# Patient Record
Sex: FEMALE | Race: WHITE | Employment: OTHER | URBAN - METROPOLITAN AREA
[De-identification: names, ages, dates, MRNs, and addresses within clinical notes are randomized per-mention and may not be internally consistent; named-entity substitution may affect disease eponyms.]

---

## 2017-01-06 ENCOUNTER — TELEPHONE (OUTPATIENT)
Dept: INTERNAL MEDICINE CLINIC | Age: 62
End: 2017-01-06

## 2017-01-09 ENCOUNTER — OFFICE VISIT (OUTPATIENT)
Dept: CARDIOLOGY CLINIC | Age: 62
End: 2017-01-09

## 2017-01-09 VITALS
SYSTOLIC BLOOD PRESSURE: 120 MMHG | HEART RATE: 60 BPM | WEIGHT: 236.4 LBS | DIASTOLIC BLOOD PRESSURE: 58 MMHG | HEIGHT: 64 IN | BODY MASS INDEX: 40.36 KG/M2 | OXYGEN SATURATION: 98 %

## 2017-01-09 DIAGNOSIS — I10 ESSENTIAL HYPERTENSION: ICD-10-CM

## 2017-01-09 DIAGNOSIS — I82.5Z2 CHRONIC DEEP VEIN THROMBOSIS (DVT) OF DISTAL VEIN OF LEFT LOWER EXTREMITY (HCC): ICD-10-CM

## 2017-01-09 DIAGNOSIS — I42.9 CARDIOMYOPATHY (HCC): Primary | ICD-10-CM

## 2017-01-09 PROCEDURE — 93000 ELECTROCARDIOGRAM COMPLETE: CPT | Performed by: INTERNAL MEDICINE

## 2017-01-09 PROCEDURE — 99214 OFFICE O/P EST MOD 30 MIN: CPT | Performed by: INTERNAL MEDICINE

## 2017-01-17 RX ORDER — PANTOPRAZOLE SODIUM 40 MG/1
TABLET, DELAYED RELEASE ORAL
Qty: 30 TABLET | Refills: 5 | Status: SHIPPED | OUTPATIENT
Start: 2017-01-17 | End: 2017-08-10 | Stop reason: SDUPTHER

## 2017-01-27 RX ORDER — GABAPENTIN 300 MG/1
CAPSULE ORAL
Qty: 180 CAPSULE | Refills: 3 | Status: SHIPPED | OUTPATIENT
Start: 2017-01-27 | End: 2017-06-20 | Stop reason: SDUPTHER

## 2017-02-01 RX ORDER — INSULIN GLARGINE 300 U/ML
INJECTION, SOLUTION SUBCUTANEOUS
Qty: 18 ML | Refills: 3 | Status: SHIPPED | OUTPATIENT
Start: 2017-02-01 | End: 2017-04-25 | Stop reason: ALTCHOICE

## 2017-02-01 RX ORDER — GABAPENTIN 300 MG/1
CAPSULE ORAL
Qty: 180 CAPSULE | Refills: 3 | Status: SHIPPED | OUTPATIENT
Start: 2017-02-01 | End: 2017-04-25 | Stop reason: SDUPTHER

## 2017-02-06 RX ORDER — RIVAROXABAN 15 MG/1
TABLET, FILM COATED ORAL
Qty: 30 TABLET | Refills: 6 | Status: SHIPPED | OUTPATIENT
Start: 2017-02-06 | End: 2017-02-06 | Stop reason: ALTCHOICE

## 2017-02-24 RX ORDER — ATORVASTATIN CALCIUM 80 MG/1
TABLET, FILM COATED ORAL
Qty: 30 TABLET | Refills: 3 | Status: SHIPPED | OUTPATIENT
Start: 2017-02-24 | End: 2017-09-29 | Stop reason: SDUPTHER

## 2017-04-25 ENCOUNTER — OFFICE VISIT (OUTPATIENT)
Dept: CARDIOLOGY CLINIC | Age: 62
End: 2017-04-25

## 2017-04-25 VITALS
WEIGHT: 237 LBS | BODY MASS INDEX: 40.46 KG/M2 | HEIGHT: 64 IN | HEART RATE: 65 BPM | OXYGEN SATURATION: 96 % | DIASTOLIC BLOOD PRESSURE: 68 MMHG | SYSTOLIC BLOOD PRESSURE: 100 MMHG

## 2017-04-25 DIAGNOSIS — I82.502 CHRONIC DEEP VEIN THROMBOSIS (DVT) OF LEFT LOWER EXTREMITY, UNSPECIFIED VEIN (HCC): ICD-10-CM

## 2017-04-25 DIAGNOSIS — I42.8 NONISCHEMIC CARDIOMYOPATHY (HCC): Primary | ICD-10-CM

## 2017-04-25 DIAGNOSIS — I10 ESSENTIAL HYPERTENSION: ICD-10-CM

## 2017-04-25 PROCEDURE — 99214 OFFICE O/P EST MOD 30 MIN: CPT | Performed by: INTERNAL MEDICINE

## 2017-04-25 RX ORDER — TRIAMTERENE AND HYDROCHLOROTHIAZIDE 37.5; 25 MG/1; MG/1
1 CAPSULE ORAL EVERY MORNING
COMMUNITY
End: 2017-11-13 | Stop reason: SDUPTHER

## 2017-04-25 RX ORDER — CARVEDILOL 12.5 MG/1
12.5 TABLET ORAL 2 TIMES DAILY
Qty: 60 TABLET | Refills: 5 | Status: SHIPPED | OUTPATIENT
Start: 2017-04-25 | End: 2017-10-22 | Stop reason: SDUPTHER

## 2017-04-26 ENCOUNTER — TELEPHONE (OUTPATIENT)
Dept: CARDIOLOGY CLINIC | Age: 62
End: 2017-04-26

## 2017-05-15 RX ORDER — CITALOPRAM 40 MG/1
40 TABLET ORAL DAILY
Qty: 30 TABLET | Refills: 1 | Status: SHIPPED | OUTPATIENT
Start: 2017-05-15 | End: 2017-07-13 | Stop reason: SDUPTHER

## 2017-06-14 ENCOUNTER — OFFICE VISIT (OUTPATIENT)
Dept: INTERNAL MEDICINE CLINIC | Age: 62
End: 2017-06-14

## 2017-06-14 VITALS
BODY MASS INDEX: 42.17 KG/M2 | RESPIRATION RATE: 18 BRPM | OXYGEN SATURATION: 98 % | HEART RATE: 52 BPM | HEIGHT: 63 IN | DIASTOLIC BLOOD PRESSURE: 84 MMHG | WEIGHT: 238 LBS | SYSTOLIC BLOOD PRESSURE: 136 MMHG

## 2017-06-14 DIAGNOSIS — Z91.14 POOR COMPLIANCE WITH MEDICATION: ICD-10-CM

## 2017-06-14 DIAGNOSIS — M54.50 BACK PAIN, LUMBOSACRAL: ICD-10-CM

## 2017-06-14 DIAGNOSIS — Z12.31 VISIT FOR SCREENING MAMMOGRAM: ICD-10-CM

## 2017-06-14 DIAGNOSIS — G89.4 CHRONIC PAIN SYNDROME: ICD-10-CM

## 2017-06-14 DIAGNOSIS — E66.01 MORBID OBESITY DUE TO EXCESS CALORIES (HCC): ICD-10-CM

## 2017-06-14 DIAGNOSIS — Z12.11 COLON CANCER SCREENING: ICD-10-CM

## 2017-06-14 DIAGNOSIS — E78.2 MIXED HYPERLIPIDEMIA: ICD-10-CM

## 2017-06-14 DIAGNOSIS — I10 ESSENTIAL HYPERTENSION: Chronic | ICD-10-CM

## 2017-06-14 PROBLEM — Z91.148 POOR COMPLIANCE WITH MEDICATION: Status: ACTIVE | Noted: 2017-06-14

## 2017-06-14 LAB — HBA1C MFR BLD: 9.8 %

## 2017-06-14 PROCEDURE — 83036 HEMOGLOBIN GLYCOSYLATED A1C: CPT | Performed by: FAMILY MEDICINE

## 2017-06-14 PROCEDURE — 82274 ASSAY TEST FOR BLOOD FECAL: CPT | Performed by: FAMILY MEDICINE

## 2017-06-14 PROCEDURE — 99214 OFFICE O/P EST MOD 30 MIN: CPT | Performed by: FAMILY MEDICINE

## 2017-06-14 RX ORDER — LISINOPRIL 20 MG/1
20 TABLET ORAL DAILY
Qty: 90 TABLET | Refills: 1 | Status: SHIPPED | OUTPATIENT
Start: 2017-06-14 | End: 2017-12-14 | Stop reason: SDUPTHER

## 2017-06-14 ASSESSMENT — ENCOUNTER SYMPTOMS
ABDOMINAL PAIN: 0
DIARRHEA: 0
SHORTNESS OF BREATH: 0
BLOOD IN STOOL: 0
CONSTIPATION: 0

## 2017-06-20 RX ORDER — GABAPENTIN 300 MG/1
CAPSULE ORAL
Qty: 180 CAPSULE | Refills: 3 | Status: SHIPPED | OUTPATIENT
Start: 2017-06-20 | End: 2017-10-22 | Stop reason: SDUPTHER

## 2017-06-22 ENCOUNTER — TELEPHONE (OUTPATIENT)
Dept: INTERNAL MEDICINE CLINIC | Age: 62
End: 2017-06-22

## 2017-06-22 DIAGNOSIS — R19.5 POSITIVE FIT (FECAL IMMUNOCHEMICAL TEST): Primary | ICD-10-CM

## 2017-06-22 LAB
CONTROL: PRESENT
HEMOCCULT STL QL: POSITIVE

## 2017-06-26 ENCOUNTER — HOSPITAL ENCOUNTER (OUTPATIENT)
Dept: OTHER | Age: 62
Discharge: HOME | End: 2017-06-26
Attending: FAMILY MEDICINE | Admitting: FAMILY MEDICINE

## 2017-06-26 DIAGNOSIS — G89.4 CHRONIC PAIN SYNDROME: ICD-10-CM

## 2017-06-27 ENCOUNTER — TELEPHONE (OUTPATIENT)
Dept: INTERNAL MEDICINE CLINIC | Age: 62
End: 2017-06-27

## 2017-07-03 RX ORDER — NAPROXEN 375 MG/1
375 TABLET ORAL 2 TIMES DAILY WITH MEALS
Qty: 60 TABLET | Refills: 3 | Status: SHIPPED | OUTPATIENT
Start: 2017-07-03 | End: 2017-07-17

## 2017-07-05 ENCOUNTER — HOSPITAL ENCOUNTER (OUTPATIENT)
Dept: WOMENS IMAGING | Age: 62
Discharge: HOME | End: 2017-07-05
Attending: FAMILY MEDICINE | Admitting: FAMILY MEDICINE

## 2017-07-05 DIAGNOSIS — Z12.31 VISIT FOR SCREENING MAMMOGRAM: ICD-10-CM

## 2017-07-07 ENCOUNTER — TELEPHONE (OUTPATIENT)
Dept: INTERNAL MEDICINE CLINIC | Age: 62
End: 2017-07-07

## 2017-07-07 DIAGNOSIS — R92.8 ABNORMAL MAMMOGRAM OF LEFT BREAST: Primary | ICD-10-CM

## 2017-07-10 ENCOUNTER — HOSPITAL ENCOUNTER (OUTPATIENT)
Dept: ULTRASOUND IMAGING | Age: 62
Discharge: HOME | End: 2017-07-10
Attending: FAMILY MEDICINE | Admitting: FAMILY MEDICINE

## 2017-07-10 DIAGNOSIS — R92.8 ABNORMAL MAMMOGRAM OF LEFT BREAST: ICD-10-CM

## 2017-07-10 DIAGNOSIS — R92.8 OTHER ABNORMAL AND INCONCLUSIVE FINDINGS ON DIAGNOSTIC IMAGING OF BREAST: ICD-10-CM

## 2017-07-13 RX ORDER — CITALOPRAM 40 MG/1
TABLET ORAL
Qty: 30 TABLET | Refills: 2 | Status: SHIPPED | OUTPATIENT
Start: 2017-07-13 | End: 2017-10-15 | Stop reason: SDUPTHER

## 2017-07-17 RX ORDER — NAPROXEN 500 MG/1
500 TABLET ORAL 2 TIMES DAILY WITH MEALS
Qty: 60 TABLET | Refills: 0 | Status: SHIPPED | OUTPATIENT
Start: 2017-07-17 | End: 2017-08-16 | Stop reason: SDUPTHER

## 2017-07-27 RX ORDER — VALACYCLOVIR HYDROCHLORIDE 1 G/1
TABLET, FILM COATED ORAL
Qty: 4 TABLET | Refills: 1 | Status: SHIPPED | OUTPATIENT
Start: 2017-07-27 | End: 2018-03-27

## 2017-08-10 RX ORDER — PANTOPRAZOLE SODIUM 40 MG/1
TABLET, DELAYED RELEASE ORAL
Qty: 30 TABLET | Refills: 3 | Status: SHIPPED | OUTPATIENT
Start: 2017-08-10 | End: 2017-12-09 | Stop reason: SDUPTHER

## 2017-08-14 RX ORDER — INSULIN DEGLUDEC 200 U/ML
INJECTION, SOLUTION SUBCUTANEOUS
Refills: 1 | OUTPATIENT
Start: 2017-08-14

## 2017-08-17 RX ORDER — NAPROXEN 500 MG/1
500 TABLET ORAL 2 TIMES DAILY WITH MEALS
Qty: 60 TABLET | Refills: 0 | Status: SHIPPED | OUTPATIENT
Start: 2017-08-17 | End: 2017-09-13 | Stop reason: SDUPTHER

## 2017-09-13 RX ORDER — NAPROXEN 500 MG/1
500 TABLET ORAL 2 TIMES DAILY WITH MEALS
Qty: 60 TABLET | Refills: 0 | Status: SHIPPED | OUTPATIENT
Start: 2017-09-13 | End: 2017-10-05

## 2017-09-13 RX ORDER — NAPROXEN 500 MG/1
TABLET ORAL
Qty: 60 TABLET | Refills: 0 | OUTPATIENT
Start: 2017-09-13

## 2017-09-21 RX ORDER — RIVAROXABAN 20 MG/1
TABLET, FILM COATED ORAL
Qty: 30 TABLET | Refills: 1 | Status: SHIPPED | OUTPATIENT
Start: 2017-09-21 | End: 2017-11-19 | Stop reason: SDUPTHER

## 2017-09-28 ENCOUNTER — OFFICE VISIT (OUTPATIENT)
Dept: INTERNAL MEDICINE CLINIC | Age: 62
End: 2017-09-28

## 2017-09-28 VITALS — WEIGHT: 247 LBS | HEART RATE: 56 BPM | BODY MASS INDEX: 44.1 KG/M2 | OXYGEN SATURATION: 97 % | RESPIRATION RATE: 18 BRPM

## 2017-09-28 DIAGNOSIS — E78.2 MIXED HYPERLIPIDEMIA: ICD-10-CM

## 2017-09-28 DIAGNOSIS — N32.81 OAB (OVERACTIVE BLADDER): ICD-10-CM

## 2017-09-28 DIAGNOSIS — E66.01 MORBID OBESITY DUE TO EXCESS CALORIES (HCC): ICD-10-CM

## 2017-09-28 DIAGNOSIS — Z23 NEED FOR INFLUENZA VACCINATION: ICD-10-CM

## 2017-09-28 DIAGNOSIS — E11.42 DM TYPE 2 WITH DIABETIC PERIPHERAL NEUROPATHY (HCC): Primary | ICD-10-CM

## 2017-09-28 DIAGNOSIS — I10 ESSENTIAL HYPERTENSION: Chronic | ICD-10-CM

## 2017-09-28 LAB
CREATININE URINE POCT: 106.6
HBA1C MFR BLD: 7.8 %
MICROALBUMIN/CREAT 24H UR: 41.2 MG/G{CREAT}
MICROALBUMIN/CREAT UR-RTO: 38.6

## 2017-09-28 PROCEDURE — 90686 IIV4 VACC NO PRSV 0.5 ML IM: CPT | Performed by: FAMILY MEDICINE

## 2017-09-28 PROCEDURE — 99214 OFFICE O/P EST MOD 30 MIN: CPT | Performed by: FAMILY MEDICINE

## 2017-09-28 PROCEDURE — 90471 IMMUNIZATION ADMIN: CPT | Performed by: FAMILY MEDICINE

## 2017-09-28 PROCEDURE — 83036 HEMOGLOBIN GLYCOSYLATED A1C: CPT | Performed by: FAMILY MEDICINE

## 2017-09-28 PROCEDURE — 82044 UR ALBUMIN SEMIQUANTITATIVE: CPT | Performed by: FAMILY MEDICINE

## 2017-09-28 ASSESSMENT — ENCOUNTER SYMPTOMS
CONSTIPATION: 0
SHORTNESS OF BREATH: 0
BLOOD IN STOOL: 0
ABDOMINAL PAIN: 0
DIARRHEA: 0

## 2017-09-29 RX ORDER — TOLTERODINE 4 MG/1
4 CAPSULE, EXTENDED RELEASE ORAL DAILY
Qty: 30 CAPSULE | Refills: 3 | Status: SHIPPED | OUTPATIENT
Start: 2017-09-29 | End: 2018-03-27

## 2017-09-29 RX ORDER — ATORVASTATIN CALCIUM 80 MG/1
TABLET, FILM COATED ORAL
Qty: 90 TABLET | Refills: 1 | Status: SHIPPED | OUTPATIENT
Start: 2017-09-29 | End: 2018-05-24 | Stop reason: SDUPTHER

## 2017-10-02 ENCOUNTER — TELEPHONE (OUTPATIENT)
Dept: INTERNAL MEDICINE CLINIC | Age: 62
End: 2017-10-02

## 2017-10-02 RX ORDER — OXYBUTYNIN CHLORIDE 5 MG/1
5 TABLET ORAL 2 TIMES DAILY
Qty: 60 TABLET | Refills: 3 | Status: SHIPPED | OUTPATIENT
Start: 2017-10-02 | End: 2018-04-09

## 2017-10-03 ENCOUNTER — TELEPHONE (OUTPATIENT)
Dept: BARIATRICS/WEIGHT MGMT | Age: 62
End: 2017-10-03

## 2017-10-04 ENCOUNTER — NURSE ONLY (OUTPATIENT)
Dept: INTERNAL MEDICINE CLINIC | Age: 62
End: 2017-10-04

## 2017-10-04 DIAGNOSIS — Z11.59 NEED FOR HEPATITIS C SCREENING TEST: ICD-10-CM

## 2017-10-04 DIAGNOSIS — Z11.4 SCREENING FOR HIV (HUMAN IMMUNODEFICIENCY VIRUS): ICD-10-CM

## 2017-10-04 DIAGNOSIS — Z00.00 PREVENTATIVE HEALTH CARE: Primary | ICD-10-CM

## 2017-10-04 LAB
A/G RATIO: 1.2 (ref 1.1–2.2)
ALBUMIN SERPL-MCNC: 3.8 G/DL (ref 3.4–5)
ALP BLD-CCNC: 99 U/L (ref 40–129)
ALT SERPL-CCNC: 13 U/L (ref 10–40)
ANION GAP SERPL CALCULATED.3IONS-SCNC: 14 MMOL/L (ref 3–16)
AST SERPL-CCNC: 11 U/L (ref 15–37)
BASOPHILS ABSOLUTE: 0.1 K/UL (ref 0–0.2)
BASOPHILS RELATIVE PERCENT: 0.7 %
BILIRUB SERPL-MCNC: <0.2 MG/DL (ref 0–1)
BUN BLDV-MCNC: 35 MG/DL (ref 7–20)
CALCIUM SERPL-MCNC: 9.2 MG/DL (ref 8.3–10.6)
CHLORIDE BLD-SCNC: 100 MMOL/L (ref 99–110)
CHOLESTEROL, TOTAL: 191 MG/DL (ref 0–199)
CO2: 25 MMOL/L (ref 21–32)
CREAT SERPL-MCNC: 1.9 MG/DL (ref 0.6–1.2)
EOSINOPHILS ABSOLUTE: 0.3 K/UL (ref 0–0.6)
EOSINOPHILS RELATIVE PERCENT: 3.9 %
GFR AFRICAN AMERICAN: 32
GFR NON-AFRICAN AMERICAN: 27
GLOBULIN: 3.2 G/DL
GLUCOSE BLD-MCNC: 131 MG/DL (ref 70–99)
HCT VFR BLD CALC: 34.3 % (ref 36–48)
HDLC SERPL-MCNC: 46 MG/DL (ref 40–60)
HEMOGLOBIN: 11.1 G/DL (ref 12–16)
HEPATITIS C ANTIBODY INTERPRETATION: NORMAL
LDL CHOLESTEROL CALCULATED: 119 MG/DL
LYMPHOCYTES ABSOLUTE: 2 K/UL (ref 1–5.1)
LYMPHOCYTES RELATIVE PERCENT: 25.3 %
MCH RBC QN AUTO: 29.4 PG (ref 26–34)
MCHC RBC AUTO-ENTMCNC: 32.3 G/DL (ref 31–36)
MCV RBC AUTO: 90.9 FL (ref 80–100)
MONOCYTES ABSOLUTE: 0.6 K/UL (ref 0–1.3)
MONOCYTES RELATIVE PERCENT: 6.9 %
NEUTROPHILS ABSOLUTE: 5.1 K/UL (ref 1.7–7.7)
NEUTROPHILS RELATIVE PERCENT: 63.2 %
PDW BLD-RTO: 14.5 % (ref 12.4–15.4)
PLATELET # BLD: 260 K/UL (ref 135–450)
PMV BLD AUTO: 10 FL (ref 5–10.5)
POTASSIUM SERPL-SCNC: 5 MMOL/L (ref 3.5–5.1)
RBC # BLD: 3.77 M/UL (ref 4–5.2)
SODIUM BLD-SCNC: 139 MMOL/L (ref 136–145)
T4 FREE: 0.9 NG/DL (ref 0.9–1.8)
TOTAL PROTEIN: 7 G/DL (ref 6.4–8.2)
TRIGL SERPL-MCNC: 132 MG/DL (ref 0–150)
TSH SERPL DL<=0.05 MIU/L-ACNC: 2.24 UIU/ML (ref 0.27–4.2)
VLDLC SERPL CALC-MCNC: 26 MG/DL
WBC # BLD: 8 K/UL (ref 4–11)

## 2017-10-04 PROCEDURE — 36415 COLL VENOUS BLD VENIPUNCTURE: CPT | Performed by: FAMILY MEDICINE

## 2017-10-04 NOTE — PROGRESS NOTES
Patient is here for fasting labs. Drawn without complications. Advised patient to check for lab results, or call in the next day or two.

## 2017-10-05 ENCOUNTER — TELEPHONE (OUTPATIENT)
Dept: INTERNAL MEDICINE CLINIC | Age: 62
End: 2017-10-05

## 2017-10-05 DIAGNOSIS — E11.22 CKD STAGE 3 SECONDARY TO DIABETES (HCC): Primary | ICD-10-CM

## 2017-10-05 DIAGNOSIS — N18.30 CKD STAGE 3 SECONDARY TO DIABETES (HCC): Primary | ICD-10-CM

## 2017-10-05 LAB — HIV-1 AND HIV-2 ANTIBODIES: NORMAL

## 2017-10-05 RX ORDER — EZETIMIBE 10 MG/1
10 TABLET ORAL DAILY
Qty: 30 TABLET | Refills: 3 | Status: SHIPPED | OUTPATIENT
Start: 2017-10-05 | End: 2018-04-09 | Stop reason: ALTCHOICE

## 2017-10-05 NOTE — TELEPHONE ENCOUNTER
Reviewed with Dr Elli Pittman. V.O.'s per Dr Elli Pittman:    1) D/C Naprosyn. No NSAID's.   2) CKD stage 3-4 refer to Dr Alesha Acosta  3) LDL elevated. Pt is at optimum dose of Lipitor- 80 mg. Add Zetia 10 mg daily. Repeat lipids in 3 months. Called pt. Informed her of all of this. She is okay with the Zetia. Wants to wait on the referral (copay costs). Main question:  She takes the NSAIDs for back pain. What can she take for the pain now? See a spine specialist?    Informed pt that Dr Elli Pittman has left for the day. Will call back tomorrow.

## 2017-10-09 RX ORDER — DAPAGLIFLOZIN 10 MG/1
10 TABLET, FILM COATED ORAL EVERY MORNING
Qty: 30 TABLET | Refills: 3 | Status: SHIPPED | OUTPATIENT
Start: 2017-10-09 | End: 2018-02-15 | Stop reason: SDUPTHER

## 2017-10-16 RX ORDER — CITALOPRAM 40 MG/1
40 TABLET ORAL DAILY
Qty: 30 TABLET | Refills: 3 | Status: SHIPPED | OUTPATIENT
Start: 2017-10-16 | End: 2018-02-20 | Stop reason: SDUPTHER

## 2017-10-23 RX ORDER — GABAPENTIN 300 MG/1
CAPSULE ORAL
Qty: 180 CAPSULE | Refills: 3 | Status: SHIPPED | OUTPATIENT
Start: 2017-10-23 | End: 2018-03-27

## 2017-10-23 RX ORDER — CARVEDILOL 12.5 MG/1
TABLET ORAL
Qty: 60 TABLET | Refills: 6 | Status: SHIPPED | OUTPATIENT
Start: 2017-10-23 | End: 2018-04-25 | Stop reason: SDUPTHER

## 2017-11-13 RX ORDER — TRIAMTERENE AND HYDROCHLOROTHIAZIDE 37.5; 25 MG/1; MG/1
1 CAPSULE ORAL EVERY MORNING
Qty: 30 CAPSULE | Refills: 2 | Status: SHIPPED | OUTPATIENT
Start: 2017-11-13 | End: 2018-02-20 | Stop reason: SDUPTHER

## 2017-11-20 RX ORDER — RIVAROXABAN 20 MG/1
TABLET, FILM COATED ORAL
Qty: 30 TABLET | Refills: 1 | Status: SHIPPED | OUTPATIENT
Start: 2017-11-20 | End: 2018-04-09 | Stop reason: ALTCHOICE

## 2017-12-11 RX ORDER — PANTOPRAZOLE SODIUM 40 MG/1
TABLET, DELAYED RELEASE ORAL
Qty: 30 TABLET | Refills: 2 | Status: SHIPPED | OUTPATIENT
Start: 2017-12-11 | End: 2018-03-27 | Stop reason: SDUPTHER

## 2017-12-15 RX ORDER — LISINOPRIL 20 MG/1
20 TABLET ORAL DAILY
Qty: 90 TABLET | Refills: 0 | Status: SHIPPED | OUTPATIENT
Start: 2017-12-15 | End: 2018-02-28 | Stop reason: SDUPTHER

## 2018-02-12 DIAGNOSIS — E11.42 DIABETIC POLYNEUROPATHY ASSOCIATED WITH TYPE 2 DIABETES MELLITUS (HCC): Primary | ICD-10-CM

## 2018-02-12 RX ORDER — LANCETS 33 GAUGE
EACH MISCELLANEOUS
Qty: 100 EACH | Refills: 11 | Status: SHIPPED | OUTPATIENT
Start: 2018-02-12 | End: 2018-03-27

## 2018-02-20 RX ORDER — CITALOPRAM 40 MG/1
40 TABLET ORAL DAILY
Qty: 30 TABLET | Refills: 0 | Status: SHIPPED | OUTPATIENT
Start: 2018-02-20 | End: 2018-03-27 | Stop reason: SDUPTHER

## 2018-02-20 RX ORDER — TRIAMTERENE AND HYDROCHLOROTHIAZIDE 37.5; 25 MG/1; MG/1
1 CAPSULE ORAL EVERY MORNING
Qty: 30 CAPSULE | Refills: 0 | Status: SHIPPED | OUTPATIENT
Start: 2018-02-20 | End: 2018-03-27 | Stop reason: SDUPTHER

## 2018-02-28 RX ORDER — LISINOPRIL 20 MG/1
20 TABLET ORAL DAILY
Qty: 30 TABLET | Refills: 0 | Status: SHIPPED | OUTPATIENT
Start: 2018-02-28 | End: 2018-03-27 | Stop reason: SDUPTHER

## 2018-03-27 ENCOUNTER — OFFICE VISIT (OUTPATIENT)
Dept: INTERNAL MEDICINE CLINIC | Age: 63
End: 2018-03-27

## 2018-03-27 VITALS
DIASTOLIC BLOOD PRESSURE: 57 MMHG | BODY MASS INDEX: 42.88 KG/M2 | OXYGEN SATURATION: 97 % | WEIGHT: 242 LBS | HEART RATE: 52 BPM | SYSTOLIC BLOOD PRESSURE: 102 MMHG | HEIGHT: 63 IN

## 2018-03-27 DIAGNOSIS — E11.42 DM TYPE 2 WITH DIABETIC PERIPHERAL NEUROPATHY (HCC): ICD-10-CM

## 2018-03-27 DIAGNOSIS — Z91.14 POOR COMPLIANCE WITH MEDICATION: ICD-10-CM

## 2018-03-27 DIAGNOSIS — I10 ESSENTIAL HYPERTENSION: Chronic | ICD-10-CM

## 2018-03-27 DIAGNOSIS — L08.9 DIABETIC INFECTION OF LEFT FOOT (HCC): ICD-10-CM

## 2018-03-27 DIAGNOSIS — E78.2 MIXED HYPERLIPIDEMIA: ICD-10-CM

## 2018-03-27 DIAGNOSIS — E11.628 DIABETIC INFECTION OF LEFT FOOT (HCC): ICD-10-CM

## 2018-03-27 LAB — HBA1C MFR BLD: 8.2 %

## 2018-03-27 PROCEDURE — G8417 CALC BMI ABV UP PARAM F/U: HCPCS | Performed by: FAMILY MEDICINE

## 2018-03-27 PROCEDURE — 99214 OFFICE O/P EST MOD 30 MIN: CPT | Performed by: FAMILY MEDICINE

## 2018-03-27 PROCEDURE — G8427 DOCREV CUR MEDS BY ELIG CLIN: HCPCS | Performed by: FAMILY MEDICINE

## 2018-03-27 PROCEDURE — 83036 HEMOGLOBIN GLYCOSYLATED A1C: CPT | Performed by: FAMILY MEDICINE

## 2018-03-27 PROCEDURE — 3017F COLORECTAL CA SCREEN DOC REV: CPT | Performed by: FAMILY MEDICINE

## 2018-03-27 PROCEDURE — 1036F TOBACCO NON-USER: CPT | Performed by: FAMILY MEDICINE

## 2018-03-27 PROCEDURE — G8482 FLU IMMUNIZE ORDER/ADMIN: HCPCS | Performed by: FAMILY MEDICINE

## 2018-03-27 PROCEDURE — 3014F SCREEN MAMMO DOC REV: CPT | Performed by: FAMILY MEDICINE

## 2018-03-27 PROCEDURE — 3045F PR MOST RECENT HEMOGLOBIN A1C LEVEL 7.0-9.0%: CPT | Performed by: FAMILY MEDICINE

## 2018-03-27 RX ORDER — CITALOPRAM 40 MG/1
40 TABLET ORAL DAILY
Qty: 30 TABLET | Refills: 3 | Status: SHIPPED | OUTPATIENT
Start: 2018-03-27 | End: 2018-07-22 | Stop reason: SDUPTHER

## 2018-03-27 RX ORDER — CLOTRIMAZOLE AND BETAMETHASONE DIPROPIONATE 10; .64 MG/G; MG/G
CREAM TOPICAL
Qty: 1 TUBE | Refills: 1 | Status: SHIPPED | OUTPATIENT
Start: 2018-03-27 | End: 2018-06-11

## 2018-03-27 RX ORDER — FLUCONAZOLE 150 MG/1
150 TABLET ORAL ONCE
Qty: 2 TABLET | Refills: 0 | Status: SHIPPED | OUTPATIENT
Start: 2018-03-27 | End: 2018-03-27

## 2018-03-27 RX ORDER — TRIAMTERENE AND HYDROCHLOROTHIAZIDE 37.5; 25 MG/1; MG/1
1 CAPSULE ORAL EVERY MORNING
Qty: 30 CAPSULE | Refills: 3 | Status: SHIPPED | OUTPATIENT
Start: 2018-03-27 | End: 2018-04-25 | Stop reason: SDUPTHER

## 2018-03-27 RX ORDER — PANTOPRAZOLE SODIUM 40 MG/1
TABLET, DELAYED RELEASE ORAL
Qty: 30 TABLET | Refills: 3 | Status: SHIPPED | OUTPATIENT
Start: 2018-03-27 | End: 2018-07-15 | Stop reason: SDUPTHER

## 2018-03-27 RX ORDER — LISINOPRIL 20 MG/1
20 TABLET ORAL DAILY
Qty: 30 TABLET | Refills: 3 | Status: SHIPPED | OUTPATIENT
Start: 2018-03-27 | End: 2018-07-22 | Stop reason: SDUPTHER

## 2018-03-27 RX ORDER — SULFAMETHOXAZOLE AND TRIMETHOPRIM 800; 160 MG/1; MG/1
1 TABLET ORAL 2 TIMES DAILY
Qty: 20 TABLET | Refills: 0 | Status: SHIPPED | OUTPATIENT
Start: 2018-03-27 | End: 2018-04-06

## 2018-03-27 ASSESSMENT — ENCOUNTER SYMPTOMS
SHORTNESS OF BREATH: 0
BLOOD IN STOOL: 0
CONSTIPATION: 0
ABDOMINAL PAIN: 0
DIARRHEA: 0

## 2018-03-29 RX ORDER — LISINOPRIL 20 MG/1
20 TABLET ORAL DAILY
Qty: 30 TABLET | Refills: 0 | OUTPATIENT
Start: 2018-03-29

## 2018-04-09 ENCOUNTER — HOSPITAL ENCOUNTER (OUTPATIENT)
Dept: WOUND CARE | Age: 63
Discharge: HOME | End: 2018-04-09
Admitting: NURSE PRACTITIONER

## 2018-04-09 VITALS
HEART RATE: 56 BPM | WEIGHT: 244.05 LBS | TEMPERATURE: 98.4 F | RESPIRATION RATE: 20 BRPM | HEIGHT: 63 IN | BODY MASS INDEX: 43.24 KG/M2 | DIASTOLIC BLOOD PRESSURE: 74 MMHG | SYSTOLIC BLOOD PRESSURE: 143 MMHG

## 2018-04-09 DIAGNOSIS — E66.01 MORBID OBESITY DUE TO EXCESS CALORIES (HCC): ICD-10-CM

## 2018-04-09 DIAGNOSIS — E11.42 DM TYPE 2 WITH DIABETIC PERIPHERAL NEUROPATHY (HCC): Primary | ICD-10-CM

## 2018-04-09 DIAGNOSIS — E11.621 DIABETIC ULCER OF TOE OF LEFT FOOT ASSOCIATED WITH TYPE 2 DIABETES MELLITUS, WITH FAT LAYER EXPOSED (HCC): ICD-10-CM

## 2018-04-09 DIAGNOSIS — L97.522 DIABETIC ULCER OF TOE OF LEFT FOOT ASSOCIATED WITH TYPE 2 DIABETES MELLITUS, WITH FAT LAYER EXPOSED (HCC): ICD-10-CM

## 2018-04-09 PROCEDURE — 99204 OFFICE O/P NEW MOD 45 MIN: CPT | Performed by: NURSE PRACTITIONER

## 2018-04-09 PROCEDURE — 11042 DBRDMT SUBQ TIS 1ST 20SQCM/<: CPT | Performed by: NURSE PRACTITIONER

## 2018-04-09 RX ORDER — LIDOCAINE HYDROCHLORIDE 40 MG/ML
SOLUTION TOPICAL ONCE
Status: DISCONTINUED | OUTPATIENT
Start: 2018-04-09 | End: 2018-04-10 | Stop reason: HOSPADM

## 2018-04-09 RX ORDER — GABAPENTIN 100 MG/1
100 CAPSULE ORAL 3 TIMES DAILY
COMMUNITY
End: 2018-05-25 | Stop reason: SDUPTHER

## 2018-04-10 PROBLEM — E11.621 DIABETIC ULCER OF TOE OF LEFT FOOT ASSOCIATED WITH TYPE 2 DIABETES MELLITUS, WITH FAT LAYER EXPOSED (HCC): Status: ACTIVE | Noted: 2018-04-10

## 2018-04-10 PROBLEM — L97.522 DIABETIC ULCER OF TOE OF LEFT FOOT ASSOCIATED WITH TYPE 2 DIABETES MELLITUS, WITH FAT LAYER EXPOSED (HCC): Status: ACTIVE | Noted: 2018-04-10

## 2018-04-19 ENCOUNTER — HOSPITAL ENCOUNTER (OUTPATIENT)
Dept: WOUND CARE | Age: 63
Discharge: HOME | End: 2018-04-19
Attending: PODIATRIST | Admitting: PODIATRIST

## 2018-04-19 VITALS
RESPIRATION RATE: 20 BRPM | TEMPERATURE: 98.1 F | DIASTOLIC BLOOD PRESSURE: 61 MMHG | SYSTOLIC BLOOD PRESSURE: 101 MMHG | HEART RATE: 58 BPM

## 2018-04-20 RX ORDER — LIDOCAINE HYDROCHLORIDE 40 MG/ML
SOLUTION TOPICAL ONCE
Status: DISCONTINUED | OUTPATIENT
Start: 2018-04-20 | End: 2018-06-07

## 2018-04-25 RX ORDER — CARVEDILOL 12.5 MG/1
TABLET ORAL
Qty: 180 TABLET | Refills: 0 | Status: SHIPPED | OUTPATIENT
Start: 2018-04-25 | End: 2018-08-28 | Stop reason: SDUPTHER

## 2018-04-25 RX ORDER — TRIAMTERENE AND HYDROCHLOROTHIAZIDE 37.5; 25 MG/1; MG/1
1 CAPSULE ORAL EVERY MORNING
Qty: 90 CAPSULE | Refills: 1 | Status: SHIPPED | OUTPATIENT
Start: 2018-04-25 | End: 2018-11-18 | Stop reason: SDUPTHER

## 2018-04-25 RX ORDER — GABAPENTIN 300 MG/1
CAPSULE ORAL
Qty: 180 CAPSULE | Refills: 3 | Status: SHIPPED | OUTPATIENT
Start: 2018-04-25 | End: 2018-07-22 | Stop reason: SDUPTHER

## 2018-05-16 DIAGNOSIS — E11.42 DM TYPE 2 WITH DIABETIC PERIPHERAL NEUROPATHY (HCC): Primary | ICD-10-CM

## 2018-05-16 RX ORDER — PEN NEEDLE, DIABETIC 31 GX5/16"
NEEDLE, DISPOSABLE MISCELLANEOUS
Qty: 100 EACH | Refills: 3 | Status: SHIPPED | OUTPATIENT
Start: 2018-05-16 | End: 2018-09-18 | Stop reason: SDUPTHER

## 2018-05-24 ENCOUNTER — HOSPITAL ENCOUNTER (OUTPATIENT)
Dept: OTHER | Age: 63
Discharge: HOME | End: 2018-05-24
Attending: PODIATRIST | Admitting: PODIATRIST

## 2018-05-24 ENCOUNTER — HOSPITAL ENCOUNTER (OUTPATIENT)
Dept: WOUND CARE | Age: 63
Discharge: HOME | End: 2018-05-24
Attending: PODIATRIST | Admitting: PODIATRIST

## 2018-05-24 VITALS
TEMPERATURE: 97.8 F | WEIGHT: 244 LBS | SYSTOLIC BLOOD PRESSURE: 124 MMHG | BODY MASS INDEX: 43.23 KG/M2 | RESPIRATION RATE: 20 BRPM | HEIGHT: 63 IN | HEART RATE: 60 BPM | DIASTOLIC BLOOD PRESSURE: 67 MMHG

## 2018-05-24 DIAGNOSIS — E11.621 DIABETIC ULCER OF TOE OF LEFT FOOT ASSOCIATED WITH TYPE 2 DIABETES MELLITUS, WITH FAT LAYER EXPOSED (HCC): ICD-10-CM

## 2018-05-24 DIAGNOSIS — L97.522 DIABETIC ULCER OF TOE OF LEFT FOOT ASSOCIATED WITH TYPE 2 DIABETES MELLITUS, WITH FAT LAYER EXPOSED (HCC): ICD-10-CM

## 2018-05-24 DIAGNOSIS — M79.662 PAIN OF LEFT CALF: Primary | ICD-10-CM

## 2018-05-24 LAB
C-REACTIVE PROTEIN: 48.7 MG/L (ref 0–5.1)
HCT VFR BLD CALC: 32.8 % (ref 36–48)
HEMOGLOBIN: 11 G/DL (ref 12–16)
MCH RBC QN AUTO: 29.3 PG (ref 26–34)
MCHC RBC AUTO-ENTMCNC: 33.5 G/DL (ref 31–36)
MCV RBC AUTO: 87.5 FL (ref 80–100)
PDW BLD-RTO: 15.3 % (ref 12.4–15.4)
PLATELET # BLD: 304 K/UL (ref 135–450)
PMV BLD AUTO: 9.7 FL (ref 5–10.5)
RBC # BLD: 3.75 M/UL (ref 4–5.2)
SEDIMENTATION RATE, ERYTHROCYTE: 49 MM/HR (ref 0–30)
WBC # BLD: 10.1 K/UL (ref 4–11)

## 2018-05-24 RX ORDER — CIPROFLOXACIN 500 MG/1
500 TABLET, FILM COATED ORAL 2 TIMES DAILY
Qty: 28 TABLET | Refills: 0 | Status: SHIPPED | OUTPATIENT
Start: 2018-05-24 | End: 2018-06-07

## 2018-05-24 RX ORDER — CLINDAMYCIN HYDROCHLORIDE 300 MG/1
300 CAPSULE ORAL 3 TIMES DAILY
Qty: 42 CAPSULE | Refills: 0 | Status: SHIPPED | OUTPATIENT
Start: 2018-05-24 | End: 2018-05-31 | Stop reason: ALTCHOICE

## 2018-05-24 ASSESSMENT — PAIN DESCRIPTION - LOCATION: LOCATION: TOE (COMMENT WHICH ONE)

## 2018-05-24 ASSESSMENT — PAIN DESCRIPTION - ORIENTATION: ORIENTATION: LEFT

## 2018-05-24 ASSESSMENT — PAIN DESCRIPTION - DESCRIPTORS: DESCRIPTORS: ACHING

## 2018-05-24 ASSESSMENT — PAIN SCALES - GENERAL: PAINLEVEL_OUTOF10: 2

## 2018-05-24 ASSESSMENT — PAIN DESCRIPTION - ONSET: ONSET: ON-GOING

## 2018-05-24 ASSESSMENT — PAIN DESCRIPTION - FREQUENCY: FREQUENCY: INTERMITTENT

## 2018-05-24 ASSESSMENT — PAIN DESCRIPTION - PROGRESSION: CLINICAL_PROGRESSION: NOT CHANGED

## 2018-05-24 ASSESSMENT — PAIN DESCRIPTION - PAIN TYPE: TYPE: ACUTE PAIN

## 2018-05-25 RX ORDER — LIDOCAINE HYDROCHLORIDE 40 MG/ML
SOLUTION TOPICAL PRN
Status: DISCONTINUED | OUTPATIENT
Start: 2018-05-25 | End: 2018-11-19

## 2018-05-25 RX ORDER — ATORVASTATIN CALCIUM 80 MG/1
TABLET, FILM COATED ORAL
Qty: 90 TABLET | Refills: 0 | Status: SHIPPED | OUTPATIENT
Start: 2018-05-25 | End: 2018-05-31 | Stop reason: SDUPTHER

## 2018-05-26 LAB
GRAM STAIN RESULT: ABNORMAL
ORGANISM: ABNORMAL
WOUND/ABSCESS: ABNORMAL
WOUND/ABSCESS: ABNORMAL

## 2018-05-31 ENCOUNTER — HOSPITAL ENCOUNTER (OUTPATIENT)
Dept: WOUND CARE | Age: 63
Discharge: HOME | End: 2018-05-31
Attending: PODIATRIST | Admitting: PODIATRIST

## 2018-05-31 VITALS
TEMPERATURE: 97.4 F | DIASTOLIC BLOOD PRESSURE: 59 MMHG | SYSTOLIC BLOOD PRESSURE: 96 MMHG | HEART RATE: 66 BPM | RESPIRATION RATE: 20 BRPM

## 2018-05-31 DIAGNOSIS — M86.172 OSTEOMYELITIS OF ANKLE OR FOOT, ACUTE, LEFT (HCC): ICD-10-CM

## 2018-05-31 DIAGNOSIS — L97.522 DIABETIC ULCER OF TOE OF LEFT FOOT ASSOCIATED WITH TYPE 2 DIABETES MELLITUS, WITH FAT LAYER EXPOSED (HCC): Primary | ICD-10-CM

## 2018-05-31 DIAGNOSIS — E11.621 DIABETIC ULCER OF TOE OF LEFT FOOT ASSOCIATED WITH TYPE 2 DIABETES MELLITUS, WITH FAT LAYER EXPOSED (HCC): Primary | ICD-10-CM

## 2018-05-31 RX ORDER — ATORVASTATIN CALCIUM 80 MG/1
80 TABLET, FILM COATED ORAL DAILY
Qty: 90 TABLET | Refills: 1 | Status: SHIPPED | OUTPATIENT
Start: 2018-05-31 | End: 2018-12-05 | Stop reason: SDUPTHER

## 2018-05-31 RX ORDER — LIDOCAINE HYDROCHLORIDE 40 MG/ML
SOLUTION TOPICAL PRN
Status: DISCONTINUED | OUTPATIENT
Start: 2018-05-31 | End: 2018-06-01 | Stop reason: HOSPADM

## 2018-05-31 RX ORDER — CEPHALEXIN 500 MG/1
500 CAPSULE ORAL 3 TIMES DAILY
Qty: 42 CAPSULE | Refills: 0 | Status: SHIPPED | OUTPATIENT
Start: 2018-05-31 | End: 2018-06-11

## 2018-05-31 ASSESSMENT — PAIN SCALES - GENERAL: PAINLEVEL_OUTOF10: 0

## 2018-06-04 PROBLEM — M86.172 OSTEOMYELITIS OF ANKLE OR FOOT, ACUTE, LEFT (HCC): Status: ACTIVE | Noted: 2018-06-04

## 2018-06-06 ENCOUNTER — HOSPITAL ENCOUNTER (OUTPATIENT)
Dept: MRI IMAGING | Age: 63
Discharge: HOME | End: 2018-06-06
Attending: PODIATRIST | Admitting: PODIATRIST

## 2018-06-06 DIAGNOSIS — E11.621 TYPE 2 DIABETES MELLITUS WITH FOOT ULCER (CODE) (HCC): ICD-10-CM

## 2018-06-06 DIAGNOSIS — L97.522 DIABETIC ULCER OF TOE OF LEFT FOOT ASSOCIATED WITH TYPE 2 DIABETES MELLITUS, WITH FAT LAYER EXPOSED (HCC): ICD-10-CM

## 2018-06-06 DIAGNOSIS — E11.621 DIABETIC ULCER OF TOE OF LEFT FOOT ASSOCIATED WITH TYPE 2 DIABETES MELLITUS, WITH FAT LAYER EXPOSED (HCC): ICD-10-CM

## 2018-06-06 LAB
ANION GAP SERPL CALCULATED.3IONS-SCNC: 15 MMOL/L (ref 3–16)
BUN BLDV-MCNC: 41 MG/DL (ref 7–20)
CALCIUM SERPL-MCNC: 9.4 MG/DL (ref 8.3–10.6)
CHLORIDE BLD-SCNC: 104 MMOL/L (ref 99–110)
CO2: 22 MMOL/L (ref 21–32)
CREAT SERPL-MCNC: 2 MG/DL (ref 0.6–1.2)
GFR AFRICAN AMERICAN: 30
GFR NON-AFRICAN AMERICAN: 25
GLUCOSE BLD-MCNC: 78 MG/DL (ref 70–99)
POTASSIUM SERPL-SCNC: 5.5 MMOL/L (ref 3.5–5.1)
SODIUM BLD-SCNC: 141 MMOL/L (ref 136–145)

## 2018-06-07 ENCOUNTER — HOSPITAL ENCOUNTER (OUTPATIENT)
Dept: WOUND CARE | Age: 63
Discharge: HOME | End: 2018-06-07
Attending: NURSE PRACTITIONER | Admitting: NURSE PRACTITIONER

## 2018-06-07 VITALS
TEMPERATURE: 97.8 F | SYSTOLIC BLOOD PRESSURE: 113 MMHG | HEART RATE: 59 BPM | RESPIRATION RATE: 20 BRPM | DIASTOLIC BLOOD PRESSURE: 68 MMHG

## 2018-06-07 DIAGNOSIS — M86.172 OSTEOMYELITIS OF ANKLE OR FOOT, ACUTE, LEFT (HCC): Primary | ICD-10-CM

## 2018-06-07 PROCEDURE — 99214 OFFICE O/P EST MOD 30 MIN: CPT | Performed by: NURSE PRACTITIONER

## 2018-06-07 RX ORDER — CEPHALEXIN 500 MG/1
500 CAPSULE ORAL 3 TIMES DAILY
Qty: 21 CAPSULE | Refills: 0 | Status: SHIPPED | OUTPATIENT
Start: 2018-06-07 | End: 2018-06-14

## 2018-06-07 RX ORDER — LIDOCAINE HYDROCHLORIDE 40 MG/ML
SOLUTION TOPICAL ONCE
Status: DISCONTINUED | OUTPATIENT
Start: 2018-06-07 | End: 2018-06-08 | Stop reason: HOSPADM

## 2018-06-11 ENCOUNTER — OFFICE VISIT (OUTPATIENT)
Dept: INTERNAL MEDICINE CLINIC | Age: 63
End: 2018-06-11

## 2018-06-11 VITALS
SYSTOLIC BLOOD PRESSURE: 120 MMHG | RESPIRATION RATE: 18 BRPM | DIASTOLIC BLOOD PRESSURE: 60 MMHG | HEART RATE: 54 BPM | OXYGEN SATURATION: 95 %

## 2018-06-11 DIAGNOSIS — M86.172 OSTEOMYELITIS OF ANKLE OR FOOT, ACUTE, LEFT (HCC): ICD-10-CM

## 2018-06-11 DIAGNOSIS — E11.42 DM TYPE 2 WITH DIABETIC PERIPHERAL NEUROPATHY (HCC): ICD-10-CM

## 2018-06-11 DIAGNOSIS — I10 ESSENTIAL HYPERTENSION: Chronic | ICD-10-CM

## 2018-06-11 DIAGNOSIS — G62.9 PERIPHERAL POLYNEUROPATHY: ICD-10-CM

## 2018-06-11 DIAGNOSIS — E66.01 MORBID OBESITY DUE TO EXCESS CALORIES (HCC): ICD-10-CM

## 2018-06-11 LAB — HBA1C MFR BLD: 7.4 %

## 2018-06-11 PROCEDURE — 99214 OFFICE O/P EST MOD 30 MIN: CPT | Performed by: FAMILY MEDICINE

## 2018-06-11 PROCEDURE — 3045F PR MOST RECENT HEMOGLOBIN A1C LEVEL 7.0-9.0%: CPT | Performed by: FAMILY MEDICINE

## 2018-06-11 PROCEDURE — 2022F DILAT RTA XM EVC RTNOPTHY: CPT | Performed by: FAMILY MEDICINE

## 2018-06-11 PROCEDURE — 1036F TOBACCO NON-USER: CPT | Performed by: FAMILY MEDICINE

## 2018-06-11 PROCEDURE — G8427 DOCREV CUR MEDS BY ELIG CLIN: HCPCS | Performed by: FAMILY MEDICINE

## 2018-06-11 PROCEDURE — 83036 HEMOGLOBIN GLYCOSYLATED A1C: CPT | Performed by: FAMILY MEDICINE

## 2018-06-11 PROCEDURE — G8417 CALC BMI ABV UP PARAM F/U: HCPCS | Performed by: FAMILY MEDICINE

## 2018-06-11 PROCEDURE — 3017F COLORECTAL CA SCREEN DOC REV: CPT | Performed by: FAMILY MEDICINE

## 2018-06-11 RX ORDER — SULFAMETHOXAZOLE AND TRIMETHOPRIM 800; 160 MG/1; MG/1
1 TABLET ORAL 2 TIMES DAILY
Qty: 20 TABLET | Refills: 0 | Status: SHIPPED | OUTPATIENT
Start: 2018-06-11 | End: 2018-06-21

## 2018-06-11 ASSESSMENT — ENCOUNTER SYMPTOMS
DIARRHEA: 0
BLOOD IN STOOL: 0
ABDOMINAL PAIN: 0
SHORTNESS OF BREATH: 0
CONSTIPATION: 0

## 2018-06-14 ENCOUNTER — HOSPITAL ENCOUNTER (OUTPATIENT)
Dept: WOUND CARE | Age: 63
Discharge: HOME | End: 2018-06-14
Attending: PODIATRIST | Admitting: PODIATRIST

## 2018-06-14 VITALS
SYSTOLIC BLOOD PRESSURE: 109 MMHG | TEMPERATURE: 97.8 F | RESPIRATION RATE: 18 BRPM | HEART RATE: 61 BPM | DIASTOLIC BLOOD PRESSURE: 70 MMHG

## 2018-06-14 RX ORDER — LIDOCAINE HYDROCHLORIDE 40 MG/ML
SOLUTION TOPICAL PRN
Status: DISCONTINUED | OUTPATIENT
Start: 2018-06-14 | End: 2018-06-15 | Stop reason: HOSPADM

## 2018-06-14 RX ORDER — CEPHALEXIN 500 MG/1
500 CAPSULE ORAL 3 TIMES DAILY
Qty: 42 CAPSULE | Refills: 0 | Status: SHIPPED | OUTPATIENT
Start: 2018-06-14 | End: 2018-06-28

## 2018-06-18 ENCOUNTER — HOSPITAL ENCOUNTER (OUTPATIENT)
Dept: VASCULAR LAB | Age: 63
Discharge: HOME | End: 2018-06-18
Attending: PODIATRIST | Admitting: PODIATRIST

## 2018-06-18 DIAGNOSIS — M79.662 PAIN OF LEFT LOWER LEG: ICD-10-CM

## 2018-06-21 ENCOUNTER — HOSPITAL ENCOUNTER (OUTPATIENT)
Dept: WOUND CARE | Age: 63
Discharge: HOME | End: 2018-06-21
Attending: PODIATRIST | Admitting: PODIATRIST

## 2018-06-21 VITALS
DIASTOLIC BLOOD PRESSURE: 63 MMHG | HEART RATE: 61 BPM | RESPIRATION RATE: 18 BRPM | TEMPERATURE: 97.5 F | SYSTOLIC BLOOD PRESSURE: 101 MMHG

## 2018-06-21 RX ORDER — LIDOCAINE HYDROCHLORIDE 40 MG/ML
SOLUTION TOPICAL PRN
Status: DISCONTINUED | OUTPATIENT
Start: 2018-06-21 | End: 2018-06-22 | Stop reason: HOSPADM

## 2018-06-28 ENCOUNTER — HOSPITAL ENCOUNTER (OUTPATIENT)
Dept: SURGERY | Age: 63
Discharge: HOME | End: 2018-06-28
Attending: FAMILY MEDICINE | Admitting: PODIATRIST

## 2018-06-28 VITALS
RESPIRATION RATE: 18 BRPM | DIASTOLIC BLOOD PRESSURE: 66 MMHG | TEMPERATURE: 97.4 F | HEIGHT: 63 IN | OXYGEN SATURATION: 96 % | SYSTOLIC BLOOD PRESSURE: 136 MMHG | WEIGHT: 244.6 LBS | BODY MASS INDEX: 43.34 KG/M2 | HEART RATE: 51 BPM

## 2018-06-28 LAB
GLUCOSE BLD-MCNC: 119 MG/DL (ref 70–99)
GLUCOSE BLD-MCNC: 90 MG/DL (ref 70–99)
PERFORMED ON: ABNORMAL
PERFORMED ON: NORMAL

## 2018-06-28 RX ORDER — CEPHALEXIN 500 MG/1
500 CAPSULE ORAL 3 TIMES DAILY
Qty: 30 CAPSULE | Refills: 0 | Status: SHIPPED | OUTPATIENT
Start: 2018-06-28 | End: 2018-07-08

## 2018-06-28 ASSESSMENT — PAIN SCALES - GENERAL: PAINLEVEL_OUTOF10: 0

## 2018-06-28 ASSESSMENT — PAIN - FUNCTIONAL ASSESSMENT: PAIN_FUNCTIONAL_ASSESSMENT: 0-10

## 2018-06-28 ASSESSMENT — PAIN DESCRIPTION - PAIN TYPE: TYPE: SURGICAL PAIN

## 2018-07-05 ENCOUNTER — HOSPITAL ENCOUNTER (OUTPATIENT)
Dept: WOUND CARE | Age: 63
Discharge: HOME | End: 2018-07-05
Attending: PODIATRIST | Admitting: PODIATRIST

## 2018-07-05 VITALS
DIASTOLIC BLOOD PRESSURE: 63 MMHG | SYSTOLIC BLOOD PRESSURE: 97 MMHG | HEART RATE: 59 BPM | RESPIRATION RATE: 18 BRPM | TEMPERATURE: 97.9 F

## 2018-07-05 DIAGNOSIS — Z48.89 ENCOUNTER FOR POST SURGICAL WOUND CHECK: ICD-10-CM

## 2018-07-05 PROCEDURE — 99212 OFFICE O/P EST SF 10 MIN: CPT | Performed by: NURSE PRACTITIONER

## 2018-07-05 RX ORDER — ACETAMINOPHEN 325 MG/1
650 TABLET ORAL EVERY 6 HOURS PRN
COMMUNITY
End: 2018-11-07

## 2018-07-06 PROBLEM — Z48.89 ENCOUNTER FOR POST SURGICAL WOUND CHECK: Status: ACTIVE | Noted: 2018-07-06

## 2018-07-06 NOTE — PROGRESS NOTES
40 MG tablet Take 1 tablet by mouth daily 30 tablet 3    pantoprazole (PROTONIX) 40 MG tablet TAKE 1 TABLET BY MOUTH DAILY 30 tablet 3    dapagliflozin (FARXIGA) 10 MG tablet Take 1 tablet by mouth every morning To the patient: Please call to make an appointment. . 90 tablet 1    aspirin 81 MG chewable tablet Take 1 tablet by mouth daily. 30 tablet     cephALEXin (KEFLEX) 500 MG capsule Take 1 capsule by mouth 3 times daily for 10 days 30 capsule 0    B-D UF III MINI PEN NEEDLES 31G X 5 MM MISC USE TWICE A DAY WITH INSULIN INJECTIONS 100 each 3    sennosides-docusate sodium (SENOKOT-S) 8.6-50 MG tablet Take 2 tablets by mouth 2 times daily as needed for Constipation. Current Facility-Administered Medications on File Prior to Encounter   Medication Dose Route Frequency Provider Last Rate Last Dose    lidocaine (XYLOCAINE) 4 % external solution   Topical PRN Lala Contreras DPM           REVIEW OF SYSTEMS    Pertinent items are noted in HPI. Objective:      BP 97/63   Pulse 59   Temp 97.9 °F (36.6 °C) (Oral)   Resp 18   LMP 06/15/1999     Wt Readings from Last 3 Encounters:   06/28/18 244 lb 9.6 oz (111 kg)   05/24/18 244 lb (110.7 kg)   04/09/18 244 lb 0.8 oz (110.7 kg)       PHYSICAL EXAM    General Appearance: alert and oriented to person, place and time  Skin: warm and dry, no rash or erythema  Head: normocephalic and atraumatic  Eyes: pupils equal, round, and reactive to light  Pulmonary/Chest:  normal air movement, no respiratory distress  Cardiovascular: normal rate, regular rhythm and intact distal pulses  Extremities: no cyanosis and no clubbing  Wound:  Left third toe suture line intact without drainage, redness and wound edges well approximated with sutures intact.        Assessment:      Patient Active Problem List   Diagnosis Code    Meniere's disease H81.09    Peripheral neuropathy ([facility] G62.9    Depression F32.9    Nicotine abuse Z72.0    Mixed hyperlipidemia E78.2    Essential hypertension I10    Obesity (BMI 30-39. 9) E66.9    ASNHL (asymmetrical sensorineural hearing loss) H90.5    Poor compliance with medication Z91.14    Morbid obesity due to excess calories (HCC) E66.01    DM type 2 with diabetic peripheral neuropathy (HCC) E11.42    Diabetic ulcer of toe of left foot associated with type 2 diabetes mellitus, with fat layer exposed ([address] E11.621, L97.522    Osteomyelitis of ankle or foot, acute, left ([address] M86.172    Encounter for post surgical wound check Z48.89          Plan:   1. Plan to have sutures removed next week on Thursday visit. Pt education per provider related to post-op course, questions answered. Treatment Note please see attached Discharge Instructions    Written patient dismissal instructions given to patient and signed by patient or POA. Discharge Instructions       Wound Clinic Physician Orders and Discharge [facility] [address] [place] [address] Drive   [address] [place] [address]  Telephone: [phone]      FAX [phone]     NAME: Todd Guy  DATE OF BIRTH:  1955  MEDICAL RECORD NUMBER:  [id number]  DATE:  7/5/2018     Wound Cleansing:   Do not scrub or use excessive force. Cleanse wound prior to applying a clean dressing with:  [x] Normal Saline            [] Keep Wound Dry in Shower    [] Wound Cleanser   [] Cleanse wound with Mild Soap & Water  [] May Shower at Discharge   [] Other:        Topical Treatments:  Do not apply lotions, creams, or ointments to wound bed unless directed. [] Apply moisturizing lotion to skin surrounding the wound prior to dressing change.  [] Apply antifungal ointment to skin surrounding the wound prior to dressing change.  [] Apply thin film of moisture barrier ointment to skin immediately around wound.   [] Other:       Dressings:                  Wound Location: Left third toe        [x] Apply Primary Dressing: compression.  Do not get leg(s) with wrap wet.  If wraps become too tight call the center or completely remove the wrap.                 [] Elevate leg(s) above the level of the heart when sitting.                    [] Avoid prolonged standing in one place.     Off-Loading:   [] Off-loading when        [] walking           [] in bed [] sitting  [] Total non-weight bearing  [] Right Leg  [] Left Leg          [x] Assistive Device         [] Walker            [] Cane   [] Wheelchair      [] Crutches              [x] Surgical shoe    [] Podus Boot(s)   [] Foam Boot(s)  [] Roll About              [] Cast Boot       [] CROW Boot  [] Other:     Contact Cast:  Apply:  [] Total Contact Cast Applied in Clinic  []RightLeg          []Left Leg              [] Do not get cast wet.  Contact center or go to emergency room if there is a foul odor or becomes uncomfortable due to feeling tight or swelling.  Do not use objects inside of cast to scratch.          Dietary:  [x] Diet as tolerated:        [] Calorie Diabetic Diet: [] No Added Salt:  [] Increase Protein:        [] Other:   Activity:  [x] Activity as tolerated:  [] Patient has no activity restrictions     [] Strict Bedrest:       [] Remain off Work:     [] May return to full duty work:    [] Return to work with [address]     Return Appointment:  [] Wound and dressing supply provider:   [] ECF or Home Healthcare:  [] Nurse visit:     [] Physician or NP scheduled for Nurse Visit:   [x] Return Appointment: With Dr Smith Shelbi Week(s)  [X] Remain on oral antibiotics    Nurse Case Manger:  Paige  Electronically signed by Vanita Laird RN on 7/5/2018 at 10:33 AM  [address] [place] [address] Information: Should you experience any significant changes in your wound(s) or have questions about your wound care, please contact the [address] [place] [address] [phone] [facility] - THURSDAY 8:30 am - 4:30 pm and Friday 8:30 am - 1:00 pm.  If you need help with your wound

## 2018-07-12 ENCOUNTER — HOSPITAL ENCOUNTER (OUTPATIENT)
Dept: WOUND CARE | Age: 63
Discharge: HOME | End: 2018-07-12
Attending: PODIATRIST | Admitting: PODIATRIST

## 2018-07-12 VITALS
RESPIRATION RATE: 18 BRPM | HEART RATE: 56 BPM | SYSTOLIC BLOOD PRESSURE: 96 MMHG | TEMPERATURE: 97.8 F | DIASTOLIC BLOOD PRESSURE: 60 MMHG

## 2018-07-12 DIAGNOSIS — L97.522 DIABETIC ULCER OF TOE OF LEFT FOOT ASSOCIATED WITH TYPE 2 DIABETES MELLITUS, WITH FAT LAYER EXPOSED (HCC): ICD-10-CM

## 2018-07-12 DIAGNOSIS — M86.172 OSTEOMYELITIS OF ANKLE OR FOOT, ACUTE, LEFT (HCC): ICD-10-CM

## 2018-07-12 DIAGNOSIS — E11.621 DIABETIC ULCER OF TOE OF LEFT FOOT ASSOCIATED WITH TYPE 2 DIABETES MELLITUS, WITH FAT LAYER EXPOSED (HCC): ICD-10-CM

## 2018-07-12 NOTE — PROGRESS NOTES
[facility]   Progress Note and Procedure Note      Flower Bird  MEDICAL RECORD NUMBER:  [id number]  AGE: 57 y.o. GENDER: female  : 1955  EPISODE DATE:  2018    Subjective:     Chief Complaint   Patient presents with    Wound Check     f/u left 3rd toe         HISTORY of PRESENT ILLNESS HPI  Flower Bird is a 57 y.o. female who presents today for evaluation and follow-up from surgery on 18 for amputation of left 3rd toe related to osteomyelitis. Pt has history of Type 2 diabetes with poor compliance, last A1C on 3/27 was 8.2%. Pt seeing Dr. Vicenta Bennett for cardiac issues; cardiomyopathy, HTN, sinus bradycardia. History of LLL DVT, blood thinners discontinued. Does not check blood sugars consistently. States has back issues with pain. Patient relates that she did not keep her follow up appointment because she thought that the wound was healed. Patient relates that she has completed taking her abx.   Wound/Ulcer Pain Timing/Severity: none, neuropathic  Quality of pain: N/A  Severity:  0 / 10   Modifying Factors: None  Associated Signs/Symptoms:none     Ulcer Identification:  Ulcer Type: diabetic and traumatic  Contributing Factors: diabetes, poor glucose control, shear force and obesity, osteomyelitis.     Wound: N/A  PAST MEDICAL HISTORY        Diagnosis Date    Abnormal echocardiogram     25% on 3/11/14 and 50% on 3/19/14    Back pain     Cardiomyopathy ([address]     EF was 50% on 3/19/14    Depression     Depressive disorder 2014    Diabetes mellitus ([address]     Diabetic infection of right foot ([address] 4/15/2015    DVT (deep venous thrombosis) (HCC)     HTN (hypertension)     Ischemic cardiomyopathy 4/15/2015    Meniere's disease     Mixed hyperlipidemia 3/7/2016    NSTEMI (non-ST elevated myocardial infarction) ([address] 3/13/2014    Pneumonia     Spinal abscess ([address] 3/2014    epidural abscess    Spinal epidural abscess 3/13/2014    Type II diabetes left foot associated with type 2 diabetes mellitus, with fat layer exposed ([facility] E11.621, L97.522    Osteomyelitis of ankle or foot, acute, left ([facility] [address] M86.172    Encounter for post surgical wound check Z48.89          Plan:   Half of the sutures were removed. Patient presented today without a dressing ambulating in flipflops. Encouraged her to keep her incision covered and dry until all of the sutures are removed. Treatment Note please see attached Discharge Instructions    Written patient dismissal instructions given to patient and signed by patient or POA. RTC 1 week    Discharge Instructions             Wound Clinic Physician Orders and Discharge [facility]  [address] [facility] [address], [facility]  Telephone: [phone]      FAX [phone]     NAME: Bharti Marvin  DATE OF BIRTH:  1955  MEDICAL RECORD NUMBER:  [id number]  DATE:  7/12/2018          Dressings:                  Wound Location: Left third toe        [x] Keep toe dry     Pressure Relief:  [] When sitting, shift position or do seat lifts every 15 minutes. [] Wheelchair cushion   [] Specialty Bed/Mattress  [] Turn every 2 hours when in bed.  Avoid position directing pressure on wound site.  Limit side lying to 30 degree tilt.  Limit HOB elevation to 30 degrees.     Dietary:  [x] Diet as tolerated:        [] Calorie Diabetic Diet: [] No Added Salt:  [] Increase Protein:        [] Other:   Activity:  [x] Activity as tolerated:  [] Patient has no activity restrictions     [] Strict Bedrest:       [] Remain off Work:     [] May return to full duty work:    [] Return to work with [address]     Return Appointment:  [] Wound and dressing supply provider:   [] ECF or Home Healthcare:  [] Nurse visit:     [] Physician or NP scheduled for Nurse Visit:   [x] Return Appointment: With Dr Ivan Cast Week(s)    Nurse Case Manger: Karl Richter  Electronically signed by Lokesh Rodrigues RN on 7/12/2018 at 9:43 AM  [address] [place] [address] Information: Should you experience any significant changes in your wound(s) or have questions about your wound care, please contact the [address] [place] [address] [phone] [facility] - THURSDAY 8:30 am - 4:30 pm and Friday 8:30 am - 1:00 pm.  If you need help with your wound outside these hours and cannot wait until we are again available, contact your PCP or go to the hospital emergency room.      Nurse Signature:_______________________     Date: ___________ Time:  ____________        Discharge Nurse Signature        PLEASE NOTE: IF YOU ARE UNABLE TO OBTAIN WOUND SUPPLIES, CONTINUE TO USE THE SUPPLIES YOU HAVE AVAILABLE UNTIL YOU ARE ABLE TO [facility].  IT IS MOST IMPORTANT TO KEEP THE WOUND COVERED AT ALL TIMES.     Physician Signature:_______________________     Date: ___________ Time:  ____________                    [x] Dr Sadi Ulrich            The information contained in the After Visit Summary has been reviewed with me, the patient and/or responsible adult, by my health care provider(s).  I had the opportunity to ask questions regarding this information.  I have elected to receive;        Patient Signature:_______________________     Date: ___________ Time:  ____________     [] Patient unable to sign Discharge Instructions given to ECF/Transportation/POA     [x]  After Visit Summary  []  Comprehensive Discharge Instruction        Electronically signed by Sadi Ulrich DPM on 7/12/2018 at 2:50 PM

## 2018-07-16 RX ORDER — PANTOPRAZOLE SODIUM 40 MG/1
40 TABLET, DELAYED RELEASE ORAL DAILY
Qty: 30 TABLET | Refills: 3 | Status: SHIPPED | OUTPATIENT
Start: 2018-07-16 | End: 2018-10-19 | Stop reason: SDUPTHER

## 2018-07-19 ENCOUNTER — HOSPITAL ENCOUNTER (OUTPATIENT)
Dept: WOUND CARE | Age: 63
Discharge: HOME | End: 2018-07-19
Attending: PODIATRIST | Admitting: PODIATRIST

## 2018-07-19 VITALS
RESPIRATION RATE: 18 BRPM | DIASTOLIC BLOOD PRESSURE: 67 MMHG | HEART RATE: 97 BPM | TEMPERATURE: 98 F | SYSTOLIC BLOOD PRESSURE: 120 MMHG

## 2018-07-19 DIAGNOSIS — E11.621 DIABETIC ULCER OF TOE OF LEFT FOOT ASSOCIATED WITH TYPE 2 DIABETES MELLITUS, WITH FAT LAYER EXPOSED (HCC): ICD-10-CM

## 2018-07-19 DIAGNOSIS — L97.522 DIABETIC ULCER OF TOE OF LEFT FOOT ASSOCIATED WITH TYPE 2 DIABETES MELLITUS, WITH FAT LAYER EXPOSED (HCC): ICD-10-CM

## 2018-07-19 DIAGNOSIS — M86.172 OSTEOMYELITIS OF ANKLE OR FOOT, ACUTE, LEFT (HCC): ICD-10-CM

## 2018-07-19 ASSESSMENT — PAIN SCALES - GENERAL: PAINLEVEL_OUTOF10: 0

## 2018-07-19 NOTE — PROGRESS NOTES
[facility] [address]   Progress Note and Procedure Note      Ayana Forbes  MEDICAL RECORD NUMBER:  [id number]  AGE: 57 y.o. GENDER: female  : 1955  EPISODE DATE:  2018    Subjective:     Chief Complaint   Patient presents with    Wound Check     Follow-up visit for a wound to the third left toe. HISTORY of PRESENT ILLNESS HPI  Ayana Forbes is a 57 y.o. female who presents today for evaluation and follow-up from surgery on 18 for amputation of left 3rd toe related to osteomyelitis. Pt has history of Type 2 diabetes with poor compliance, last A1C on 3/27 was 8.2%. Pt seeing Dr. Rosa Elena Graves for cardiac issues; cardiomyopathy, HTN, sinus bradycardia. History of LLL DVT, blood thinners discontinued. Does not check blood sugars consistently. States has back issues with pain. Patient relates that she did not keep her follow up appointment because she thought that the wound was healed. Patient relates that she has completed taking her abx.   Wound/Ulcer Pain Timing/Severity: none, neuropathic  Quality of pain: N/A  Severity:  0 / 10   Modifying Factors: None  Associated Signs/Symptoms:none     Ulcer Identification:  Ulcer Type: diabetic and traumatic  Contributing Factors: diabetes, poor glucose control, shear force and obesity, osteomyelitis.     Wound: N/A  PAST MEDICAL HISTORY        Diagnosis Date    Abnormal echocardiogram     25% on 3/11/14 and 50% on 3/19/14    Back pain     Cardiomyopathy ([address]     EF was 50% on 3/19/14    Depression     Depressive disorder 2014    Diabetes mellitus ([address]     Diabetic infection of right foot ([address] 4/15/2015    DVT (deep venous thrombosis) ([address]     HTN (hypertension)     Ischemic cardiomyopathy 4/15/2015    Meniere's disease     Mixed hyperlipidemia 3/7/2016    NSTEMI (non-ST elevated myocardial infarction) ([address] 3/13/2014    Pneumonia     Spinal abscess ([address] 3/2014    epidural abscess    Spinal epidural abscess 3/13/2014    Type II diabetes mellitus, uncontrolled ([facility] 08/13/2014       PAST SURGICAL HISTORY    Past Surgical History:   Procedure Laterality Date    BACK SURGERY  3/2014    DEBRIDEMENT  3/12/14    extensive debridement of bone/muscle and paraspinal empyema    HYSTERECTOMY  6/15/1999    complete-think right ovary in.        FAMILY HISTORY    Family History   Problem Relation Age of Onset    High Blood Pressure Mother     COPD Father     Rheum Arthritis Neg Hx     Osteoarthritis Neg Hx     Asthma Neg Hx     Breast Cancer Neg Hx     Cancer Neg Hx     Diabetes Neg Hx     Heart Failure Neg Hx     High Cholesterol Neg Hx     Hypertension Neg Hx     Migraines Neg Hx     Ovarian Cancer Neg Hx     Rashes/Skin Problems Neg Hx     Seizures Neg Hx     Stroke Neg Hx     Thyroid Disease Neg Hx        SOCIAL HISTORY    Social History   Substance Use Topics    Smoking status: Former Smoker     Packs/day: 0.50     Years: 10.00     Quit date: 11/10/2013    Smokeless tobacco: Never Used    Alcohol use 0.0 oz/week      Comment: occasionally       ALLERGIES    Allergies   Allergen Reactions    Doxycycline Other (See Comments)     Yeast infection    Metformin And Related Diarrhea       MEDICATIONS    Current Outpatient Prescriptions on File Prior to Encounter   Medication Sig Dispense Refill    pantoprazole (PROTONIX) 40 MG tablet Take 1 tablet by mouth daily 30 tablet 3    atorvastatin (LIPITOR) 80 MG tablet Take 1 tablet by mouth daily 90 tablet 1    Insulin Degludec (TRESIBA FLEXTOUCH) 200 UNIT/ML SOPN Inject 94 Units into the skin 2 times daily 56 mL 3    gabapentin (NEURONTIN) 300 MG capsule TAKE 2 CAPSULES BY MOUTH 3 TIMES DAILY 180 capsule 3    carvedilol (COREG) 12.5 MG tablet TAKE 1 TABLET TWICE DAILY 180 tablet 0    lisinopril (PRINIVIL;ZESTRIL) 20 MG tablet Take 1 tablet by mouth daily 30 tablet 3    dapagliflozin (FARXIGA) 10 MG tablet Take 1 tablet by mouth every morning To the patient: Please call to make an appointment. . 90 tablet 1    aspirin 81 MG chewable tablet Take 1 tablet by mouth daily. 30 tablet     acetaminophen (TYLENOL) 325 MG tablet Take 650 mg by mouth every 6 hours as needed for Pain      B-D UF III MINI PEN NEEDLES 31G X 5 MM MISC USE TWICE A DAY WITH INSULIN INJECTIONS 100 each 3    triamterene-hydrochlorothiazide (DYAZIDE) 37.5-25 MG per capsule Take 1 capsule by mouth every morning 90 capsule 1    citalopram (CELEXA) 40 MG tablet Take 1 tablet by mouth daily 30 tablet 3    sennosides-docusate sodium (SENOKOT-S) 8.6-50 MG tablet Take 2 tablets by mouth 2 times daily as needed for Constipation. Current Facility-Administered Medications on File Prior to Encounter   Medication Dose Route Frequency Provider Last Rate Last Dose    lidocaine (XYLOCAINE) 4 % external solution   Topical PRN Suly Bryant DPM           REVIEW OF SYSTEMS    Pertinent items are noted in HPI. Objective:      /67   Pulse 97   Temp 98 °F (36.7 °C) (Oral)   Resp 18   LMP 06/15/1999     Wt Readings from Last 3 Encounters:   06/28/18 244 lb 9.6 oz (111 kg)   05/24/18 244 lb (110.7 kg)   04/09/18 244 lb 0.8 oz (110.7 kg)       PHYSICAL EXAM    General Appearance: alert and oriented to person, place and time  Wound:  Left third toe suture line intact without drainage, redness and wound edges well approximated with sutures intact. Erythema and edema noted preoperatively have nearly resolved. Assessment:      Patient Active Problem List   Diagnosis Code    Meniere's disease H81.09    Peripheral neuropathy ([facility] G62.9    Depression F32.9    Nicotine abuse Z72.0    Mixed hyperlipidemia E78.2    Essential hypertension I10    Obesity (BMI 30-39. 9) E66.9    ASNHL (asymmetrical sensorineural hearing loss) H90.5    Poor compliance with medication Z91.14    Morbid obesity due to excess calories (HCC) E66.01    DM type 2 with diabetic peripheral neuropathy [facility]) E11.42    Diabetic ulcer of toe of left foot associated with type 2 diabetes mellitus, with fat layer exposed ([facility] [address] E11.621, L97.522    Osteomyelitis of ankle or foot, acute, left ([facility] M86.172    Encounter for post surgical wound check Z48.89          Plan:   Remaining sutures were removed. OK to resume normal activities and shoe gear as tolerated. Recommend follow up with podiatry for routine DM foot care to prevent further ulcerations, infections and/or amputations. Treatment Note please see attached Discharge Instructions    Written patient dismissal instructions given to patient and signed by patient or POA. RTC prn    Discharge Instructions         [address]  Physician Orders   [facility] ORTHOPEDIC AND SPINE [facility]  [address] [place] [address]  Telephone: [phone]    NAME:  Deisy Herrera OF BIRTH:  1955  MEDICAL RECORD NUMBER:  [id number]  DATE:  7/19/2018    Congratulations! You have completed your treatment. 1. Return to your Primary Care Physician for all your health issues. 2. Resume your ordinary activities as tolerated. 3. Take your medications as prescribed by your primary care physician. 4. Check your skin daily for cracks, bruises, sores, or dryness. Use a moisturizer as needed. 5. Clean and dry your skin, using mild soap and warm water (not hot). 6. Avoid alcohol and caffeine and do not smoke. 7. Maintain a nutritious diet. [] Avoid pressure on your wound site. Keep your legs elevated above the level of the heart whenever possible.   [] Continue to use wraps/stockings/compression as prescribed. [] Replace compression stockings every 4 to 6 months as needed to ensure proper fit. [] Wear well-fitting shoes and leg garments. **MAKE APPOINTMENT WITH DR LOMAX AT HER OFFICE IN 6-8 WEEKS**     THANK YOU FOR ALLOWING US TO SERVE YOU. PLEASE CALL IF YOU DEVELOP ANOTHER WOUND.

## 2018-07-23 DIAGNOSIS — E11.42 DM TYPE 2 WITH DIABETIC PERIPHERAL NEUROPATHY (HCC): Primary | ICD-10-CM

## 2018-07-23 RX ORDER — CITALOPRAM 40 MG/1
40 TABLET ORAL DAILY
Qty: 30 TABLET | Refills: 3 | Status: SHIPPED | OUTPATIENT
Start: 2018-07-23 | End: 2018-10-19 | Stop reason: SDUPTHER

## 2018-07-23 RX ORDER — GABAPENTIN 300 MG/1
600 CAPSULE ORAL 3 TIMES DAILY
Qty: 180 CAPSULE | Refills: 3 | Status: SHIPPED | OUTPATIENT
Start: 2018-07-23 | End: 2018-10-19 | Stop reason: SDUPTHER

## 2018-07-23 RX ORDER — GABAPENTIN 300 MG/1
600 CAPSULE ORAL 3 TIMES DAILY
Qty: 180 CAPSULE | Refills: 3 | Status: SHIPPED | OUTPATIENT
Start: 2018-07-23 | End: 2018-07-23 | Stop reason: SDUPTHER

## 2018-07-23 RX ORDER — LISINOPRIL 20 MG/1
20 TABLET ORAL DAILY
Qty: 30 TABLET | Refills: 3 | Status: SHIPPED | OUTPATIENT
Start: 2018-07-23 | End: 2018-10-19 | Stop reason: SDUPTHER

## 2018-08-28 RX ORDER — CARVEDILOL 12.5 MG/1
TABLET ORAL
Qty: 180 TABLET | Refills: 0 | Status: SHIPPED | OUTPATIENT
Start: 2018-08-28 | End: 2018-11-26 | Stop reason: SDUPTHER

## 2018-08-31 ENCOUNTER — OFFICE VISIT (OUTPATIENT)
Dept: INTERNAL MEDICINE CLINIC | Age: 63
End: 2018-08-31

## 2018-08-31 VITALS
RESPIRATION RATE: 14 BRPM | WEIGHT: 247 LBS | HEART RATE: 62 BPM | SYSTOLIC BLOOD PRESSURE: 124 MMHG | HEIGHT: 63 IN | OXYGEN SATURATION: 97 % | BODY MASS INDEX: 43.77 KG/M2 | DIASTOLIC BLOOD PRESSURE: 76 MMHG

## 2018-08-31 DIAGNOSIS — E11.42 DM TYPE 2 WITH DIABETIC PERIPHERAL NEUROPATHY (HCC): Primary | ICD-10-CM

## 2018-08-31 DIAGNOSIS — E66.01 MORBID OBESITY DUE TO EXCESS CALORIES (HCC): ICD-10-CM

## 2018-08-31 DIAGNOSIS — E78.2 MIXED HYPERLIPIDEMIA: ICD-10-CM

## 2018-08-31 DIAGNOSIS — I10 ESSENTIAL HYPERTENSION: Chronic | ICD-10-CM

## 2018-08-31 PROBLEM — L97.522 DIABETIC ULCER OF TOE OF LEFT FOOT ASSOCIATED WITH TYPE 2 DIABETES MELLITUS, WITH FAT LAYER EXPOSED (HCC): Status: RESOLVED | Noted: 2018-04-10 | Resolved: 2018-08-31

## 2018-08-31 PROBLEM — E11.621 DIABETIC ULCER OF TOE OF LEFT FOOT ASSOCIATED WITH TYPE 2 DIABETES MELLITUS, WITH FAT LAYER EXPOSED (HCC): Status: RESOLVED | Noted: 2018-04-10 | Resolved: 2018-08-31

## 2018-08-31 LAB — HBA1C MFR BLD: 8 %

## 2018-08-31 PROCEDURE — G8427 DOCREV CUR MEDS BY ELIG CLIN: HCPCS | Performed by: FAMILY MEDICINE

## 2018-08-31 PROCEDURE — 3045F PR MOST RECENT HEMOGLOBIN A1C LEVEL 7.0-9.0%: CPT | Performed by: FAMILY MEDICINE

## 2018-08-31 PROCEDURE — 1036F TOBACCO NON-USER: CPT | Performed by: FAMILY MEDICINE

## 2018-08-31 PROCEDURE — 3017F COLORECTAL CA SCREEN DOC REV: CPT | Performed by: FAMILY MEDICINE

## 2018-08-31 PROCEDURE — 83036 HEMOGLOBIN GLYCOSYLATED A1C: CPT | Performed by: FAMILY MEDICINE

## 2018-08-31 PROCEDURE — 2022F DILAT RTA XM EVC RTNOPTHY: CPT | Performed by: FAMILY MEDICINE

## 2018-08-31 PROCEDURE — G8417 CALC BMI ABV UP PARAM F/U: HCPCS | Performed by: FAMILY MEDICINE

## 2018-08-31 PROCEDURE — 99214 OFFICE O/P EST MOD 30 MIN: CPT | Performed by: FAMILY MEDICINE

## 2018-08-31 ASSESSMENT — ENCOUNTER SYMPTOMS
CONSTIPATION: 0
ABDOMINAL PAIN: 0
SHORTNESS OF BREATH: 0
DIARRHEA: 0
BLOOD IN STOOL: 0

## 2018-08-31 ASSESSMENT — PATIENT HEALTH QUESTIONNAIRE - PHQ9
2. FEELING DOWN, DEPRESSED OR HOPELESS: 0
SUM OF ALL RESPONSES TO PHQ QUESTIONS 1-9: 0
SUM OF ALL RESPONSES TO PHQ9 QUESTIONS 1 & 2: 0
SUM OF ALL RESPONSES TO PHQ QUESTIONS 1-9: 0
1. LITTLE INTEREST OR PLEASURE IN DOING THINGS: 0

## 2018-08-31 NOTE — PROGRESS NOTES
Neurological: Negative for dizziness and syncope. Psychiatric/Behavioral: Negative for behavioral problems. Prior to Visit Medications    Medication Sig Taking? Authorizing Provider   Insulin Degludec (TRESIBA FLEXTOUCH) 200 UNIT/ML SOPN Inject 96 Units into the skin 2 times daily Yes Amina Jin MD   carvedilol (COREG) 12.5 MG tablet TAKE 1 TABLET TWICE DAILY Yes Amina Jin MD   lisinopril (PRINIVIL;ZESTRIL) 20 MG tablet Take 1 tablet by mouth daily Yes Amina Jin MD   gabapentin (NEURONTIN) 300 MG capsule Take 2 capsules by mouth 3 times daily for 210 days. Trish Kramer MD   pantoprazole (PROTONIX) 40 MG tablet Take 1 tablet by mouth daily Yes Amina Jin MD   acetaminophen (TYLENOL) 325 MG tablet Take 650 mg by mouth every 6 hours as needed for Pain Yes Historical Provider, MD   atorvastatin (LIPITOR) 80 MG tablet Take 1 tablet by mouth daily Yes Amina Jin MD   B-D UF III MINI PEN NEEDLES 31G X 5 MM MISC USE TWICE A DAY WITH INSULIN INJECTIONS Yes Amina Jin MD   dapagliflozin (FARXIGA) 10 MG tablet Take 1 tablet by mouth every morning To the patient: Please call to make an appointment. [phone]. Yes Amina Jin MD   aspirin 81 MG chewable tablet Take 1 tablet by mouth daily. Yes Caridad Lloyd MD   sennosides-docusate sodium (SENOKOT-S) 8.6-50 MG tablet Take 2 tablets by mouth 2 times daily as needed for Constipation.  Yes Caridad Lloyd MD   citalopram (CELEXA) 40 MG tablet Take 1 tablet by mouth daily  Amina Jin MD   triamterene-hydrochlorothiazide (Ellis Carrier) 37.5-25 MG per capsule Take 1 capsule by mouth every morning  Amina Jin MD        Social History   Substance Use Topics    Smoking status: Former Smoker     Packs/day: 0.50     Years: 10.00     Quit date: 11/10/2013    Smokeless tobacco: Never Used    Alcohol use 0.0 oz/week      Comment: occasionally        Vitals:    08/31/18 1337   BP: 124/76   Site: Left Arm

## 2018-09-18 DIAGNOSIS — E11.42 DM TYPE 2 WITH DIABETIC PERIPHERAL NEUROPATHY (HCC): ICD-10-CM

## 2018-09-19 RX ORDER — PEN NEEDLE, DIABETIC 31 GX5/16"
NEEDLE, DISPOSABLE MISCELLANEOUS
Qty: 100 EACH | Refills: 3 | Status: SHIPPED | OUTPATIENT
Start: 2018-09-19 | End: 2019-08-26 | Stop reason: SDUPTHER

## 2018-10-05 RX ORDER — IBUPROFEN 800 MG/1
800 TABLET ORAL EVERY 6 HOURS PRN
COMMUNITY
End: 2018-10-12

## 2018-10-05 RX ORDER — IBUPROFEN 800 MG/1
800 TABLET ORAL EVERY 8 HOURS PRN
Qty: 30 TABLET | Refills: 1 | Status: SHIPPED | OUTPATIENT
Start: 2018-10-05 | End: 2018-10-12

## 2018-10-12 ENCOUNTER — OFFICE VISIT (OUTPATIENT)
Dept: INTERNAL MEDICINE CLINIC | Age: 63
End: 2018-10-12
Payer: MEDICARE

## 2018-10-12 VITALS
OXYGEN SATURATION: 94 % | HEART RATE: 58 BPM | RESPIRATION RATE: 18 BRPM | SYSTOLIC BLOOD PRESSURE: 126 MMHG | DIASTOLIC BLOOD PRESSURE: 70 MMHG

## 2018-10-12 DIAGNOSIS — I10 ESSENTIAL HYPERTENSION: Chronic | ICD-10-CM

## 2018-10-12 DIAGNOSIS — Z23 NEED FOR INFLUENZA VACCINATION: ICD-10-CM

## 2018-10-12 DIAGNOSIS — E66.01 MORBID OBESITY DUE TO EXCESS CALORIES (HCC): ICD-10-CM

## 2018-10-12 DIAGNOSIS — R53.82 CHRONIC FATIGUE: ICD-10-CM

## 2018-10-12 DIAGNOSIS — R06.83 SNORING: ICD-10-CM

## 2018-10-12 DIAGNOSIS — E11.42 DM TYPE 2 WITH DIABETIC PERIPHERAL NEUROPATHY (HCC): ICD-10-CM

## 2018-10-12 DIAGNOSIS — M25.562 ACUTE PAIN OF LEFT KNEE: ICD-10-CM

## 2018-10-12 PROCEDURE — 2022F DILAT RTA XM EVC RTNOPTHY: CPT | Performed by: FAMILY MEDICINE

## 2018-10-12 PROCEDURE — G8427 DOCREV CUR MEDS BY ELIG CLIN: HCPCS | Performed by: FAMILY MEDICINE

## 2018-10-12 PROCEDURE — G0008 ADMIN INFLUENZA VIRUS VAC: HCPCS | Performed by: FAMILY MEDICINE

## 2018-10-12 PROCEDURE — G8417 CALC BMI ABV UP PARAM F/U: HCPCS | Performed by: FAMILY MEDICINE

## 2018-10-12 PROCEDURE — 99214 OFFICE O/P EST MOD 30 MIN: CPT | Performed by: FAMILY MEDICINE

## 2018-10-12 PROCEDURE — 3017F COLORECTAL CA SCREEN DOC REV: CPT | Performed by: FAMILY MEDICINE

## 2018-10-12 PROCEDURE — 90682 RIV4 VACC RECOMBINANT DNA IM: CPT | Performed by: FAMILY MEDICINE

## 2018-10-12 PROCEDURE — 1036F TOBACCO NON-USER: CPT | Performed by: FAMILY MEDICINE

## 2018-10-12 PROCEDURE — G8482 FLU IMMUNIZE ORDER/ADMIN: HCPCS | Performed by: FAMILY MEDICINE

## 2018-10-12 PROCEDURE — 3045F PR MOST RECENT HEMOGLOBIN A1C LEVEL 7.0-9.0%: CPT | Performed by: FAMILY MEDICINE

## 2018-10-12 RX ORDER — PREDNISONE 20 MG/1
20 TABLET ORAL DAILY
Qty: 10 TABLET | Refills: 0 | Status: SHIPPED | OUTPATIENT
Start: 2018-10-12 | End: 2018-10-22

## 2018-10-12 RX ORDER — ACETAMINOPHEN 500 MG
500 TABLET ORAL EVERY 6 HOURS PRN
Qty: 120 TABLET | Status: ON HOLD | COMMUNITY
Start: 2018-10-12 | End: 2020-09-08

## 2018-10-12 ASSESSMENT — ENCOUNTER SYMPTOMS
CONSTIPATION: 0
BLOOD IN STOOL: 0
ABDOMINAL PAIN: 0
SHORTNESS OF BREATH: 0
DIARRHEA: 0

## 2018-10-12 NOTE — PROGRESS NOTES
Vaccine Information Sheet, \"Influenza - Inactivated\"  given to Kathryn Mccann, or parent/legal guardian of  Kathryn Mccann and verbalized understanding. Patient responses:    Have you ever had a reaction to a flu vaccine? No  Are you able to eat eggs without adverse effects? Yes  Do you have any current illness? No  Have you ever had Guillian Swarth[place] Syndrome? No    Flu vaccine given per order. Please see immunization tab.

## 2018-10-12 NOTE — PATIENT INSTRUCTIONS
season. But even when the vaccine doesn't exactly match these viruses, it may still provide some protection. Flu vaccine cannot prevent:  · Flu that is caused by a virus not covered by the vaccine. · Illnesses that look like flu but are not. Some people should not get this vaccine  Tell the person who is giving you the vaccine:  · If you have any severe (life-threatening) allergies. If you ever had a life-threatening allergic reaction after a dose of flu vaccine, or have a severe allergy to any part of this vaccine, you may be advised not to get vaccinated. Most, but not all, types of flu vaccine contain a small amount of egg protein. · If you ever had Guillain-Barré syndrome (also called GBS) Some people with a history of GBS should not get this vaccine. This should be discussed with your doctor. · If you are not feeling well. It is usually okay to get flu vaccine when you have a mild illness, but you might be asked to come back when you feel better. Risks of a vaccine reaction  With any medicine, including vaccines, there is a chance of reactions. These are usually mild and go away on their own, but serious reactions are also possible. Most people who get a flu shot do not have any problems with it. Minor problems following a flu shot include:  · Soreness, redness, or swelling where the shot was given  · Hoarseness  · Sore, red or itchy eyes  · Cough  · Fever  · Aches  · Headache  · Itching  · Fatigue  If these problems occur, they usually begin soon after the shot and last 1 or 2 days. More serious problems following a flu shot can include the following:  · There may be a small increased risk of Guillain-Barré Syndrome (GBS) after inactivated flu vaccine. This risk has been estimated at 1 or 2 additional cases per million people vaccinated. This is much lower than the risk of severe complications from flu, which can be prevented by flu vaccine.   · The [employer] children who get the flu shot along with

## 2018-10-12 NOTE — PROGRESS NOTES
10/12/2018     Jeovany Lee (:  1955) is a 57 y.o. female, here for evaluation of the following medical concerns:    HPI  Patient is a 57years old white female with multiple medical problems. She presented to the office because of left knee pain for the past 2 weeks. Patient is stated that couple of weeks ago she tried to get out of the car and probably twisted the left knee and since then she is sore on her left knee  with certain movement especially on ambulation She has no bruise no swelling of the knee Somebody told her that she might have ruptured Baker's cyst. And she needs to be checked  Patient also have morbid obesity and had been trying to lose weight she is enrolled to weight management program but she still reluctant to have surgical weight loss treatment. She was wondering if there is some Magic diet pill that she could try. She has diabetes but does not check sugar regularly but denies hypoglycemic episode. She has a cortisone shot on her back several months ago which should take my affected it her blood sugar and she appears to have HbA1c checked today. She has chronic fatigue and was told that she snore at night. She's never been tested for sleep apnea  She has hypertension controlled with current medication and has no side effect from the drugs    Review of Systems   Constitutional: Negative for activity change and appetite change. Eyes: Negative for visual disturbance. Respiratory: Negative for shortness of breath. Cardiovascular: Negative for chest pain and leg swelling. Gastrointestinal: Negative for abdominal pain, blood in stool, constipation and diarrhea. Genitourinary: Negative for difficulty urinating, frequency, hematuria, menstrual problem and urgency. Neurological: Negative for dizziness and syncope. Psychiatric/Behavioral: Negative for behavioral problems. Prior to Visit Medications    Medication Sig Taking?  Authorizing Provider   predniSONE (DELTASONE) 20 MG tablet Take 1 tablet by mouth daily for 10 days Yes Dayton Avina MD   acetaminophen (TYLENOL) 500 MG tablet Take 1 tablet by mouth every 6 hours as needed for Pain Yes Dayton Avina MD   dapagliflozin (FARXIGA) 10 MG tablet Take 1 tablet by mouth every morning To the patient: Please call to make an appointment. [phone]. Yes Dayton Avina MD   B-D UF III MINI PEN NEEDLES 31G X 5 MM MISC USE TWICE A DAY WITH INSULIN INJECTIONS  Dayton Avina MD   Insulin Degludec (TRESIBA FLEXTOUCH) 200 UNIT/ML SOPN Inject 96 Units into the skin 2 times daily  Dayton Avina MD   carvedilol (COREG) 12.5 MG tablet TAKE 1 TABLET TWICE DAILY  Dayton Avina MD   citalopram (CELEXA) 40 MG tablet Take 1 tablet by mouth daily  Dayton Avina MD   lisinopril (PRINIVIL;ZESTRIL) 20 MG tablet Take 1 tablet by mouth daily  Dayton Avina MD   gabapentin (NEURONTIN) 300 MG capsule Take 2 capsules by mouth 3 times daily for 210 days. Carey De La Torre MD   pantoprazole (PROTONIX) 40 MG tablet Take 1 tablet by mouth daily  Dayton Avina MD   acetaminophen (TYLENOL) 325 MG tablet Take 650 mg by mouth every 6 hours as needed for Pain  Historical Provider, MD   atorvastatin (LIPITOR) 80 MG tablet Take 1 tablet by mouth daily  Dayton Avina MD   triamterene-hydrochlorothiazide (DYAZIDE) 37.5-25 MG per capsule Take 1 capsule by mouth every morning  Dayton Avina MD   aspirin 81 MG chewable tablet Take 1 tablet by mouth daily. Daisy Knight MD   sennosides-docusate sodium (SENOKOT-S) 8.6-50 MG tablet Take 2 tablets by mouth 2 times daily as needed for Constipation.   Daisy Knight MD        Social History   Substance Use Topics    Smoking status: Former Smoker     Packs/day: 0.50     Years: 10.00     Quit date: 11/10/2013    Smokeless tobacco: Never Used    Alcohol use 0.0 oz/week      Comment: occasionally        Vitals:    10/12/18 1438   BP: 126/70   Pulse: 58   Resp: 18   SpO2: 94%     Estimated body mass index is 43.75 kg/m² as calculated from the following:    Height as of 8/31/18: 5' 3\" (1.6 m). Weight as of 8/31/18: 247 lb (112 kg). Physical Exam   Constitutional: She is oriented to person, place, and time. She appears well-developed and well-nourished. No distress. HENT:   Head: Normocephalic. Eyes: Conjunctivae are normal.   Neck: Neck supple. No thyromegaly present. Cardiovascular: Normal rate, regular rhythm and normal heart sounds. No murmur heard. Pulmonary/Chest: Breath sounds normal. No respiratory distress. She has no wheezes. She has no rales. Abdominal: Soft. She exhibits no distension. Musculoskeletal: Normal range of motion. She exhibits no edema. Neurological: She is alert and oriented to person, place, and time. Skin: Skin is warm. No rash noted. Psychiatric: She has a normal mood and affect. Her behavior is normal.       ASSESSMENT/PLAN:  1. Acute pain of left knee  Prescribe prednisone 20 mg daily 10 days plus Tylenol as needed. She cannot take NSAIDs because of chronic kidney disease. Will order x-ray of the left knee. If left knee is not better in 2-4 weeks will order MRI and consider referral to orthopedic  - XR KNEE LEFT (3 VIEWS); Future    2. DM type 2 with diabetic peripheral neuropathy ([address]  She is on assist and case of diabetes and takes high dose of basal insulin Tressiba 96 units twice a day plus Farxiga at 10 mg daily. She is concern that when she turn 70 and on Medicare she might easily go into the donut hole because of the cost of the insulin    3. Morbid obesity due to excess calories ([address]  Encouraged to lose weight with diet and exercise. Follow-up with weight management solution. She might be a good candidate for bariatric surgery if her insurance will cover the procedure    4. Chronic fatigue  With the her massive weight and other symptoms is sleep apnea is highly possible. Will refer to sleep clinic    5.  Snoring  With

## 2018-10-15 ENCOUNTER — TELEPHONE (OUTPATIENT)
Dept: BARIATRICS/WEIGHT MGMT | Age: 63
End: 2018-10-15

## 2018-10-19 DIAGNOSIS — E11.42 DM TYPE 2 WITH DIABETIC PERIPHERAL NEUROPATHY (HCC): ICD-10-CM

## 2018-10-19 RX ORDER — LISINOPRIL 20 MG/1
20 TABLET ORAL DAILY
Qty: 30 TABLET | Refills: 3 | Status: SHIPPED | OUTPATIENT
Start: 2018-10-19 | End: 2019-01-20 | Stop reason: SDUPTHER

## 2018-10-19 RX ORDER — CITALOPRAM 40 MG/1
40 TABLET ORAL DAILY
Qty: 30 TABLET | Refills: 3 | Status: SHIPPED | OUTPATIENT
Start: 2018-10-19 | End: 2019-01-20 | Stop reason: SDUPTHER

## 2018-10-19 RX ORDER — PANTOPRAZOLE SODIUM 40 MG/1
40 TABLET, DELAYED RELEASE ORAL DAILY
Qty: 30 TABLET | Refills: 3 | Status: SHIPPED | OUTPATIENT
Start: 2018-10-19 | End: 2019-01-20 | Stop reason: SDUPTHER

## 2018-10-19 RX ORDER — GABAPENTIN 300 MG/1
CAPSULE ORAL
Qty: 180 CAPSULE | Refills: 3 | Status: SHIPPED | OUTPATIENT
Start: 2018-10-19 | End: 2019-01-20 | Stop reason: SDUPTHER

## 2018-10-26 ENCOUNTER — TELEPHONE (OUTPATIENT)
Dept: INTERNAL MEDICINE CLINIC | Age: 63
End: 2018-10-26

## 2018-11-07 ENCOUNTER — OFFICE VISIT (OUTPATIENT)
Dept: BARIATRICS/WEIGHT MGMT | Age: 63
End: 2018-11-07
Payer: MEDICARE

## 2018-11-07 VITALS
HEART RATE: 59 BPM | HEIGHT: 63 IN | SYSTOLIC BLOOD PRESSURE: 126 MMHG | WEIGHT: 259.8 LBS | BODY MASS INDEX: 46.03 KG/M2 | DIASTOLIC BLOOD PRESSURE: 62 MMHG

## 2018-11-07 DIAGNOSIS — E11.42 DM TYPE 2 WITH DIABETIC PERIPHERAL NEUROPATHY (HCC): ICD-10-CM

## 2018-11-07 DIAGNOSIS — I10 ESSENTIAL HYPERTENSION: ICD-10-CM

## 2018-11-07 DIAGNOSIS — E78.2 MIXED HYPERLIPIDEMIA: ICD-10-CM

## 2018-11-07 DIAGNOSIS — E66.01 MORBID OBESITY WITH BMI OF 45.0-49.9, ADULT (HCC): Primary | ICD-10-CM

## 2018-11-07 PROCEDURE — 4040F PNEUMOC VAC/ADMIN/RCVD: CPT | Performed by: SURGERY

## 2018-11-07 PROCEDURE — G8417 CALC BMI ABV UP PARAM F/U: HCPCS | Performed by: SURGERY

## 2018-11-07 PROCEDURE — G8482 FLU IMMUNIZE ORDER/ADMIN: HCPCS | Performed by: SURGERY

## 2018-11-07 PROCEDURE — 3045F PR MOST RECENT HEMOGLOBIN A1C LEVEL 7.0-9.0%: CPT | Performed by: SURGERY

## 2018-11-07 PROCEDURE — 1090F PRES/ABSN URINE INCON ASSESS: CPT | Performed by: SURGERY

## 2018-11-07 PROCEDURE — G8427 DOCREV CUR MEDS BY ELIG CLIN: HCPCS | Performed by: SURGERY

## 2018-11-07 PROCEDURE — 3017F COLORECTAL CA SCREEN DOC REV: CPT | Performed by: SURGERY

## 2018-11-07 PROCEDURE — 99204 OFFICE O/P NEW MOD 45 MIN: CPT | Performed by: SURGERY

## 2018-11-07 PROCEDURE — 1036F TOBACCO NON-USER: CPT | Performed by: SURGERY

## 2018-11-07 PROCEDURE — 2022F DILAT RTA XM EVC RTNOPTHY: CPT | Performed by: SURGERY

## 2018-11-07 PROCEDURE — 1123F ACP DISCUSS/DSCN MKR DOCD: CPT | Performed by: SURGERY

## 2018-11-07 PROCEDURE — G8400 PT W/DXA NO RESULTS DOC: HCPCS | Performed by: SURGERY

## 2018-11-07 PROCEDURE — 1101F PT FALLS ASSESS-DOCD LE1/YR: CPT | Performed by: SURGERY

## 2018-11-07 NOTE — PROGRESS NOTES
take an unusually large amount of food for the patient to feel comfortably full. Most days the patient eats until she is full. Patient describes level of activity as sedentary - previous back surgery; just started physical therapy    Surgery  Patient's greatest concern about having surgery is: being permanent. Patient describes the motivation for weight loss surgery to be: diabetes, HTN, high cholesterol, previous MI?, DVT. Patient does feel confident in her ability to make these changes. The patient's expectations of post-surgical eating habits are realistics. Patient states she does understand the consequences of not complying with post-op food guidelines. Patient states she understands the long term changes in food intake that will be necessary for all occasions after surgery for the rest of her life. she does understand the long term food changes. Patient is deemed nutritionally appropriate to proceed. Goals  Weight: 150?? Health Improvement: improve diabetes, HTN, high cholesterol, knee and back pain    Assessment  Nutritional Needs: RMR=(9.99 x 118) + (6.25 x 160) - (4.92 x 63 y.o.) -161  = 1698 kcal x 1.4 (sedentary activity factor)= 2377 kcal - 1000 (for 2 lb weight loss/week)= 1377 kcal.    Plan  Surgical procedure desired: gastric sleeve    Plan/Recommendations: Surgical Procedure: gastric sleeve  General weight loss/lifestyle modification strategies discussed (elicit support from others; identify saboteurs; non-food rewards, etc). Goals:   -Eat 4-5 times daily  -Avoid high fat and high sugar foods  -Include protein with all meals and snacks  -Avoid carbonation and caffeine  -Avoid calorie containing beverages  -Increase physical activity as tolerated    PES Statement:  Overweight/Obesity related to lack of exercise, sedentary lifestyle, unhealthy eating habits, and unsuccessful diet attempts as evidenced by BMI. Body mass index is 46.02 kg/m². .    Will follow up as
for Pain, Disp: 120 tablet, Rfl:     B-D UF III MINI PEN NEEDLES 31G X 5 MM MISC, USE TWICE A DAY WITH INSULIN INJECTIONS, Disp: 100 each, Rfl: 3    Insulin Degludec (TRESIBA FLEXTOUCH) 200 UNIT/ML SOPN, Inject 96 Units into the skin 2 times daily, Disp: 56 mL, Rfl: 3    carvedilol (COREG) 12.5 MG tablet, TAKE 1 TABLET TWICE DAILY, Disp: 180 tablet, Rfl: 0    atorvastatin (LIPITOR) 80 MG tablet, Take 1 tablet by mouth daily, Disp: 90 tablet, Rfl: 1    aspirin 81 MG chewable tablet, Take 1 tablet by mouth daily. , Disp: 30 tablet, Rfl:     triamterene-hydrochlorothiazide (DYAZIDE) 37.5-25 MG per capsule, Take 1 capsule by mouth every morning, Disp: 90 capsule, Rfl: 1      Review of Systems - History obtained from the patient  General ROS: negative  Psychological ROS: negative  Ophthalmic ROS: negative  Neurological ROS: negative  ENT ROS: negative  Allergy and Immunology ROS: negative  Hematological and Lymphatic ROS: negative  Endocrine ROS: negative  Breast ROS: negative  Respiratory ROS: negative  Cardiovascular ROS: negative  Gastrointestinal ROS:negative  Genito-Urinary ROS: negative  Musculoskeletal ROS: negative   Skin ROS: negative    Physical Exam   Constitutional: Patient is oriented to person, place, and time. Vital signs are normal. Patient  appears well-developed and well-nourished. Patient  is active and cooperative. Non-toxic appearance. No distress. HENT:   Head: Normocephalic and atraumatic. Head is without laceration. Right Ear: External ear normal. No lacerations. No drainage, swelling or tenderness. Left Ear: External ear normal. No lacerations. No drainage, swelling or tenderness. Nose: Nose normal. No nose lacerations or nasal deformity. Mouth/Throat: Uvula is midline, oropharynx is clear and moist and mucous membranes are normal. No oropharyngeal exudate. Eyes: Conjunctivae, EOM and lids are normal. Pupils are equal, round, and reactive to light.  Right eye exhibits no

## 2018-11-09 ENCOUNTER — HOSPITAL ENCOUNTER (OUTPATIENT)
Dept: WOUND CARE | Age: 63
Discharge: HOME | End: 2018-11-09
Payer: MEDICARE

## 2018-11-09 VITALS
DIASTOLIC BLOOD PRESSURE: 61 MMHG | BODY MASS INDEX: 46.32 KG/M2 | TEMPERATURE: 97.9 F | SYSTOLIC BLOOD PRESSURE: 160 MMHG | WEIGHT: 261.47 LBS | RESPIRATION RATE: 18 BRPM | HEART RATE: 64 BPM

## 2018-11-09 DIAGNOSIS — L97.522 DIABETIC ULCER OF TOE OF LEFT FOOT ASSOCIATED WITH DIABETES MELLITUS DUE TO UNDERLYING CONDITION, WITH FAT LAYER EXPOSED (HCC): ICD-10-CM

## 2018-11-09 DIAGNOSIS — E11.621 DIABETIC ULCER OF TOE OF LEFT FOOT ASSOCIATED WITH TYPE 2 DIABETES MELLITUS, WITH FAT LAYER EXPOSED (HCC): ICD-10-CM

## 2018-11-09 DIAGNOSIS — L97.522 DIABETIC ULCER OF TOE OF LEFT FOOT ASSOCIATED WITH TYPE 2 DIABETES MELLITUS, WITH FAT LAYER EXPOSED (HCC): ICD-10-CM

## 2018-11-09 DIAGNOSIS — E11.42 DM TYPE 2 WITH DIABETIC PERIPHERAL NEUROPATHY (HCC): ICD-10-CM

## 2018-11-09 DIAGNOSIS — E08.621 DIABETIC ULCER OF TOE OF LEFT FOOT ASSOCIATED WITH DIABETES MELLITUS DUE TO UNDERLYING CONDITION, WITH FAT LAYER EXPOSED (HCC): ICD-10-CM

## 2018-11-09 DIAGNOSIS — E66.01 MORBID OBESITY DUE TO EXCESS CALORIES (HCC): Primary | ICD-10-CM

## 2018-11-09 PROCEDURE — 97597 DBRDMT OPN WND 1ST 20 CM/<: CPT

## 2018-11-09 PROCEDURE — 97597 DBRDMT OPN WND 1ST 20 CM/<: CPT | Performed by: NURSE PRACTITIONER

## 2018-11-09 PROCEDURE — 99213 OFFICE O/P EST LOW 20 MIN: CPT

## 2018-11-09 RX ORDER — ACETAMINOPHEN 160 MG
2000 TABLET,DISINTEGRATING ORAL EVERY EVENING
COMMUNITY

## 2018-11-09 NOTE — PLAN OF CARE
Problem: Wound:  Goal: Will show signs of wound healing; wound closure and no evidence of infection  Will show signs of wound healing; wound closure and no evidence of infection  Outcome: Ongoing      Problem: Weight control:  Goal: Ability to maintain an optimal weight for height and age will be supported  Ability to maintain an optimal weight for height and age will be supported  Outcome: Ongoing      Problem: Falls - Risk of:  Goal: Will remain free from falls  Will remain free from falls  Outcome: Ongoing      Problem: Blood Glucose:  Goal: Ability to maintain appropriate glucose levels will improve  Ability to maintain appropriate glucose levels will improve  Outcome: Ongoing

## 2018-11-09 NOTE — PROGRESS NOTES
[address]   Progress Note and Procedure Note      Palmer Padron  MEDICAL RECORD NUMBER:  [id number]  AGE: 70 y.o. GENDER: female  : 1955  EPISODE DATE:  2018    Subjective:     Chief Complaint   Patient presents with    Wound Check     initial visit for wound on left great toe. Has been applying polysporin and dry dressing to toe wound         HISTORY of PRESENT ILLNESS HPI     Palmer Padron is a 70 y.o. female who presents today for wound/ulcer evaluation. Pt noted a crack in the callus on left medial great toe and irritated second left toe from callus and \"cut nail too close\". Denies constitutional issues. She is undergoing testing and consultation for upcoming gastric sleeve surgery tentatively scheduled for 2019. Last A1C was 8 on 18, is scheduled for another A1C this month. Pt recently twisted her knee getting out of the car and believes she has been walking differently since then and lead to callus formation. Was treated with short course of prednisone and then had a cortisone injection.   History of Wound Context: callus formation with fissure  Wound/Ulcer Pain Timing/Severity: none (pt is neuropathic)  Quality of pain: N/A  Severity:  0 / 10   Modifying Factors: None  Associated Signs/Symptoms: none    Ulcer Identification:  Ulcer Type: diabetic, pressure and shear  Contributing Factors: diabetes, poor glucose control, chronic pressure, shear force and obesity    Wound: N/A        PAST MEDICAL HISTORY        Diagnosis Date    Abnormal echocardiogram     25% on 3/11/14 and 50% on 3/19/14    Back pain     Cardiomyopathy ([address]     EF was 50% on 3/19/14    Depression     Depressive disorder 2014    Diabetes mellitus ([address]     Diabetic infection of right foot ([address] 4/15/2015    Diabetic ulcer of toe of left foot associated with type 2 diabetes mellitus, with fat layer exposed ([address] 4/10/2018    Pt slipped in hot tub latter part of February pulses  Extremities: no cyanosis, no clubbing and no edema, dry skin on feet  Wounds: Large callus medial left great toe with a horizontal fissure. Callus above 2nd toenail with slightly loose nail, but adhering well without drainage,slight anterior redness below cuticle. Assessment:      Patient Active Problem List   Diagnosis Code    Type II diabetes mellitus, uncontrolled ([facility] E11.65    Meniere's disease H81.09    Peripheral neuropathy G62.9    Depression F32.9    Mixed hyperlipidemia E78.2    Essential hypertension I10    Obesity (BMI 30-39. 9) E66.9    ASNHL (asymmetrical sensorineural hearing loss) H90.5    Poor compliance with medication Z91.14    Morbid obesity due to excess calories (HCC) E66.01    DM type 2 with diabetic peripheral neuropathy (HCC) E11.42    Osteomyelitis of ankle or foot, acute, left ([facility] M86.172    Encounter for post surgical wound check Z48.89    Diabetic ulcer of toe of left foot associated with diabetes mellitus due to underlying condition, with fat layer exposed ([facility] J05.989, F29.634        Procedure Note  Indications:  Based on my examination of this patient's wound(s)/ulcer(s) today, debridement is required to promote healing and evaluate the wound base.     Performed by: MADISON Ryan CNP    Consent obtained:  Yes    Time out taken:  Yes    Pain Control: Anesthetic  Anesthetic: 5% Lidocaine Ointment Topical (0.5cm)       Debridement: NON-EXCISIONAL DEBRIDMENT    Using #15 blade scalpel and forceps the wound(s)/ulcer(s) was/were sharply debrided down through and including the removal of epidermis and dermis       Devitalized Tissue Debrided:  Fibrin, biofilm and callus    Pre Debridement Measurements:  Are located in the Wound/Ulcer Documentation Flow Sheet    Wound/Ulcer #: 8    Post Debridement Measurements:  Wound/Ulcer Descriptions are Pre Debridement except measurements:    Wound 11/09/18 Toe (Comment  which one) Left;Plantar Wound #8 noted

## 2018-11-15 ENCOUNTER — HOSPITAL ENCOUNTER (OUTPATIENT)
Dept: WOUND CARE | Age: 63
Discharge: HOME | End: 2018-11-15

## 2018-11-19 ENCOUNTER — HOSPITAL ENCOUNTER (OUTPATIENT)
Dept: WOUND CARE | Age: 63
Discharge: HOME | End: 2018-11-19
Payer: MEDICARE

## 2018-11-19 VITALS
RESPIRATION RATE: 18 BRPM | HEART RATE: 66 BPM | SYSTOLIC BLOOD PRESSURE: 145 MMHG | TEMPERATURE: 98.3 F | DIASTOLIC BLOOD PRESSURE: 73 MMHG

## 2018-11-19 DIAGNOSIS — L97.522 DIABETIC ULCER OF TOE OF LEFT FOOT ASSOCIATED WITH DIABETES MELLITUS DUE TO UNDERLYING CONDITION, WITH FAT LAYER EXPOSED (HCC): Primary | ICD-10-CM

## 2018-11-19 DIAGNOSIS — E08.621 DIABETIC ULCER OF TOE OF LEFT FOOT ASSOCIATED WITH DIABETES MELLITUS DUE TO UNDERLYING CONDITION, WITH FAT LAYER EXPOSED (HCC): Primary | ICD-10-CM

## 2018-11-19 PROCEDURE — 97597 DBRDMT OPN WND 1ST 20 CM/<: CPT | Performed by: NURSE PRACTITIONER

## 2018-11-19 PROCEDURE — 11042 DBRDMT SUBQ TIS 1ST 20SQCM/<: CPT | Performed by: NURSE PRACTITIONER

## 2018-11-19 RX ORDER — TRIAMTERENE AND HYDROCHLOROTHIAZIDE 37.5; 25 MG/1; MG/1
1 CAPSULE ORAL EVERY MORNING
Qty: 90 CAPSULE | Refills: 1 | Status: SHIPPED | OUTPATIENT
Start: 2018-11-19 | End: 2018-12-13 | Stop reason: ALTCHOICE

## 2018-11-19 RX ORDER — LIDOCAINE 50 MG/G
OINTMENT TOPICAL PRN
Status: DISCONTINUED | OUTPATIENT
Start: 2018-11-19 | End: 2018-11-20 | Stop reason: HOSPADM

## 2018-11-20 NOTE — PROGRESS NOTES
topical treatment    DVT (deep venous thrombosis) (HCC)     HTN (hypertension)     Ischemic cardiomyopathy 4/15/2015    Meniere's disease     Mixed hyperlipidemia 3/7/2016    Nicotine abuse     NSTEMI (non-ST elevated myocardial infarction) ([facility] 3/13/2014    Osteomyelitis of ankle or foot, acute, left ([facility] 6/4/2018    Pneumonia     Spinal abscess ([facility] 3/2014    epidural abscess    Spinal epidural abscess 3/13/2014    Type II diabetes mellitus, uncontrolled ([facility] 08/13/2014       PAST SURGICAL HISTORY    Past Surgical History:   Procedure Laterality Date    BACK SURGERY  3/2014    DEBRIDEMENT  3/12/14    extensive debridement of bone/muscle and paraspinal empyema    HYSTERECTOMY  6/15/1999    complete-think right ovary in.        FAMILY HISTORY    Family History   Problem Relation Age of Onset    High Blood Pressure Mother     Cancer Mother     Rheum Arthritis Mother     COPD Father     Cancer Father     Osteoarthritis Neg Hx     Asthma Neg Hx     Breast Cancer Neg Hx     Diabetes Neg Hx     Heart Failure Neg Hx     High Cholesterol Neg Hx     Hypertension Neg Hx     Migraines Neg Hx     Ovarian Cancer Neg Hx     Rashes/Skin Problems Neg Hx     Seizures Neg Hx     Stroke Neg Hx     Thyroid Disease Neg Hx        SOCIAL HISTORY    Social History   Substance Use Topics    Smoking status: Former Smoker     Packs/day: 0.50     Years: 10.00     Quit date: 11/10/2013    Smokeless tobacco: Never Used    Alcohol use 0.0 oz/week      Comment: occasionally       ALLERGIES    Allergies   Allergen Reactions    Doxycycline Other (See Comments)     Yeast infection    Metformin And Related Diarrhea       MEDICATIONS    Current Outpatient Prescriptions on File Prior to Encounter   Medication Sig Dispense Refill    Cholecalciferol (VITAMIN D3) 2000 units CAPS Take by mouth daily      dapagliflozin (FARXIGA) 10 MG tablet Take 1 tablet by mouth every morning 90 tablet 1    gabapentin [facility] emergency room.      Nurse Signature:_______________________     Date: ___________ Time:  ____________        Discharge Nurse Signature        PLEASE NOTE: IF YOU ARE UNABLE TO OBTAIN WOUND SUPPLIES, CONTINUE TO USE THE SUPPLIES YOU HAVE AVAILABLE UNTIL YOU ARE ABLE TO REACH US. IT IS MOST IMPORTANT TO KEEP THE WOUND COVERED AT ALL TIMES. Physician Signature:_______________________     Date: ___________ Time:  ____________                    [] Dr Ilsa Pedraza    [] Dr Neetu Magdaleno   [x] Leonel Nogueira CNP                 [] Dr Melva Jaime  [] Dr Marli Bui   [] Dr Manny Goel                  [] Dr Fatemeh Guerrero   [] Teodora Davenport NP            The information contained in the After Visit Summary has been reviewed with me, the patient and/or responsible adult, by my health care provider(s). I had the opportunity to ask questions regarding this information.   I have elected to receive;        Patient Signature:_______________________     Date: ___________ Time:  ____________     [] Patient unable to sign Discharge Instructions given to ECF/Transportation/POA        [x]  After Visit Summary          Electronically signed by MADISON Duran CNP on 11/20/2018 at 12:23 PM

## 2018-11-21 ENCOUNTER — TELEPHONE (OUTPATIENT)
Dept: BARIATRICS/WEIGHT MGMT | Age: 63
End: 2018-11-21

## 2018-11-26 RX ORDER — CARVEDILOL 12.5 MG/1
12.5 TABLET ORAL 2 TIMES DAILY
Qty: 180 TABLET | Refills: 1 | Status: SHIPPED | OUTPATIENT
Start: 2018-11-26 | End: 2019-03-18 | Stop reason: SDUPTHER

## 2018-11-27 ENCOUNTER — OFFICE VISIT (OUTPATIENT)
Dept: SLEEP MEDICINE | Age: 63
End: 2018-11-27
Payer: MEDICARE

## 2018-11-27 VITALS
BODY MASS INDEX: 45.89 KG/M2 | SYSTOLIC BLOOD PRESSURE: 98 MMHG | HEIGHT: 63 IN | RESPIRATION RATE: 18 BRPM | TEMPERATURE: 97.7 F | HEART RATE: 64 BPM | WEIGHT: 259 LBS | DIASTOLIC BLOOD PRESSURE: 50 MMHG | OXYGEN SATURATION: 96 %

## 2018-11-27 DIAGNOSIS — E66.01 MORBID OBESITY WITH BMI OF 45.0-49.9, ADULT (HCC): ICD-10-CM

## 2018-11-27 DIAGNOSIS — I10 ESSENTIAL HYPERTENSION: Chronic | ICD-10-CM

## 2018-11-27 DIAGNOSIS — E66.01 CLASS 3 SEVERE OBESITY DUE TO EXCESS CALORIES WITH SERIOUS COMORBIDITY AND BODY MASS INDEX (BMI) OF 45.0 TO 49.9 IN ADULT (HCC): ICD-10-CM

## 2018-11-27 DIAGNOSIS — G47.33 OSA (OBSTRUCTIVE SLEEP APNEA): Primary | ICD-10-CM

## 2018-11-27 LAB
A/G RATIO: 1.5 (ref 1.1–2.2)
ALBUMIN SERPL-MCNC: 4.3 G/DL (ref 3.4–5)
ALP BLD-CCNC: 94 U/L (ref 40–129)
ALT SERPL-CCNC: 20 U/L (ref 10–40)
ANION GAP SERPL CALCULATED.3IONS-SCNC: 13 MMOL/L (ref 3–16)
AST SERPL-CCNC: 13 U/L (ref 15–37)
BASOPHILS ABSOLUTE: 0.1 K/UL (ref 0–0.2)
BASOPHILS RELATIVE PERCENT: 0.6 %
BILIRUB SERPL-MCNC: 0.3 MG/DL (ref 0–1)
BUN BLDV-MCNC: 41 MG/DL (ref 7–20)
CALCIUM SERPL-MCNC: 9.7 MG/DL (ref 8.3–10.6)
CHLORIDE BLD-SCNC: 104 MMOL/L (ref 99–110)
CHOLESTEROL, TOTAL: 218 MG/DL (ref 0–199)
CO2: 26 MMOL/L (ref 21–32)
CREAT SERPL-MCNC: 1.7 MG/DL (ref 0.6–1.2)
EOSINOPHILS ABSOLUTE: 0.1 K/UL (ref 0–0.6)
EOSINOPHILS RELATIVE PERCENT: 1.4 %
FOLATE: 5.6 NG/ML (ref 4.78–24.2)
GFR AFRICAN AMERICAN: 36
GFR NON-AFRICAN AMERICAN: 30
GLOBULIN: 2.8 G/DL
GLUCOSE BLD-MCNC: 57 MG/DL (ref 70–99)
HCT VFR BLD CALC: 35.9 % (ref 36–48)
HDLC SERPL-MCNC: 64 MG/DL (ref 40–60)
HEMOGLOBIN: 11.7 G/DL (ref 12–16)
IRON SATURATION: 22 % (ref 15–50)
IRON: 73 UG/DL (ref 37–145)
LDL CHOLESTEROL CALCULATED: 139 MG/DL
LYMPHOCYTES ABSOLUTE: 1.8 K/UL (ref 1–5.1)
LYMPHOCYTES RELATIVE PERCENT: 20.1 %
MCH RBC QN AUTO: 29.8 PG (ref 26–34)
MCHC RBC AUTO-ENTMCNC: 32.7 G/DL (ref 31–36)
MCV RBC AUTO: 91.2 FL (ref 80–100)
MONOCYTES ABSOLUTE: 0.8 K/UL (ref 0–1.3)
MONOCYTES RELATIVE PERCENT: 9.1 %
NEUTROPHILS ABSOLUTE: 6 K/UL (ref 1.7–7.7)
NEUTROPHILS RELATIVE PERCENT: 68.8 %
PDW BLD-RTO: 15 % (ref 12.4–15.4)
PLATELET # BLD: 280 K/UL (ref 135–450)
PMV BLD AUTO: 10 FL (ref 5–10.5)
POTASSIUM SERPL-SCNC: 5.3 MMOL/L (ref 3.5–5.1)
RBC # BLD: 3.93 M/UL (ref 4–5.2)
SODIUM BLD-SCNC: 143 MMOL/L (ref 136–145)
TOTAL IRON BINDING CAPACITY: 327 UG/DL (ref 260–445)
TOTAL PROTEIN: 7.1 G/DL (ref 6.4–8.2)
TRIGL SERPL-MCNC: 77 MG/DL (ref 0–150)
TSH REFLEX: 3.07 UIU/ML (ref 0.27–4.2)
VITAMIN B-12: 355 PG/ML (ref 211–911)
VITAMIN D 25-HYDROXY: 39.7 NG/ML
VLDLC SERPL CALC-MCNC: 15 MG/DL
WBC # BLD: 8.8 K/UL (ref 4–11)

## 2018-11-27 PROCEDURE — 1123F ACP DISCUSS/DSCN MKR DOCD: CPT | Performed by: PSYCHIATRY & NEUROLOGY

## 2018-11-27 PROCEDURE — G8427 DOCREV CUR MEDS BY ELIG CLIN: HCPCS | Performed by: PSYCHIATRY & NEUROLOGY

## 2018-11-27 PROCEDURE — G8482 FLU IMMUNIZE ORDER/ADMIN: HCPCS | Performed by: PSYCHIATRY & NEUROLOGY

## 2018-11-27 PROCEDURE — G8417 CALC BMI ABV UP PARAM F/U: HCPCS | Performed by: PSYCHIATRY & NEUROLOGY

## 2018-11-27 PROCEDURE — 1101F PT FALLS ASSESS-DOCD LE1/YR: CPT | Performed by: PSYCHIATRY & NEUROLOGY

## 2018-11-27 PROCEDURE — 99204 OFFICE O/P NEW MOD 45 MIN: CPT | Performed by: PSYCHIATRY & NEUROLOGY

## 2018-11-27 PROCEDURE — 4040F PNEUMOC VAC/ADMIN/RCVD: CPT | Performed by: PSYCHIATRY & NEUROLOGY

## 2018-11-27 PROCEDURE — 3017F COLORECTAL CA SCREEN DOC REV: CPT | Performed by: PSYCHIATRY & NEUROLOGY

## 2018-11-27 PROCEDURE — 1036F TOBACCO NON-USER: CPT | Performed by: PSYCHIATRY & NEUROLOGY

## 2018-11-27 PROCEDURE — G8400 PT W/DXA NO RESULTS DOC: HCPCS | Performed by: PSYCHIATRY & NEUROLOGY

## 2018-11-27 PROCEDURE — 1090F PRES/ABSN URINE INCON ASSESS: CPT | Performed by: PSYCHIATRY & NEUROLOGY

## 2018-11-27 ASSESSMENT — SLEEP AND FATIGUE QUESTIONNAIRES
HOW LIKELY ARE YOU TO NOD OFF OR FALL ASLEEP WHILE SITTING INACTIVE IN A PUBLIC PLACE: 0
ESS TOTAL SCORE: 3
HOW LIKELY ARE YOU TO NOD OFF OR FALL ASLEEP WHILE SITTING AND TALKING TO SOMEONE: 0
HOW LIKELY ARE YOU TO NOD OFF OR FALL ASLEEP WHILE LYING DOWN TO REST IN THE AFTERNOON WHEN CIRCUMSTANCES PERMIT: 2
NECK CIRCUMFERENCE (INCHES): 17.75
HOW LIKELY ARE YOU TO NOD OFF OR FALL ASLEEP IN A CAR, WHILE STOPPED FOR A FEW MINUTES IN TRAFFIC: 0
HOW LIKELY ARE YOU TO NOD OFF OR FALL ASLEEP WHILE SITTING AND READING: 0
HOW LIKELY ARE YOU TO NOD OFF OR FALL ASLEEP WHILE WATCHING TV: 0
HOW LIKELY ARE YOU TO NOD OFF OR FALL ASLEEP WHEN YOU ARE A PASSENGER IN A CAR FOR AN HOUR WITHOUT A BREAK: 1
HOW LIKELY ARE YOU TO NOD OFF OR FALL ASLEEP WHILE SITTING QUIETLY AFTER LUNCH WITHOUT ALCOHOL: 0

## 2018-11-27 ASSESSMENT — ENCOUNTER SYMPTOMS
EYES NEGATIVE: 1
CHOKING: 1
ALLERGIC/IMMUNOLOGIC NEGATIVE: 1
GASTROINTESTINAL NEGATIVE: 1

## 2018-11-27 NOTE — PROGRESS NOTES
MD DEYSI Bernabe Board Certified in Sleep Medicine  Certified [facility] Sleep Medicine  Board Certified in Neurology [address]  [address] [facility] [address]  [address] [facility] ([address] [facility]  [address], [address]           [address]  [facility] [place] [address]  [phone]    Subjective:     Patient ID: Aminata Vicente is a 70 y.o. female. Chief Complaint   Patient presents with   Sonia Weldon Overall, sx, snoring,        HPI:        Aminata Vicente is a 70 y.o. female referred by Dr Sangeeta Dodson for a sleep evaluation. She complains of snoring, snorting, tossing and turning, excessive daytime sleepiness, feels sleepy during the day but she denies choking, periods of not breathing, knees buckling with laughing, completely or partially paralyzed while falling asleep or waking up, noisy environment, uncomfortable room temperature, uncomfortable bedding. Symptoms began several years ago, gradually worsening since that time. The patient's family confirmed the snoring without stopped breathing at night  SLEEP SCHEDULE: Goes to bed around 1 AM in theweekdays and 1 AM in the weekends. It usually takes the patient 10 minutes to fall asleep. The patient gets up 3 per night to go to the bathroom. The Patient finally gets up at 9 AM during the weekdays and 9 AM in the weekends. The patient has restless sleep with frequent arousals in addition to the Patient has significant daytime sleepiness. The Patient scored Total score: 3 on [place] Sleepiness Scale ( more than 10 is indicative of daytime sleepiness)and 24 in fatigue scale ( more than 36 is indicativeof daytime fatigue). The patient takes no naps. Her BP is up and down. Previous evaluation and treatment has included- none.     He has been obese for many years and tried, has gained 100 pounds daily 8/31/18 12/29/18 Yes Mickey Vicente MD   atorvastatin (LIPITOR) 80 MG tablet Take 1 tablet by mouth daily 5/31/18  Yes Mickey Vicente MD   aspirin 81 MG chewable tablet Take 1 tablet by mouth daily. 3/20/14  Yes Winifred Huber MD   acetaminophen (TYLENOL) 500 MG tablet Take 1 tablet by mouth every 6 hours as needed for Pain 10/12/18 11/11/18  Mickey Vicente MD       Allergies as of 11/27/2018 - Review Complete 11/27/2018   Allergen Reaction Noted    Doxycycline Other (See Comments) 04/09/2018    Metformin and related Diarrhea 10/26/2016       Patient Active Problem List   Diagnosis    Type II diabetes mellitus, uncontrolled ([address]    Meniere's disease    Peripheral neuropathy    Depression    Mixed hyperlipidemia    Essential hypertension    Obesity (BMI 30-39. 9)    ASNHL (asymmetrical sensorineural hearing loss)    Poor compliance with medication    Morbid obesity due to excess calories ([address]    DM type 2 with diabetic peripheral neuropathy (HCC)    Osteomyelitis of ankle or foot, acute, left ([address]    Encounter for post surgical wound check    Diabetic ulcer of toe of left foot associated with diabetes mellitus due to underlying condition, with fat layer exposed ([address]       Past Medical History:   Diagnosis Date    Abnormal echocardiogram     25% on 3/11/14 and 50% on 3/19/14    Back pain     Cardiomyopathy ([address]     EF was 50% on 3/19/14    Depression     Depressive disorder 8/13/2014    Diabetes mellitus ([address]     Diabetic infection of right foot ([address] 4/15/2015    Diabetic ulcer of toe of left foot associated with type 2 diabetes mellitus, with fat layer exposed ([address] 4/10/2018    Pt slipped in hot tub latter part of February and has not healed since despite topical treatment    DVT (deep venous thrombosis) ([address]     HTN (hypertension)     Ischemic cardiomyopathy 4/15/2015    Meniere's disease     Mixed hyperlipidemia 3/7/2016    Nicotine abuse     NSTEMI (non-ST

## 2018-11-27 NOTE — PATIENT INSTRUCTIONS
Patient Education        Sleep Apnea: Care Instructions  Your Care Instructions    Sleep apnea means that you frequently stop breathing for 10 seconds or longer during sleep. It can be mild to severe, based on the number of times an hour that you stop breathing or have slowed breathing. Blocked or narrowed airways in your nose, mouth, or throat can cause sleep apnea. Your airway can become blocked when your throat muscles and tongue relax during sleep. You can treat sleep apnea at home by making lifestyle changes. You also can use a CPAP breathing machine that keeps tissues in the throat from blocking your airway. Or your doctor may suggest that you use a breathing device while you sleep. It helps keep your airway open. This could be a device that you put in your mouth. In some cases, surgery may be needed to remove enlarged tissues in the throat. Follow-up care is a key part of your treatment and safety. Be sure to make and go to all appointments, and call your doctor if you are having problems. It's also a good idea to know your test results and keep a list of the medicines you take. How can you care for yourself at home? · Lose weight, if needed. It may reduce the number of times you stop breathing or have slowed breathing. · Sleep on your side. It may stop mild apnea. If you tend to roll onto your back, sew a pocket in the back of your pajama top. Put a tennis ball into the pocket, and stitch the pocket shut. This will help keep you from sleeping on your back. · Avoid alcohol and medicines such as sleeping pills and sedatives before bed. · Do not smoke. Smoking can make sleep apnea worse. If you need help quitting, talk to your doctor about stop-smoking programs and medicines. These can increase your chances of quitting for good. · Prop up the head of your bed 4 to 6 inches by putting bricks under the legs of the bed.   · Treat breathing problems, such as a stuffy nose, caused by a cold or

## 2018-11-28 LAB
ESTIMATED AVERAGE GLUCOSE: 165.7 MG/DL
HBA1C MFR BLD: 7.4 %

## 2018-11-29 ENCOUNTER — HOSPITAL ENCOUNTER (OUTPATIENT)
Dept: WOUND CARE | Age: 63
Discharge: HOME | End: 2018-11-29
Payer: MEDICARE

## 2018-11-29 VITALS
RESPIRATION RATE: 18 BRPM | SYSTOLIC BLOOD PRESSURE: 135 MMHG | HEART RATE: 62 BPM | DIASTOLIC BLOOD PRESSURE: 83 MMHG | TEMPERATURE: 97.9 F

## 2018-11-29 DIAGNOSIS — L97.522 DIABETIC ULCER OF TOE OF LEFT FOOT ASSOCIATED WITH DIABETES MELLITUS DUE TO UNDERLYING CONDITION, WITH FAT LAYER EXPOSED (HCC): ICD-10-CM

## 2018-11-29 DIAGNOSIS — E08.621 DIABETIC ULCER OF TOE OF LEFT FOOT ASSOCIATED WITH DIABETES MELLITUS DUE TO UNDERLYING CONDITION, WITH FAT LAYER EXPOSED (HCC): ICD-10-CM

## 2018-11-29 PROCEDURE — 97597 DBRDMT OPN WND 1ST 20 CM/<: CPT

## 2018-11-29 RX ORDER — LIDOCAINE 50 MG/G
OINTMENT TOPICAL PRN
Status: DISCONTINUED | OUTPATIENT
Start: 2018-11-29 | End: 2018-11-30 | Stop reason: HOSPADM

## 2018-11-29 NOTE — PROGRESS NOTES
1 capsule by mouth every morning 90 capsule 1    Cholecalciferol (VITAMIN D3) 2000 units CAPS Take by mouth daily      gabapentin (NEURONTIN) 300 MG capsule TAKE 2 CAPSULES BY MOUTH 3 TIMES DAILY 180 capsule 3    pantoprazole (PROTONIX) 40 MG tablet Take 1 tablet by mouth daily 30 tablet 3    lisinopril (PRINIVIL;ZESTRIL) 20 MG tablet Take 1 tablet by mouth daily 30 tablet 3    citalopram (CELEXA) 40 MG tablet Take 1 tablet by mouth daily 30 tablet 3    Insulin Degludec (TRESIBA FLEXTOUCH) 200 UNIT/ML SOPN Inject 96 Units into the skin 2 times daily 56 mL 3    atorvastatin (LIPITOR) 80 MG tablet Take 1 tablet by mouth daily 90 tablet 1    aspirin 81 MG chewable tablet Take 1 tablet by mouth daily. 30 tablet     dapagliflozin (FARXIGA) 10 MG tablet Take 1 tablet by mouth every morning 90 tablet 1    acetaminophen (TYLENOL) 500 MG tablet Take 1 tablet by mouth every 6 hours as needed for Pain 120 tablet     B-D UF III MINI PEN NEEDLES 31G X 5 MM MISC USE TWICE A DAY WITH INSULIN INJECTIONS 100 each 3     No current facility-administered medications on file prior to encounter. REVIEW OF SYSTEMS    Pertinent items are noted in HPI. Review of Systems: A 12 point review of symptoms is unremarkable with the exception of the chief complaint. Patient specifically denies nausea, fever, vomiting, chills, shortness of breath, chest pain, abdominal pain, constipation or difficulty urinating. Objective:      /83   Pulse 62   Temp 97.9 °F (36.6 °C) (Oral)   Resp 18   LMP 06/15/1999     Wt Readings from Last 3 Encounters:   11/27/18 259 lb (117.5 kg)   11/09/18 261 lb 7.5 oz (118.6 kg)   11/07/18 259 lb 12.8 oz (117.8 kg)       PHYSICAL EXAM    General Appearance: alert and oriented to person, place and time, well-developed and well-nourished, in no acute distress  Skin: warm and dry, no rash or erythema. Wound noted on the plantar aspect of the left great toe.   Wound has fibrotic and nonviable Assessment:  [] Physician or NP scheduled for Wound Assessment:   [x] Return Appointment: With DR. Heather Lopez  in 1 Week(s)  [] Ordered tests:      Nurse Case Manger: [facility] [address] Information: Should you experience any significant changes in your wound(s) or have questions about your wound care, please contact the [address] [place] [address] [phone] [facility] - THURSDAY 8:30 am - 4:30 pm and Friday 8:30 am - 1:00 pm.  If you need help with your wound outside these hours and cannot wait until we are again available, contact your PCP or go to the hospital emergency room.      Nurse Signature:_______________________     Date: ___________ Time:  ____________        Discharge Nurse Signature        PLEASE NOTE: IF YOU ARE UNABLE TO OBTAIN WOUND SUPPLIES, CONTINUE TO USE THE SUPPLIES YOU HAVE AVAILABLE UNTIL YOU ARE ABLE TO [facility].  IT IS MOST IMPORTANT TO KEEP THE WOUND COVERED AT ALL TIMES.     Physician Signature:_______________________     Date: ___________ Time:  ____________                    [x] Dr Mica Pierre    [] Dr Veronica Brown   [] Melvi Hickman CNP                 [] Dr José Dimas  [] Dr Ashtyn Hernandez   [] Dr Froylan Fernandez                  [] Dr Korey Hutchison   [] Chilo Botello NP            The information contained in the After Visit Summary has been reviewed with me, the patient and/or responsible adult, by my health care provider(s).  I had the opportunity to ask questions regarding this information.  I have elected to receive;          Electronically signed by Mica Pierre DPM on 11/29/2018 at 2:10 PM

## 2018-11-30 ENCOUNTER — OFFICE VISIT (OUTPATIENT)
Dept: INTERNAL MEDICINE CLINIC | Age: 63
End: 2018-11-30
Payer: MEDICARE

## 2018-11-30 VITALS
HEART RATE: 62 BPM | BODY MASS INDEX: 45.7 KG/M2 | OXYGEN SATURATION: 96 % | WEIGHT: 258 LBS | RESPIRATION RATE: 18 BRPM | SYSTOLIC BLOOD PRESSURE: 138 MMHG | DIASTOLIC BLOOD PRESSURE: 64 MMHG

## 2018-11-30 DIAGNOSIS — I10 ESSENTIAL HYPERTENSION: Chronic | ICD-10-CM

## 2018-11-30 DIAGNOSIS — E78.2 MIXED HYPERLIPIDEMIA: ICD-10-CM

## 2018-11-30 DIAGNOSIS — E66.01 MORBID OBESITY DUE TO EXCESS CALORIES (HCC): ICD-10-CM

## 2018-11-30 DIAGNOSIS — E08.621 DIABETIC ULCER OF TOE OF LEFT FOOT ASSOCIATED WITH DIABETES MELLITUS DUE TO UNDERLYING CONDITION, WITH FAT LAYER EXPOSED (HCC): ICD-10-CM

## 2018-11-30 DIAGNOSIS — L97.522 DIABETIC ULCER OF TOE OF LEFT FOOT ASSOCIATED WITH DIABETES MELLITUS DUE TO UNDERLYING CONDITION, WITH FAT LAYER EXPOSED (HCC): ICD-10-CM

## 2018-11-30 PROCEDURE — 99214 OFFICE O/P EST MOD 30 MIN: CPT | Performed by: FAMILY MEDICINE

## 2018-11-30 PROCEDURE — G8482 FLU IMMUNIZE ORDER/ADMIN: HCPCS | Performed by: FAMILY MEDICINE

## 2018-11-30 PROCEDURE — 1101F PT FALLS ASSESS-DOCD LE1/YR: CPT | Performed by: FAMILY MEDICINE

## 2018-11-30 PROCEDURE — G8400 PT W/DXA NO RESULTS DOC: HCPCS | Performed by: FAMILY MEDICINE

## 2018-11-30 PROCEDURE — 3017F COLORECTAL CA SCREEN DOC REV: CPT | Performed by: FAMILY MEDICINE

## 2018-11-30 PROCEDURE — 1036F TOBACCO NON-USER: CPT | Performed by: FAMILY MEDICINE

## 2018-11-30 PROCEDURE — 1090F PRES/ABSN URINE INCON ASSESS: CPT | Performed by: FAMILY MEDICINE

## 2018-11-30 PROCEDURE — 1123F ACP DISCUSS/DSCN MKR DOCD: CPT | Performed by: FAMILY MEDICINE

## 2018-11-30 PROCEDURE — G8427 DOCREV CUR MEDS BY ELIG CLIN: HCPCS | Performed by: FAMILY MEDICINE

## 2018-11-30 PROCEDURE — G8417 CALC BMI ABV UP PARAM F/U: HCPCS | Performed by: FAMILY MEDICINE

## 2018-11-30 PROCEDURE — 4040F PNEUMOC VAC/ADMIN/RCVD: CPT | Performed by: FAMILY MEDICINE

## 2018-11-30 ASSESSMENT — ENCOUNTER SYMPTOMS
ABDOMINAL PAIN: 0
BLOOD IN STOOL: 0
CONSTIPATION: 0
SHORTNESS OF BREATH: 0
DIARRHEA: 0

## 2018-12-05 ENCOUNTER — OFFICE VISIT (OUTPATIENT)
Dept: BARIATRICS/WEIGHT MGMT | Age: 63
End: 2018-12-05
Payer: MEDICARE

## 2018-12-05 VITALS
DIASTOLIC BLOOD PRESSURE: 60 MMHG | HEART RATE: 60 BPM | HEIGHT: 63 IN | SYSTOLIC BLOOD PRESSURE: 108 MMHG | BODY MASS INDEX: 46 KG/M2 | WEIGHT: 259.6 LBS

## 2018-12-05 DIAGNOSIS — E66.01 MORBID OBESITY WITH BMI OF 45.0-49.9, ADULT (HCC): Primary | ICD-10-CM

## 2018-12-05 DIAGNOSIS — I10 ESSENTIAL HYPERTENSION: ICD-10-CM

## 2018-12-05 DIAGNOSIS — N18.9 CHRONIC KIDNEY DISEASE, UNSPECIFIED CKD STAGE: ICD-10-CM

## 2018-12-05 DIAGNOSIS — E11.42 DM TYPE 2 WITH DIABETIC PERIPHERAL NEUROPATHY (HCC): ICD-10-CM

## 2018-12-05 PROCEDURE — G8417 CALC BMI ABV UP PARAM F/U: HCPCS | Performed by: SURGERY

## 2018-12-05 PROCEDURE — 4040F PNEUMOC VAC/ADMIN/RCVD: CPT | Performed by: SURGERY

## 2018-12-05 PROCEDURE — G8427 DOCREV CUR MEDS BY ELIG CLIN: HCPCS | Performed by: SURGERY

## 2018-12-05 PROCEDURE — G8400 PT W/DXA NO RESULTS DOC: HCPCS | Performed by: SURGERY

## 2018-12-05 PROCEDURE — 1090F PRES/ABSN URINE INCON ASSESS: CPT | Performed by: SURGERY

## 2018-12-05 PROCEDURE — 3017F COLORECTAL CA SCREEN DOC REV: CPT | Performed by: SURGERY

## 2018-12-05 PROCEDURE — 2022F DILAT RTA XM EVC RTNOPTHY: CPT | Performed by: SURGERY

## 2018-12-05 PROCEDURE — 3045F PR MOST RECENT HEMOGLOBIN A1C LEVEL 7.0-9.0%: CPT | Performed by: SURGERY

## 2018-12-05 PROCEDURE — 1036F TOBACCO NON-USER: CPT | Performed by: SURGERY

## 2018-12-05 PROCEDURE — 1101F PT FALLS ASSESS-DOCD LE1/YR: CPT | Performed by: SURGERY

## 2018-12-05 PROCEDURE — 99213 OFFICE O/P EST LOW 20 MIN: CPT | Performed by: SURGERY

## 2018-12-05 PROCEDURE — G8482 FLU IMMUNIZE ORDER/ADMIN: HCPCS | Performed by: SURGERY

## 2018-12-05 PROCEDURE — 1123F ACP DISCUSS/DSCN MKR DOCD: CPT | Performed by: SURGERY

## 2018-12-05 RX ORDER — ATORVASTATIN CALCIUM 80 MG/1
80 TABLET, FILM COATED ORAL DAILY
Qty: 90 TABLET | Refills: 1 | Status: SHIPPED | OUTPATIENT
Start: 2018-12-05 | End: 2019-09-05 | Stop reason: SDUPTHER

## 2018-12-05 NOTE — PROGRESS NOTES
Heart Failure Neg Hx     High Cholesterol Neg Hx     Hypertension Neg Hx     Migraines Neg Hx     Ovarian Cancer Neg Hx     Rashes/Skin Problems Neg Hx     Seizures Neg Hx     Stroke Neg Hx     Thyroid Disease Neg Hx      Social History   Substance Use Topics    Smoking status: Former Smoker     Packs/day: 0.50     Years: 10.00     Quit date: 11/10/2013    Smokeless tobacco: Never Used    Alcohol use 0.0 oz/week      Comment: occasionally     I counseled the patient on the importance of not smoking and risks of ETOH. Allergies   Allergen Reactions    Doxycycline Other (See Comments)     Yeast infection    Metformin And Related Diarrhea     Vitals:    12/05/18 1314   BP: 108/60   Pulse: 60   Weight: 259 lb 9.6 oz (117.8 kg)   Height: 5' 3\" (1.6 m)       Body mass index is 45.99 kg/m².       Current Outpatient Prescriptions:     dapagliflozin (FARXIGA) 10 MG tablet, Take 1 tablet by mouth every morning, Disp: 30 tablet, Rfl: 3    carvedilol (COREG) 12.5 MG tablet, Take 1 tablet by mouth 2 times daily, Disp: 180 tablet, Rfl: 1    triamterene-hydrochlorothiazide (DYAZIDE) 37.5-25 MG per capsule, Take 1 capsule by mouth every morning, Disp: 90 capsule, Rfl: 1    Cholecalciferol (VITAMIN D3) 2000 units CAPS, Take by mouth daily, Disp: , Rfl:     gabapentin (NEURONTIN) 300 MG capsule, TAKE 2 CAPSULES BY MOUTH 3 TIMES DAILY, Disp: 180 capsule, Rfl: 3    pantoprazole (PROTONIX) 40 MG tablet, Take 1 tablet by mouth daily, Disp: 30 tablet, Rfl: 3    lisinopril (PRINIVIL;ZESTRIL) 20 MG tablet, Take 1 tablet by mouth daily, Disp: 30 tablet, Rfl: 3    citalopram (CELEXA) 40 MG tablet, Take 1 tablet by mouth daily, Disp: 30 tablet, Rfl: 3    B-D UF III MINI PEN NEEDLES 31G X 5 MM MISC, USE TWICE A DAY WITH INSULIN INJECTIONS, Disp: 100 each, Rfl: 3    Insulin Degludec (TRESIBA FLEXTOUCH) 200 UNIT/ML SOPN, Inject 96 Units into the skin 2 times daily, Disp: 56 mL, Rfl: 3    aspirin 81 MG chewable tablet, sizes, food choices and liquid calories. Patient is trying to exercise regularly as much as possible. We discussed how her weight affects her overall health including: Mounika Marie was seen today for obesity. Diagnoses and all orders for this visit:    Morbid obesity with BMI of 45.0-49.9, adult ([facility]    Chronic kidney disease, unspecified CKD stage  -     AFL ([facility])- Conrad Thompson MD, Nephrology, [facility]    DM type 2 with diabetic peripheral neuropathy ([facility]    Essential hypertension       and importance of weight loss to alleviate those co morbid conditions. I encouraged the patient to continue exercise and keeping healthy eating habits. Discussed pre-op labs and work up till now. Also counseled the patient extensively on Surgery. I spent 15 minutes face to face with patient with more than 50% of the time counseling and/or coordinating care for weight loss surgery. RTC in 4 weeks  Obtain rest of pre-op work up / clearances  Diet and Exercise   EGD     Patient advised that its their responsibility to follow up for studies and/or labs ordered today.      Laureano Minor

## 2018-12-06 ENCOUNTER — CLINICAL DOCUMENTATION (OUTPATIENT)
Dept: NEPHROLOGY | Age: 63
End: 2018-12-06

## 2018-12-06 ENCOUNTER — HOSPITAL ENCOUNTER (OUTPATIENT)
Dept: WOUND CARE | Age: 63
Discharge: HOME | End: 2018-12-06
Payer: MEDICARE

## 2018-12-06 VITALS
TEMPERATURE: 97.7 F | SYSTOLIC BLOOD PRESSURE: 111 MMHG | DIASTOLIC BLOOD PRESSURE: 64 MMHG | RESPIRATION RATE: 18 BRPM | HEART RATE: 62 BPM

## 2018-12-06 DIAGNOSIS — L97.522 DIABETIC ULCER OF TOE OF LEFT FOOT ASSOCIATED WITH DIABETES MELLITUS DUE TO UNDERLYING CONDITION, WITH FAT LAYER EXPOSED (HCC): Primary | ICD-10-CM

## 2018-12-06 DIAGNOSIS — E08.621 DIABETIC ULCER OF TOE OF LEFT FOOT ASSOCIATED WITH DIABETES MELLITUS DUE TO UNDERLYING CONDITION, WITH FAT LAYER EXPOSED (HCC): Primary | ICD-10-CM

## 2018-12-06 PROCEDURE — 97597 DBRDMT OPN WND 1ST 20 CM/<: CPT

## 2018-12-06 RX ORDER — LIDOCAINE 50 MG/G
OINTMENT TOPICAL PRN
Status: DISCONTINUED | OUTPATIENT
Start: 2018-12-06 | End: 2018-12-07 | Stop reason: HOSPADM

## 2018-12-07 DIAGNOSIS — I10 ESSENTIAL HYPERTENSION: Chronic | ICD-10-CM

## 2018-12-07 DIAGNOSIS — E66.01 MORBID OBESITY WITH BMI OF 45.0-49.9, ADULT (HCC): Primary | ICD-10-CM

## 2018-12-07 DIAGNOSIS — E11.42 DM TYPE 2 WITH DIABETIC PERIPHERAL NEUROPATHY (HCC): ICD-10-CM

## 2018-12-07 NOTE — PROGRESS NOTES
(COREG) 12.5 MG tablet Take 1 tablet by mouth 2 times daily 180 tablet 1    triamterene-hydrochlorothiazide (DYAZIDE) 37.5-25 MG per capsule Take 1 capsule by mouth every morning 90 capsule 1    Cholecalciferol (VITAMIN D3) 2000 units CAPS Take by mouth daily      gabapentin (NEURONTIN) 300 MG capsule TAKE 2 CAPSULES BY MOUTH 3 TIMES DAILY 180 capsule 3    pantoprazole (PROTONIX) 40 MG tablet Take 1 tablet by mouth daily 30 tablet 3    lisinopril (PRINIVIL;ZESTRIL) 20 MG tablet Take 1 tablet by mouth daily 30 tablet 3    citalopram (CELEXA) 40 MG tablet Take 1 tablet by mouth daily 30 tablet 3    Insulin Degludec (TRESIBA FLEXTOUCH) 200 UNIT/ML SOPN Inject 96 Units into the skin 2 times daily 56 mL 3    aspirin 81 MG chewable tablet Take 1 tablet by mouth daily. 30 tablet     acetaminophen (TYLENOL) 500 MG tablet Take 1 tablet by mouth every 6 hours as needed for Pain 120 tablet     B-D UF III MINI PEN NEEDLES 31G X 5 MM MISC USE TWICE A DAY WITH INSULIN INJECTIONS 100 each 3     No current facility-administered medications on file prior to encounter. REVIEW OF SYSTEMS    Pertinent items are noted in HPI. Review of Systems: A 12 point review of symptoms is unremarkable with the exception of the chief complaint. Patient specifically denies nausea, fever, vomiting, chills, shortness of breath, chest pain, abdominal pain, constipation or difficulty urinating. Objective:      /64   Pulse 62   Temp 97.7 °F (36.5 °C) (Oral)   Resp 18   LMP 06/15/1999     Wt Readings from Last 3 Encounters:   12/05/18 259 lb 9.6 oz (117.8 kg)   11/30/18 258 lb (117 kg)   11/27/18 259 lb (117.5 kg)       PHYSICAL EXAM    General Appearance: alert and oriented to person, place and time, well-developed and well-nourished, in no acute distress  Skin: warm and dry, no rash or erythema. Wound noted on the plantar aspect of the left great toe.   Wound has fibrotic and nonviable tissue that extends down through

## 2018-12-11 DIAGNOSIS — N18.30 CKD (CHRONIC KIDNEY DISEASE), STAGE III (HCC): ICD-10-CM

## 2018-12-11 LAB
ALBUMIN SERPL-MCNC: 4.5 G/DL (ref 3.4–5)
ANION GAP SERPL CALCULATED.3IONS-SCNC: 17 MMOL/L (ref 3–16)
BUN BLDV-MCNC: 48 MG/DL (ref 7–20)
CALCIUM SERPL-MCNC: 9.8 MG/DL (ref 8.3–10.6)
CHLORIDE BLD-SCNC: 100 MMOL/L (ref 99–110)
CO2: 24 MMOL/L (ref 21–32)
CREAT SERPL-MCNC: 2.2 MG/DL (ref 0.6–1.2)
GFR AFRICAN AMERICAN: 27
GFR NON-AFRICAN AMERICAN: 22
GLUCOSE BLD-MCNC: 40 MG/DL (ref 70–99)
PHOSPHORUS: 4.4 MG/DL (ref 2.5–4.9)
POTASSIUM SERPL-SCNC: 5.1 MMOL/L (ref 3.5–5.1)
SODIUM BLD-SCNC: 141 MMOL/L (ref 136–145)

## 2018-12-13 ENCOUNTER — HOSPITAL ENCOUNTER (OUTPATIENT)
Dept: WOUND CARE | Age: 63
Discharge: HOME | End: 2018-12-13
Payer: MEDICARE

## 2018-12-13 VITALS
TEMPERATURE: 97.9 F | RESPIRATION RATE: 20 BRPM | DIASTOLIC BLOOD PRESSURE: 71 MMHG | SYSTOLIC BLOOD PRESSURE: 109 MMHG | HEART RATE: 62 BPM

## 2018-12-13 PROCEDURE — 97597 DBRDMT OPN WND 1ST 20 CM/<: CPT

## 2018-12-14 LAB
KAPPA, FREE LIGHT CHAINS, SERUM: 55.45 MG/L (ref 3.3–19.4)
KAPPA/LAMBDA RATIO: 2.81 (ref 0.26–1.65)
KAPPA/LAMBDA TEST COMMENT: ABNORMAL
LAMBDA, FREE LIGHT CHAINS, SERUM: 19.71 MG/L (ref 5.71–26.3)

## 2018-12-14 NOTE — PROGRESS NOTES
Meniere's disease     Mixed hyperlipidemia 3/7/2016    Nicotine abuse     NSTEMI (non-ST elevated myocardial infarction) ([facility] 3/13/2014    Osteomyelitis of ankle or foot, acute, left ([facility] 6/4/2018    Pneumonia     Spinal abscess ([facility] 3/2014    epidural abscess    Spinal epidural abscess 3/13/2014    Type II diabetes mellitus, uncontrolled ([facility] 08/13/2014       PAST SURGICAL HISTORY    Past Surgical History:   Procedure Laterality Date    BACK SURGERY  3/2014    DEBRIDEMENT  3/12/14    extensive debridement of bone/muscle and paraspinal empyema    HYSTERECTOMY  6/15/1999    complete-think right ovary in.        FAMILY HISTORY    Family History   Problem Relation Age of Onset    High Blood Pressure Mother     Cancer Mother     Rheum Arthritis Mother     COPD Father     Cancer Father     Osteoarthritis Neg Hx     Asthma Neg Hx     Breast Cancer Neg Hx     Diabetes Neg Hx     Heart Failure Neg Hx     High Cholesterol Neg Hx     Hypertension Neg Hx     Migraines Neg Hx     Ovarian Cancer Neg Hx     Rashes/Skin Problems Neg Hx     Seizures Neg Hx     Stroke Neg Hx     Thyroid Disease Neg Hx        SOCIAL HISTORY    Social History   Substance Use Topics    Smoking status: Former Smoker     Packs/day: 0.50     Years: 10.00     Quit date: 11/10/2013    Smokeless tobacco: Never Used    Alcohol use 0.0 oz/week      Comment: occasionally       ALLERGIES    Allergies   Allergen Reactions    Doxycycline Other (See Comments)     Yeast infection    Metformin And Related Diarrhea       MEDICATIONS    Current Outpatient Prescriptions on File Prior to Encounter   Medication Sig Dispense Refill    atorvastatin (LIPITOR) 80 MG tablet Take 1 tablet by mouth daily 90 tablet 1    dapagliflozin (FARXIGA) 10 MG tablet Take 1 tablet by mouth every morning 30 tablet 3    carvedilol (COREG) 12.5 MG tablet Take 1 tablet by mouth 2 times daily 180 tablet 1    Cholecalciferol (VITAMIN D3) 2000 units CAPS Assessment:  [] Physician or NP scheduled for Wound Assessment:   [x] Return Appointment: With DR. Heather Lopez  in 1 Week(s)  [] Ordered tests:      Nurse Case Manger: [facility] [address] [employer] [address] Information: Should you experience any significant changes in your wound(s) or have questions about your wound care, please contact the [address] [place] [address] [phone] [facility] - THURSDAY 8:30 am - 4:30 pm and Friday 8:30 am - 1:00 pm.  If you need help with your wound outside these hours and cannot wait until we are again available, contact your PCP or go to the hospital emergency room.      Nurse Signature:_______________________     Date: ___________ Time:  ____________        Discharge Nurse Signature        PLEASE NOTE: IF YOU ARE UNABLE TO OBTAIN WOUND SUPPLIES, CONTINUE TO USE THE SUPPLIES YOU HAVE AVAILABLE UNTIL YOU ARE ABLE TO [facility].  IT IS MOST IMPORTANT TO KEEP THE WOUND COVERED AT ALL TIMES.     Physician Signature:_______________________     Date: ___________ Time:  ____________                    [x] Dr Ilsa Pedraza    [] Dr Neetu Magdaleno   [] Melvi Hickman CNP                 [] Dr Melva Jaime  [] Dr Marli Bui   [] Dr Manny Goel                  [] Dr Fatemeh Guerrero   [] Chilo Angela NP            The information contained in the After Visit Summary has been reviewed with me, the patient and/or responsible adult, by my health care provider(s).  I had the opportunity to ask questions regarding this information.  I have elected to receive;                Electronically signed by Ilsa Pedraza DPM on 12/14/2018 at 12:58 PM

## 2018-12-20 ENCOUNTER — HOSPITAL ENCOUNTER (OUTPATIENT)
Dept: WOUND CARE | Age: 63
Discharge: HOME | End: 2018-12-20

## 2019-01-02 ENCOUNTER — OFFICE VISIT (OUTPATIENT)
Dept: BARIATRICS/WEIGHT MGMT | Age: 64
End: 2019-01-02
Payer: MEDICARE

## 2019-01-02 VITALS
SYSTOLIC BLOOD PRESSURE: 105 MMHG | BODY MASS INDEX: 46.42 KG/M2 | WEIGHT: 262 LBS | HEIGHT: 63 IN | DIASTOLIC BLOOD PRESSURE: 60 MMHG | HEART RATE: 68 BPM

## 2019-01-02 DIAGNOSIS — E66.01 MORBID OBESITY WITH BMI OF 45.0-49.9, ADULT (HCC): Primary | ICD-10-CM

## 2019-01-02 DIAGNOSIS — N18.9 CHRONIC KIDNEY DISEASE, UNSPECIFIED CKD STAGE: ICD-10-CM

## 2019-01-02 DIAGNOSIS — E11.42 DM TYPE 2 WITH DIABETIC PERIPHERAL NEUROPATHY (HCC): ICD-10-CM

## 2019-01-02 DIAGNOSIS — E78.2 MIXED HYPERLIPIDEMIA: ICD-10-CM

## 2019-01-02 DIAGNOSIS — I10 ESSENTIAL HYPERTENSION: ICD-10-CM

## 2019-01-02 PROCEDURE — 1123F ACP DISCUSS/DSCN MKR DOCD: CPT | Performed by: SURGERY

## 2019-01-02 PROCEDURE — 1090F PRES/ABSN URINE INCON ASSESS: CPT | Performed by: SURGERY

## 2019-01-02 PROCEDURE — 1101F PT FALLS ASSESS-DOCD LE1/YR: CPT | Performed by: SURGERY

## 2019-01-02 PROCEDURE — 1036F TOBACCO NON-USER: CPT | Performed by: SURGERY

## 2019-01-02 PROCEDURE — 4040F PNEUMOC VAC/ADMIN/RCVD: CPT | Performed by: SURGERY

## 2019-01-02 PROCEDURE — 2022F DILAT RTA XM EVC RTNOPTHY: CPT | Performed by: SURGERY

## 2019-01-02 PROCEDURE — G8400 PT W/DXA NO RESULTS DOC: HCPCS | Performed by: SURGERY

## 2019-01-02 PROCEDURE — G8417 CALC BMI ABV UP PARAM F/U: HCPCS | Performed by: SURGERY

## 2019-01-02 PROCEDURE — 99213 OFFICE O/P EST LOW 20 MIN: CPT | Performed by: SURGERY

## 2019-01-02 PROCEDURE — G8427 DOCREV CUR MEDS BY ELIG CLIN: HCPCS | Performed by: SURGERY

## 2019-01-02 PROCEDURE — 3046F HEMOGLOBIN A1C LEVEL >9.0%: CPT | Performed by: SURGERY

## 2019-01-02 PROCEDURE — 3017F COLORECTAL CA SCREEN DOC REV: CPT | Performed by: SURGERY

## 2019-01-02 PROCEDURE — G8482 FLU IMMUNIZE ORDER/ADMIN: HCPCS | Performed by: SURGERY

## 2019-01-03 ENCOUNTER — HOSPITAL ENCOUNTER (OUTPATIENT)
Dept: WOUND CARE | Age: 64
Discharge: HOME | End: 2019-01-03
Payer: MEDICARE

## 2019-01-03 ENCOUNTER — HOSPITAL ENCOUNTER (OUTPATIENT)
Dept: SLEEP CENTER | Age: 64
Discharge: HOME | End: 2019-01-03
Payer: MEDICARE

## 2019-01-03 VITALS
DIASTOLIC BLOOD PRESSURE: 67 MMHG | TEMPERATURE: 97.4 F | SYSTOLIC BLOOD PRESSURE: 140 MMHG | HEIGHT: 63 IN | BODY MASS INDEX: 46.33 KG/M2 | HEART RATE: 67 BPM | WEIGHT: 261.47 LBS | RESPIRATION RATE: 19 BRPM

## 2019-01-03 DIAGNOSIS — L97.522 DIABETIC ULCER OF TOE OF LEFT FOOT ASSOCIATED WITH DIABETES MELLITUS DUE TO UNDERLYING CONDITION, WITH FAT LAYER EXPOSED (HCC): ICD-10-CM

## 2019-01-03 DIAGNOSIS — I10 ESSENTIAL HYPERTENSION: Chronic | ICD-10-CM

## 2019-01-03 DIAGNOSIS — E66.01 CLASS 3 SEVERE OBESITY DUE TO EXCESS CALORIES WITH SERIOUS COMORBIDITY AND BODY MASS INDEX (BMI) OF 45.0 TO 49.9 IN ADULT (HCC): ICD-10-CM

## 2019-01-03 DIAGNOSIS — E08.621 DIABETIC ULCER OF TOE OF LEFT FOOT ASSOCIATED WITH DIABETES MELLITUS DUE TO UNDERLYING CONDITION, WITH FAT LAYER EXPOSED (HCC): ICD-10-CM

## 2019-01-03 DIAGNOSIS — G47.33 OSA (OBSTRUCTIVE SLEEP APNEA): ICD-10-CM

## 2019-01-03 PROCEDURE — 95810 POLYSOM 6/> YRS 4/> PARAM: CPT

## 2019-01-03 PROCEDURE — 99212 OFFICE O/P EST SF 10 MIN: CPT

## 2019-01-04 PROCEDURE — 95810 POLYSOM 6/> YRS 4/> PARAM: CPT | Performed by: PSYCHIATRY & NEUROLOGY

## 2019-01-07 PROBLEM — E08.621 DIABETIC ULCER OF TOE OF LEFT FOOT ASSOCIATED WITH DIABETES MELLITUS DUE TO UNDERLYING CONDITION, WITH FAT LAYER EXPOSED (HCC): Chronic | Status: ACTIVE | Noted: 2018-11-09

## 2019-01-07 PROBLEM — L97.522 DIABETIC ULCER OF TOE OF LEFT FOOT ASSOCIATED WITH DIABETES MELLITUS DUE TO UNDERLYING CONDITION, WITH FAT LAYER EXPOSED (HCC): Chronic | Status: ACTIVE | Noted: 2018-11-09

## 2019-01-07 PROBLEM — E11.42 DM TYPE 2 WITH DIABETIC PERIPHERAL NEUROPATHY (HCC): Chronic | Status: ACTIVE | Noted: 2017-09-28

## 2019-01-08 ENCOUNTER — TELEPHONE (OUTPATIENT)
Dept: SLEEP MEDICINE | Age: 64
End: 2019-01-08

## 2019-01-12 PROBLEM — N18.30 CKD (CHRONIC KIDNEY DISEASE), STAGE III (HCC): Chronic | Status: ACTIVE | Noted: 2018-12-11

## 2019-01-14 ENCOUNTER — HOSPITAL ENCOUNTER (OUTPATIENT)
Dept: SLEEP CENTER | Age: 64
Discharge: HOME | End: 2019-01-14
Payer: MEDICARE

## 2019-01-14 DIAGNOSIS — G47.33 OSA (OBSTRUCTIVE SLEEP APNEA): ICD-10-CM

## 2019-01-14 PROCEDURE — 95811 POLYSOM 6/>YRS CPAP 4/> PARM: CPT

## 2019-01-15 PROCEDURE — 95811 POLYSOM 6/>YRS CPAP 4/> PARM: CPT | Performed by: PSYCHIATRY & NEUROLOGY

## 2019-01-16 ENCOUNTER — TELEPHONE (OUTPATIENT)
Dept: PULMONOLOGY | Age: 64
End: 2019-01-16

## 2019-01-17 DIAGNOSIS — N18.30 CKD (CHRONIC KIDNEY DISEASE), STAGE III (HCC): ICD-10-CM

## 2019-01-17 LAB
ALBUMIN SERPL-MCNC: 3.8 G/DL (ref 3.4–5)
ANION GAP SERPL CALCULATED.3IONS-SCNC: 15 MMOL/L (ref 3–16)
BACTERIA: ABNORMAL /HPF
BUN BLDV-MCNC: 29 MG/DL (ref 7–20)
CALCIUM SERPL-MCNC: 9 MG/DL (ref 8.3–10.6)
CHLORIDE BLD-SCNC: 100 MMOL/L (ref 99–110)
CO2: 25 MMOL/L (ref 21–32)
CREAT SERPL-MCNC: 1.6 MG/DL (ref 0.6–1.2)
CREATININE URINE: 250.6 MG/DL (ref 28–259)
D DIMER: 486 NG/ML DDU (ref 0–229)
EPITHELIAL CELLS, UA: 5 /HPF (ref 0–5)
GFR AFRICAN AMERICAN: 39
GFR NON-AFRICAN AMERICAN: 32
GLUCOSE BLD-MCNC: 167 MG/DL (ref 70–99)
HYALINE CASTS: 3 /LPF (ref 0–8)
MICROALBUMIN UR-MCNC: 99.4 MG/DL
MICROALBUMIN/CREAT UR-RTO: 396.6 MG/G (ref 0–30)
PHOSPHORUS: 3.3 MG/DL (ref 2.5–4.9)
POTASSIUM SERPL-SCNC: 4.7 MMOL/L (ref 3.5–5.1)
RBC UA: 4 /HPF (ref 0–4)
SODIUM BLD-SCNC: 140 MMOL/L (ref 136–145)
WBC UA: 4 /HPF (ref 0–5)

## 2019-01-18 ENCOUNTER — HOSPITAL ENCOUNTER (OUTPATIENT)
Dept: VASCULAR LAB | Age: 64
Discharge: HOME | End: 2019-01-18
Payer: MEDICARE

## 2019-01-18 DIAGNOSIS — N18.30 CKD (CHRONIC KIDNEY DISEASE), STAGE III (HCC): ICD-10-CM

## 2019-01-18 DIAGNOSIS — R60.0 LOCALIZED EDEMA: ICD-10-CM

## 2019-01-18 PROCEDURE — 93971 EXTREMITY STUDY: CPT

## 2019-01-20 DIAGNOSIS — E11.42 DM TYPE 2 WITH DIABETIC PERIPHERAL NEUROPATHY (HCC): ICD-10-CM

## 2019-01-21 RX ORDER — GABAPENTIN 300 MG/1
600 CAPSULE ORAL 3 TIMES DAILY
Qty: 180 CAPSULE | Refills: 2 | Status: SHIPPED | OUTPATIENT
Start: 2019-01-21 | End: 2019-08-25

## 2019-01-21 RX ORDER — LISINOPRIL 20 MG/1
20 TABLET ORAL DAILY
Qty: 30 TABLET | Refills: 2 | Status: SHIPPED | OUTPATIENT
Start: 2019-01-21 | End: 2019-03-18 | Stop reason: SDUPTHER

## 2019-01-21 RX ORDER — PANTOPRAZOLE SODIUM 40 MG/1
40 TABLET, DELAYED RELEASE ORAL DAILY
Qty: 30 TABLET | Refills: 2 | Status: ON HOLD | OUTPATIENT
Start: 2019-01-21 | End: 2019-04-03 | Stop reason: HOSPADM

## 2019-01-21 RX ORDER — CITALOPRAM 40 MG/1
40 TABLET ORAL DAILY
Qty: 30 TABLET | Refills: 2 | Status: SHIPPED | OUTPATIENT
Start: 2019-01-21 | End: 2019-04-16 | Stop reason: SDUPTHER

## 2019-01-22 ENCOUNTER — OFFICE VISIT (OUTPATIENT)
Dept: CARDIOLOGY CLINIC | Age: 64
End: 2019-01-22
Payer: MEDICARE

## 2019-01-22 VITALS
DIASTOLIC BLOOD PRESSURE: 82 MMHG | WEIGHT: 263 LBS | SYSTOLIC BLOOD PRESSURE: 126 MMHG | OXYGEN SATURATION: 96 % | BODY MASS INDEX: 46.6 KG/M2 | HEIGHT: 63 IN | HEART RATE: 61 BPM

## 2019-01-22 DIAGNOSIS — Z01.810 PREOP CARDIOVASCULAR EXAM: Primary | ICD-10-CM

## 2019-01-22 DIAGNOSIS — R06.02 SOB (SHORTNESS OF BREATH): ICD-10-CM

## 2019-01-22 DIAGNOSIS — I10 ESSENTIAL HYPERTENSION: ICD-10-CM

## 2019-01-22 DIAGNOSIS — I51.81 STRESS-INDUCED CARDIOMYOPATHY: ICD-10-CM

## 2019-01-22 PROCEDURE — 1123F ACP DISCUSS/DSCN MKR DOCD: CPT | Performed by: INTERNAL MEDICINE

## 2019-01-22 PROCEDURE — 3017F COLORECTAL CA SCREEN DOC REV: CPT | Performed by: INTERNAL MEDICINE

## 2019-01-22 PROCEDURE — 1036F TOBACCO NON-USER: CPT | Performed by: INTERNAL MEDICINE

## 2019-01-22 PROCEDURE — G8482 FLU IMMUNIZE ORDER/ADMIN: HCPCS | Performed by: INTERNAL MEDICINE

## 2019-01-22 PROCEDURE — G8417 CALC BMI ABV UP PARAM F/U: HCPCS | Performed by: INTERNAL MEDICINE

## 2019-01-22 PROCEDURE — G8400 PT W/DXA NO RESULTS DOC: HCPCS | Performed by: INTERNAL MEDICINE

## 2019-01-22 PROCEDURE — 4040F PNEUMOC VAC/ADMIN/RCVD: CPT | Performed by: INTERNAL MEDICINE

## 2019-01-22 PROCEDURE — 99215 OFFICE O/P EST HI 40 MIN: CPT | Performed by: INTERNAL MEDICINE

## 2019-01-22 PROCEDURE — G8427 DOCREV CUR MEDS BY ELIG CLIN: HCPCS | Performed by: INTERNAL MEDICINE

## 2019-01-22 PROCEDURE — 1101F PT FALLS ASSESS-DOCD LE1/YR: CPT | Performed by: INTERNAL MEDICINE

## 2019-01-22 PROCEDURE — 93000 ELECTROCARDIOGRAM COMPLETE: CPT | Performed by: INTERNAL MEDICINE

## 2019-01-22 PROCEDURE — 1090F PRES/ABSN URINE INCON ASSESS: CPT | Performed by: INTERNAL MEDICINE

## 2019-02-06 ENCOUNTER — OFFICE VISIT (OUTPATIENT)
Dept: BARIATRICS/WEIGHT MGMT | Age: 64
End: 2019-02-06
Payer: MEDICARE

## 2019-02-06 VITALS
HEIGHT: 63 IN | HEART RATE: 66 BPM | WEIGHT: 260 LBS | DIASTOLIC BLOOD PRESSURE: 61 MMHG | SYSTOLIC BLOOD PRESSURE: 124 MMHG | BODY MASS INDEX: 46.07 KG/M2

## 2019-02-06 DIAGNOSIS — E66.01 MORBID OBESITY WITH BMI OF 45.0-49.9, ADULT (HCC): Primary | ICD-10-CM

## 2019-02-06 DIAGNOSIS — I10 ESSENTIAL HYPERTENSION: ICD-10-CM

## 2019-02-06 DIAGNOSIS — E11.42 DM TYPE 2 WITH DIABETIC PERIPHERAL NEUROPATHY (HCC): ICD-10-CM

## 2019-02-06 DIAGNOSIS — N18.9 CHRONIC KIDNEY DISEASE, UNSPECIFIED CKD STAGE: ICD-10-CM

## 2019-02-06 PROCEDURE — G8417 CALC BMI ABV UP PARAM F/U: HCPCS | Performed by: SURGERY

## 2019-02-06 PROCEDURE — G8427 DOCREV CUR MEDS BY ELIG CLIN: HCPCS | Performed by: SURGERY

## 2019-02-06 PROCEDURE — 1101F PT FALLS ASSESS-DOCD LE1/YR: CPT | Performed by: SURGERY

## 2019-02-06 PROCEDURE — 1123F ACP DISCUSS/DSCN MKR DOCD: CPT | Performed by: SURGERY

## 2019-02-06 PROCEDURE — 1090F PRES/ABSN URINE INCON ASSESS: CPT | Performed by: SURGERY

## 2019-02-06 PROCEDURE — 1036F TOBACCO NON-USER: CPT | Performed by: SURGERY

## 2019-02-06 PROCEDURE — G8482 FLU IMMUNIZE ORDER/ADMIN: HCPCS | Performed by: SURGERY

## 2019-02-06 PROCEDURE — 2022F DILAT RTA XM EVC RTNOPTHY: CPT | Performed by: SURGERY

## 2019-02-06 PROCEDURE — 4040F PNEUMOC VAC/ADMIN/RCVD: CPT | Performed by: SURGERY

## 2019-02-06 PROCEDURE — 3046F HEMOGLOBIN A1C LEVEL >9.0%: CPT | Performed by: SURGERY

## 2019-02-06 PROCEDURE — 99213 OFFICE O/P EST LOW 20 MIN: CPT | Performed by: SURGERY

## 2019-02-06 PROCEDURE — G8400 PT W/DXA NO RESULTS DOC: HCPCS | Performed by: SURGERY

## 2019-02-06 PROCEDURE — 3017F COLORECTAL CA SCREEN DOC REV: CPT | Performed by: SURGERY

## 2019-02-08 ENCOUNTER — ANESTHESIA EVENT (OUTPATIENT)
Dept: ENDOSCOPY | Age: 64
End: 2019-02-08
Payer: MEDICARE

## 2019-02-08 ENCOUNTER — ANESTHESIA (OUTPATIENT)
Dept: ENDOSCOPY | Age: 64
End: 2019-02-08
Payer: MEDICARE

## 2019-02-08 ENCOUNTER — HOSPITAL ENCOUNTER (OUTPATIENT)
Age: 64
Setting detail: OUTPATIENT SURGERY
Discharge: HOME | End: 2019-02-08
Attending: SURGERY | Admitting: SURGERY
Payer: MEDICARE

## 2019-02-08 VITALS
HEART RATE: 30 BPM | DIASTOLIC BLOOD PRESSURE: 70 MMHG | WEIGHT: 250 LBS | SYSTOLIC BLOOD PRESSURE: 157 MMHG | OXYGEN SATURATION: 96 % | TEMPERATURE: 97.6 F | HEIGHT: 63 IN | BODY MASS INDEX: 44.3 KG/M2 | RESPIRATION RATE: 18 BRPM

## 2019-02-08 VITALS — DIASTOLIC BLOOD PRESSURE: 65 MMHG | SYSTOLIC BLOOD PRESSURE: 125 MMHG | TEMPERATURE: 96.8 F | OXYGEN SATURATION: 97 %

## 2019-02-08 LAB
GLUCOSE BLD-MCNC: 72 MG/DL (ref 70–99)
PERFORMED ON: NORMAL

## 2019-02-08 PROCEDURE — 6360000002 HC RX W HCPCS: Performed by: NURSE ANESTHETIST, CERTIFIED REGISTERED

## 2019-02-08 PROCEDURE — 88305 TISSUE EXAM BY PATHOLOGIST: CPT

## 2019-02-08 PROCEDURE — 43251 EGD REMOVE LESION SNARE: CPT | Performed by: SURGERY

## 2019-02-08 PROCEDURE — 2580000003 HC RX 258: Performed by: NURSE ANESTHETIST, CERTIFIED REGISTERED

## 2019-02-08 PROCEDURE — 3700000001 HC ADD 15 MINUTES (ANESTHESIA): Performed by: SURGERY

## 2019-02-08 PROCEDURE — 2709999900 HC NON-CHARGEABLE SUPPLY: Performed by: SURGERY

## 2019-02-08 PROCEDURE — 3700000000 HC ANESTHESIA ATTENDED CARE: Performed by: SURGERY

## 2019-02-08 PROCEDURE — 3609013500 HC EGD REMOVAL TUMOR POLYP/OTHER LESION SNARE TECH: Performed by: SURGERY

## 2019-02-08 PROCEDURE — 7100000011 HC PHASE II RECOVERY - ADDTL 15 MIN: Performed by: SURGERY

## 2019-02-08 PROCEDURE — 43239 EGD BIOPSY SINGLE/MULTIPLE: CPT | Performed by: SURGERY

## 2019-02-08 PROCEDURE — 2500000003 HC RX 250 WO HCPCS: Performed by: NURSE ANESTHETIST, CERTIFIED REGISTERED

## 2019-02-08 PROCEDURE — 3609012400 HC EGD TRANSORAL BIOPSY SINGLE/MULTIPLE: Performed by: SURGERY

## 2019-02-08 PROCEDURE — 7100000010 HC PHASE II RECOVERY - FIRST 15 MIN: Performed by: SURGERY

## 2019-02-08 PROCEDURE — 3609013200 HC EGD W/ ABLATION: Performed by: SURGERY

## 2019-02-08 RX ORDER — SODIUM CHLORIDE, SODIUM LACTATE, POTASSIUM CHLORIDE, CALCIUM CHLORIDE 600; 310; 30; 20 MG/100ML; MG/100ML; MG/100ML; MG/100ML
INJECTION, SOLUTION INTRAVENOUS CONTINUOUS PRN
Status: DISCONTINUED | OUTPATIENT
Start: 2019-02-08 | End: 2019-02-08 | Stop reason: SDUPTHER

## 2019-02-08 RX ORDER — PROPOFOL 10 MG/ML
INJECTION, EMULSION INTRAVENOUS PRN
Status: DISCONTINUED | OUTPATIENT
Start: 2019-02-08 | End: 2019-02-08 | Stop reason: SDUPTHER

## 2019-02-08 RX ORDER — LIDOCAINE HYDROCHLORIDE 20 MG/ML
INJECTION, SOLUTION EPIDURAL; INFILTRATION; INTRACAUDAL; PERINEURAL PRN
Status: DISCONTINUED | OUTPATIENT
Start: 2019-02-08 | End: 2019-02-08 | Stop reason: SDUPTHER

## 2019-02-08 RX ADMIN — PROPOFOL 50 MG: 10 INJECTION, EMULSION INTRAVENOUS at 09:25

## 2019-02-08 RX ADMIN — LIDOCAINE HYDROCHLORIDE 60 MG: 20 INJECTION, SOLUTION EPIDURAL; INFILTRATION; INTRACAUDAL; PERINEURAL at 09:19

## 2019-02-08 RX ADMIN — PROPOFOL 50 MG: 10 INJECTION, EMULSION INTRAVENOUS at 09:28

## 2019-02-08 RX ADMIN — PROPOFOL 50 MG: 10 INJECTION, EMULSION INTRAVENOUS at 09:22

## 2019-02-08 RX ADMIN — PROPOFOL 100 MG: 10 INJECTION, EMULSION INTRAVENOUS at 09:19

## 2019-02-08 RX ADMIN — SODIUM CHLORIDE, SODIUM LACTATE, POTASSIUM CHLORIDE, AND CALCIUM CHLORIDE: 600; 310; 30; 20 INJECTION, SOLUTION INTRAVENOUS at 09:12

## 2019-02-08 ASSESSMENT — LIFESTYLE VARIABLES: SMOKING_STATUS: 0

## 2019-02-08 ASSESSMENT — PULMONARY FUNCTION TESTS
PIF_VALUE: 0
PIF_VALUE: 1
PIF_VALUE: 0
PIF_VALUE: 1

## 2019-02-08 ASSESSMENT — PAIN - FUNCTIONAL ASSESSMENT: PAIN_FUNCTIONAL_ASSESSMENT: 0-10

## 2019-02-12 ENCOUNTER — HOSPITAL ENCOUNTER (OUTPATIENT)
Dept: MRI IMAGING | Age: 64
Discharge: HOME | End: 2019-02-12
Payer: MEDICARE

## 2019-02-12 DIAGNOSIS — M25.562 LEFT KNEE PAIN, UNSPECIFIED CHRONICITY: ICD-10-CM

## 2019-02-12 PROCEDURE — 73721 MRI JNT OF LWR EXTRE W/O DYE: CPT

## 2019-02-14 ENCOUNTER — HOSPITAL ENCOUNTER (OUTPATIENT)
Dept: NON INVASIVE DIAGNOSTICS | Age: 64
Discharge: HOME | End: 2019-02-14
Payer: MEDICARE

## 2019-02-14 DIAGNOSIS — R06.02 SOB (SHORTNESS OF BREATH): ICD-10-CM

## 2019-02-14 DIAGNOSIS — E11.42 DM TYPE 2 WITH DIABETIC PERIPHERAL NEUROPATHY (HCC): Primary | Chronic | ICD-10-CM

## 2019-02-14 DIAGNOSIS — I51.81 STRESS-INDUCED CARDIOMYOPATHY: ICD-10-CM

## 2019-02-14 LAB
LV EF: 53 %
LVEF MODALITY: NORMAL

## 2019-02-14 PROCEDURE — 93306 TTE W/DOPPLER COMPLETE: CPT

## 2019-02-19 ENCOUNTER — TELEPHONE (OUTPATIENT)
Dept: CARDIOLOGY CLINIC | Age: 64
End: 2019-02-19

## 2019-03-06 ENCOUNTER — OFFICE VISIT (OUTPATIENT)
Dept: BARIATRICS/WEIGHT MGMT | Age: 64
End: 2019-03-06
Payer: MEDICARE

## 2019-03-06 VITALS
WEIGHT: 261.6 LBS | BODY MASS INDEX: 46.35 KG/M2 | DIASTOLIC BLOOD PRESSURE: 60 MMHG | HEART RATE: 72 BPM | HEIGHT: 63 IN | SYSTOLIC BLOOD PRESSURE: 136 MMHG

## 2019-03-06 DIAGNOSIS — E78.2 MIXED HYPERLIPIDEMIA: ICD-10-CM

## 2019-03-06 DIAGNOSIS — N18.9 CHRONIC KIDNEY DISEASE, UNSPECIFIED CKD STAGE: ICD-10-CM

## 2019-03-06 DIAGNOSIS — E11.42 DM TYPE 2 WITH DIABETIC PERIPHERAL NEUROPATHY (HCC): ICD-10-CM

## 2019-03-06 DIAGNOSIS — I10 ESSENTIAL HYPERTENSION: ICD-10-CM

## 2019-03-06 DIAGNOSIS — E66.01 MORBID OBESITY WITH BMI OF 45.0-49.9, ADULT (HCC): Primary | ICD-10-CM

## 2019-03-06 PROCEDURE — 3046F HEMOGLOBIN A1C LEVEL >9.0%: CPT | Performed by: SURGERY

## 2019-03-06 PROCEDURE — 1101F PT FALLS ASSESS-DOCD LE1/YR: CPT | Performed by: SURGERY

## 2019-03-06 PROCEDURE — G8427 DOCREV CUR MEDS BY ELIG CLIN: HCPCS | Performed by: SURGERY

## 2019-03-06 PROCEDURE — 1036F TOBACCO NON-USER: CPT | Performed by: SURGERY

## 2019-03-06 PROCEDURE — 2022F DILAT RTA XM EVC RTNOPTHY: CPT | Performed by: SURGERY

## 2019-03-06 PROCEDURE — G8417 CALC BMI ABV UP PARAM F/U: HCPCS | Performed by: SURGERY

## 2019-03-06 PROCEDURE — G8400 PT W/DXA NO RESULTS DOC: HCPCS | Performed by: SURGERY

## 2019-03-06 PROCEDURE — 1123F ACP DISCUSS/DSCN MKR DOCD: CPT | Performed by: SURGERY

## 2019-03-06 PROCEDURE — 1090F PRES/ABSN URINE INCON ASSESS: CPT | Performed by: SURGERY

## 2019-03-06 PROCEDURE — 4040F PNEUMOC VAC/ADMIN/RCVD: CPT | Performed by: SURGERY

## 2019-03-06 PROCEDURE — G8482 FLU IMMUNIZE ORDER/ADMIN: HCPCS | Performed by: SURGERY

## 2019-03-06 PROCEDURE — 99214 OFFICE O/P EST MOD 30 MIN: CPT | Performed by: SURGERY

## 2019-03-06 PROCEDURE — 3017F COLORECTAL CA SCREEN DOC REV: CPT | Performed by: SURGERY

## 2019-03-10 PROBLEM — K21.9 CHRONIC GERD: Status: ACTIVE | Noted: 2019-03-10

## 2019-03-10 ASSESSMENT — ENCOUNTER SYMPTOMS
EYES NEGATIVE: 1
GASTROINTESTINAL NEGATIVE: 1
RESPIRATORY NEGATIVE: 1
ALLERGIC/IMMUNOLOGIC NEGATIVE: 1

## 2019-03-12 ENCOUNTER — OFFICE VISIT (OUTPATIENT)
Dept: SLEEP MEDICINE | Age: 64
End: 2019-03-12
Payer: MEDICARE

## 2019-03-12 VITALS
DIASTOLIC BLOOD PRESSURE: 73 MMHG | TEMPERATURE: 97.3 F | WEIGHT: 258 LBS | OXYGEN SATURATION: 94 % | HEIGHT: 63 IN | HEART RATE: 62 BPM | BODY MASS INDEX: 45.71 KG/M2 | SYSTOLIC BLOOD PRESSURE: 152 MMHG | RESPIRATION RATE: 14 BRPM

## 2019-03-12 DIAGNOSIS — E66.01 OBESITY, CLASS III, BMI 40-49.9 (MORBID OBESITY) (HCC): ICD-10-CM

## 2019-03-12 DIAGNOSIS — Z99.89 DEPENDENCE ON OTHER ENABLING MACHINES AND DEVICES: ICD-10-CM

## 2019-03-12 DIAGNOSIS — G47.33 OSA ON CPAP: Primary | ICD-10-CM

## 2019-03-12 DIAGNOSIS — Z99.89 OSA ON CPAP: Primary | ICD-10-CM

## 2019-03-12 PROCEDURE — 99213 OFFICE O/P EST LOW 20 MIN: CPT | Performed by: PSYCHIATRY & NEUROLOGY

## 2019-03-12 PROCEDURE — G8482 FLU IMMUNIZE ORDER/ADMIN: HCPCS | Performed by: PSYCHIATRY & NEUROLOGY

## 2019-03-12 PROCEDURE — 1090F PRES/ABSN URINE INCON ASSESS: CPT | Performed by: PSYCHIATRY & NEUROLOGY

## 2019-03-12 PROCEDURE — 4040F PNEUMOC VAC/ADMIN/RCVD: CPT | Performed by: PSYCHIATRY & NEUROLOGY

## 2019-03-12 PROCEDURE — G8400 PT W/DXA NO RESULTS DOC: HCPCS | Performed by: PSYCHIATRY & NEUROLOGY

## 2019-03-12 PROCEDURE — G8427 DOCREV CUR MEDS BY ELIG CLIN: HCPCS | Performed by: PSYCHIATRY & NEUROLOGY

## 2019-03-12 PROCEDURE — 1101F PT FALLS ASSESS-DOCD LE1/YR: CPT | Performed by: PSYCHIATRY & NEUROLOGY

## 2019-03-12 PROCEDURE — 3017F COLORECTAL CA SCREEN DOC REV: CPT | Performed by: PSYCHIATRY & NEUROLOGY

## 2019-03-12 PROCEDURE — 1123F ACP DISCUSS/DSCN MKR DOCD: CPT | Performed by: PSYCHIATRY & NEUROLOGY

## 2019-03-12 PROCEDURE — G8417 CALC BMI ABV UP PARAM F/U: HCPCS | Performed by: PSYCHIATRY & NEUROLOGY

## 2019-03-12 PROCEDURE — 1036F TOBACCO NON-USER: CPT | Performed by: PSYCHIATRY & NEUROLOGY

## 2019-03-12 ASSESSMENT — ENCOUNTER SYMPTOMS
EYES NEGATIVE: 1
GASTROINTESTINAL NEGATIVE: 1
CHOKING: 0
APNEA: 0

## 2019-03-14 ENCOUNTER — OFFICE VISIT (OUTPATIENT)
Dept: BARIATRICS/WEIGHT MGMT | Age: 64
End: 2019-03-14
Payer: MEDICARE

## 2019-03-14 VITALS
SYSTOLIC BLOOD PRESSURE: 132 MMHG | DIASTOLIC BLOOD PRESSURE: 70 MMHG | BODY MASS INDEX: 46 KG/M2 | HEART RATE: 78 BPM | WEIGHT: 259.6 LBS | HEIGHT: 63 IN

## 2019-03-14 DIAGNOSIS — E78.2 MIXED HYPERLIPIDEMIA: ICD-10-CM

## 2019-03-14 DIAGNOSIS — K21.9 CHRONIC GERD: ICD-10-CM

## 2019-03-14 DIAGNOSIS — E11.42 DM TYPE 2 WITH DIABETIC PERIPHERAL NEUROPATHY (HCC): Chronic | ICD-10-CM

## 2019-03-14 DIAGNOSIS — I10 ESSENTIAL HYPERTENSION: Primary | Chronic | ICD-10-CM

## 2019-03-14 DIAGNOSIS — E66.01 OBESITY, CLASS III, BMI 40-49.9 (MORBID OBESITY) (HCC): ICD-10-CM

## 2019-03-14 DIAGNOSIS — G47.33 OSA (OBSTRUCTIVE SLEEP APNEA): ICD-10-CM

## 2019-03-14 PROCEDURE — G8417 CALC BMI ABV UP PARAM F/U: HCPCS | Performed by: NURSE PRACTITIONER

## 2019-03-14 PROCEDURE — 4040F PNEUMOC VAC/ADMIN/RCVD: CPT | Performed by: NURSE PRACTITIONER

## 2019-03-14 PROCEDURE — 1101F PT FALLS ASSESS-DOCD LE1/YR: CPT | Performed by: NURSE PRACTITIONER

## 2019-03-14 PROCEDURE — 3046F HEMOGLOBIN A1C LEVEL >9.0%: CPT | Performed by: NURSE PRACTITIONER

## 2019-03-14 PROCEDURE — 1036F TOBACCO NON-USER: CPT | Performed by: NURSE PRACTITIONER

## 2019-03-14 PROCEDURE — 99213 OFFICE O/P EST LOW 20 MIN: CPT | Performed by: NURSE PRACTITIONER

## 2019-03-14 PROCEDURE — 2022F DILAT RTA XM EVC RTNOPTHY: CPT | Performed by: NURSE PRACTITIONER

## 2019-03-14 PROCEDURE — 1090F PRES/ABSN URINE INCON ASSESS: CPT | Performed by: NURSE PRACTITIONER

## 2019-03-14 PROCEDURE — G8427 DOCREV CUR MEDS BY ELIG CLIN: HCPCS | Performed by: NURSE PRACTITIONER

## 2019-03-14 PROCEDURE — G8482 FLU IMMUNIZE ORDER/ADMIN: HCPCS | Performed by: NURSE PRACTITIONER

## 2019-03-14 PROCEDURE — G8400 PT W/DXA NO RESULTS DOC: HCPCS | Performed by: NURSE PRACTITIONER

## 2019-03-14 PROCEDURE — 1123F ACP DISCUSS/DSCN MKR DOCD: CPT | Performed by: NURSE PRACTITIONER

## 2019-03-14 PROCEDURE — 3017F COLORECTAL CA SCREEN DOC REV: CPT | Performed by: NURSE PRACTITIONER

## 2019-03-14 ASSESSMENT — ENCOUNTER SYMPTOMS
ABDOMINAL PAIN: 0
COUGH: 0
DIARRHEA: 0
SHORTNESS OF BREATH: 0
CONSTIPATION: 0
BACK PAIN: 1
NAUSEA: 0

## 2019-03-18 ENCOUNTER — OFFICE VISIT (OUTPATIENT)
Dept: INTERNAL MEDICINE CLINIC | Age: 64
End: 2019-03-18
Payer: MEDICARE

## 2019-03-18 ENCOUNTER — TELEPHONE (OUTPATIENT)
Dept: BARIATRICS/WEIGHT MGMT | Age: 64
End: 2019-03-18

## 2019-03-18 VITALS
BODY MASS INDEX: 46.07 KG/M2 | DIASTOLIC BLOOD PRESSURE: 64 MMHG | HEIGHT: 63 IN | HEART RATE: 62 BPM | OXYGEN SATURATION: 98 % | SYSTOLIC BLOOD PRESSURE: 150 MMHG | WEIGHT: 260 LBS | RESPIRATION RATE: 18 BRPM

## 2019-03-18 DIAGNOSIS — E66.01 OBESITY, CLASS III, BMI 40-49.9 (MORBID OBESITY) (HCC): ICD-10-CM

## 2019-03-18 DIAGNOSIS — E08.621 DIABETIC ULCER OF TOE OF LEFT FOOT ASSOCIATED WITH DIABETES MELLITUS DUE TO UNDERLYING CONDITION, WITH FAT LAYER EXPOSED (HCC): ICD-10-CM

## 2019-03-18 DIAGNOSIS — G47.33 OSA (OBSTRUCTIVE SLEEP APNEA): ICD-10-CM

## 2019-03-18 DIAGNOSIS — Z01.818 PREOP EXAMINATION: Primary | ICD-10-CM

## 2019-03-18 DIAGNOSIS — L97.522 DIABETIC ULCER OF TOE OF LEFT FOOT ASSOCIATED WITH DIABETES MELLITUS DUE TO UNDERLYING CONDITION, WITH FAT LAYER EXPOSED (HCC): ICD-10-CM

## 2019-03-18 DIAGNOSIS — Z23 NEED FOR PNEUMOCOCCAL VACCINATION: ICD-10-CM

## 2019-03-18 DIAGNOSIS — E11.42 DM TYPE 2 WITH DIABETIC PERIPHERAL NEUROPATHY (HCC): Chronic | ICD-10-CM

## 2019-03-18 LAB
ANION GAP SERPL CALCULATED.3IONS-SCNC: 15 MMOL/L (ref 3–16)
BASOPHILS ABSOLUTE: 0 K/UL (ref 0–0.2)
BASOPHILS RELATIVE PERCENT: 0.5 %
BUN BLDV-MCNC: 30 MG/DL (ref 7–20)
CALCIUM SERPL-MCNC: 9.1 MG/DL (ref 8.3–10.6)
CHLORIDE BLD-SCNC: 96 MMOL/L (ref 99–110)
CO2: 27 MMOL/L (ref 21–32)
CREAT SERPL-MCNC: 1.7 MG/DL (ref 0.6–1.2)
EOSINOPHILS ABSOLUTE: 0.2 K/UL (ref 0–0.6)
EOSINOPHILS RELATIVE PERCENT: 2.5 %
GFR AFRICAN AMERICAN: 36
GFR NON-AFRICAN AMERICAN: 30
GLUCOSE BLD-MCNC: 246 MG/DL (ref 70–99)
HBA1C MFR BLD: 7.5 %
HCT VFR BLD CALC: 35.7 % (ref 36–48)
HEMOGLOBIN: 11.6 G/DL (ref 12–16)
LYMPHOCYTES ABSOLUTE: 1.5 K/UL (ref 1–5.1)
LYMPHOCYTES RELATIVE PERCENT: 17.8 %
MCH RBC QN AUTO: 29.5 PG (ref 26–34)
MCHC RBC AUTO-ENTMCNC: 32.6 G/DL (ref 31–36)
MCV RBC AUTO: 90.3 FL (ref 80–100)
MONOCYTES ABSOLUTE: 0.7 K/UL (ref 0–1.3)
MONOCYTES RELATIVE PERCENT: 8.1 %
NEUTROPHILS ABSOLUTE: 6 K/UL (ref 1.7–7.7)
NEUTROPHILS RELATIVE PERCENT: 71.1 %
PDW BLD-RTO: 15.5 % (ref 12.4–15.4)
PLATELET # BLD: 260 K/UL (ref 135–450)
PMV BLD AUTO: 10.2 FL (ref 5–10.5)
POTASSIUM SERPL-SCNC: 4.5 MMOL/L (ref 3.5–5.1)
RBC # BLD: 3.95 M/UL (ref 4–5.2)
SODIUM BLD-SCNC: 138 MMOL/L (ref 136–145)
WBC # BLD: 8.4 K/UL (ref 4–11)

## 2019-03-18 PROCEDURE — 4040F PNEUMOC VAC/ADMIN/RCVD: CPT | Performed by: FAMILY MEDICINE

## 2019-03-18 PROCEDURE — G8482 FLU IMMUNIZE ORDER/ADMIN: HCPCS | Performed by: FAMILY MEDICINE

## 2019-03-18 PROCEDURE — 90670 PCV13 VACCINE IM: CPT | Performed by: FAMILY MEDICINE

## 2019-03-18 PROCEDURE — 3046F HEMOGLOBIN A1C LEVEL >9.0%: CPT | Performed by: FAMILY MEDICINE

## 2019-03-18 PROCEDURE — G8417 CALC BMI ABV UP PARAM F/U: HCPCS | Performed by: FAMILY MEDICINE

## 2019-03-18 PROCEDURE — 2022F DILAT RTA XM EVC RTNOPTHY: CPT | Performed by: FAMILY MEDICINE

## 2019-03-18 PROCEDURE — G8427 DOCREV CUR MEDS BY ELIG CLIN: HCPCS | Performed by: FAMILY MEDICINE

## 2019-03-18 PROCEDURE — G8400 PT W/DXA NO RESULTS DOC: HCPCS | Performed by: FAMILY MEDICINE

## 2019-03-18 PROCEDURE — 36415 COLL VENOUS BLD VENIPUNCTURE: CPT | Performed by: FAMILY MEDICINE

## 2019-03-18 PROCEDURE — 99214 OFFICE O/P EST MOD 30 MIN: CPT | Performed by: FAMILY MEDICINE

## 2019-03-18 PROCEDURE — 1101F PT FALLS ASSESS-DOCD LE1/YR: CPT | Performed by: FAMILY MEDICINE

## 2019-03-18 PROCEDURE — 1090F PRES/ABSN URINE INCON ASSESS: CPT | Performed by: FAMILY MEDICINE

## 2019-03-18 PROCEDURE — 1123F ACP DISCUSS/DSCN MKR DOCD: CPT | Performed by: FAMILY MEDICINE

## 2019-03-18 PROCEDURE — 3017F COLORECTAL CA SCREEN DOC REV: CPT | Performed by: FAMILY MEDICINE

## 2019-03-18 PROCEDURE — G0009 ADMIN PNEUMOCOCCAL VACCINE: HCPCS | Performed by: FAMILY MEDICINE

## 2019-03-18 PROCEDURE — 1036F TOBACCO NON-USER: CPT | Performed by: FAMILY MEDICINE

## 2019-03-18 PROCEDURE — 83036 HEMOGLOBIN GLYCOSYLATED A1C: CPT | Performed by: FAMILY MEDICINE

## 2019-03-18 RX ORDER — CARVEDILOL 25 MG/1
25 TABLET ORAL 2 TIMES DAILY
Qty: 180 TABLET | Refills: 1 | Status: SHIPPED | OUTPATIENT
Start: 2019-03-18 | End: 2019-09-14 | Stop reason: SDUPTHER

## 2019-03-18 RX ORDER — LISINOPRIL 20 MG/1
20 TABLET ORAL DAILY
Qty: 30 TABLET | Refills: 2 | Status: SHIPPED | OUTPATIENT
Start: 2019-03-18 | End: 2019-07-20 | Stop reason: SDUPTHER

## 2019-04-01 ENCOUNTER — ANESTHESIA EVENT (OUTPATIENT)
Dept: OPERATING ROOM | Age: 64
DRG: 619 | End: 2019-04-01
Payer: MEDICARE

## 2019-04-01 ENCOUNTER — TELEPHONE (OUTPATIENT)
Dept: BARIATRICS/WEIGHT MGMT | Age: 64
End: 2019-04-01

## 2019-04-01 NOTE — TELEPHONE ENCOUNTER
LM for patient to call office.    Surgery time tomorrow is 7:15 with an arrival time of 5:30   [facility]   Dr Maggi Stoddard

## 2019-04-01 NOTE — PROGRESS NOTES
[address]                          Date of Procedure   4/2/19    Time of Procedure  0715  PRIOR TO PROCEDURE DATE:  1. Please follow any guidelines/instructions prior to your procedure as advised by your surgeon. 2. Arrange for someone to drive you home and be with you for the first 24 hours after discharge for your safety after your procedure for which you received sedation. Ensure it is someone we can share information with regarding your discharge. 3. You must contact your surgeon for instructions IF:   You are taking any blood thinners, aspirin, anti-inflammatory or vitamin E.   There is a change in your physical condition such as a cold, fever, rash, cuts, sores or any other infection, especially near your surgical site. 4. Do not drink alcohol the day before or day of your procedure. 5. A Pre-op History and Physical for surgery MUST be completed by your Physician or Urgent Care within 30 days of your procedure date. Please bring a copy with you on the day of your procedure and along with any other testing performed. THE DAY OF YOUR PROCEDURE:  1. Follow instructions for ARRIVAL TIME as DIRECTED BY YOUR SURGEON. If your surgeon does not give you a specific arrival time, please arrive at 0530    2. Enter the MAIN entrance from [employer] and follow the signs to the free [employer] or [id number] parking (offered free of charge 6am-5pm). 3. Enter the Main Entrance of the hospital (do not enter from the lower level of the parking garage). Upon entrance, check in with the  at the main desk on your left. If no one is available at the desk, proceed into the [facility] [place] Waiting Room and go through the door directly into the [facility] [place] [facility]. There is a Check-in desk ACROSS from Room 5 (marked with a sign hanging from the ceiling). The phone number for the surgery center is [phone].     4. Please call [phone] option #2 option #2 if you have not been preregistered yet. On the day of your procedure bring your insurance card and photo ID. You will be registered at your bedside once brought back to your room. 5. DO NOT EAT ANYTHING eight hours prior to surgery. May have 8 ounces of water 4 hours prior to surgery. 6. MEDICATIONS    Take the following medications with a SMALL sip of water: gabapentin, coreg celexa   Use your usual dose of inhalers the morning of surgery. BRING your rescue inhaler with you to hospital.    Anesthesia does NOT want you to take insulin the morning of surgery. They will control your blood sugar while you are at the hospital. Please contact your ordering physician for instructions regarding your insulin the night before your procedure. If you have an insulin pump, please keep it set on basal rate. 7. Do not swallow water when brushing teeth. No gum, candy, mints or ice chips. Refrain from smoking or at least decrease the amount. 8. Dress in loose, comfortable clothing appropriate for redressing after your procedure. Do not wear jewelry (including body piercings), make-up (especially NO eye make-up), fingernail polish (NO toenail polish if foot/leg surgery), lotion, powders or metal hairclips. 9. Dentures, glasses, or contacts will need to be removed before your procedure. Bring cases for your glasses, contacts, dentures, or hearing aids to protect them while you are in surgery. 10. If you use a CPAP, please bring it with you on the day of your procedure. 11. We recommend that valuable personal  belongings such as cash, cell phones, e-tablets or jewelry, be left at home during your stay. The hospital will not be responsible for valuables that are not secured in the hospital safe. However, if your insurance requires a co-pay, you may want to bring a method of payment, i.e. Check or credit card, if you wish to pay your co-pay the day of surgery.       12. If you are to stay overnight, you may bring a bag with personal items. Please have any large items you may need brought in by your family after your arrival to your hospital room. 15. If you have a Living Will or Durable Power of , please bring a copy on the day of your procedure. 15. With your permission, one family member may accompany you while you are being prepared for surgery. Once you are ready, additional family members may join you. HOW WE KEEP YOU SAFE and WORK TO PREVENT SURGICAL SITE INFECTIONS:  1. Health care workers should always check your ID bracelet to verify your name and birth date. You will be asked many times to state your name, date of birth, and allergies. 2. Health care workers should always clean their hands with soap or alcohol gel before providing care to you. It is okay to ask anyone if they cleaned their hands before they touch you. 3. You will be actively involved in verifying the type of procedure you are having and ensuring the correct surgical site. This will be confirmed multiple times prior to your procedure. Do NOT tim your surgery site UNLESS instructed to by your surgeon. 4. Do not shave or wax for 72 hours prior to procedure near your operative site. Shaving with a razor can irritate your skin and make it easier to develop an infection. On the day of your procedure, any hair that needs to be removed near the surgical site will be clipped by a healthcare worker using a special clippers designed to avoid skin irritation. 5. When you are in the operating room, your surgical site will be cleansed with a special soap, and in most cases, you will be given an antibiotic before the surgery begins. What to expect AFTER YOUR PROCEDURE:  1. Immediately following your procedure, your will be taken to the PACU for the first phase of your recovery.   Your nurse will help you recover from any potential side effects of anesthesia, such as extreme drowsiness, changes in your vital signs or

## 2019-04-02 ENCOUNTER — ANESTHESIA (OUTPATIENT)
Dept: OPERATING ROOM | Age: 64
DRG: 619 | End: 2019-04-02
Payer: MEDICARE

## 2019-04-02 ENCOUNTER — HOSPITAL ENCOUNTER (INPATIENT)
Age: 64
LOS: 1 days | Discharge: HOME | DRG: 619 | End: 2019-04-03
Attending: SURGERY | Admitting: SURGERY
Payer: MEDICARE

## 2019-04-02 VITALS — DIASTOLIC BLOOD PRESSURE: 59 MMHG | OXYGEN SATURATION: 98 % | SYSTOLIC BLOOD PRESSURE: 111 MMHG | TEMPERATURE: 97.3 F

## 2019-04-02 DIAGNOSIS — G89.18 POST-OP PAIN: Primary | ICD-10-CM

## 2019-04-02 PROBLEM — E66.01 MORBID OBESITY WITH BMI OF 45.0-49.9, ADULT (HCC): Status: ACTIVE | Noted: 2019-04-02

## 2019-04-02 LAB
A/G RATIO: 1.2 (ref 1.1–2.2)
ABO/RH: NORMAL
ALBUMIN SERPL-MCNC: 4.2 G/DL (ref 3.4–5)
ALP BLD-CCNC: 88 U/L (ref 40–129)
ALT SERPL-CCNC: 17 U/L (ref 10–40)
ANION GAP SERPL CALCULATED.3IONS-SCNC: 14 MMOL/L (ref 3–16)
ANTIBODY SCREEN: NORMAL
AST SERPL-CCNC: 17 U/L (ref 15–37)
BILIRUB SERPL-MCNC: 0.4 MG/DL (ref 0–1)
BUN BLDV-MCNC: 51 MG/DL (ref 7–20)
CALCIUM SERPL-MCNC: 10 MG/DL (ref 8.3–10.6)
CHLORIDE BLD-SCNC: 99 MMOL/L (ref 99–110)
CHLORIDE URINE RANDOM: 27 MMOL/L
CO2: 27 MMOL/L (ref 21–32)
CREAT SERPL-MCNC: 2.1 MG/DL (ref 0.6–1.2)
CREATININE URINE: 165.6 MG/DL (ref 28–259)
GFR AFRICAN AMERICAN: 29
GFR NON-AFRICAN AMERICAN: 24
GLOBULIN: 3.5 G/DL
GLUCOSE BLD-MCNC: 76 MG/DL (ref 70–99)
GLUCOSE BLD-MCNC: 76 MG/DL (ref 70–99)
GLUCOSE BLD-MCNC: 86 MG/DL (ref 70–99)
GLUCOSE BLD-MCNC: 91 MG/DL (ref 70–99)
GLUCOSE BLD-MCNC: 97 MG/DL (ref 70–99)
PERFORMED ON: NORMAL
POTASSIUM SERPL-SCNC: 3.9 MMOL/L (ref 3.5–5.1)
PROTEIN PROTEIN: 56.4 MG/DL
SODIUM BLD-SCNC: 140 MMOL/L (ref 136–145)
SODIUM URINE: 28 MMOL/L
TOTAL PROTEIN: 7.7 G/DL (ref 6.4–8.2)

## 2019-04-02 PROCEDURE — 86900 BLOOD TYPING SEROLOGIC ABO: CPT

## 2019-04-02 PROCEDURE — 88307 TISSUE EXAM BY PATHOLOGIST: CPT

## 2019-04-02 PROCEDURE — 6370000000 HC RX 637 (ALT 250 FOR IP)

## 2019-04-02 PROCEDURE — 6360000002 HC RX W HCPCS: Performed by: SURGERY

## 2019-04-02 PROCEDURE — P9045 ALBUMIN (HUMAN), 5%, 250 ML: HCPCS | Performed by: NURSE ANESTHETIST, CERTIFIED REGISTERED

## 2019-04-02 PROCEDURE — 94770 HC ETCO2 MONITOR DAILY: CPT

## 2019-04-02 PROCEDURE — 7100000001 HC PACU RECOVERY - ADDTL 15 MIN: Performed by: SURGERY

## 2019-04-02 PROCEDURE — 86901 BLOOD TYPING SEROLOGIC RH(D): CPT

## 2019-04-02 PROCEDURE — 2700000000 HC OXYGEN THERAPY PER DAY

## 2019-04-02 PROCEDURE — 3600000009 HC SURGERY ROBOT BASE: Performed by: SURGERY

## 2019-04-02 PROCEDURE — 2720000010 HC SURG SUPPLY STERILE: Performed by: SURGERY

## 2019-04-02 PROCEDURE — 94761 N-INVAS EAR/PLS OXIMETRY MLT: CPT

## 2019-04-02 PROCEDURE — 82436 ASSAY OF URINE CHLORIDE: CPT

## 2019-04-02 PROCEDURE — 2580000003 HC RX 258: Performed by: ANESTHESIOLOGY

## 2019-04-02 PROCEDURE — 84156 ASSAY OF PROTEIN URINE: CPT

## 2019-04-02 PROCEDURE — 3700000001 HC ADD 15 MINUTES (ANESTHESIA): Performed by: SURGERY

## 2019-04-02 PROCEDURE — 2580000003 HC RX 258: Performed by: SURGERY

## 2019-04-02 PROCEDURE — 0WQF4ZZ REPAIR ABDOMINAL WALL, PERCUTANEOUS ENDOSCOPIC APPROACH: ICD-10-PCS | Performed by: SURGERY

## 2019-04-02 PROCEDURE — 82570 ASSAY OF URINE CREATININE: CPT

## 2019-04-02 PROCEDURE — 2709999900 HC NON-CHARGEABLE SUPPLY: Performed by: SURGERY

## 2019-04-02 PROCEDURE — 6370000000 HC RX 637 (ALT 250 FOR IP): Performed by: SURGERY

## 2019-04-02 PROCEDURE — C9113 INJ PANTOPRAZOLE SODIUM, VIA: HCPCS | Performed by: SURGERY

## 2019-04-02 PROCEDURE — 7100000000 HC PACU RECOVERY - FIRST 15 MIN: Performed by: SURGERY

## 2019-04-02 PROCEDURE — 94664 DEMO&/EVAL PT USE INHALER: CPT

## 2019-04-02 PROCEDURE — 6360000002 HC RX W HCPCS: Performed by: INTERNAL MEDICINE

## 2019-04-02 PROCEDURE — 1200000000 HC SEMI PRIVATE

## 2019-04-02 PROCEDURE — 0DB64Z3 EXCISION OF STOMACH, PERCUTANEOUS ENDOSCOPIC APPROACH, VERTICAL: ICD-10-PCS | Performed by: SURGERY

## 2019-04-02 PROCEDURE — 84300 ASSAY OF URINE SODIUM: CPT

## 2019-04-02 PROCEDURE — 6360000002 HC RX W HCPCS: Performed by: NURSE ANESTHETIST, CERTIFIED REGISTERED

## 2019-04-02 PROCEDURE — 3700000000 HC ANESTHESIA ATTENDED CARE: Performed by: SURGERY

## 2019-04-02 PROCEDURE — 2500000003 HC RX 250 WO HCPCS: Performed by: SURGERY

## 2019-04-02 PROCEDURE — S2900 ROBOTIC SURGICAL SYSTEM: HCPCS | Performed by: SURGERY

## 2019-04-02 PROCEDURE — 49654 PR LAP, INCISIONAL HERNIA REPAIR,REDUCIBLE: CPT | Performed by: SURGERY

## 2019-04-02 PROCEDURE — 80053 COMPREHEN METABOLIC PANEL: CPT

## 2019-04-02 PROCEDURE — 86850 RBC ANTIBODY SCREEN: CPT

## 2019-04-02 PROCEDURE — 3600000019 HC SURGERY ROBOT ADDTL 15MIN: Performed by: SURGERY

## 2019-04-02 PROCEDURE — 2500000003 HC RX 250 WO HCPCS: Performed by: NURSE ANESTHETIST, CERTIFIED REGISTERED

## 2019-04-02 PROCEDURE — 8E0W4CZ ROBOTIC ASSISTED PROCEDURE OF TRUNK REGION, PERCUTANEOUS ENDOSCOPIC APPROACH: ICD-10-PCS | Performed by: SURGERY

## 2019-04-02 PROCEDURE — 43775 LAP SLEEVE GASTRECTOMY: CPT | Performed by: SURGERY

## 2019-04-02 RX ORDER — ONDANSETRON 2 MG/ML
4 INJECTION INTRAMUSCULAR; INTRAVENOUS EVERY 6 HOURS PRN
Status: DISCONTINUED | OUTPATIENT
Start: 2019-04-02 | End: 2019-04-03 | Stop reason: HOSPADM

## 2019-04-02 RX ORDER — HYDRALAZINE HYDROCHLORIDE 20 MG/ML
5 INJECTION INTRAMUSCULAR; INTRAVENOUS EVERY 10 MIN PRN
Status: DISCONTINUED | OUTPATIENT
Start: 2019-04-02 | End: 2019-04-02 | Stop reason: HOSPADM

## 2019-04-02 RX ORDER — LABETALOL HYDROCHLORIDE 5 MG/ML
5 INJECTION, SOLUTION INTRAVENOUS EVERY 10 MIN PRN
Status: DISCONTINUED | OUTPATIENT
Start: 2019-04-02 | End: 2019-04-02 | Stop reason: HOSPADM

## 2019-04-02 RX ORDER — FENTANYL CITRATE 50 UG/ML
INJECTION, SOLUTION INTRAMUSCULAR; INTRAVENOUS PRN
Status: DISCONTINUED | OUTPATIENT
Start: 2019-04-02 | End: 2019-04-02 | Stop reason: SDUPTHER

## 2019-04-02 RX ORDER — MEPERIDINE HYDROCHLORIDE 25 MG/ML
12.5 INJECTION INTRAMUSCULAR; INTRAVENOUS; SUBCUTANEOUS EVERY 5 MIN PRN
Status: DISCONTINUED | OUTPATIENT
Start: 2019-04-02 | End: 2019-04-02 | Stop reason: HOSPADM

## 2019-04-02 RX ORDER — PANTOPRAZOLE SODIUM 40 MG/10ML
40 INJECTION, POWDER, LYOPHILIZED, FOR SOLUTION INTRAVENOUS DAILY
Status: DISCONTINUED | OUTPATIENT
Start: 2019-04-02 | End: 2019-04-03 | Stop reason: HOSPADM

## 2019-04-02 RX ORDER — GLYCOPYRROLATE 1 MG/5 ML
SYRINGE (ML) INTRAVENOUS PRN
Status: DISCONTINUED | OUTPATIENT
Start: 2019-04-02 | End: 2019-04-02 | Stop reason: SDUPTHER

## 2019-04-02 RX ORDER — NALOXONE HYDROCHLORIDE 0.4 MG/ML
0.4 INJECTION, SOLUTION INTRAMUSCULAR; INTRAVENOUS; SUBCUTANEOUS PRN
Status: DISCONTINUED | OUTPATIENT
Start: 2019-04-02 | End: 2019-04-03

## 2019-04-02 RX ORDER — MIDAZOLAM HYDROCHLORIDE 1 MG/ML
INJECTION INTRAMUSCULAR; INTRAVENOUS PRN
Status: DISCONTINUED | OUTPATIENT
Start: 2019-04-02 | End: 2019-04-02 | Stop reason: SDUPTHER

## 2019-04-02 RX ORDER — ONDANSETRON 2 MG/ML
4 INJECTION INTRAMUSCULAR; INTRAVENOUS
Status: DISCONTINUED | OUTPATIENT
Start: 2019-04-02 | End: 2019-04-02 | Stop reason: HOSPADM

## 2019-04-02 RX ORDER — MAGNESIUM HYDROXIDE 1200 MG/15ML
LIQUID ORAL CONTINUOUS PRN
Status: COMPLETED | OUTPATIENT
Start: 2019-04-02 | End: 2019-04-02

## 2019-04-02 RX ORDER — ROCURONIUM BROMIDE 10 MG/ML
INJECTION, SOLUTION INTRAVENOUS PRN
Status: DISCONTINUED | OUTPATIENT
Start: 2019-04-02 | End: 2019-04-02 | Stop reason: SDUPTHER

## 2019-04-02 RX ORDER — EPHEDRINE SULFATE 50 MG/ML
INJECTION, SOLUTION INTRAVENOUS PRN
Status: DISCONTINUED | OUTPATIENT
Start: 2019-04-02 | End: 2019-04-02 | Stop reason: SDUPTHER

## 2019-04-02 RX ORDER — SODIUM CHLORIDE 0.9 % (FLUSH) 0.9 %
10 SYRINGE (ML) INJECTION PRN
Status: DISCONTINUED | OUTPATIENT
Start: 2019-04-02 | End: 2019-04-03 | Stop reason: HOSPADM

## 2019-04-02 RX ORDER — SODIUM CHLORIDE, SODIUM LACTATE, POTASSIUM CHLORIDE, CALCIUM CHLORIDE 600; 310; 30; 20 MG/100ML; MG/100ML; MG/100ML; MG/100ML
INJECTION, SOLUTION INTRAVENOUS CONTINUOUS PRN
Status: COMPLETED | OUTPATIENT
Start: 2019-04-02 | End: 2019-04-02

## 2019-04-02 RX ORDER — SUCCINYLCHOLINE CHLORIDE 20 MG/ML
INJECTION INTRAMUSCULAR; INTRAVENOUS PRN
Status: DISCONTINUED | OUTPATIENT
Start: 2019-04-02 | End: 2019-04-02 | Stop reason: SDUPTHER

## 2019-04-02 RX ORDER — SODIUM CHLORIDE 0.9 % (FLUSH) 0.9 %
10 SYRINGE (ML) INJECTION EVERY 12 HOURS SCHEDULED
Status: DISCONTINUED | OUTPATIENT
Start: 2019-04-02 | End: 2019-04-03 | Stop reason: HOSPADM

## 2019-04-02 RX ORDER — METOCLOPRAMIDE HYDROCHLORIDE 5 MG/ML
10 INJECTION INTRAMUSCULAR; INTRAVENOUS EVERY 6 HOURS PRN
Status: DISCONTINUED | OUTPATIENT
Start: 2019-04-02 | End: 2019-04-03 | Stop reason: HOSPADM

## 2019-04-02 RX ORDER — ALBUMIN, HUMAN INJ 5% 5 %
SOLUTION INTRAVENOUS PRN
Status: DISCONTINUED | OUTPATIENT
Start: 2019-04-02 | End: 2019-04-02 | Stop reason: SDUPTHER

## 2019-04-02 RX ORDER — SODIUM CHLORIDE 9 MG/ML
INJECTION, SOLUTION INTRAVENOUS CONTINUOUS
Status: DISCONTINUED | OUTPATIENT
Start: 2019-04-02 | End: 2019-04-03

## 2019-04-02 RX ORDER — 0.9 % SODIUM CHLORIDE 0.9 %
500 INTRAVENOUS SOLUTION INTRAVENOUS ONCE
Status: COMPLETED | OUTPATIENT
Start: 2019-04-02 | End: 2019-04-02

## 2019-04-02 RX ORDER — FENTANYL CITRATE 50 UG/ML
50 INJECTION, SOLUTION INTRAMUSCULAR; INTRAVENOUS EVERY 5 MIN PRN
Status: DISCONTINUED | OUTPATIENT
Start: 2019-04-02 | End: 2019-04-02 | Stop reason: HOSPADM

## 2019-04-02 RX ORDER — CEFAZOLIN SODIUM 2 G/50ML
2 SOLUTION INTRAVENOUS ONCE
Status: COMPLETED | OUTPATIENT
Start: 2019-04-02 | End: 2019-04-02

## 2019-04-02 RX ORDER — APREPITANT 40 MG/1
80 CAPSULE ORAL ONCE
Status: COMPLETED | OUTPATIENT
Start: 2019-04-02 | End: 2019-04-02

## 2019-04-02 RX ORDER — BUPIVACAINE HYDROCHLORIDE 5 MG/ML
INJECTION, SOLUTION EPIDURAL; INTRACAUDAL PRN
Status: DISCONTINUED | OUTPATIENT
Start: 2019-04-02 | End: 2019-04-02 | Stop reason: HOSPADM

## 2019-04-02 RX ORDER — PROMETHAZINE HYDROCHLORIDE 25 MG/ML
6.25 INJECTION, SOLUTION INTRAMUSCULAR; INTRAVENOUS
Status: DISCONTINUED | OUTPATIENT
Start: 2019-04-02 | End: 2019-04-02 | Stop reason: HOSPADM

## 2019-04-02 RX ORDER — SCOLOPAMINE TRANSDERMAL SYSTEM 1 MG/1
1 PATCH, EXTENDED RELEASE TRANSDERMAL ONCE
Status: DISCONTINUED | OUTPATIENT
Start: 2019-04-02 | End: 2019-04-02

## 2019-04-02 RX ORDER — 0.9 % SODIUM CHLORIDE 0.9 %
10 VIAL (ML) INJECTION DAILY
Status: DISCONTINUED | OUTPATIENT
Start: 2019-04-02 | End: 2019-04-03 | Stop reason: HOSPADM

## 2019-04-02 RX ORDER — FUROSEMIDE 10 MG/ML
40 INJECTION INTRAMUSCULAR; INTRAVENOUS ONCE
Status: COMPLETED | OUTPATIENT
Start: 2019-04-02 | End: 2019-04-02

## 2019-04-02 RX ORDER — METHYLENE BLUE 10 MG/ML
INJECTION INTRAVENOUS PRN
Status: DISCONTINUED | OUTPATIENT
Start: 2019-04-02 | End: 2019-04-02 | Stop reason: HOSPADM

## 2019-04-02 RX ORDER — ONDANSETRON 2 MG/ML
INJECTION INTRAMUSCULAR; INTRAVENOUS PRN
Status: DISCONTINUED | OUTPATIENT
Start: 2019-04-02 | End: 2019-04-02 | Stop reason: SDUPTHER

## 2019-04-02 RX ORDER — PROPOFOL 10 MG/ML
INJECTION, EMULSION INTRAVENOUS PRN
Status: DISCONTINUED | OUTPATIENT
Start: 2019-04-02 | End: 2019-04-02 | Stop reason: SDUPTHER

## 2019-04-02 RX ORDER — SODIUM CHLORIDE, SODIUM LACTATE, POTASSIUM CHLORIDE, CALCIUM CHLORIDE 600; 310; 30; 20 MG/100ML; MG/100ML; MG/100ML; MG/100ML
INJECTION, SOLUTION INTRAVENOUS CONTINUOUS
Status: DISCONTINUED | OUTPATIENT
Start: 2019-04-02 | End: 2019-04-03

## 2019-04-02 RX ORDER — HEPARIN SODIUM 5000 [USP'U]/ML
5000 INJECTION, SOLUTION INTRAVENOUS; SUBCUTANEOUS EVERY 8 HOURS SCHEDULED
Status: DISCONTINUED | OUTPATIENT
Start: 2019-04-02 | End: 2019-04-03 | Stop reason: HOSPADM

## 2019-04-02 RX ORDER — FENTANYL CITRATE 50 UG/ML
25 INJECTION, SOLUTION INTRAMUSCULAR; INTRAVENOUS EVERY 5 MIN PRN
Status: DISCONTINUED | OUTPATIENT
Start: 2019-04-02 | End: 2019-04-02 | Stop reason: HOSPADM

## 2019-04-02 RX ADMIN — FENTANYL CITRATE 50 MCG: 50 INJECTION INTRAMUSCULAR; INTRAVENOUS at 08:43

## 2019-04-02 RX ADMIN — PHENYLEPHRINE HYDROCHLORIDE 160 MCG: 10 INJECTION, SOLUTION INTRAMUSCULAR; INTRAVENOUS; SUBCUTANEOUS at 08:53

## 2019-04-02 RX ADMIN — Medication 0.2 MG: at 07:43

## 2019-04-02 RX ADMIN — HEPARIN SODIUM 5000 UNITS: 5000 INJECTION INTRAVENOUS; SUBCUTANEOUS at 15:15

## 2019-04-02 RX ADMIN — EPHEDRINE SULFATE 10 MG: 50 INJECTION, SOLUTION INTRAMUSCULAR; INTRAVENOUS; SUBCUTANEOUS at 07:50

## 2019-04-02 RX ADMIN — PHENYLEPHRINE HYDROCHLORIDE 160 MCG: 10 INJECTION, SOLUTION INTRAMUSCULAR; INTRAVENOUS; SUBCUTANEOUS at 08:22

## 2019-04-02 RX ADMIN — PHENYLEPHRINE HYDROCHLORIDE 80 MCG: 10 INJECTION, SOLUTION INTRAMUSCULAR; INTRAVENOUS; SUBCUTANEOUS at 08:10

## 2019-04-02 RX ADMIN — SUGAMMADEX 200 MG: 100 INJECTION, SOLUTION INTRAVENOUS at 09:46

## 2019-04-02 RX ADMIN — HEPARIN SODIUM 5000 UNITS: 5000 INJECTION INTRAVENOUS; SUBCUTANEOUS at 23:16

## 2019-04-02 RX ADMIN — ALBUMIN (HUMAN) 250 ML: 12.5 SOLUTION INTRAVENOUS at 08:13

## 2019-04-02 RX ADMIN — FUROSEMIDE 40 MG: 10 INJECTION, SOLUTION INTRAMUSCULAR; INTRAVENOUS at 16:32

## 2019-04-02 RX ADMIN — FENTANYL CITRATE 50 MCG: 50 INJECTION INTRAMUSCULAR; INTRAVENOUS at 09:43

## 2019-04-02 RX ADMIN — ROCURONIUM BROMIDE 50 MG: 10 INJECTION, SOLUTION INTRAVENOUS at 07:41

## 2019-04-02 RX ADMIN — ENOXAPARIN SODIUM 30 MG: 30 INJECTION SUBCUTANEOUS at 06:13

## 2019-04-02 RX ADMIN — PROPOFOL 200 MG: 10 INJECTION, EMULSION INTRAVENOUS at 07:25

## 2019-04-02 RX ADMIN — CEFAZOLIN SODIUM 2 G: 2 SOLUTION INTRAVENOUS at 07:40

## 2019-04-02 RX ADMIN — MIDAZOLAM HYDROCHLORIDE 2 MG: 2 INJECTION, SOLUTION INTRAMUSCULAR; INTRAVENOUS at 07:15

## 2019-04-02 RX ADMIN — Medication 10 ML: at 12:10

## 2019-04-02 RX ADMIN — PHENYLEPHRINE HYDROCHLORIDE 160 MCG: 10 INJECTION, SOLUTION INTRAMUSCULAR; INTRAVENOUS; SUBCUTANEOUS at 08:25

## 2019-04-02 RX ADMIN — APREPITANT 80 MG: 40 CAPSULE ORAL at 06:11

## 2019-04-02 RX ADMIN — FENTANYL CITRATE 100 MCG: 50 INJECTION INTRAMUSCULAR; INTRAVENOUS at 07:25

## 2019-04-02 RX ADMIN — LIDOCAINE HYDROCHLORIDE 100 MG: 20 INJECTION, SOLUTION INTRAVENOUS at 07:25

## 2019-04-02 RX ADMIN — PANTOPRAZOLE SODIUM 40 MG: 40 INJECTION, POWDER, FOR SOLUTION INTRAVENOUS at 12:10

## 2019-04-02 RX ADMIN — SODIUM CHLORIDE 500 ML: 9 INJECTION, SOLUTION INTRAVENOUS at 14:43

## 2019-04-02 RX ADMIN — SODIUM CHLORIDE: 9 INJECTION, SOLUTION INTRAVENOUS at 10:41

## 2019-04-02 RX ADMIN — SUCCINYLCHOLINE CHLORIDE 140 MG: 20 INJECTION, SOLUTION INTRAMUSCULAR; INTRAVENOUS; PARENTERAL at 07:25

## 2019-04-02 RX ADMIN — PHENYLEPHRINE HYDROCHLORIDE 80 MCG: 10 INJECTION, SOLUTION INTRAMUSCULAR; INTRAVENOUS; SUBCUTANEOUS at 09:05

## 2019-04-02 RX ADMIN — SODIUM CHLORIDE: 9 INJECTION, SOLUTION INTRAVENOUS at 07:15

## 2019-04-02 RX ADMIN — EPHEDRINE SULFATE 20 MG: 50 INJECTION, SOLUTION INTRAMUSCULAR; INTRAVENOUS; SUBCUTANEOUS at 08:06

## 2019-04-02 RX ADMIN — EPHEDRINE SULFATE 10 MG: 50 INJECTION, SOLUTION INTRAMUSCULAR; INTRAVENOUS; SUBCUTANEOUS at 07:54

## 2019-04-02 RX ADMIN — SODIUM CHLORIDE, POTASSIUM CHLORIDE, SODIUM LACTATE AND CALCIUM CHLORIDE: 600; 310; 30; 20 INJECTION, SOLUTION INTRAVENOUS at 06:30

## 2019-04-02 RX ADMIN — Medication: at 10:39

## 2019-04-02 RX ADMIN — ONDANSETRON 4 MG: 2 INJECTION INTRAMUSCULAR; INTRAVENOUS at 09:09

## 2019-04-02 RX ADMIN — SODIUM CHLORIDE: 9 INJECTION, SOLUTION INTRAVENOUS at 16:32

## 2019-04-02 ASSESSMENT — PULMONARY FUNCTION TESTS
PIF_VALUE: 34
PIF_VALUE: 32
PIF_VALUE: 32
PIF_VALUE: 2
PIF_VALUE: 27
PIF_VALUE: 27
PIF_VALUE: 32
PIF_VALUE: 31
PIF_VALUE: 1
PIF_VALUE: 32
PIF_VALUE: 8
PIF_VALUE: 32
PIF_VALUE: 32
PIF_VALUE: 27
PIF_VALUE: 32
PIF_VALUE: 32
PIF_VALUE: 22
PIF_VALUE: 32
PIF_VALUE: 6
PIF_VALUE: 32
PIF_VALUE: 34
PIF_VALUE: 32
PIF_VALUE: 27
PIF_VALUE: 32
PIF_VALUE: 32
PIF_VALUE: 27
PIF_VALUE: 0
PIF_VALUE: 33
PIF_VALUE: 33
PIF_VALUE: 0
PIF_VALUE: 1
PIF_VALUE: 35
PIF_VALUE: 22
PIF_VALUE: 31
PIF_VALUE: 32
PIF_VALUE: 0
PIF_VALUE: 32
PIF_VALUE: 10
PIF_VALUE: 32
PIF_VALUE: 27
PIF_VALUE: 2
PIF_VALUE: 40
PIF_VALUE: 0
PIF_VALUE: 33
PIF_VALUE: 32
PIF_VALUE: 33
PIF_VALUE: 32
PIF_VALUE: 30
PIF_VALUE: 34
PIF_VALUE: 27
PIF_VALUE: 2
PIF_VALUE: 32
PIF_VALUE: 32
PIF_VALUE: 36
PIF_VALUE: 32
PIF_VALUE: 31
PIF_VALUE: 4
PIF_VALUE: 32
PIF_VALUE: 34
PIF_VALUE: 32
PIF_VALUE: 0
PIF_VALUE: 32
PIF_VALUE: 1
PIF_VALUE: 19
PIF_VALUE: 32
PIF_VALUE: 27
PIF_VALUE: 32
PIF_VALUE: 1
PIF_VALUE: 32
PIF_VALUE: 33
PIF_VALUE: 32
PIF_VALUE: 7
PIF_VALUE: 1
PIF_VALUE: 32
PIF_VALUE: 1
PIF_VALUE: 32
PIF_VALUE: 32
PIF_VALUE: 31
PIF_VALUE: 32
PIF_VALUE: 1
PIF_VALUE: 32
PIF_VALUE: 31
PIF_VALUE: 27
PIF_VALUE: 32
PIF_VALUE: 35
PIF_VALUE: 32
PIF_VALUE: 0
PIF_VALUE: 27
PIF_VALUE: 32
PIF_VALUE: 27
PIF_VALUE: 32
PIF_VALUE: 32
PIF_VALUE: 31
PIF_VALUE: 0
PIF_VALUE: 32
PIF_VALUE: 25
PIF_VALUE: 3
PIF_VALUE: 22
PIF_VALUE: 1
PIF_VALUE: 32
PIF_VALUE: 4
PIF_VALUE: 31
PIF_VALUE: 32
PIF_VALUE: 0
PIF_VALUE: 6
PIF_VALUE: 34
PIF_VALUE: 36
PIF_VALUE: 32
PIF_VALUE: 27
PIF_VALUE: 31
PIF_VALUE: 22
PIF_VALUE: 32
PIF_VALUE: 32
PIF_VALUE: 40
PIF_VALUE: 27
PIF_VALUE: 27
PIF_VALUE: 35
PIF_VALUE: 32
PIF_VALUE: 33
PIF_VALUE: 32
PIF_VALUE: 1
PIF_VALUE: 22
PIF_VALUE: 1
PIF_VALUE: 34
PIF_VALUE: 32
PIF_VALUE: 0
PIF_VALUE: 32
PIF_VALUE: 27
PIF_VALUE: 37
PIF_VALUE: 32
PIF_VALUE: 32
PIF_VALUE: 25
PIF_VALUE: 32
PIF_VALUE: 32
PIF_VALUE: 27
PIF_VALUE: 32

## 2019-04-02 ASSESSMENT — PAIN SCALES - GENERAL
PAINLEVEL_OUTOF10: 3
PAINLEVEL_OUTOF10: 2
PAINLEVEL_OUTOF10: 3
PAINLEVEL_OUTOF10: 0
PAINLEVEL_OUTOF10: 3

## 2019-04-02 ASSESSMENT — PAIN - FUNCTIONAL ASSESSMENT: PAIN_FUNCTIONAL_ASSESSMENT: 0-10

## 2019-04-02 NOTE — PROGRESS NOTES
Pt admitted to PACU 9 from OR post ROBOTIC ASSISTED LAPAROSCOPIC SLEEVE GASTRECTOMY, LAPAROSCOPIC REDUCIBLE [address] per Dr. Ursula German. PACU monitoring devices in place. Report received at bedside from CRNA, reports patient required BP support and had low urine output. BP resolved with medication. Dr. Ursula German to consult nephrology for low urine output as patient has a history of CKD. Pt denies pain.

## 2019-04-02 NOTE — PROGRESS NOTES
Dr. Rk Patterson and anesthesia at bedside. Notified of R shoulder pain. No swelling or redness noted. Likely due to BP cuff and positioning in OR. BP cuff switched to LUE.

## 2019-04-02 NOTE — PROGRESS NOTES
PACU Transfer Note    Vitals:    04/02/19 1115   BP: (!) 152/56   Pulse: 60   Resp: 16   Temp: 98 °F (36.7 °C)   SpO2: 100%   Meets haven criteria for transfer to inpatient unit.      In: 2691 [I.V.:2441]  Out: 60 [Urine:35]    Pain assessment:  present - adequately treated  Pain Level: 3(R shoulder )    Report given to Receiving unit RN.    4/2/2019 11:21 AM

## 2019-04-02 NOTE — ANESTHESIA POSTPROCEDURE EVALUATION
Department of Anesthesiology  Postprocedure Note    Patient: Devonte Restrepo  MRN: [id number]  YOB: 1955  Date of evaluation: 4/2/2019  Time:  2:17 PM     Procedure Summary     Date:  04/02/19 Room / Location:  [address] [place] [address] OR    Anesthesia Start:  0715 Anesthesia Stop:  1234    Procedure:  ROBOTIC ASSISTED LAPAROSCOPIC SLEEVE GASTRECTOMY, LAPAROSCOPIC REDUCIBLE INCISIONAL HERNIA REPAIR (N/A ) Diagnosis:  (MORBID OBESITY)    Surgeon: Charleen Weiner DO Responsible Provider:  Juan Francisco Fuller MD    Anesthesia Type:  general ASA Status:  3          Anesthesia Type: general    Steven Phase I: Steven Score: 8    Steven Phase II:      Last vitals: Reviewed and per EMR flowsheets.        Anesthesia Post Evaluation    Patient location during evaluation: bedside  Patient participation: complete - patient participated  Level of consciousness: awake and alert  Pain score: 3  Airway patency: patent  Nausea & Vomiting: no nausea and no vomiting  Complications: no  Cardiovascular status: blood pressure returned to baseline  Respiratory status: acceptable  Hydration status: stable

## 2019-04-02 NOTE — CONSULTS
Nephrology Consult Note  Patient's Name: Leonard Diaz  10:08 AM  4/3/2019    Reason for Consult: NEHA   Requesting Physician:  Gely Piper MD      Chief Complaint:  Gastric sleeve     History of Present Ilness:     Leonard Diaz is a 70 y.o. female came for gastric sleeve today   Cr was worse from normal   UO is low   Bp was low   S/p IVF   On morphine pump   Pt has CHF was on lasix at home   On IVF       Past Medical History:   Diagnosis Date    Abnormal echocardiogram     25% on 3/11/14 and 50% on 3/19/14    Back pain     Cardiomyopathy ([address]     EF was 50% on 3/19/14    Chipped tooth     lower left    Dental crown present     veneers    Depression     Depressive disorder 8/13/2014    Diabetes mellitus ([address]     Diabetic infection of right foot ([address] 4/15/2015    Diabetic ulcer of toe of left foot associated with type 2 diabetes mellitus, with fat layer exposed ([address] 4/10/2018    Pt slipped in hot tub latter part of February and has not healed since despite topical treatment    DVT (deep venous thrombosis) ([address]     HTN (hypertension)     Hx of blood clots 2016    left leg    Ischemic cardiomyopathy 4/15/2015    Kidney disease     Meniere's disease     Mixed hyperlipidemia 3/7/2016    Nicotine abuse     NSTEMI (non-ST elevated myocardial infarction) ([address] 3/13/2014    Osteomyelitis of ankle or foot, acute, left ([address] 6/4/2018    Pneumonia     Spinal abscess ([address] 3/2014    epidural abscess    Spinal epidural abscess 3/13/2014    Type II diabetes mellitus, uncontrolled ([address] 08/13/2014       Past Surgical History:   Procedure Laterality Date    BACK SURGERY  3/2014    DEBRIDEMENT  3/12/14    extensive debridement of bone/muscle and paraspinal empyema    HYSTERECTOMY  6/15/1999    complete-think right ovary in.    NASAL SEPTUM SURGERY      SLEEVE GASTRECTOMY  04/02/2019    ROBOTIC ASSISTED LAPAROSCOPIC SLEEVE GASTRECTOMY, LAPAROSCOPIC REDUCIBLE INCISIONAL HERNIA REPAIR     SLEEVE GASTRECTOMY N/A 4/2/2019    ROBOTIC ASSISTED LAPAROSCOPIC SLEEVE GASTRECTOMY, LAPAROSCOPIC REDUCIBLE INCISIONAL HERNIA REPAIR performed by Lorie Greene DO at [address] N/A 2/8/2019    EGD BIOPSY performed by Lorie Greene DO at [facility] N/A 2/8/2019    EGD POLYP ABLATION/OTHER performed by Lorie Greene DO at [facility] ENDOSCOPY       Family History   Problem Relation Age of Onset    High Blood Pressure Mother     Cancer Mother     Rheum Arthritis Mother     COPD Father     Cancer Father     Osteoarthritis Neg Hx     Asthma Neg Hx     Breast Cancer Neg Hx     Diabetes Neg Hx     Heart Failure Neg Hx     High Cholesterol Neg Hx     Hypertension Neg Hx     Migraines Neg Hx     Ovarian Cancer Neg Hx     Rashes/Skin Problems Neg Hx     Seizures Neg Hx     Stroke Neg Hx     Thyroid Disease Neg Hx         reports that she quit smoking about 5 years ago. She has a 5.00 pack-year smoking history. She has never used smokeless tobacco. She reports that she drank alcohol. She reports that she does not use drugs.     Allergies:  Doxycycline    Current Medications:      naloxone (NARCAN) injection 0.4 mg PRN   HYDROmorphone (DILAUDID; STANDARD DOSE) PCA 1 mg/mL Continuous   sodium chloride flush 0.9 % injection 10 mL 2 times per day   sodium chloride flush 0.9 % injection 10 mL PRN   0.9 % sodium chloride infusion Continuous   ondansetron (ZOFRAN) injection 4 mg Q6H PRN   prochlorperazine (COMPAZINE) injection 10 mg Q6H PRN   metoclopramide (REGLAN) injection 10 mg Q6H PRN   hyoscyamine (LEVSIN/SL) sublingual tablet 125 mcg Q6H PRN   pantoprazole (PROTONIX) injection 40 mg Daily   And    sodium chloride (PF) 0.9 % injection 10 mL Daily   heparin (porcine) injection 5,000 Units 3 times per day       Review of Systems:   14 point ROS obtained but were negative except mentioned in HPI      Physical exam:     Vitals:  BP (!) 149/66   Pulse 65   Temp 99.2 °F (37.3 °C) (Oral)   Resp 20   Ht 5' 2.5\" (1.588 m)   Wt 249 lb 8 oz (113.2 kg)   LMP 06/15/1999   SpO2 97%   BMI 44.91 kg/m²   Constitutional:  OAA X3 NAD  Skin: no rash, turgor wnl  Heent:  eomi, mmm  Neck: no bruits or jvd noted  Cardiovascular:  S1, S2 without m/r/g  Respiratory: CTA B without w/r/r  Abdomen:  +bs, soft, nt, nd  Ext: no lower extremity edema  Psychiatric: mood and affect appropriate  Musculoskeletal:  Rom, muscular strength intact    Data:   Labs:  CBC:   Recent Labs     04/03/19  0635   WBC 10.3   HGB 10.6*        BMP:  Recent Labs     04/02/19  0635 04/03/19  0635    139   K 3.9 4.4   CL 99 103   CO2 27 25   BUN 51* 45*   CREATININE 2.1* 1.9*   GLUCOSE 76 87     Ca/Mg/Phos: Recent Labs     04/02/19  0635 04/03/19  0635   CALCIUM 10.0 8.4     Hepatic: Recent Labs     04/02/19  0635   AST 17   ALT 17   BILITOT 0.4   ALKPHOS 88     Troponin: No results for input(s): TROPONINI in the last 72 hours. BNP: No results for input(s): BNP in the last 72 hours. Lipids: No results for input(s): CHOL, TRIG, HDL, LDLCALC, LABVLDL in the last 72 hours. ABGs: No results for input(s): PHART, PO2ART, KFI7XSX in the last 72 hours. INR: No results for input(s): INR in the last 72 hours. UA:No results for input(s): Cristian Perks, GLUCOSEU, BILIRUBINUR, Devora Dary, BLOODU, PHUR, PROTEINU, UROBILINOGEN, NITRU, LEUKOCYTESUR, LABMICR, URINETYPE in the last 72 hours. Urine Microscopic: No results for input(s): LABCAST, BACTERIA, COMU, HYALCAST, WBCUA, RBCUA, EPIU in the last 72 hours. Urine Culture: No results for input(s): LABURIN in the last 72 hours. Urine Chemistry: Recent Labs     04/02/19  1430   CLUR 27   LABCREA 165.6   PROTEINUR 56.40*   NAUR 28             IMAGING:  FL UGI W KUB    (Results Pending)       Assessment/Plan   1. NEHA     2. HTN    3. Anemia    4.  S/p Gastric sleeve     Plan     NEHA sec to Hypoperfusion and ATN   - IVF to cont at 150 cc per hour - Ur studies  - Keep MAP > 75   - Monitor UO   - Uo is low - will give lasix once BP is better  - Monitor lyrupa             Thank you for allowing us to participate in care of [address] [place] [address] free to contact me   [facility] [address]  Office : [phone]  Fax :[phone]

## 2019-04-02 NOTE — PROGRESS NOTES
Per Dr Nilam Branham omit scopolamine patch because of meniere's and aware of today's lab values per CRNA calling him.

## 2019-04-02 NOTE — ANESTHESIA PRE PROCEDURE
Department of Anesthesiology  Preprocedure Note       Name:  Ld Rachel   Age:  70 y.o.  :  1955                                          MRN:  [id number]         Date:  2019      Surgeon: Archana Lei): Christal Santana DO    Procedure: ROBOTIC ASSISTED LAPAROSCOPIC SLEEVE GASTRECTOMY (N/A )    Medications prior to admission:   Prior to Admission medications    Medication Sig Start Date End Date Taking? Authorizing Provider   lisinopril (PRINIVIL;ZESTRIL) 20 MG tablet Take 1 tablet by mouth daily 3/18/19  Yes Helio Jo MD   carvedilol (COREG) 25 MG tablet Take 1 tablet by mouth 2 times daily 3/18/19  Yes Helio Jo MD   Insulin Degludec (TRESIBA FLEXTOUCH) 200 UNIT/ML SOPN Inject 96 units into the skin twice daily. Patient taking differently: 44 Units 2 times daily Inject 96 units into the skin twice daily. 19  Yes Helio Jo MD   ONE TOUCH LANCETS MISC 1 each by Does not apply route 3 times daily 19  Yes Helio Jo MD   blood glucose test strips (ONE TOUCH ULTRA TEST) strip 1 each by Does not apply route 3 times daily As needed. 19  Yes Helio Jo MD   citalopram (CELEXA) 40 MG tablet Take 1 tablet by mouth daily 1/21/19 3/6/20 Yes Helio Jo MD   pantoprazole (PROTONIX) 40 MG tablet Take 1 tablet by mouth daily  Patient taking differently: Take 40 mg by mouth as needed  19  Yes Helio Jo MD   gabapentin (NEURONTIN) 300 MG capsule Take 2 capsules by mouth 3 times daily for 120 days. . 19 Yes Helio Jo MD   furosemide (LASIX) 20 MG tablet Take 1 tablet by mouth daily 19  Yes Sami Mustafa MD   atorvastatin (LIPITOR) 80 MG tablet Take 1 tablet by mouth daily 18  Yes Helio Jo MD   Cholecalciferol (VITAMIN D3) 2000 units CAPS Take by mouth daily   Yes Historical Provider, MD   acetaminophen (TYLENOL) 500 MG tablet Take 1 tablet by mouth every 6 hours as needed for Pain 10/12/18 3/6/20 Yes Deangelo Sabillon MD [place]   B-D UF III MINI PEN NEEDLES 31G X 5 MM MISC USE TWICE A DAY WITH INSULIN INJECTIONS 9/19/18  Yes Tani Adorno MD   aspirin 81 MG chewable tablet Take 1 tablet by mouth daily. 3/20/14  Yes Aimee Martins MD       Current medications:    Current Facility-Administered Medications   Medication Dose Route Frequency Provider Last Rate Last Dose    0.9 % sodium chloride infusion   Intravenous Continuous Linard Arm, DO        ceFAZolin (ANCEF) 2 g in dextrose 3 % 50 mL IVPB (duplex)  2 g Intravenous Once Linard Arm, DO        scopolamine (TRANSDERM-SCOP) transdermal patch 1 patch  1 patch Transdermal Once Linard Arm, DO        lactated ringers infusion   Intravenous Continuous Natasha Heredia MD           Allergies:     Allergies   Allergen Reactions    Doxycycline Other (See Comments)     Yeast infection       Problem List:    Patient Active Problem List   Diagnosis Code    Type II diabetes mellitus, uncontrolled ([facility] E11.65    Meniere's disease H81.09    Peripheral neuropathy G62.9    Depression F32.9    Mixed hyperlipidemia E78.2    Essential hypertension I10    Obesity, Class III, BMI 40-49.9 (morbid obesity) ([facility]) E66.01    ASNHL (asymmetrical sensorineural hearing loss) H90.5    Poor compliance with medication Z91.14    DM type 2 with diabetic peripheral neuropathy ([facility]) E11.42    Osteomyelitis of ankle or foot, acute, left ([facility] M86.172    Encounter for post surgical wound check Z48.89    Diabetic ulcer of toe of left foot associated with diabetes mellitus due to underlying condition, with fat layer exposed ([facility] C35.993, A03.353    CKD (chronic kidney disease), stage III ([facility]) N18.3    ANGLE (obstructive sleep apnea) G47.33    Chronic GERD K21.9    Morbid obesity with BMI of 45.0-49.9, adult ([facility]) E66.01, Z68.42       Past Medical History:        Diagnosis Date    Abnormal echocardiogram     25% on 3/11/14 and 50% on 3/19/14    Back pain     Cardiomyopathy ([facility]     EF was 50% on 3/19/14    Chipped tooth     lower left    Dental crown present     veneers    Depression     Depressive disorder 2014    Diabetes mellitus ([address]     Diabetic infection of right foot ([address] 4/15/2015    Diabetic ulcer of toe of left foot associated with type 2 diabetes mellitus, with fat layer exposed ([address] 4/10/2018    Pt slipped in hot tub latter part of February and has not healed since despite topical treatment    DVT (deep venous thrombosis) ([address]     HTN (hypertension)     Hx of blood clots     left leg    Ischemic cardiomyopathy 4/15/2015    Kidney disease     Meniere's disease     Mixed hyperlipidemia 3/7/2016    Nicotine abuse     NSTEMI (non-ST elevated myocardial infarction) ([address] 3/13/2014    Osteomyelitis of ankle or foot, acute, left ([address] 2018    Pneumonia     Spinal abscess ([address] 3/2014    epidural abscess    Spinal epidural abscess 3/13/2014    Type II diabetes mellitus, uncontrolled ([address] 2014       Past Surgical History:        Procedure Laterality Date    BACK SURGERY  3/2014    DEBRIDEMENT  3/12/14    extensive debridement of bone/muscle and paraspinal empyema    HYSTERECTOMY  6/15/1999    complete-think right ovary in.    NASAL SEPTUM SURGERY      UPPER GASTROINTESTINAL ENDOSCOPY N/A 2019    EGD BIOPSY performed by Tessie Cooley DO at [address] [facility] N/A 2019    EGD POLYP ABLATION/OTHER performed by Tessie Cooley DO at [address] [facility] History:    Social History     Tobacco Use    Smoking status: Former Smoker     Packs/day: 0.50     Years: 10.00     Pack years: 5.00     Last attempt to quit: 11/10/2013     Years since quittin.3    Smokeless tobacco: Never Used   Substance Use Topics    Alcohol use: Not Currently     Alcohol/week: 0.0 oz     Comment: occasionally                                Counseling given: Not Answered      Vital Signs (Current):   Vitals:    19 0805 04/02/19 0550   BP:  (!) 105/57   Pulse:  52   Resp:  16   Temp:  97.8 °F (36.6 °C)   TempSrc:  Oral   SpO2:  95%   Weight: 249 lb (112.9 kg) 249 lb 8 oz (113.2 kg)   Height: 5' 2.5\" (1.588 m) 5' 2.5\" (1.588 m)                                              BP Readings from Last 3 Encounters:   04/02/19 (!) 105/57   03/18/19 (!) 150/64   03/14/19 132/70       NPO Status:                                                   Date of last liquid consumption: 04/01/19                        Date of last solid food consumption: 03/31/19    BMI:   Wt Readings from Last 3 Encounters:   04/02/19 249 lb 8 oz (113.2 kg)   03/18/19 260 lb (117.9 kg)   03/14/19 259 lb 9.6 oz (117.8 kg)     Body mass index is 44.91 kg/m². CBC:   Lab Results   Component Value Date    WBC 8.4 03/18/2019    RBC 3.95 03/18/2019    HGB 11.6 03/18/2019    HCT 35.7 03/18/2019    MCV 90.3 03/18/2019    RDW 15.5 03/18/2019     03/18/2019       CMP:   Lab Results   Component Value Date     03/18/2019    K 4.5 03/18/2019    CL 96 03/18/2019    CO2 27 03/18/2019    BUN 30 03/18/2019    CREATININE 1.7 03/18/2019    GFRAA 36 03/18/2019    AGRATIO 1.5 11/27/2018    LABGLOM 30 03/18/2019    GLUCOSE 246 03/18/2019    PROT 7.1 11/27/2018    CALCIUM 9.1 03/18/2019    BILITOT 0.3 11/27/2018    ALKPHOS 94 11/27/2018    AST 13 11/27/2018    ALT 20 11/27/2018       POC Tests: No results for input(s): POCGLU, POCNA, POCK, POCCL, POCBUN, POCHEMO, POCHCT in the last 72 hours.     Coags: No results found for: PROTIME, INR, APTT    HCG (If Applicable): No results found for: PREGTESTUR, PREGSERUM, HCG, HCGQUANT     ABGs:   Lab Results   Component Value Date    PHART 7.431 03/13/2014    PO2ART 86.8 03/13/2014    MQR9JQT 31.0 03/13/2014    IXR6LJI 20.2 03/13/2014    BEART -2.8 03/13/2014    E5DDRHLH 96.6 03/13/2014        Type & Screen (If Applicable):  No results found for: [facility]    Anesthesia Evaluation  Patient summary reviewed and Nursing notes reviewed  Airway: Mallampati: II  TM distance: >3 FB   Neck ROM: full  Mouth opening: > = 3 FB Dental: normal exam         Pulmonary:Negative Pulmonary ROS and normal exam  breath sounds clear to auscultation                             Cardiovascular:Negative CV ROS            Rhythm: regular  Rate: normal                    Neuro/Psych:   Negative Neuro/Psych ROS              GI/Hepatic/Renal: Neg GI/Hepatic/Renal ROS            Endo/Other: Negative Endo/Other ROS                    Abdominal:           Vascular: negative vascular ROS. Anesthesia Plan      general     ASA 3       Induction: intravenous. MIPS: Postoperative opioids intended and Prophylactic antiemetics administered. Anesthetic plan and risks discussed with patient.         Attending anesthesiologist reviewed and agrees with aSnia Mueller MD   4/2/2019

## 2019-04-02 NOTE — H&P
performed by Shirley Dandy, DO at [address] [facility] N/A 2019    EGD POLYP ABLATION/OTHER performed by Shirley Dandy, DO at [facility] ENDOSCOPY     Past Social History:  Social History     Socioeconomic History    Marital status:      Spouse name: None    Number of children: None    Years of education: None    Highest education level: None   Occupational History    None   Social Needs    Financial resource strain: None    Food insecurity:     Worry: None     Inability: None    Transportation needs:     Medical: None     Non-medical: None   Tobacco Use    Smoking status: Former Smoker     Packs/day: 0.50     Years: 10.00     Pack years: 5.00     Last attempt to quit: 11/10/2013     Years since quittin.3    Smokeless tobacco: Never Used   Substance and Sexual Activity    Alcohol use: Not Currently     Alcohol/week: 0.0 oz     Comment: occasionally    Drug use: No    Sexual activity: Not Currently   Lifestyle    Physical activity:     Days per week: None     Minutes per session: None    Stress: None   Relationships    Social connections:     Talks on phone: None     Gets together: None     Attends Worship service: None     Active member of club or organization: None     Attends meetings of clubs or organizations: None     Relationship status: None    Intimate partner violence:     Fear of current or ex partner: None     Emotionally abused: None     Physically abused: None     Forced sexual activity: None   Other Topics Concern    None   Social History Narrative    None         Medications Prior to Admission:      Prior to Admission medications    Medication Sig Start Date End Date Taking?  Authorizing Provider   lisinopril (PRINIVIL;ZESTRIL) 20 MG tablet Take 1 tablet by mouth daily 3/18/19  Yes Dorothea Hines MD   carvedilol (COREG) 25 MG tablet Take 1 tablet by mouth 2 times daily 3/18/19  Yes Dorothea Hines MD   Insulin Degludec (TRESIBA FLEXTOUCH) 200 UNIT/ML SOPN Inject 96 units into the skin twice daily. Patient taking differently: 44 Units 2 times daily Inject 96 units into the skin twice daily. 2/22/19  Yes Dorothea Hines MD   ONE TOUCH LANCETS MISC 1 each by Does not apply route 3 times daily 2/14/19  Yes Dorothea Hines MD   blood glucose test strips (ONE TOUCH ULTRA TEST) strip 1 each by Does not apply route 3 times daily As needed. 2/14/19  Yes Dorothea Hines MD   citalopram (CELEXA) 40 MG tablet Take 1 tablet by mouth daily 1/21/19 3/6/20 Yes Dorothea Hines MD   pantoprazole (PROTONIX) 40 MG tablet Take 1 tablet by mouth daily  Patient taking differently: Take 40 mg by mouth as needed  1/21/19  Yes Dorothea Hines MD   gabapentin (NEURONTIN) 300 MG capsule Take 2 capsules by mouth 3 times daily for 120 days. . 1/21/19 5/21/19 Yes Dorothea Hines MD   furosemide (LASIX) 20 MG tablet Take 1 tablet by mouth daily 1/17/19  Yes Yesenia Valenzuela MD   atorvastatin (LIPITOR) 80 MG tablet Take 1 tablet by mouth daily 12/5/18  Yes Dorothea Hines MD   Cholecalciferol (VITAMIN D3) 2000 units CAPS Take by mouth daily   Yes Historical Provider, MD   acetaminophen (TYLENOL) 500 MG tablet Take 1 tablet by mouth every 6 hours as needed for Pain 10/12/18 3/6/20 Yes Dorothea Hines MD   B-D UF III MINI PEN NEEDLES 31G X 5 MM MISC USE TWICE A DAY WITH INSULIN INJECTIONS 9/19/18  Yes Dorothea Hines MD   aspirin 81 MG chewable tablet Take 1 tablet by mouth daily.  3/20/14  Yes Ericka Ochoa MD         Allergies:  Doxycycline    PHYSICAL EXAM:      BP (!) 105/57 Comment: 106/63 left arm  Pulse 52   Temp 97.8 °F (36.6 °C) (Oral)   Resp 16   Ht 5' 2.5\" (1.588 m)   Wt 249 lb 8 oz (113.2 kg)   LMP 06/15/1999   SpO2 95%   BMI 44.91 kg/m²      Heart:  regular rate and rhythm, no murmur    Lungs:  No increased work of breathing, good air exchange, clear to auscultation bilaterally, no crackles or wheezing    Abdomen:  soft, non-distended, non-tender, no rebound tenderness or guarding, normal active bowel sounds and no masses palpated    ASSESSMENT AND PLAN:    1. Patient seen and focused exam done today- no new changes since last physical exam on 3-    2. Access to ancillary services are available per request of the provider.     Veronica Nelson     4/2/2019

## 2019-04-03 ENCOUNTER — APPOINTMENT (OUTPATIENT)
Dept: GENERAL RADIOLOGY | Age: 64
DRG: 619 | End: 2019-04-03
Attending: SURGERY
Payer: MEDICARE

## 2019-04-03 VITALS
OXYGEN SATURATION: 97 % | HEART RATE: 65 BPM | RESPIRATION RATE: 16 BRPM | SYSTOLIC BLOOD PRESSURE: 165 MMHG | HEIGHT: 63 IN | DIASTOLIC BLOOD PRESSURE: 70 MMHG | BODY MASS INDEX: 44.21 KG/M2 | WEIGHT: 249.5 LBS | TEMPERATURE: 98.4 F

## 2019-04-03 LAB
ANION GAP SERPL CALCULATED.3IONS-SCNC: 11 MMOL/L (ref 3–16)
BUN BLDV-MCNC: 45 MG/DL (ref 7–20)
CALCIUM SERPL-MCNC: 8.4 MG/DL (ref 8.3–10.6)
CHLORIDE BLD-SCNC: 103 MMOL/L (ref 99–110)
CO2: 25 MMOL/L (ref 21–32)
CREAT SERPL-MCNC: 1.9 MG/DL (ref 0.6–1.2)
GFR AFRICAN AMERICAN: 32
GFR NON-AFRICAN AMERICAN: 26
GLUCOSE BLD-MCNC: 108 MG/DL (ref 70–99)
GLUCOSE BLD-MCNC: 82 MG/DL (ref 70–99)
GLUCOSE BLD-MCNC: 87 MG/DL (ref 70–99)
GLUCOSE BLD-MCNC: 96 MG/DL (ref 70–99)
HCT VFR BLD CALC: 33 % (ref 36–48)
HEMOGLOBIN: 10.6 G/DL (ref 12–16)
MCH RBC QN AUTO: 29.1 PG (ref 26–34)
MCHC RBC AUTO-ENTMCNC: 32.2 G/DL (ref 31–36)
MCV RBC AUTO: 90.4 FL (ref 80–100)
PDW BLD-RTO: 15.3 % (ref 12.4–15.4)
PERFORMED ON: ABNORMAL
PERFORMED ON: NORMAL
PERFORMED ON: NORMAL
PLATELET # BLD: 215 K/UL (ref 135–450)
PMV BLD AUTO: 10.2 FL (ref 5–10.5)
POTASSIUM SERPL-SCNC: 4.4 MMOL/L (ref 3.5–5.1)
RBC # BLD: 3.65 M/UL (ref 4–5.2)
SODIUM BLD-SCNC: 139 MMOL/L (ref 136–145)
WBC # BLD: 10.3 K/UL (ref 4–11)

## 2019-04-03 PROCEDURE — 85027 COMPLETE CBC AUTOMATED: CPT

## 2019-04-03 PROCEDURE — 6370000000 HC RX 637 (ALT 250 FOR IP): Performed by: NURSE PRACTITIONER

## 2019-04-03 PROCEDURE — 94761 N-INVAS EAR/PLS OXIMETRY MLT: CPT

## 2019-04-03 PROCEDURE — 99024 POSTOP FOLLOW-UP VISIT: CPT | Performed by: SURGERY

## 2019-04-03 PROCEDURE — 74241 FL UGI W KUB: CPT

## 2019-04-03 PROCEDURE — 6360000002 HC RX W HCPCS: Performed by: INTERNAL MEDICINE

## 2019-04-03 PROCEDURE — 80048 BASIC METABOLIC PNL TOTAL CA: CPT

## 2019-04-03 PROCEDURE — 2580000003 HC RX 258: Performed by: SURGERY

## 2019-04-03 PROCEDURE — 6360000002 HC RX W HCPCS: Performed by: SURGERY

## 2019-04-03 PROCEDURE — 94664 DEMO&/EVAL PT USE INHALER: CPT

## 2019-04-03 PROCEDURE — C9113 INJ PANTOPRAZOLE SODIUM, VIA: HCPCS | Performed by: SURGERY

## 2019-04-03 PROCEDURE — 36415 COLL VENOUS BLD VENIPUNCTURE: CPT

## 2019-04-03 PROCEDURE — 2700000000 HC OXYGEN THERAPY PER DAY

## 2019-04-03 RX ORDER — OXYCODONE HYDROCHLORIDE AND ACETAMINOPHEN 5; 325 MG/1; MG/1
1 TABLET ORAL EVERY 6 HOURS PRN
Qty: 28 TABLET | Refills: 0 | Status: SHIPPED | OUTPATIENT
Start: 2019-04-03 | End: 2019-04-10

## 2019-04-03 RX ORDER — OXYCODONE HCL 5 MG/5 ML
10 SOLUTION, ORAL ORAL EVERY 4 HOURS PRN
Status: DISCONTINUED | OUTPATIENT
Start: 2019-04-03 | End: 2019-04-03 | Stop reason: HOSPADM

## 2019-04-03 RX ORDER — OXYCODONE HCL 5 MG/5 ML
5 SOLUTION, ORAL ORAL EVERY 4 HOURS PRN
Status: DISCONTINUED | OUTPATIENT
Start: 2019-04-03 | End: 2019-04-03 | Stop reason: HOSPADM

## 2019-04-03 RX ORDER — CARVEDILOL 25 MG/1
25 TABLET ORAL 2 TIMES DAILY
Status: DISCONTINUED | OUTPATIENT
Start: 2019-04-03 | End: 2019-04-03 | Stop reason: HOSPADM

## 2019-04-03 RX ORDER — FUROSEMIDE 10 MG/ML
40 INJECTION INTRAMUSCULAR; INTRAVENOUS ONCE
Status: COMPLETED | OUTPATIENT
Start: 2019-04-03 | End: 2019-04-03

## 2019-04-03 RX ORDER — OMEPRAZOLE 20 MG/1
20 CAPSULE, DELAYED RELEASE ORAL DAILY
Qty: 30 CAPSULE | Refills: 3 | Status: SHIPPED | OUTPATIENT
Start: 2019-04-03 | End: 2019-04-30

## 2019-04-03 RX ORDER — ONDANSETRON 4 MG/1
4 TABLET, ORALLY DISINTEGRATING ORAL EVERY 8 HOURS PRN
Qty: 20 TABLET | Refills: 0 | Status: SHIPPED | OUTPATIENT
Start: 2019-04-03 | End: 2019-04-30 | Stop reason: SDUPTHER

## 2019-04-03 RX ADMIN — PANTOPRAZOLE SODIUM 40 MG: 40 INJECTION, POWDER, FOR SOLUTION INTRAVENOUS at 08:27

## 2019-04-03 RX ADMIN — Medication 10 ML: at 08:31

## 2019-04-03 RX ADMIN — HEPARIN SODIUM 5000 UNITS: 5000 INJECTION INTRAVENOUS; SUBCUTANEOUS at 14:05

## 2019-04-03 RX ADMIN — FUROSEMIDE 40 MG: 10 INJECTION, SOLUTION INTRAMUSCULAR; INTRAVENOUS at 11:10

## 2019-04-03 RX ADMIN — Medication 10 ML: at 08:28

## 2019-04-03 RX ADMIN — HEPARIN SODIUM 5000 UNITS: 5000 INJECTION INTRAVENOUS; SUBCUTANEOUS at 08:27

## 2019-04-03 RX ADMIN — CARVEDILOL 25 MG: 25 TABLET, FILM COATED ORAL at 11:46

## 2019-04-03 RX ADMIN — OXYCODONE HYDROCHLORIDE 5 MG: 5 SOLUTION ORAL at 13:29

## 2019-04-03 ASSESSMENT — PAIN SCALES - GENERAL
PAINLEVEL_OUTOF10: 0
PAINLEVEL_OUTOF10: 3
PAINLEVEL_OUTOF10: 2
PAINLEVEL_OUTOF10: 4

## 2019-04-03 NOTE — PLAN OF CARE
Problem:  Bowel Function - Altered:  Goal: Bowel elimination is within specified parameters  Description  Bowel elimination is within specified parameters  Outcome: Met This Shift

## 2019-04-03 NOTE — CARE COORDINATION
Case Management Discharge Assessment    4/3/2019  [facility])  Clinical Case Management Department    Patient: Devonte Restrepo  MRN: [id number] / : 1955  ACCT: [de-identified]          Admission Documentation  Attending Provider: Charleen Weiner DO  Admit date/time: 2019  5:21 AM  Status: Inpatient [101]  Diagnosis: Morbid obesity with BMI of 45.0-49.9, adult ([address]     Readmission within last 30 days:  no     Living Situation  Discharge Planning  Living Arrangements: Alone  Support Systems: Children, Family Members  Potential Assistance Needed: N/A  Type of Home Care Services: None  Patient expects to be discharged to[de-identified] home  Expected Discharge Date: 19    Service Assessment:       Values / Beliefs  Do you have any ethnic, cultural, sacramental, or spiritual Gnosticist needs you would like us to be aware of while you are in the hospital?: No    Advance Directives (For Healthcare)  Pre-existing DNR Comfort Care/DNR Arrest/DNI Order: No  Healthcare Directive: No, patient does not have an advance directive for healthcare treatment  Information on Healthcare Directives Requested: No  Patient Requests Assistance: No  Advance Directives: Pt. not interested at this time                        Pharmacy: [facility]   Potential Assistance Purchasing Medications:  No  Does patient want to participate in local refill/meds to beds program?: Yes    Notification completed in HENS/PAS? No     IMM  No       Discharge disposition: Home     LOC Home with no needs    Mode of Transport private car with family      Jordan Esteves and her family were provided with choice of provider; she and her family are in agreement with the discharge plan.       Care Transitions patient: No    Rajan Bustillo RN  [facility]  Case Management Department  Ph: [phone] Fax: [phone]
RN  [facility]  Case Management Department  Ph: [phone] Fax: [phone]

## 2019-04-03 NOTE — PROGRESS NOTES
Surgery Daily Progress Note  Leonard Diaz  CC: Morbid obesity      Subjective : No acute events overnight. Patient states pain is well controlled with PCA. Ambulated yesterday evening but not during the night. Patient had low urine output during the day but has picked up some overnight. Objective    Infusions:   sodium chloride 50 mL/hr at 04/02/19 0715    lactated ringers 100 mL/hr at 04/02/19 0630    HYDROmorphone      sodium chloride 100 mL/hr at 04/02/19 1837        I/O:I/O last 3 completed shifts: In: 3952.8 [I.V.:3702.8; IV Piggyback:250]  Out: 695 [Urine:670; Blood:25]           Wt Readings from Last 1 Encounters:   04/02/19 249 lb 8 oz (113.2 kg)                 LABS:  No results for input(s): WBC, HGB, HCT, MCV, PLT in the last 72 hours. Recent Labs     04/02/19  0635      K 3.9   CL 99   CO2 27   BUN 51*   CREATININE 2.1*        Recent Labs     04/02/19  0635   AST 17   ALT 17   BILITOT 0.4   ALKPHOS 88      No results for input(s): LIPASE, AMYLASE in the last 72 hours. Recent Labs     04/02/19  0635   PROT 7.7      No results for input(s): CKTOTAL, CKMB, CKMBINDEX, TROPONINI in the last 72 hours. Exam:/73   Pulse 89   Temp 98.2 °F (36.8 °C) (Oral)   Resp 16   Ht 5' 2.5\" (1.588 m)   Wt 249 lb 8 oz (113.2 kg)   LMP 06/15/1999   SpO2 96%   BMI 44.91 kg/m²   General appearance: alert, appears stated age and cooperative  Lungs: clear to auscultation bilaterally  Heart: regular rate and rhythm, S1, S2   Abdomen: soft, appropriately-tender; bowel sounds present. Trocar sites c/d/i. Abdominal binder in place.  No active signs of bleeding      ASSESSMENT/PLAN: Pt. is a 70 y.o. female s/p robotic assisted laparoscopic sleeve gastrectomy, laparoscopic reducible incisional hernia repair    Awaiting UGI this am  If UGI negative will progress diet to BCLD  Anticipate patient being able to be discharged home later today      Vita Young 4/3/2019 6:16

## 2019-04-03 NOTE — PROGRESS NOTES
PT switched to continuous pulse oximeter because she is primarily breathing out of her mouth making her Etc02 alarms inaccurately reading.   Pt on 4 liter NC, sp02 96%

## 2019-04-03 NOTE — PROGRESS NOTES
Rom, muscular strength intact    Data:   Labs:  CBC:   Recent Labs     04/03/19  0635   WBC 10.3   HGB 10.6*        BMP:    Recent Labs     04/02/19  0635 04/03/19  0635    139   K 3.9 4.4   CL 99 103   CO2 27 25   BUN 51* 45*   CREATININE 2.1* 1.9*   GLUCOSE 76 87     Ca/Mg/Phos:   Recent Labs     04/02/19  0635 04/03/19  0635   CALCIUM 10.0 8.4     Hepatic:   Recent Labs     04/02/19  0635   AST 17   ALT 17   BILITOT 0.4   ALKPHOS 88     Troponin: No results for input(s): TROPONINI in the last 72 hours. BNP: No results for input(s): BNP in the last 72 hours. Lipids: No results for input(s): CHOL, TRIG, HDL, LDLCALC, LABVLDL in the last 72 hours. ABGs: No results for input(s): PHART, PO2ART, ULK5IKB in the last 72 hours. INR: No results for input(s): INR in the last 72 hours. UA:No results for input(s): Deirdre Drafts, GLUCOSEU, BILIRUBINUR, Deb Bottcher, BLOODU, PHUR, PROTEINU, UROBILINOGEN, NITRU, LEUKOCYTESUR, LABMICR, URINETYPE in the last 72 hours. Urine Microscopic: No results for input(s): LABCAST, BACTERIA, COMU, HYALCAST, WBCUA, RBCUA, EPIU in the last 72 hours. Urine Culture: No results for input(s): LABURIN in the last 72 hours. Urine Chemistry:   Recent Labs     04/02/19  1430   CLUR 27   LABCREA 165.6   PROTEINUR 56.40*   NAUR 28             IMAGING:  FL UGI W KUB    (Results Pending)       Assessment/Plan   1. NEHA     2. HTN    3. Anemia    4.  S/p Gastric sleeve     Plan     NEHA sec to Hypoperfusion and ATN   - d/c IVF   - Cr better   - lasix x 1 dose   - Keep MAP > 75   - Monitor UO   - Monitor lytes             Thank you for allowing us to participate in care of [address] [place] [address] free to contact me   [facility] [address]  Office : [phone]  Fax :[phone]

## 2019-04-03 NOTE — PROGRESS NOTES
RN educated and reviewed pt's discharge instructions with pt and son. Both IV's removed, pt tolerated well. Pt to be wheeled down in wheelchair.

## 2019-04-03 NOTE — CONSULTS
Clinical Pharmacy Progress Note    Admit date: 4/2/2019     Asked to review home medications to see what can be crushed or changed to liquid formulation. · Acetaminophen - some tablet formulations can be crushed. Liquid & chewable formulations also available. · Aspirin - chewable tablets can be crushed. · Atorvastatin - tablets can be crushed. · Carvedilol - tablets can be crushed. · Cholecalciferol - capsule formulations cannot be opened. Tablet formulations can be crushed. · Citalopram - tablets can be crushed. · Furosemide - tablets can be crushed. · Gabapentin - capsules can be opened. · Lisinopril - tablets can be crushed. · Pantoprazole - tablets cannot be crushed. Packets are available for reconstitution into oral solution (or granules from packet can be sprinkled on applesauce).         Please call with questions--  Thanks--  Ivonne Ferguson, PharmD, [address] [facility], [address] [facility] or [phone] (wireless)  4/3/2019 10:47 AM

## 2019-04-03 NOTE — PROGRESS NOTES
Pt alert and oriented. VSS. Pain is being controlled PCA pump. Elmore intact, draining minimal output. Pt in bed with call light within reach, bed is in the lowest position, bed alarm on.  Will continue to monitor

## 2019-04-04 ENCOUNTER — TELEPHONE (OUTPATIENT)
Dept: INTERNAL MEDICINE CLINIC | Age: 64
End: 2019-04-04

## 2019-04-04 NOTE — DISCHARGE SUMMARY
Physician Discharge Summary     Patient ID:  Roland Benjamin  [id number]  70 y.o.  1955    Admit date: 4/2/2019    Discharge date and time: 4/3/2019  5:41 PM     Admitting Physician: Yu Ingram DO     Discharge Physician: Yu Ingram DO    Admission Diagnoses: Morbid obesity with BMI of 45.0-49.9, adult ([facility] [address] [E66.01, Z68.42]    Discharge Diagnoses: Morbid obesity    Admission Condition: fair    Discharged Condition: stable    Indication for Admission: surgery, IV hydration, IV pain control, IV nausea control    Hospital Course: 70 /o female admitted through [facility] for a planned robotic assisted laparoscopic sleeve gastrectomy, laparoscopic reducible incisional hernia repair. Her surgery was uneventful and she was taken to PACU to begin her recovery She remained hemodynamically stable and was transferred to her med/surg room for continued recovery. The patient is NPO, PCA for pain control along with a valverde catheter    POD # 1 - UGI negative for any abnormality. PCA discontinued, valverde removed and patient was placed on a BCLD. Will continue to monitor closely but anticipate patient will be able to be discharged home today. Instructed patient on diet restrictions, importance of staying mobile and keeping her follow up appointment    Consults: none        Outstanding Order Results     No orders found from 3/4/2019 to 4/3/2019. Treatments: IV hydration, antibiotics: Ancef and analgesia: Dilaudid    Discharge Exam:    Exam:/73   Pulse 89   Temp 98.2 °F (36.8 °C) (Oral)   Resp 16   Ht 5' 2.5\" (1.588 m)   Wt 249 lb 8 oz (113.2 kg)   LMP 06/15/1999   SpO2 96%   BMI 44.91 kg/m²   General appearance: alert, appears stated age and cooperative  Lungs: clear to auscultation bilaterally  Heart: regular rate and rhythm, S1, S2   Abdomen: soft, appropriately-tender; bowel sounds present. Trocar sites c/d/i. Abdominal binder in place.  No active signs of bleeding        Disposition: home    Patient Instructions:   [unfilled]  Activity: no lifting, Driving, or Strenuous exercise for until seen at  Your follow up appointment  Diet: clear liquids - bariatric  Wound Care: none needed    Follow-up with Dr. Maia Matos in 2 weeks.     Signed:  Naheed Cordova  4/4/2019  9:39 AM

## 2019-04-04 NOTE — TELEPHONE ENCOUNTER
[facility] Transitions Initial Follow Up Call    Outreach made within 2 business days of discharge: Yes    Patient: Genesis Parkinson Patient : 1955   MRN: [id number]  Reason for Admission: There are no discharge diagnoses documented for the most recent discharge. Discharge Date: 4/3/19       Spoke with: Rommel Romo    Discharge department/facility: [facility]    TCM Interactive Patient Contact:  Was patient able to fill all prescriptions: Yes  Was patient instructed to bring all medications to the follow-up visit: Yes  Is patient taking all medications as directed in the discharge summary?  Yes  Does patient understand their discharge instructions: Yes  Does patient have questions or concerns that need addressed prior to 7-14 day follow up office visit: no    Scheduled appointment with PCP within 7-14 days    Follow Up  Future Appointments   Date Time Provider Gael Turcios   2019 11:45 AM Cornel Franco DO [facility]   2019 11:30 [facility]   2019  1:00 PM Sudhakar Albrecht MD [facility]   2019  1:30 PM Willow De Santiago  [place], [place]

## 2019-04-09 ENCOUNTER — TELEPHONE (OUTPATIENT)
Dept: BARIATRICS/WEIGHT MGMT | Age: 64
End: 2019-04-09

## 2019-04-18 ENCOUNTER — OFFICE VISIT (OUTPATIENT)
Dept: BARIATRICS/WEIGHT MGMT | Age: 64
End: 2019-04-18

## 2019-04-18 VITALS
BODY MASS INDEX: 41.5 KG/M2 | WEIGHT: 234.2 LBS | SYSTOLIC BLOOD PRESSURE: 118 MMHG | HEART RATE: 60 BPM | HEIGHT: 63 IN | DIASTOLIC BLOOD PRESSURE: 60 MMHG

## 2019-04-18 DIAGNOSIS — Z98.84 S/P LAPAROSCOPIC SLEEVE GASTRECTOMY: Primary | ICD-10-CM

## 2019-04-18 PROCEDURE — 99024 POSTOP FOLLOW-UP VISIT: CPT | Performed by: SURGERY

## 2019-04-18 RX ORDER — CITALOPRAM 40 MG/1
40 TABLET ORAL DAILY
Qty: 30 TABLET | Refills: 2 | Status: SHIPPED | OUTPATIENT
Start: 2019-04-18 | End: 2019-07-24 | Stop reason: SDUPTHER

## 2019-04-18 RX ORDER — PANTOPRAZOLE SODIUM 40 MG/1
40 TABLET, DELAYED RELEASE ORAL DAILY
Qty: 30 TABLET | Refills: 2 | Status: SHIPPED | OUTPATIENT
Start: 2019-04-18 | End: 2020-04-15 | Stop reason: SDUPTHER

## 2019-04-18 NOTE — PROGRESS NOTES
Dietary Assessment Note    Vitals:   Vitals:    19 1137   BP: 118/60   Pulse: 60   Weight: 234 lb 3.2 oz (106.2 kg)   Height: 5' 3\" (1.6 m)   Patient lost 25.4 lbs over past ~ 2 weeks. Total Weight Loss: 25.6 lb    Labs reviewed: no labs since surgery    Protein intake: <60 grams/day (hasn't drank protein shakes for the past 4 days)    Fluid intake: 48-64 oz/day - water / or w/ SF flavoring    Multivitamin/mineral intake: fusion - how many/day: 1/day    Calcium intake: none    Other: none    Exercise: up moving around but no strenuous exercise    Nutrition Assessment: 2 week post-op visit.      Amount able to eat per sittinoz     Following  rule: trying to but occasionally has had a sip / using baby spoons    *Not consistently pureeing food, eating \"soft\" foods    B- SF pudding OR SF jello OR scrambled eggs OR yogurt  L- same as above  D- same as above  S- same as above    Food Intolerances/issues: adversity to pureed based foods    Client Concerns: a little constipation / low energy    Goals:   - Take 4 fusion per day  - Aim for 60-80g protein   - Try mixing protein powder in foods OR crystal light (suggested unflavored)  - Move to phase 3 on 2019    Plan: f/u at 6 weeks OR as needed    [employer]

## 2019-04-18 NOTE — PATIENT INSTRUCTIONS
Patient received dietary handouts and education.     Goals:   - Take 4 fusion per day  - Aim for 60-80g protein   - Try mixing protein powder in foods OR crystal light (suggested unflavored)  - Move to phase 3 on April 23, 2019

## 2019-04-26 NOTE — PROGRESS NOTES
Patient feels great  No N/V/F/C  Tolerating pureed  Educated on phase 3    Incisions C/D/I    2 weeks S/P sleeve gastrectomy    F/U in 4 weeks    [employer]

## 2019-04-30 ENCOUNTER — OFFICE VISIT (OUTPATIENT)
Dept: INTERNAL MEDICINE CLINIC | Age: 64
End: 2019-04-30
Payer: MEDICARE

## 2019-04-30 VITALS
SYSTOLIC BLOOD PRESSURE: 130 MMHG | DIASTOLIC BLOOD PRESSURE: 60 MMHG | WEIGHT: 233.2 LBS | RESPIRATION RATE: 18 BRPM | BODY MASS INDEX: 41.31 KG/M2

## 2019-04-30 DIAGNOSIS — Z98.84 S/P LAPAROSCOPIC SLEEVE GASTRECTOMY: ICD-10-CM

## 2019-04-30 DIAGNOSIS — N18.30 CKD (CHRONIC KIDNEY DISEASE) STAGE 3, GFR 30-59 ML/MIN (HCC): Chronic | ICD-10-CM

## 2019-04-30 DIAGNOSIS — E66.01 MORBID OBESITY WITH BMI OF 45.0-49.9, ADULT (HCC): ICD-10-CM

## 2019-04-30 DIAGNOSIS — E11.42 DM TYPE 2 WITH DIABETIC PERIPHERAL NEUROPATHY (HCC): Primary | Chronic | ICD-10-CM

## 2019-04-30 DIAGNOSIS — I10 ESSENTIAL HYPERTENSION: Chronic | ICD-10-CM

## 2019-04-30 DIAGNOSIS — E78.2 MIXED HYPERLIPIDEMIA: ICD-10-CM

## 2019-04-30 PROBLEM — Z48.89 ENCOUNTER FOR POST SURGICAL WOUND CHECK: Status: RESOLVED | Noted: 2018-07-06 | Resolved: 2019-04-30

## 2019-04-30 PROBLEM — M86.172 OSTEOMYELITIS OF ANKLE OR FOOT, ACUTE, LEFT (HCC): Status: RESOLVED | Noted: 2018-06-04 | Resolved: 2019-04-30

## 2019-04-30 PROCEDURE — 1111F DSCHRG MED/CURRENT MED MERGE: CPT | Performed by: FAMILY MEDICINE

## 2019-04-30 PROCEDURE — 1090F PRES/ABSN URINE INCON ASSESS: CPT | Performed by: FAMILY MEDICINE

## 2019-04-30 PROCEDURE — 1036F TOBACCO NON-USER: CPT | Performed by: FAMILY MEDICINE

## 2019-04-30 PROCEDURE — 4040F PNEUMOC VAC/ADMIN/RCVD: CPT | Performed by: FAMILY MEDICINE

## 2019-04-30 PROCEDURE — 2022F DILAT RTA XM EVC RTNOPTHY: CPT | Performed by: FAMILY MEDICINE

## 2019-04-30 PROCEDURE — 3017F COLORECTAL CA SCREEN DOC REV: CPT | Performed by: FAMILY MEDICINE

## 2019-04-30 PROCEDURE — 99214 OFFICE O/P EST MOD 30 MIN: CPT | Performed by: FAMILY MEDICINE

## 2019-04-30 PROCEDURE — 3045F PR MOST RECENT HEMOGLOBIN A1C LEVEL 7.0-9.0%: CPT | Performed by: FAMILY MEDICINE

## 2019-04-30 PROCEDURE — 1123F ACP DISCUSS/DSCN MKR DOCD: CPT | Performed by: FAMILY MEDICINE

## 2019-04-30 PROCEDURE — G8427 DOCREV CUR MEDS BY ELIG CLIN: HCPCS | Performed by: FAMILY MEDICINE

## 2019-04-30 PROCEDURE — G8400 PT W/DXA NO RESULTS DOC: HCPCS | Performed by: FAMILY MEDICINE

## 2019-04-30 PROCEDURE — G8417 CALC BMI ABV UP PARAM F/U: HCPCS | Performed by: FAMILY MEDICINE

## 2019-04-30 RX ORDER — ONDANSETRON 4 MG/1
4 TABLET, ORALLY DISINTEGRATING ORAL EVERY 8 HOURS PRN
Qty: 20 TABLET | Refills: 0 | Status: SHIPPED | OUTPATIENT
Start: 2019-04-30 | End: 2019-08-18 | Stop reason: SDUPTHER

## 2019-04-30 ASSESSMENT — ENCOUNTER SYMPTOMS
BLOOD IN STOOL: 0
DIARRHEA: 0
CONSTIPATION: 0
SHORTNESS OF BREATH: 0
ABDOMINAL PAIN: 0

## 2019-04-30 NOTE — PROGRESS NOTES
Post-Discharge Transitional Care Management Services or Hospital Follow Up      Margaret Love   YOB: 1955    Date of Office Visit:  4/30/2019  Date of Hospital Admission: 4/2/19  Date of Hospital Discharge: 4/3/19  Readmission Risk Score(high >=14%.  Medium >=10%):Readmission Risk Score: 14      Care management risk score Rising risk (score 2-5) and Complex Care (Scores >=6): 3     Non face to face  following discharge, date last encounter closed (first attempt may have been earlier): *No documented post hospital discharge outreach found in the last 14 days *No documented post hospital discharge outreach found in the last 14 days    Call initiated 2 business days of discharge: *No response recorded in the last 14 days     Patient Active Problem List   Diagnosis    Meniere's disease    Peripheral neuropathy    Depression    Mixed hyperlipidemia    Essential hypertension    Obesity, Class III, BMI 40-49.9 (morbid obesity) ([address]    ASNHL (asymmetrical sensorineural hearing loss)    Poor compliance with medication    DM type 2 with diabetic peripheral neuropathy ([address]    Diabetic ulcer of toe of left foot associated with diabetes mellitus due to underlying condition, with fat layer exposed ([address]    CKD (chronic kidney disease) stage 3, GFR 30-59 ml/min ([facility])    ANGLE (obstructive sleep apnea)    Chronic GERD    Morbid obesity with BMI of 45.0-49.9, adult ([address]    Incisional hernia, without obstruction or gangrene    S/P laparoscopic sleeve gastrectomy       Allergies   Allergen Reactions    Doxycycline Other (See Comments)     Yeast infection       Medications listed as ordered at the time of discharge from hospital   Luis AMBROCIO \"Kati\"   Home Medication Instructions PERRY:    Printed on:04/30/19 5426   Medication Information                      acetaminophen (TYLENOL) 500 MG tablet  Take 1 tablet by mouth every 6 hours as needed for Pain             aspirin 81 MG chewable tablet  Take 1 tablet by mouth daily. atorvastatin (LIPITOR) 80 MG tablet  Take 1 tablet by mouth daily             B-D UF III MINI PEN NEEDLES 31G X 5 MM MISC  USE TWICE A DAY WITH INSULIN INJECTIONS             blood glucose test strips (ONE TOUCH ULTRA TEST) strip  1 each by Does not apply route 3 times daily As needed. carvedilol (COREG) 25 MG tablet  Take 1 tablet by mouth 2 times daily             Cholecalciferol (VITAMIN D3) 2000 units CAPS  Take by mouth daily             citalopram (CELEXA) 40 MG tablet  TAKE 1 TABLET BY MOUTH DAILY             furosemide (LASIX) 20 MG tablet  Take 1 tablet by mouth daily             gabapentin (NEURONTIN) 300 MG capsule  Take 2 capsules by mouth 3 times daily for 120 days. .             hyoscyamine (LEVSIN/SL) 125 MCG sublingual tablet  Place 1 tablet under the tongue every 6 hours as needed for Cramping             Insulin Degludec (TRESIBA FLEXTOUCH) 200 UNIT/ML SOPN  Inject 96 units into the skin twice daily.              lisinopril (PRINIVIL;ZESTRIL) 20 MG tablet  Take 1 tablet by mouth daily             Multiple Vitamins-Minerals (BARIATRIC FUSION PO)  Take 4 tablets by mouth daily             omeprazole (PRILOSEC) 20 MG delayed release capsule  Take 1 capsule by mouth daily Open capsule and sprinkle into applesauce to take             ondansetron (ZOFRAN ODT) 4 MG disintegrating tablet  Take 1 tablet by mouth every 8 hours as needed for Nausea or Vomiting             ONE TOUCH LANCETS MISC  1 each by Does not apply route 3 times daily             pantoprazole (PROTONIX) 40 MG tablet  TAKE 1 TABLET BY MOUTH DAILY                   Medications marked \"taking\" at this time  Outpatient Medications Marked as Taking for the 4/30/19 encounter (Office Visit) with Valentina Gutierrez MD   Medication Sig Dispense Refill    citalopram (CELEXA) 40 MG tablet TAKE 1 TABLET BY MOUTH DAILY 30 tablet 2    pantoprazole (PROTONIX) 40 MG tablet TAKE 1 TABLET BY Assessment/Plan:  1. DM type 2 with diabetic peripheral neuropathy (HCC)  HbA1c last March 18, 2019 was 7.5%. Patient already reduced the dose of  basal and insulin to half. She was on Tressiba 96 units twice a day . She cut it down to 44 units twice a day patient was told to adjust the dose to 50 units twice a day. Continue low-carb diet. 2. Morbid obesity with BMI of 45.0-49.9, adult ([facility] [address]   status post laparoscopic sleeve gastrectomy 4/2/19. Patient short-term goal is weight < 220 pounds, medium-term goal is weight <180 pounds  and long-term goal is weight <160 pounds    3. S/P laparoscopic sleeve gastrectomy  Her GI function is back to normal. But she still take Protonix 40 mg daily and Zofran as needed    4. Essential hypertension  Controlled. Continue lisinopril 20 mg daily and Coreg 25 mg twice a day    5. Mixed hyperlipidemia  Controlled. Continue Lipitor 80 mg daily    6. CKD (chronic kidney disease) stage 3, GFR 30-59 ml/min ([facility])  Stable renal profile. Avoid NSAIDs and other nephrotoxic drugs. Medical Decision Making: moderate complexity    RTC in 2 mos.  She would like her to be less 220 lbs next visit

## 2019-05-23 ENCOUNTER — HOSPITAL ENCOUNTER (OUTPATIENT)
Dept: WOUND CARE | Age: 64
Discharge: HOME | End: 2019-05-23
Payer: MEDICARE

## 2019-05-23 VITALS
WEIGHT: 233.25 LBS | BODY MASS INDEX: 41.32 KG/M2 | SYSTOLIC BLOOD PRESSURE: 147 MMHG | DIASTOLIC BLOOD PRESSURE: 78 MMHG | TEMPERATURE: 97.7 F | HEART RATE: 62 BPM | RESPIRATION RATE: 18 BRPM

## 2019-05-23 PROCEDURE — 99213 OFFICE O/P EST LOW 20 MIN: CPT

## 2019-05-23 PROCEDURE — 97597 DBRDMT OPN WND 1ST 20 CM/<: CPT

## 2019-05-23 RX ORDER — CLINDAMYCIN HYDROCHLORIDE 300 MG/1
300 CAPSULE ORAL 3 TIMES DAILY
Qty: 30 CAPSULE | Refills: 0 | Status: SHIPPED | OUTPATIENT
Start: 2019-05-23 | End: 2019-06-02

## 2019-05-23 ASSESSMENT — PAIN SCALES - GENERAL
PAINLEVEL_OUTOF10: 0
PAINLEVEL_OUTOF10: 0

## 2019-05-24 NOTE — PROGRESS NOTES
Jo [facility] [address]   Progress Note and Procedure Note      Eloise Nazario  MEDICAL RECORD NUMBER:  [id number]  AGE: 70 y.o. GENDER: female  : 1955  EPISODE DATE:  2019    Subjective:     Chief Complaint   Patient presents with    Wound Check     INITIAL VISIT FOR SWELLING TO 2ND TOE. APPLYING PSO TO WOUND. HISTORY of PRESENT ILLNESS HPI  Eloise Nazario is a 70 y.o. female who presents today for wound/ulcer evaluation. Pt noted a crack in the callus on left medial great toe and irritated second left toe from callus and \"cut nail too close\" on the left 5th toe. She relates that she cut her 5th toe last night when cutting her nails and had trouble getting it to stop bleeding. Denies constitutional issues. Patient relates that she had a gastric sleeve in march and has lost 30 lbs since her surgery. She relates that she has noticed that since her surgery she os overall feeling much better.    History of Wound Context: callus formation with fissure  Wound/Ulcer Pain Timing/Severity: none (pt is neuropathic)  Quality of pain: N/A  Severity:  0 / 10   Modifying Factors: None  Associated Signs/Symptoms: none     Ulcer Identification:  Ulcer Type: diabetic, pressure and shear  Contributing Factors: diabetes, poor glucose control, chronic pressure, shear force and obesity     Wound: N/A                                       PAST MEDICAL HISTORY        Diagnosis Date    Abnormal echocardiogram     25% on 3/11/14 and 50% on 3/19/14    Back pain     Cardiomyopathy ([address]     EF was 50% on 3/19/14    Chipped tooth     lower left    Dental crown present     veneers    Depression     Depressive disorder 2014    Diabetes mellitus ([address]     Diabetic infection of right foot ([address] 4/15/2015    Diabetic ulcer of toe of left foot associated with type 2 diabetes mellitus, with fat layer exposed ([address] 4/10/2018    Pt slipped in hot tub latter part of February and has not healed since despite topical treatment    DVT (deep venous thrombosis) (HCC)     HTN (hypertension)     Hx of blood clots 2016    left leg    Ischemic cardiomyopathy 4/15/2015    Kidney disease     Meniere's disease     Mixed hyperlipidemia 3/7/2016    Nicotine abuse     NSTEMI (non-ST elevated myocardial infarction) ([facility] 3/13/2014    Osteomyelitis of ankle or foot, acute, left ([address] 6/4/2018    Pneumonia     Spinal abscess ([facility] 3/2014    epidural abscess    Spinal epidural abscess 3/13/2014    Type II diabetes mellitus, uncontrolled ([facility] 08/13/2014       PAST SURGICAL HISTORY    Past Surgical History:   Procedure Laterality Date    BACK SURGERY  3/2014    DEBRIDEMENT  3/12/14    extensive debridement of bone/muscle and paraspinal empyema    HYSTERECTOMY  6/15/1999    complete-think right ovary in.    NASAL SEPTUM SURGERY      SLEEVE GASTRECTOMY  04/02/2019    ROBOTIC ASSISTED LAPAROSCOPIC SLEEVE GASTRECTOMY, LAPAROSCOPIC REDUCIBLE INCISIONAL HERNIA REPAIR     SLEEVE GASTRECTOMY N/A 4/2/2019    ROBOTIC ASSISTED LAPAROSCOPIC SLEEVE GASTRECTOMY, LAPAROSCOPIC REDUCIBLE INCISIONAL HERNIA REPAIR performed by Khurram Yanez DO at [facility] N/A 2/8/2019    EGD BIOPSY performed by Khurram Yanez DO at [facility] N/A 2/8/2019    EGD POLYP ABLATION/OTHER performed by Khurram Yanez DO at [facility] ENDOSCOPY       FAMILY HISTORY    Family History   Problem Relation Age of Onset    High Blood Pressure Mother     Cancer Mother     Rheum Arthritis Mother     COPD Father     Cancer Father     Osteoarthritis Neg Hx     Asthma Neg Hx     Breast Cancer Neg Hx     Diabetes Neg Hx     Heart Failure Neg Hx     High Cholesterol Neg Hx     Hypertension Neg Hx     Migraines Neg Hx     Ovarian Cancer Neg Hx     Rashes/Skin Problems Neg Hx     Seizures Neg Hx     Stroke Neg Hx     Thyroid Disease Neg Hx        SOCIAL HISTORY    Social History Tobacco Use    Smoking status: Former Smoker     Packs/day: 0.50     Years: 10.00     Pack years: 5.00     Last attempt to quit: 11/10/2013     Years since quittin.5    Smokeless tobacco: Never Used   Substance Use Topics    Alcohol use: Not Currently     Alcohol/week: 0.0 oz     Comment: occasionally    Drug use: No       ALLERGIES    Allergies   Allergen Reactions    Doxycycline Other (See Comments)     Yeast infection       MEDICATIONS    Current Outpatient Medications on File Prior to Encounter   Medication Sig Dispense Refill    ondansetron (ZOFRAN ODT) 4 MG disintegrating tablet Take 1 tablet by mouth every 8 hours as needed for Nausea or Vomiting 20 tablet 0    Insulin Degludec (TRESIBA FLEXTOUCH) 200 UNIT/ML SOPN Inject 50 units into the skin twice daily. (Patient taking differently: 52 Units 2 times daily Inject 50 units into the skin twice daily. ) 172 mL 1    citalopram (CELEXA) 40 MG tablet TAKE 1 TABLET BY MOUTH DAILY 30 tablet 2    pantoprazole (PROTONIX) 40 MG tablet TAKE 1 TABLET BY MOUTH DAILY 30 tablet 2    Multiple Vitamins-Minerals (BARIATRIC FUSION PO) Take 4 tablets by mouth daily      lisinopril (PRINIVIL;ZESTRIL) 20 MG tablet Take 1 tablet by mouth daily 30 tablet 2    carvedilol (COREG) 25 MG tablet Take 1 tablet by mouth 2 times daily 180 tablet 1    gabapentin (NEURONTIN) 300 MG capsule Take 2 capsules by mouth 3 times daily for 120 days. . 180 capsule 2    furosemide (LASIX) 20 MG tablet Take 1 tablet by mouth daily 60 tablet 3    atorvastatin (LIPITOR) 80 MG tablet Take 1 tablet by mouth daily 90 tablet 1    Cholecalciferol (VITAMIN D3) 2000 units CAPS Take by mouth daily      acetaminophen (TYLENOL) 500 MG tablet Take 1 tablet by mouth every 6 hours as needed for Pain 120 tablet     aspirin 81 MG chewable tablet Take 1 tablet by mouth daily.  30 tablet     ONE TOUCH LANCETS MISC 1 each by Does not apply route 3 times daily 100 each 11    blood glucose test strips (ONE TOUCH ULTRA TEST) strip 1 each by Does not apply route 3 times daily As needed. 100 strip 11    B-D UF III MINI PEN NEEDLES 31G X 5 MM MISC USE TWICE A DAY WITH INSULIN INJECTIONS 100 each 3     No current facility-administered medications on file prior to encounter. REVIEW OF SYSTEMS    Pertinent items are noted in HPI. Review of Systems: A 12 point review of symptoms is unremarkable with the exception of the chief complaint. Patient specifically denies nausea, fever, vomiting, chills, shortness of breath, chest pain, abdominal pain, constipation or difficulty urinating. Objective:      BP (!) 147/78   Pulse 62   Temp 97.7 °F (36.5 °C) (Oral)   Resp 18   Wt 233 lb 4 oz (105.8 kg)   LMP 06/15/1999   BMI 41.32 kg/m²     Wt Readings from Last 3 Encounters:   05/23/19 233 lb 4 oz (105.8 kg)   04/30/19 234 lb (106.1 kg)   04/30/19 233 lb 3.2 oz (105.8 kg)       PHYSICAL EXAM    General Appearance: alert and oriented to person, place and time, well-developed and well-nourished, in no acute distress  Skin: warm and dry, no rash or erythema. Wound noted on the plantar aspect of the left great toe with a large hyperkeratotic rim. Wound has fibrotic and nonviable tissue that extends down through and includes the subcutaneous tissue. After debridement the wound has a granular base. There is no surrounding erythema, edema, warmth or malodor noted. The wound does not probe or track to bone. There is a wound noted on the dorsal medial aspect of the PIPJ of the left 2nd toe. Wound has fibrotic and nonviable tissue that extends down through and includes the subcutaneous tissue. After debridement the wound has a fibrous base. There is surrounding erythema, edema and warmth but no purulent drainage or malodor noted. The wound does not probe or track to bone. There is an 2cm ring of cellulitis surrounding the left 2nd toe wound. The distal tip of the left 5th toe has been clipped off.   There Width (cm) 2.4 cm 5/23/2019 11:44 AM   Post-Procedure Depth (cm) 0.3 cm 5/23/2019 11:44 AM   Post-Procedure Surface Area (cm^2) 4.08 cm^2 5/23/2019 11:44 AM   Post-Procedure Volume (cm^3) 1.22 cm^3 5/23/2019 11:44 AM   Wound Assessment Slough;Pink;Yellow 5/23/2019 10:48 AM   Drainage Amount ELIZABETH 5/23/2019 10:48 AM   Odor None 5/23/2019 10:48 AM   Margins Undefined edges 5/23/2019 10:48 AM   Nicole-wound Assessment Calloused;Dry 5/23/2019 10:48 AM   Non-staged Wound Description Full thickness 5/23/2019 10:48 AM   [place]%Wound Bed 50 5/23/2019 10:48 AM   Yellow%Wound Bed 50 5/23/2019 10:48 AM   Number of days: 0       Wound 05/23/19 Toe (Comment  which one) Left;Dorsal # 10 left 2nd toe dorsal onset 4 weeks ago, Diabetic, Love 1 (Active)   Wound Image   5/23/2019 10:48 AM   Wound Diabetic Love 1 5/23/2019 10:48 AM   Dressing Changed Changed/New 5/23/2019 11:44 AM   Wound Length (cm) 0.6 cm 5/23/2019 10:48 AM   Wound Width (cm) 0.6 cm 5/23/2019 10:48 AM   Wound Depth (cm) 0.1 cm 5/23/2019 10:48 AM   Wound Surface Area (cm^2) 0.36 cm^2 5/23/2019 10:48 AM   Wound Volume (cm^3) 0.04 cm^3 5/23/2019 10:48 AM   Post-Procedure Length (cm) 0.8 cm 5/23/2019 11:44 AM   Post-Procedure Width (cm) 0.8 cm 5/23/2019 11:44 AM   Post-Procedure Depth (cm) 0.1 cm 5/23/2019 11:44 AM   Post-Procedure Surface Area (cm^2) 0.64 cm^2 5/23/2019 11:44 AM   Post-Procedure Volume (cm^3) 0.06 cm^3 5/23/2019 11:44 AM   Wound Assessment Pink;Yellow 5/23/2019 10:48 AM   Drainage Amount ELIZABETH 5/23/2019 10:48 AM   Odor None 5/23/2019 10:48 AM   Margins Attached edges 5/23/2019 10:48 AM   Nicole-wound Assessment Dry 5/23/2019 10:48 AM   Non-staged Wound Description Full thickness 5/23/2019 10:48 AM   [place]%Wound Bed 10 5/23/2019 10:48 AM   Yellow%Wound Bed 90 5/23/2019 10:48 AM   Number of days: 0       Wound 05/23/19 Toe (Comment  which one) Left # 11 left 5th toe tip onset 4 weeks ago, Diabetic Love 1 (Active)   Wound Image   5/23/2019 10:48 AM   Wound Diabetic Love 1 5/23/2019 10:48 AM   Dressing Changed Changed/New 5/23/2019 11:44 AM   Wound Length (cm) 1.5 cm 5/23/2019 10:48 AM   Wound Width (cm) 0.5 cm 5/23/2019 10:48 AM   Wound Depth (cm) 0.1 cm 5/23/2019 10:48 AM   Wound Surface Area (cm^2) 0.75 cm^2 5/23/2019 10:48 AM   Wound Volume (cm^3) 0.08 cm^3 5/23/2019 10:48 AM   Post-Procedure Length (cm) 1.7 cm 5/23/2019 11:44 AM   Post-Procedure Width (cm) 0.7 cm 5/23/2019 11:44 AM   Post-Procedure Depth (cm) 0.1 cm 5/23/2019 11:44 AM   Post-Procedure Surface Area (cm^2) 1.19 cm^2 5/23/2019 11:44 AM   Post-Procedure Volume (cm^3) 0.12 cm^3 5/23/2019 11:44 AM   Wound Assessment Black; Red 5/23/2019 10:48 AM   Drainage Amount ELIZABETH 5/23/2019 10:48 AM   Odor None 5/23/2019 10:48 AM   Margins Undefined edges 5/23/2019 10:48 AM   Nicole-wound Assessment Dry 5/23/2019 10:48 AM   Non-staged Wound Description Full thickness 5/23/2019 10:48 AM   Red%Wound Bed 50 5/23/2019 10:48 AM   Black%Wound Bed 50 5/23/2019 10:48 AM   Number of days: 0     Percent of Wound(s)/Ulcer(s) Debrided: 100%    Total Surface Area Debrided:  4.72 sq cm       Diabetic/Pressure/Non Pressure Ulcers only:  Ulcer: N/A      Estimated Blood Loss:  Minimal    Hemostasis Achieved:  by pressure    Response to treatment:  Well tolerated by patient. Plan:   E/M x 30 minutes of a new problem. rx clindamycin for 2nd toe cellulitis. A culture was taken and will adjust abx based on culture results. Offload the wound with surgical shoe as there was hyperkeratotic tissue surrounding the wound. Patient admits that she does not always wear it. Pt education per provider related to plan of care, pt is agreeable to plan and questions answered. Treatment Note please see attached Discharge Instructions    Written patient dismissal instructions given to patient and signed by patient or POA.       RTC 1 week       Discharge Instructions       [address] and Hyperbaric Oxygen with restrictions: If you are still having pain after you go home:  [x] Elevate the affected limb. [x] Use over-the-counter medications you would normally use for pain as permitted by your family doctor. [x] For persistent pain not relieved by the above interventions, please call your family doctor. Return Appointment:  [] Wound and dressing supply provider:   [] ECF or Home Healthcare:  [] Wound Assessment: [] Physician or NP scheduled for Wound Assessment:   [x] Return Appointment: With DR LOMAX  in  1 Week(s)  [] Ordered tests:     Nurse Case Manger:  PILAR   Electronically signed by Wendy Rizo RN on 5/23/2019 at 10:56 AM     [address] [place] [address] Information: Should you experience any significant changes in your wound(s) or have questions about your wound care, please contact the [address] [place] [address] at [address] 8:00 am - 4:30 pm and Friday 8:00 am - 12:30 pm.  If you need help with your wound outside these hours and cannot wait until we are again available, contact your PCP or go to the hospital emergency room. PLEASE NOTE: IF YOU ARE UNABLE TO OBTAIN WOUND SUPPLIES, CONTINUE TO USE THE SUPPLIES YOU HAVE AVAILABLE UNTIL YOU ARE ABLE TO REACH US. IT IS MOST IMPORTANT TO KEEP THE WOUND COVERED AT ALL TIMES.      Physician Signature:_______________________    Date: ___________ Time:  ____________        Dr Bryan Birmingham                        Electronically signed by Bryan Birmingham DPM on 5/24/2019 at 8:42 AM

## 2019-05-25 LAB
ANAEROBIC CULTURE: ABNORMAL
GRAM STAIN RESULT: ABNORMAL
ORGANISM: ABNORMAL
WOUND/ABSCESS: ABNORMAL

## 2019-05-30 ENCOUNTER — HOSPITAL ENCOUNTER (OUTPATIENT)
Dept: WOUND CARE | Age: 64
Discharge: HOME | End: 2019-05-30
Payer: MEDICARE

## 2019-06-01 RX ORDER — CARVEDILOL 12.5 MG/1
TABLET ORAL
Qty: 180 TABLET | Refills: 1 | OUTPATIENT
Start: 2019-06-01

## 2019-06-06 ENCOUNTER — HOSPITAL ENCOUNTER (OUTPATIENT)
Dept: WOUND CARE | Age: 64
Discharge: HOME | End: 2019-06-06
Payer: MEDICARE

## 2019-06-06 VITALS
DIASTOLIC BLOOD PRESSURE: 64 MMHG | BODY MASS INDEX: 41.47 KG/M2 | SYSTOLIC BLOOD PRESSURE: 145 MMHG | HEART RATE: 59 BPM | RESPIRATION RATE: 16 BRPM | TEMPERATURE: 97.2 F | WEIGHT: 234.13 LBS

## 2019-06-06 PROCEDURE — 97597 DBRDMT OPN WND 1ST 20 CM/<: CPT

## 2019-06-06 RX ORDER — LIDOCAINE 50 MG/G
OINTMENT TOPICAL PRN
Status: DISCONTINUED | OUTPATIENT
Start: 2019-06-06 | End: 2019-06-07 | Stop reason: HOSPADM

## 2019-06-06 ASSESSMENT — PAIN SCALES - GENERAL
PAINLEVEL_OUTOF10: 0
PAINLEVEL_OUTOF10: 0

## 2019-06-10 NOTE — PROGRESS NOTES
Jo Tony [address]   Progress Note and Procedure Note      Leonard Diaz  MEDICAL RECORD NUMBER:  [id number]  AGE: 70 y.o. GENDER: female  : 1955  EPISODE DATE:  2019    Subjective:     No chief complaint on file. HISTORY of PRESENT ILLNESS HPI  Leonard Diaz is a 70 y.o. female who presents today for wound/ulcer evaluation. Pt noted a crack in the callus on left medial great toe and irritated second left toe from callus and \"cut nail too close\" on the left 5th toe. She relates that she cut her 5th toe last night when cutting her nails and had trouble getting it to stop bleeding. Denies constitutional issues. Patient relates that she had a gastric sleeve in march and has lost more than 30 lbs since her surgery. She relates that she has noticed that since her surgery she is overall feeling much better. Patient relates that she has been taking her abx as recommended.    History of Wound Context: callus formation with fissure  Wound/Ulcer Pain Timing/Severity: none (pt is neuropathic)  Quality of pain: N/A  Severity:  0 / 10   Modifying Factors: None  Associated Signs/Symptoms: none     Ulcer Identification:  Ulcer Type: diabetic, pressure and shear  Contributing Factors: diabetes, poor glucose control, chronic pressure, shear force and obesity     Wound: N/A                                       PAST MEDICAL HISTORY        Diagnosis Date    Abnormal echocardiogram     25% on 3/11/14 and 50% on 3/19/14    Back pain     Cardiomyopathy ([address]     EF was 50% on 3/19/14    Chipped tooth     lower left    Dental crown present     veneers    Depression     Depressive disorder 2014    Diabetes mellitus ([address]     Diabetic infection of right foot ([address] 4/15/2015    Diabetic ulcer of toe of left foot associated with type 2 diabetes mellitus, with fat layer exposed ([address] 4/10/2018    Pt slipped in hot tub latter part of February and has not healed since despite topical treatment    DVT (deep venous thrombosis) (HCC)     HTN (hypertension)     Hx of blood clots 2016    left leg    Ischemic cardiomyopathy 4/15/2015    Kidney disease     Meniere's disease     Mixed hyperlipidemia 3/7/2016    Nicotine abuse     NSTEMI (non-ST elevated myocardial infarction) ([facility] 3/13/2014    Osteomyelitis of ankle or foot, acute, left ([address] 6/4/2018    Pneumonia     Spinal abscess ([address] 3/2014    epidural abscess    Spinal epidural abscess 3/13/2014    Type II diabetes mellitus, uncontrolled ([facility] 08/13/2014       PAST SURGICAL HISTORY    Past Surgical History:   Procedure Laterality Date    BACK SURGERY  3/2014    DEBRIDEMENT  3/12/14    extensive debridement of bone/muscle and paraspinal empyema    HYSTERECTOMY  6/15/1999    complete-think right ovary in.    NASAL SEPTUM SURGERY      SLEEVE GASTRECTOMY  04/02/2019    ROBOTIC ASSISTED LAPAROSCOPIC SLEEVE GASTRECTOMY, LAPAROSCOPIC REDUCIBLE INCISIONAL HERNIA REPAIR     SLEEVE GASTRECTOMY N/A 4/2/2019    ROBOTIC ASSISTED LAPAROSCOPIC SLEEVE GASTRECTOMY, LAPAROSCOPIC REDUCIBLE INCISIONAL HERNIA REPAIR performed by Elina Del Toro DO at [place] [address] N/A 2/8/2019    EGD BIOPSY performed by Elina Del Toro DO at [facility] N/A 2/8/2019    EGD POLYP ABLATION/OTHER performed by Elina Del Toro DO at [facility] ENDOSCOPY       FAMILY HISTORY    Family History   Problem Relation Age of Onset    High Blood Pressure Mother     Cancer Mother     Rheum Arthritis Mother     COPD Father     Cancer Father     Osteoarthritis Neg Hx     Asthma Neg Hx     Breast Cancer Neg Hx     Diabetes Neg Hx     Heart Failure Neg Hx     High Cholesterol Neg Hx     Hypertension Neg Hx     Migraines Neg Hx     Ovarian Cancer Neg Hx     Rashes/Skin Problems Neg Hx     Seizures Neg Hx     Stroke Neg Hx     Thyroid Disease Neg Hx        SOCIAL HISTORY    Social History     Tobacco Use    Hair growth is normal in appearance. No edema noted. No calf pain with palpation noted. Epicritic sensation is grossly absent bilaterally. Negative clonus and babinski reflex is down going. Patient has had a left partial 3rd toe amputation. Patient has rigid hammertoe contractures noted on the bilateral feet. Hallux limitus noted bilaterally    Assessment:        Problem List Items Addressed This Visit     None           Procedure Note  Indications:  Based on my examination of this patient's wound(s)/ulcer(s) today, debridement is required to promote healing and evaluate the wound base. Performed by: Jay Taylor DPM    Consent obtained:  Yes    Time out taken:  Yes    Pain Control: Anesthetic  Anesthetic: 5% Lidocaine Ointment Topical(0.5 cm)       Debridement:Nonexcisional     Using # 10 blade scalpel the wound(s)/ulcer(s) was/were sharply debrided down through and including the removal of epidermis and dermis. Devitalized Tissue Debrided:  fibrin, biofilm and callus    Pre Debridement Measurements:  Are located in the Wound/Ulcer Documentation Flow Sheet    Wound/Ulcer #: 9 and 10    Post Debridement Measurements:  Wound/Ulcer Descriptions are Pre Debridement except measurements:    Wound 05/23/19 Toe (Comment  which one) Left;Plantar # 9 left great toe plantar onset 4 weeks ago, Diabetic Wagner1 (Active)   Wound Image   5/23/2019 10:48 AM   Wound Diabetic Love 1 6/6/2019 10:43 AM   Dressing Status Clean;Dry; Intact 6/6/2019 10:43 AM   Dressing Changed Changed/New 6/6/2019 11:14 AM   Wound Length (cm) 0.9 cm 6/6/2019 10:43 AM   Wound Width (cm) 1.8 cm 6/6/2019 10:43 AM   Wound Depth (cm) 0.3 cm 6/6/2019 10:43 AM   Wound Surface Area (cm^2) 1.62 cm^2 6/6/2019 10:43 AM   Change in Wound Size % (l*w) 50.91 6/6/2019 10:43 AM   Wound Volume (cm^3) 0.49 cm^3 6/6/2019 10:43 AM   Wound Healing % 51 6/6/2019 10:43 AM   Post-Procedure Length (cm) 1.1 cm 6/6/2019 11:14 AM   Post-Procedure Width (cm) 2 cm 05/23/19 Toe (Comment  which one) Left # 11 left 5th toe tip onset 4 weeks ago, Diabetic Love 1 (Active)   Wound Image   5/23/2019 10:48 AM   Wound Diabetic Love 1 6/6/2019 10:43 AM   Dressing Status Clean;Dry; Intact 6/6/2019 10:43 AM   Dressing Changed Changed/New 6/6/2019 11:14 AM   Wound Length (cm) 0.5 cm 6/6/2019 10:43 AM   Wound Width (cm) 0.5 cm 6/6/2019 10:43 AM   Wound Depth (cm) 0.1 cm 6/6/2019 10:43 AM   Wound Surface Area (cm^2) 0.25 cm^2 6/6/2019 10:43 AM   Change in Wound Size % (l*w) 66.67 6/6/2019 10:43 AM   Wound Volume (cm^3) 0.02 cm^3 6/6/2019 10:43 AM   Wound Healing % 75 6/6/2019 10:43 AM   Post-Procedure Length (cm) 1.9 cm 6/6/2019 11:14 AM   Post-Procedure Width (cm) 0.9 cm 6/6/2019 11:14 AM   Post-Procedure Depth (cm) 0.1 cm 6/6/2019 11:14 AM   Post-Procedure Surface Area (cm^2) 1.71 cm^2 6/6/2019 11:14 AM   Post-Procedure Volume (cm^3) 0.17 cm^3 6/6/2019 11:14 AM   Wound Assessment Dry;Brown 6/6/2019 10:43 AM   Drainage Amount None 6/6/2019 10:43 AM   Odor None 6/6/2019 10:43 AM   Margins Undefined edges 6/6/2019 10:43 AM   Nicole-wound Assessment Clean;Dry 6/6/2019 10:43 AM   Non-staged Wound Description Full thickness 6/6/2019 10:43 AM   Red%Wound Bed 50 5/23/2019 10:48 AM   Black%Wound Bed 50 5/23/2019 10:48 AM   Other%Wound Bed 100 6/6/2019 10:43 AM   Number of days: 17     Percent of Wound(s)/Ulcer(s) Debrided: 100%    Total Surface Area Debrided:  2.36 sq cm       Diabetic/Pressure/Non Pressure Ulcers only:  Ulcer: N/A      Estimated Blood Loss:  Minimal    Hemostasis Achieved:  by pressure    Response to treatment:  Well tolerated by patient. Plan:   E/M x 30 minutes     Continue clindamycin for 2nd toe cellulitis until gone. Offload the wound with surgical shoe as there was hyperkeratotic tissue surrounding the wound. Patient admits that she does not always wear it.       Pt education per provider related to plan of care, pt is agreeable to plan and questions          Off-Loading:   [x] Off-loading when        [x] walking           [] in bed [] sitting  [] Total non-weight bearing  [] Right Leg  [] Left Leg          [x] Assistive Device         [] Walker            [] Cane   [] Wheelchair      [] Crutches              [x] Surgical shoe  TO LEFT FOOT                                    Dietary:  [] Diet as tolerated:        [x] Calorie Diabetic Diet: [] No Added Salt:  [x] Increase Protein:        [] Other:   Activity:  [x] Activity as tolerated:  [] Patient has no activity restrictions     [] Strict Bedrest:            [] Remain off Work:     [] May return to full duty work:                                       [] Return to work with restrictions:                 If you are still having pain after you go home:  [x] Elevate the affected limb. [x] Use over-the-counter medications you would normally use for pain as permitted by your family doctor. [x] For persistent pain not relieved by the above interventions, please call your family doctor.   Tamica Daniels   Return Appointment:  [] Wound and dressing supply provider:   [] ECF or Home Healthcare:  [] Wound Assessment:  [] Physician or NP scheduled for Wound Assessment:   [x] Return Appointment: With DR LOMAX  in  1 Week(s)  [] Ordered tests:      Nurse Case Manger:  PILAR  Electronically signed by José Luis Bolanos RN on 6/6/2019 at 11:20 AM  [address] [place] [address] Information: Should you experience any significant changes in your wound(s) or have questions about your wound care, please contact the [address] [place] [address] at [address] 8:00 am - 4:30 pm and Friday 8:00 am - 12:30 pm.  If you need help with your wound outside these hours and cannot wait until we are again available, contact your PCP or go to the hospital emergency room.      PLEASE NOTE: IF YOU ARE UNABLE TO OBTAIN WOUND SUPPLIES, CONTINUE TO USE THE SUPPLIES YOU HAVE AVAILABLE UNTIL YOU ARE ABLE TO [facility].  IT IS MOST IMPORTANT TO KEEP THE WOUND COVERED AT ALL TIMES.      Physician Signature:_______________________     Date: ___________ Time:  ____________                                Dr Freda Sarmiento               Electronically signed by Freda Sarmiento DPM on 6/10/2019 at 8:04 AM

## 2019-06-13 ENCOUNTER — HOSPITAL ENCOUNTER (OUTPATIENT)
Dept: WOUND CARE | Age: 64
Discharge: HOME | End: 2019-06-13
Payer: MEDICARE

## 2019-06-20 ENCOUNTER — OFFICE VISIT (OUTPATIENT)
Dept: BARIATRICS/WEIGHT MGMT | Age: 64
End: 2019-06-20

## 2019-06-20 VITALS
SYSTOLIC BLOOD PRESSURE: 136 MMHG | WEIGHT: 228.4 LBS | DIASTOLIC BLOOD PRESSURE: 70 MMHG | HEIGHT: 63 IN | HEART RATE: 60 BPM | BODY MASS INDEX: 40.47 KG/M2

## 2019-06-20 DIAGNOSIS — Z98.84 S/P LAPAROSCOPIC SLEEVE GASTRECTOMY: Primary | ICD-10-CM

## 2019-06-20 PROCEDURE — 99024 POSTOP FOLLOW-UP VISIT: CPT | Performed by: SURGERY

## 2019-06-20 NOTE — PROGRESS NOTES
Dietary Assessment Note    Vitals:   Vitals:    19 0854   BP: 136/70   Pulse: 60   Weight: 228 lb 6.4 oz (103.6 kg)   Height: 5' 3\" (1.6 m)    Patient lost 5.8 lbs over past 2 months. Total Weight Loss: 31.4 lbs     Labs reviewed: No new nutrition related labs     Protein intake: 60-80 grams/day, pt mentally tracking     Fluid intake: 45-64 oz/day     Multivitamin/mineral intake: fusion 2-3 x day     Calcium intake:  none     Other:  Vit D     Exercise: No. Hx of spine sx, but states this is improving with wt loss. Interested in starting at [facility]. Nutrition Assessment: 11 week s/p sleeve post-op visit. Does not have a consistent sleep/wake schedule. Pt has not been seen since 2 week post-op. Breakfast: 2 softboiled eggs on sandwich thin     Snack: nothing     Lunch: PB with celery/apple OR sliced turkey with piece of cheese     Snack: string cheese OR parmesan crisps OR egg salad     Dinner: hamburger tj sometimes with cheese and onions with garlic Dominican mustard OR grilled chicken with brussels sprouts/broccoli/cauliflower/green beans/potatoes    Snack: crackers sometimes with cheese OR pretzel with Dominican onion dip     Fluids: Water, flavored water, sravan delight (states she is using for low BS's, but also has glucose tablets)     Amount able to eat per sittin/2-3/4 cup/sitting     Following 30/30/30 rule: Eats faster than 30 minutes, takes sips during meals     Food Intolerances/issues: none     Client Concerns:  Constipation- started taking colace, takes several times per week. Slow weight loss     Goals:   Measurements taken and provided to pt today   Be consistent with meeting fluid recommendations of 48-64 oz/day  Avoid sravan delight - use glucose tablets   Stay on track with diet and track intakes with goal of 1200 kcals and 60-80 g/pro/day.    Follow 30-30-30 Rule   Get started with exercise - discussed utilizing [facility]   Focus on fruits and veggies in diet as main CHO source to help with constipation   Switch to alternative MVI and start Calcium supplement- handout provided and reviewed  Phase 4 handout provided and reviewed     Plan: F/U at 6 months     Gilford Dole

## 2019-06-20 NOTE — PROGRESS NOTES
[address]  Physicians   General & Laparoscopic Surgery  [facility]       HPI:     Anabella Blake is a very pleasant 70 y.o. obese female , Body mass index is 40.46 kg/m². Marcela Kaz And multiple medical problems who is presenting for bariatric follow up care.    Anabella Blake is s/p laparoscopic sleeve gastrectomy        Past Medical History:   Diagnosis Date    Abnormal echocardiogram     25% on 3/11/14 and 50% on 3/19/14    Back pain     Cardiomyopathy ([address]     EF was 50% on 3/19/14    Chipped tooth     lower left    Dental crown present     veneers    Depression     Depressive disorder 8/13/2014    Diabetes mellitus ([address]     Diabetic infection of right foot ([address] 4/15/2015    Diabetic ulcer of toe of left foot associated with type 2 diabetes mellitus, with fat layer exposed ([address] 4/10/2018    Pt slipped in hot tub latter part of February and has not healed since despite topical treatment    DVT (deep venous thrombosis) ([address]     HTN (hypertension)     Hx of blood clots 2016    left leg    Ischemic cardiomyopathy 4/15/2015    Kidney disease     Meniere's disease     Mixed hyperlipidemia 3/7/2016    Nicotine abuse     NSTEMI (non-ST elevated myocardial infarction) ([address] 3/13/2014    Osteomyelitis of ankle or foot, acute, left ([address] 6/4/2018    Pneumonia     Spinal abscess ([address] 3/2014    epidural abscess    Spinal epidural abscess 3/13/2014    Type II diabetes mellitus, uncontrolled ([address] 08/13/2014     Past Surgical History:   Procedure Laterality Date    BACK SURGERY  3/2014    DEBRIDEMENT  3/12/14    extensive debridement of bone/muscle and paraspinal empyema    HYSTERECTOMY  6/15/1999    complete-think right ovary in.    NASAL SEPTUM SURGERY      SLEEVE GASTRECTOMY  04/02/2019    ROBOTIC ASSISTED LAPAROSCOPIC SLEEVE GASTRECTOMY, LAPAROSCOPIC REDUCIBLE INCISIONAL HERNIA REPAIR     SLEEVE GASTRECTOMY N/A 4/2/2019    ROBOTIC ASSISTED LAPAROSCOPIC SLEEVE GASTRECTOMY, LAPAROSCOPIC REDUCIBLE INCISIONAL HERNIA REPAIR performed by Geovany Mukherjee DO at [address] N/A 2019    EGD BIOPSY [facility] ENDOS[facility] N/A 2019    EGD POLYP ABLATION/OTHER performed by Geovany Mukherjee DO at [facility] ENDOSCOPY     Family History   Problem Relation Age of Onset    High Blood Pressure Mother     Cancer Mother     Rheum Arthritis Mother     COPD Father     Cancer Father     Osteoarthritis Neg Hx     Asthma Neg Hx     Breast Cancer Neg Hx     Diabetes Neg Hx     Heart Failure Neg Hx     High Cholesterol Neg Hx     Hypertension Neg Hx     Migraines Neg Hx     Ovarian Cancer Neg Hx     Rashes/Skin Problems Neg Hx     Seizures Neg Hx     Stroke Neg Hx     Thyroid Disease Neg Hx      Social History     Tobacco Use    Smoking status: Former Smoker     Packs/day: 0.50     Years: 10.00     Pack years: 5.00     Last attempt to quit: 11/10/2013     Years since quittin.6    Smokeless tobacco: Never Used   Substance Use Topics    Alcohol use: Not Currently     Alcohol/week: 0.0 oz     Comment: occasionally     I counseled the patient on the importance of not smoking and risks of ETOH. Allergies   Allergen Reactions    Doxycycline Other (See Comments)     Yeast infection     Vitals:    19 0854   BP: 136/70   Pulse: 60   Weight: 228 lb 6.4 oz (103.6 kg)   Height: 5' 3\" (1.6 m)       Body mass index is 40.46 kg/m². Current Outpatient Medications:     ondansetron (ZOFRAN ODT) 4 MG disintegrating tablet, Take 1 tablet by mouth every 8 hours as needed for Nausea or Vomiting, Disp: 20 tablet, Rfl: 0    Insulin Degludec (TRESIBA FLEXTOUCH) 200 UNIT/ML SOPN, Inject 50 units into the skin twice daily.  (Patient taking differently: 52 Units 2 times daily Inject 50 units into the skin twice daily.), Disp: 172 mL, Rfl: 1    pantoprazole (PROTONIX) 40 MG tablet, TAKE 1 TABLET BY MOUTH DAILY, Disp: 30 tablet, Rfl: 2    Multiple Vitamins-Minerals (BARIATRIC FUSION PO), Take 4 tablets by mouth daily, Disp: , Rfl:     lisinopril (PRINIVIL;ZESTRIL) 20 MG tablet, Take 1 tablet by mouth daily, Disp: 30 tablet, Rfl: 2    carvedilol (COREG) 25 MG tablet, Take 1 tablet by mouth 2 times daily, Disp: 180 tablet, Rfl: 1    ONE TOUCH LANCETS MISC, 1 each by Does not apply route 3 times daily, Disp: 100 each, Rfl: 11    blood glucose test strips (ONE TOUCH ULTRA TEST) strip, 1 each by Does not apply route 3 times daily As needed. , Disp: 100 strip, Rfl: 11    furosemide (LASIX) 20 MG tablet, Take 1 tablet by mouth daily, Disp: 60 tablet, Rfl: 3    atorvastatin (LIPITOR) 80 MG tablet, Take 1 tablet by mouth daily, Disp: 90 tablet, Rfl: 1    Cholecalciferol (VITAMIN D3) 2000 units CAPS, Take by mouth daily, Disp: , Rfl:     acetaminophen (TYLENOL) 500 MG tablet, Take 1 tablet by mouth every 6 hours as needed for Pain, Disp: 120 tablet, Rfl:     B-D UF III MINI PEN NEEDLES 31G X 5 MM MISC, USE TWICE A DAY WITH INSULIN INJECTIONS, Disp: 100 each, Rfl: 3    aspirin 81 MG chewable tablet, Take 1 tablet by mouth daily. , Disp: 30 tablet, Rfl:     citalopram (CELEXA) 40 MG tablet, TAKE 1 TABLET BY MOUTH DAILY, Disp: 30 tablet, Rfl: 2    gabapentin (NEURONTIN) 300 MG capsule, Take 2 capsules by mouth 3 times daily for 120 days. ., Disp: 180 capsule, Rfl: 2      Review of Systems - History obtained from the patient  General ROS: negative  Psychological ROS: negative  Respiratory ROS: negative  Cardiovascular ROS: negative  Gastrointestinal ROS:negative  Genito-Urinary ROS: negative  Musculoskeletal ROS: negative   Skin ROS: negative    Physical Exam   Vitals Reviewed   Constitutional: Patient is oriented to person, place, and time. Patient appears well-developed and well-nourished. Patient is active and cooperative. Non-toxic appearance. No distress. Cardiovascular: Normal rate and no JVD.    Abdominal: Normal

## 2019-07-18 DIAGNOSIS — N18.30 CKD (CHRONIC KIDNEY DISEASE), STAGE III (HCC): ICD-10-CM

## 2019-07-18 LAB
ALBUMIN SERPL-MCNC: 4.3 G/DL (ref 3.4–5)
ANION GAP SERPL CALCULATED.3IONS-SCNC: 15 MMOL/L (ref 3–16)
BUN BLDV-MCNC: 24 MG/DL (ref 7–20)
CALCIUM SERPL-MCNC: 9.8 MG/DL (ref 8.3–10.6)
CHLORIDE BLD-SCNC: 98 MMOL/L (ref 99–110)
CO2: 25 MMOL/L (ref 21–32)
CREAT SERPL-MCNC: 1.4 MG/DL (ref 0.6–1.2)
GFR AFRICAN AMERICAN: 46
GFR NON-AFRICAN AMERICAN: 38
GLUCOSE BLD-MCNC: 137 MG/DL (ref 70–99)
PHOSPHORUS: 4.2 MG/DL (ref 2.5–4.9)
POTASSIUM SERPL-SCNC: 4.3 MMOL/L (ref 3.5–5.1)
SODIUM BLD-SCNC: 138 MMOL/L (ref 136–145)

## 2019-07-22 DIAGNOSIS — E11.42 DM TYPE 2 WITH DIABETIC PERIPHERAL NEUROPATHY (HCC): ICD-10-CM

## 2019-07-22 RX ORDER — LISINOPRIL 20 MG/1
20 TABLET ORAL DAILY
Qty: 90 TABLET | Refills: 1 | Status: SHIPPED | OUTPATIENT
Start: 2019-07-22 | End: 2020-01-13

## 2019-07-23 RX ORDER — GABAPENTIN 300 MG/1
600 CAPSULE ORAL 3 TIMES DAILY
Qty: 540 CAPSULE | Refills: 1 | Status: SHIPPED | OUTPATIENT
Start: 2019-07-23 | End: 2020-01-10

## 2019-07-24 RX ORDER — CITALOPRAM 40 MG/1
40 TABLET ORAL DAILY
Qty: 90 TABLET | Refills: 1 | Status: SHIPPED | OUTPATIENT
Start: 2019-07-24 | End: 2020-02-08

## 2019-08-19 RX ORDER — ONDANSETRON 4 MG/1
TABLET, ORALLY DISINTEGRATING ORAL
Qty: 20 TABLET | Refills: 0 | Status: SHIPPED | OUTPATIENT
Start: 2019-08-19 | End: 2019-09-27

## 2019-08-23 ENCOUNTER — OFFICE VISIT (OUTPATIENT)
Dept: INTERNAL MEDICINE CLINIC | Age: 64
End: 2019-08-23
Payer: MEDICARE

## 2019-08-23 VITALS
HEART RATE: 58 BPM | TEMPERATURE: 97.8 F | DIASTOLIC BLOOD PRESSURE: 86 MMHG | SYSTOLIC BLOOD PRESSURE: 166 MMHG | OXYGEN SATURATION: 98 % | RESPIRATION RATE: 20 BRPM

## 2019-08-23 DIAGNOSIS — Z12.39 BREAST CANCER SCREENING: ICD-10-CM

## 2019-08-23 DIAGNOSIS — G47.33 OSA (OBSTRUCTIVE SLEEP APNEA): ICD-10-CM

## 2019-08-23 DIAGNOSIS — N18.30 CKD (CHRONIC KIDNEY DISEASE) STAGE 3, GFR 30-59 ML/MIN (HCC): Chronic | ICD-10-CM

## 2019-08-23 DIAGNOSIS — K21.9 CHRONIC GERD: ICD-10-CM

## 2019-08-23 DIAGNOSIS — I10 ESSENTIAL HYPERTENSION: Chronic | ICD-10-CM

## 2019-08-23 DIAGNOSIS — E66.01 OBESITY, CLASS III, BMI 40-49.9 (MORBID OBESITY) (HCC): ICD-10-CM

## 2019-08-23 DIAGNOSIS — J20.9 ACUTE BRONCHITIS DUE TO INFECTION: ICD-10-CM

## 2019-08-23 DIAGNOSIS — E11.42 DM TYPE 2 WITH DIABETIC PERIPHERAL NEUROPATHY (HCC): Chronic | ICD-10-CM

## 2019-08-23 LAB — HBA1C MFR BLD: 6.5 %

## 2019-08-23 PROCEDURE — 99214 OFFICE O/P EST MOD 30 MIN: CPT | Performed by: FAMILY MEDICINE

## 2019-08-23 PROCEDURE — 4040F PNEUMOC VAC/ADMIN/RCVD: CPT | Performed by: FAMILY MEDICINE

## 2019-08-23 PROCEDURE — 1123F ACP DISCUSS/DSCN MKR DOCD: CPT | Performed by: FAMILY MEDICINE

## 2019-08-23 PROCEDURE — 3017F COLORECTAL CA SCREEN DOC REV: CPT | Performed by: FAMILY MEDICINE

## 2019-08-23 PROCEDURE — 1090F PRES/ABSN URINE INCON ASSESS: CPT | Performed by: FAMILY MEDICINE

## 2019-08-23 PROCEDURE — G8427 DOCREV CUR MEDS BY ELIG CLIN: HCPCS | Performed by: FAMILY MEDICINE

## 2019-08-23 PROCEDURE — 3044F HG A1C LEVEL LT 7.0%: CPT | Performed by: FAMILY MEDICINE

## 2019-08-23 PROCEDURE — 2022F DILAT RTA XM EVC RTNOPTHY: CPT | Performed by: FAMILY MEDICINE

## 2019-08-23 PROCEDURE — 1036F TOBACCO NON-USER: CPT | Performed by: FAMILY MEDICINE

## 2019-08-23 PROCEDURE — 83036 HEMOGLOBIN GLYCOSYLATED A1C: CPT | Performed by: FAMILY MEDICINE

## 2019-08-23 PROCEDURE — G8417 CALC BMI ABV UP PARAM F/U: HCPCS | Performed by: FAMILY MEDICINE

## 2019-08-23 PROCEDURE — G8400 PT W/DXA NO RESULTS DOC: HCPCS | Performed by: FAMILY MEDICINE

## 2019-08-23 RX ORDER — AZITHROMYCIN 250 MG/1
TABLET, FILM COATED ORAL
Qty: 1 PACKET | Refills: 0 | Status: SHIPPED | OUTPATIENT
Start: 2019-08-23 | End: 2019-09-02

## 2019-08-23 ASSESSMENT — ENCOUNTER SYMPTOMS
BLOOD IN STOOL: 0
ABDOMINAL PAIN: 0
CONSTIPATION: 0
DIARRHEA: 0
SHORTNESS OF BREATH: 0

## 2019-08-26 DIAGNOSIS — E11.42 DM TYPE 2 WITH DIABETIC PERIPHERAL NEUROPATHY (HCC): ICD-10-CM

## 2019-08-26 RX ORDER — PEN NEEDLE, DIABETIC 31 GX5/16"
NEEDLE, DISPOSABLE MISCELLANEOUS
Qty: 200 EACH | Refills: 1 | Status: SHIPPED | OUTPATIENT
Start: 2019-08-26 | End: 2020-02-05

## 2019-09-06 RX ORDER — ATORVASTATIN CALCIUM 80 MG/1
80 TABLET, FILM COATED ORAL DAILY
Qty: 90 TABLET | Refills: 1 | Status: SHIPPED | OUTPATIENT
Start: 2019-09-06 | End: 2020-03-06

## 2019-09-16 RX ORDER — CARVEDILOL 25 MG/1
25 TABLET ORAL 2 TIMES DAILY WITH MEALS
Qty: 180 TABLET | Refills: 1 | Status: SHIPPED | OUTPATIENT
Start: 2019-09-16 | End: 2020-02-06 | Stop reason: SDUPTHER

## 2019-09-27 ENCOUNTER — OFFICE VISIT (OUTPATIENT)
Dept: INTERNAL MEDICINE CLINIC | Age: 64
End: 2019-09-27
Payer: MEDICARE

## 2019-09-27 VITALS
TEMPERATURE: 98 F | HEART RATE: 70 BPM | OXYGEN SATURATION: 92 % | SYSTOLIC BLOOD PRESSURE: 96 MMHG | RESPIRATION RATE: 18 BRPM | WEIGHT: 210 LBS | DIASTOLIC BLOOD PRESSURE: 48 MMHG | BODY MASS INDEX: 37.2 KG/M2

## 2019-09-27 DIAGNOSIS — Z98.84 S/P LAPAROSCOPIC SLEEVE GASTRECTOMY: ICD-10-CM

## 2019-09-27 DIAGNOSIS — E66.01 MORBID OBESITY WITH BMI OF 45.0-49.9, ADULT (HCC): ICD-10-CM

## 2019-09-27 DIAGNOSIS — E11.42 DM TYPE 2 WITH DIABETIC PERIPHERAL NEUROPATHY (HCC): Chronic | ICD-10-CM

## 2019-09-27 DIAGNOSIS — I10 ESSENTIAL HYPERTENSION: Chronic | ICD-10-CM

## 2019-09-27 DIAGNOSIS — F32.9 REACTIVE DEPRESSION: ICD-10-CM

## 2019-09-27 DIAGNOSIS — I95.1 ORTHOSTATIC HYPOTENSION: Primary | ICD-10-CM

## 2019-09-27 DIAGNOSIS — N18.30 CKD (CHRONIC KIDNEY DISEASE) STAGE 3, GFR 30-59 ML/MIN (HCC): Chronic | ICD-10-CM

## 2019-09-27 PROCEDURE — 3017F COLORECTAL CA SCREEN DOC REV: CPT | Performed by: FAMILY MEDICINE

## 2019-09-27 PROCEDURE — 2022F DILAT RTA XM EVC RTNOPTHY: CPT | Performed by: FAMILY MEDICINE

## 2019-09-27 PROCEDURE — G8400 PT W/DXA NO RESULTS DOC: HCPCS | Performed by: FAMILY MEDICINE

## 2019-09-27 PROCEDURE — 4040F PNEUMOC VAC/ADMIN/RCVD: CPT | Performed by: FAMILY MEDICINE

## 2019-09-27 PROCEDURE — 1123F ACP DISCUSS/DSCN MKR DOCD: CPT | Performed by: FAMILY MEDICINE

## 2019-09-27 PROCEDURE — 1090F PRES/ABSN URINE INCON ASSESS: CPT | Performed by: FAMILY MEDICINE

## 2019-09-27 PROCEDURE — G8417 CALC BMI ABV UP PARAM F/U: HCPCS | Performed by: FAMILY MEDICINE

## 2019-09-27 PROCEDURE — G8427 DOCREV CUR MEDS BY ELIG CLIN: HCPCS | Performed by: FAMILY MEDICINE

## 2019-09-27 PROCEDURE — 1036F TOBACCO NON-USER: CPT | Performed by: FAMILY MEDICINE

## 2019-09-27 PROCEDURE — 3044F HG A1C LEVEL LT 7.0%: CPT | Performed by: FAMILY MEDICINE

## 2019-09-27 PROCEDURE — 99214 OFFICE O/P EST MOD 30 MIN: CPT | Performed by: FAMILY MEDICINE

## 2019-09-27 ASSESSMENT — ENCOUNTER SYMPTOMS
ABDOMINAL PAIN: 0
BLOOD IN STOOL: 0
SHORTNESS OF BREATH: 0
DIARRHEA: 0
CONSTIPATION: 0

## 2019-09-27 NOTE — PROGRESS NOTES
2019     Pacheco Hill (:  1955) is a 70 y.o. female, here for evaluation of the following medical concerns:    HPI   Patient was sick 2 to 3 days ago. He woke up one morning went to the bathroom and almost passed out. Nausea and vomiting and some kind of diarrhea blood pressure has been running low. She did check it at [facility] and it was 77 systolic. She has been drinking a lot of fluids lately but she is still taking her usual blood pressure medication including Lasix. Blood pressure at the office today is 96/48. She has no nausea vomiting or diarrhea but still weak and did not go for work. She is also on new product Victoza. Diabetes Mellitus Type 2: Current symptoms/problems include neuropathy. Medication compliance:  compliant most of the time  Diabetic diet compliance:  compliant all of the time,  Weight trend: decreasing  Current exercise: no regular exercise  Barriers: none    Home blood sugar records: patient does not test  Any episodes of hypoglycemia? no  Eye exam current (within one year): unknown   reports that she quit smoking about 5 years ago. She has a 5.00 pack-year smoking history. She has never used smokeless tobacco.   Daily Aspirin? Yes    Lab Results   Component Value Date    LABA1C 6.5 2019    LABA1C 7.5 2019    LABA1C 7.4 2018     Lab Results   Component Value Date    LABMICR 99.40 (H) 2019    CREATININE 1.4 (H) 2019     Lab Results   Component Value Date    ALT 17 2019    AST 17 2019     Lab Results   Component Value Date    CHOL 218 (H) 2018    TRIG 77 2018    HDL 64 (H) 2018    LDLCALC 139 (H) 2018        Hypertension:  Home blood pressure monitoring: No.  She is adherent to a low sodium diet. Patient denies chest pain and shortness of breath. Antihypertensive medication side effects: no medication side effects noted. Use of agents associated with hypertension: none.

## 2019-09-30 ENCOUNTER — HOSPITAL ENCOUNTER (EMERGENCY)
Age: 64
Discharge: HOME | End: 2019-09-30
Attending: EMERGENCY MEDICINE
Payer: MEDICARE

## 2019-09-30 VITALS
BODY MASS INDEX: 37.42 KG/M2 | RESPIRATION RATE: 15 BRPM | OXYGEN SATURATION: 99 % | HEIGHT: 63 IN | SYSTOLIC BLOOD PRESSURE: 179 MMHG | WEIGHT: 211.2 LBS | TEMPERATURE: 98.8 F | HEART RATE: 75 BPM | DIASTOLIC BLOOD PRESSURE: 65 MMHG

## 2019-09-30 DIAGNOSIS — R31.9 URINARY TRACT INFECTION WITH HEMATURIA, SITE UNSPECIFIED: ICD-10-CM

## 2019-09-30 DIAGNOSIS — I10 ESSENTIAL HYPERTENSION: ICD-10-CM

## 2019-09-30 DIAGNOSIS — N39.0 URINARY TRACT INFECTION WITH HEMATURIA, SITE UNSPECIFIED: ICD-10-CM

## 2019-09-30 DIAGNOSIS — D64.9 ANEMIA, UNSPECIFIED TYPE: ICD-10-CM

## 2019-09-30 DIAGNOSIS — R55 NEAR SYNCOPE: Primary | ICD-10-CM

## 2019-09-30 LAB
A/G RATIO: 1 (ref 1.1–2.2)
ALBUMIN SERPL-MCNC: 3.9 G/DL (ref 3.4–5)
ALP BLD-CCNC: 90 U/L (ref 40–129)
ALT SERPL-CCNC: 10 U/L (ref 10–40)
ANION GAP SERPL CALCULATED.3IONS-SCNC: 16 MMOL/L (ref 3–16)
AST SERPL-CCNC: 16 U/L (ref 15–37)
BACTERIA: ABNORMAL /HPF
BASOPHILS ABSOLUTE: 0.1 K/UL (ref 0–0.2)
BASOPHILS RELATIVE PERCENT: 0.9 %
BILIRUB SERPL-MCNC: 0.3 MG/DL (ref 0–1)
BILIRUBIN URINE: NEGATIVE
BLOOD, URINE: ABNORMAL
BUN BLDV-MCNC: 25 MG/DL (ref 7–20)
CALCIUM SERPL-MCNC: 9 MG/DL (ref 8.3–10.6)
CASTS: ABNORMAL /LPF
CHLORIDE BLD-SCNC: 101 MMOL/L (ref 99–110)
CLARITY: ABNORMAL
CO2: 24 MMOL/L (ref 21–32)
COLOR: YELLOW
CREAT SERPL-MCNC: 1.5 MG/DL (ref 0.6–1.2)
CRYSTALS, UA: ABNORMAL /HPF
EKG ATRIAL RATE: 71 BPM
EKG DIAGNOSIS: NORMAL
EKG P AXIS: 43 DEGREES
EKG P-R INTERVAL: 184 MS
EKG Q-T INTERVAL: 422 MS
EKG QRS DURATION: 70 MS
EKG QTC CALCULATION (BAZETT): 458 MS
EKG R AXIS: -15 DEGREES
EKG T AXIS: 27 DEGREES
EKG VENTRICULAR RATE: 71 BPM
EOSINOPHILS ABSOLUTE: 0.1 K/UL (ref 0–0.6)
EOSINOPHILS RELATIVE PERCENT: 1.2 %
EPITHELIAL CELLS, UA: >36 /HPF (ref 0–5)
GFR AFRICAN AMERICAN: 42
GFR NON-AFRICAN AMERICAN: 35
GLOBULIN: 4 G/DL
GLUCOSE BLD-MCNC: 118 MG/DL (ref 70–99)
GLUCOSE URINE: NEGATIVE MG/DL
HCT VFR BLD CALC: 32.7 % (ref 36–48)
HEMOGLOBIN: 10.9 G/DL (ref 12–16)
KETONES, URINE: NEGATIVE MG/DL
LEUKOCYTE ESTERASE, URINE: NEGATIVE
LYMPHOCYTES ABSOLUTE: 1.1 K/UL (ref 1–5.1)
LYMPHOCYTES RELATIVE PERCENT: 11.8 %
MCH RBC QN AUTO: 29.3 PG (ref 26–34)
MCHC RBC AUTO-ENTMCNC: 33.3 G/DL (ref 31–36)
MCV RBC AUTO: 88.2 FL (ref 80–100)
MICROSCOPIC EXAMINATION: YES
MONOCYTES ABSOLUTE: 0.9 K/UL (ref 0–1.3)
MONOCYTES RELATIVE PERCENT: 9.4 %
NEUTROPHILS ABSOLUTE: 7.4 K/UL (ref 1.7–7.7)
NEUTROPHILS RELATIVE PERCENT: 76.7 %
NITRITE, URINE: NEGATIVE
PDW BLD-RTO: 14.6 % (ref 12.4–15.4)
PH UA: 5.5 (ref 5–8)
PLATELET # BLD: 328 K/UL (ref 135–450)
PMV BLD AUTO: 9.2 FL (ref 5–10.5)
POTASSIUM SERPL-SCNC: 3.9 MMOL/L (ref 3.5–5.1)
PROTEIN UA: 100 MG/DL
RBC # BLD: 3.71 M/UL (ref 4–5.2)
RBC UA: ABNORMAL /HPF (ref 0–2)
SODIUM BLD-SCNC: 141 MMOL/L (ref 136–145)
SPECIFIC GRAVITY UA: >=1.03 (ref 1–1.03)
TOTAL PROTEIN: 7.9 G/DL (ref 6.4–8.2)
URINE REFLEX TO CULTURE: YES
URINE TYPE: ABNORMAL
UROBILINOGEN, URINE: 0.2 E.U./DL
WBC # BLD: 9.6 K/UL (ref 4–11)
WBC UA: 8 /HPF (ref 0–5)

## 2019-09-30 PROCEDURE — 93005 ELECTROCARDIOGRAM TRACING: CPT | Performed by: EMERGENCY MEDICINE

## 2019-09-30 PROCEDURE — 2580000003 HC RX 258: Performed by: EMERGENCY MEDICINE

## 2019-09-30 PROCEDURE — 85025 COMPLETE CBC W/AUTO DIFF WBC: CPT

## 2019-09-30 PROCEDURE — 80053 COMPREHEN METABOLIC PANEL: CPT

## 2019-09-30 PROCEDURE — 81001 URINALYSIS AUTO W/SCOPE: CPT

## 2019-09-30 PROCEDURE — 96360 HYDRATION IV INFUSION INIT: CPT

## 2019-09-30 PROCEDURE — 99284 EMERGENCY DEPT VISIT MOD MDM: CPT

## 2019-09-30 PROCEDURE — 93010 ELECTROCARDIOGRAM REPORT: CPT | Performed by: INTERNAL MEDICINE

## 2019-09-30 PROCEDURE — 87086 URINE CULTURE/COLONY COUNT: CPT

## 2019-09-30 RX ORDER — CEFUROXIME AXETIL 250 MG/1
250 TABLET ORAL 2 TIMES DAILY
Qty: 20 TABLET | Refills: 0 | Status: SHIPPED | OUTPATIENT
Start: 2019-09-30 | End: 2019-10-10

## 2019-09-30 RX ORDER — 0.9 % SODIUM CHLORIDE 0.9 %
1000 INTRAVENOUS SOLUTION INTRAVENOUS ONCE
Status: COMPLETED | OUTPATIENT
Start: 2019-09-30 | End: 2019-09-30

## 2019-09-30 RX ADMIN — SODIUM CHLORIDE 1000 ML: 9 INJECTION, SOLUTION INTRAVENOUS at 15:46

## 2019-10-01 LAB — URINE CULTURE, ROUTINE: NORMAL

## 2019-11-07 ENCOUNTER — HOSPITAL ENCOUNTER (OUTPATIENT)
Dept: WOUND CARE | Age: 64
Discharge: HOME | End: 2019-11-07
Payer: MEDICARE

## 2019-11-07 VITALS
TEMPERATURE: 97.1 F | SYSTOLIC BLOOD PRESSURE: 105 MMHG | RESPIRATION RATE: 16 BRPM | HEART RATE: 69 BPM | DIASTOLIC BLOOD PRESSURE: 56 MMHG

## 2019-11-07 PROCEDURE — 99213 OFFICE O/P EST LOW 20 MIN: CPT

## 2019-11-07 PROCEDURE — 97597 DBRDMT OPN WND 1ST 20 CM/<: CPT

## 2019-11-07 RX ORDER — LIDOCAINE 50 MG/G
OINTMENT TOPICAL PRN
Status: DISCONTINUED | OUTPATIENT
Start: 2019-11-07 | End: 2019-11-08 | Stop reason: HOSPADM

## 2019-11-07 ASSESSMENT — PAIN SCALES - GENERAL
PAINLEVEL_OUTOF10: 0
PAINLEVEL_OUTOF10: 0

## 2019-11-14 ENCOUNTER — HOSPITAL ENCOUNTER (OUTPATIENT)
Dept: WOUND CARE | Age: 64
Discharge: HOME | End: 2019-11-14
Payer: MEDICARE

## 2019-11-14 VITALS
SYSTOLIC BLOOD PRESSURE: 129 MMHG | RESPIRATION RATE: 16 BRPM | TEMPERATURE: 96.6 F | DIASTOLIC BLOOD PRESSURE: 68 MMHG | HEART RATE: 69 BPM

## 2019-11-14 DIAGNOSIS — L97.512 ULCER OF TOE OF RIGHT FOOT, WITH FAT LAYER EXPOSED (HCC): ICD-10-CM

## 2019-11-14 PROCEDURE — 97597 DBRDMT OPN WND 1ST 20 CM/<: CPT

## 2019-11-14 RX ORDER — LIDOCAINE 50 MG/G
OINTMENT TOPICAL PRN
Status: DISCONTINUED | OUTPATIENT
Start: 2019-11-14 | End: 2019-11-15 | Stop reason: HOSPADM

## 2019-11-14 ASSESSMENT — PAIN SCALES - GENERAL
PAINLEVEL_OUTOF10: 0
PAINLEVEL_OUTOF10: 0

## 2019-11-21 ENCOUNTER — HOSPITAL ENCOUNTER (OUTPATIENT)
Dept: WOUND CARE | Age: 64
Discharge: HOME | End: 2019-11-21
Payer: MEDICARE

## 2019-11-21 VITALS
SYSTOLIC BLOOD PRESSURE: 182 MMHG | HEART RATE: 66 BPM | TEMPERATURE: 97.7 F | DIASTOLIC BLOOD PRESSURE: 94 MMHG | RESPIRATION RATE: 16 BRPM

## 2019-11-21 DIAGNOSIS — L97.522 DIABETIC ULCER OF TOE OF LEFT FOOT ASSOCIATED WITH DIABETES MELLITUS DUE TO UNDERLYING CONDITION, WITH FAT LAYER EXPOSED (HCC): ICD-10-CM

## 2019-11-21 DIAGNOSIS — E08.621 DIABETIC ULCER OF TOE OF LEFT FOOT ASSOCIATED WITH DIABETES MELLITUS DUE TO UNDERLYING CONDITION, WITH FAT LAYER EXPOSED (HCC): ICD-10-CM

## 2019-11-21 PROCEDURE — 97597 DBRDMT OPN WND 1ST 20 CM/<: CPT

## 2019-11-21 RX ORDER — LIDOCAINE HYDROCHLORIDE 40 MG/ML
SOLUTION TOPICAL PRN
Status: DISCONTINUED | OUTPATIENT
Start: 2019-11-21 | End: 2019-11-22 | Stop reason: HOSPADM

## 2019-11-21 ASSESSMENT — PAIN SCALES - GENERAL: PAINLEVEL_OUTOF10: 0

## 2019-11-30 ENCOUNTER — HOSPITAL ENCOUNTER (OUTPATIENT)
Age: 64
Discharge: HOME | End: 2019-11-30
Payer: MEDICARE

## 2019-11-30 DIAGNOSIS — N18.30 CKD (CHRONIC KIDNEY DISEASE), STAGE III (HCC): ICD-10-CM

## 2019-11-30 LAB
ALBUMIN SERPL-MCNC: 3.6 G/DL (ref 3.4–5)
ANION GAP SERPL CALCULATED.3IONS-SCNC: 13 MMOL/L (ref 3–16)
BUN BLDV-MCNC: 28 MG/DL (ref 7–20)
CALCIUM SERPL-MCNC: 9 MG/DL (ref 8.3–10.6)
CHLORIDE BLD-SCNC: 104 MMOL/L (ref 99–110)
CO2: 25 MMOL/L (ref 21–32)
CREAT SERPL-MCNC: 1.6 MG/DL (ref 0.6–1.2)
GFR AFRICAN AMERICAN: 39
GFR NON-AFRICAN AMERICAN: 32
GLUCOSE BLD-MCNC: 61 MG/DL (ref 70–99)
PHOSPHORUS: 3.6 MG/DL (ref 2.5–4.9)
POTASSIUM SERPL-SCNC: 4 MMOL/L (ref 3.5–5.1)
SODIUM BLD-SCNC: 142 MMOL/L (ref 136–145)

## 2019-11-30 PROCEDURE — 36415 COLL VENOUS BLD VENIPUNCTURE: CPT

## 2019-11-30 PROCEDURE — 80069 RENAL FUNCTION PANEL: CPT

## 2019-12-05 ENCOUNTER — HOSPITAL ENCOUNTER (OUTPATIENT)
Dept: WOUND CARE | Age: 64
Discharge: HOME | End: 2019-12-05
Payer: MEDICARE

## 2019-12-12 ENCOUNTER — HOSPITAL ENCOUNTER (OUTPATIENT)
Dept: WOUND CARE | Age: 64
Discharge: HOME | End: 2019-12-12
Payer: MEDICARE

## 2019-12-12 VITALS
HEART RATE: 69 BPM | SYSTOLIC BLOOD PRESSURE: 111 MMHG | TEMPERATURE: 97.2 F | RESPIRATION RATE: 16 BRPM | DIASTOLIC BLOOD PRESSURE: 62 MMHG

## 2019-12-12 DIAGNOSIS — E08.621 DIABETIC ULCER OF TOE OF LEFT FOOT ASSOCIATED WITH DIABETES MELLITUS DUE TO UNDERLYING CONDITION, WITH FAT LAYER EXPOSED (HCC): Primary | ICD-10-CM

## 2019-12-12 DIAGNOSIS — L97.522 DIABETIC ULCER OF TOE OF LEFT FOOT ASSOCIATED WITH DIABETES MELLITUS DUE TO UNDERLYING CONDITION, WITH FAT LAYER EXPOSED (HCC): Primary | ICD-10-CM

## 2019-12-12 PROCEDURE — 97597 DBRDMT OPN WND 1ST 20 CM/<: CPT

## 2019-12-12 PROCEDURE — 6370000000 HC RX 637 (ALT 250 FOR IP): Performed by: PODIATRIST

## 2019-12-12 RX ORDER — GINSENG 100 MG
CAPSULE ORAL ONCE
Status: CANCELLED | OUTPATIENT
Start: 2019-12-12

## 2019-12-12 RX ORDER — BACITRACIN, NEOMYCIN, POLYMYXIN B 400; 3.5; 5 [USP'U]/G; MG/G; [USP'U]/G
OINTMENT TOPICAL ONCE
Status: CANCELLED | OUTPATIENT
Start: 2019-12-12

## 2019-12-12 RX ORDER — GENTAMICIN SULFATE 1 MG/G
OINTMENT TOPICAL ONCE
Status: CANCELLED | OUTPATIENT
Start: 2019-12-12

## 2019-12-12 RX ORDER — LIDOCAINE 50 MG/G
0.5 OINTMENT TOPICAL ONCE
Status: CANCELLED | OUTPATIENT
Start: 2019-12-12

## 2019-12-12 RX ORDER — BETAMETHASONE DIPROPIONATE 0.05 %
OINTMENT (GRAM) TOPICAL ONCE
Status: CANCELLED | OUTPATIENT
Start: 2019-12-12

## 2019-12-12 RX ORDER — FUROSEMIDE 20 MG/1
20 TABLET ORAL DAILY
COMMUNITY
Start: 2019-09-23 | End: 2020-02-06 | Stop reason: SDUPTHER

## 2019-12-12 RX ORDER — BACITRACIN ZINC AND POLYMYXIN B SULFATE 500; 1000 [USP'U]/G; [USP'U]/G
OINTMENT TOPICAL ONCE
Status: CANCELLED | OUTPATIENT
Start: 2019-12-12

## 2019-12-12 RX ORDER — LIDOCAINE HYDROCHLORIDE 40 MG/ML
5 SOLUTION TOPICAL ONCE
Status: CANCELLED | OUTPATIENT
Start: 2019-12-12

## 2019-12-12 RX ORDER — CLOBETASOL PROPIONATE 0.5 MG/G
OINTMENT TOPICAL ONCE
Status: CANCELLED | OUTPATIENT
Start: 2019-12-12

## 2019-12-12 RX ORDER — LIDOCAINE 40 MG/G
CREAM TOPICAL ONCE
Status: CANCELLED | OUTPATIENT
Start: 2019-12-12

## 2019-12-12 RX ORDER — LIDOCAINE HYDROCHLORIDE 40 MG/ML
5 SOLUTION TOPICAL ONCE
Status: COMPLETED | OUTPATIENT
Start: 2019-12-12 | End: 2019-12-12

## 2019-12-12 RX ADMIN — LIDOCAINE HYDROCHLORIDE 2.5 ML: 40 SOLUTION TOPICAL at 11:29

## 2019-12-12 ASSESSMENT — PAIN SCALES - GENERAL
PAINLEVEL_OUTOF10: 0
PAINLEVEL_OUTOF10: 0

## 2019-12-17 RX ORDER — ONDANSETRON 4 MG/1
TABLET, ORALLY DISINTEGRATING ORAL
Qty: 20 TABLET | Refills: 1 | Status: SHIPPED | OUTPATIENT
Start: 2019-12-17 | End: 2020-02-25

## 2019-12-19 ENCOUNTER — HOSPITAL ENCOUNTER (OUTPATIENT)
Dept: WOUND CARE | Age: 64
Discharge: HOME | End: 2019-12-19
Payer: MEDICARE

## 2019-12-19 VITALS
DIASTOLIC BLOOD PRESSURE: 68 MMHG | RESPIRATION RATE: 16 BRPM | SYSTOLIC BLOOD PRESSURE: 111 MMHG | HEART RATE: 72 BPM | TEMPERATURE: 97.9 F

## 2019-12-19 DIAGNOSIS — L97.522 DIABETIC ULCER OF TOE OF LEFT FOOT ASSOCIATED WITH DIABETES MELLITUS DUE TO UNDERLYING CONDITION, WITH FAT LAYER EXPOSED (HCC): Primary | ICD-10-CM

## 2019-12-19 DIAGNOSIS — E08.621 DIABETIC ULCER OF TOE OF LEFT FOOT ASSOCIATED WITH DIABETES MELLITUS DUE TO UNDERLYING CONDITION, WITH FAT LAYER EXPOSED (HCC): Primary | ICD-10-CM

## 2019-12-19 PROCEDURE — 6370000000 HC RX 637 (ALT 250 FOR IP): Performed by: PODIATRIST

## 2019-12-19 PROCEDURE — 97597 DBRDMT OPN WND 1ST 20 CM/<: CPT

## 2019-12-19 RX ORDER — LIDOCAINE 40 MG/G
CREAM TOPICAL ONCE
Status: CANCELLED | OUTPATIENT
Start: 2019-12-19

## 2019-12-19 RX ORDER — GENTAMICIN SULFATE 1 MG/G
OINTMENT TOPICAL ONCE
Status: CANCELLED | OUTPATIENT
Start: 2019-12-19

## 2019-12-19 RX ORDER — GINSENG 100 MG
CAPSULE ORAL ONCE
Status: CANCELLED | OUTPATIENT
Start: 2019-12-19

## 2019-12-19 RX ORDER — BETAMETHASONE DIPROPIONATE 0.05 %
OINTMENT (GRAM) TOPICAL ONCE
Status: CANCELLED | OUTPATIENT
Start: 2019-12-19

## 2019-12-19 RX ORDER — LIDOCAINE HYDROCHLORIDE 40 MG/ML
5 SOLUTION TOPICAL ONCE
Status: COMPLETED | OUTPATIENT
Start: 2019-12-19 | End: 2019-12-19

## 2019-12-19 RX ORDER — BACITRACIN ZINC AND POLYMYXIN B SULFATE 500; 1000 [USP'U]/G; [USP'U]/G
OINTMENT TOPICAL ONCE
Status: CANCELLED | OUTPATIENT
Start: 2019-12-19

## 2019-12-19 RX ORDER — BACITRACIN, NEOMYCIN, POLYMYXIN B 400; 3.5; 5 [USP'U]/G; MG/G; [USP'U]/G
OINTMENT TOPICAL ONCE
Status: CANCELLED | OUTPATIENT
Start: 2019-12-19

## 2019-12-19 RX ORDER — LIDOCAINE HYDROCHLORIDE 40 MG/ML
5 SOLUTION TOPICAL ONCE
Status: CANCELLED | OUTPATIENT
Start: 2019-12-19

## 2019-12-19 RX ORDER — CLOBETASOL PROPIONATE 0.5 MG/G
OINTMENT TOPICAL ONCE
Status: CANCELLED | OUTPATIENT
Start: 2019-12-19

## 2019-12-19 RX ORDER — LIDOCAINE 50 MG/G
0.5 OINTMENT TOPICAL ONCE
Status: CANCELLED | OUTPATIENT
Start: 2019-12-19

## 2019-12-19 RX ADMIN — LIDOCAINE HYDROCHLORIDE 2.5 ML: 40 SOLUTION TOPICAL at 10:58

## 2019-12-19 ASSESSMENT — PAIN SCALES - GENERAL
PAINLEVEL_OUTOF10: 0
PAINLEVEL_OUTOF10: 0

## 2020-01-02 ENCOUNTER — HOSPITAL ENCOUNTER (OUTPATIENT)
Dept: WOUND CARE | Age: 65
Discharge: HOME | End: 2020-01-02
Payer: MEDICARE

## 2020-01-02 VITALS
SYSTOLIC BLOOD PRESSURE: 158 MMHG | RESPIRATION RATE: 16 BRPM | TEMPERATURE: 97.4 F | HEART RATE: 62 BPM | DIASTOLIC BLOOD PRESSURE: 80 MMHG

## 2020-01-02 PROCEDURE — 6370000000 HC RX 637 (ALT 250 FOR IP): Performed by: PODIATRIST

## 2020-01-02 PROCEDURE — 97597 DBRDMT OPN WND 1ST 20 CM/<: CPT

## 2020-01-02 RX ORDER — LIDOCAINE HYDROCHLORIDE 40 MG/ML
5 SOLUTION TOPICAL ONCE
Status: CANCELLED | OUTPATIENT
Start: 2020-01-02

## 2020-01-02 RX ORDER — BACITRACIN ZINC AND POLYMYXIN B SULFATE 500; 1000 [USP'U]/G; [USP'U]/G
OINTMENT TOPICAL ONCE
Status: CANCELLED | OUTPATIENT
Start: 2020-01-02

## 2020-01-02 RX ORDER — LIDOCAINE 40 MG/G
CREAM TOPICAL ONCE
Status: CANCELLED | OUTPATIENT
Start: 2020-01-02

## 2020-01-02 RX ORDER — LIDOCAINE 50 MG/G
0.5 OINTMENT TOPICAL ONCE
Status: CANCELLED | OUTPATIENT
Start: 2020-01-02

## 2020-01-02 RX ORDER — LIDOCAINE HYDROCHLORIDE 40 MG/ML
5 SOLUTION TOPICAL ONCE
Status: COMPLETED | OUTPATIENT
Start: 2020-01-02 | End: 2020-01-02

## 2020-01-02 RX ORDER — BETAMETHASONE DIPROPIONATE 0.05 %
OINTMENT (GRAM) TOPICAL ONCE
Status: CANCELLED | OUTPATIENT
Start: 2020-01-02

## 2020-01-02 RX ORDER — BACITRACIN, NEOMYCIN, POLYMYXIN B 400; 3.5; 5 [USP'U]/G; MG/G; [USP'U]/G
OINTMENT TOPICAL ONCE
Status: CANCELLED | OUTPATIENT
Start: 2020-01-02

## 2020-01-02 RX ORDER — GENTAMICIN SULFATE 1 MG/G
OINTMENT TOPICAL ONCE
Status: CANCELLED | OUTPATIENT
Start: 2020-01-02

## 2020-01-02 RX ORDER — GINSENG 100 MG
CAPSULE ORAL ONCE
Status: CANCELLED | OUTPATIENT
Start: 2020-01-02

## 2020-01-02 RX ORDER — CLOBETASOL PROPIONATE 0.5 MG/G
OINTMENT TOPICAL ONCE
Status: CANCELLED | OUTPATIENT
Start: 2020-01-02

## 2020-01-02 RX ADMIN — LIDOCAINE HYDROCHLORIDE 2.5 ML: 40 SOLUTION TOPICAL at 11:10

## 2020-01-02 ASSESSMENT — PAIN SCALES - GENERAL
PAINLEVEL_OUTOF10: 0
PAINLEVEL_OUTOF10: 0

## 2020-01-02 NOTE — PROGRESS NOTES
cardiomyopathy 4/15/2015    Kidney disease     Meniere's disease     Mixed hyperlipidemia 3/7/2016    Nicotine abuse     NSTEMI (non-ST elevated myocardial infarction) [facility] 3/13/2014    Osteomyelitis of ankle or foot, acute, left ([facility] 6/4/2018    Pneumonia     Spinal abscess ([facility] 3/2014    epidural abscess    Spinal epidural abscess 3/13/2014    Type II diabetes mellitus, uncontrolled ([facility] 08/13/2014       PAST SURGICAL HISTORY    Past Surgical History:   Procedure Laterality Date    BACK SURGERY  3/2014    DEBRIDEMENT  3/12/14    extensive debridement of bone/muscle and paraspinal empyema    HYSTERECTOMY  6/15/1999    complete-think right ovary in.    NASAL SEPTUM SURGERY      SLEEVE GASTRECTOMY  04/02/2019    ROBOTIC ASSISTED LAPAROSCOPIC SLEEVE GASTRECTOMY, LAPAROSCOPIC REDUCIBLE INCISIONAL HERNIA REPAIR     SLEEVE GASTRECTOMY N/A 4/2/2019    ROBOTIC ASSISTED LAPAROSCOPIC SLEEVE GASTRECTOMY, LAPAROSCOPIC REDUCIBLE INCISIONAL HERNIA REPAIR performed by Nabeel Michel DO at [address] [facility] N/A 2/8/2019    EGD BIOPSY performed by Nabeel Michel DO at [address] [facility] N/A 2/8/2019    EGD POLYP ABLATION/OTHER performed by [facility]abeel Michel DO at [facility] ENDOSCOPY       FAMILY HISTORY    Family History   Problem Relation Age of Onset    High Blood Pressure Mother     Cancer Mother     Rheum Arthritis Mother     COPD Father     Cancer Father     Osteoarthritis Neg Hx     Asthma Neg Hx     Breast Cancer Neg Hx     Diabetes Neg Hx     Heart Failure Neg Hx     High Cholesterol Neg Hx     Hypertension Neg Hx     Migraines Neg Hx     Ovarian Cancer Neg Hx     Rashes/Skin Problems Neg Hx     Seizures Neg Hx     Stroke Neg Hx     Thyroid Disease Neg Hx        SOCIAL HISTORY    Social History     Tobacco Use    Smoking status: Former Smoker     Packs/day: 0.50     Years: 10.00     Pack years: 5.00     Last attempt to quit: 11/10/2013 TOUCH LANCETS MISC 1 each by Does not apply route 3 times daily 100 each 11    blood glucose test strips (ONE TOUCH ULTRA TEST) strip 1 each by Does not apply route 3 times daily As needed. 100 strip 11     No current facility-administered medications on file prior to encounter. REVIEW OF SYSTEMS    Pertinent items are noted in HPI. Review of Systems: A 12 point review of symptoms is unremarkable with the exception of the chief complaint. Patient specifically denies nausea, fever, vomiting, chills, shortness of breath, chest pain, abdominal pain, constipation or difficulty urinating. Objective:      BP (!) 158/80   Pulse 62   Temp 97.4 °F (36.3 °C) (Oral)   Resp 16   LMP 06/15/1999     Wt Readings from Last 3 Encounters:   12/03/19 214 lb 12.8 oz (97.4 kg)   09/30/19 211 lb 3.2 oz (95.8 kg)   09/27/19 210 lb (95.3 kg)       PHYSICAL EXAM    General Appearance: alert and oriented to person, place and time, well-developed and well-nourished, in no acute distress  Skin: warm and dry, no rash or erythema. Wound noted on the plantar aspect of the bilateral great toes. There are minimal hyperkeratotic rims surrounding the wounds. Wound has fibrotic and nonviable tissue that extends down through and includes the subcutaneous tissue. After debridement the wound has a granular base. There is no surrounding erythema, edema, warmth or malodor noted. The wound does not probe or track to bone. There is a superficial puncture/abriasion wound noted sub 2nd MPJ on the left foot. There was no retained FB noted and there was not erythema, warmth, edema or drainage noted. DP/PT pulses palpable bilaterally. CFT brisk to all digits. Digits are pink and warm to the touch. Hair growth is normal in appearance. No edema noted. No calf pain with palpation noted. Epicritic sensation is grossly absent bilaterally. Negative clonus and babinski reflex is down going.   Patient has had a left partial 3rd toe 1/2/2020 11:00 AM   Post-Procedure Length (cm) 0.9 cm 1/2/2020 11:29 AM   Post-Procedure Width (cm) 0.8 cm 1/2/2020 11:29 AM   Post-Procedure Depth (cm) 0.3 cm 1/2/2020 11:29 AM   Post-Procedure Surface Area (cm^2) 0.72 cm^2 1/2/2020 11:29 AM   Post-Procedure Volume (cm^3) 0.22 cm^3 1/2/2020 11:29 AM   Undermining Starts ___ O'Clock 6 1/2/2020 11:00 AM   Undermining Ends___ O'Clock 11 1/2/2020 11:00 AM   Undermining Maxium Distance (cm) 0.3 1/2/2020 11:00 AM   Wound Assessment Granulation tissue;Slough 1/2/2020 11:00 AM   Drainage Amount Scant 1/2/2020 11:00 AM   Drainage Description Serosanguinous;Green 1/2/2020 11:00 AM   Odor Mild 1/2/2020 11:00 AM   Margins Unattached edges 1/2/2020 11:00 AM   Nicole-wound Assessment Calloused;Dry 1/2/2020 11:00 AM   Non-staged Wound Description Full thickness 1/2/2020 11:00 AM   [place]%Wound Bed 90 1/2/2020 11:00 AM   Red%Wound Bed 5 1/2/2020 11:00 AM   Yellow%Wound Bed 5 1/2/2020 11:00 AM   Number of days: 56          Percent of Wound(s)/Ulcer(s) Debrided: 100%    Total Surface Area Debrided:  1.14 sq cm       Diabetic/Pressure/Non Pressure Ulcers only:  Ulcer: Diabetic ulcer, fat layer exposed      Estimated Blood Loss:  Minimal    Hemostasis Achieved:  by pressure    Response to treatment:  Well tolerated by patient. Plan:   E/M x 30 minutes with more than half of the time spent with face to face treatment and counseling. Offload the wound with surgical shoe as there was hyperkeratotic tissue surrounding the wound. Discussed with patient at length that if she is not adherent to offloading recommendations the wounds are not likely to heal and she is at risk for further amputation. Patient will be preauthorized for a bioengineered skin substitute to accelerate healing in this high rksk DM foot ulceration as patient has already had an amputation of a digit due to OM from a nonhealing wound.   Blood sugars are well controlled, wounds are offloaded, and there is no underlying infection and wounds have failed to heal in greater than 6 weeks. Pt education per provider related to plan of care, pt is agreeable to plan and questions answered. Treatment Note please see attached Discharge Instructions    Written patient dismissal instructions given to patient and signed by patient or POA. RTC 1 week       Discharge Instructions       [address] and Hyperbaric Oxygen Therapy   Physician Orders and Discharge [facility]  [address]   [address], [facility]  Telephone: [phone]      FAX [phone]     NAME: Tarik Madrigal  DATE OF BIRTH:  1955  MEDICAL RECORD NUMBER:  [id number]  DATE:  1/2/2020     Wound Cleansing:   Do not scrub or use excessive force.   Cleanse wound prior to applying a clean dressing with:  [x]????? Normal Saline        [x]????? Keep Wound Dry in Shower    []????? Wound Cleanser   []????? Cleanse wound with Mild Soap & Water  []????? May Shower at Discharge   []????? Other:        Topical Treatments:  Do not apply lotions, creams, or ointments to wound bed unless directed.   []????? Apply moisturizing lotion to skin surrounding the wound prior to dressing change.  []????? Apply antifungal ointment to skin surrounding the wound prior to dressing change.  []????? Apply thin film of moisture barrier ointment to skin immediately around wound.  []????? Other:       Dressings:                  Wound Location : RIGHT AND LEFT GREAT TOE WOUNDS       [x]????? Apply Primary Dressing:                                          [x]????? Collagen with Silver                         [x]????? Moisten with Saline                                       []????? Other:       [x]????? Cover and Secure with:                   [x]????? Gauze         [x]????? Jackelyn           []????? Kerlix              []????? Ace Wrap   []????? Cover Roll Tape     []????? ABD                                      []????? Other:               Avoid contact of tape with skin. [x]????? Change dressing:   []????? Daily           []????? Every Other Day    [x]????? Three times per week              []????? Once a week          []????? Do Not Change Dressing     []????? Other:        Edema Control:  Apply:  []????? Compression Stocking       []??? ? ? Right Leg     []??? ? ? Left Leg              []????? Tubigrip      []??? ? ? Right Leg Double Layer      []??? ? ? Left Leg Double Layer                                                  []??? ? ? Right Leg Single Layer       []??? ? ? Left Leg Single Layer              []????? SpandaGrip            []??? ? ? Right Leg     []??? ? ? Left Leg                                      []??? ? ?Low compression 5-10 mm/Hg                                             []??? ? ? Medium compression 10-20 mm/Hg                                      []??? ? ? High compression  20-30 mm/Hg              every morning immediately when getting up should be applied to affected leg(s) from mid foot to knee making sure to cover the heel.  Remove every night before going to bed.              []????? Elevate leg(s) above the level of the heart when sitting.                 []????? Avoid prolonged standing in one place.        Compression:  Apply:  []????? Multilayer Compression Wrap Applied in Clinic    []??? ? ? RightLeg      []??? ? ? Left Leg              []????? Multi-layer compression.  Do not get leg(s) with wrap wet.  If wraps become too tight call the center or completely remove the wrap.                      []????? Elevate leg(s) above the level of the heart when sitting.                 []????? Avoid prolonged standing in one place.     Off-Loading:   []????? Off-loading when    []????? walking       []????? in bed         []????? sitting  []????? Total non-weight bearing  []????? Right Leg  []????? Left Leg          [x]????? Assistive Device     []????? Walker        []????? Cane           []????? Wheelchair  []????? Crutches              [x]????? Surgical shoe TO BOTH FEET - WEAR WHENEVER YOUR FEET ARE ON THE FLOOR   []????? Podus Boot(s)   []????? Foam Boot(s)  []????? Roll About              []????? Cast Boot   []????? CROW Boot  []????? Other:     Contact Cast:  Apply:  []????? Total Contact Cast Applied in Clinic          []??? ? ? RightLeg      []??? ? ? Left Leg              []????? Do not get cast wet.  Contact center or go to emergency room if there is a foul odor or becomes uncomfortable due to feeling tight or swelling.  Do not use objects inside of cast to scratch.          Dietary:  []????? Diet as tolerated:    [x]????? Calorie Diabetic Diet:         []????? No Added Salt:  []????? Increase Protein:    []????? Other:     Activity:  [x]????? Activity as tolerated:  []????? Patient has no activity restrictions     []????? Strict Bedrest: []????? Remain off Work:     []????? May return to full duty work:                                    []????? Return to work with [address]     If you are still having pain after you go home:  [x]????? Elevate the affected limb.    [x]????? Use over-the-counter medications you would normally use for pain as permitted by your family doctor.   [x]????? For persistent pain not relieved by the above interventions, please call your family doctor.             Return Appointment:  []????? Wound and dressing supply provider:   []????? ECF or Home Healthcare:  []????? Wound Assessment:          []????? Physician or NP scheduled for Wound Assessment:   [x]????? Return Appointment: With DR LOMAX  in 1 Week(s)  []????? Ordered tests:      Nurse Inder Childs: Arne Simmonds     Electronically signed by Jorje Angulo RN on 1/2/2020 at 11:28 [address] Information: Should you experience any significant changes in your wound(s) or have questions about your wound care, please contact the [facility] [address] [facility] [place] [phone] [address] 8:00 am - 4:30 pm and Friday 8:00 am - 12:30 pm.  If you need help with your wound outside these hours and cannot wait until we are again available, contact your PCP or go to the hospital emergency room.      PLEASE NOTE: IF YOU ARE UNABLE TO [address], CONTINUE TO USE THE SUPPLIES YOU HAVE AVAILABLE UNTIL YOU ARE ABLE TO [facility].  IT IS MOST IMPORTANT TO KEEP THE WOUND COVERED AT ALL TIMES.     Physician Signature:_______________________     Date: ___________ Time:  ____________                                Dr Maggie Macdonald        Electronically signed by Odilia Vila DPM on 1/2/2020 at 3:38 PM

## 2020-01-09 ENCOUNTER — HOSPITAL ENCOUNTER (OUTPATIENT)
Dept: WOUND CARE | Age: 65
Discharge: HOME | End: 2020-01-09
Payer: MEDICARE

## 2020-01-09 VITALS
SYSTOLIC BLOOD PRESSURE: 137 MMHG | RESPIRATION RATE: 16 BRPM | DIASTOLIC BLOOD PRESSURE: 79 MMHG | HEART RATE: 65 BPM | TEMPERATURE: 97.6 F

## 2020-01-09 PROCEDURE — 11042 DBRDMT SUBQ TIS 1ST 20SQCM/<: CPT

## 2020-01-09 PROCEDURE — 6370000000 HC RX 637 (ALT 250 FOR IP): Performed by: PODIATRIST

## 2020-01-09 PROCEDURE — 11042 DBRDMT SUBQ TIS 1ST 20SQCM/<: CPT | Performed by: NURSE PRACTITIONER

## 2020-01-09 RX ORDER — LIDOCAINE 50 MG/G
0.5 OINTMENT TOPICAL ONCE
Status: CANCELLED | OUTPATIENT
Start: 2020-01-09

## 2020-01-09 RX ORDER — BACITRACIN ZINC AND POLYMYXIN B SULFATE 500; 1000 [USP'U]/G; [USP'U]/G
OINTMENT TOPICAL ONCE
Status: CANCELLED | OUTPATIENT
Start: 2020-01-09

## 2020-01-09 RX ORDER — BETAMETHASONE DIPROPIONATE 0.05 %
OINTMENT (GRAM) TOPICAL ONCE
Status: CANCELLED | OUTPATIENT
Start: 2020-01-09

## 2020-01-09 RX ORDER — BACITRACIN, NEOMYCIN, POLYMYXIN B 400; 3.5; 5 [USP'U]/G; MG/G; [USP'U]/G
OINTMENT TOPICAL ONCE
Status: CANCELLED | OUTPATIENT
Start: 2020-01-09

## 2020-01-09 RX ORDER — GINSENG 100 MG
CAPSULE ORAL ONCE
Status: CANCELLED | OUTPATIENT
Start: 2020-01-09

## 2020-01-09 RX ORDER — LIDOCAINE HYDROCHLORIDE 40 MG/ML
5 SOLUTION TOPICAL ONCE
Status: CANCELLED | OUTPATIENT
Start: 2020-01-09

## 2020-01-09 RX ORDER — GENTAMICIN SULFATE 1 MG/G
OINTMENT TOPICAL ONCE
Status: CANCELLED | OUTPATIENT
Start: 2020-01-09

## 2020-01-09 RX ORDER — LIDOCAINE HYDROCHLORIDE 40 MG/ML
5 SOLUTION TOPICAL ONCE
Status: COMPLETED | OUTPATIENT
Start: 2020-01-09 | End: 2020-01-09

## 2020-01-09 RX ORDER — LIDOCAINE 40 MG/G
CREAM TOPICAL ONCE
Status: CANCELLED | OUTPATIENT
Start: 2020-01-09

## 2020-01-09 RX ORDER — CLOBETASOL PROPIONATE 0.5 MG/G
OINTMENT TOPICAL ONCE
Status: CANCELLED | OUTPATIENT
Start: 2020-01-09

## 2020-01-09 RX ADMIN — LIDOCAINE HYDROCHLORIDE 2.5 ML: 40 SOLUTION TOPICAL at 11:12

## 2020-01-09 ASSESSMENT — PAIN SCALES - GENERAL
PAINLEVEL_OUTOF10: 0
PAINLEVEL_OUTOF10: 0

## 2020-01-10 PROBLEM — L97.522 ULCER OF TOE OF LEFT FOOT, WITH FAT LAYER EXPOSED (HCC): Status: ACTIVE | Noted: 2020-01-10

## 2020-01-10 RX ORDER — GABAPENTIN 300 MG/1
CAPSULE ORAL
Qty: 180 CAPSULE | Refills: 0 | Status: SHIPPED | OUTPATIENT
Start: 2020-01-10 | End: 2020-04-17

## 2020-01-10 NOTE — TELEPHONE ENCOUNTER
Patient requesting a medication refill. Medication: gabapentin (NEURONTIN) 300 MG capsule       [facility]  Andres Wick [phone] Christin Beal [phone]  Last office visit: 7-27-19  Next visit: 1-16-19

## 2020-01-10 NOTE — PROGRESS NOTES
acute, left ([facility] 2018    Pneumonia     Spinal abscess ([facility] 3/2014    epidural abscess    Spinal epidural abscess 3/13/2014    Type II diabetes mellitus, uncontrolled ([facility] 2014       PAST SURGICAL HISTORY    Past Surgical History:   Procedure Laterality Date    BACK SURGERY  3/2014    DEBRIDEMENT  3/12/14    extensive debridement of bone/muscle and paraspinal empyema    HYSTERECTOMY  6/15/1999    complete-think right ovary in.    NASAL SEPTUM SURGERY      SLEEVE GASTRECTOMY  2019    ROBOTIC ASSISTED LAPAROSCOPIC SLEEVE GASTRECTOMY, LAPAROSCOPIC REDUCIBLE INCISIONAL HERNIA REPAIR     SLEEVE GASTRECTOMY N/A 2019    ROBOTIC ASSISTED LAPAROSCOPIC SLEEVE GASTRECTOMY, LAPAROSCOPIC REDUCIBLE INCISIONAL HERNIA REPAIR performed by Atul Kern DO at [facility] N/A 2019    EGD BIOPSY performed by Atul Kern DO at [facility] N/A 2019    EGD POLYP ABLATION/OTHER performed by Atul Kern DO at [facility] ENDOSCOPY       FAMILY HISTORY    Family History   Problem Relation Age of Onset    High Blood Pressure Mother     Cancer Mother     Rheum Arthritis Mother     COPD Father     Cancer Father     Osteoarthritis Neg Hx     Asthma Neg Hx     Breast Cancer Neg Hx     Diabetes Neg Hx     Heart Failure Neg Hx     High Cholesterol Neg Hx     Hypertension Neg Hx     Migraines Neg Hx     Ovarian Cancer Neg Hx     Rashes/Skin Problems Neg Hx     Seizures Neg Hx     Stroke Neg Hx     Thyroid Disease Neg Hx        SOCIAL HISTORY    Social History     Tobacco Use    Smoking status: Former Smoker     Packs/day: 0.50     Years: 10.00     Pack years: 5.00     Last attempt to quit: 11/10/2013     Years since quittin.1    Smokeless tobacco: Never Used   Substance Use Topics    Alcohol use: Not Currently     Alcohol/week: 0.0 standard drinks     Comment: occasionally    Drug use: No       ALLERGIES    Allergies facility-administered medications on file prior to encounter. REVIEW OF SYSTEMS    Pertinent items are noted in HPI. Objective:      /79   Pulse 65   Temp 97.6 °F (36.4 °C) (Oral)   Resp 16   LMP 06/15/1999     Wt Readings from Last 3 Encounters:   12/03/19 214 lb 12.8 oz (97.4 kg)   09/30/19 211 lb 3.2 oz (95.8 kg)   09/27/19 210 lb (95.3 kg)       PHYSICAL EXAM    General Appearance: alert and oriented to person, place and time, well-developed and well-nourished, in no acute distress  Skin: warm and dry, no rash or erythema  Head: normocephalic and atraumatic  Eyes: pupils equal, round, and reactive to light  Pulmonary/Chest:  normal air movement, no respiratory distress  Cardiovascular: normal rate, regular rhythm and intact distal pulses  Extremities: no cyanosis and no clubbing  Wounds: Wounds noted on the plantar aspect of the bilateral great toes. There are minimal hyperkeratotic rims surrounding the wounds. Wound has fibrotic and nonviable tissue that extends down through and includes the subcutaneous tissue. After debridement the wound has a granular base. There is no surrounding erythema, edema, warmth or malodor noted. The wound does not probe or track to bone. Assessment:        Problem List Items Addressed This Visit     Diabetic ulcer of toe of left foot associated with diabetes mellitus due to underlying condition, with fat layer exposed ([address] - Primary (Chronic)    Ulcer of toe of right foot, with fat layer exposed ([address]    Ulcer of toe of left foot, with fat layer exposed ([address]           Procedure Note  Indications:  Based on my examination of this patient's wound(s)/ulcer(s) today, debridement is required to promote healing and evaluate the wound base.     Performed by: Lorenso Councilman, APRN - CNP    Consent obtained:  Yes    Time out taken:  Yes    Pain Control: Anesthetic  Anesthetic: 4% Lidocaine Liquid Topical(2.5ml)       Debridement: Excisional Debridement    Using curette and # 10 blade scalpel the wound(s)/ulcer(s) was/were sharply debrided down through and including the removal of epidermis, dermis and subcutaneous tissue.         Devitalized Tissue Debrided:  fibrin, biofilm, slough and callus    Pre Debridement Measurements:  Are located in the [place]  Documentation Flow Sheet    Wound/Ulcer #: 1 and 2    Post Debridement Measurements:  Wound/Ulcer Descriptions are Pre Debridement except measurements:    Wound 11/07/19 Toe (Comment  which one) Right;Plantar Initial wound #1: Great toe  (Active)   Wound Image   1/2/2020 11:00 AM   Wound Diabetic Love 2 11/7/2019 10:36 AM   Offloading for Diabetic Foot Ulcers Post op shoe 1/9/2020 11:53 AM   Dressing Status Old drainage 1/9/2020 11:12 AM   Dressing Changed Changed/New 1/9/2020 12:15 PM   Dressing/Treatment Collagen with Ag;4x4;Other (comment) 1/9/2020 12:15 PM   Wound Cleansed Rinsed/Irrigated with saline 11/21/2019 10:41 AM   Wound Length (cm) 0.6 cm 1/9/2020 11:12 AM   Wound Width (cm) 0.5 cm 1/9/2020 11:12 AM   Wound Depth (cm) 0.3 cm 1/9/2020 11:12 AM   Wound Surface Area (cm^2) 0.3 cm^2 1/9/2020 11:12 AM   Change in Wound Size % (l*w) 46.43 1/9/2020 11:12 AM   Wound Volume (cm^3) 0.09 cm^3 1/9/2020 11:12 AM   Wound Healing % 18 1/9/2020 11:12 AM   Post-Procedure Length (cm) 0.7 cm 1/9/2020 11:53 AM   Post-Procedure Width (cm) 0.6 cm 1/9/2020 11:53 AM   Post-Procedure Depth (cm) 0.4 cm 1/9/2020 11:53 AM   Post-Procedure Surface Area (cm^2) 0.42 cm^2 1/9/2020 11:53 AM   Post-Procedure Volume (cm^3) 0.17 cm^3 1/9/2020 11:53 AM   Undermining Starts ___ O'Clock 12 1/9/2020 11:12 AM   Undermining Ends___ O'Clock 12 1/9/2020 11:12 AM   Undermining Maxium Distance (cm) 0.3 1/9/2020 11:12 AM   Wound Assessment Granulation tissue;Slough 1/9/2020 11:12 AM   Drainage Amount Scant 1/9/2020 11:12 AM   Drainage Description Serous 1/9/2020 11:12 AM   Odor None 1/9/2020 11:12 AM   Margins Unattached edges 1/9/2020 11:12 AM Nicole-wound Assessment Calloused;Dry 1/9/2020 11:12 AM   Non-staged Wound Description Full thickness 1/2/2020 11:00 AM   Sibley%Wound Bed 95 1/9/2020 11:12 AM   Yellow%Wound Bed 5 1/9/2020 11:12 AM   Number of days: 64       Wound 11/07/19 Toe (Comment  which one) Left;Plantar Initial wound #2- Great toe  (Active)   Wound Image   1/2/2020 11:00 AM   Wound Diabetic Love 2 11/7/2019 10:36 AM   Offloading for Diabetic Foot Ulcers Post op shoe 1/9/2020 11:53 AM   Dressing Status Old drainage 1/9/2020 11:12 AM   Dressing Changed Changed/New 1/9/2020 12:15 PM   Dressing/Treatment Collagen with Ag;4x4;Roll gauze; Other (comment) 1/9/2020 12:15 PM   Wound Cleansed Rinsed/Irrigated with saline 11/21/2019 10:41 AM   Wound Length (cm) 0.8 cm 1/9/2020 11:12 AM   Wound Width (cm) 0.7 cm 1/9/2020 11:12 AM   Wound Depth (cm) 0.4 cm 1/9/2020 11:12 AM   Wound Surface Area (cm^2) 0.56 cm^2 1/9/2020 11:12 AM   Change in Wound Size % (l*w) -12 1/9/2020 11:12 AM   Wound Volume (cm^3) 0.22 cm^3 1/9/2020 11:12 AM   Wound Healing % -47 1/9/2020 11:12 AM   Post-Procedure Length (cm) 0.9 cm 1/9/2020 11:53 AM   Post-Procedure Width (cm) 0.8 cm 1/9/2020 11:53 AM   Post-Procedure Depth (cm) 0.5 cm 1/9/2020 11:53 AM   Post-Procedure Surface Area (cm^2) 0.72 cm^2 1/9/2020 11:53 AM   Post-Procedure Volume (cm^3) 0.36 cm^3 1/9/2020 11:53 AM   Undermining Starts ___ O'Clock 6 1/9/2020 11:12 AM   Undermining Ends___ O'Clock 11 1/9/2020 11:12 AM   Undermining Maxium Distance (cm) 0.4 1/9/2020 11:12 AM   Wound Assessment Granulation tissue;Slough 1/9/2020 11:12 AM   Drainage Amount Small 1/9/2020 11:12 AM   Drainage Description Serous 1/9/2020 11:12 AM   Odor None 1/9/2020 11:12 AM   Margins Unattached edges 1/9/2020 11:12 AM   Nicole-wound Assessment Calloused;Dry 1/9/2020 11:12 AM   Non-staged Wound Description Full thickness 1/9/2020 11:12 AM   Sibley%Wound Bed 95 1/9/2020 11:12 AM   Red%Wound Bed 5 1/2/2020 11:00 AM   Yellow%Wound Bed 5 1/9/2020 11:12 AM   Number of days: 64          Percent of Wound(s)/Ulcer(s) Debrided: 100%    Total Surface Area Debrided:  1.14 sq cm     Diabetic/Pressure/Non Pressure Ulcers only:  Ulcer: Diabetic ulcer, fat layer exposed     Estimated Blood Loss:  Minimal    Hemostasis Achieved:  by pressure    Procedural Pain:  0  / 10     Post Procedural Pain:  0 / 10     Response to treatment:  Well tolerated by patient. Plan:   Pt education per provider related to current plan of care and pt is agreeable to continue. Emphasized continued use of surgical shoes. Treatment Note please see attached Discharge Instructions    Written patient dismissal instructions given to patient and signed by patient or POA. Discharge [facility] Wound [facility] and Hyper[facility]ic Oxygen Therapy   Physician Orders and Discharge [facility]  [address] [address], [facility]  Telephone: [phone]      FAX [phone]     NAME: Italia Francisco  DATE OF BIRTH:  1955  MEDICAL RECORD NUMBER:  [id number]  DATE:  1/9/2020     Wound Cleansing:   Do not scrub or use excessive force.   Cleanse wound prior to applying a clean dressing with:  [x]?????? Normal Saline        [x]?????? Keep Wound Dry in Shower    []?????? Wound Cleanser   []?????? Cleanse wound with Mild Soap & Water  []?????? May Shower at Discharge   []?????? Other:        Topical Treatments:  Do not apply lotions, creams, or ointments to wound bed unless directed.   []?????? Apply moisturizing lotion to skin surrounding the wound prior to dressing change.  []?????? Apply antifungal ointment to skin surrounding the wound prior to dressing change.  []?????? Apply thin film of moisture barrier ointment to skin immediately around wound.  []?????? Other:       Dressings:                  Wound Location : RIGHT AND LEFT GREAT TOE WOUNDS       [x]?????? Apply Primary Dressing: wrap.                      []?????? Elevate leg(s) above the level of the heart when sitting.                 []?????? Avoid prolonged standing in one place.     Off-Loading:   []?????? Off-loading when    []?????? walking       []?????? in bed         []?????? sitting  []?????? Total non-weight bearing  []?????? Right Leg  []?????? Left Leg          [x]?????? Assistive Device     []?????? Walker        []?????? Cane           []?????? Wheelchair  []?????? Crutches              [x]?????? Surgical shoe TO BOTH FEET - WEAR WHENEVER YOUR FEET ARE ON THE FLOOR   []?????? Podus Boot(s)   []?????? Foam Boot(s)  []?????? Roll About              []?????? Cast Boot   []?????? CROW Boot  []?????? Other:     Contact Cast:  Apply:  []?????? Total Contact Cast Applied in Clinic          []???? ? ? RightLeg      []???? ? ? Left Leg              []?????? Do not get cast wet.  Contact center or go to emergency room if there is a foul odor or becomes uncomfortable due to feeling tight or swelling.  Do not use objects inside of cast to scratch.          Dietary:  []?????? Diet as tolerated:    [x]?????? Calorie Diabetic Diet:         []?????? No Added Salt:  []?????? Increase Protein:    []?????? Other:     Activity:  [x]?????? Activity as tolerated:  []?????? Patient has no activity restrictions     []?????? Strict Bedrest: []?????? Remain off Work:     []?????? May return to full duty work:                                    []?????? Return to work with [address]     If you are still having pain after you go home:  [x]?????? Elevate the affected limb.    [x]?????? Use over-the-counter medications you would normally use for pain as permitted by your family doctor.   [x]?????? For persistent pain not relieved by the above interventions, please call your family doctor.             Return Appointment:  []?????? Wound and dressing supply provider:   []?????? ECF or Home Healthcare:  []?????? Wound Assessment:          []?????? Physician or NP scheduled for Wound Assessment:   [x]?????? Return Appointment: With DR SCHULTZ  in 1 Week(s)  []?????? Ordered tests:      Nurse Case Manger: Xiao [place]     Electronically signed by Prerna Stack RN on 1/9/2020 at 11:52 [address] Information: Should you experience any significant changes in your wound(s) or have questions about your wound care, please contact the [address] [place] [address] [phone] [address] 8:00 am - 4:30 pm and Friday 8:00 am - 12:30 pm.  If you need help with your wound outside these hours and cannot wait until we are again available, contact your PCP or go to the hospital emergency room.      PLEASE NOTE: IF YOU ARE UNABLE TO OBTAIN WOUND SUPPLIES, CONTINUE TO USE THE SUPPLIES YOU HAVE AVAILABLE UNTIL YOU ARE ABLE TO [facility].  IT IS MOST IMPORTANT TO KEEP THE WOUND COVERED AT ALL TIMES.     Physician Signature:_______________________     Date: ___________ Time:  ____________                                NR Nicolette Schultz   [X] Prince Cortes CNP        Electronically signed by MADISON Peng CNP on 1/10/2020 at 11:34 AM

## 2020-01-13 RX ORDER — LISINOPRIL 20 MG/1
20 TABLET ORAL DAILY
Qty: 90 TABLET | Refills: 0 | Status: SHIPPED | OUTPATIENT
Start: 2020-01-13 | End: 2020-02-06 | Stop reason: SDUPTHER

## 2020-01-16 ENCOUNTER — HOSPITAL ENCOUNTER (OUTPATIENT)
Dept: WOUND CARE | Age: 65
Discharge: HOME | End: 2020-01-16
Payer: MEDICARE

## 2020-01-16 VITALS
TEMPERATURE: 97.7 F | SYSTOLIC BLOOD PRESSURE: 127 MMHG | HEART RATE: 64 BPM | DIASTOLIC BLOOD PRESSURE: 79 MMHG | RESPIRATION RATE: 16 BRPM

## 2020-01-16 PROCEDURE — 15275 SKIN SUB GRAFT FACE/NK/HF/G: CPT

## 2020-01-16 PROCEDURE — 97597 DBRDMT OPN WND 1ST 20 CM/<: CPT

## 2020-01-16 PROCEDURE — 6370000000 HC RX 637 (ALT 250 FOR IP): Performed by: PODIATRIST

## 2020-01-16 RX ORDER — BETAMETHASONE DIPROPIONATE 0.05 %
OINTMENT (GRAM) TOPICAL ONCE
Status: CANCELLED | OUTPATIENT
Start: 2020-01-16

## 2020-01-16 RX ORDER — LIDOCAINE 50 MG/G
0.5 OINTMENT TOPICAL ONCE
Status: CANCELLED | OUTPATIENT
Start: 2020-01-16

## 2020-01-16 RX ORDER — BACITRACIN, NEOMYCIN, POLYMYXIN B 400; 3.5; 5 [USP'U]/G; MG/G; [USP'U]/G
OINTMENT TOPICAL ONCE
Status: CANCELLED | OUTPATIENT
Start: 2020-01-16

## 2020-01-16 RX ORDER — BACITRACIN ZINC AND POLYMYXIN B SULFATE 500; 1000 [USP'U]/G; [USP'U]/G
OINTMENT TOPICAL ONCE
Status: CANCELLED | OUTPATIENT
Start: 2020-01-16

## 2020-01-16 RX ORDER — GENTAMICIN SULFATE 1 MG/G
OINTMENT TOPICAL ONCE
Status: CANCELLED | OUTPATIENT
Start: 2020-01-16

## 2020-01-16 RX ORDER — CLOBETASOL PROPIONATE 0.5 MG/G
OINTMENT TOPICAL ONCE
Status: CANCELLED | OUTPATIENT
Start: 2020-01-16

## 2020-01-16 RX ORDER — LIDOCAINE 40 MG/G
CREAM TOPICAL ONCE
Status: CANCELLED | OUTPATIENT
Start: 2020-01-16

## 2020-01-16 RX ORDER — LIDOCAINE HYDROCHLORIDE 40 MG/ML
5 SOLUTION TOPICAL ONCE
Status: CANCELLED | OUTPATIENT
Start: 2020-01-16

## 2020-01-16 RX ORDER — LIDOCAINE HYDROCHLORIDE 40 MG/ML
5 SOLUTION TOPICAL ONCE
Status: COMPLETED | OUTPATIENT
Start: 2020-01-16 | End: 2020-01-16

## 2020-01-16 RX ORDER — GINSENG 100 MG
CAPSULE ORAL ONCE
Status: CANCELLED | OUTPATIENT
Start: 2020-01-16

## 2020-01-16 RX ADMIN — LIDOCAINE HYDROCHLORIDE 2.5 ML: 40 SOLUTION TOPICAL at 11:26

## 2020-01-16 ASSESSMENT — PAIN SCALES - GENERAL
PAINLEVEL_OUTOF10: 0
PAINLEVEL_OUTOF10: 0

## 2020-01-20 NOTE — PROGRESS NOTES
[address]   Progress Note and Procedure Note      Marcia Bravo  MEDICAL RECORD NUMBER:  [id number]  AGE: 75 y.o. GENDER: female  : 1955  EPISODE DATE:  2020    Subjective:     Chief Complaint   Patient presents with    Wound Check     right and left great toe wounds follow up         HISTORY of PRESENT ILLNESS HPI  Marcia Bravo is a 70 y.o. female who presents today for wound/ulcer evaluation. Denies constitutional issues. Patient relates that she had a gastric sleeve in march and has lost more than 30 lbs since her surgery. She relates that she has noticed that since her surgery she os overall feeling much better. Patient relates that she has been doing local wound care daily and offloading the wounds with surgical shoes.    History of Wound Context: callus formation with fissure  Wound/Ulcer Pain Timing/Severity: none (pt is neuropathic)  Quality of pain: N/A  Severity:  0 / 10   Modifying Factors: None  Associated Signs/Symptoms: none     Ulcer Identification:  Ulcer Type: diabetic, pressure and shear  Contributing Factors: diabetes, poor glucose control, chronic pressure, shear force and obesity     Wound: N/A                                       PAST MEDICAL HISTORY        Diagnosis Date    Abnormal echocardiogram     25% on 3/11/14 and 50% on 3/19/14    Back pain     Cardiomyopathy ([address]     EF was 50% on 3/19/14    Chipped tooth     lower left    Dental crown present     veneers    Depression     Depressive disorder 2014    Diabetes mellitus ([address]     Diabetic infection of right foot ([address] 4/15/2015    Diabetic ulcer of toe of left foot associated with type 2 diabetes mellitus, with fat layer exposed ([address] 4/10/2018    Pt slipped in hot tub latter part of February and has not healed since despite topical treatment    DVT (deep venous thrombosis) ([address]     HTN (hypertension)     Hx of blood clots     left leg    Ischemic cardiomyopathy 4/15/2015    Kidney disease     Meniere's disease     Mixed hyperlipidemia 3/7/2016    Nicotine abuse     [facility]STEMI (non-ST elevated [facility] infarction) [facility] 3/13/2014    Osteomyelitis of ankle or foot, acute, left ([facility] 6/4/2018    Pneumonia     Spinal abscess ([facility] 3/2014    epidural abscess    Spinal epidural abscess 3/13/2014    Type II diabetes mellitus, uncontrolled ([facility] 08/13/2014       PAST SURGICAL HISTORY    Past Surgical History:   Procedure Laterality Date    BACK SURGERY  3/2014    DEBRIDEMENT  3/12/14    extensive debridement of bone/muscle and paraspinal empyema    HYSTERECTOMY  6/15/1999    complete-think right ovary in.    NASAL SEPTUM SURGERY      SLEEVE GASTRECTOMY  04/02/2019    ROBOTIC ASSISTED LAPAROSCOPIC SLEEVE GASTRECTOMY, LAPAROSCOPIC REDUCIBLE INCISIONAL HERNIA REPAIR     SLEEVE GASTRECTOMY N/A 4/2/2019    ROBOTIC ASSISTED LAPAROSCOPIC SLEEVE GASTRECTOMY, LAPAROSCOPIC REDUCIBLE INCISIONAL HERNIA REPAIR performed by Brie Denise DO at [address] N/A 2/8/2019    EGD BIOPSY performed by Brie Denise DO at [address] N/A 2/8/2019    EGD POLYP ABLATION/OTHER performed by Brie Denise DO at [address] ENDOSCOPY       FAMILY HISTORY    Family History   Problem Relation Age of Onset    High Blood Pressure Mother     Cancer Mother     Rheum Arthritis Mother     COPD Father     Cancer Father     Osteoarthritis Neg Hx     Asthma Neg Hx     Breast Cancer Neg Hx     Diabetes Neg Hx     Heart Failure Neg Hx     High Cholesterol Neg Hx     Hypertension Neg Hx     Migraines Neg Hx     Ovarian Cancer Neg Hx     Rashes/Skin Problems Neg Hx     Seizures Neg Hx     Stroke Neg Hx     Thyroid Disease Neg Hx        SOCIAL HISTORY    Social History     Tobacco Use    Smoking status: Former Smoker     Packs/day: 0.50     Years: 10.00     Pack years: 5.00     Last attempt to quit: 11/10/2013 TOUCH LANCETS MISC 1 each by Does not apply route 3 times daily 100 each 11    blood glucose test strips (ONE TOUCH ULTRA TEST) strip 1 each by Does not apply route 3 times daily As needed. 100 strip 11     No current facility-administered medications on file prior to encounter. REVIEW OF SYSTEMS    Pertinent items are noted in HPI. Review of Systems: A 12 point review of symptoms is unremarkable with the exception of the chief complaint. Patient specifically denies nausea, fever, vomiting, chills, shortness of breath, chest pain, abdominal pain, constipation or difficulty urinating. Objective:      /79   Pulse 64   Temp 97.7 °F (36.5 °C) (Oral)   Resp 16   LMP 06/15/1999     Wt Readings from Last 3 Encounters:   12/03/19 214 lb 12.8 oz (97.4 kg)   09/30/19 211 lb 3.2 oz (95.8 kg)   09/27/19 210 lb (95.3 kg)       PHYSICAL EXAM    General Appearance: alert and oriented to person, place and time, well-developed and well-nourished, in no acute distress  Skin: warm and dry, no rash or erythema. Wound noted on the plantar aspect of the bilateral great toes. There are minimal hyperkeratotic rims surrounding the wounds. Wound has fibrotic and nonviable tissue that extends down through and includes the subcutaneous tissue. After debridement the wound has a granular base. There is no surrounding erythema, edema, warmth or malodor noted. The wound does not probe or track to bone. There is a superficial puncture/abriasion wound noted sub 2nd MPJ on the left foot. There was no retained FB noted and there was not erythema, warmth, edema or drainage noted. DP/PT pulses palpable bilaterally. CFT brisk to all digits. Digits are pink and warm to the touch. Hair growth is normal in appearance. No edema noted. No calf pain with palpation noted. Epicritic sensation is grossly absent bilaterally. Negative clonus and babinski reflex is down going. Patient has had a left partial 3rd toe amputation. Patient has rigid hammertoe contractures noted on the bilateral feet. Hallux limitus noted bilaterally    Assessment:        Problem List Items Addressed This Visit     Diabetic ulcer of toe of left foot associated with diabetes mellitus due to underlying condition, with fat layer exposed ([address] - Primary (Chronic)    Relevant Medications    lidocaine (XYLOCAINE) 4 % external solution 5 mL (Completed)           Procedure Note  Indications:  Based on my examination of this patient's wound(s)/ulcer(s) today, debridement is required to promote healing and evaluate the wound base. Performed by: Shaye Hawthorne DPM    Consent obtained:  Yes    Time out taken:  Yes    Pain Control: Anesthetic  Anesthetic: 4% Lidocaine Liquid Topical       Debridement:Nonexcisional     Using # 10 blade scalpel the wound(s)/ulcer(s) was/were sharply debrided down through and including the removal of epidermis and dermis. Devitalized Tissue Debrided:  fibrin, biofilm and callus    Pre Debridement Measurements:  Are located in the [place]  Documentation Flow Sheet    Wound/Ulcer #: 2    Post Debridement Measurements:  Wound/Ulcer Descriptions are Pre Debridement except measurements:  Wound 11/07/19 Toe (Comment  which one) Right;Plantar Initial wound #1: Great toe  (Active)   Wound Image   1/2/2020 11:00 AM   Wound Diabetic Love 2 11/7/2019 10:36 AM   Offloading for Diabetic Foot Ulcers Post op shoe 1/9/2020 11:53 AM   Dressing Status Old drainage 1/16/2020 11:00 AM   Dressing Changed Changed/New 1/16/2020 12:05 PM   Dressing/Treatment Other (comment); Hydrating gel;4x4;Steri-strips;Coban;Cast padding;Roll gauze 1/16/2020 12:05 PM   Wound Cleansed Rinsed/Irrigated with saline 11/21/2019 10:41 AM   Wound Length (cm) 0.4 cm 1/16/2020 11:00 AM   Wound Width (cm) 0.3 cm 1/16/2020 11:00 AM   Wound Depth (cm) 0.3 cm 1/16/2020 11:00 AM   Wound Surface Area (cm^2) 0.12 cm^2 1/16/2020 11:00 AM   Change in Wound Size % (l*w) 78.57 1/16/2020 11:00 AM   Wound Volume (cm^3) 0.04 cm^3 1/16/2020 11:00 AM   Wound Healing % 64 1/16/2020 11:00 AM   Post-Procedure Length (cm) 0.4 cm 1/16/2020 11:02 AM   Post-Procedure Width (cm) 0.3 cm 1/16/2020 11:02 AM   Post-Procedure Depth (cm) 0.3 cm 1/16/2020 11:02 AM   Post-Procedure Surface Area (cm^2) 0.12 cm^2 1/16/2020 11:02 AM   Post-Procedure Volume (cm^3) 0.04 cm^3 1/16/2020 11:02 AM   Undermining Starts ___ O'Clock 3 1/16/2020 11:00 AM   Undermining Ends___ O'Clock 12 1/16/2020 11:00 AM   Undermining Maxium Distance (cm) 0.2 1/16/2020 11:00 AM   Wound Assessment Granulation tissue;Slough 1/16/2020 11:00 AM   Drainage Amount Scant 1/16/2020 11:00 AM   Drainage Description Serous 1/16/2020 11:00 AM   Odor None 1/16/2020 11:00 AM   Margins Unattached edges 1/16/2020 11:00 AM   Nicole-wound Assessment Calloused;Dry 1/16/2020 11:00 AM   Non-staged Wound Description Full thickness 1/16/2020 11:00 AM   [place]%Wound Bed 95 1/16/2020 11:00 AM   Yellow%Wound Bed 5 1/16/2020 11:00 AM   Number of days: 74       Wound 11/07/19 Toe (Comment  which one) Left;Plantar Initial wound #2- Great toe  (Active)   Wound Image   1/2/2020 11:00 AM   Wound Diabetic Love 2 11/7/2019 10:36 AM   Offloading for Diabetic Foot Ulcers Post op shoe 1/9/2020 11:53 AM   Dressing Status Old drainage 1/16/2020 11:00 AM   Dressing Changed Changed/New 1/16/2020 12:05 PM   Dressing/Treatment Hydrating gel; Cast padding;Coban;4x4;Roll gauze;Non adherent 1/16/2020 12:05 PM   Wound Cleansed Rinsed/Irrigated with saline 11/21/2019 10:41 AM   Wound Length (cm) 0.8 cm 1/16/2020 11:00 AM   Wound Width (cm) 0.7 cm 1/16/2020 11:00 AM   Wound Depth (cm) 0.3 cm 1/16/2020 11:00 AM   Wound Surface Area (cm^2) 0.56 cm^2 1/16/2020 11:00 AM   Change in Wound Size % (l*w) -12 1/16/2020 11:00 AM   Wound Volume (cm^3) 0.17 cm^3 1/16/2020 11:00 AM   Wound Healing % -13 1/16/2020 11:00 AM   Post-Procedure Length (cm) 1 cm 1/16/2020 11:02 AM   Post-Procedure Width (cm) 0.9 cm gel;4x4;Steri-strips;Coban;Cast padding;Roll gauze 1/16/2020 12:05 PM   Wound Cleansed Rinsed/Irrigated with saline 11/21/2019 10:41 AM   Wound Length (cm) 0.4 cm 1/16/2020 11:00 AM   Wound Width (cm) 0.3 cm 1/16/2020 11:00 AM   Wound Depth (cm) 0.3 cm 1/16/2020 11:00 AM   Wound Surface Area (cm^2) 0.12 cm^2 1/16/2020 11:00 AM   Change in Wound Size % (l*w) 78.57 1/16/2020 11:00 AM   Wound Volume (cm^3) 0.04 cm^3 1/16/2020 11:00 AM   Wound Healing % 64 1/16/2020 11:00 AM   Post-Procedure Length (cm) 0.4 cm 1/16/2020 11:02 AM   Post-Procedure Width (cm) 0.3 cm 1/16/2020 11:02 AM   Post-Procedure Depth (cm) 0.3 cm 1/16/2020 11:02 AM   Post-Procedure Surface Area (cm^2) 0.12 cm^2 1/16/2020 11:02 AM   Post-Procedure Volume (cm^3) 0.04 cm^3 1/16/2020 11:02 AM   Undermining Starts ___ O'Clock 3 1/16/2020 11:00 AM   Undermining Ends___ O'Clock 12 1/16/2020 11:00 AM   Undermining Maxium Distance (cm) 0.2 1/16/2020 11:00 AM   Wound Assessment Granulation tissue;Slough 1/16/2020 11:00 AM   Drainage Amount Scant 1/16/2020 11:00 AM   Drainage Description Serous 1/16/2020 11:00 AM   Odor None 1/16/2020 11:00 AM   Margins Unattached edges 1/16/2020 11:00 AM   Nicole-wound Assessment Calloused;Dry 1/16/2020 11:00 AM   Non-staged Wound Description Full thickness 1/16/2020 11:00 AM   [place]%Wound Bed 95 1/16/2020 11:00 AM   Yellow%Wound Bed 5 1/16/2020 11:00 AM   Number of days: 74       Wound 11/07/19 Toe (Comment  which one) Left;Plantar Initial wound #2- Great toe  (Active)   Wound Image   1/2/2020 11:00 AM   Wound Diabetic Love 2 11/7/2019 10:36 AM   Offloading for Diabetic Foot Ulcers Post op shoe 1/9/2020 11:53 AM   Dressing Status Old drainage 1/16/2020 11:00 AM   Dressing Changed Changed/New 1/16/2020 12:05 PM   Dressing/Treatment Hydrating gel; Cast padding;Coban;4x4;Roll gauze;Non adherent 1/16/2020 12:05 PM   Wound Cleansed Rinsed/Irrigated with saline 11/21/2019 10:41 AM   Wound Length (cm) 0.8 cm 1/16/2020 11:00 AM   Wound tissue surrounding the wound. Discussed with patient at length that if she is not adherent to offloading recommendations the wounds are not likely to heal and she is at risk for further amputation. Pt education per provider related to plan of care, pt is agreeable to plan and questions answered. Treatment Note please see attached Discharge Instructions    Written patient dismissal instructions given to patient and signed by patient or POA. RTC 1 week       Discharge Instructions       [address] and [facility] O[facility] Therapy   Phys[facility] Orders and Discharge [facility]  [facility] [address], [facility]  Telephone: [phone]      FAX [phone]     NAME: Silvia Del Rosario  DATE OF BIRTH:  1955  MEDICAL RECORD NUMBER:  [id number]  DATE:  1/16/2020     Wound Cleansing:   Do not scrub or use excessive force.   Cleanse wound prior to applying a clean dressing with:  [x]??????? Normal Saline        [x]??????? Keep Wound Dry in Shower    []??????? Wound Cleanser   []??????? Cleanse wound with Mild Soap & Water  []??????? May Shower at Discharge   []??????? Other:        Topical Treatments:  Do not apply lotions, creams, or ointments to wound bed unless directed.   []??????? Apply moisturizing lotion to skin surrounding the wound prior to dressing change.  []??????? Apply antifungal ointment to skin surrounding the wound prior to dressing change.  []??????? Apply thin film of moisture barrier ointment to skin immediately around wound.  []??????? Other:       Dressings:                  Wound Location :  LEFT GREAT TOE WOUNDS       [x]??????? Apply Primary Dressing:                                          [x]??????? DAB OF HYDROGEL                       []???????                                       []??????? Other:       [x]??????? Cover and Secure with:                   [x]??????? Gauze ,CAST PADDING,KERLIX AND Appointment:  []??????? Wound and dressing supply provider:   []??????? ECF or Home Healthcare:  []??????? Wound Assessment:          []??????? Physician or NP scheduled for Wound Assessment:   [x]??????? Return Appointment: With DR LOMAX  in 1 Week(s)  []??????? Ordered tests:      Nurse Case Manger: Joya Grossman    Electronically signed by Spencer Carreon RN on 1/16/2020 at 11:50 AM    [address] [place] [address] Information: Should you experience any significant changes in your wound(s) or have questions about your wound care, please contact the [address] [place] [address] [phone] [address] 8:00 am - 4:30 pm and Friday 8:00 am - 12:30 pm.  If you need help with your wound outside these hours and cannot wait until we are again available, contact your PCP or go to the hospital emergency room.      PLEASE NOTE: IF YOU ARE UNABLE TO OBTAIN WOUND SUPPLIES, CONTINUE TO USE THE SUPPLIES YOU HAVE AVAILABLE UNTIL YOU ARE ABLE TO [facility].  IT IS MOST IMPORTANT TO KEEP THE WOUND COVERED AT ALL TIMES.     Physician Signature:_______________________     Date: ___________ Time:  ____________                                 Millie Just  [ ] Henrry Miramontes CNP        Electronically signed by Gabbi Zarate DPM on 1/20/2020 at 2:59 PM

## 2020-01-23 ENCOUNTER — HOSPITAL ENCOUNTER (OUTPATIENT)
Dept: WOUND CARE | Age: 65
Discharge: HOME | End: 2020-01-23
Payer: MEDICARE

## 2020-01-23 VITALS
HEART RATE: 69 BPM | RESPIRATION RATE: 16 BRPM | DIASTOLIC BLOOD PRESSURE: 77 MMHG | SYSTOLIC BLOOD PRESSURE: 135 MMHG | TEMPERATURE: 98.6 F

## 2020-01-23 PROCEDURE — 6370000000 HC RX 637 (ALT 250 FOR IP): Performed by: PODIATRIST

## 2020-01-23 PROCEDURE — 15275 SKIN SUB GRAFT FACE/NK/HF/G: CPT

## 2020-01-23 RX ORDER — GENTAMICIN SULFATE 1 MG/G
OINTMENT TOPICAL ONCE
Status: CANCELLED | OUTPATIENT
Start: 2020-01-23

## 2020-01-23 RX ORDER — LIDOCAINE 50 MG/G
0.5 OINTMENT TOPICAL ONCE
Status: CANCELLED | OUTPATIENT
Start: 2020-01-23

## 2020-01-23 RX ORDER — CLOBETASOL PROPIONATE 0.5 MG/G
OINTMENT TOPICAL ONCE
Status: CANCELLED | OUTPATIENT
Start: 2020-01-23

## 2020-01-23 RX ORDER — LIDOCAINE HYDROCHLORIDE 40 MG/ML
5 SOLUTION TOPICAL ONCE
Status: CANCELLED | OUTPATIENT
Start: 2020-01-23

## 2020-01-23 RX ORDER — BACITRACIN ZINC AND POLYMYXIN B SULFATE 500; 1000 [USP'U]/G; [USP'U]/G
OINTMENT TOPICAL ONCE
Status: CANCELLED | OUTPATIENT
Start: 2020-01-23

## 2020-01-23 RX ORDER — BACITRACIN, NEOMYCIN, POLYMYXIN B 400; 3.5; 5 [USP'U]/G; MG/G; [USP'U]/G
OINTMENT TOPICAL ONCE
Status: CANCELLED | OUTPATIENT
Start: 2020-01-23

## 2020-01-23 RX ORDER — LIDOCAINE 40 MG/G
CREAM TOPICAL ONCE
Status: CANCELLED | OUTPATIENT
Start: 2020-01-23

## 2020-01-23 RX ORDER — LIDOCAINE HYDROCHLORIDE 40 MG/ML
5 SOLUTION TOPICAL ONCE
Status: COMPLETED | OUTPATIENT
Start: 2020-01-23 | End: 2020-01-23

## 2020-01-23 RX ORDER — GINSENG 100 MG
CAPSULE ORAL ONCE
Status: CANCELLED | OUTPATIENT
Start: 2020-01-23

## 2020-01-23 RX ORDER — BETAMETHASONE DIPROPIONATE 0.05 %
OINTMENT (GRAM) TOPICAL ONCE
Status: CANCELLED | OUTPATIENT
Start: 2020-01-23

## 2020-01-23 RX ADMIN — LIDOCAINE HYDROCHLORIDE 2.5 ML: 40 SOLUTION TOPICAL at 11:34

## 2020-01-23 ASSESSMENT — PAIN SCALES - GENERAL
PAINLEVEL_OUTOF10: 0
PAINLEVEL_OUTOF10: 0

## 2020-01-23 NOTE — PROGRESS NOTES
[facility] [address]   Progress Note and Procedure Note      Ovidio Huddleston  MEDICAL RECORD NUMBER:  [id number]  AGE: 75 y.o. GENDER: female  : 1955  EPISODE DATE:  2020    Subjective:     Chief Complaint   Patient presents with    Wound Check     Follow Up on Bilateral Great Toes         HISTORY of PRESENT ILLNESS HPI  Ovidio Huddleston is a 70 y.o. female who presents today for wound/ulcer evaluation. Denies constitutional issues. Patient relates that she had a gastric sleeve in march and has lost more than 30 lbs since her surgery. She relates that she has noticed that since her surgery she os overall feeling much better. Patient relates that she has kept her dressings clean and intact and is offloading the wounds with surgical shoes.    History of Wound Context: callus formation with fissure  Wound/Ulcer Pain Timing/Severity: none (pt is neuropathic)  Quality of pain: N/A  Severity:  0 / 10   Modifying Factors: None  Associated Signs/Symptoms: none     Ulcer Identification:  Ulcer Type: diabetic, pressure and shear  Contributing Factors: diabetes, poor glucose control, chronic pressure, shear force and obesity     Wound: N/A                                       PAST MEDICAL HISTORY        Diagnosis Date    Abnormal echocardiogram     25% on 3/11/14 and 50% on 3/19/14    Back pain     Cardiomyopathy ([address]     EF was 50% on 3/19/14    Chipped tooth     lower left    Dental crown present     veneers    Depression     Depressive disorder 2014    Diabetes mellitus ([address]     Diabetic infection of right foot ([address] 4/15/2015    Diabetic ulcer of toe of left foot associated with type 2 diabetes mellitus, with fat layer exposed ([address] 4/10/2018    Pt slipped in hot tub latter part of February and has not healed since despite topical treatment    DVT (deep venous thrombosis) ([address]     HTN (hypertension)     Hx of blood clots     left leg    Ischemic cardiomyopathy quittin.2    Smokeless tobacco: Never Used   Substance Use Topics    Alcohol use: Not Currently     Alcohol/week: 0.0 standard drinks     Comment: occasionally    Drug use: No       ALLERGIES    Allergies   Allergen Reactions    Doxycycline Other (See Comments)     Yeast infection       MEDICATIONS    Current Outpatient Medications on File Prior to Encounter   Medication Sig Dispense Refill    lisinopril (PRINIVIL;ZESTRIL) 20 MG tablet Take 1 tablet by mouth daily 90 tablet 0    gabapentin (NEURONTIN) 300 MG capsule TAKE 2 CAPSULES BY MOUTH 3 TIMES DAILY  DAYS. [place] 180 capsule 0    furosemide (LASIX) 20 MG tablet Take 20 mg by mouth daily      doxazosin (CARDURA) 2 MG tablet Take 1 tablet by mouth nightly 30 tablet 3    Insulin Degludec (TRESIBA FLEXTOUCH) 200 UNIT/ML SOPN Inject 40 units into the skin once daily. (Patient taking differently: 2 times daily Inject 40 units into the skin once daily. ) 172 mL 1    Liraglutide (VICTOZA) 18 MG/3ML SOPN SC injection Inject 1.8 mg into the skin daily 2 pen 3    carvedilol (COREG) 25 MG tablet Take 1 tablet by mouth 2 times daily (with meals) 180 tablet 1    atorvastatin (LIPITOR) 80 MG tablet Take 1 tablet by mouth daily 90 tablet 1    citalopram (CELEXA) 40 MG tablet Take 1 tablet by mouth daily 90 tablet 1    pantoprazole (PROTONIX) 40 MG tablet TAKE 1 TABLET BY MOUTH DAILY (Patient taking differently: Take 40 mg by mouth as needed ) 30 tablet 2    Cholecalciferol (VITAMIN D3) 2000 units CAPS Take by mouth daily      aspirin 81 MG chewable tablet Take 1 tablet by mouth daily.  30 tablet     ondansetron (ZOFRAN-ODT) 4 MG disintegrating tablet TAKE 1 TABLET BY MOUTH EVERY 8 HOURS AS NEEDED FOR NAUSEA AND VOMITING 20 tablet 1    B-D UF III MINI PEN NEEDLES 31G X 5 MM MISC USE TWICE A DAY WITH INSULIN INJECTIONS 200 each 1    ONE TOUCH LANCETS MISC 1 each by Does not apply route 3 times daily 100 each 11    blood glucose test strips (ONE TOUCH ULTRA TEST) strip 1 each by Does not apply route 3 times daily As needed. 100 strip 11    acetaminophen (TYLENOL) 500 MG tablet Take 1 tablet by mouth every 6 hours as needed for Pain 120 tablet      No current facility-administered medications on file prior to encounter. REVIEW OF SYSTEMS    Pertinent items are noted in HPI. Review of Systems: A 12 point review of symptoms is unremarkable with the exception of the chief complaint. Patient specifically denies nausea, fever, vomiting, chills, shortness of breath, chest pain, abdominal pain, constipation or difficulty urinating. Objective:      /77   Pulse 69   Temp 98.6 °F (37 °C) (Oral)   Resp 16   LMP 06/15/1999     Wt Readings from Last 3 Encounters:   12/03/19 214 lb 12.8 oz (97.4 kg)   09/30/19 211 lb 3.2 oz (95.8 kg)   09/27/19 210 lb (95.3 kg)       PHYSICAL EXAM    General Appearance: alert and oriented to person, place and time, well-developed and well-nourished, in no acute distress  Skin: warm and dry, no rash or erythema. Wound noted on the plantar aspect of the bilateral great toes. There are minimal hyperkeratotic rims surrounding the wounds. Wound has fibrotic and nonviable tissue that extends down through and includes the subcutaneous tissue. After debridement the wound has a granular base. There is no surrounding erythema, edema, warmth or malodor noted. The wound does not probe or track to bone. DP/PT pulses palpable bilaterally. CFT brisk to all digits. Digits are pink and warm to the touch. Hair growth is normal in appearance. No edema noted. No calf pain with palpation noted. Epicritic sensation is grossly absent bilaterally. Negative clonus and babinski reflex is down going. Patient has had a left partial 3rd toe amputation. Patient has rigid hammertoe contractures noted on the bilateral feet.   Hallux limitus noted bilaterally    Assessment:        Problem List Items Addressed This Visit     Diabetic ulcer of toe of Healing % 91 1/23/2020 11:27 AM   Post-Procedure Length (cm) 0.5 cm 1/23/2020 11:52 AM   Post-Procedure Width (cm) 0.5 cm 1/23/2020 11:52 AM   Post-Procedure Depth (cm) 0.1 cm 1/23/2020 11:52 AM   Post-Procedure Surface Area (cm^2) 0.25 cm^2 1/23/2020 11:52 AM   Post-Procedure Volume (cm^3) 0.02 cm^3 1/23/2020 11:52 AM   Undermining Starts ___ O'Clock 3 1/16/2020 11:00 AM   Undermining Ends___ O'Clock 12 1/16/2020 11:00 AM   Undermining Maxium Distance (cm) 0.2 1/16/2020 11:00 AM   Wound Assessment Granulation tissue;Slough 1/23/2020 11:27 AM   Drainage Amount Scant 1/23/2020 11:27 AM   Drainage Description Serosanguinous 1/23/2020 11:27 AM   Odor None 1/23/2020 11:27 AM   Margins Attached edges 1/23/2020 11:27 AM   Nicole-wound Assessment Calloused;Dry 1/23/2020 11:27 AM   Non-staged Wound Description Full thickness 1/23/2020 11:27 AM   [place]%Wound Bed 95 1/16/2020 11:00 AM   Red%Wound Bed 95 1/23/2020 11:27 AM   Yellow%Wound Bed 5 1/23/2020 11:27 AM   Number of days: 77       Wound 11/07/19 Toe (Comment  which one) Left;Plantar Initial wound #2- Great toe  (Active)   Wound Image   1/2/2020 11:00 AM   Wound Diabetic Love 2 11/7/2019 10:36 AM   Offloading for Diabetic Foot Ulcers Post op shoe 1/9/2020 11:53 AM   Dressing Status Old drainage 1/23/2020 11:27 AM   Dressing Changed Changed/New 1/23/2020 11:52 AM   Dressing/Treatment Hydrating gel; Cast padding;Coban;4x4;Roll gauze;Non adherent 1/23/2020 11:52 AM   Wound Cleansed Rinsed/Irrigated with saline 11/21/2019 10:41 AM   Wound Length (cm) 0.6 cm 1/23/2020 11:27 AM   Wound Width (cm) 0.6 cm 1/23/2020 11:27 AM   Wound Depth (cm) 0.1 cm 1/23/2020 11:27 AM   Wound Surface Area (cm^2) 0.36 cm^2 1/23/2020 11:27 AM   Change in Wound Size % (l*w) 28 1/23/2020 11:27 AM   Wound Volume (cm^3) 0.04 cm^3 1/23/2020 11:27 AM   Wound Healing % 73 1/23/2020 11:27 AM   Post-Procedure Length (cm) 0.8 cm 1/23/2020 11:52 AM   Post-Procedure Width (cm) 0.8 cm 1/23/2020 11:52 AM Post-Procedure Depth (cm) 0.1 cm 1/23/2020 11:52 AM   Post-Procedure Surface Area (cm^2) 0.64 cm^2 1/23/2020 11:52 AM   Post-Procedure Volume (cm^3) 0.06 cm^3 1/23/2020 11:52 AM   Undermining Starts ___ O'Clock 12 1/23/2020 11:27 AM   Undermining Ends___ O'Clock 12 1/23/2020 11:27 AM   Undermining [place] Distance (cm) 0.3 1/23/2020 11:27 AM   Wound Assessment Granulation tissue;Slough 1/23/2020 11:27 AM   Drainage Amount Scant 1/23/2020 11:27 AM   Drainage Description Serosanguinous 1/23/2020 11:27 AM   Odor None 1/23/2020 11:27 AM   Margins Attached edges 1/23/2020 11:27 AM   Nicole-wound Assessment Calloused;Dry 1/23/2020 11:27 AM   Non-staged Wound Description Full thickness 1/23/2020 11:27 AM   [place]%Wound Bed 95 1/23/2020 11:27 AM   Red%Wound Bed 5 1/2/2020 11:00 AM   Yellow%Wound Bed 5 1/23/2020 11:27 AM   Number of days: 77          Percent of Wound(s)/Ulcer(s) Debrided: 100%    Total Surface Area Debrided: 0.25 sq cm       Diabetic/Pressure/Non Pressure Ulcers only:  Ulcer: Diabetic ulcer, fat layer exposed      Estimated Blood Loss:  Minimal    Hemostasis Achieved:  by pressure    Response to treatment:  Well tolerated by patient. Procedure:  Skin Substitute Application    Performed by: Demario Tidwell DPM    Ulcer Type: diabetic    Consent obtained: Yes    Time out taken: Yes    Product Utilized:    EpiFix 14 mm disc (1.54 cm)     Fenestrated: No    Mesher Utilized: No    Instrument(s) curette, #15 blade scalpel, scissors and forceps    Skin Substitute was Applied to Ulcer Number(s):    Ulcer #: 1      Wound 11/07/19 Toe (Comment  which one) Right;Plantar Initial wound #1: Great toe  (Active)   Wound Image   1/2/2020 11:00 AM   Wound Diabetic Love 2 11/7/2019 10:36 AM   Offloading for Diabetic Foot Ulcers Post op shoe 1/9/2020 11:53 AM   Dressing Status Old drainage 1/23/2020 11:27 AM   Dressing Changed Changed/New 1/23/2020 11:52 AM   Dressing/Treatment Other (comment); Hydrating gel;4x4;Steri-strips;Coban;Cast padding;Roll gauze;Silicone dressing 9/11/9059 11:52 AM   Wound Cleansed Rinsed/Irrigated with saline 11/21/2019 10:41 AM   Wound Length (cm) 0.3 cm 1/23/2020 11:27 AM   Wound Width (cm) 0.3 cm 1/23/2020 11:27 AM   Wound Depth (cm) 0.1 cm 1/23/2020 11:27 AM   Wound Surface Area (cm^2) 0.09 cm^2 1/23/2020 11:27 AM   Change in Wound Size % (l*w) 83.93 1/23/2020 11:27 AM   Wound Volume (cm^3) 0.01 cm^3 1/23/2020 11:27 AM   Wound Healing % 91 1/23/2020 11:27 AM   Post-Procedure Length (cm) 0.5 cm 1/23/2020 11:52 AM   Post-Procedure Width (cm) 0.5 cm 1/23/2020 11:52 AM   Post-Procedure Depth (cm) 0.1 cm 1/23/2020 11:52 AM   Post-Procedure Surface Area (cm^2) 0.25 cm^2 1/23/2020 11:52 AM   Post-Procedure Volume (cm^3) 0.02 cm^3 1/23/2020 11:52 AM   Undermining Starts ___ O'Clock 3 1/16/2020 11:00 AM   Undermining Ends___ O'Clock 12 1/16/2020 11:00 AM   Undermining [place] Distance (cm) 0.2 1/16/2020 11:00 AM   Wound Assessment Granulation tissue;Slough 1/23/2020 11:27 AM   Drainage Amount Scant 1/23/2020 11:27 AM   Drainage Description Serosanguinous 1/23/2020 11:27 AM   Odor None 1/23/2020 11:27 AM   Margins Attached edges 1/23/2020 11:27 AM   Nicole-wound Assessment Calloused;Dry 1/23/2020 11:27 AM   Non-staged Wound Description Full thickness 1/23/2020 11:27 AM   [place]%Wound Bed 95 1/16/2020 11:00 AM   Red%Wound Bed 95 1/23/2020 11:27 AM   Yellow%Wound Bed 5 1/23/2020 11:27 AM   Number of days: 77       Wound 11/07/19 Toe (Comment  which one) Left;Plantar Initial wound #2- Great toe  (Active)   Wound Image   1/2/2020 11:00 AM   Wound Diabetic Love 2 11/7/2019 10:36 AM   Offloading for Diabetic Foot Ulcers Post op shoe 1/9/2020 11:53 AM   Dressing Status Old drainage 1/23/2020 11:27 AM   Dressing Changed Changed/New 1/23/2020 11:52 AM   Dressing/Treatment Hydrating gel; Cast padding;Coban;4x4;Roll gauze;Non adherent 1/23/2020 11:52 AM   Wound Cleansed Rinsed/Irrigated with saline 11/21/2019 Offload the wound with surgical shoe as there was hyperkeratotic tissue surrounding the wound. Discussed with patient at length that if she is not adherent to offloading recommendations the wounds are not likely to heal and she is at risk for further amputation. Pt education per provider related to plan of care, pt is agreeable to plan and questions answered. Treatment Note please see attached Discharge Instructions    Written patient dismissal instructions given to patient and signed by patient or POA. RTC 1 week       Discharge Instructions       [address] and [facility]   Physician Orders and Discharge Instructions  [address] [place] [address], [facility]  Telephone: [phone]      FAX [phone]     NAME: Silvia Del Rosario  DATE OF BIRTH:  1955  MEDICAL RECORD NUMBER:  [id number]  DATE:  1/23/2020     Wound Cleansing:   Do not scrub or use excessive force.   Cleanse wound prior to applying a clean dressing with:  [x]???????? Normal Saline        [x]???????? Keep Wound Dry in Shower    []???????? Wound Cleanser   []???????? Cleanse wound with Mild Soap & Water  []???????? May Shower at Discharge   []???????? Other:        Topical Treatments:  Do not apply lotions, creams, or ointments to wound bed unless directed.   []???????? Apply moisturizing lotion to skin surrounding the wound prior to dressing change.  []???????? Apply antifungal ointment to skin surrounding the wound prior to dressing change.  []???????? Apply thin film of moisture barrier ointment to skin immediately around wound.  []???????? Other:       Dressings:                  Wound Location :  LEFT GREAT TOE WOUND      [x]???????? Apply Primary Dressing:                                          [x]???????? DAB OF HYDROGEL                       []????????                                       []???????? Other:       [x]???????? Cover and Secure with:                   [x]???????? Gauze ,CAST PADDING,KERLIX AND COBAN (FOOTBALL DRESSING)        []???????? [place]           []???????? Kerlix              []???????? Ace Wrap   []???????? Cover Roll Tape     []???????? ABD                                      []???????? Other:               Avoid contact of tape with skin.  []???????? Change dressing:   []???????? Daily           []???????? Every Other Day    []???????? Three times per week              []???????? Once a week          [x]???????? Do Not Change Dressing     []???????? Other:     Dressings:          Wound Location : RIGHT GREAT TOE WOUND     **EPIFIX #2 APPLIED 1/23/2020**          Epifix,Mepitel and steri strips applied per Dr Cody Melendez. Apply small dab of Hydrogel. Cover with gauze,cast padding,kerlix and Coban ( Football dressing ) . DO NOT CHANGE DRESSING.        YOU HAVE HAD AN  EPIFIX  PLACED ON YOUR WOUND TODAY. FOR BEST RESULTS, WE RECOMMEND YOU LIMIT YOUR ACTIVITY FOR THE NEXT WEEK AS MUCH AS POSSIBLE. AVOID LONG PERIODS OF TIME ON YOUR FEET. THIS ALSO INCLUDES ELEVATING YOUR LEGS AND FEET 3-4 TIMES DAILY FOR AT LEAST 20-30 MINUTES ON PILLOWS AND AT NIGHT SO THAT THEY ARE HIGHER THAN THE LEVEL OF YOUR HEART     Electronically signed by Natalia Jorge RN on 1/23/2020 at 11:53 AM        Edema Control:  Apply:  []???????? Compression Stocking       []???????? Right Leg     []?????? ?? Left Leg              []???????? Tubigrip      []???????? Right Leg Double Layer      []?????? ?? Left Leg Double Layer                                                  []???????? Right Leg Single Layer       []?????? ?? Left Leg Single Layer              []???????? SpandaGrip            []???????? Right Leg     []?????? ?? Left Leg                                      []?????? ??Low compression 5-10 mm/Hg                                             []?????? ? ? Medium compression 10-20 mm/Hg                                      []?????? ? ? High compression  20-30 mm/Hg              every morning immediately when getting up should be applied to affected leg(s) from mid foot to knee making sure to cover the heel.  Remove every night before going to bed.              []???????? Elevate leg(s) above the level of the heart when sitting.                 []???????? Avoid prolonged standing in one place.        Compression:  Apply:  []???????? Multilayer Compression Wrap Applied in Clinic    []???????? RightLeg      []?????? ?? Left Leg              []???????? Multi-layer compression.  Do not get leg(s) with wrap wet.  If wraps become too tight call the center or completely remove the wrap.                      []???????? Elevate leg(s) above the level of the heart when sitting.                 []???????? Avoid prolonged standing in one place.     Off-Loading:   []???????? Off-loading when    []???????? walking       []???????? in bed         []???????? sitting  []???????? Total non-weight bearing  []???????? Right Leg  []???????? Left Leg          [x]???????? Assistive Device     []???????? Walker        []???????? Cane           []???????? Wheelchair  []???????? Crutches              [x]???????? Surgical shoe TO BOTH FEET - WEAR WHENEVER YOUR FEET ARE ON THE FLOOR   []???????? Podus Boot(s)   []???????? Foam Boot(s)  []???????? Roll About              []???????? Cast Boot   []???????? CROW Boot  []???????? Other:     Contact Cast:  Apply:  []???????? Total Contact Cast Applied in Clinic          []???????? RightLeg      []?????? ?? Left Leg              []???????? Do not get cast wet.  Contact center or go to emergency room if there is a foul odor or becomes uncomfortable due to feeling tight or swelling.  Do not use objects inside of cast to scratch.          Dietary:  []???????? Diet as tolerated:    [x]???????? Calorie Diabetic Diet:         []???????? No Added Salt:  []???????? Increase Protein:    []???????? Other:     Activity:  [x]???????? Activity as tolerated:  []???????? Patient has no activity restrictions     []???????? Strict Bedrest: []???????? Remain off Work:     []???????? May return to full duty work:                                    []???????? Return to work with [address]     If you are still having pain after you go home:  [x]???????? Elevate the affected limb.    [x]???????? Use over-the-counter medications you would normally use for pain as permitted by your family doctor. [x]???????? For persistent pain not relieved by the above interventions, please call your family doctor.             Return Appointment:  []???????? Wound and dressing supply provider:   []???????? ECF or Home Healthcare:  []???????? Wound Assessment:          []???????? Physician or NP scheduled for Wound Assessment:   [x]???????? Return Appointment: With DR LOMAX  in 1 Week(s)  []???????? Ordered tests:      Nurse Case Manger: Angela Eddy    Electronically signed by Vidhya Fleming RN on 1/23/2020 at 11:51 [address] Information: Should you experience any significant changes in your wound(s) or have questions about your wound care, please contact the [address] [place] [address] [phone] [facility] [place] 8:00 am - 4:30 pm and Friday 8:00 am - 12:30 pm.  If you need help with your wound outside these hours and cannot wait until we are again available, contact your PCP or go to the hospital emergency room.      PLEASE NOTE: IF YOU ARE UNABLE TO OBTAIN WOUND SUPPLIES, CONTINUE TO USE THE SUPPLIES YOU HAVE AVAILABLE UNTIL YOU ARE ABLE TO [facility].  IT IS MOST IMPORTANT TO KEEP THE WOUND COVERED AT ALL TIMES.     Physician Signature:_______________________     Date: ___________ Time:  ____________                                [facility]  [ ] Macarena Hatfield CNP        Electronically signed by Christin Hay DPM on 1/23/2020 at 4:40 PM

## 2020-01-30 ENCOUNTER — HOSPITAL ENCOUNTER (OUTPATIENT)
Dept: WOUND CARE | Age: 65
Discharge: HOME | End: 2020-01-30
Payer: MEDICARE

## 2020-01-30 ENCOUNTER — TELEPHONE (OUTPATIENT)
Dept: INTERNAL MEDICINE CLINIC | Age: 65
End: 2020-01-30

## 2020-01-30 VITALS
TEMPERATURE: 98 F | RESPIRATION RATE: 16 BRPM | DIASTOLIC BLOOD PRESSURE: 73 MMHG | HEART RATE: 65 BPM | SYSTOLIC BLOOD PRESSURE: 137 MMHG

## 2020-01-30 PROCEDURE — 97597 DBRDMT OPN WND 1ST 20 CM/<: CPT

## 2020-01-30 PROCEDURE — 6370000000 HC RX 637 (ALT 250 FOR IP): Performed by: PODIATRIST

## 2020-01-30 PROCEDURE — 15275 SKIN SUB GRAFT FACE/NK/HF/G: CPT

## 2020-01-30 RX ORDER — GENTAMICIN SULFATE 1 MG/G
OINTMENT TOPICAL ONCE
Status: CANCELLED | OUTPATIENT
Start: 2020-01-30

## 2020-01-30 RX ORDER — LIDOCAINE HYDROCHLORIDE 40 MG/ML
5 SOLUTION TOPICAL ONCE
Status: CANCELLED | OUTPATIENT
Start: 2020-01-30

## 2020-01-30 RX ORDER — BETAMETHASONE DIPROPIONATE 0.05 %
OINTMENT (GRAM) TOPICAL ONCE
Status: CANCELLED | OUTPATIENT
Start: 2020-01-30

## 2020-01-30 RX ORDER — CLOBETASOL PROPIONATE 0.5 MG/G
OINTMENT TOPICAL ONCE
Status: CANCELLED | OUTPATIENT
Start: 2020-01-30

## 2020-01-30 RX ORDER — BACITRACIN, NEOMYCIN, POLYMYXIN B 400; 3.5; 5 [USP'U]/G; MG/G; [USP'U]/G
OINTMENT TOPICAL ONCE
Status: CANCELLED | OUTPATIENT
Start: 2020-01-30

## 2020-01-30 RX ORDER — LIDOCAINE HYDROCHLORIDE 40 MG/ML
5 SOLUTION TOPICAL ONCE
Status: COMPLETED | OUTPATIENT
Start: 2020-01-30 | End: 2020-01-30

## 2020-01-30 RX ORDER — LIDOCAINE 40 MG/G
CREAM TOPICAL ONCE
Status: CANCELLED | OUTPATIENT
Start: 2020-01-30

## 2020-01-30 RX ORDER — GINSENG 100 MG
CAPSULE ORAL ONCE
Status: CANCELLED | OUTPATIENT
Start: 2020-01-30

## 2020-01-30 RX ORDER — LIDOCAINE 50 MG/G
0.5 OINTMENT TOPICAL ONCE
Status: CANCELLED | OUTPATIENT
Start: 2020-01-30

## 2020-01-30 RX ORDER — BACITRACIN ZINC AND POLYMYXIN B SULFATE 500; 1000 [USP'U]/G; [USP'U]/G
OINTMENT TOPICAL ONCE
Status: CANCELLED | OUTPATIENT
Start: 2020-01-30

## 2020-01-30 RX ADMIN — LIDOCAINE HYDROCHLORIDE 2.5 ML: 40 SOLUTION TOPICAL at 11:01

## 2020-01-30 ASSESSMENT — PAIN SCALES - GENERAL
PAINLEVEL_OUTOF10: 0
PAINLEVEL_OUTOF10: 0

## 2020-01-30 NOTE — PROGRESS NOTES
[address]   Progress Note and Procedure Note      Nba Castro  MEDICAL RECORD NUMBER:  [id number]  AGE: 75 y.o. GENDER: female  : 1955  EPISODE DATE:  2020    Subjective:     Chief Complaint   Patient presents with    Wound Check     Follow up for bilateral foot wounds. HISTORY of PRESENT ILLNESS HPI  Nba Castro is a 70 y.o. female who presents today for wound/ulcer evaluation. Denies constitutional issues. Patient relates that she had a gastric sleeve in march and has lost more than 30 lbs since her surgery. She relates that she has noticed that since her surgery she os overall feeling much better. Patient relates that she has kept her dressings clean and intact and is offloading the wounds with surgical shoes.    History of Wound Context: callus formation with fissure  Wound/Ulcer Pain Timing/Severity: none (pt is neuropathic)  Quality of pain: N/A  Severity:  0 / 10   Modifying Factors: None  Associated Signs/Symptoms: none     Ulcer Identification:  Ulcer Type: diabetic, pressure and shear  Contributing Factors: diabetes, poor glucose control, chronic pressure, shear force and obesity     Wound: N/A                                       PAST MEDICAL HISTORY        Diagnosis Date    Abnormal echocardiogram     25% on 3/11/14 and 50% on 3/19/14    Back pain     Cardiomyopathy ([address]     EF was 50% on 3/19/14    Chipped tooth     lower left    Dental crown present     veneers    Depression     Depressive disorder 2014    Diabetes mellitus ([address]     Diabetic infection of right foot ([address] 4/15/2015    Diabetic ulcer of toe of left foot associated with type 2 diabetes mellitus, with fat layer exposed ([address] 4/10/2018    Pt slipped in hot tub latter part of February and has not healed since despite topical treatment    DVT (deep venous thrombosis) ([address]     HTN (hypertension)     Hx of blood clots     left leg    Ischemic cardiomyopathy 4/15/2015    Kidney disease     Meniere's disease     Mixed hyperlipidemia 3/7/2016    Nicotine abuse     NSTEMI (non-ST elevated myocardial infarction) ([facility] 3/13/2014    Osteomyelitis of ankle or foot, acute, left ([facility] 6/4/2018    Pneumonia     Spinal abscess ([facility] 3/2014    epidural abscess    Spinal epidural abscess 3/13/2014    Type II diabetes mellitus, uncontrolled ([facility] 08/13/2014       PAST SURGICAL HISTORY    Past Surgical History:   Procedure Laterality Date    BACK SURGERY  3/2014    DEBRIDEMENT  3/12/14    extensive debridement of bone/muscle and paraspinal empyema    HYSTERECTOMY  6/15/1999    complete-think right ovary in.    NASAL SEPTUM SURGERY      SLEEVE GASTRECTOMY  04/02/2019    ROBOTIC ASSISTED LAPAROSCOPIC SLEEVE GASTRECTOMY, LAPAROSCOPIC REDUCIBLE INCISIONAL HERNIA REPAIR     SLEEVE GASTRECTOMY N/A 4/2/2019    ROBOTIC ASSISTED LAPAROSCOPIC SLEEVE GASTRECTOMY, LAPAROSCOPIC REDUCIBLE INCISIONAL HERNIA REPAIR performed by Latasha Blanco DO at [address] [place] [address] 190 N/A 2/8/2019    EGD BIOPSY performed by Latasha Blanco DO at [facility] N/A 2/8/2019    EGD POLYP ABLATION/OTHER performed by Latasha Blanco DO at [facility] ENDOSCOPY       FAMILY HISTORY    Family History   Problem Relation Age of Onset    High Blood Pressure Mother     Cancer Mother     Rheum Arthritis Mother     COPD Father     Cancer Father     Osteoarthritis Neg Hx     Asthma Neg Hx     Breast Cancer Neg Hx     Diabetes Neg Hx     Heart Failure Neg Hx     High Cholesterol Neg Hx     Hypertension Neg Hx     Migraines Neg Hx     Ovarian Cancer Neg Hx     Rashes/Skin Problems Neg Hx     Seizures Neg Hx     Stroke Neg Hx     Thyroid Disease Neg Hx        SOCIAL HISTORY    Social History     Tobacco Use    Smoking status: Former Smoker     Packs/day: 0.50     Years: 10.00     Pack years: 5.00     Last attempt to quit: 11/10/2013 Years since quittin.2    Smokeless tobacco: Never Used   Substance Use Topics    Alcohol use: Not Currently     Alcohol/week: 0.0 standard drinks     Comment: occasionally    Drug use: No       ALLERGIES    Allergies   Allergen Reactions    Doxycycline Other (See Comments)     Yeast infection       MEDICATIONS    Current Outpatient Medications on File Prior to Encounter   Medication Sig Dispense Refill    lisinopril (PRINIVIL;ZESTRIL) 20 MG tablet Take 1 tablet by mouth daily 90 tablet 0    gabapentin (NEURONTIN) 300 MG capsule TAKE 2 CAPSULES BY MOUTH 3 TIMES DAILY  DAYS. Ephraim Founds 180 capsule 0    ondansetron (ZOFRAN-ODT) 4 MG disintegrating tablet TAKE 1 TABLET BY MOUTH EVERY 8 HOURS AS NEEDED FOR NAUSEA AND VOMITING 20 tablet 1    furosemide (LASIX) 20 MG tablet Take 20 mg by mouth daily      doxazosin (CARDURA) 2 MG tablet Take 1 tablet by mouth nightly 30 tablet 3    Liraglutide (VICTOZA) 18 MG/3ML SOPN SC injection Inject 1.8 mg into the skin daily 2 pen 3    carvedilol (COREG) 25 MG tablet Take 1 tablet by mouth 2 times daily (with meals) 180 tablet 1    atorvastatin (LIPITOR) 80 MG tablet Take 1 tablet by mouth daily 90 tablet 1    B-D UF III MINI PEN NEEDLES 31G X 5 MM MISC USE TWICE A DAY WITH INSULIN INJECTIONS 200 each 1    citalopram (CELEXA) 40 MG tablet Take 1 tablet by mouth daily 90 tablet 1    pantoprazole (PROTONIX) 40 MG tablet TAKE 1 TABLET BY MOUTH DAILY (Patient taking differently: Take 40 mg by mouth as needed ) 30 tablet 2    ONE TOUCH LANCETS MISC 1 each by Does not apply route 3 times daily 100 each 11    Cholecalciferol (VITAMIN D3) 2000 units CAPS Take by mouth daily      acetaminophen (TYLENOL) 500 MG tablet Take 1 tablet by mouth every 6 hours as needed for Pain 120 tablet     aspirin 81 MG chewable tablet Take 1 tablet by mouth daily. 30 tablet     blood glucose test strips (ONE TOUCH ULTRA TEST) strip 1 each by Does not apply route 3 times daily As needed.  100 strip 11     No current facility-administered medications on file prior to encounter. REVIEW OF SYSTEMS    Pertinent items are noted in HPI. Review of Systems: A 12 point review of symptoms is unremarkable with the exception of the chief complaint. Patient specifically denies nausea, fever, vomiting, chills, shortness of breath, chest pain, abdominal pain, constipation or difficulty urinating. Objective:      /73   Pulse 65   Temp 98 °F (36.7 °C) (Oral)   Resp 16   LMP 06/15/1999     Wt Readings from Last 3 Encounters:   12/03/19 214 lb 12.8 oz (97.4 kg)   09/30/19 211 lb 3.2 oz (95.8 kg)   09/27/19 210 lb (95.3 kg)       PHYSICAL EXAM    General Appearance: alert and oriented to person, place and time, well-developed and well-nourished, in no acute distress  Skin: warm and dry, no rash or erythema. Wound noted on the plantar aspect of the bilateral great toes. There are minimal hyperkeratotic rims surrounding the wounds. Wound has fibrotic and nonviable tissue that extends down through and includes the subcutaneous tissue. After debridement the wound has a granular base. There is no surrounding erythema, edema, warmth or malodor noted. The wound does not probe or track to bone. DP/PT pulses palpable bilaterally. CFT brisk to all digits. Digits are pink and warm to the touch. Hair growth is normal in appearance. No edema noted. No calf pain with palpation noted. Epicritic sensation is grossly absent bilaterally. Negative clonus and babinski reflex is down going. Patient has had a left partial 3rd toe amputation. Patient has rigid hammertoe contractures noted on the bilateral feet.   Hallux limitus noted bilaterally    Assessment:        Problem List Items Addressed This Visit     Diabetic ulcer of toe of left foot associated with diabetes mellitus due to underlying condition, with fat layer exposed ([facility] - Primary (Chronic)    Relevant Medications    lidocaine (XYLOCAINE) 4 % external solution 5 mL (Completed)    Other Relevant Orders    Supply: Nicole Wound    Supply: Wound Dressings    Supply: Cover and Secure           Procedure Note  Indications:  Based on my examination of this patient's wound(s)/ulcer(s) today, debridement is required to promote healing and evaluate the wound base. Performed by: Jackie Mo DPM    Consent obtained:  Yes    Time out taken:  Yes    Pain Control: Anesthetic  Anesthetic: 4% Lidocaine Liquid Topical(2.5 ml's )       Debridement:Nonexcisional     Using # 10 blade scalpel the wound(s)/ulcer(s) was/were sharply debrided down through and including the removal of epidermis and dermis. Devitalized Tissue Debrided:  fibrin, biofilm and callus    Pre Debridement Measurements:  Are located in the [place]  Documentation Flow Sheet    Wound/Ulcer #: 2    Post Debridement Measurements:  Wound/Ulcer Descriptions are Pre Debridement except measurements:  Wound 11/07/19 Toe (Comment  which one) Right;Plantar Initial wound #1: Great toe  (Active)   Wound Image   1/2/2020 11:00 AM   Wound Diabetic Love 2 11/7/2019 10:36 AM   Offloading for Diabetic Foot Ulcers Post op shoe 1/9/2020 11:53 AM   Dressing Status Old drainage 1/30/2020 10:55 AM   Dressing Changed Changed/New 1/30/2020 11:41 AM   Dressing/Treatment Other (comment); Hydrating gel;4x4;Steri-strips;Coban;Cast padding;Roll gauze;Silicone dressing 0/79/8332 11:41 AM   Wound Cleansed Rinsed/Irrigated with saline 11/21/2019 10:41 AM   Wound Length (cm) 0.3 cm 1/30/2020 10:55 AM   Wound Width (cm) 0.3 cm 1/30/2020 10:55 AM   Wound Depth (cm) 0.1 cm 1/30/2020 10:55 AM   Wound Surface Area (cm^2) 0.09 cm^2 1/30/2020 10:55 AM   Change in Wound Size % (l*w) 83.93 1/30/2020 10:55 AM   Wound Volume (cm^3) 0.01 cm^3 1/30/2020 10:55 AM   Wound Healing % 91 1/30/2020 10:55 AM   Post-Procedure Length (cm) 0.5 cm 1/30/2020 11:34 AM   Post-Procedure Width (cm) 0.5 cm 1/30/2020 11:34 AM   Post-Procedure Depth (cm) 0.1 cm 1/30/2020 11:34 AM   Post-Procedure Surface Area (cm^2) 0.25 cm^2 1/30/2020 11:34 AM   Post-Procedure Volume (cm^3) 0.02 cm^3 1/30/2020 11:34 AM   Undermining Starts ___ O'Clock 3 1/16/2020 11:00 AM   Undermining Ends___ O'Clock 12 1/16/2020 11:00 AM   Undermining Maxium Distance (cm) 0.2 1/16/2020 11:00 AM   Wound Assessment Brown 1/30/2020 10:55 AM   Drainage Amount Scant 1/30/2020 10:55 AM   Drainage Description [place];Brown 1/30/2020 10:55 AM   Odor None 1/30/2020 10:55 AM   Margins Undefined edges 1/30/2020 10:55 AM   Nicole-wound Assessment Dry 1/30/2020 10:55 AM   Non-staged Wound Description Full thickness 1/30/2020 10:55 AM   [place]%Wound Bed 95 1/16/2020 11:00 AM   Red%Wound Bed 95 1/23/2020 11:27 AM   Yellow%Wound Bed 5 1/23/2020 11:27 AM   Other%Wound Bed 100 1/30/2020 10:55 AM   Number of days: 84       Wound 11/07/19 Toe (Comment  which one) Left;Plantar Initial wound #2- Great toe  (Active)   Wound Image   1/2/2020 11:00 AM   Wound Diabetic Love 2 11/7/2019 10:36 AM   Offloading for Diabetic Foot Ulcers Post op shoe 1/9/2020 11:53 AM   Dressing Status Old drainage 1/30/2020 10:55 AM   Dressing Changed Changed/New 1/30/2020 11:41 AM   Dressing/Treatment Hydrating gel; Cast padding;Coban;4x4;Roll gauze;Non adherent 1/30/2020 11:41 AM   Wound Cleansed Rinsed/Irrigated with saline 11/21/2019 10:41 AM   Wound Length (cm) 0.5 cm 1/30/2020 10:55 AM   Wound Width (cm) 0.5 cm 1/30/2020 10:55 AM   Wound Depth (cm) 0.2 cm 1/30/2020 10:55 AM   Wound Surface Area (cm^2) 0.25 cm^2 1/30/2020 10:55 AM   Change in Wound Size % (l*w) 50 1/30/2020 10:55 AM   Wound Volume (cm^3) 0.05 cm^3 1/30/2020 10:55 AM   Wound Healing % 67 1/30/2020 10:55 AM   Post-Procedure Length (cm) 0.7 cm 1/30/2020 11:34 AM   Post-Procedure Width (cm) 0.7 cm 1/30/2020 11:34 AM   Post-Procedure Depth (cm) 0.2 cm 1/30/2020 11:34 AM   Post-Procedure Surface Area (cm^2) 0.49 cm^2 1/30/2020 11:34 AM   Post-Procedure Volume (cm^3) 0.1 cm^3 1/30/2020 11:34 AM   Undermining Starts ___ O'Clock 12 1/23/2020 11:27 AM   Undermining Ends___ O'Clock 12 1/23/2020 11:27 AM   Undermining Maxium Distance (cm) 0.3 1/23/2020 11:27 AM   Wound Assessment Granulation tissue;Slough 1/30/2020 10:55 AM   Drainage Amount Scant 1/30/2020 10:55 AM   Drainage Description Lenora Prose; [place] 1/30/2020 10:55 AM   Odor None 1/30/2020 10:55 AM   Margins Attached edges 1/30/2020 10:55 AM   Nicole-wound Assessment Dry; White 1/30/2020 10:55 AM   Non-staged Wound Description Full thickness 1/30/2020 10:55 AM   [place]%Wound Bed 90 1/30/2020 10:55 AM   Red%Wound Bed 5 1/2/2020 11:00 AM   Yellow%Wound Bed 10 1/30/2020 10:55 AM   Number of days: 84          Percent of Wound(s)/Ulcer(s) Debrided: 100%    Total Surface Area Debrided: 0.49 sq cm       Diabetic/Pressure/Non Pressure Ulcers only:  Ulcer: Diabetic ulcer, fat layer exposed      Estimated Blood Loss:  Minimal    Hemostasis Achieved:  by pressure    Response to treatment:  Well tolerated by patient. Procedure:  Skin Substitute Application    Performed by: Claire Morales DPM    Ulcer Type: diabetic    Consent obtained: Yes    Time out taken: Yes    Product Utilized:    EpiFix 14 mm disc (1.54 cm)     Fenestrated: No    Mesher Utilized: No    Instrument(s) curette, #15 blade scalpel, scissors and forceps    Skin Substitute was Applied to Ulcer Number(s):    Ulcer #: 1      Wound 11/07/19 Toe (Comment  which one) Right;Plantar Initial wound #1: Great toe  (Active)   Wound Image   1/2/2020 11:00 AM   Wound Diabetic Love 2 11/7/2019 10:36 AM   Offloading for Diabetic Foot Ulcers Post op shoe 1/9/2020 11:53 AM   Dressing Status Old drainage 1/30/2020 10:55 AM   Dressing Changed Changed/New 1/30/2020 11:41 AM   Dressing/Treatment Other (comment); Hydrating gel;4x4;Steri-strips;Coban;Cast padding;Roll gauze;Silicone dressing 5/14/3774 11:41 AM   Wound Cleansed Rinsed/Irrigated with saline 11/21/2019 10:41 AM   Wound Length (cm) 0.3 cm 1/30/2020 10:55 AM Wound Width (cm) 0.3 cm 1/30/2020 10:55 AM   Wound Depth (cm) 0.1 cm 1/30/2020 10:55 AM   Wound Surface Area (cm^2) 0.09 cm^2 1/30/2020 10:55 AM   Change in Wound Size % (l*w) 83.93 1/30/2020 10:55 AM   Wound Volume (cm^3) 0.01 cm^3 1/30/2020 10:55 AM   Wound Healing % 91 1/30/2020 10:55 AM   Post-Procedure Length (cm) 0.5 cm 1/30/2020 11:34 AM   Post-Procedure Width (cm) 0.5 cm 1/30/2020 11:34 AM   Post-Procedure Depth (cm) 0.1 cm 1/30/2020 11:34 AM   Post-Procedure Surface Area (cm^2) 0.25 cm^2 1/30/2020 11:34 AM   Post-Procedure Volume (cm^3) 0.02 cm^3 1/30/2020 11:34 AM   Undermining Starts ___ O'Clock 3 1/16/2020 11:00 AM   Undermining Ends___ O'Clock 12 1/16/2020 11:00 AM   Undermining Maxium Distance (cm) 0.2 1/16/2020 11:00 AM   Wound Assessment Brown 1/30/2020 10:55 AM   Drainage Amount Scant 1/30/2020 10:55 AM   Drainage Description [place];Brown 1/30/2020 10:55 AM   Odor None 1/30/2020 10:55 AM   Margins Undefined edges 1/30/2020 10:55 AM   Nicole-wound Assessment Dry 1/30/2020 10:55 AM   Non-staged Wound Description Full thickness 1/30/2020 10:55 AM   [place]%Wound Bed 95 1/16/2020 11:00 AM   Red%Wound Bed 95 1/23/2020 11:27 AM   Yellow%Wound Bed 5 1/23/2020 11:27 AM   Other%Wound Bed 100 1/30/2020 10:55 AM   Number of days: 84       Wound 11/07/19 Toe (Comment  which one) Left;Plantar Initial wound #2- Great toe  (Active)   Wound Image   1/2/2020 11:00 AM   Wound Diabetic Love 2 11/7/2019 10:36 AM   Offloading for Diabetic Foot Ulcers Post op shoe 1/9/2020 11:53 AM   Dressing Status Old drainage 1/30/2020 10:55 AM   Dressing Changed Changed/New 1/30/2020 11:41 AM   Dressing/Treatment Hydrating gel; Cast padding;Coban;4x4;Roll gauze;Non adherent 1/30/2020 11:41 AM   Wound Cleansed Rinsed/Irrigated with saline 11/21/2019 10:41 AM   Wound Length (cm) 0.5 cm 1/30/2020 10:55 AM   Wound Width (cm) 0.5 cm 1/30/2020 10:55 AM   Wound Depth (cm) 0.2 cm 1/30/2020 10:55 AM   Wound Surface Area (cm^2) 0.25 cm^2 1/30/2020 10:55 AM   Change in Wound Size % (l*w) 50 1/30/2020 10:55 AM   Wound Volume (cm^3) 0.05 cm^3 1/30/2020 10:55 AM   Wound Healing % 67 1/30/2020 10:55 AM   Post-Procedure Length (cm) 0.7 cm 1/30/2020 11:34 AM   Post-Procedure Width (cm) 0.7 cm 1/30/2020 11:34 AM   Post-Procedure Depth (cm) 0.2 cm 1/30/2020 11:34 AM   Post-Procedure Surface Area (cm^2) 0.49 cm^2 1/30/2020 11:34 AM   Post-Procedure Volume (cm^3) 0.1 cm^3 1/30/2020 11:34 AM   Undermining Starts ___ O'Clock 12 1/23/2020 11:27 AM   Undermining Ends___ O'Clock 12 1/23/2020 11:27 AM   Undermining Maxium Distance (cm) 0.3 1/23/2020 11:27 AM   Wound Assessment Granulation tissue;Slough 1/30/2020 10:55 AM   Drainage Amount Scant 1/30/2020 10:55 AM   Drainage Description Ricke Duncans; [place] 1/30/2020 10:55 AM   Odor None 1/30/2020 10:55 AM   Margins Attached edges 1/30/2020 10:55 AM   Nicole-wound Assessment Dry; White 1/30/2020 10:55 AM   Non-staged Wound Description Full thickness 1/30/2020 10:55 AM   [place]%Wound Bed 90 1/30/2020 10:55 AM   Red%Wound Bed 5 1/2/2020 11:00 AM   Yellow%Wound Bed 10 1/30/2020 10:55 AM   Number of days: 84          Diabetic/Pressure/Non Pressure Ulcers:  Ulcer:   Diabetic ulcer, fat layer exposed      Total Surface Area of Ulcer(s) Covered 0.25 sq/cm    Was the Product Layered  Yes     Amount of Product Applied 1.54 sq/cm     Amount of Product Wasted 0 sq/cm     Reason for Waste N/A      Surgically Fixated: Yes    Secured With: Steri Strips and Mepitel     Procedural Pain: 0/10     Post Procedural Pain: 0 / 10    Response to Treatment: Well tolerated by patient. This was the third application of Epifix to the right foot wound. Plan:   E/M x 30 minutes with more than half of the time spent with face to face treatment and counseling. Offload the wound with surgical shoe as there was hyperkeratotic tissue surrounding the wound.  Discussed with patient at length that if she is not adherent to offloading recommendations the wounds are to full duty work:                                    []????????? Return to work with [address]     If you are still having pain after you go home:  [x]????????? Elevate the affected limb.    [x]????????? Use over-the-counter medications you would normally use for pain as permitted by your family doctor. [x]????????? For persistent pain not relieved by the above interventions, please call your family doctor.             Return Appointment:  []????????? Wound and dressing supply provider:   []????????? ECF or Home Healthcare:  []????????? Wound Assessment:          []????????? Physician or NP scheduled for Wound Assessment:   [x]????????? Return Appointment: With DR SCHULTZ  in 1 Week(s)  []????????? Ordered tests:      Nurse Case Manger: Duke Grand    Electronically signed by Halina Sloan RN on 1/30/2020 at 11:24 1730 [address] Information: Should you experience any significant changes in your wound(s) or have questions about your wound care, please contact the [address] [place] [address] [phone] [address] [place] 8:00 am - 4:30 pm and Friday 8:00 am - 12:30 pm.  If you need help with your wound outside these hours and cannot wait until we are again available, contact your PCP or go to the hospital emergency room.      PLEASE NOTE: IF YOU ARE UNABLE TO OBTAIN WOUND SUPPLIES, CONTINUE TO USE THE SUPPLIES YOU HAVE AVAILABLE UNTIL YOU ARE ABLE TO [facility].  IT IS MOST IMPORTANT TO KEEP THE WOUND COVERED AT ALL TIMES.     Physician Signature:_______________________     Date: ___________ Time:  ____________                             [X]   Dr Nicolette Schultz   [ ] Tiffanie Zamarripa CNP        Electronically signed by Viet Contreras DPM on 1/30/2020 at 6:16 PM

## 2020-01-30 NOTE — PLAN OF CARE
EpiFix Treatment Note    NAME:  Marcia Bravo  YOB: 1955  MEDICAL RECORD NUMBER:  [id number]  DATE:  1/30/2020    Goal:  Patient will receive safe and proper application of skin substitute. Patient will comply with caring for dressing, offloading and reporting complications. Expiration date checked immediately prior to use. Package intact prior to use and no damage noted. Transport temperature controlled and acceptable. EpiFix was removed from protective sterile packaging by provider and  applied to prepared ulcer bed. EpiFix was placed using embossment letting as a guide. EpiFix was hydrated with sterile normal saline per provider. EpiFix was applied to RIGHT GREAT TOE and affixed with steri-strips by the provider. EpiFix was covered with non-adherent ulcer dressing. Applied HYDROGEL over non-adherent. Applied dry gauze and/or roll gauze. Coban or ace wrap was applied to secure graft and decrease edema. Patient/caregiver was instructed not to remove dressing and to keep it clean and dry. Pt/family/caregiver was instructed on signs and symptoms of complications to report such as draining through dressing, dressing falling down/slipping, getting wet, or severe pain or tingling in toes   Pt/family/caregiver was instructed on need for offloading and elevation of affected extremity and on use of prescribed offloading device.   Guidelines followed   EpiFix may be applied a total of 10 times per wound over a 12 week period. Additionally EpiFix may only be used every 12 months per wound. Date of first application of EpiFix for this current wound is January 16, 2020.         Electronically signed by Caron Gan RN on 1/30/2020 at 5:09 PM

## 2020-01-31 ENCOUNTER — HOSPITAL ENCOUNTER (OUTPATIENT)
Dept: WOUND CARE | Age: 65
Discharge: HOME | End: 2020-01-31
Payer: MEDICARE

## 2020-01-31 VITALS
RESPIRATION RATE: 16 BRPM | TEMPERATURE: 97.9 F | SYSTOLIC BLOOD PRESSURE: 99 MMHG | HEART RATE: 70 BPM | DIASTOLIC BLOOD PRESSURE: 61 MMHG

## 2020-01-31 PROCEDURE — 99212 OFFICE O/P EST SF 10 MIN: CPT

## 2020-01-31 RX ORDER — CLOBETASOL PROPIONATE 0.5 MG/G
OINTMENT TOPICAL ONCE
Status: CANCELLED | OUTPATIENT
Start: 2020-01-31

## 2020-01-31 RX ORDER — LIDOCAINE 50 MG/G
0.5 OINTMENT TOPICAL ONCE
Status: CANCELLED | OUTPATIENT
Start: 2020-01-31

## 2020-01-31 RX ORDER — LIDOCAINE HYDROCHLORIDE 40 MG/ML
5 SOLUTION TOPICAL ONCE
Status: CANCELLED | OUTPATIENT
Start: 2020-01-31

## 2020-01-31 RX ORDER — LIDOCAINE 40 MG/G
CREAM TOPICAL ONCE
Status: CANCELLED | OUTPATIENT
Start: 2020-01-31

## 2020-01-31 RX ORDER — GINSENG 100 MG
CAPSULE ORAL ONCE
Status: CANCELLED | OUTPATIENT
Start: 2020-01-31

## 2020-01-31 RX ORDER — BETAMETHASONE DIPROPIONATE 0.05 %
OINTMENT (GRAM) TOPICAL ONCE
Status: CANCELLED | OUTPATIENT
Start: 2020-01-31

## 2020-01-31 RX ORDER — GENTAMICIN SULFATE 1 MG/G
OINTMENT TOPICAL ONCE
Status: CANCELLED | OUTPATIENT
Start: 2020-01-31

## 2020-01-31 RX ORDER — BACITRACIN ZINC AND POLYMYXIN B SULFATE 500; 1000 [USP'U]/G; [USP'U]/G
OINTMENT TOPICAL ONCE
Status: CANCELLED | OUTPATIENT
Start: 2020-01-31

## 2020-01-31 RX ORDER — BACITRACIN, NEOMYCIN, POLYMYXIN B 400; 3.5; 5 [USP'U]/G; MG/G; [USP'U]/G
OINTMENT TOPICAL ONCE
Status: CANCELLED | OUTPATIENT
Start: 2020-01-31

## 2020-01-31 ASSESSMENT — PAIN SCALES - GENERAL: PAINLEVEL_OUTOF10: 0

## 2020-02-05 RX ORDER — PEN NEEDLE, DIABETIC 31 GX5/16"
NEEDLE, DISPOSABLE MISCELLANEOUS
Qty: 200 EACH | Refills: 0 | Status: SHIPPED | OUTPATIENT
Start: 2020-02-05 | End: 2020-07-29

## 2020-02-06 ENCOUNTER — HOSPITAL ENCOUNTER (OUTPATIENT)
Dept: WOUND CARE | Age: 65
Discharge: HOME | End: 2020-02-06
Payer: MEDICARE

## 2020-02-06 VITALS
RESPIRATION RATE: 16 BRPM | BODY MASS INDEX: 38.36 KG/M2 | TEMPERATURE: 97.5 F | HEART RATE: 68 BPM | DIASTOLIC BLOOD PRESSURE: 78 MMHG | HEIGHT: 63 IN | WEIGHT: 216.49 LBS | SYSTOLIC BLOOD PRESSURE: 137 MMHG

## 2020-02-06 PROCEDURE — 15275 SKIN SUB GRAFT FACE/NK/HF/G: CPT

## 2020-02-06 RX ORDER — BACITRACIN ZINC AND POLYMYXIN B SULFATE 500; 1000 [USP'U]/G; [USP'U]/G
OINTMENT TOPICAL ONCE
Status: CANCELLED | OUTPATIENT
Start: 2020-02-06

## 2020-02-06 RX ORDER — LIDOCAINE 50 MG/G
0.5 OINTMENT TOPICAL ONCE
Status: CANCELLED | OUTPATIENT
Start: 2020-02-06

## 2020-02-06 RX ORDER — LIDOCAINE 40 MG/G
CREAM TOPICAL ONCE
Status: CANCELLED | OUTPATIENT
Start: 2020-02-06

## 2020-02-06 RX ORDER — LIDOCAINE HYDROCHLORIDE 40 MG/ML
5 SOLUTION TOPICAL ONCE
Status: DISCONTINUED | OUTPATIENT
Start: 2020-02-06 | End: 2020-02-07 | Stop reason: HOSPADM

## 2020-02-06 RX ORDER — GINSENG 100 MG
CAPSULE ORAL ONCE
Status: CANCELLED | OUTPATIENT
Start: 2020-02-06

## 2020-02-06 RX ORDER — LIDOCAINE HYDROCHLORIDE 40 MG/ML
5 SOLUTION TOPICAL ONCE
Status: CANCELLED | OUTPATIENT
Start: 2020-02-06

## 2020-02-06 RX ORDER — GENTAMICIN SULFATE 1 MG/G
OINTMENT TOPICAL ONCE
Status: CANCELLED | OUTPATIENT
Start: 2020-02-06

## 2020-02-06 RX ORDER — BETAMETHASONE DIPROPIONATE 0.05 %
OINTMENT (GRAM) TOPICAL ONCE
Status: CANCELLED | OUTPATIENT
Start: 2020-02-06

## 2020-02-06 RX ORDER — CLOBETASOL PROPIONATE 0.5 MG/G
OINTMENT TOPICAL ONCE
Status: CANCELLED | OUTPATIENT
Start: 2020-02-06

## 2020-02-06 RX ORDER — BACITRACIN, NEOMYCIN, POLYMYXIN B 400; 3.5; 5 [USP'U]/G; MG/G; [USP'U]/G
OINTMENT TOPICAL ONCE
Status: CANCELLED | OUTPATIENT
Start: 2020-02-06

## 2020-02-06 ASSESSMENT — PAIN SCALES - GENERAL
PAINLEVEL_OUTOF10: 0
PAINLEVEL_OUTOF10: 0

## 2020-02-06 NOTE — PROGRESS NOTES
[address]   Progress Note and Procedure Note      Rochelle Howell  MEDICAL RECORD NUMBER:  [id number]  AGE: 75 y.o. GENDER: female  : 1955  EPISODE DATE:  2020    Subjective:     Chief Complaint   Patient presents with    Wound Check     Follow up for bilateral foot wound          HISTORY of PRESENT ILLNESS HPI  Rochelle Howell is a 70 y.o. female who presents today for wound/ulcer evaluation. Denies constitutional issues. Patient relates that she has kept her dressings clean and intact and is offloading the wounds with surgical shoes.    History of Wound Context: callus formation with fissure  Wound/Ulcer Pain Timing/Severity: none (pt is neuropathic)  Quality of pain: N/A  Severity:  0 / 10   Modifying Factors: None  Associated Signs/Symptoms: none     Ulcer Identification:  Ulcer Type: diabetic, pressure and shear  Contributing Factors: diabetes, poor glucose control, chronic pressure, shear force and obesity     Wound: N/A                                       PAST MEDICAL HISTORY        Diagnosis Date    Abnormal echocardiogram     25% on 3/11/14 and 50% on 3/19/14    Back pain     Cardiomyopathy ([address]     EF was 50% on 3/19/14    Chipped tooth     lower left    Dental crown present     veneers    Depression     Depressive disorder 2014    Diabetes mellitus ([address]     Diabetic infection of right foot ([address] 4/15/2015    Diabetic ulcer of toe of left foot associated with type 2 diabetes mellitus, with fat layer exposed ([address] 4/10/2018    Pt slipped in hot tub latter part of February and has not healed since despite topical treatment    DVT (deep venous thrombosis) ([address]     HTN (hypertension)     Hx of blood clots     left leg    Ischemic cardiomyopathy 4/15/2015    Kidney disease     Meniere's disease     Mixed hyperlipidemia 3/7/2016    Nicotine abuse     NSTEMI (non-ST elevated myocardial infarction) ([address] 3/13/2014    Osteomyelitis of ankle or foot, acute, left ([facility] 2018    Pneumonia     Spinal abscess ([facility] 3/2014    epidural abscess    Spinal epidural abscess 3/13/2014    Type II diabetes mellitus, uncontrolled ([facility] 2014       PAST SURGICAL HISTORY    Past Surgical History:   Procedure Laterality Date    BACK SURGERY  3/2014    DEBRIDEMENT  3/12/14    extensive debridement of bone/muscle and paraspinal empyema    HYSTERECTOMY  6/15/1999    complete-think right ovary in.    NASAL SEPTUM SURGERY      SLEEVE GASTRECTOMY  2019    ROBOTIC ASSISTED LAPAROSCOPIC SLEEVE GASTRECTOMY, LAPAROSCOPIC REDUCIBLE INCISIONAL HERNIA REPAIR     SLEEVE GASTRECTOMY N/A 2019    ROBOTIC ASSISTED LAPAROSCOPIC SLEEVE GASTRECTOMY, LAPAROSCOPIC REDUCIBLE INCISIONAL HERNIA REPAIR performed by Cori Salazars, DO at [address] N/A 2019    EGD BIOPSY performed by Cori Salazars, DO at [facility] N/A 2019    EGD POLYP ABLATION/OTHER performed by Cori Augustine, DO at [facility] ENDOSCOPY       FAMILY HISTORY    Family History   Problem Relation Age of Onset    High Blood Pressure Mother     Cancer Mother     Rheum Arthritis Mother     COPD Father     Cancer Father     Osteoarthritis Neg Hx     Asthma Neg Hx     Breast Cancer Neg Hx     Diabetes Neg Hx     Heart Failure Neg Hx     High Cholesterol Neg Hx     Hypertension Neg Hx     Migraines Neg Hx     Ovarian Cancer Neg Hx     Rashes/Skin Problems Neg Hx     Seizures Neg Hx     Stroke Neg Hx     Thyroid Disease Neg Hx        SOCIAL HISTORY    Social History     Tobacco Use    Smoking status: Former Smoker     Packs/day: 0.50     Years: 10.00     Pack years: 5.00     Last attempt to quit: 11/10/2013     Years since quittin.2    Smokeless tobacco: Never Used   Substance Use Topics    Alcohol use: Not Currently     Alcohol/week: 0.0 standard drinks     Comment: occasionally    Drug use:  No ALLERGIES    Allergies   Allergen Reactions    Doxycycline Other (See Comments)     Yeast infection       MEDICATIONS    Current Outpatient Medications on File Prior to Encounter   Medication Sig Dispense Refill    Insulin Degludec (TRESIBA FLEXTOUCH) 200 UNIT/ML SOPN Inject 40 units into the skin once daily. 5 pen 1    gabapentin (NEURONTIN) 300 MG capsule TAKE 2 CAPSULES BY MOUTH 3 TIMES DAILY  DAYS. Bridget Hazard 180 capsule 0    Liraglutide (VICTOZA) 18 MG/3ML SOPN SC injection Inject 1.8 mg into the skin daily 2 pen 3    citalopram (CELEXA) 40 MG tablet Take 1 tablet by mouth daily 90 tablet 1    pantoprazole (PROTONIX) 40 MG tablet TAKE 1 TABLET BY MOUTH DAILY (Patient taking differently: Take 40 mg by mouth as needed ) 30 tablet 2    Cholecalciferol (VITAMIN D3) 2000 units CAPS Take by mouth daily      acetaminophen (TYLENOL) 500 MG tablet Take 1 tablet by mouth every 6 hours as needed for Pain 120 tablet     aspirin 81 MG chewable tablet Take 1 tablet by mouth daily. 30 tablet     B-D UF III MINI PEN NEEDLES 31G X 5 MM MISC USE TWICE A DAY WITH INSULIN INJECTIONS 200 each 0    ondansetron (ZOFRAN-ODT) 4 MG disintegrating tablet TAKE 1 TABLET BY MOUTH EVERY 8 HOURS AS NEEDED FOR NAUSEA AND VOMITING 20 tablet 1    atorvastatin (LIPITOR) 80 MG tablet Take 1 tablet by mouth daily 90 tablet 1    ONE TOUCH LANCETS MISC 1 each by Does not apply route 3 times daily 100 each 11    blood glucose test strips (ONE TOUCH ULTRA TEST) strip 1 each by Does not apply route 3 times daily As needed. 100 strip 11     No current facility-administered medications on file prior to encounter. REVIEW OF SYSTEMS    Pertinent items are noted in HPI. Review of Systems: A 12 point review of symptoms is unremarkable with the exception of the chief complaint.   Patient specifically denies nausea, fever, vomiting, chills, shortness of breath, chest pain, abdominal pain, constipation or difficulty urinating. Objective:      /78   Pulse 68   Temp 97.5 °F (36.4 °C) (Oral)   Resp 16   Ht 5' 3\" (1.6 m)   Wt 216 lb 7.9 oz (98.2 kg)   LMP 06/15/1999   BMI 38.35 kg/m²     Wt Readings from Last 3 Encounters:   02/06/20 216 lb (98 kg)   02/06/20 216 lb 7.9 oz (98.2 kg)   12/03/19 214 lb 12.8 oz (97.4 kg)       PHYSICAL EXAM    General Appearance: alert and oriented to person, place and time, well-developed and well-nourished, in no acute distress  Skin: warm and dry, no rash or erythema. Wound noted on the plantar aspect of the left great toe. The previously noted wound on the right hallux has epithelialized. There is minimal hyperkeratotic rim surrounding the wounds. Wound has fibrotic and nonviable tissue that extends down through and includes the subcutaneous tissue. After debridement the wound has a granular base. There is no surrounding erythema, edema, warmth or malodor noted. The wound does not probe or track to bone. DP/PT pulses palpable bilaterally. CFT brisk to all digits. Digits are pink and warm to the touch. Hair growth is normal in appearance. No edema noted. No calf pain with palpation noted. Epicritic sensation is grossly absent bilaterally. Negative clonus and babinski reflex is down going. Patient has had a left partial 3rd toe amputation. Patient has rigid hammertoe contractures noted on the bilateral feet.   Hallux limitus noted bilaterally    Assessment:        Problem List Items Addressed This Visit     Diabetic ulcer of toe of left foot associated with diabetes mellitus due to underlying condition, with fat layer exposed ([facility] - Primary (Chronic)    Relevant Medications    lidocaine (XYLOCAINE) 4 % external solution 5 mL    Other Relevant Orders    Supply: Wound Dressings    Supply: Cover and Secure           Procedure Note  Indications:  Based on my examination of this patient's wound(s)/ulcer(s) today, debridement is required to promote healing and evaluate the wound Nicole-wound Assessment Calloused; White 2/6/2020 11:20 AM   Non-staged Wound Description Full thickness 2/6/2020 11:20 AM   [place]%Wound Bed 95 2/6/2020 11:20 AM   Red%Wound Bed 5 1/2/2020 11:00 AM   Yellow%Wound Bed 5 2/6/2020 11:20 AM   Number of days: 91          Percent of Wound(s)/Ulcer(s) Debrided: 100%    Total Surface Area Debrided: 0.42 sq cm       Diabetic/Pressure/Non Pressure Ulcers only:  Ulcer: Diabetic ulcer, fat layer exposed      Estimated Blood Loss:  Minimal    Hemostasis Achieved:  by pressure    Response to treatment:  Well tolerated by patient.        Procedure:  Skin Substitute Application    Performed by: Claire Morales DPM    Ulcer Type: diabetic    Consent obtained: Yes    Time out taken: Yes    Product Utilized:    EpiFix 14 mm disc (1.54 cm)     Fenestrated: No    Mesher Utilized: No    Instrument(s) curette, #15 blade scalpel, scissors and forceps    Skin Substitute was Applied to Ulcer Number(s):    Ulcer #: 2      Wound 11/07/19 Toe (Comment  which one) Right;Plantar Initial wound #1: Great toe  (Active)   Wound Image   2/6/2020 11:20 AM   Wound Diabetic Love 2 11/7/2019 10:36 AM   Offloading for Diabetic Foot Ulcers Post op shoe 1/9/2020 11:53 AM   Dressing Status Old drainage 1/30/2020 10:55 AM   Dressing Changed Dressing reinforced 1/31/2020  8:32 AM   Dressing/Treatment Silicone border 0/0/6725 12:15 PM   Wound Cleansed Rinsed/Irrigated with saline 11/21/2019 10:41 AM   Wound Length (cm) 0 cm 2/6/2020 11:20 AM   Wound Width (cm) 0 cm 2/6/2020 11:20 AM   Wound Depth (cm) 0 cm 2/6/2020 11:20 AM   Wound Surface Area (cm^2) 0 cm^2 2/6/2020 11:20 AM   Change in Wound Size % (l*w) 100 2/6/2020 11:20 AM   Wound Volume (cm^3) 0 cm^3 2/6/2020 11:20 AM   Wound Healing % 100 2/6/2020 11:20 AM   Post-Procedure Length (cm) 0 cm 2/6/2020 12:00 PM   Post-Procedure Width (cm) 0 cm 2/6/2020 12:00 PM   Post-Procedure Depth (cm) 0 cm 2/6/2020 12:00 PM   Post-Procedure Surface Area (cm^2) 0 cm^2 2/6/2020 12:00 PM   Post-Procedure Volume (cm^3) 0 cm^3 2/6/2020 12:00 PM   Undermining Starts ___ O'Clock 3 1/16/2020 11:00 AM   Undermining Ends___ O'Clock 12 1/16/2020 11:00 AM   Undermining Maxium Distance (cm) 0.2 1/16/2020 11:00 AM   Wound Assessment Epithelialization 2/6/2020 11:20 AM   Drainage Amount None 1/31/2020  8:32 AM   Drainage Description [place];Brown 1/30/2020 10:55 AM   Odor None 1/31/2020  8:32 AM   Nicole-wound Assessment Clean;Dry 1/31/2020  8:32 AM   Non-staged Wound Description Full thickness 1/31/2020  8:32 AM   [place]%Wound Bed 95 1/16/2020 11:00 AM   Red%Wound Bed 95 1/23/2020 11:27 AM   Yellow%Wound Bed 5 1/23/2020 11:27 AM   Other%Wound Bed 100 1/30/2020 10:55 AM   Number of days: 91       Wound 11/07/19 Toe (Comment  which one) Left;Plantar Initial wound #2- Great toe  (Active)   Wound Image   2/6/2020 11:20 AM   Wound Diabetic Love 2 11/7/2019 10:36 AM   Offloading for Diabetic Foot Ulcers Post op shoe 1/9/2020 11:53 AM   Dressing Status Intact; Old drainage 1/31/2020  8:32 AM   Dressing Changed Changed/New 2/6/2020 12:15 PM   Dressing/Treatment Other (comment); [place];Cast padding;4x4;Roll gauze 2/6/2020 12:15 PM   Wound Cleansed Rinsed/Irrigated with saline 11/21/2019 10:41 AM   Wound Length (cm) 0.5 cm 2/6/2020 11:20 AM   Wound Width (cm) 0.4 cm 2/6/2020 11:20 AM   Wound Depth (cm) 0.2 cm 2/6/2020 11:20 AM   Wound Surface Area (cm^2) 0.2 cm^2 2/6/2020 11:20 AM   Change in Wound Size % (l*w) 60 2/6/2020 11:20 AM   Wound Volume (cm^3) 0.04 cm^3 2/6/2020 11:20 AM   Wound Healing % 73 2/6/2020 11:20 AM   Post-Procedure Length (cm) 0.7 cm 2/6/2020 12:00 PM   Post-Procedure Width (cm) 0.6 cm 2/6/2020 12:00 PM   Post-Procedure Depth (cm) 0.2 cm 2/6/2020 12:00 PM   Post-Procedure Surface Area (cm^2) 0.42 cm^2 2/6/2020 12:00 PM   Post-Procedure Volume (cm^3) 0.08 cm^3 2/6/2020 12:00 PM   Undermining Starts ___ O'Clock 12 1/23/2020 11:27 AM   Undermining Ends___ O'Clock 12 1/23/2020 11:27 AM   Undermining  If wraps become too tight call the center or completely remove the wrap.                      []?????????? Elevate leg(s) above the level of the heart when sitting.                 []?????????? Avoid prolonged standing in one place.     Off-Loading:   []?????????? Off-loading when    []?????????? walking       []?????????? in bed         []?????????? sitting  []?????????? Total non-weight bearing  []?????????? Right Leg  []?????????? Left Leg          [x]?????????? Assistive Device     []?????????? Walker        []?????????? Cane           []?????????? Wheelchair  []?????????? Crutches              [x]?????????? Surgical shoe TO BOTH FEET - WEAR WHENEVER YOUR FEET ARE ON THE FLOOR   []?????????? Podus Boot(s)   []?????????? Foam Boot(s)  []?????????? Roll About              []?????????? Cast Boot   []?????????? CROW Boot  []?????????? Other:     Contact Cast:  Apply:  []?????????? Total Contact Cast Applied in Clinic          []?????????? RightLeg      []???????? ?? Left Leg              []?????????? Do not get cast wet.  Contact center or go to emergency room if there is a foul odor or becomes uncomfortable due to feeling tight or swelling.  Do not use objects inside of cast to scratch.          Dietary:  []?????????? Diet as tolerated:    [x]?????????? Calorie Diabetic Diet:         []?????????? No Added Salt:  []?????????? Increase Protein:    []?????????? Other:     Activity:  [x]?????????? Activity as tolerated:  []?????????? Patient has no activity restrictions     []?????????? Strict Bedrest: []?????????? Remain off Work:     []?????????? May return to full duty work:                                    []?????????? Return to work with [address]     If you are still having pain after you go home:  [x]?????????? Elevate the affected limb.    [x]?????????? Use over-the-counter medications you would normally use for pain as permitted by your family doctor.   [x]?????????? For persistent pain not relieved by the above interventions, please call your family doctor.             Return Appointment:  []?????????? Wound and dressing supply provider:   []?????????? ECF or Home Healthcare:  []?????????? Wound Assessment:          []?????????? Physician or NP scheduled for Wound Assessment:   [x]?????????? Return Appointment: With DR SCHULTZ  in 1 Week(s)  []?????????? Ordered tests:      Nurse Case Manger: Nadir Urias     Electronically signed by Lance Castorena RN on 2/6/2020 at 12:00 PM    [id number] [address] [facility] [address]  N Information: Should you experience any significant changes in your wound(s) or have questions about your wound care, please contact the [address] [place] [address] [phone] [address] [place] 8:00 am - 4:30 pm and Friday 8:00 am - 12:30 pm.  If you need help with your wound outside these hours and cannot wait until we are again available, contact your PCP or go to the hospital emergency room.      PLEASE NOTE: IF YOU ARE UNABLE TO OBTAIN WOUND SUPPLIES, CONTINUE TO USE THE SUPPLIES YOU HAVE AVAILABLE UNTIL YOU ARE ABLE TO [facility].  IT IS MOST IMPORTANT TO KEEP THE WOUND COVERED AT ALL TIMES.     Physician Signature:_______________________     Date: ___________ Time:  ____________                             [X]   Dr Nicolette Schultz   [ ] Le[place], CNP        Electronically signed by Demario Tidwell DPM on 2/6/2020 at 4:11 PM

## 2020-02-08 RX ORDER — CITALOPRAM 40 MG/1
40 TABLET ORAL DAILY
Qty: 90 TABLET | Refills: 0 | Status: SHIPPED | OUTPATIENT
Start: 2020-02-08 | End: 2020-06-15

## 2020-02-13 ENCOUNTER — HOSPITAL ENCOUNTER (OUTPATIENT)
Dept: WOUND CARE | Age: 65
Discharge: HOME | End: 2020-02-13
Payer: MEDICARE

## 2020-02-13 VITALS
RESPIRATION RATE: 18 BRPM | TEMPERATURE: 98.3 F | SYSTOLIC BLOOD PRESSURE: 121 MMHG | DIASTOLIC BLOOD PRESSURE: 68 MMHG | HEART RATE: 71 BPM

## 2020-02-13 PROCEDURE — 6370000000 HC RX 637 (ALT 250 FOR IP): Performed by: PODIATRIST

## 2020-02-13 PROCEDURE — 15275 SKIN SUB GRAFT FACE/NK/HF/G: CPT

## 2020-02-13 RX ORDER — CLOBETASOL PROPIONATE 0.5 MG/G
OINTMENT TOPICAL ONCE
Status: CANCELLED | OUTPATIENT
Start: 2020-02-13

## 2020-02-13 RX ORDER — LIDOCAINE 40 MG/G
CREAM TOPICAL ONCE
Status: CANCELLED | OUTPATIENT
Start: 2020-02-13

## 2020-02-13 RX ORDER — BETAMETHASONE DIPROPIONATE 0.05 %
OINTMENT (GRAM) TOPICAL ONCE
Status: CANCELLED | OUTPATIENT
Start: 2020-02-13

## 2020-02-13 RX ORDER — LIDOCAINE HYDROCHLORIDE 40 MG/ML
5 SOLUTION TOPICAL ONCE
Status: COMPLETED | OUTPATIENT
Start: 2020-02-13 | End: 2020-02-13

## 2020-02-13 RX ORDER — LIDOCAINE HYDROCHLORIDE 40 MG/ML
5 SOLUTION TOPICAL ONCE
Status: CANCELLED | OUTPATIENT
Start: 2020-02-13

## 2020-02-13 RX ORDER — BACITRACIN ZINC AND POLYMYXIN B SULFATE 500; 1000 [USP'U]/G; [USP'U]/G
OINTMENT TOPICAL ONCE
Status: CANCELLED | OUTPATIENT
Start: 2020-02-13

## 2020-02-13 RX ORDER — GENTAMICIN SULFATE 1 MG/G
OINTMENT TOPICAL ONCE
Status: CANCELLED | OUTPATIENT
Start: 2020-02-13

## 2020-02-13 RX ORDER — BACITRACIN, NEOMYCIN, POLYMYXIN B 400; 3.5; 5 [USP'U]/G; MG/G; [USP'U]/G
OINTMENT TOPICAL ONCE
Status: CANCELLED | OUTPATIENT
Start: 2020-02-13

## 2020-02-13 RX ORDER — GINSENG 100 MG
CAPSULE ORAL ONCE
Status: CANCELLED | OUTPATIENT
Start: 2020-02-13

## 2020-02-13 RX ORDER — LIDOCAINE 50 MG/G
0.5 OINTMENT TOPICAL ONCE
Status: CANCELLED | OUTPATIENT
Start: 2020-02-13

## 2020-02-13 RX ADMIN — LIDOCAINE HYDROCHLORIDE 2.5 ML: 40 SOLUTION TOPICAL at 11:32

## 2020-02-13 ASSESSMENT — PAIN SCALES - GENERAL
PAINLEVEL_OUTOF10: 0
PAINLEVEL_OUTOF10: 0

## 2020-02-13 NOTE — PROGRESS NOTES
[facility]   Progress Note and Procedure Note      Maddy Carver  MEDICAL RECORD NUMBER:  [id number]  AGE: 75 y.o. GENDER: female  : 1955  EPISODE DATE:  2020    Subjective:     Chief Complaint   Patient presents with    Wound Check     wound follow up right and left feet         HISTORY of PRESENT ILLNESS HPI  Maddy Carver is a 70 y.o. female who presents today for wound/ulcer evaluation. Denies constitutional issues. Patient relates that she has kept her dressings clean and intact and is offloading the wounds with surgical shoes. Patient relates that her dressing got wet and removed it. She states that she has a blister on her heel from her shoe that she put on.   History of Wound Context: callus formation with fissure  Wound/Ulcer Pain Timing/Severity: none (pt is neuropathic)  Quality of pain: N/A  Severity:  0 / 10   Modifying Factors: None  Associated Signs/Symptoms: none     Ulcer Identification:  Ulcer Type: diabetic, pressure and shear  Contributing Factors: diabetes, poor glucose control, chronic pressure, shear force and obesity     Wound: N/A                                       PAST MEDICAL HISTORY        Diagnosis Date    Abnormal echocardiogram     25% on 3/11/14 and 50% on 3/19/14    Back pain     Cardiomyopathy ([address]     EF was 50% on 3/19/14    Chipped tooth     lower left    Dental crown present     veneers    Depression     Depressive disorder 2014    Diabetes mellitus ([address]     Diabetic infection of right foot ([address] 4/15/2015    Diabetic ulcer of toe of left foot associated with type 2 diabetes mellitus, with fat layer exposed ([address] 4/10/2018    Pt slipped in hot tub latter part of February and has not healed since despite topical treatment    DVT (deep venous thrombosis) ([address]     HTN (hypertension)     Hx of blood clots     left leg    Ischemic cardiomyopathy 4/15/2015    Kidney disease     Meniere's disease     Items Addressed This Visit     Diabetic ulcer of toe of left foot associated with diabetes mellitus due to underlying condition, with fat layer exposed ([facility] - Primary (Chronic)    Relevant Medications    lidocaine (XYLOCAINE) 4 % external solution 5 mL (Completed)    Other Relevant Orders    Supply: Wound Dressings    Supply: Cover and Secure           Procedure Note  Indications:  Based on my examination of this patient's wound(s)/ulcer(s) today, debridement is required to promote healing and evaluate the wound base. Performed by: Una Miller DPM    Consent obtained:  Yes    Time out taken:  Yes    Pain Control: Anesthetic  Anesthetic: 4% Lidocaine Liquid Topical(2.5ml)       Debridement:Nonexcisional     Using # 10 blade scalpel the wound(s)/ulcer(s) was/were sharply debrided down through and including the removal of epidermis and dermis to prepare the wound for application of epfix. Devitalized Tissue Debrided:  fibrin, biofilm and callus    Pre Debridement Measurements:  Are located in the [place]  Documentation Flow Sheet    Wound/Ulcer #: 2    Post Debridement Measurements:  Wound/Ulcer Descriptions are Pre Debridement except measurements:  Wound 11/07/19 Toe (Comment  which one) Left;Plantar Initial wound #2- Great toe  (Active)   Wound Image   2/6/2020 11:20 AM   Wound Diabetic Love 2 11/7/2019 10:36 AM   Offloading for Diabetic Foot Ulcers Football dressing 2/13/2020 12:09 PM   Dressing Status Intact; Old drainage 1/31/2020  8:32 AM   Dressing Changed Changed/New 2/13/2020 12:09 PM   Dressing/Treatment Other (comment); Coban;Cast padding;4x4;Roll gauze 2/13/2020 12:09 PM   Wound Cleansed Rinsed/Irrigated with saline 11/21/2019 10:41 AM   Wound Length (cm) 0.3 cm 2/13/2020 11:13 AM   Wound Width (cm) 0.3 cm 2/13/2020 11:13 AM   Wound Depth (cm) 0.3 cm 2/13/2020 11:13 AM   Wound Surface Area (cm^2) 0.09 cm^2 2/13/2020 11:13 AM   Change in Wound Size % (l*w) 82 2/13/2020 11:13 AM   Wound Volume Assessment Pink;Yellow 2/13/2020 11:13 AM   Drainage Amount Small 2/13/2020 11:13 AM   Drainage Description Serous 2/13/2020 11:13 AM   Odor None 2/13/2020 11:13 AM   Margins Undefined edges 2/13/2020 11:13 AM   Nicole-wound Assessment Dry;Pink 2/13/2020 11:13 AM   Non-staged Wound Description Full thickness 2/13/2020 11:13 AM   [place]%Wound Bed 90 2/13/2020 11:13 AM   Yellow%Wound Bed 10 2/13/2020 11:13 AM   Number of days: 0          Percent of Wound(s)/Ulcer(s) Debrided: 100%    Total Surface Area Debrided: 0.25 sq cm       Diabetic/Pressure/Non Pressure Ulcers only:  Ulcer: Diabetic ulcer, fat layer exposed      Estimated Blood Loss:  Minimal    Hemostasis Achieved:  by pressure    Response to treatment:  Well tolerated by patient. Procedure:  Skin Substitute Application    Performed by: Odilia Vila DPM    Ulcer Type: diabetic    Consent obtained: Yes    Time out taken: Yes    Product Utilized:    EpiFix 14 mm disc (1.54 cm)     Fenestrated: No    Mesher Utilized: No    Instrument(s) curette, #15 blade scalpel, scissors and forceps    Skin Substitute was Applied to Ulcer Number(s):    Ulcer #: 2      Wound 11/07/19 Toe (Comment  which one) Left;Plantar Initial wound #2- Great toe  (Active)   Wound Image   2/6/2020 11:20 AM   Wound Diabetic Love 2 11/7/2019 10:36 AM   Offloading for Diabetic Foot Ulcers Football dressing 2/13/2020 12:09 PM   Dressing Status Intact; Old drainage 1/31/2020  8:32 AM   Dressing Changed Changed/New 2/13/2020 12:09 PM   Dressing/Treatment Other (comment); Coban;Cast padding;4x4;Roll gauze 2/13/2020 12:09 PM   Wound Cleansed Rinsed/Irrigated with saline 11/21/2019 10:41 AM   Wound Length (cm) 0.3 cm 2/13/2020 11:13 AM   Wound Width (cm) 0.3 cm 2/13/2020 11:13 AM   Wound Depth (cm) 0.3 cm 2/13/2020 11:13 AM   Wound Surface Area (cm^2) 0.09 cm^2 2/13/2020 11:13 AM   Change in Wound Size % (l*w) 82 2/13/2020 11:13 AM   Wound Volume (cm^3) 0.03 cm^3 2/13/2020 11:13 AM   Wound Healing % 80 2/13/2020 11:13 AM   Post-Procedure Length (cm) 0.5 cm 2/13/2020 11:42 AM   Post-Procedure Width (cm) 0.5 cm 2/13/2020 11:42 AM   Post-Procedure Depth (cm) 0.3 cm 2/13/2020 11:42 AM   Post-Procedure Surface Area (cm^2) 0.25 cm^2 2/13/2020 11:42 AM   Post-Procedure Volume (cm^3) 0.08 cm^3 2/13/2020 11:42 AM   Undermining Starts ___ O'Clock 12 1/23/2020 11:27 AM   Undermining Ends___ O'Clock 12 1/23/2020 11:27 AM   Undermining [place] Distance (cm) 0.3 1/23/2020 11:27 AM   Wound Assessment Granulation tissue;Pink;Slough; Yellow 2/13/2020 11:13 AM   Drainage Amount Scant 2/13/2020 11:13 AM   Drainage Description Serous 2/13/2020 11:13 AM   Odor None 2/13/2020 11:13 AM   Margins Attached edges 2/13/2020 11:13 AM   Nicole-wound Assessment Calloused; White 2/13/2020 11:13 AM   Non-staged Wound Description Full thickness 2/13/2020 11:13 AM   [place]%Wound Bed 95 2/13/2020 11:13 AM   Red%Wound Bed 5 1/2/2020 11:00 AM   Yellow%Wound Bed 5 2/13/2020 11:13 AM   Number of days: 98       Wound 02/13/20 Heel Left # 3 left heel initial 2/11/2020 (Active)   Wound Image   2/13/2020 11:13 AM   Wound Diabetic Love 2 2/13/2020 11:13 AM   Offloading for Diabetic Foot Ulcers Football dressing 2/13/2020 12:09 PM   Dressing Changed Changed/New 2/13/2020 12:09 PM   Dressing/Treatment Cast padding;Coban;Non adherent; Roll gauze;Silicone pad 2/53/9426 12:09 PM   Wound Length (cm) 1.4 cm 2/13/2020 11:13 AM   Wound Width (cm) 1.5 cm 2/13/2020 11:13 AM   Wound Depth (cm) 0.1 cm 2/13/2020 11:13 AM   Wound Surface Area (cm^2) 2.1 cm^2 2/13/2020 11:13 AM   Wound Volume (cm^3) 0.21 cm^3 2/13/2020 11:13 AM   Post-Procedure Length (cm) 1.4 cm 2/13/2020 11:42 AM   Post-Procedure Width (cm) 1.5 cm 2/13/2020 11:42 AM   Post-Procedure Depth (cm) 0.1 cm 2/13/2020 11:42 AM   Post-Procedure Surface Area (cm^2) 2.1 cm^2 2/13/2020 11:42 AM   Post-Procedure Volume (cm^3) 0.21 cm^3 2/13/2020 11:42 AM   Wound Assessment Pink;Yellow 2/13/2020 11:13 AM   Drainage Amount Small 2/13/2020 11:13 AM   Drainage Description Serous 2/13/2020 11:13 AM   Odor None 2/13/2020 11:13 AM   Margins Undefined edges 2/13/2020 11:13 AM   Nicole-wound Assessment Dry;Pink 2/13/2020 11:13 AM   Non-staged Wound Description Full thickness 2/13/2020 11:13 AM   [place]%Wound Bed 90 2/13/2020 11:13 AM   Yellow%Wound Bed 10 2/13/2020 11:13 AM   Number of days: 0          Diabetic/Pressure/Non Pressure Ulcers:  Ulcer:   Diabetic ulcer, fat layer exposed      Total Surface Area of Ulcer(s) Covered 0.25 sq/cm    Was the Product Layered  Yes     Amount of Product Applied 1.54 sq/cm     Amount of Product Wasted 0 sq/cm     Reason for Waste N/A      Surgically Fixated: Yes    Secured With: Steri Strips and Mepitel     Procedural Pain: 0/10     Post Procedural Pain: 0 / 10    Response to Treatment: Well tolerated by patient. This was the second application of Epifix to the left foot wound. Plan:   E/M x 30 minutes with more than half of the time spent with face to face treatment and counseling. Encouraged patient to follow up with getting extra depth DM shoes with custom inserts to better offload the areas and prevent reulceration. Keep dressing left foot clean and intact. Offload the wound with surgical shoe as there was hyperkeratotic tissue surrounding the wound. Discussed with patient at length that if she is not adherent to offloading recommendations the wounds are not likely to heal and she is at risk for further amputation. Pt education per provider related to plan of care, pt is agreeable to plan and questions answered. Treatment Note please see attached Discharge Instructions    Written patient dismissal instructions given to patient and signed by patient or POA.       RTC 1 week       Discharge Instructions       [address] and [facility]   Physician Orders and Discharge Instructions  [address] Dressing     []??????????? Other:     Dressings:          Wound Location :   LEFT HEEL       APPLY MEPITEL AND HYDROGEL TO WOUND THEN Gauze ,CAST PADDING,KERLIX AND COBAN (FOOTBALL DRESSING) .   DO NOT CHANGE              YOU HAVE HAD AN  EPIFIX  PLACED ON YOUR WOUND TODAY. FOR BEST RESULTS, WE RECOMMEND YOU LIMIT YOUR ACTIVITY FOR THE NEXT WEEK AS MUCH AS POSSIBLE. AVOID LONG PERIODS OF TIME ON YOUR FEET. THIS ALSO INCLUDES ELEVATING YOUR LEGS AND FEET 3-4 TIMES DAILY FOR AT LEAST 20-30 MINUTES ON PILLOWS AND AT NIGHT SO THAT THEY ARE HIGHER THAN THE LEVEL OF YOUR HEART      Electronically signed by Ashu Al RN on 2/13/2020 at 11:39 AM          Edema Control:  Apply:  []??????????? Compression Stocking       []??????????? Right Leg     []????????? ?? Left Leg              []??????????? Tubigrip      []??????????? Right Leg Double Layer      []????????? ?? Left Leg Double Layer                                                  []??????????? Right Leg Single Layer       []????????? ?? Left Leg Single Layer              []??????????? SpandaGrip            []??????????? Right Leg     []????????? ?? Left Leg                                      []????????? ??Low compression 5-10 mm/Hg                                             []????????? ? ? Medium compression 10-20 mm/Hg                                      []????????? ? ? High compression  20-30 mm/Hg              every morning immediately when getting up should be applied to affected leg(s) from mid foot to knee making sure to cover the heel.  Remove every night before going to bed.              []??????????? Elevate leg(s) above the level of the heart when sitting.                 []??????????? Avoid prolonged standing in one place.        Compression:  Apply:  []??????????? Multilayer Compression Wrap Applied in Clinic    []??????????? RightLeg      []????????? ?? Left Leg              []??????????? Multi-layer compression.  Do not get leg(s) with wrap wet.  If wraps become

## 2020-02-13 NOTE — PLAN OF CARE
EpiFix Treatment Note    NAME:  Margie Ohara  YOB: 1955  MEDICAL RECORD NUMBER:  [id number]  DATE:  2/13/2020    Goal:  Patient will receive safe and proper application of skin substitute. Patient will comply with caring for dressing, offloading and reporting complications. Expiration date checked immediately prior to use. Package intact prior to use and no damage noted. Transport temperature controlled and acceptable. EpiFix was removed from protective sterile packaging by provider and  applied to prepared ulcer bed. EpiFix was placed using embossment letting as a guide. EpiFix was hydrated with sterile normal saline per provider. EpiFix was applied to left great toe and affixed with steri-strips by the provider. EpiFix was covered with non-adherent ulcer dressing. Applied hydrogel over non-adherent. Applied dry gauze and/or roll gauze. Coban or ace wrap was applied to secure graft and decrease edema. Patient/caregiver was instructed not to remove dressing and to keep it clean and dry. Pt/family/caregiver was instructed on signs and symptoms of complications to report such as draining through dressing, dressing falling down/slipping, getting wet, or severe pain or tingling in toes   Pt/family/caregiver was instructed on need for offloading and elevation of affected extremity and on use of prescribed offloading device.   Guidelines followed   EpiFix may be applied a total of 10 times per wound over a 12 week period. Additionally EpiFix may only be used every 12 months per wound. Date of first application of EpiFix for this current wound is January 6, 2020.         Electronically signed by Monica Jj RN on 2/13/2020 at 4:50 PM

## 2020-02-20 ENCOUNTER — HOSPITAL ENCOUNTER (OUTPATIENT)
Dept: WOUND CARE | Age: 65
Discharge: HOME | End: 2020-02-20
Payer: MEDICARE

## 2020-02-20 VITALS
TEMPERATURE: 97.7 F | RESPIRATION RATE: 18 BRPM | HEART RATE: 64 BPM | SYSTOLIC BLOOD PRESSURE: 122 MMHG | DIASTOLIC BLOOD PRESSURE: 65 MMHG

## 2020-02-20 PROCEDURE — 97597 DBRDMT OPN WND 1ST 20 CM/<: CPT

## 2020-02-20 PROCEDURE — 6370000000 HC RX 637 (ALT 250 FOR IP): Performed by: PODIATRIST

## 2020-02-20 RX ORDER — BACITRACIN ZINC AND POLYMYXIN B SULFATE 500; 1000 [USP'U]/G; [USP'U]/G
OINTMENT TOPICAL ONCE
Status: CANCELLED | OUTPATIENT
Start: 2020-02-20

## 2020-02-20 RX ORDER — BACITRACIN, NEOMYCIN, POLYMYXIN B 400; 3.5; 5 [USP'U]/G; MG/G; [USP'U]/G
OINTMENT TOPICAL ONCE
Status: CANCELLED | OUTPATIENT
Start: 2020-02-20

## 2020-02-20 RX ORDER — LIDOCAINE HYDROCHLORIDE 40 MG/ML
5 SOLUTION TOPICAL ONCE
Status: CANCELLED | OUTPATIENT
Start: 2020-02-20

## 2020-02-20 RX ORDER — LIDOCAINE 50 MG/G
0.5 OINTMENT TOPICAL ONCE
Status: CANCELLED | OUTPATIENT
Start: 2020-02-20

## 2020-02-20 RX ORDER — GENTAMICIN SULFATE 1 MG/G
OINTMENT TOPICAL ONCE
Status: CANCELLED | OUTPATIENT
Start: 2020-02-20

## 2020-02-20 RX ORDER — GINSENG 100 MG
CAPSULE ORAL ONCE
Status: CANCELLED | OUTPATIENT
Start: 2020-02-20

## 2020-02-20 RX ORDER — LIDOCAINE 40 MG/G
CREAM TOPICAL ONCE
Status: CANCELLED | OUTPATIENT
Start: 2020-02-20

## 2020-02-20 RX ORDER — CLOBETASOL PROPIONATE 0.5 MG/G
OINTMENT TOPICAL ONCE
Status: CANCELLED | OUTPATIENT
Start: 2020-02-20

## 2020-02-20 RX ORDER — BETAMETHASONE DIPROPIONATE 0.05 %
OINTMENT (GRAM) TOPICAL ONCE
Status: CANCELLED | OUTPATIENT
Start: 2020-02-20

## 2020-02-20 RX ORDER — LIDOCAINE 50 MG/G
0.5 OINTMENT TOPICAL ONCE
Status: COMPLETED | OUTPATIENT
Start: 2020-02-20 | End: 2020-02-20

## 2020-02-20 RX ADMIN — LIDOCAINE 0.5 CM: 50 OINTMENT TOPICAL at 11:05

## 2020-02-20 ASSESSMENT — PAIN SCALES - GENERAL: PAINLEVEL_OUTOF10: 0

## 2020-02-20 NOTE — PROGRESS NOTES
[address]   Progress Note and Procedure Note      Oliver Apgar  MEDICAL RECORD NUMBER:  [id number]  AGE: 75 y.o. GENDER: female  : 1955  EPISODE DATE:  2020    Subjective:     Chief Complaint   Patient presents with    Wound Check     wound follow up left toe         HISTORY of PRESENT ILLNESS HPI  Oliver Apgar is a 70 y.o. female who presents today for wound/ulcer evaluation. Denies constitutional issues. Patient presents today ambulating is ballet flats.   She relates that she took her bandages off because she thought it was \"getting smaller\"  History of Wound Context: callus formation with fissure  Wound/Ulcer Pain Timing/Severity: none (pt is neuropathic)  Quality of pain: N/A  Severity:  0 / 10   Modifying Factors: None  Associated Signs/Symptoms: none     Ulcer Identification:  Ulcer Type: diabetic, pressure and shear  Contributing Factors: diabetes, poor glucose control, chronic pressure, shear force and obesity     Wound: N/A                                       PAST MEDICAL HISTORY        Diagnosis Date    Abnormal echocardiogram     25% on 3/11/14 and 50% on 3/19/14    Back pain     Cardiomyopathy ([address]     EF was 50% on 3/19/14    Chipped tooth     lower left    Dental crown present     veneers    Depression     Depressive disorder 2014    Diabetes mellitus ([address]     Diabetic infection of right foot ([address] 4/15/2015    Diabetic ulcer of toe of left foot associated with type 2 diabetes mellitus, with fat layer exposed ([address] 4/10/2018    Pt slipped in hot tub latter part of February and has not healed since despite topical treatment    DVT (deep venous thrombosis) ([address]     HTN (hypertension)     Hx of blood clots     left leg    Ischemic cardiomyopathy 4/15/2015    Kidney disease     Meniere's disease     Mixed hyperlipidemia 3/7/2016    Nicotine abuse     NSTEMI (non-ST elevated myocardial infarction) ([address] 3/13/2014    ALLERGIES    Allergies   Allergen Reactions    Doxycycline Other (See Comments)     Yeast infection       MEDICATIONS    Current Outpatient Medications on File Prior to Encounter   Medication Sig Dispense Refill    citalopram (CELEXA) 40 MG tablet Take 1 tablet by mouth daily 90 tablet 0    furosemide (LASIX) 20 MG tablet Take 1 tablet by mouth daily 30 tablet 5    carvedilol (COREG) 25 MG tablet Take 1 tablet by mouth 2 times daily (with meals) 180 tablet 1    amLODIPine (NORVASC) 2.5 MG tablet Take 1 tablet by mouth daily 90 tablet 1    Insulin Degludec (TRESIBA FLEXTOUCH) 200 UNIT/ML SOPN Inject 40 units into the skin once daily. 5 pen 1    gabapentin (NEURONTIN) 300 MG capsule TAKE 2 CAPSULES BY MOUTH 3 TIMES DAILY  DAYS. Ewing Pander 180 capsule 0    ondansetron (ZOFRAN-ODT) 4 MG disintegrating tablet TAKE 1 TABLET BY MOUTH EVERY 8 HOURS AS NEEDED FOR NAUSEA AND VOMITING 20 tablet 1    Liraglutide (VICTOZA) 18 MG/3ML SOPN SC injection Inject 1.8 mg into the skin daily 2 pen 3    atorvastatin (LIPITOR) 80 MG tablet Take 1 tablet by mouth daily 90 tablet 1    pantoprazole (PROTONIX) 40 MG tablet TAKE 1 TABLET BY MOUTH DAILY (Patient taking differently: Take 40 mg by mouth as needed ) 30 tablet 2    Cholecalciferol (VITAMIN D3) 2000 units CAPS Take by mouth daily      acetaminophen (TYLENOL) 500 MG tablet Take 1 tablet by mouth every 6 hours as needed for Pain 120 tablet     aspirin 81 MG chewable tablet Take 1 tablet by mouth daily. 30 tablet     lisinopril (PRINIVIL;ZESTRIL) 20 MG tablet Take 1 tablet by mouth daily 90 tablet 0    B-D UF III MINI PEN NEEDLES 31G X 5 MM MISC USE TWICE A DAY WITH INSULIN INJECTIONS 200 each 0    ONE TOUCH LANCETS MISC 1 each by Does not apply route 3 times daily 100 each 11    blood glucose test strips (ONE TOUCH ULTRA TEST) strip 1 each by Does not apply route 3 times daily As needed.  100 strip 11     No current facility-administered medications on file prior to encounter. REVIEW OF SYSTEMS    Pertinent items are noted in HPI. Review of Systems: A 12 point review of symptoms is unremarkable with the exception of the chief complaint. Patient specifically denies nausea, fever, vomiting, chills, shortness of breath, chest pain, abdominal pain, constipation or difficulty urinating. Objective:      /65   Pulse 64   Temp 97.7 °F (36.5 °C) (Oral)   Resp 18   LMP 06/15/1999     Wt Readings from Last 3 Encounters:   02/06/20 216 lb 7.9 oz (98.2 kg)   02/06/20 216 lb (98 kg)   12/03/19 214 lb 12.8 oz (97.4 kg)       PHYSICAL EXAM    General Appearance: alert and oriented to person, place and time, well-developed and well-nourished, in no acute distress  Skin: warm and dry, no rash or erythema. Wound noted on the plantar aspect of the left great toe. The previously noted wound on the right hallux has epithelialized. There is minimal hyperkeratotic rim surrounding the wounds. Wound has fibrotic and nonviable tissue that extends down through and includes the subcutaneous tissue. After debridement the wound has a granular base. There is no surrounding erythema, edema, warmth or malodor noted. The wound does not probe or track to bone. The previously noted heel wound has epithelialized. There is no surrounding erythema, edema, warmth or drainage noted. DP/PT pulses palpable bilaterally. CFT brisk to all digits. Digits are pink and warm to the touch. Hair growth is normal in appearance. No edema noted. No calf pain with palpation noted. Epicritic sensation is grossly absent bilaterally. Negative clonus and babinski reflex is down going. Patient has had a left partial 3rd toe amputation. Patient has rigid hammertoe contractures noted on the bilateral feet.   Hallux limitus noted bilaterally    Assessment:        Problem List Items Addressed This Visit     Diabetic ulcer of toe of left foot associated with diabetes mellitus due to underlying condition, Undefined edges 2/13/2020 11:13 AM   Nicole-wound Assessment Dry;Pink 2/13/2020 11:13 AM   Non-staged Wound Description Full thickness 2/13/2020 11:13 AM   [place]%Wound Bed 90 2/13/2020 11:13 AM   Yellow%Wound Bed 10 2/13/2020 11:13 AM   Number of days: 7          Percent of Wound(s)/Ulcer(s) Debrided: 100%    Total Surface Area Debrided: 0.2 sq cm       Diabetic/Pressure/Non Pressure Ulcers only:  Ulcer: Diabetic ulcer, fat layer exposed      Estimated Blood Loss:  Minimal    Hemostasis Achieved:  by pressure    Response to treatment:  Well tolerated by patient. Plan:   E/M x 30 minutes with more than half of the time spent with face to face treatment and counseling. Encouraged patient to follow up with getting extra depth DM shoes with custom inserts to better offload the areas and prevent reulceration. Maddy  discussion with patient that she need to be more adherent to treatment recommendations including leaving dressings intact and wearing the recommended offloading shoe gear. She is at risk for further amputation due to nonhealing wounds. Discussed with patient a TCC of she continues to not use offloading shoe gear. She has an appointment today to be casted for custom orthotics to prevent re ulceration once healed. Encouraged her to keep this appointment and if wound is not healed by next week will use a TCC. Offload the wound with surgical shoe as there was hyperkeratotic tissue surrounding the wound. Discussed with patient at length that if she is not adherent to offloading recommendations the wounds are not likely to heal and she is at risk for further amputation. Pt education per provider related to plan of care, pt is agreeable to plan and questions answered. Treatment Note please see attached Discharge Instructions    Written patient dismissal instructions given to patient and signed by patient or POA.       RTC 1 week       Discharge Instructions       [facility]   Physician Orders and Discharge Instructions  [address]   [address] [facility] [place] [address]  Telephone: [phone]      FAX [phone]     NAME: Martell Elmore  DATE OF BIRTH:  1955  MEDICAL RECORD NUMBER:  [id number]  DATE:  2/20/2020     Wound Cleansing:   Do not scrub or use excessive force. Cleanse wound prior to applying a clean dressing with:  [x]???????????? Normal Saline        [x]???????????? Keep Wound Dry in Shower    []???????????? Wound Cleanser   []???????????? Cleanse wound with Mild Soap & Water  []???????????? May Shower at Discharge   []???????????? Other:        Topical Treatments:  Do not apply lotions, creams, or ointments to wound bed unless directed.   []???????????? Apply moisturizing lotion to skin surrounding the wound prior to dressing change.  []???????????? Apply antifungal ointment to skin surrounding the wound prior to dressing change.  []???????????? Apply thin film of moisture barrier ointment to skin immediately around wound.  []???????????? Other:       Dressings:                  Wound Location :  LEFT GREAT TOE WOUND       **EPIFIX #5 APPLIED 2/13/2020**  [x]???????????? Apply Primary Dressing:                                          [x]????????????COLLAGEN MOISTENED WITH SALINE                      []????????????                                      []???????????? Other:       [x]???????????? Cover and Secure with:                   [x]???????????? Gauze       [x]???????????? Jackelyn           []???????????? Kerlix              []???????????? Ace Wrap   []???????????? Cover Roll Tape     []???????????? ABD                                      []???????????? Other:              Avoid contact of tape with skin.   [x]???????????? Change dressing:   []???????????? Daily           []???????????? Every Other Day    [x]???????????? Three times per week              []???????????? Once a week          []???????????? Do Not Change Dressing     []???????????? Other:     Dressings:          Wound Location :              Edema Control:  Apply:  []???????????? Compression Stocking       []???????????? Right Leg     []?????????? ?? Left Leg              []???????????? Tubigrip      []???????????? Right Leg Double Layer      []?????????? ?? Left Leg Double Layer                                                  []???????????? Right Leg Single Layer       []?????????? ?? Left Leg Single Layer              []???????????? SpandaGrip            []???????????? Right Leg     []?????????? ?? Left Leg                                      []?????????? ??Low compression 5-10 mm/Hg                                             []?????????? ? ? Medium compression 10-20 mm/Hg                                      []?????????? ? ? High compression  20-30 mm/Hg              every morning immediately when getting up should be applied to affected leg(s) from mid foot to knee making sure to cover the heel.  Remove every night before going to bed.              []???????????? Elevate leg(s) above the level of the heart when sitting.                 []???????????? Avoid prolonged standing in one place.        Compression:  Apply:  []???????????? Multilayer Compression Wrap Applied in Clinic    []???????????? RightLeg      []?????????? ?? Left Leg              []???????????? Multi-layer compression.  Do not get leg(s) with wrap wet.  If wraps become too tight call the center or completely remove the wrap.                      []???????????? Elevate leg(s) above the level of the heart when sitting.                 []???????????? Avoid prolonged standing in one place.     Off-Loading:   []???????????? Off-loading when    []???????????? walking       []???????????? in bed         []???????????? sitting  []???????????? Total non-weight bearing  []???????????? Right Leg  []???????????? Left Leg          [x]???????????? Assistive [facility]    Electronically signed by Randolph Amaro RN on 2/20/2020 at 11:39 AM         [address]: Should you experience any significant changes in your wound(s) or have questions about your wound care, please contact the [address] [place] [address] [phone] 12 [facility] [place] 8:00 am - 4:30 pm and Friday 8:00 am - 12:30 pm.  If you need help with your wound outside these hours and cannot wait until we are again available, contact your PCP or go to the hospital emergency room.      PLEASE NOTE: IF YOU ARE UNABLE TO OBTAIN WOUND SUPPLIES, CONTINUE TO USE THE SUPPLIES YOU HAVE AVAILABLE UNTIL YOU ARE ABLE TO [facility].  IT IS MOST IMPORTANT TO KEEP THE WOUND COVERED AT ALL TIMES.     Physician Signature:_______________________     Date: ___________ Time:  ____________                             [X]   Dr Nicolette Schultz   [ ] Megan Redding CNP        Electronically signed by Grady Taylor DPM on 2/20/2020 at 4:56 PM

## 2020-02-25 ENCOUNTER — OFFICE VISIT (OUTPATIENT)
Dept: INTERNAL MEDICINE CLINIC | Age: 65
End: 2020-02-25
Payer: MEDICARE

## 2020-02-25 VITALS
SYSTOLIC BLOOD PRESSURE: 116 MMHG | DIASTOLIC BLOOD PRESSURE: 58 MMHG | OXYGEN SATURATION: 96 % | BODY MASS INDEX: 38.76 KG/M2 | HEART RATE: 66 BPM | RESPIRATION RATE: 18 BRPM | WEIGHT: 218.8 LBS

## 2020-02-25 LAB — HBA1C MFR BLD: 6.5 %

## 2020-02-25 PROCEDURE — 83036 HEMOGLOBIN GLYCOSYLATED A1C: CPT | Performed by: FAMILY MEDICINE

## 2020-02-25 PROCEDURE — 99214 OFFICE O/P EST MOD 30 MIN: CPT | Performed by: FAMILY MEDICINE

## 2020-02-25 NOTE — PROGRESS NOTES
Not on file    Highest education level: Not on file   Occupational History    Not on file   Social Needs    Financial resource strain: Not on file    Food insecurity:     Worry: Not on file     Inability: Not on file    Transportation needs:     Medical: Not on file     Non-medical: Not on file   Tobacco Use    Smoking status: Former Smoker     Packs/day: 0.50     Years: 10.00     Pack years: 5.00     Last attempt to quit: 11/10/2013     Years since quittin.2    Smokeless tobacco: Never Used   Substance and Sexual Activity    Alcohol use: Not Currently     Alcohol/week: 0.0 standard drinks     Comment: occasionally    Drug use: No    Sexual activity: Not Currently   Lifestyle    Physical activity:     Days per week: Not on file     Minutes per session: Not on file    Stress: Not on file   Relationships    Social connections:     Talks on phone: Not on file     Gets together: Not on file     Attends Jain service: Not on file     Active member of club or organization: Not on file     Attends meetings of clubs or organizations: Not on file     Relationship status: Not on file    Intimate partner violence:     Fear of current or ex partner: Not on file     Emotionally abused: Not on file     Physically abused: Not on file     Forced sexual activity: Not on file   Other Topics Concern    Not on file   Social History Narrative    Not on file        Family History   Problem Relation Age of Onset    High Blood Pressure Mother     Cancer Mother     Rheum Arthritis Mother     COPD Father     Cancer Father     Osteoarthritis Neg Hx     Asthma Neg Hx     Breast Cancer Neg Hx     Diabetes Neg Hx     Heart Failure Neg Hx     High Cholesterol Neg Hx     Hypertension Neg Hx     Migraines Neg Hx     Ovarian Cancer Neg Hx     Rashes/Skin Problems Neg Hx     Seizures Neg Hx     Stroke Neg Hx     Thyroid Disease Neg Hx           Physical Exam:  Physical Exam  Vitals signs and nursing note reviewed. Constitutional:       General: She is not in acute distress. Appearance: Normal appearance. HENT:      Head: Normocephalic and atraumatic. Right Ear: External ear normal.      Left Ear: External ear normal.      Mouth/Throat:      Mouth: Mucous membranes are moist.      Pharynx: Oropharynx is clear. No oropharyngeal exudate. Eyes:      General: No scleral icterus. Conjunctiva/sclera: Conjunctivae normal.   Neck:      Musculoskeletal: Normal range of motion and neck supple. Vascular: No carotid bruit. Cardiovascular:      Rate and Rhythm: Normal rate and regular rhythm. Heart sounds: Normal heart sounds. No murmur. Pulmonary:      Effort: Pulmonary effort is normal. No respiratory distress. Breath sounds: Normal breath sounds. No wheezing. Abdominal:      General: There is no distension. Palpations: Abdomen is soft. There is no mass. Tenderness: There is no abdominal tenderness. There is no guarding or rebound. Musculoskeletal:         General: No swelling. Skin:     General: Skin is warm and dry. Findings: No rash. Neurological:      Mental Status: She is alert and oriented to person, place, and time.    Psychiatric:         Mood and Affect: Mood normal.         Behavior: Behavior normal.            Laboratory Studies:  Lab Results   Component Value Date    LABA1C 6.5 08/23/2019    LABA1C 7.5 03/18/2019    LABA1C 7.4 11/27/2018        Lab Results   Component Value Date    WBC 9.6 09/30/2019    HGB 10.9 (L) 09/30/2019    HCT 32.7 (L) 09/30/2019    MCV 88.2 09/30/2019     09/30/2019        Lab Results   Component Value Date     02/06/2020    K 3.9 02/06/2020    CL 98 (L) 02/06/2020    CO2 25 02/06/2020    BUN 28 (H) 02/06/2020    CREATININE 1.6 (H) 02/06/2020    GLUCOSE 156 (H) 02/06/2020    CALCIUM 9.6 02/06/2020    PROT 7.9 09/30/2019    LABALBU 3.9 02/06/2020    BILITOT 0.3 09/30/2019    ALKPHOS 90 09/30/2019    AST 16 09/30/2019 depression  Patient reports increased stress lately due to weight gain and family issues. Referred patient to [facility] to help manage her weight gain s/p sleeve gastrectomy. Advised patient to continue Celexa 40 mg daily. 6. S/P laparoscopic sleeve gastrectomy / 7. Obesity (BMI 30-39. 9)  Patient is doing well following surgery. Patient's HbA1c is also under control since gastrectomy. However, patient reports she is concerned due to recent weight gain. Patient reports she lives alone and is frustrated she does not have anyone holding her accountable for her dietary choices. Patient's long-term goal is weight below 150-160 lbs. Will refer patient to [facility] for further assistance with her weight management. Advised patient to follow up with me in 3-4 months. - [facility]       I have reviewed patient's pertinent medical history, relevant laboratory and imaging studies, and past/future health maintenance. Patient's medications have been reviewed and were discussed with the patient. Refills otherwise up-to-date. Discussed with the patient the importance of adhering to their current medication regimen as directed. Advised the patient that they should continue to work on eating a healthy balanced diet and staying active by exercising within their personal limits. Patient was advised to keep future appointments with their respective specialty care team(s). Patient had the opportunity to ask questions, all of which were answered to the best of my ability and with patient satisfaction. Patient advised to schedule a return visit for reevaluation in 3 months. Patient understands and is agreeable with the care plan following today's visit. Patient is to schedule an appointment for any new or worsening symptoms.       Requested Prescriptions     Signed Prescriptions Disp Refills    Semaglutide,0.25 or 0.5MG/DOS, (OZEMPIC, 0.25 OR 0.5 MG/DOSE,) 2 MG/1.5ML SOPN 4 pen 3     Sig: Inject 0.5 mg into the skin once a week      Orders Placed This Encounter   Procedures   [facility]     Referral Priority:   Routine     Referral Type:   Eval and Treat     Referral Reason:   Specialty Services Required     Requested Specialty:   Bariatric Surgery     Number of Visits Requested:   1    POCT glycosylated hemoglobin (Hb A1C)         By signing my name below, I, Barbara Bradley, attest that this documentation has been prepared under the direction and in the presence of Patricia Lima MD.   Electronically Signed: Rochelle Kaufman, 2/25/2020, 1:27 PM.      Roel Lutz MD, personally performed the services described in this documentation. All medical record entries made by the scribe were at my direction and in my presence. I have reviewed the chart and discharge instructions (if applicable) and agree that the record reflects my personal performance and is accurate and complete.  Patricia Lima MD, 2/25/2020, 2:08 PM.

## 2020-02-27 ENCOUNTER — HOSPITAL ENCOUNTER (OUTPATIENT)
Dept: WOUND CARE | Age: 65
Discharge: HOME | End: 2020-02-27
Payer: MEDICARE

## 2020-02-27 VITALS
SYSTOLIC BLOOD PRESSURE: 102 MMHG | TEMPERATURE: 97 F | RESPIRATION RATE: 18 BRPM | HEART RATE: 73 BPM | DIASTOLIC BLOOD PRESSURE: 54 MMHG

## 2020-02-27 PROCEDURE — 15275 SKIN SUB GRAFT FACE/NK/HF/G: CPT

## 2020-02-27 PROCEDURE — 6370000000 HC RX 637 (ALT 250 FOR IP): Performed by: PODIATRIST

## 2020-02-27 RX ORDER — LIDOCAINE 50 MG/G
0.5 OINTMENT TOPICAL ONCE
Status: CANCELLED | OUTPATIENT
Start: 2020-02-27

## 2020-02-27 RX ORDER — BACITRACIN, NEOMYCIN, POLYMYXIN B 400; 3.5; 5 [USP'U]/G; MG/G; [USP'U]/G
OINTMENT TOPICAL ONCE
Status: CANCELLED | OUTPATIENT
Start: 2020-02-27

## 2020-02-27 RX ORDER — GINSENG 100 MG
CAPSULE ORAL ONCE
Status: CANCELLED | OUTPATIENT
Start: 2020-02-27

## 2020-02-27 RX ORDER — CLOBETASOL PROPIONATE 0.5 MG/G
OINTMENT TOPICAL ONCE
Status: CANCELLED | OUTPATIENT
Start: 2020-02-27

## 2020-02-27 RX ORDER — LIDOCAINE HYDROCHLORIDE 40 MG/ML
5 SOLUTION TOPICAL ONCE
Status: CANCELLED | OUTPATIENT
Start: 2020-02-27

## 2020-02-27 RX ORDER — LIDOCAINE 50 MG/G
0.5 OINTMENT TOPICAL ONCE
Status: COMPLETED | OUTPATIENT
Start: 2020-02-27 | End: 2020-02-27

## 2020-02-27 RX ORDER — BETAMETHASONE DIPROPIONATE 0.05 %
OINTMENT (GRAM) TOPICAL ONCE
Status: CANCELLED | OUTPATIENT
Start: 2020-02-27

## 2020-02-27 RX ORDER — GENTAMICIN SULFATE 1 MG/G
OINTMENT TOPICAL ONCE
Status: CANCELLED | OUTPATIENT
Start: 2020-02-27

## 2020-02-27 RX ORDER — BACITRACIN ZINC AND POLYMYXIN B SULFATE 500; 1000 [USP'U]/G; [USP'U]/G
OINTMENT TOPICAL ONCE
Status: CANCELLED | OUTPATIENT
Start: 2020-02-27

## 2020-02-27 RX ORDER — LIDOCAINE 40 MG/G
CREAM TOPICAL ONCE
Status: CANCELLED | OUTPATIENT
Start: 2020-02-27

## 2020-02-27 RX ADMIN — LIDOCAINE 0.5 CM: 50 OINTMENT TOPICAL at 11:01

## 2020-02-27 ASSESSMENT — PAIN SCALES - GENERAL
PAINLEVEL_OUTOF10: 0
PAINLEVEL_OUTOF10: 0

## 2020-02-27 NOTE — PLAN OF CARE
EpiFix Treatment Note    NAME:  Sandra Canchola  YOB: 1955  MEDICAL RECORD NUMBER:  [id number]  DATE:  2/27/2020    Goal:  Patient will receive safe and proper application of skin substitute. Patient will comply with caring for dressing, offloading and reporting complications. Expiration date checked immediately prior to use. Package intact prior to use and no damage noted. Transport temperature controlled and acceptable. EpiFix was removed from protective sterile packaging by provider and  applied to prepared ulcer bed. EpiFix was placed using embossment letting as a guide. EpiFix was hydrated with sterile normal saline per provider. EpiFix was applied to LEFT GREAT TOE and affixed with steri-strips by the provider. EpiFix was covered with non-adherent ulcer dressing. Applied HYDROGEL over non-adherent. Applied dry gauze and/or roll gauze. Coban or ace wrap was applied to secure graft and decrease edema. Patient/caregiver was instructed not to remove dressing and to keep it clean and dry. Pt/family/caregiver was instructed on signs and symptoms of complications to report such as draining through dressing, dressing falling down/slipping, getting wet, or severe pain or tingling in toes   Pt/family/caregiver was instructed on need for offloading and elevation of affected extremity and on use of prescribed offloading device.   Guidelines followed   EpiFix may be applied a total of 10 times per wound over a 12 week period. Additionally EpiFix may only be used every 12 months per wound. Date of first application of EpiFix for this current wound is January 16, 2020.         Electronically signed by Katie Neves RN on 2/27/2020 at 11:50 AM

## 2020-03-03 NOTE — PROGRESS NOTES
[address]   Progress Note and Procedure Note      Tim Hamilton  MEDICAL RECORD NUMBER:  [id number]  AGE: 75 y.o. GENDER: female  : 1955  EPISODE DATE:  2020    Subjective:     Chief Complaint   Patient presents with    Wound Check     Follow Up on Left Great Toe Wound         HISTORY of PRESENT ILLNESS HPI  Tim Hamilton is a 70 y.o. female who presents today for wound/ulcer evaluation. Denies constitutional issues. Patient relates that she has kept her dressings clean and intact and is offloading the wound with surgical shoe.     History of Wound Context: callus formation with fissure  Wound/Ulcer Pain Timing/Severity: none (pt is neuropathic)  Quality of pain: N/A  Severity:  0 / 10   Modifying Factors: None  Associated Signs/Symptoms: none     Ulcer Identification:  Ulcer Type: diabetic, pressure and shear  Contributing Factors: diabetes, poor glucose control, chronic pressure, shear force and obesity     Wound: N/A                                       PAST MEDICAL HISTORY        Diagnosis Date    Abnormal echocardiogram     25% on 3/11/14 and 50% on 3/19/14    Back pain     Cardiomyopathy ([address]     EF was 50% on 3/19/14    Chipped tooth     lower left    Dental crown present     veneers    Depression     Depressive disorder 2014    Diabetes mellitus ([address]     Diabetic infection of right foot ([address] 4/15/2015    Diabetic ulcer of toe of left foot associated with type 2 diabetes mellitus, with fat layer exposed ([address] 4/10/2018    Pt slipped in hot tub latter part of February and has not healed since despite topical treatment    DVT (deep venous thrombosis) ([address]     HTN (hypertension)     Hx of blood clots     left leg    Ischemic cardiomyopathy 4/15/2015    Kidney disease     Meniere's disease     Mixed hyperlipidemia 3/7/2016    Nicotine abuse     NSTEMI (non-ST elevated myocardial infarction) ([address] 3/13/2014    Osteomyelitis of ankle point review of symptoms is unremarkable with the exception of the chief complaint. Patient specifically denies nausea, fever, vomiting, chills, shortness of breath, chest pain, abdominal pain, constipation or difficulty urinating. Objective:      BP (!) 102/54   Pulse 73   Temp 97 °F (36.1 °C) (Oral)   Resp 18   LMP 06/15/1999     Wt Readings from Last 3 Encounters:   02/25/20 218 lb 12.8 oz (99.2 kg)   02/06/20 216 lb 7.9 oz (98.2 kg)   02/06/20 216 lb (98 kg)       PHYSICAL EXAM    General Appearance: alert and oriented to person, place and time, well-developed and well-nourished, in no acute distress  Skin: warm and dry, no rash or erythema. Wound noted on the plantar aspect of the left great toe. The previously noted wound on the right hallux has epithelialized. There is minimal hyperkeratotic rim surrounding the wounds. Wound has fibrotic and nonviable tissue that extends down through and includes the subcutaneous tissue. After debridement the wound has a granular base. There is no surrounding erythema, edema, warmth or malodor noted. The wound does not probe or track to bone. The previously noted superficial wound on the posterior aspect of the heel has epithelialized. There is no surrounding erythema, edema, warmth or drainage noted. DP/PT pulses palpable bilaterally. CFT brisk to all digits. Digits are pink and warm to the touch. Hair growth is normal in appearance. No edema noted. No calf pain with palpation noted. Epicritic sensation is grossly absent bilaterally. Negative clonus and babinski reflex is down going. Patient has had a left partial 3rd toe amputation. Patient has rigid hammertoe contractures noted on the bilateral feet.   Hallux limitus noted bilaterally    Assessment:        Problem List Items Addressed This Visit     Diabetic ulcer of toe of left foot associated with diabetes mellitus due to underlying condition, with fat layer exposed ([address] - Primary (Chronic) Rinsed/Irrigated with saline 11/21/2019 10:41 AM   Wound Length (cm) 0.5 cm 2/27/2020 11:00 AM   Wound Width (cm) 0.5 cm 2/27/2020 11:00 AM   Wound Depth (cm) 0.4 cm 2/27/2020 11:00 AM   Wound Surface Area (cm^2) 0.25 cm^2 2/27/2020 11:00 AM   Change in Wound Size % (l*w) 50 2/27/2020 11:00 AM   Wound Volume (cm^3) 0.1 cm^3 2/27/2020 11:00 AM   Wound Healing % 33 2/27/2020 11:00 AM   Post-Procedure Length (cm) 0.7 cm 2/27/2020 11:40 AM   Post-Procedure Width (cm) 0.7 cm 2/27/2020 11:40 AM   Post-Procedure Depth (cm) 0.4 cm 2/27/2020 11:40 AM   Post-Procedure Surface Area (cm^2) 0.49 cm^2 2/27/2020 11:40 AM   Post-Procedure Volume (cm^3) 0.2 cm^3 2/27/2020 11:40 AM   Undermining Starts ___ O'Clock 12 2/27/2020 11:00 AM   Undermining Ends___ O'Clock 12 2/27/2020 11:00 AM   Undermining Maxium Distance (cm) 0.3 2/27/2020 11:00 AM   Wound Assessment White;Yellow;Pink 2/27/2020 11:00 AM   Drainage Amount Scant 2/27/2020 11:00 AM   Drainage Description Serosanguinous 2/27/2020 11:00 AM   Odor None 2/27/2020 11:00 AM   Margins Attached edges 2/27/2020 11:00 AM   Nicole-wound Assessment Calloused; White; Maceration 2/27/2020 11:00 AM   Non-staged Wound Description Full thickness 2/27/2020 11:00 AM   Kaleva%Wound Bed 90 2/27/2020 11:00 AM   Red%Wound Bed 5 1/2/2020 11:00 AM   Yellow%Wound Bed 10 2/27/2020 11:00 AM   Number of days: 117          Diabetic/Pressure/Non Pressure Ulcers:  Ulcer:   Diabetic ulcer, fat layer exposed      Total Surface Area of Ulcer(s) Covered 0.49 sq/cm    Was the Product Layered  Yes     Amount of Product Applied 1.54 sq/cm     Amount of Product Wasted 0 sq/cm     Reason for Waste N/A      Surgically Fixated: Yes    Secured With: Steri Strips and Mepitel     Procedural Pain: 0/10     Post Procedural Pain: 0 / 10    Response to Treatment: Well tolerated by patient. This was the third application of Epifix to the left foot wound.          Plan:   E/M x 30 minutes with more than half of the time spent with face to face treatment and counseling. Encouraged patient to follow up with getting extra depth DM shoes with custom inserts to better offload the areas and prevent reulceration. Keep dressing left foot clean and intact. Offload the wound with surgical shoe as there was hyperkeratotic tissue surrounding the wound. Discussed with patient at length that if she is not adherent to offloading recommendations the wounds are not likely to heal and she is at risk for further amputation. Pt education per provider related to plan of care, pt is agreeable to plan and questions answered. Treatment Note please see attached Discharge Instructions    Written patient dismissal instructions given to patient and signed by patient or POA. RTC 1 week       Discharge Instructions       [address] and [facility] Therapy   Physician Orders and Discharge Instructions  [facility]  [address] [facility] [address], [facility] [address]  Telephone: [phone]      FAX [phone]     NAME: Omari Schwarz  DATE OF BIRTH:  1955  MEDICAL RECORD NUMBER:  [id number]  DATE:  2/27/2020     Wound Cleansing:   Do not scrub or use excessive force.   Cleanse wound prior to applying a clean dressing with:  [x]????????????? Normal Saline        [x]????????????? Keep Wound Dry in Shower    []????????????? Wound Cleanser   []????????????? Cleanse wound with Mild Soap & Water  []????????????? May Shower at Discharge   []????????????? Other:        Topical Treatments:  Do not apply lotions, creams, or ointments to wound bed unless directed.   []????????????? Apply moisturizing lotion to skin surrounding the wound prior to dressing change.  []????????????? Apply antifungal ointment to skin surrounding the wound prior to dressing change.  []????????????? Apply thin film of moisture barrier ointment to skin immediately around wound.  []????????????? Other: cover the heel.  Remove every night before going to bed.              []????????????? Elevate leg(s) above the level of the heart when sitting.                 []????????????? Avoid prolonged standing in one place.        Compression:  Apply:  []????????????? Multilayer Compression Wrap Applied in Clinic    []????????????? RightLeg      []??????????? ?? Left Leg              []????????????? Multi-layer compression.  Do not get leg(s) with wrap wet.  If wraps become too tight call the center or completely remove the wrap.                      []????????????? Elevate leg(s) above the level of the heart when sitting.                 []????????????? Avoid prolonged standing in one place.     Off-Loading:   []????????????? Off-loading when    []????????????? walking       []????????????? in bed         []????????????? sitting  []????????????? Total non-weight bearing  []????????????? Right Leg  []????????????? Left Leg          [x]????????????? Assistive Device     []????????????? Walker        []????????????? Cane           []????????????? Wheelchair  []????????????? Crutches              [x]????????????? Surgical shoe TO BOTH FEET - WEAR WHENEVER YOUR FEET ARE ON THE FLOOR   []????????????? Podus Boot(s)   []????????????? Foam Boot(s)  []????????????? Roll About              []????????????? Cast Boot   []????????????? CROW Boot  []????????????? Other:     Contact Cast:  Apply:  []????????????? Total Contact Cast Applied in Clinic          []????????????? RightLeg      []??????????? ?? Left Leg              []????????????? Do not get cast wet.  Contact center or go to emergency room if there is a foul odor or becomes uncomfortable due to feeling tight or swelling.  Do not use objects inside of cast to scratch.          Dietary:  []????????????? Diet as tolerated:    [x]????????????? Calorie Diabetic Diet:         []????????????? No Added Salt:  []????????????? Increase Odilia Vila DPM on 3/3/2020 at 6:07 PM

## 2020-03-05 ENCOUNTER — HOSPITAL ENCOUNTER (OUTPATIENT)
Dept: WOUND CARE | Age: 65
Discharge: HOME | End: 2020-03-05
Payer: MEDICARE

## 2020-03-05 VITALS
DIASTOLIC BLOOD PRESSURE: 50 MMHG | HEART RATE: 52 BPM | SYSTOLIC BLOOD PRESSURE: 91 MMHG | RESPIRATION RATE: 18 BRPM | TEMPERATURE: 96.9 F

## 2020-03-05 PROCEDURE — 6370000000 HC RX 637 (ALT 250 FOR IP): Performed by: PODIATRIST

## 2020-03-05 PROCEDURE — 99211 OFF/OP EST MAY X REQ PHY/QHP: CPT

## 2020-03-05 RX ORDER — LIDOCAINE HYDROCHLORIDE 40 MG/ML
5 SOLUTION TOPICAL ONCE
Status: CANCELLED | OUTPATIENT
Start: 2020-03-05

## 2020-03-05 RX ORDER — BETAMETHASONE DIPROPIONATE 0.05 %
OINTMENT (GRAM) TOPICAL ONCE
Status: CANCELLED | OUTPATIENT
Start: 2020-03-05

## 2020-03-05 RX ORDER — LIDOCAINE 50 MG/G
0.5 OINTMENT TOPICAL ONCE
Status: CANCELLED | OUTPATIENT
Start: 2020-03-05

## 2020-03-05 RX ORDER — GINSENG 100 MG
CAPSULE ORAL ONCE
Status: CANCELLED | OUTPATIENT
Start: 2020-03-05

## 2020-03-05 RX ORDER — BACITRACIN, NEOMYCIN, POLYMYXIN B 400; 3.5; 5 [USP'U]/G; MG/G; [USP'U]/G
OINTMENT TOPICAL ONCE
Status: CANCELLED | OUTPATIENT
Start: 2020-03-05

## 2020-03-05 RX ORDER — BACITRACIN ZINC AND POLYMYXIN B SULFATE 500; 1000 [USP'U]/G; [USP'U]/G
OINTMENT TOPICAL ONCE
Status: CANCELLED | OUTPATIENT
Start: 2020-03-05

## 2020-03-05 RX ORDER — LIDOCAINE 50 MG/G
0.5 OINTMENT TOPICAL ONCE
Status: COMPLETED | OUTPATIENT
Start: 2020-03-05 | End: 2020-03-05

## 2020-03-05 RX ORDER — GENTAMICIN SULFATE 1 MG/G
OINTMENT TOPICAL ONCE
Status: CANCELLED | OUTPATIENT
Start: 2020-03-05

## 2020-03-05 RX ORDER — CLOBETASOL PROPIONATE 0.5 MG/G
OINTMENT TOPICAL ONCE
Status: CANCELLED | OUTPATIENT
Start: 2020-03-05

## 2020-03-05 RX ORDER — LIDOCAINE 40 MG/G
CREAM TOPICAL ONCE
Status: CANCELLED | OUTPATIENT
Start: 2020-03-05

## 2020-03-05 RX ADMIN — LIDOCAINE 0.5 CM: 50 OINTMENT TOPICAL at 11:40

## 2020-03-05 ASSESSMENT — PAIN SCALES - GENERAL: PAINLEVEL_OUTOF10: 0

## 2020-03-06 RX ORDER — ATORVASTATIN CALCIUM 80 MG/1
80 TABLET, FILM COATED ORAL DAILY
Qty: 90 TABLET | Refills: 1 | Status: SHIPPED | OUTPATIENT
Start: 2020-03-06 | End: 2020-05-07 | Stop reason: SDUPTHER

## 2020-03-07 NOTE — PROGRESS NOTES
[address]   Progress Note and Procedure Note      Fernando Cai  MEDICAL RECORD NUMBER:  [id number]  AGE: 75 y.o. GENDER: female  : 1955  EPISODE DATE:  3/5/2020    Subjective:     Chief Complaint   Patient presents with    Wound Check     Follow up for left toe wound. HISTORY of PRESENT ILLNESS HPI  Fernando Cai is a 70 y.o. female who presents today for wound/ulcer evaluation. Denies constitutional issues. Patient relates that she has kept her dressings clean and intact and is offloading the wound with surgical shoe. Patient relates that she is not feeling well today and has experienced diarrhea this morning.    History of Wound Context: callus formation with fissure  Wound/Ulcer Pain Timing/Severity: none (pt is neuropathic)  Quality of pain: N/A  Severity:  0 / 10   Modifying Factors: None  Associated Signs/Symptoms: none     Ulcer Identification:  Ulcer Type: diabetic, pressure and shear  Contributing Factors: diabetes, poor glucose control, chronic pressure, shear force and obesity     Wound: N/A                                       PAST MEDICAL HISTORY        Diagnosis Date    Abnormal echocardiogram     25% on 3/11/14 and 50% on 3/19/14    Back pain     Cardiomyopathy ([address]     EF was 50% on 3/19/14    Chipped tooth     lower left    Dental crown present     veneers    Depression     Depressive disorder 2014    Diabetes mellitus ([address]     Diabetic infection of right foot ([address] 4/15/2015    Diabetic ulcer of toe of left foot associated with type 2 diabetes mellitus, with fat layer exposed ([address] 4/10/2018    Pt slipped in hot tub latter part of February and has not healed since despite topical treatment    DVT (deep venous thrombosis) ([address]     HTN (hypertension)     Hx of blood clots     left leg    Ischemic cardiomyopathy 4/15/2015    Kidney disease     Meniere's disease     Mixed hyperlipidemia 3/7/2016    Nicotine abuse     NSTEMI (non-ST elevated myocardial infarction) ([facility] 3/13/2014    Osteomyelitis of ankle or foot, acute, left ([facility] 2018    Pneumonia     Spinal abscess ([facility] 3/2014    epidural abscess    Spinal epidural abscess 3/13/2014    Type II diabetes mellitus, uncontrolled ([facility] 2014       PAST SURGICAL HISTORY    Past Surgical History:   Procedure Laterality Date    BACK SURGERY  3/2014    DEBRIDEMENT  3/12/14    extensive debridement of bone/muscle and paraspinal empyema    HYSTERECTOMY  6/15/1999    complete-think right ovary in.    NASAL SEPTUM SURGERY      SLEEVE GASTRECTOMY  2019    ROBOTIC ASSISTED LAPAROSCOPIC SLEEVE GASTRECTOMY, LAPAROSCOPIC REDUCIBLE INCISIONAL HERNIA REPAIR     SLEEVE GASTRECTOMY N/A 2019    ROBOTIC ASSISTED LAPAROSCOPIC SLEEVE GASTRECTOMY, LAPAROSCOPIC REDUCIBLE INCISIONAL HERNIA REPAIR performed by Jarett Fofana DO at [address] N/A 2019    EGD BIOPSY performed by Jarett Fofana DO at [facility] [address] [facility] N/A 2019    EGD POLYP ABLATION/OTHER performed by Jarett Fofana DO at [facility]       FAMILY HISTORY    Family History   Problem Relation Age of Onset    High Blood Pressure Mother     Cancer Mother     Rheum Arthritis Mother     COPD Father     Cancer Father     Osteoarthritis Neg Hx     Asthma Neg Hx     Breast Cancer Neg Hx     Diabetes Neg Hx     Heart Failure Neg Hx     High Cholesterol Neg Hx     Hypertension Neg Hx     Migraines Neg Hx     Ovarian Cancer Neg Hx     Rashes/Skin Problems Neg Hx     Seizures Neg Hx     Stroke Neg Hx     Thyroid Disease Neg Hx        SOCIAL HISTORY    Social History     Tobacco Use    Smoking status: Former Smoker     Packs/day: 0.50     Years: 10.00     Pack years: 5.00     Last attempt to quit: 11/10/2013     Years since quittin.3    Smokeless tobacco: Never Used   Substance Use Topics    Alcohol use: Not Currently Alcohol/week: 0.0 standard drinks     Comment: occasionally    Drug use: No       ALLERGIES    Allergies   Allergen Reactions    Doxycycline Other (See Comments)     Yeast infection       MEDICATIONS    Current Outpatient Medications on File Prior to Encounter   Medication Sig Dispense Refill    citalopram (CELEXA) 40 MG tablet Take 1 tablet by mouth daily 90 tablet 0    furosemide (LASIX) 20 MG tablet Take 1 tablet by mouth daily 30 tablet 5    carvedilol (COREG) 25 MG tablet Take 1 tablet by mouth 2 times daily (with meals) 180 tablet 1    lisinopril (PRINIVIL;ZESTRIL) 20 MG tablet Take 1 tablet by mouth daily 90 tablet 0    amLODIPine (NORVASC) 2.5 MG tablet Take 1 tablet by mouth daily 90 tablet 1    Insulin Degludec (TRESIBA FLEXTOUCH) 200 UNIT/ML SOPN Inject 40 units into the skin once daily. 5 pen 1    gabapentin (NEURONTIN) 300 MG capsule TAKE 2 CAPSULES BY MOUTH 3 TIMES DAILY  DAYS. [place] 180 capsule 0    pantoprazole (PROTONIX) 40 MG tablet TAKE 1 TABLET BY MOUTH DAILY (Patient taking differently: Take 40 mg by mouth as needed ) 30 tablet 2    Cholecalciferol (VITAMIN D3) 2000 units CAPS Take by mouth daily      acetaminophen (TYLENOL) 500 MG tablet Take 1 tablet by mouth every 6 hours as needed for Pain 120 tablet     aspirin 81 MG chewable tablet Take 1 tablet by mouth daily. 30 tablet     Semaglutide,0.25 or 0.5MG/DOS, (OZEMPIC, 0.25 OR 0.5 MG/DOSE,) 2 MG/1.5ML SOPN Inject 0.5 mg into the skin once a week 4 pen 3    B-D UF III MINI PEN NEEDLES 31G X 5 MM MISC USE TWICE A DAY WITH INSULIN INJECTIONS 200 each 0    ONE TOUCH LANCETS MISC 1 each by Does not apply route 3 times daily 100 each 11     No current facility-administered medications on file prior to encounter. REVIEW OF SYSTEMS    Pertinent items are noted in HPI. Review of Systems: A 12 point review of symptoms is unremarkable with the exception of the chief complaint.   Patient specifically denies fever, vomiting, chills, shortness of breath, chest pain, constipation or difficulty urinating. Objective:      BP (!) 91/50   Pulse 52   Temp 96.9 °F (36.1 °C) (Oral)   Resp 18   LMP 06/15/1999     Wt Readings from Last 3 Encounters:   02/25/20 218 lb 12.8 oz (99.2 kg)   02/06/20 216 lb 7.9 oz (98.2 kg)   02/06/20 216 lb (98 kg)       PHYSICAL EXAM    General Appearance: alert and oriented to person, place and time, well-developed and well-nourished, in no acute distress  Skin: warm and dry, no rash or erythema. Wound previously noted on the plantar aspect of the left great toe has epithelialized. There is no surrounding erythema, edema, warmth or malodor noted. DP/PT pulses palpable bilaterally. CFT brisk to all digits. Digits are pink and warm to the touch. Hair growth is normal in appearance. No edema noted. No calf pain with palpation noted. Epicritic sensation is grossly absent bilaterally. Negative clonus and babinski reflex is down going. Patient has had a left partial 3rd toe amputation. Patient has rigid hammertoe contractures noted on the bilateral feet. Hallux limitus noted bilaterally    Assessment:        Problem List Items Addressed This Visit     Diabetic ulcer of toe of left foot associated with diabetes mellitus due to underlying condition, with fat layer exposed ([address] - Primary (Chronic)               Plan:   E/M x 30 minutes with more than half of the time spent with face to face treatment and counseling. Encouraged patient to follow up with getting extra depth DM shoes with custom inserts to better offload the areas and prevent reulceration. Follow up with podiatry for routine DM foot care. Patient relates that she does not have a podiatrist so she was given follow up information for my private office. Discussed with patient that if she does not wear her DM shoes with custom orthotics and have routine DM foot care to de bulk calloses periodically she will likely re ulcerate. Pt education per provider related to plan of care, pt is agreeable to plan and questions answered. Treatment Note please see attached Discharge Instructions    Written patient dismissal instructions given to patient and signed by patient or POA. RTC prn    Discharge Instructions         [address] Physician Orders   [facility] [place]  [address] [facility] [address], [address]  Telephone: [phone]    NAME:  Eva Staff OF BIRTH:  1955  MEDICAL RECORD NUMBER:  [id number]  DATE:  3/5/2020    Congratulations! You have completed your treatment. Return to your Primary Care Physician for all your health issues. Resume your ordinary activities as tolerated. Take your medications as prescribed by your primary care physician. Check your skin daily for cracks, bruises, sores, or dryness. Use a moisturizer as needed. Clean and dry your skin, using mild soap and warm water (not hot). Avoid alcohol and caffeine and do not smoke. Maintain a nutritious diet.   Other: WEAR YOUR SURGICAL SHOE UNTIL YOU LEAVE ON VACATION NEXT WEEK     **FOLLOW UP WITH DR LOMAX IN HER OFFICE IN 3 WEEKS**    **GET YOUR SHOE INSERTS AS SOON AS THEY COME AVAILABLE**      Physician Signature:______________________    Date: ___________ Time:  ____________    Dr Maddie Zavala             Electronically signed by Pelon Tyson RN on 3/5/2020 at 11:56 AM                          Electronically signed by Maddie Zavala DPM on 3/7/2020 at 11:23 AM

## 2020-03-26 ENCOUNTER — HOSPITAL ENCOUNTER (OUTPATIENT)
Dept: WOUND CARE | Age: 65
Discharge: HOME | End: 2020-03-26
Payer: MEDICARE

## 2020-03-26 VITALS
HEART RATE: 62 BPM | RESPIRATION RATE: 18 BRPM | SYSTOLIC BLOOD PRESSURE: 157 MMHG | TEMPERATURE: 97.4 F | DIASTOLIC BLOOD PRESSURE: 75 MMHG

## 2020-03-26 PROCEDURE — 6370000000 HC RX 637 (ALT 250 FOR IP): Performed by: PODIATRIST

## 2020-03-26 PROCEDURE — 15275 SKIN SUB GRAFT FACE/NK/HF/G: CPT

## 2020-03-26 RX ORDER — LIDOCAINE 50 MG/G
0.5 OINTMENT TOPICAL ONCE
Status: CANCELLED | OUTPATIENT
Start: 2020-03-26

## 2020-03-26 RX ORDER — CLOBETASOL PROPIONATE 0.5 MG/G
OINTMENT TOPICAL ONCE
Status: CANCELLED | OUTPATIENT
Start: 2020-03-26

## 2020-03-26 RX ORDER — GINSENG 100 MG
CAPSULE ORAL ONCE
Status: CANCELLED | OUTPATIENT
Start: 2020-03-26

## 2020-03-26 RX ORDER — BETAMETHASONE DIPROPIONATE 0.05 %
OINTMENT (GRAM) TOPICAL ONCE
Status: CANCELLED | OUTPATIENT
Start: 2020-03-26

## 2020-03-26 RX ORDER — BACITRACIN ZINC AND POLYMYXIN B SULFATE 500; 1000 [USP'U]/G; [USP'U]/G
OINTMENT TOPICAL ONCE
Status: CANCELLED | OUTPATIENT
Start: 2020-03-26

## 2020-03-26 RX ORDER — BACITRACIN, NEOMYCIN, POLYMYXIN B 400; 3.5; 5 [USP'U]/G; MG/G; [USP'U]/G
OINTMENT TOPICAL ONCE
Status: CANCELLED | OUTPATIENT
Start: 2020-03-26

## 2020-03-26 RX ORDER — LIDOCAINE 50 MG/G
0.5 OINTMENT TOPICAL ONCE
Status: COMPLETED | OUTPATIENT
Start: 2020-03-26 | End: 2020-03-26

## 2020-03-26 RX ORDER — LIDOCAINE HYDROCHLORIDE 40 MG/ML
5 SOLUTION TOPICAL ONCE
Status: CANCELLED | OUTPATIENT
Start: 2020-03-26

## 2020-03-26 RX ORDER — GENTAMICIN SULFATE 1 MG/G
OINTMENT TOPICAL ONCE
Status: CANCELLED | OUTPATIENT
Start: 2020-03-26

## 2020-03-26 RX ORDER — LIDOCAINE 40 MG/G
CREAM TOPICAL ONCE
Status: CANCELLED | OUTPATIENT
Start: 2020-03-26

## 2020-03-26 RX ADMIN — LIDOCAINE 0.5 CM: 50 OINTMENT TOPICAL at 09:01

## 2020-03-26 ASSESSMENT — PAIN SCALES - GENERAL
PAINLEVEL_OUTOF10: 0
PAINLEVEL_OUTOF10: 0

## 2020-03-26 NOTE — PLAN OF CARE
EpiFix Treatment Note    NAME:  Maximo Cervantes  YOB: 1955  MEDICAL RECORD NUMBER:  [id number]  DATE:  3/26/2020    Goal:  Patient will receive safe and proper application of skin substitute. Patient will comply with caring for dressing, offloading and reporting complications. Expiration date checked immediately prior to use. Package intact prior to use and no damage noted. Transport temperature controlled and acceptable. EpiFix was removed from protective sterile packaging by provider and  applied to prepared ulcer bed. EpiFix was placed using embossment letting as a guide. EpiFix was hydrated with sterile normal saline per provider. EpiFix was applied to left great toe and affixed with steri-strips by the provider. EpiFix was covered with non-adherent ulcer dressing. Applied dab of hydrogel over non-adherent. Applied dry gauze and/or roll gauze. Coban or ace wrap was applied to secure graft and decrease edema. Patient/caregiver was instructed not to remove dressing and to keep it clean and dry. Pt/family/caregiver was instructed on signs and symptoms of complications to report such as draining through dressing, dressing falling down/slipping, getting wet, or severe pain or tingling in toes   Pt/family/caregiver was instructed on need for offloading and elevation of affected extremity and on use of prescribed offloading device.   Guidelines followed   EpiFix may be applied a total of 10 times per wound over a 12 week period. Additionally EpiFix may only be used every 12 months per wound. Date of first application of EpiFix for this current wound is January 16, 2020.         Electronically signed by Jason Lares RN on 3/26/2020 at 12:40 PM

## 2020-03-26 NOTE — PROGRESS NOTES
[address]   Progress Note and Procedure Note      Rochelle Howell  MEDICAL RECORD NUMBER:  [id number]  AGE: 75 y.o. GENDER: female  : 1955  EPISODE DATE:  3/26/2020    Subjective:     Chief Complaint   Patient presents with    Wound Check     Re-Opened Wound Left Great Toe         HISTORY of PRESENT ILLNESS HPI  Rochelle Howell is a 70 y.o. female who presents today for wound/ulcer evaluation. Denies constitutional issues. Patient relates that the wound opened up about a week after being healed. She admits to not wearing her DM shoes.    History of Wound Context: callus formation with fissure  Wound/Ulcer Pain Timing/Severity: none (pt is neuropathic)  Quality of pain: N/A  Severity:  0 / 10   Modifying Factors: None  Associated Signs/Symptoms: none     Ulcer Identification:  Ulcer Type: diabetic, pressure and shear  Contributing Factors: diabetes, poor glucose control, chronic pressure, shear force and obesity     Wound: N/A                                       PAST MEDICAL HISTORY        Diagnosis Date    Abnormal echocardiogram     25% on 3/11/14 and 50% on 3/19/14    Back pain     Cardiomyopathy ([address]     EF was 50% on 3/19/14    Chipped tooth     lower left    Dental crown present     veneers    Depression     Depressive disorder 2014    Diabetes mellitus ([address]     Diabetic infection of right foot ([address] 4/15/2015    Diabetic ulcer of toe of left foot associated with type 2 diabetes mellitus, with fat layer exposed ([address] 4/10/2018    Pt slipped in hot tub latter part of February and has not healed since despite topical treatment    DVT (deep venous thrombosis) ([address]     HTN (hypertension)     Hx of blood clots     left leg    Ischemic cardiomyopathy 4/15/2015    Kidney disease     Meniere's disease     Mixed hyperlipidemia 3/7/2016    Nicotine abuse     NSTEMI (non-ST elevated myocardial infarction) ([address] 3/13/2014    Osteomyelitis of ankle or (cm^3) 0.05 cm^3 3/26/2020  8:56 AM   Wound Healing % 67 3/26/2020  8:56 AM   Post-Procedure Length (cm) 1 cm 3/26/2020  9:49 AM   Post-Procedure Width (cm) 0.8 cm 3/26/2020  9:49 AM   Post-Procedure Depth (cm) 0.1 cm 3/26/2020  9:49 AM   Post-Procedure Surface Area (cm^2) 0.8 cm^2 3/26/2020  9:49 AM   Post-Procedure Volume (cm^3) 0.08 cm^3 3/26/2020  9:49 AM   Undermining Starts ___ O'Clock 12 2/27/2020 11:00 AM   Undermining Ends___ O'Clock 12 2/27/2020 11:00 AM   Undermining Maxium Distance (cm) 0.3 2/27/2020 11:00 AM   Wound Assessment Granulation tissue;Slough 3/26/2020  8:56 AM   Drainage Amount Small 3/26/2020  8:56 AM   Drainage Description Serosanguinous 3/26/2020  8:56 AM   Odor None 3/26/2020  8:56 AM   Margins Attached edges 3/26/2020  8:56 AM   Nicole-wound Assessment White;Dry 3/26/2020  8:56 AM   Non-staged Wound Description Full thickness 3/26/2020  8:56 AM   [place]%Wound Bed 80 3/26/2020  8:56 AM   Red%Wound Bed 5 1/2/2020 11:00 AM   Yellow%Wound Bed 20 3/26/2020  8:56 AM   Number of days: 140          Diabetic/Pressure/Non Pressure Ulcers:  Ulcer:   Diabetic ulcer, fat layer exposed      Total Surface Area of Ulcer(s) Covered 0.8 sq/cm    Was the Product Layered  Yes     Amount of Product Applied 1.54 sq/cm     Amount of Product Wasted 0 sq/cm     Reason for Waste: N/A     Surgically Fixated: Yes    Secured With: Steri Strips and Silicone Dressing     Procedural Pain: 0/10     Post Procedural Pain: 0 / 10    Response to Treatment: Well tolerated by patient. Plan:   E/M x 30 minutes of a new problem    Offload the wound with surgical shoe as there was hyperkeratotic tissue surrounding the wound. Encouraged patient to be compliant with offloading as she is currently off of work due to corona virus outbreak. Pt education per provider related to plan of care, pt is agreeable to plan and questions answered.     Treatment Note please see attached Discharge Instructions    Written patient dismissal the above interventions, please call your family doctor. Return Appointment:  [] Wound and dressing supply provider:   [] ECF or Home Healthcare:  [] Wound Assessment: [] Physician or NP scheduled for Wound Assessment:   [x] Return Appointment: With DR LOMAX  in  1 Week(s)  [] Ordered tests:     Nurse Case Manger:  PIA     Electronically signed by Dionicio Romberg, RN on 3/26/2020 at [address] [facility]: Should you experience any significant changes in your wound(s) or have questions about your wound care, please contact the [address] [place] [address] at [address] 8:00 am - 4:30 pm and Friday 8:00 am - 12:30 pm.  If you need help with your wound outside these hours and cannot wait until we are again available, contact your PCP or go to the hospital emergency room. PLEASE NOTE: IF YOU ARE UNABLE TO OBTAIN WOUND SUPPLIES, CONTINUE TO USE THE SUPPLIES YOU HAVE AVAILABLE UNTIL YOU ARE ABLE TO REACH US. IT IS MOST IMPORTANT TO KEEP THE WOUND COVERED AT ALL TIMES.      Physician Signature:_______________________    Date: ___________ Time:  ____________        Dr Claire Morales                        Electronically signed by Claire Morales DPM on 3/26/2020 at 12:11 PM

## 2020-04-02 ENCOUNTER — HOSPITAL ENCOUNTER (OUTPATIENT)
Dept: WOUND CARE | Age: 65
Discharge: HOME | End: 2020-04-02
Payer: MEDICARE

## 2020-04-02 VITALS
RESPIRATION RATE: 18 BRPM | TEMPERATURE: 97.6 F | SYSTOLIC BLOOD PRESSURE: 150 MMHG | DIASTOLIC BLOOD PRESSURE: 78 MMHG | HEART RATE: 65 BPM

## 2020-04-02 PROCEDURE — 6370000000 HC RX 637 (ALT 250 FOR IP): Performed by: PODIATRIST

## 2020-04-02 PROCEDURE — 15275 SKIN SUB GRAFT FACE/NK/HF/G: CPT

## 2020-04-02 RX ORDER — BACITRACIN ZINC AND POLYMYXIN B SULFATE 500; 1000 [USP'U]/G; [USP'U]/G
OINTMENT TOPICAL ONCE
Status: CANCELLED | OUTPATIENT
Start: 2020-04-02

## 2020-04-02 RX ORDER — LIDOCAINE 50 MG/G
0.5 OINTMENT TOPICAL ONCE
Status: COMPLETED | OUTPATIENT
Start: 2020-04-02 | End: 2020-04-02

## 2020-04-02 RX ORDER — CLOBETASOL PROPIONATE 0.5 MG/G
OINTMENT TOPICAL ONCE
Status: CANCELLED | OUTPATIENT
Start: 2020-04-02

## 2020-04-02 RX ORDER — BETAMETHASONE DIPROPIONATE 0.05 %
OINTMENT (GRAM) TOPICAL ONCE
Status: CANCELLED | OUTPATIENT
Start: 2020-04-02

## 2020-04-02 RX ORDER — LIDOCAINE HYDROCHLORIDE 40 MG/ML
5 SOLUTION TOPICAL ONCE
Status: CANCELLED | OUTPATIENT
Start: 2020-04-02

## 2020-04-02 RX ORDER — LIDOCAINE 40 MG/G
CREAM TOPICAL ONCE
Status: CANCELLED | OUTPATIENT
Start: 2020-04-02

## 2020-04-02 RX ORDER — LIDOCAINE HYDROCHLORIDE 40 MG/ML
5 SOLUTION TOPICAL ONCE
Status: DISCONTINUED | OUTPATIENT
Start: 2020-04-02 | End: 2020-04-02 | Stop reason: CLARIF

## 2020-04-02 RX ORDER — LIDOCAINE 50 MG/G
0.5 OINTMENT TOPICAL ONCE
Status: CANCELLED | OUTPATIENT
Start: 2020-04-02

## 2020-04-02 RX ORDER — GENTAMICIN SULFATE 1 MG/G
OINTMENT TOPICAL ONCE
Status: CANCELLED | OUTPATIENT
Start: 2020-04-02

## 2020-04-02 RX ORDER — BACITRACIN, NEOMYCIN, POLYMYXIN B 400; 3.5; 5 [USP'U]/G; MG/G; [USP'U]/G
OINTMENT TOPICAL ONCE
Status: CANCELLED | OUTPATIENT
Start: 2020-04-02

## 2020-04-02 RX ORDER — GINSENG 100 MG
CAPSULE ORAL ONCE
Status: CANCELLED | OUTPATIENT
Start: 2020-04-02

## 2020-04-02 RX ADMIN — LIDOCAINE 0.5 CM: 50 OINTMENT TOPICAL at 11:21

## 2020-04-02 ASSESSMENT — PAIN SCALES - GENERAL
PAINLEVEL_OUTOF10: 0
PAINLEVEL_OUTOF10: 0

## 2020-04-02 NOTE — PROGRESS NOTES
MEDICATIONS    Current Outpatient Medications on File Prior to Encounter   Medication Sig Dispense Refill    doxazosin (CARDURA) 2 MG tablet TAKE 1 TABLET BY MOUTH EVERY DAY AT NIGHT 90 tablet 1    atorvastatin (LIPITOR) 80 MG tablet Take 1 tablet by mouth daily 90 tablet 1    blood glucose test strips (ONE TOUCH ULTRA TEST) strip Check FSBS 2-3 times daily. 300 strip 1    Semaglutide,0.25 or 0.5MG/DOS, (OZEMPIC, 0.25 OR 0.5 MG/DOSE,) 2 MG/1.5ML SOPN Inject 0.5 mg into the skin once a week 4 pen 3    citalopram (CELEXA) 40 MG tablet Take 1 tablet by mouth daily 90 tablet 0    furosemide (LASIX) 20 MG tablet Take 1 tablet by mouth daily 30 tablet 5    carvedilol (COREG) 25 MG tablet Take 1 tablet by mouth 2 times daily (with meals) 180 tablet 1    lisinopril (PRINIVIL;ZESTRIL) 20 MG tablet Take 1 tablet by mouth daily 90 tablet 0    amLODIPine (NORVASC) 2.5 MG tablet Take 1 tablet by mouth daily 90 tablet 1    Insulin Degludec (TRESIBA FLEXTOUCH) 200 UNIT/ML SOPN Inject 40 units into the skin once daily. 5 pen 1    gabapentin (NEURONTIN) 300 MG capsule TAKE 2 CAPSULES BY MOUTH 3 TIMES DAILY  DAYS. Janas Maikel 180 capsule 0    pantoprazole (PROTONIX) 40 MG tablet TAKE 1 TABLET BY MOUTH DAILY (Patient taking differently: Take 40 mg by mouth as needed ) 30 tablet 2    Cholecalciferol (VITAMIN D3) 2000 units CAPS Take by mouth daily      aspirin 81 MG chewable tablet Take 1 tablet by mouth daily. 30 tablet     B-D UF III MINI PEN NEEDLES 31G X 5 MM MISC USE TWICE A DAY WITH INSULIN INJECTIONS 200 each 0    ONE TOUCH LANCETS MISC 1 each by Does not apply route 3 times daily 100 each 11    acetaminophen (TYLENOL) 500 MG tablet Take 1 tablet by mouth every 6 hours as needed for Pain 120 tablet      No current facility-administered medications on file prior to encounter. REVIEW OF SYSTEMS    Pertinent items are noted in HPI.    Review of Systems: A 12 point review of symptoms is unremarkable with the exception of the chief complaint. Patient specifically denies nausea, fever, vomiting, chills, shortness of breath, chest pain, abdominal pain, constipation or difficulty urinating. Objective:      BP (!) 150/78   Pulse 65   Temp 97.6 °F (36.4 °C) (Oral)   Resp 18   LMP 06/15/1999     Wt Readings from Last 3 Encounters:   02/25/20 218 lb 12.8 oz (99.2 kg)   02/06/20 216 lb 7.9 oz (98.2 kg)   02/06/20 216 lb (98 kg)       PHYSICAL EXAM    General Appearance: alert and oriented to person, place and time, well-developed and well-nourished, in no acute distress  Skin: warm and dry, no rash or erythema. Wound noted on the plantar aspect of the left great toe with a large hyperkeratotic rim. Wound has fibrotic and nonviable tissue that extends down through and includes the subcutaneous tissue. After debridement the wound has a granular base. There is no surrounding erythema, edema, warmth or malodor noted. The wound does not probe or track to bone. DP/PT pulses palpable bilaterally. CFT brisk to all digits. Digits are pink and warm to the touch. Hair growth is normal in appearance. No edema noted. No calf pain with palpation noted. Epicritic sensation is grossly absent bilaterally. Negative clonus and babinski reflex is down going. Patient has had a left partial 3rd toe amputation. Patient has rigid hammertoe contractures noted on the bilateral feet. Hallux limitus noted bilaterally with dorsal bunion deformity.     Assessment:        Problem List Items Addressed This Visit     Diabetic ulcer of toe of left foot associated with diabetes mellitus due to underlying condition, with fat layer exposed ([address] - Primary (Chronic)    Relevant Orders    Supply: Wound Cleanser    Supply: Wound Dressings    Supply: Cover and Secure           Procedure Note  Indications:  Based on my examination of this patient's wound(s)/ulcer(s) today, debridement is required to promote healing and evaluate the wound base and UNABLE TO OBTAIN WOUND SUPPLIES, CONTINUE TO USE THE SUPPLIES YOU HAVE AVAILABLE UNTIL YOU ARE ABLE TO REACH US. IT IS MOST IMPORTANT TO KEEP THE WOUND COVERED AT ALL TIMES.      Physician Signature:_______________________     Date: ___________ Time:  ____________                                Dr Wanda Wei                              Electronically signed by Wanda Wei DPM on 4/2/2020 at 1:17 PM

## 2020-04-09 ENCOUNTER — HOSPITAL ENCOUNTER (OUTPATIENT)
Dept: WOUND CARE | Age: 65
Discharge: HOME | End: 2020-04-09
Payer: MEDICARE

## 2020-04-09 VITALS
TEMPERATURE: 96.1 F | HEART RATE: 67 BPM | RESPIRATION RATE: 18 BRPM | DIASTOLIC BLOOD PRESSURE: 54 MMHG | SYSTOLIC BLOOD PRESSURE: 109 MMHG

## 2020-04-09 PROCEDURE — 6370000000 HC RX 637 (ALT 250 FOR IP): Performed by: PODIATRIST

## 2020-04-09 PROCEDURE — 15275 SKIN SUB GRAFT FACE/NK/HF/G: CPT

## 2020-04-09 RX ORDER — LIDOCAINE 50 MG/G
0.5 OINTMENT TOPICAL ONCE
Status: COMPLETED | OUTPATIENT
Start: 2020-04-09 | End: 2020-04-09

## 2020-04-09 RX ORDER — BACITRACIN, NEOMYCIN, POLYMYXIN B 400; 3.5; 5 [USP'U]/G; MG/G; [USP'U]/G
OINTMENT TOPICAL ONCE
Status: CANCELLED | OUTPATIENT
Start: 2020-04-09

## 2020-04-09 RX ORDER — BETAMETHASONE DIPROPIONATE 0.05 %
OINTMENT (GRAM) TOPICAL ONCE
Status: CANCELLED | OUTPATIENT
Start: 2020-04-09

## 2020-04-09 RX ORDER — GINSENG 100 MG
CAPSULE ORAL ONCE
Status: CANCELLED | OUTPATIENT
Start: 2020-04-09

## 2020-04-09 RX ORDER — LIDOCAINE 40 MG/G
CREAM TOPICAL ONCE
Status: CANCELLED | OUTPATIENT
Start: 2020-04-09

## 2020-04-09 RX ORDER — GENTAMICIN SULFATE 1 MG/G
OINTMENT TOPICAL ONCE
Status: CANCELLED | OUTPATIENT
Start: 2020-04-09

## 2020-04-09 RX ORDER — CLOBETASOL PROPIONATE 0.5 MG/G
OINTMENT TOPICAL ONCE
Status: CANCELLED | OUTPATIENT
Start: 2020-04-09

## 2020-04-09 RX ORDER — LIDOCAINE 50 MG/G
0.5 OINTMENT TOPICAL ONCE
Status: CANCELLED | OUTPATIENT
Start: 2020-04-09

## 2020-04-09 RX ORDER — LIDOCAINE HYDROCHLORIDE 40 MG/ML
5 SOLUTION TOPICAL ONCE
Status: CANCELLED | OUTPATIENT
Start: 2020-04-09

## 2020-04-09 RX ORDER — BACITRACIN ZINC AND POLYMYXIN B SULFATE 500; 1000 [USP'U]/G; [USP'U]/G
OINTMENT TOPICAL ONCE
Status: CANCELLED | OUTPATIENT
Start: 2020-04-09

## 2020-04-09 RX ADMIN — LIDOCAINE 0.5 CM: 50 OINTMENT TOPICAL at 11:37

## 2020-04-09 ASSESSMENT — PAIN SCALES - GENERAL
PAINLEVEL_OUTOF10: 0
PAINLEVEL_OUTOF10: 0

## 2020-04-09 NOTE — PROGRESS NOTES
prepare the wound for epifix. Performed by: Sania Shafer DPM    Consent obtained:  Yes    Time out taken:  Yes    Pain Control: Anesthetic  Anesthetic: 5% Lidocaine Ointment Topical(0.5 cm )       Debridement:Nonexcisional     Using # 10 blade scalpel the wound(s)/ulcer(s) was/were sharply debrided down through and including the removal of epidermis and dermis. Devitalized Tissue Debrided:  fibrin, biofilm and callus    Pre Debridement Measurements:  Are located in the [place]  Documentation Flow Sheet    Wound/Ulcer #: 2    Post Debridement Measurements:  Wound/Ulcer Descriptions are Pre Debridement except measurements:  Wound 11/07/19 Toe (Comment  which one) Left;Plantar Initial wound #2- Great toe  (Active)   Wound Image   3/26/2020  8:56 AM   Wound Diabetic Love 2 11/7/2019 10:36 AM   Offloading for Diabetic Foot Ulcers Football dressing 4/2/2020 11:08 AM   Dressing Status Old drainage; Intact 4/9/2020 10:47 AM   Dressing Changed Changed/New 4/9/2020 11:36 AM   Dressing/Treatment Other (comment);Gauze dressing/ dressing sponge;Coban;Cast padding;Steri-strips;Silicone dressing 5/9/1275 11:36 AM   Wound Cleansed Rinsed/Irrigated with saline 11/21/2019 10:41 AM   Wound Length (cm) 0.4 cm 4/9/2020 10:47 AM   Wound Width (cm) 0.4 cm 4/9/2020 10:47 AM   Wound Depth (cm) 0.1 cm 4/9/2020 10:47 AM   Wound Surface Area (cm^2) 0.16 cm^2 4/9/2020 10:47 AM   Change in Wound Size % (l*w) 68 4/9/2020 10:47 AM   Wound Volume (cm^3) 0.02 cm^3 4/9/2020 10:47 AM   Wound Healing % 87 4/9/2020 10:47 AM   Post-Procedure Length (cm) 0.6 cm 4/9/2020 11:15 AM   Post-Procedure Width (cm) 0.6 cm 4/9/2020 11:15 AM   Post-Procedure Depth (cm) 0.1 cm 4/9/2020 11:15 AM   Post-Procedure Surface Area (cm^2) 0.36 cm^2 4/9/2020 11:15 AM   Post-Procedure Volume (cm^3) 0.04 cm^3 4/9/2020 11:15 AM   Undermining Starts ___ O'Clock 12 2/27/2020 11:00 AM   Undermining Ends___ O'Clock 12 2/27/2020 11:00 AM   Undermining Maxium Distance (cm) Size % (l*w) 68 4/9/2020 10:47 AM   Wound Volume (cm^3) 0.02 cm^3 4/9/2020 10:47 AM   Wound Healing % 87 4/9/2020 10:47 AM   Post-Procedure Length (cm) 0.6 cm 4/9/2020 11:15 AM   Post-Procedure Width (cm) 0.6 cm 4/9/2020 11:15 AM   Post-Procedure Depth (cm) 0.1 cm 4/9/2020 11:15 AM   Post-Procedure Surface Area (cm^2) 0.36 cm^2 4/9/2020 11:15 AM   Post-Procedure Volume (cm^3) 0.04 cm^3 4/9/2020 11:15 AM   Undermining Starts ___ O'Clock 12 2/27/2020 11:00 AM   Undermining Ends___ O'Clock 12 2/27/2020 11:00 AM   Undermining Maxium Distance (cm) 0.3 2/27/2020 11:00 AM   Wound Assessment Granulation tissue;Pink;Slough 4/9/2020 10:47 AM   Drainage Amount Scant 4/9/2020 10:47 AM   Drainage Description Serosanguinous 4/9/2020 10:47 AM   Odor None 4/9/2020 10:47 AM   Margins Attached edges 4/9/2020 10:47 AM   Nicole-wound Assessment Maceration 4/9/2020 10:47 AM   Non-staged Wound Description Full thickness 4/9/2020 10:47 AM   [place]%Wound Bed 90 4/9/2020 10:47 AM   Red%Wound Bed 5 1/2/2020 11:00 AM   Yellow%Wound Bed 10 4/9/2020 10:47 AM   Number of days: 154          Diabetic/Pressure/Non Pressure Ulcers:  Ulcer:   Diabetic ulcer, fat layer exposed      Total Surface Area of Ulcer(s) Covered 0.36 sq/cm    Was the Product Layered  Yes     Amount of Product Applied 1.54 sq/cm     Amount of Product Wasted 0 sq/cm     Reason for Waste: N/A     Surgically Fixated: Yes    Secured With: Steri Strips and Silicone Dressing     Procedural Pain: 0/10     Post Procedural Pain: 0 / 10    Response to Treatment: Well tolerated by patient. Plan:   E/M x 30 minutes of a new problem    Offload the wound with surgical shoe as there was hyperkeratotic tissue surrounding the wound. Encouraged patient to be compliant with offloading as she is currently off of work due to corona virus outbreak. Pt education per provider related to plan of care, pt is agreeable to plan and questions answered.     Treatment Note please see attached Discharge

## 2020-04-15 RX ORDER — PANTOPRAZOLE SODIUM 40 MG/1
40 TABLET, DELAYED RELEASE ORAL DAILY
Qty: 90 TABLET | Refills: 1 | Status: SHIPPED | OUTPATIENT
Start: 2020-04-15 | End: 2020-10-19 | Stop reason: SDUPTHER

## 2020-04-16 ENCOUNTER — HOSPITAL ENCOUNTER (OUTPATIENT)
Dept: WOUND CARE | Age: 65
Discharge: HOME | End: 2020-04-16
Payer: MEDICARE

## 2020-04-16 VITALS
SYSTOLIC BLOOD PRESSURE: 106 MMHG | TEMPERATURE: 97 F | DIASTOLIC BLOOD PRESSURE: 67 MMHG | HEART RATE: 67 BPM | RESPIRATION RATE: 18 BRPM

## 2020-04-16 PROCEDURE — 6370000000 HC RX 637 (ALT 250 FOR IP): Performed by: PODIATRIST

## 2020-04-16 PROCEDURE — 15275 SKIN SUB GRAFT FACE/NK/HF/G: CPT

## 2020-04-16 RX ORDER — BACITRACIN ZINC AND POLYMYXIN B SULFATE 500; 1000 [USP'U]/G; [USP'U]/G
OINTMENT TOPICAL ONCE
Status: CANCELLED | OUTPATIENT
Start: 2020-04-16

## 2020-04-16 RX ORDER — LIDOCAINE 50 MG/G
0.5 OINTMENT TOPICAL ONCE
Status: COMPLETED | OUTPATIENT
Start: 2020-04-16 | End: 2020-04-16

## 2020-04-16 RX ORDER — GINSENG 100 MG
CAPSULE ORAL ONCE
Status: CANCELLED | OUTPATIENT
Start: 2020-04-16

## 2020-04-16 RX ORDER — BACITRACIN, NEOMYCIN, POLYMYXIN B 400; 3.5; 5 [USP'U]/G; MG/G; [USP'U]/G
OINTMENT TOPICAL ONCE
Status: CANCELLED | OUTPATIENT
Start: 2020-04-16

## 2020-04-16 RX ORDER — LIDOCAINE 40 MG/G
CREAM TOPICAL ONCE
Status: CANCELLED | OUTPATIENT
Start: 2020-04-16

## 2020-04-16 RX ORDER — LIDOCAINE 50 MG/G
0.5 OINTMENT TOPICAL ONCE
Status: CANCELLED | OUTPATIENT
Start: 2020-04-16

## 2020-04-16 RX ORDER — GENTAMICIN SULFATE 1 MG/G
OINTMENT TOPICAL ONCE
Status: CANCELLED | OUTPATIENT
Start: 2020-04-16

## 2020-04-16 RX ORDER — CLOBETASOL PROPIONATE 0.5 MG/G
OINTMENT TOPICAL ONCE
Status: CANCELLED | OUTPATIENT
Start: 2020-04-16

## 2020-04-16 RX ORDER — BETAMETHASONE DIPROPIONATE 0.05 %
OINTMENT (GRAM) TOPICAL ONCE
Status: CANCELLED | OUTPATIENT
Start: 2020-04-16

## 2020-04-16 RX ORDER — LIDOCAINE HYDROCHLORIDE 40 MG/ML
5 SOLUTION TOPICAL ONCE
Status: CANCELLED | OUTPATIENT
Start: 2020-04-16

## 2020-04-16 RX ADMIN — LIDOCAINE 0.5 CM: 50 OINTMENT TOPICAL at 11:04

## 2020-04-16 ASSESSMENT — PAIN SCALES - GENERAL
PAINLEVEL_OUTOF10: 0
PAINLEVEL_OUTOF10: 0

## 2020-04-16 NOTE — PROGRESS NOTES
[address]   Progress Note and Procedure Note      Katarina Wong  MEDICAL RECORD NUMBER:  [id number]  AGE: 75 y.o. GENDER: female  : 1955  EPISODE DATE:  2020    Subjective:     Chief Complaint   Patient presents with    Wound Check     Follow up for left foot wound         HISTORY of PRESENT ILLNESS HPI  Katarina Wong is a 70 y.o. female who presents today for wound/ulcer evaluation. she has kept her dressing clean and intact.    History of Wound Context: callus formation with fissure  Wound/Ulcer Pain Timing/Severity: none (pt is neuropathic)  Quality of pain: N/A  Severity:  0 / 10   Modifying Factors: None  Associated Signs/Symptoms: none     Ulcer Identification:  Ulcer Type: diabetic, pressure and shear  Contributing Factors: diabetes, poor glucose control, chronic pressure, shear force and obesity     Wound: N/A                                       PAST MEDICAL HISTORY        Diagnosis Date    Abnormal echocardiogram     25% on 3/11/14 and 50% on 3/19/14    Back pain     Cardiomyopathy ([address]     EF was 50% on 3/19/14    Chipped tooth     lower left    Dental crown present     veneers    Depression     Depressive disorder 2014    Diabetes mellitus ([address]     Diabetic infection of right foot ([address] 4/15/2015    Diabetic ulcer of toe of left foot associated with type 2 diabetes mellitus, with fat layer exposed ([address] 4/10/2018    Pt slipped in hot tub latter part of February and has not healed since despite topical treatment    DVT (deep venous thrombosis) ([address]     HTN (hypertension)     Hx of blood clots     left leg    Ischemic cardiomyopathy 4/15/2015    Kidney disease     Meniere's disease     Mixed hyperlipidemia 3/7/2016    Nicotine abuse     NSTEMI (non-ST elevated myocardial infarction) ([address] 3/13/2014    Osteomyelitis of ankle or foot, acute, left ([address] 2018    Pneumonia     Spinal abscess ([address] 3/2014    epidural abscess  Spinal epidural abscess 3/13/2014    Type II diabetes mellitus, uncontrolled [facility] 2014       PAST SURGICAL HISTORY    Past Surgical History:   Procedure Laterality Date    BACK SURGERY  3/2014    DEBRIDEMENT  3/12/14    extensive debridement of bone/muscle and paraspinal empyema    HYSTERECTOMY  6/15/1999    complete-think right ovary in.    NASAL SEPTUM SURGERY      SLEEVE GASTRECTOMY  2019    ROBOTIC ASSISTED LAPAROSCOPIC SLEEVE GASTRECTOMY, LAPAROSCOPIC REDUCIBLE INCISIONAL HERNIA REPAIR     SLEEVE GASTRECTOMY N/A 2019    ROBOTIC ASSISTED LAPAROSCOPIC SLEEVE GASTRECTOMY, LAPAROSCOPIC REDUCIBLE INCISIONAL HERNIA REPAIR performed by Nori Erwin DO at [address] [facility] N/A 2019    EGD BIOPSY performed by Nori Erwin DO at [address] N/A 2019    EGD POLYP ABLATION/OTHER performed by Nori Erwin DO at [facility] END[facility]COPY       FAMILY HISTORY    Family History   Problem Relation Age of Onset    High Blood Pressure Mother     Cancer Mother     Rheum Arthritis Mother     COPD Father     Cancer Father     Osteoarthritis Neg Hx     Asthma Neg Hx     Breast Cancer Neg Hx     Diabetes Neg Hx     Heart Failure Neg Hx     High Cholesterol Neg Hx     Hypertension Neg Hx     Migraines Neg Hx     Ovarian Cancer Neg Hx     Rashes/Skin Problems Neg Hx     Seizures Neg Hx     Stroke Neg Hx     Thyroid Disease Neg Hx        SOCIAL HISTORY    Social History     Tobacco Use    Smoking status: Former Smoker     Packs/day: 0.50     Years: 10.00     Pack years: 5.00     Last attempt to quit: 11/10/2013     Years since quittin.4    Smokeless tobacco: Never Used   Substance Use Topics    Alcohol use: Not Currently     Alcohol/week: 0.0 standard drinks     Comment: occasionally    Drug use: No       ALLERGIES    Allergies   Allergen Reactions    Doxycycline Other (See Comments)     Yeast infection denies nausea, fever, vomiting, chills, shortness of breath, chest pain, abdominal pain, constipation or difficulty urinating. Objective:      /67   Pulse 67   Temp 97 °F (36.1 °C) (Oral)   Resp 18   LMP 06/15/1999     Wt Readings from Last 3 Encounters:   02/25/20 218 lb 12.8 oz (99.2 kg)   02/06/20 216 lb 7.9 oz (98.2 kg)   02/06/20 216 lb (98 kg)       PHYSICAL EXAM    General Appearance: alert and oriented to person, place and time, well-developed and well-nourished, in no acute distress  Skin: warm and dry, no rash or erythema. Wound noted on the plantar aspect of the left great toe with a large hyperkeratotic rim. Wound has fibrotic and nonviable tissue that extends down through and includes the subcutaneous tissue. After debridement the wound has a granular base. There is no surrounding erythema, edema, warmth or malodor noted. The wound does not probe or track to bone. DP/PT pulses palpable bilaterally. CFT brisk to all digits. Digits are pink and warm to the touch. Hair growth is normal in appearance. No edema noted. No calf pain with palpation noted. Epicritic sensation is grossly absent bilaterally. Negative clonus and babinski reflex is down going. Patient has had a left partial 3rd toe amputation. Patient has rigid hammertoe contractures noted on the bilateral feet. Hallux limitus noted bilaterally with dorsal bunion deformity. Assessment:        Problem List Items Addressed This Visit     Diabetic ulcer of toe of left foot associated with diabetes mellitus due to underlying condition, with fat layer exposed ([address] - Primary (Chronic)           Procedure Note  Indications:  Based on my examination of this patient's wound(s)/ulcer(s) today, debridement is required to promote healing and evaluate the wound base and prepare the wound for epifix.     Performed by: Jennie Whiet DPM    Consent obtained:  Yes    Time out taken:  Yes    Pain Control: Anesthetic  Anesthetic: 5% Lidocaine given to patient and signed by patient or POA. RTC 1 week       Discharge Instructions       [address] and [facility] O[facility] Therapy   Physician Orders and Discharge [facility]  [address] [facility] [address], [address]  Telephone: [phone]      FAX [phone]     NAME: Marianne Suh  DATE OF BIRTH:  1955  MEDICAL RECORD NUMBER:  [id number]  DATE:  4/16/2020     Wound Cleansing:   Do not scrub or use excessive force. Cleanse wound prior to applying a clean dressing with:  [x]? ?? Normal Saline  [x]? ?? Keep Wound Dry in Shower    []??? Wound Cleanser   []? ?? Cleanse wound with Mild Soap & Water  []??? May Shower at Discharge   []? ?? Other:        Topical Treatments:  Do not apply lotions, creams, or ointments to wound bed unless directed.   []??? Apply moisturizing lotion to skin surrounding the wound prior to dressing change.  []??? Apply antifungal ointment to skin surrounding the wound prior to dressing change.  []??? Apply thin film of moisture barrier ointment to skin immediately around wound.  []??? Other:       Dressings:                  Wound Location : LEFT GREAT TOE            **EPIFIX # 10 APPLIED 4/16/2020**  [x]??? Apply Primary Dressing:                                                      [x]? ?? Other: EPIFIX,MEPITEL,STERI STRIPS,DAB OF HYDROGEL   []??? Pack wound loosely with    []? ?? Iodoform   []? ?? Plain Packing           []? ?? Other   [x]? ?? Cover and Secure with:                   []??? Gauze         []? ?? Jackelyn           []? ?? Roll gauze              []??? Ace Wrap   []? ?? Cover Roll Tape     []??? ABD                                      [x]? ?? Other: GAUZE,CAST PADDING,KERLIX, COBAN ( FOOTBALL DRESSING)               Avoid contact of tape with skin.  []??? Change dressing:   []??? Daily           []? ?? Every Other Day    []? ?? Three times per week              []? ?? Once a

## 2020-04-17 RX ORDER — GABAPENTIN 300 MG/1
600 CAPSULE ORAL 3 TIMES DAILY
Qty: 540 CAPSULE | Refills: 0 | Status: SHIPPED | OUTPATIENT
Start: 2020-04-17 | End: 2020-10-21

## 2020-04-23 ENCOUNTER — HOSPITAL ENCOUNTER (OUTPATIENT)
Dept: WOUND CARE | Age: 65
Discharge: HOME | End: 2020-04-23
Payer: MEDICARE

## 2020-04-23 VITALS
TEMPERATURE: 97.7 F | SYSTOLIC BLOOD PRESSURE: 113 MMHG | DIASTOLIC BLOOD PRESSURE: 66 MMHG | HEART RATE: 65 BPM | RESPIRATION RATE: 18 BRPM

## 2020-04-23 PROCEDURE — 99211 OFF/OP EST MAY X REQ PHY/QHP: CPT

## 2020-04-23 ASSESSMENT — PAIN SCALES - GENERAL
PAINLEVEL_OUTOF10: 0
PAINLEVEL_OUTOF10: 0

## 2020-05-06 ENCOUNTER — TELEPHONE (OUTPATIENT)
Dept: INTERNAL MEDICINE CLINIC | Age: 65
End: 2020-05-06

## 2020-05-07 RX ORDER — ATORVASTATIN CALCIUM 80 MG/1
80 TABLET, FILM COATED ORAL DAILY
Qty: 90 TABLET | Refills: 1 | Status: SHIPPED | OUTPATIENT
Start: 2020-05-07 | End: 2020-10-21

## 2020-06-15 RX ORDER — CITALOPRAM 40 MG/1
TABLET ORAL
Qty: 90 TABLET | Refills: 0 | Status: SHIPPED | OUTPATIENT
Start: 2020-06-15 | End: 2020-10-12

## 2020-07-29 RX ORDER — PEN NEEDLE, DIABETIC 31 GX5/16"
NEEDLE, DISPOSABLE MISCELLANEOUS
Qty: 100 EACH | Refills: 0 | Status: SHIPPED | OUTPATIENT
Start: 2020-07-29 | End: 2020-10-21

## 2020-08-13 ENCOUNTER — OFFICE VISIT (OUTPATIENT)
Dept: INTERNAL MEDICINE CLINIC | Age: 65
End: 2020-08-13
Payer: MEDICARE

## 2020-08-13 VITALS
HEART RATE: 59 BPM | WEIGHT: 212 LBS | OXYGEN SATURATION: 97 % | DIASTOLIC BLOOD PRESSURE: 74 MMHG | SYSTOLIC BLOOD PRESSURE: 126 MMHG | BODY MASS INDEX: 37.55 KG/M2 | RESPIRATION RATE: 18 BRPM | TEMPERATURE: 97.2 F

## 2020-08-13 LAB
ANION GAP SERPL CALCULATED.3IONS-SCNC: 14 MMOL/L (ref 3–16)
BASOPHILS ABSOLUTE: 0.1 K/UL (ref 0–0.2)
BASOPHILS RELATIVE PERCENT: 1.1 %
BUN BLDV-MCNC: 25 MG/DL (ref 7–20)
CALCIUM SERPL-MCNC: 9.4 MG/DL (ref 8.3–10.6)
CHLORIDE BLD-SCNC: 97 MMOL/L (ref 99–110)
CO2: 24 MMOL/L (ref 21–32)
CREAT SERPL-MCNC: 1.5 MG/DL (ref 0.6–1.2)
EOSINOPHILS ABSOLUTE: 0.2 K/UL (ref 0–0.6)
EOSINOPHILS RELATIVE PERCENT: 3 %
GFR AFRICAN AMERICAN: 42
GFR NON-AFRICAN AMERICAN: 35
GLUCOSE BLD-MCNC: 407 MG/DL (ref 70–99)
HCT VFR BLD CALC: 35.7 % (ref 36–48)
HEMOGLOBIN: 12 G/DL (ref 12–16)
LYMPHOCYTES ABSOLUTE: 1.6 K/UL (ref 1–5.1)
LYMPHOCYTES RELATIVE PERCENT: 22.2 %
MCH RBC QN AUTO: 30.8 PG (ref 26–34)
MCHC RBC AUTO-ENTMCNC: 33.7 G/DL (ref 31–36)
MCV RBC AUTO: 91.4 FL (ref 80–100)
MONOCYTES ABSOLUTE: 0.4 K/UL (ref 0–1.3)
MONOCYTES RELATIVE PERCENT: 5.9 %
NEUTROPHILS ABSOLUTE: 4.9 K/UL (ref 1.7–7.7)
NEUTROPHILS RELATIVE PERCENT: 67.8 %
PDW BLD-RTO: 13.3 % (ref 12.4–15.4)
PLATELET # BLD: 273 K/UL (ref 135–450)
PMV BLD AUTO: 9.9 FL (ref 5–10.5)
POTASSIUM SERPL-SCNC: 4.7 MMOL/L (ref 3.5–5.1)
RBC # BLD: 3.91 M/UL (ref 4–5.2)
SODIUM BLD-SCNC: 135 MMOL/L (ref 136–145)
WBC # BLD: 7.2 K/UL (ref 4–11)

## 2020-08-13 PROCEDURE — 36415 COLL VENOUS BLD VENIPUNCTURE: CPT | Performed by: NURSE PRACTITIONER

## 2020-08-13 PROCEDURE — 99212 OFFICE O/P EST SF 10 MIN: CPT | Performed by: NURSE PRACTITIONER

## 2020-08-13 PROCEDURE — 93000 ELECTROCARDIOGRAM COMPLETE: CPT | Performed by: NURSE PRACTITIONER

## 2020-08-13 RX ORDER — LIRAGLUTIDE 6 MG/ML
1.2 INJECTION SUBCUTANEOUS WEEKLY
COMMUNITY
End: 2020-10-19 | Stop reason: SDUPTHER

## 2020-08-13 NOTE — LETTER
[address] [place] [address]  Phone: [phone]  Fax: [address] [place] [address] Dr Funk, APRN - CNP        August 13, 2020     Patient: Amisha Prakash   YOB: 1955   Date of Visit: 8/13/2020       To Whom It May Concern: It is my medical opinion that Mj Zaragoza requires a disability parking placard for the following reasons:  She cannot walk 200 feet without stopping to rest.  Duration of need: 5 years    If you have any questions or concerns, please don't hesitate to call.     Sincerely,        MADISON Cheek - CNP

## 2020-08-13 NOTE — PROGRESS NOTES
Preoperative Consultation      Rivka Maxwell  YOB: 1955    Date of Service:  8/13/2020    Vitals:    08/13/20 1635   BP: 126/74   Pulse: 59   Resp: 18   Temp: 97.2 °F (36.2 °C)   TempSrc: Temporal   SpO2: 97%   Weight: 212 lb (96.2 kg)      Wt Readings from Last 2 Encounters:   08/13/20 212 lb (96.2 kg)   02/25/20 218 lb 12.8 oz (99.2 kg)     BP Readings from Last 3 Encounters:   08/13/20 126/74   04/23/20 113/66   04/16/20 106/67        Chief Complaint   Patient presents with    Pre-op Exam     left great toe surgery;  DOS: 8/28/20 Dr Laina Carolina, [facility]     Allergies   Allergen Reactions    Doxycycline Other (See Comments)     Yeast infection     Outpatient Medications Marked as Taking for the 8/13/20 encounter (Office Visit) with MADISON Horvath - CNP   Medication Sig Dispense Refill    Liraglutide (VICTOZA) 18 MG/3ML SOPN SC injection Inject 1.2 mg into the skin daily      lisinopril (PRINIVIL;ZESTRIL) 20 MG tablet TAKE 1 TABLET BY MOUTH EVERY DAY 90 tablet 0    amLODIPine (NORVASC) 2.5 MG tablet TAKE 1 TABLET BY MOUTH EVERY DAY 90 tablet 1    carvedilol (COREG) 25 MG tablet TAKE 1 TABLET BY MOUTH TWICE A DAY WITH MEALS 180 tablet 1    citalopram (CELEXA) 40 MG tablet TAKE 1 TABLET BY MOUTH EVERY DAY 90 tablet 0    atorvastatin (LIPITOR) 80 MG tablet Take 1 tablet by mouth daily 90 tablet 1    pantoprazole (PROTONIX) 40 MG tablet Take 1 tablet by mouth daily 90 tablet 1    doxazosin (CARDURA) 2 MG tablet TAKE 1 TABLET BY MOUTH EVERY DAY AT NIGHT 90 tablet 1    Insulin Degludec (TRESIBA FLEXTOUCH) 200 UNIT/ML SOPN Inject 40 units into the skin once daily. 5 pen 1    Cholecalciferol (VITAMIN D3) 2000 units CAPS Take by mouth daily         This patient presents to the office today for a preoperative consultation at the request of surgeon, Dr. Laina Carolina, who plans on performing hallux interphalangeal joint arthrodesis vs arthroplasty, wound closure on August 28.        Known anesthesia problems: nausea    Patient Active Problem List   Diagnosis    Meniere's disease    Peripheral neuropathy    Depression    Mixed hyperlipidemia    Essential hypertension    Obesity, Class III, BMI 40-49.9 (morbid obesity) [facility]    ASNHL (asymmetrical sensorineural hearing loss)    Poor compliance with medication    DM type 2 with diabetic peripheral neuropathy ([address]    Diabetic ulcer of toe of left foot associated with diabetes mellitus due to underlying condition, with fat layer exposed ([address]    CKD (chronic kidney disease) stage 3, GFR 30-59 ml/min ([facility]    ANGLE (obstructive sleep apnea)    Chronic GERD    Morbid obesity with BMI of 45.0-49.9, adult ([address]    Incisional hernia, without obstruction or gangrene    S/P laparoscopic sleeve gastrectomy    Anemia    Ulcer of toe of right foot, with fat layer exposed ([address]    Ulcer of toe of left foot, with fat layer exposed ([address]       Past Medical History:   Diagnosis Date    Abnormal echocardiogram     25% on 3/11/14 and 50% on 3/19/14    Back pain     Cardiomyopathy ([address]     EF was 50% on 3/19/14    Chipped tooth     lower left    Dental crown present     veneers    Depression     Depressive disorder 8/13/2014    Diabetes mellitus ([address]     Diabetic infection of right foot ([address] 4/15/2015    Diabetic ulcer of toe of left foot associated with type 2 diabetes mellitus, with fat layer exposed ([address] 4/10/2018    Pt slipped in hot tub latter part of February and has not healed since despite topical treatment    DVT (deep venous thrombosis) ([address]     HTN (hypertension)     Hx of blood clots 2016    left leg    Ischemic cardiomyopathy 4/15/2015    Kidney disease     Meniere's disease     Mixed hyperlipidemia 3/7/2016    Nicotine abuse     NSTEMI (non-ST elevated myocardial infarction) ([address] 3/13/2014    Osteomyelitis of ankle or foot, acute, left ([address] 6/4/2018    Pneumonia     Spinal abscess ([address] 3/2014    epidural abscess    Spinal epidural abscess 3/13/2014    Type II diabetes mellitus, uncontrolled ([facility] 08/13/2014     Past Surgical History:   Procedure Laterality Date    BACK SURGERY  3/2014    DEBRIDEMENT  3/12/14    extensive debridement of bone/muscle and paraspinal empyema    HYSTERECTOMY  6/15/1999    complete-think right ovary in.    NASAL SEPTUM SURGERY      SLEEVE GASTRECTOMY  04/02/2019    ROBOTIC ASSISTED LAPAROSCOPIC SLEEVE GASTRECTOMY, LAPAROSCOPIC REDUCIBLE INCISIONAL HERNIA REPAIR     SLEEVE GASTRECTOMY N/A 4/2/2019    ROBOTIC ASSISTED LAPAROSCOPIC SLEEVE GASTRECTOMY, LAPAROSCOPIC REDUCIBLE INCISIONAL HERNIA REPAIR performed by Cy Lyles DO at [address] [facility] N/A 2/8/2019    EGD BIOPSY performed by Cy Lyles DO at [address] N/A 2/8/2019    EGD POLYP ABLATION/OTHER performed by Cy Lyles DO at [address] ENDOSCOPY     Family History   Problem Relation Age of Onset    High Blood Pressure Mother     Cancer Mother     Rheum Arthritis Mother     COPD Father     Cancer Father     Osteoarthritis Neg Hx     Asthma Neg Hx     Breast Cancer Neg Hx     Diabetes Neg Hx     Heart Failure Neg Hx     High Cholesterol Neg Hx     Hypertension Neg Hx     Migraines Neg Hx     Ovarian Cancer Neg Hx     Rashes/Skin Problems Neg Hx     Seizures Neg Hx     Stroke Neg Hx     Thyroid Disease Neg Hx      Social History     Socioeconomic History    Marital status:       Spouse name: Not on file    Number of children: Not on file    Years of education: Not on file    Highest education level: Not on file   Occupational History    Not on file   Social Needs    Financial resource strain: Not on file    Food insecurity     Worry: Not on file     Inability: Not on file    Transportation needs     Medical: Not on file     Non-medical: Not on file   Tobacco Use    Smoking status: Former Smoker     Packs/day: 0.50     Years: 10.00     Pack years: 5.00     Last attempt to quit: 11/10/2013     Years since quittin.7    Smokeless tobacco: Never Used   Substance and Sexual Activity    Alcohol use: Not Currently     Alcohol/week: 0.0 standard drinks     Comment: occasionally    Drug use: No    Sexual activity: Not Currently   Lifestyle    Physical activity     Days per week: Not on file     Minutes per session: Not on file    Stress: Not on file   Relationships    Social connections     Talks on phone: Not on file     Gets together: Not on file     Attends Scientologist service: Not on file     Active member of club or organization: Not on file     Attends meetings of clubs or organizations: Not on file     Relationship status: Not on file    Intimate partner violence     Fear of current or ex partner: Not on file     Emotionally abused: Not on file     Physically abused: Not on file     Forced sexual activity: Not on file   Other Topics Concern    Not on file   Social History Narrative    Not on file       Review of Systems  A comprehensive review of systems was negative except for what was noted in the HPI. Physical Exam   Constitutional: She is oriented to person, place, and time. She appears well-developed and well-nourished. No distress. HENT:   Head: Normocephalic and atraumatic. Mouth/Throat: Uvula is midline, oropharynx is clear and moist and mucous membranes are normal.   Eyes: Conjunctivae and EOM are normal. Pupils are equal, round, and reactive to light. Neck: Trachea normal and normal range of motion. Neck supple. No JVD present. Carotid bruit is not present. No mass and no thyromegaly present. Cardiovascular: Normal rate, regular rhythm, normal heart sounds and intact distal pulses. Exam reveals no gallop and no friction rub. No murmur heard. Pulmonary/Chest: Effort normal and breath sounds normal. No respiratory distress. She has no wheezes. She has no rales. Abdominal: Soft.  Normal aorta and bowel sounds are normal. She

## 2020-08-20 NOTE — PROGRESS NOTES
[facility] [employer] / [facility] ([facility]) [address] [facility], [address]    Acknowledgment of Informed Consent for Surgical or Medical Procedure and Sedation  I agree to allow doctor(s) Pelon Segovia and his/her associates or assistants, including residents and/or other qualified medical practitioner to perform the following medical treatment or procedure and to administer or direct the administration of sedation as necessary:  Procedure(s): ON THE LEFT, HALLUX INTERPHALANGEAL JOINT ARTHRODESIS VERSUS ARTHROPLASTY, [address]  My doctor has explained the following regarding the proposed procedure:   the explanation of the procedure   the benefits of the procedure   the potential problems that might occur during recuperation   the risks and side effects of the procedure which could include but are not limited to severe blood loss, infection, stroke or death   the benefits, risks and side effect of alternative procedures including the consequences of declining this procedure or any alternative procedures   the likelihood of achieving satisfactory results. I acknowledge no guarantee or assurance has been made to me regarding the results. I understand that during the course of this treatment/procedure, unforeseen conditions can occur which require an additional or different procedure. I agree to allow my physician or assistants to perform such extension of the original procedure as they may find necessary. I understand that sedation will often result in temporary impairment of memory and fine motor skills and that sedation can occasionally progress to a state of deep sedation or general anesthesia. I understand the risks of anesthesia for surgery include, but are not limited to, sore throat, hoarseness, injury to face, mouth, or teeth; nausea; headache; injury to blood vessels or nerves; death, brain damage, or paralysis.     I understand that if I have a Limitation of Treatment order

## 2020-08-24 ENCOUNTER — OFFICE VISIT (OUTPATIENT)
Dept: PRIMARY CARE CLINIC | Age: 65
End: 2020-08-24
Payer: MEDICARE

## 2020-08-24 PROCEDURE — 99211 OFF/OP EST MAY X REQ PHY/QHP: CPT | Performed by: NURSE PRACTITIONER

## 2020-08-24 NOTE — PATIENT INSTRUCTIONS
You have received a viral test for COVID-19. Below is education on quarantine per the CDC guidelines. For any symptoms, seek care from your PCP, call [phone] to establish care with a doctor, or go directly to an urgent care or the emergency room. Test results will take 2-7 days and will be sent to you in your [employer] account. If you test positive, you will be contacted via phone. If you test negative, the ONLY communication will be through [address] GO TO [address] AND SIGN UP FOR [employer]  (LOWER LEFT OF THE HOME PAGE)  No test is 100%. If you have symptoms, you should follow the guidance of quarantine as previously stated. You can still be contagious if you have symptoms. Your [facility] Department will reach out to you if you have a positive result. They will provide you with a return to work date and note. If you were tested for a pre-op, then you should remain in quarantine until your procedure. How do I know if I need to be in quarantine? If you live in a community where COVID-19 is or might be spreading (currently, that is virtually everywhere in the [place])  Be alert for symptoms. Watch for fever, cough, shortness of breath, or other symptoms of COVID-19.  Take your temperature if symptoms develop.  Practice social distancing. Maintain 6 feet of distance from others and stay out of crowded places.  Follow CDC guidance if symptoms develop. If you feel healthy but:   Recently had close contact with a person with COVID-19 you need to Quarantine:   Stay home until 14 days after your last exposure.  Check your temperature twice a day and watch for symptoms of COVID-19.  If possible, stay away from people who are at higher-risk for getting very sick from COVID-19.   Stay Home and Monitor Your Health if you:   Have been diagnosed with COVID-19, or   Are waiting for test results, or   Have cough, fever, or shortness of breath, or symptoms of COVID-19      When You Can

## 2020-08-25 LAB — SARS-COV-2, NAA: NOT DETECTED

## 2020-08-26 ENCOUNTER — ANESTHESIA EVENT (OUTPATIENT)
Dept: OPERATING ROOM | Age: 65
End: 2020-08-26
Payer: MEDICARE

## 2020-08-27 NOTE — PROGRESS NOTES
[facility] PRE-SURGICAL TESTING INSTRUCTIONS                              PRIOR TO PROCEDURE DATE:  1. Please follow any guidelines/instructions prior to your procedure as advised by your surgeon. 2. Arrange for someone to drive you home and be with you for the first 24 hours after discharge for your safety after your procedure for which you received sedation. Ensure it is someone we can share information with regarding your discharge. 3. You must contact your surgeon for instructions IF:   You are taking any blood thinners, aspirin, anti-inflammatory or vitamin E.   There is a change in your physical condition such as a cold, fever, rash, cuts, sores or any other infection, especially near your surgical site. 4. Do not drink alcohol the day before or day of your procedure. 5. A Pre-op History and Physical for surgery MUST be completed by your Physician or Urgent Care within 30 days of your procedure date. Please bring a copy with you on the day of your procedure and along with any other testing performed. THE DAY OF YOUR PROCEDURE:  1. Follow instructions for ARRIVAL TIME as DIRECTED BY YOUR SURGEON. I    2. Enter the MAIN entrance from [employer] and follow the signs to the free [employer] parking (offered free of charge 6am-5pm). 3. Enter the Main Entrance of the hospital (do not enter from the lower level of the parking garage). Upon entrance, check in with the  at the main desk on your left. If no one is available at the desk, proceed into the [facility] Waiting Room and go through the door directly into the [facility]. There is a Check-in desk ACROSS from Room 5 (marked with a sign hanging from the ceiling). The phone number for the surgery center is [phone]. 4. Please call [phone] option #2 option #2 if you have not been preregistered yet. On the day of your procedure bring your insurance card and photo ID.  You will be registered at your bedside once brought back to your room. 5. DO NOT EAT ANYTHING eight hours prior to surgery. May have 8 ounces of water 4 hours prior to surgery. 6. MEDICATIONS    Take the following medications with a SMALL sip of water: CARVEDILOL   Use your usual dose of inhalers the morning of surgery. BRING your rescue inhaler with you to hospital.    Anesthesia does NOT want you to take insulin the morning of surgery. They will control your blood sugar while you are at the hospital. Please contact your ordering physician for instructions regarding your insulin the night before your procedure. If you have an insulin pump, please keep it set on basal rate. 7. Do not swallow water when brushing teeth. No gum, candy, mints or ice chips. Refrain from smoking or at least decrease the amount. 8. Dress in loose, comfortable clothing appropriate for redressing after your procedure. Do not wear jewelry (including body piercings), make-up (especially NO eye make-up), fingernail polish (NO toenail polish if foot/leg surgery), lotion, powders or metal hairclips. 9. Dentures, glasses, or contacts will need to be removed before your procedure. Bring cases for your glasses, contacts, dentures, or hearing aids to protect them while you are in surgery. 10. If you use a CPAP, please bring it with you on the day of your procedure. 11. We recommend that valuable personal  belongings such as cash, cell phones, e-tablets or jewelry, be left at home during your stay. The hospital will not be responsible for valuables that are not secured in the hospital safe. However, if your insurance requires a co-pay, you may want to bring a method of payment, i.e. Check or credit card, if you wish to pay your co-pay the day of surgery. 12. If you are to stay overnight, you may bring a bag with personal items.  Please have any large items you may need brought in by your family after your arrival to your hospital room.    13. If you have a Living Will or Durable Power of , please bring a copy on the day of your procedure. 15. With your permission, one family member may accompany you while you are being prepared for surgery. Once you are ready, additional family members may join you. HOW WE KEEP YOU SAFE and WORK TO PREVENT SURGICAL SITE INFECTIONS:  1. Health care workers should always check your ID bracelet to verify your name and birth date. You will be asked many times to state your name, date of birth, and allergies. 2. Health care workers should always clean their hands with soap or alcohol gel before providing care to you. It is okay to ask anyone if they cleaned their hands before they touch you. 3. You will be actively involved in verifying the type of procedure you are having and ensuring the correct surgical site. This will be confirmed multiple times prior to your procedure. Do NOT tim your surgery site UNLESS instructed to by your surgeon. 4. Do not shave or wax for 72 hours prior to procedure near your operative site. Shaving with a razor can irritate your skin and make it easier to develop an infection. On the day of your procedure, any hair that needs to be removed near the surgical site will be clipped by a healthcare worker using a special clippers designed to avoid skin irritation. 5. When you are in the operating room, your surgical site will be cleansed with a special soap, and in most cases, you will be given an antibiotic before the surgery begins. What to expect AFTER YOUR PROCEDURE:  1. Immediately following your procedure, your will be taken to the PACU for the first phase of your recovery. Your nurse will help you recover from any potential side effects of anesthesia, such as extreme drowsiness, changes in your vital signs or breathing patterns. Nausea, headache, muscle aches, or sore throat may also occur after anesthesia.   Your nurse will help you manage these potential side effects. 2. For comfort and safety, arrange to have someone at home with you for the first 24 hours after discharge. 3. You and your family will be given written instructions about your diet, activity, dressing care, medications, and return visits. 4. Once at home, should issues with nausea, pain, or bleeding occur, or should you notice any signs of infection, you should call your surgeon. 5. Always clean your hands before and after caring for your wound. Do not let your family touch your surgery site without cleaning their hands. 6. Narcotic pain medications can cause significant constipation. You may want to add a stool softener to your postoperative medication schedule or speak to your surgeon on how best to manage this SIDE EFFECT. SPECIAL INSTRUCTIONS     Thank you for allowing us to care for you. We strive to exceed your expectations in the delivery of care and service provided to you and your family. If you need to contact us for any reason, please call us at [phone]    Instructions reviewed with patient during preadmission testing phone interview. Eric Ibrahim. 8/27/2020 .9:40 AM      ADDITIONAL EDUCATIONAL INFORMATION REVIEWED PER PHONE WITH YOU AND/OR YOUR FAMILY:  No Bring a urine sample on day of surgery  Yes Pain Goal-Taking Control of Your Pain  Yes FAQs about Surgical Site Infections  No Hibiclens® Bathing Instructions   Yes Antibacterial Soap  No Anderson® Wipes Bathing Instructions (Obtained from: https://www.[employer]/. pdf )  No Incentive Spirometer Education  No Other

## 2020-08-27 NOTE — PROGRESS NOTES
HONG ESTRADA (APRN-CNP) WHO DID PRE-OP EXAM FOR PT  CALLED REGARDING PRE-OP LAB RESULTS W/ BLOOD SUGAR >400. PRE-OP [address] PENDING LAB RESULTS. Davion Maria (CNP) SAID \" LABS WERE DRAWN IN MIDDLE OF DAY AND PT IS CLEARED FOR SURGERY. \" /JW    DR. Abdirahman Worthington (ANESTHESIOLOGIST) CALLED REPEAT LABS ORDERED DOS. / Mateo Tavera

## 2020-08-28 ENCOUNTER — APPOINTMENT (OUTPATIENT)
Dept: GENERAL RADIOLOGY | Age: 65
End: 2020-08-28
Attending: PODIATRIST
Payer: MEDICARE

## 2020-08-28 ENCOUNTER — HOSPITAL ENCOUNTER (OUTPATIENT)
Age: 65
Setting detail: OUTPATIENT SURGERY
Discharge: HOME | End: 2020-08-28
Attending: PODIATRIST | Admitting: PODIATRIST
Payer: MEDICARE

## 2020-08-28 ENCOUNTER — ANESTHESIA (OUTPATIENT)
Dept: OPERATING ROOM | Age: 65
End: 2020-08-28
Payer: MEDICARE

## 2020-08-28 VITALS
OXYGEN SATURATION: 98 % | DIASTOLIC BLOOD PRESSURE: 71 MMHG | HEART RATE: 50 BPM | WEIGHT: 212 LBS | HEIGHT: 63 IN | TEMPERATURE: 96.9 F | RESPIRATION RATE: 16 BRPM | SYSTOLIC BLOOD PRESSURE: 153 MMHG | BODY MASS INDEX: 37.56 KG/M2

## 2020-08-28 VITALS — SYSTOLIC BLOOD PRESSURE: 115 MMHG | DIASTOLIC BLOOD PRESSURE: 58 MMHG | OXYGEN SATURATION: 97 %

## 2020-08-28 LAB
ANION GAP SERPL CALCULATED.3IONS-SCNC: 12 MMOL/L (ref 3–16)
BUN BLDV-MCNC: 31 MG/DL (ref 7–20)
CALCIUM SERPL-MCNC: 9.1 MG/DL (ref 8.3–10.6)
CHLORIDE BLD-SCNC: 100 MMOL/L (ref 99–110)
CO2: 25 MMOL/L (ref 21–32)
CREAT SERPL-MCNC: 1.8 MG/DL (ref 0.6–1.2)
GFR AFRICAN AMERICAN: 34
GFR NON-AFRICAN AMERICAN: 28
GLUCOSE BLD-MCNC: 153 MG/DL (ref 70–99)
GLUCOSE BLD-MCNC: 164 MG/DL (ref 70–99)
GLUCOSE BLD-MCNC: 231 MG/DL (ref 70–99)
GLUCOSE BLD-MCNC: 235 MG/DL (ref 70–99)
PERFORMED ON: ABNORMAL
POTASSIUM SERPL-SCNC: 4.4 MMOL/L (ref 3.5–5.1)
SODIUM BLD-SCNC: 137 MMOL/L (ref 136–145)

## 2020-08-28 PROCEDURE — 80048 BASIC METABOLIC PNL TOTAL CA: CPT

## 2020-08-28 PROCEDURE — 2500000003 HC RX 250 WO HCPCS: Performed by: PODIATRIST

## 2020-08-28 PROCEDURE — 88304 TISSUE EXAM BY PATHOLOGIST: CPT

## 2020-08-28 PROCEDURE — 2780000010 HC IMPLANT OTHER: Performed by: PODIATRIST

## 2020-08-28 PROCEDURE — 2580000003 HC RX 258: Performed by: PODIATRIST

## 2020-08-28 PROCEDURE — 7100000011 HC PHASE II RECOVERY - ADDTL 15 MIN: Performed by: PODIATRIST

## 2020-08-28 PROCEDURE — 3600000004 HC SURGERY LEVEL 4 BASE: Performed by: PODIATRIST

## 2020-08-28 PROCEDURE — 88311 DECALCIFY TISSUE: CPT

## 2020-08-28 PROCEDURE — 3600000014 HC SURGERY LEVEL 4 ADDTL 15MIN: Performed by: PODIATRIST

## 2020-08-28 PROCEDURE — 7100000001 HC PACU RECOVERY - ADDTL 15 MIN: Performed by: PODIATRIST

## 2020-08-28 PROCEDURE — 2709999900 HC NON-CHARGEABLE SUPPLY: Performed by: PODIATRIST

## 2020-08-28 PROCEDURE — 2500000003 HC RX 250 WO HCPCS: Performed by: NURSE ANESTHETIST, CERTIFIED REGISTERED

## 2020-08-28 PROCEDURE — 7100000010 HC PHASE II RECOVERY - FIRST 15 MIN: Performed by: PODIATRIST

## 2020-08-28 PROCEDURE — 6360000002 HC RX W HCPCS: Performed by: ANESTHESIOLOGY

## 2020-08-28 PROCEDURE — 3700000001 HC ADD 15 MINUTES (ANESTHESIA): Performed by: PODIATRIST

## 2020-08-28 PROCEDURE — 6370000000 HC RX 637 (ALT 250 FOR IP): Performed by: ANESTHESIOLOGY

## 2020-08-28 PROCEDURE — 6360000002 HC RX W HCPCS: Performed by: NURSE ANESTHETIST, CERTIFIED REGISTERED

## 2020-08-28 PROCEDURE — 6360000002 HC RX W HCPCS: Performed by: PODIATRIST

## 2020-08-28 PROCEDURE — 7100000000 HC PACU RECOVERY - FIRST 15 MIN: Performed by: PODIATRIST

## 2020-08-28 PROCEDURE — 3700000000 HC ANESTHESIA ATTENDED CARE: Performed by: PODIATRIST

## 2020-08-28 PROCEDURE — 73630 X-RAY EXAM OF FOOT: CPT

## 2020-08-28 PROCEDURE — 2580000003 HC RX 258: Performed by: ANESTHESIOLOGY

## 2020-08-28 DEVICE — ALLOGRAFT HUM TISS 1 CC AMNIO TISS MEMBRN AMNIFLO CRYOPRES: Type: IMPLANTABLE DEVICE | Site: FOOT | Status: FUNCTIONAL

## 2020-08-28 RX ORDER — MIDAZOLAM HYDROCHLORIDE 1 MG/ML
INJECTION INTRAMUSCULAR; INTRAVENOUS PRN
Status: DISCONTINUED | OUTPATIENT
Start: 2020-08-28 | End: 2020-08-28 | Stop reason: SDUPTHER

## 2020-08-28 RX ORDER — FENTANYL CITRATE 50 UG/ML
50 INJECTION, SOLUTION INTRAMUSCULAR; INTRAVENOUS EVERY 5 MIN PRN
Status: DISCONTINUED | OUTPATIENT
Start: 2020-08-28 | End: 2020-08-28 | Stop reason: HOSPADM

## 2020-08-28 RX ORDER — BUPIVACAINE HYDROCHLORIDE 5 MG/ML
INJECTION, SOLUTION EPIDURAL; INTRACAUDAL PRN
Status: DISCONTINUED | OUTPATIENT
Start: 2020-08-28 | End: 2020-08-28 | Stop reason: ALTCHOICE

## 2020-08-28 RX ORDER — LIDOCAINE HYDROCHLORIDE 20 MG/ML
INJECTION, SOLUTION INFILTRATION; PERINEURAL PRN
Status: DISCONTINUED | OUTPATIENT
Start: 2020-08-28 | End: 2020-08-28 | Stop reason: ALTCHOICE

## 2020-08-28 RX ORDER — MAGNESIUM HYDROXIDE 1200 MG/15ML
LIQUID ORAL CONTINUOUS PRN
Status: COMPLETED | OUTPATIENT
Start: 2020-08-28 | End: 2020-08-28

## 2020-08-28 RX ORDER — ONDANSETRON 2 MG/ML
4 INJECTION INTRAMUSCULAR; INTRAVENOUS
Status: DISCONTINUED | OUTPATIENT
Start: 2020-08-28 | End: 2020-08-28 | Stop reason: HOSPADM

## 2020-08-28 RX ORDER — LABETALOL 20 MG/4 ML (5 MG/ML) INTRAVENOUS SYRINGE
5 EVERY 10 MIN PRN
Status: DISCONTINUED | OUTPATIENT
Start: 2020-08-28 | End: 2020-08-28 | Stop reason: HOSPADM

## 2020-08-28 RX ORDER — SODIUM CHLORIDE, SODIUM LACTATE, POTASSIUM CHLORIDE, CALCIUM CHLORIDE 600; 310; 30; 20 MG/100ML; MG/100ML; MG/100ML; MG/100ML
INJECTION, SOLUTION INTRAVENOUS CONTINUOUS
Status: DISCONTINUED | OUTPATIENT
Start: 2020-08-28 | End: 2020-08-28 | Stop reason: HOSPADM

## 2020-08-28 RX ORDER — MEPERIDINE HYDROCHLORIDE 25 MG/ML
12.5 INJECTION INTRAMUSCULAR; INTRAVENOUS; SUBCUTANEOUS EVERY 5 MIN PRN
Status: DISCONTINUED | OUTPATIENT
Start: 2020-08-28 | End: 2020-08-28 | Stop reason: HOSPADM

## 2020-08-28 RX ORDER — PROPOFOL 10 MG/ML
INJECTION, EMULSION INTRAVENOUS CONTINUOUS PRN
Status: DISCONTINUED | OUTPATIENT
Start: 2020-08-28 | End: 2020-08-28 | Stop reason: SDUPTHER

## 2020-08-28 RX ORDER — OXYCODONE HYDROCHLORIDE AND ACETAMINOPHEN 5; 325 MG/1; MG/1
1 TABLET ORAL PRN
Status: DISCONTINUED | OUTPATIENT
Start: 2020-08-28 | End: 2020-08-28 | Stop reason: HOSPADM

## 2020-08-28 RX ORDER — OXYCODONE HYDROCHLORIDE AND ACETAMINOPHEN 5; 325 MG/1; MG/1
1 TABLET ORAL
Status: DISCONTINUED | OUTPATIENT
Start: 2020-08-28 | End: 2020-08-28 | Stop reason: HOSPADM

## 2020-08-28 RX ORDER — OXYCODONE HYDROCHLORIDE AND ACETAMINOPHEN 5; 325 MG/1; MG/1
2 TABLET ORAL PRN
Status: DISCONTINUED | OUTPATIENT
Start: 2020-08-28 | End: 2020-08-28 | Stop reason: HOSPADM

## 2020-08-28 RX ORDER — PROCHLORPERAZINE EDISYLATE 5 MG/ML
5 INJECTION INTRAMUSCULAR; INTRAVENOUS
Status: DISCONTINUED | OUTPATIENT
Start: 2020-08-28 | End: 2020-08-28 | Stop reason: HOSPADM

## 2020-08-28 RX ORDER — APREPITANT 40 MG/1
40 CAPSULE ORAL ONCE
Status: COMPLETED | OUTPATIENT
Start: 2020-08-28 | End: 2020-08-28

## 2020-08-28 RX ORDER — LIDOCAINE HYDROCHLORIDE 20 MG/ML
INJECTION, SOLUTION INTRAVENOUS PRN
Status: DISCONTINUED | OUTPATIENT
Start: 2020-08-28 | End: 2020-08-28 | Stop reason: SDUPTHER

## 2020-08-28 RX ORDER — HYDRALAZINE HYDROCHLORIDE 20 MG/ML
5 INJECTION INTRAMUSCULAR; INTRAVENOUS EVERY 10 MIN PRN
Status: DISCONTINUED | OUTPATIENT
Start: 2020-08-28 | End: 2020-08-28 | Stop reason: HOSPADM

## 2020-08-28 RX ORDER — FENTANYL CITRATE 50 UG/ML
25 INJECTION, SOLUTION INTRAMUSCULAR; INTRAVENOUS EVERY 5 MIN PRN
Status: DISCONTINUED | OUTPATIENT
Start: 2020-08-28 | End: 2020-08-28 | Stop reason: HOSPADM

## 2020-08-28 RX ORDER — EPHEDRINE SULFATE 50 MG/ML
INJECTION, SOLUTION INTRAVENOUS PRN
Status: DISCONTINUED | OUTPATIENT
Start: 2020-08-28 | End: 2020-08-28 | Stop reason: SDUPTHER

## 2020-08-28 RX ORDER — DIPHENHYDRAMINE HYDROCHLORIDE 50 MG/ML
12.5 INJECTION INTRAMUSCULAR; INTRAVENOUS
Status: DISCONTINUED | OUTPATIENT
Start: 2020-08-28 | End: 2020-08-28 | Stop reason: HOSPADM

## 2020-08-28 RX ADMIN — SODIUM CHLORIDE, POTASSIUM CHLORIDE, SODIUM LACTATE AND CALCIUM CHLORIDE: 600; 310; 30; 20 INJECTION, SOLUTION INTRAVENOUS at 09:15

## 2020-08-28 RX ADMIN — EPHEDRINE SULFATE 5 MG: 50 INJECTION, SOLUTION INTRAMUSCULAR; INTRAVENOUS; SUBCUTANEOUS at 11:15

## 2020-08-28 RX ADMIN — CEFAZOLIN 2 G: 10 INJECTION, POWDER, FOR SOLUTION INTRAVENOUS at 10:24

## 2020-08-28 RX ADMIN — INSULIN HUMAN 4 UNITS: 100 INJECTION, SOLUTION PARENTERAL at 09:31

## 2020-08-28 RX ADMIN — PROPOFOL 100 MCG/KG/MIN: 10 INJECTION, EMULSION INTRAVENOUS at 10:34

## 2020-08-28 RX ADMIN — EPHEDRINE SULFATE 5 MG: 50 INJECTION, SOLUTION INTRAMUSCULAR; INTRAVENOUS; SUBCUTANEOUS at 11:31

## 2020-08-28 RX ADMIN — APREPITANT 40 MG: 40 CAPSULE ORAL at 09:36

## 2020-08-28 RX ADMIN — MIDAZOLAM HYDROCHLORIDE 2 MG: 2 INJECTION, SOLUTION INTRAMUSCULAR; INTRAVENOUS at 10:24

## 2020-08-28 RX ADMIN — LIDOCAINE HYDROCHLORIDE 50 MG: 20 INJECTION, SOLUTION INTRAVENOUS at 10:34

## 2020-08-28 ASSESSMENT — PULMONARY FUNCTION TESTS
PIF_VALUE: 0
PIF_VALUE: 1
PIF_VALUE: 0
PIF_VALUE: 0
PIF_VALUE: 1
PIF_VALUE: 0

## 2020-08-28 ASSESSMENT — PAIN SCALES - GENERAL
PAINLEVEL_OUTOF10: 0

## 2020-08-28 ASSESSMENT — LIFESTYLE VARIABLES: SMOKING_STATUS: 0

## 2020-08-28 ASSESSMENT — PAIN - FUNCTIONAL ASSESSMENT: PAIN_FUNCTIONAL_ASSESSMENT: 0-10

## 2020-08-28 NOTE — PROGRESS NOTES
Ambulatory Surgery/Procedure Discharge Note  Pt alert and oriented  Foot drsg left clean dry and intact  Ice to OR area  IV dcd , fluids taken well  Verbal and written discharge instructions given to pt and son   Boot on ,,partial weight bearing as tolerated  Call surgeon if questions problems or concerns  No pain  No nausea  Pt states , does not need to void prior to discharge  dcd per wheelchair to car with son present    Vitals:    08/28/20 1313   BP: (!) 153/71   Pulse: (!) 48   Resp: 16   Temp: 96.9 °F (36.1 °C)   SpO2: 96%       In: 937.4 [I.V.:887.4]  Out: -     Restroom use offered before discharge. Yes    Pain assessment:  level of pain (1-10, 10 severe),   Pain Level: 0        Patient discharged to home/self care.  Patient discharged via wheel chair by transporter to waiting family/S.O.       8/28/2020 1:37 PM

## 2020-08-28 NOTE — ANESTHESIA PRE PROCEDURE
Department of Anesthesiology  Preprocedure Note       Name:  Hollie Pemberton   Age:  75 y.o.  :  1955                                          MRN:  [id number]         Date:  2020      Surgeon: Angela Jones):  Maria Esther Johnson DPM    Procedure: ON THE LEFT: HALLUX INTERPHALANGEAL JOINT ARTHRODESIS VERSUS ARTHROPLASTY, WOUND CLOSURE (Left )    Medications prior to admission:   Prior to Admission medications    Medication Sig Start Date End Date Taking? Authorizing Provider   Liraglutide (VICTOZA) 18 MG/3ML SOPN SC injection Inject 1.2 mg into the skin once a week     Historical Provider, MD   lisinopril (PRINIVIL;ZESTRIL) 20 MG tablet TAKE 1 TABLET BY MOUTH EVERY DAY 20   MADISON Greer - CNP   amLODIPine (NORVASC) 2.5 MG tablet TAKE 1 TABLET BY MOUTH EVERY DAY 8/3/20   Zenaida Mead MD   carvedilol (COREG) 25 MG tablet TAKE 1 TABLET BY MOUTH TWICE A DAY WITH MEALS 8/3/20   Zenaida Mead MD   Insulin Pen Needle (B-D UF III MINI PEN NEEDLES) 31G X 5 MM MISC Use daily with insulin injection. 20   Al Serna MD   citalopram (CELEXA) 40 MG tablet TAKE 1 TABLET BY MOUTH EVERY DAY 6/15/20   Al Serna MD   atorvastatin (LIPITOR) 80 MG tablet Take 1 tablet by mouth daily 20   Al Serna MD   gabapentin (NEURONTIN) 300 MG capsule Take 2 capsules by mouth 3 times daily for 90 days. 20  Al Serna MD   pantoprazole (PROTONIX) 40 MG tablet Take 1 tablet by mouth daily 4/15/20   Al Serna MD   doxazosin (CARDURA) 2 MG tablet TAKE 1 TABLET BY MOUTH EVERY DAY AT NIGHT 3/26/20   Zenaida Mead MD   blood glucose test strips (ONE TOUCH ULTRA TEST) strip Check FSBS 2-3 times daily. 3/5/20   Al Serna MD   Insulin Degludec (TRESIBA FLEXTOUCH) 200 UNIT/ML SOPN Inject 40 units into the skin once daily.  20   Al Serna MD   ONE TOUCH LANCETS MISC 1 each by Does not apply route 3 times daily 19   Al Serna MD Cholecalciferol (VITAMIN D3) 2000 units CAPS Take by mouth daily    Historical Provider, MD   acetaminophen (TYLENOL) 500 MG tablet Take 1 tablet by mouth every 6 hours as needed for Pain 10/12/18 3/6/20  Deedee Salazar MD   aspirin 81 MG chewable tablet Take 1 tablet by mouth daily. 3/20/14   Dwain Worthington MD       Current medications:    No current facility-administered medications for this visit. No current outpatient medications on file. Facility-Administered Medications Ordered in Other Visits   Medication Dose Route Frequency Provider Last Rate Last Dose    lactated ringers infusion   Intravenous Continuous Andre Carr  mL/hr at 08/28/20 0915      ceFAZolin (ANCEF) 2 g in dextrose 5 % 50 mL IVPB  2 g Intravenous Once Radha Lewis DPM           Allergies:     Allergies   Allergen Reactions    Doxycycline Other (See Comments)     Yeast infection       Problem List:    Patient Active Problem List   Diagnosis Code    Meniere's disease H81.09    Peripheral neuropathy G62.9    Depression F32.9    Mixed hyperlipidemia E78.2    Essential hypertension I10    Obesity, Class III, BMI 40-49.9 (morbid obesity) ([facility]) E66.01    ASNHL (asymmetrical sensorineural hearing loss) H90.5    Poor compliance with medication Z91.14    DM type 2 with diabetic peripheral neuropathy ([facility]) E11.42    Diabetic ulcer of toe of left foot associated with diabetes mellitus due to underlying condition, with fat layer exposed ([facility] S07.924, L97.522    CKD (chronic kidney disease) stage 3, GFR 30-59 ml/min ([facility]) N18.3    ANGLE (obstructive sleep apnea) G47.33    Chronic GERD K21.9    Morbid obesity with BMI of 45.0-49.9, adult ([facility]) E66.01, Z68.42    Incisional hernia, without obstruction or gangrene K43.2    S/P laparoscopic sleeve gastrectomy Z98.84    Anemia D64.9    Ulcer of toe of right foot, with fat layer exposed ([facility] L97.512    Ulcer of toe of left foot, with fat layer exposed [facility]) K29.932       Past Medical History:        Diagnosis Date    Abnormal echocardiogram     25% on 3/11/14 and 50% on 3/19/14    Back pain     Cardiomyopathy ([address]     EF was 50% on 3/19/14    Chipped tooth     lower left    Dental crown present     veneers    Depression     Depressive disorder 8/13/2014    Diabetes mellitus ([address]     Diabetic infection of right foot ([address] 4/15/2015    Diabetic ulcer of toe of left foot associated with type 2 diabetes mellitus, with fat layer exposed ([address] 4/10/2018    Pt slipped in hot tub latter part of February and has not healed since despite topical treatment    DVT (deep venous thrombosis) ([address]     HTN (hypertension)     Hx of blood clots 2016    left leg    Ischemic cardiomyopathy 4/15/2015    Kidney disease     CHRONIC STAGE 3    Meniere's disease     Mixed hyperlipidemia 3/7/2016    Nicotine abuse     NSTEMI (non-ST elevated myocardial infarction) ([address] 3/13/2014    ANGLE (obstructive sleep apnea)     Osteomyelitis of ankle or foot, acute, left ([address] 6/4/2018    Pneumonia     PONV (postoperative nausea and vomiting)     nausea    Spinal abscess ([address] 3/2014    epidural abscess    Spinal epidural abscess 3/13/2014    Type II diabetes mellitus, uncontrolled ([address] 08/13/2014       Past Surgical History:        Procedure Laterality Date    BACK SURGERY  3/2014    DEBRIDEMENT  3/12/14    extensive debridement of bone/muscle and paraspinal empyema    HYSTERECTOMY  6/15/1999    complete-think right ovary in.    NASAL SEPTUM SURGERY      SLEEVE GASTRECTOMY  04/02/2019    ROBOTIC ASSISTED LAPAROSCOPIC SLEEVE GASTRECTOMY, LAPAROSCOPIC REDUCIBLE INCISIONAL HERNIA REPAIR     SLEEVE GASTRECTOMY N/A 4/2/2019    ROBOTIC ASSISTED LAPAROSCOPIC SLEEVE GASTRECTOMY, LAPAROSCOPIC REDUCIBLE INCISIONAL HERNIA REPAIR performed by Chely Castro DO at [address] N/A 2/8/2019    EGD BIOPSY performed by Chely Castro DO at [facility] ENDOSCOPY    UPPER GASTROINTESTINAL ENDOSCOPY N/A 2019    EGD POLYP ABLATION/OTHER performed by Megan Kaplan DO at [address] History:    Social History     Tobacco Use    Smoking status: Former Smoker     Packs/day: 0.50     Years: 10.00     Pack years: 5.00     Last attempt to quit: 11/10/2013     Years since quittin.8    Smokeless tobacco: Never Used   Substance Use Topics    Alcohol use: Not Currently     Alcohol/week: 0.0 standard drinks     Comment: occasionally                                Counseling given: Not Answered      Vital Signs (Current): There were no vitals filed for this visit. BP Readings from Last 3 Encounters:   20 (!) 131/58   20 126/74   20 113/66       NPO Status:                                                                                 BMI:   Wt Readings from Last 3 Encounters:   20 212 lb (96.2 kg)   20 212 lb (96.2 kg)   20 218 lb 12.8 oz (99.2 kg)     There is no height or weight on file to calculate BMI.    CBC:   Lab Results   Component Value Date    WBC 7.2 2020    RBC 3.91 2020    HGB 12.0 2020    HCT 35.7 2020    MCV 91.4 2020    RDW 13.3 2020     2020       CMP:   Lab Results   Component Value Date     2020    K 4.7 2020    CL 97 2020    CO2 24 2020    BUN 25 2020    CREATININE 1.5 2020    GFRAA 42 2020    AGRATIO 1.0 2019    LABGLOM 35 2020    GLUCOSE 407 2020    PROT 7.9 2019    CALCIUM 9.4 2020    BILITOT 0.3 2019    ALKPHOS 90 2019    AST 16 2019    ALT 10 2019       POC Tests: No results for input(s): POCGLU, POCNA, POCK, POCCL, POCBUN, POCHEMO, POCHCT in the last 72 hours.     Coags: No results found for: PROTIME, INR, APTT    HCG (If Applicable): No results found for: PREGTESTUR, PREGSERUM, HCG, HCGQUANT     ABGs: Lab Results   Component Value Date    PHART 7.431 03/13/2014    PO2ART 86.8 03/13/2014    TOA0ZOZ 31.0 03/13/2014    IVP2MKM 20.2 03/13/2014    BEART -2.8 03/13/2014    T1PFACWY 96.6 03/13/2014        Type & Screen (If Applicable):  No results found for: [facility]    Anesthesia Evaluation  Patient summary reviewed and Nursing notes reviewed   history of anesthetic complications: PONV. Airway: Mallampati: II  TM distance: >3 FB   Neck ROM: full  Mouth opening: > = 3 FB Dental: normal exam         Pulmonary:normal exam  breath sounds clear to auscultation  (+) sleep apnea: on noncompliant,      (-) COPD, asthma and not a current smoker                           Cardiovascular:  Exercise tolerance: good (>4 METS),   (+) hypertension:, past MI:,     (-) pacemaker, dysrhythmias,  angina and  AREVALO      Rhythm: regular  Rate: normal                    Neuro/Psych:      (-) seizures and CVA           GI/Hepatic/Renal:   (+) GERD: well controlled, renal disease: CRI, morbid obesity          Endo/Other:    (+) DiabetesType II DM, , blood dyscrasia::., .                 Abdominal:           Vascular: negative vascular ROS. Anesthesia Plan      general     ASA 3     (-npo MN  -denies chest pain or palp, had MI in 2014 when she had a spinal abcess; no stent. Activity tolerance is low due to back pain  -no fever or cough  -gastric sleeve last year but has gained all weight back    Echo 2019  Conclusions      Summary   Left ventricular cavity size is upper limits of normal.   There is mild concentric left ventricular hypertrophy. Ejection fraction is visually estimated to be 50-55%. Mitral valve leaflets appear mildly thickened. No significant mitral regurgitation)  Induction: intravenous. MIPS: Postoperative opioids intended and Prophylactic antiemetics administered. Anesthetic plan and risks discussed with patient.         Attending anesthesiologist reviewed and agrees with Pre Eval content              Bing Nina MD   8/28/2020

## 2020-08-28 NOTE — PROGRESS NOTES
Patient admitted to [facility]. Post op    Room / Location: [address] [place] [address] / [facility]    Anesthesia Start: 1025  Anesthesia Stop: 9543    Procedure: ON THE LEFT: HALLUX INTERPHALANGEAL JOINT ARTHRODESIS Y, WOUND CLOSURE, FLEXOR TENOTOMY 4TH AND 5TH DIGITS, TENOTOMY AND CAPSULOTOMY 2ND DIGIT (Left )  Diagnosis:        Osteoarthritis, chronic       (Osteoarthritis, left foot  [M19.90] Type 2 diabetes mellitus with foot ulcer [E11.621])    Surgeon: Janette Washington DPM  Responsible Provider: Donny Campos      Report received from anesthesia personnel- no problems noted during the case. Patient awake- no complaints of pain or nausea. Left foot with ace wrap intact, boot in place. Good cap refill, patient reports numbness in left foot/toes. Post op blood sugar 164.

## 2020-08-28 NOTE — OP NOTE
Operative Note      Patient: Otto Lewis  YOB: 1955  MRN: [id number]    Date of Procedure: 8/28/2020    Pre-Op Diagnosis: Osteoarthritis, left foot  [M19.90] Type 2 diabetes mellitus with foot ulcer [E11.621]  Post-Op Diagnosis: Same    Procedure(s):  1. [address], LEFT FOOT [address]  2. WOUND CLOSURE, LEFT FOOT [address]  3. TENOTOMY AND CAPSULOTOMY 2ND DIGIT  4. FLEXOR TENOTOMY 2ND, 4TH, AND 5TH DIGITS  Surgeon(s):  Tarah Arreola DPM  Assistant:   Resident: Brandi Faith DPM, Ngoc Salvador MS4  Anesthesia: Monitor Anesthesia Care  Hemostasis: Ankle tourniquet at 250 mmHg for 55 minutes  Estimated Blood Loss (mL): Minimal  Materials: 3-0 vicryl, 4-0 vicryl, 3-0 nylon   Injectables: 10 cc of 2% lidocaine plain, 10 cc of 0.5% marcaine plain, 2 cc of amnioflo   Complications: None    Specimens:   ID Type Source Tests Collected by Time Destination   A : PROXIMAL PHALANX LEFT HALLUX Tissue Tissue SURGICAL PATHOLOGY Tarah Arreola DPM 8/28/2020 1051        Implants:  Implant Name Type Inv. Item Serial No.  Lot No. LRB No. Used Action   CHRIS-ALLOGRAFT AMNION 1.0ML Kandy Reese - [id number] Bone/Graft/Tissue/Human/Synth CHRIS-ALLOGRAFT AMNION 1.0ML AMNIFLO CRYOPRES [id number] [employer]ONE [employer] ALLOG[employer]FT[employer] [de-identified] Left 1 Implanted   Findings: As dictated    Detailed Description of Procedure:     INDICATIONS FOR PROCEDURE: This patient has signs and symptoms clinically  consistent with the above mentioned preoperative diagnosis. Having failed conservative treatment, it was determined that the patient would benefit from surgical intervention. Pre-operative x-rays showed an accessory sesamoid bone plantarly at the level of the hallucal interphalangeal joint, therefore, it was determined the patient would benefit from removal of the sesamoid and an arthroplasty of the interphalangeal joint.  All potential risks, benefits, and complications were discussed with the patient prior to the scheduling of surgery. All the patient's questions were answered and no guarantees were given. The patient wished to proceed with surgery, and informed written consent was obtained. DETAILS OF PROCEDURE: The patient was brought from the pre-operative area and placed on the operating table in the supine position. A pneumatic ankle tourniquet was placed around the patient's well-padded left lower extremity. Following IV sedation, a local anesthetic block was then injected proximal to the incision site consisting of 10cc of 2% lidocaine plain. The left  lower extremity was then scrubbed, prepped, and draped in the usual sterile fashion. A time-out was performed. The patient, procedure, and operative site were confirmed. An Esmarch bandage was then utilized to exsanguinate the patient's left lower extremity. The tourniquet was then inflated to 250 mmHg and the following procedure was performed. Procedure #1: [address] [facility], LEFT FOOT [address]: Attention was directed toward the left hallux where a full thickness wound was noted to the plantar aspect at the level of the hallucal interphalangeal joint. Attention was directed toward the dorsal aspect of the left hallux at the level of the interphalangeal joint. An approximately 4cm linear incision was made centering over the hallucal interphalangeal joint (HIPJ). Using sharp and blunt dissection the incision was deepened through the subcutaneous layer with care being taken to identify and retract all vital neurovascular structures. All venous tributaries were electrocoagulated as necessary. Sharp and blunt dissection was continued to the level of long extensor tendon. At this time, a transverse tenotomy and capsulotomy were performed at the level of the interphalangeal joint.   All ligamentous and capsular structures, as well as the long extensor tendon, were reflected from the underlying bone, thereby allowing exposure and visualization of the head of the proximal phalanx. The long extensor tendon was reflected from its osseous attachments back to the level of the proximal phalanx base. Attention was directed toward the osteotomy. Using a #138 blade on a sagittal saw, the head of the proximal phalanx was resected approximately at the level of the metaphyseal/diaphyseal flair and passed from the operative site. Next, a back brushing technique was used to remove the cartilage from the base of the distal phalanx. At this time, the resultant joint cavity was inspected and two accessory sesamoid bones were noted within the long flexor tendon. These were sharply dissected from the tendon and passed to the back field. Upon completion, integrity of the long flexor tendon was noted to still be intact. A dorsiflexory force was then applied to the plantar aspect of the metatarsal head to simulate weight-bearing, and the digit was noted to be in proper anatomic alignment, with respect to the adjacent digits. At this time, a 0.045 inch K-wire was driven from proximal to distal through the distal phalanges and allowed to exit the skin at the distal aspect of the digit. The wire was then driven in a retrograde fashion through the proximal phalanx, with care being taken not to cross the metatarsophalangeal joint. Proper placement of the wire into the metatarsal was confirmed by maintenance of plantarflexory and dorsiflexory range of motion at the metatarsophalangeal joint. The free end of the K-wire at the distal aspect of the digit was then cut at a length of approximately 1cm. A protective plastic Silvio ball was placed over the sharp end. Procedure #2: WOUND CLOSURE, LEFT FOOT [address]: Attention was directed toward the plantar aspect of the left hallux where the full thickness wound, previously mentioned above, was noted. The wound bed measured approximately 1.0x2.0x0.2cm and consisted of pink granular and yellow fibrotic tissue.  The wound did not probe to bone, track, or undermine. No purulence was noted. At this time, a #15 blade was used to make two full thickness semi-elliptical incisions surrounding the wound. The ellipsed soft tissue, including the wound, was excised in toto. The resultant deficit was down to the subcutaneous tissue. At this time, using copious amounts of normal sterile saline, the wound was irrigated. Next, the skin edges were reapproximated using 3-0 nylon in a simple interrupted manner. Procedure #3: TENOTOMY AND CAPSULOTOMY 2ND DIGIT, left foot: At this time, attention was directed toward the 2nd digit of the left foot. The digit was noted to have a flexible hammertoe deformity. Using a #15 blade, a horizontal stab incision was made at the level of the metatarsophalangeal joint. The toe was then plantarflexed at the metatarsophalangeal joint and upon doing so, the long extensor tendon was then severed at this level both medially and laterally, as well as the dorsal aspect of the metatarsophalangeal joint capsule. The stab incision was then closed using 4-0 nylon in a simple interrupted suture technique. Procedure #4: FLEXOR TENOTOMY 2ND, 4TH, AND 5TH DIGITS, left foot   Next, attention was directed towards the plantar aspect of the 2nd digit. While applying a plantarflexory force to the head of the proximal phalanx and a dorsiflexory force to the distal aspect of the digit, a stab incision was made plantarly at the level of the proximal interphalangeal joint. The flexor tendon was then severed at this level both medially and laterally. A gentle extension force was applied to the digit at the level of the proximal interphalangeal joint. Excellent correction of the digital contracture was noted. The stab incision was then closed using 4-0 nylon in a simple interrupted suture technique. Next, attention was directed towards the plantar aspect of the 4th digit on the left foot.  While applying a plantarflexory force left lower extremity and digital capillary refill time immediate to the digits of the left  foot. Following a period of post-operative monitoring, the patient will be discharged home with written and oral wound care and follow-up instructions per Dr. Fernando Patterson. The patient is to follow-up with Dr. Fernando Patterson in his private office within 3-5 days. The patient is to keep dressing clean, dry and intact at all times. The patient is to call with if any complications occur.     Dictated on behalf of Dr. Bridget Garcia DPM    Electronically signed by Pema Plunkett DPM on 8/28/2020 at 12:49 PM     Dr. Bridget Garcia [facility]   Office: [phone]  Cell: [phone]

## 2020-08-28 NOTE — ANESTHESIA POSTPROCEDURE EVALUATION
Department of Anesthesiology  Postprocedure Note    Patient: Tyrone Cosby  MRN: [id number]  YOB: 1955  Date of evaluation: 8/28/2020  Time:  12:50 PM     Procedure Summary     Date:  08/28/20 Room / Location:  [address] [place] [address]    Anesthesia Start:  1025 Anesthesia Stop:  8456    Procedure:  ON THE LEFT: HALLUX INTERPHALANGEAL JOINT ARTHRODESIS Y, WOUND CLOSURE, FLEXOR TENOTOMY 4TH AND 5TH DIGITS, TENOTOMY AND CAPSULOTOMY 2ND DIGIT (Left ) Diagnosis:       Osteoarthritis, chronic      (Osteoarthritis, left foot  [M19.90] Type 2 diabetes mellitus with foot ulcer [E11.621])    Surgeon:  Jigar Baeza DPM Responsible Provider:  Tommie Garcia MD    Anesthesia Type:  general ASA Status:  3          Anesthesia Type: general    Steven Phase I: Steven Score: 9    Steven Phase II:      Last vitals: Reviewed and per EMR flowsheets.        Anesthesia Post Evaluation    Patient location during evaluation: PACU  Patient participation: complete - patient participated  Level of consciousness: awake  Pain score: 2  Airway patency: patent  Nausea & Vomiting: no nausea and no vomiting  Complications: no  Cardiovascular status: hemodynamically stable  Respiratory status: acceptable  Hydration status: euvolemic

## 2020-08-28 NOTE — H&P
Stoney Salazar    [id number]    [facility] Same Day Surgery Update H & P  Department of General Surgery   Surgical Service   Pre-operative History and Physical  Last H & P within the last 30 days. DIAGNOSIS:   Osteoarthritis, chronic [M19.90]    PROCEDURE:  MT FUSION BIG TOE,I-P JOINT [46538] (ON THE LEFT: HALLUX INTERPHALANGEAL JOINT ARTHRODESIS VERSUS ARTHROPLASTY, WOUND CLOSURE)      HISTORY OF PRESENT ILLNESS:   Patient with diabetic left great toe ulcer presents today for the above procedure. Covid 19:  Patient denies fever, chills, cough or known exposure to Covid-19.   Patient reports they have not been quarantined at home since Covid-19 test.      Past Medical History:        Diagnosis Date    Abnormal echocardiogram     25% on 3/11/14 and 50% on 3/19/14    Back pain     Cardiomyopathy ([address]     EF was 50% on 3/19/14    Chipped tooth     lower left    Dental crown present     veneers    Depression     Depressive disorder 8/13/2014    Diabetes mellitus ([address]     Diabetic infection of right foot ([address] 4/15/2015    Diabetic ulcer of toe of left foot associated with type 2 diabetes mellitus, with fat layer exposed ([address] 4/10/2018    Pt slipped in hot tub latter part of February and has not healed since despite topical treatment    DVT (deep venous thrombosis) ([address]     HTN (hypertension)     Hx of blood clots 2016    left leg    Ischemic cardiomyopathy 4/15/2015    Kidney disease     CHRONIC STAGE 3    Meniere's disease     Mixed hyperlipidemia 3/7/2016    Nicotine abuse     NSTEMI (non-ST elevated myocardial infarction) ([address] 3/13/2014    ANGLE (obstructive sleep apnea)     Osteomyelitis of ankle or foot, acute, left (HCC) 6/4/2018    Pneumonia     PONV (postoperative nausea and vomiting)     nausea    Spinal abscess ([address] 3/2014    epidural abscess    Spinal epidural abscess 3/13/2014    Type II diabetes mellitus, uncontrolled ([address] 08/13/2014     Past Surgical History: Procedure Laterality Date    BACK SURGERY  3/2014    DEBRIDEMENT  3/12/14    extensive debridement of bone/muscle and paraspinal empyema    HYSTERECTOMY  6/15/1999    complete-think right ovary in.    NASAL SEPTUM SURGERY      SLEEVE GASTRECTOMY  2019    ROBOTIC ASSISTED LAPAROSCOPIC SLEEVE GASTRECTOMY, LAPAROSCOPIC REDUCIBLE INCISIONAL HERNIA REPAIR     SLEEVE GASTRECTOMY N/A 2019    ROBOTIC ASSISTED LAPAROSCOPIC SLEEVE GASTRECTOMY, LAPAROSCOPIC REDUCIBLE INCISIONAL HERNIA REPAIR performed by Rome Wills DO at [address] N/A 2019    EGD BIOPSY performed by Rome Wills DO at [address] N/A 2019    EGD POLYP ABLATION/OTHER performed by Rome Wills DO at [facility] ENDOSCOPY     Past Social History:  Social History     Socioeconomic History    Marital status:       Spouse name: None    Number of children: None    Years of education: None    Highest education level: None   Occupational History    None   Social Needs    Financial resource strain: None    Food insecurity     Worry: None     Inability: None    Transportation needs     Medical: None     Non-medical: None   Tobacco Use    Smoking status: Former Smoker     Packs/day: 0.50     Years: 10.00     Pack years: 5.00     Last attempt to quit: 11/10/2013     Years since quittin.8    Smokeless tobacco: Never Used   Substance and Sexual Activity    Alcohol use: Not Currently     Alcohol/week: 0.0 standard drinks     Comment: occasionally    Drug use: No    Sexual activity: Not Currently   Lifestyle    Physical activity     Days per week: None     Minutes per session: None    Stress: None   Relationships    Social connections     Talks on phone: None     Gets together: None     Attends [employer] service: None     Active member of club or organization: None     Attends meetings of clubs or organizations: None     Relationship status: None    Intimate partner violence     Fear of current or ex partner: None     Emotionally abused: None     Physically abused: None     Forced sexual activity: None   Other Topics Concern    None   Social History Narrative    None         Medications Prior to Admission:      Prior to Admission medications    Medication Sig Start Date End Date Taking? Authorizing Provider   Liraglutide (VICTOZA) 18 MG/3ML SOPN SC injection Inject 1.2 mg into the skin once a week    Yes Historical Provider, MD   lisinopril (PRINIVIL;ZESTRIL) 20 MG tablet TAKE 1 TABLET BY MOUTH EVERY DAY 8/6/20  Yes MADISON Marquez - CNP   amLODIPine (NORVASC) 2.5 MG tablet TAKE 1 TABLET BY MOUTH EVERY DAY 8/3/20  Yes Danelle Calvillo MD   carvedilol (COREG) 25 MG tablet TAKE 1 TABLET BY MOUTH TWICE A DAY WITH MEALS 8/3/20  Yes Danelle Calvillo MD   citalopram (CELEXA) 40 MG tablet TAKE 1 TABLET BY MOUTH EVERY DAY 6/15/20  Yes Carlee Coppola MD   atorvastatin (LIPITOR) 80 MG tablet Take 1 tablet by mouth daily 5/7/20  Yes Carlee Coppola MD   gabapentin (NEURONTIN) 300 MG capsule Take 2 capsules by mouth 3 times daily for 90 days. 4/17/20 8/27/20 Yes Carlee Coppola MD   pantoprazole (PROTONIX) 40 MG tablet Take 1 tablet by mouth daily 4/15/20  Yes Carlee Coppola MD   doxazosin (CARDURA) 2 MG tablet TAKE 1 TABLET BY MOUTH EVERY DAY AT NIGHT 3/26/20  Yes Danelle Calvillo MD   Insulin Degludec (TRESIBA FLEXTOUCH) 200 UNIT/ML SOPN Inject 40 units into the skin once daily. 1/30/20  Yes Carlee Coppola MD   Cholecalciferol (VITAMIN D3) 2000 units CAPS Take by mouth daily   Yes Historical Provider, MD   aspirin 81 MG chewable tablet Take 1 tablet by mouth daily. 3/20/14  Yes Asya Foley MD   Insulin Pen Needle (B-D UF III MINI PEN NEEDLES) 31G X 5 MM MISC Use daily with insulin injection. 7/29/20   Carlee Coppola MD   blood glucose test strips (ONE TOUCH ULTRA TEST) strip Check FSBS 2-3 times daily.  3/5/20   Carlee Coppola MD   ONE TOUCH LANCETS MISC 1 each by Does not apply route 3 times daily 2/14/19   Suyapa Shin MD   acetaminophen (TYLENOL) 500 MG tablet Take 1 tablet by mouth every 6 hours as needed for Pain 10/12/18 3/6/20  Suyapa Shin MD         Allergies:  Doxycycline    PHYSICAL EXAM:      BP (!) 131/58   Pulse 56   Temp 97.8 °F (36.6 °C) (Oral)   Resp 18   Ht 5' 3\" (1.6 m)   Wt 212 lb (96.2 kg)   LMP 06/15/1999   SpO2 97%   BMI 37.55 kg/m²      Airway:  Airway patent with no audible stridor    Heart:  regular rate and rhythm, No murmur noted    Lungs:  No increased work of breathing, good air exchange, clear to auscultation bilaterally, no crackles or wheezing    Abdomen:  soft, non-distended, non-tender, no rebound tenderness or guarding, normal active bowel sounds and no masses palpated    ASSESSMENT AND PLAN     Patient is a 75 y.o. female with above specified procedure planned. 1.  Patient seen and focused exam done today- no new changes since last physical exam on 8/13/20    2. Access to ancillary services are available per request of the provider.       MADISON Tyler - CNP     8/28/2020

## 2020-08-28 NOTE — BRIEF OP NOTE
Brief Postoperative Note      Patient: Sienna Newby  YOB: 1955  MRN: [id number]    Date of Procedure: 8/28/2020    Pre-Op Diagnosis: Osteoarthritis, left foot  [M19.90] Type 2 diabetes mellitus with foot ulcer [E11.621]    Post-Op Diagnosis: Same       Procedure(s):  ON THE LEFT: HALLUX INTERPHALANGEAL JOINT ARTHRODESIS Y, WOUND CLOSURE, FLEXOR TENOTOMY 4TH AND 5TH DIGITS, TENOTOMY AND CAPSULOTOMY 2ND DIGIT    Surgeon(s):  Jessica Lima DPM    Assistant:  Resident: Bruce Brown DPM, Jc Davidson MS4    Anesthesia: Monitor Anesthesia Care    Hemostasis: ankle tourniquet 250 mmHg for 55 minutes    Estimated Blood Loss (mL): Minimal    Materials: 3-0 vicryl, 4-0 vicryl, 3-0 nylon     Injectables: 10 cc of 2% lidocaine plain     Complications: None    Specimens:   ID Type Source Tests Collected by Time Destination   A : PROXIMAL PHALANX LEFT HALLUX Tissue Tissue SURGICAL PATHOLOGY Jessica Lima DPM 8/28/2020 1051        Implants:  Implant Name Type Inv.  Item Serial No.  Lot No. LRB No. Used Action   CHRIS-ALLOGRAFT AMNION 1.0ML Anastacio Combs - [id number] Bone/Graft/Tissue/Human/Synth CHRIS-ALLOGRAFT AMNION 1.0ML KACY [facility] [id number] BONE BANK ALLOGRAFTS [de-identified] Left 1 Implanted         Drains: * No LDAs found *    Findings: as dictated     Electronically signed by Bruce Brown DPM on 8/28/2020 at 12:45 PM

## 2020-08-28 NOTE — PROGRESS NOTES
PACU Transfer to [facility]    Vitals:    08/28/20 1300   BP: (!) 150/56   Pulse: 52   Resp: 14   Temp:    SpO2: 97%         Intake/Output Summary (Last 24 hours) at 8/28/2020 1311  Last data filed at 8/28/2020 1153  Gross per 24 hour   Intake 937.37 ml   Output --   Net 937.37 ml       Pain assessment:   Pain Level: 0    Patient transferred to care of [facility] RN.    8/28/2020 1:11 PM

## 2020-08-31 NOTE — PROGRESS NOTES
Pre-Operative Biomechanical Exam  Swetha AMBROCIO Adrien      Biomechanical:  Biomechanics:   Gait Analysis:  Angle of Gait    R: 7  L:  7 deg abducted)     Base of Gait (centimeters) 3.5 btw malleoli      Stance Phase (any abnormal findings): decreased motion noted to the HIPJ left foot, and the 1st MPJ left;                          plantar wound noted to the HIPJ, left.       Swing Phase (any abnormal findings): N/A     Postural Considerations (limb length/assymetry): N/A     Ankle Joint: Dorsiflexion (KE):  L:  5     Dorsiflexion (KF):  L:  >10     Subtalar: Inversion:   L:  20     Eversion:   L:  10     Neutral Position ((inv+ev)/3): L:  10     Midtarsal: 1-5 Position:   L: 0 Varus/Valgus     First Ray: Dorsiflexion:   L: 5mm     Plantarflexion:   L: 5mm     First MPJ:  Dorsiflexion (unloaded): L: 45     Dorsiflexion (loaded):  L: 40     Static Stance: RCSP (calc bisection): L:  4 valgus     NCSP: (STJNP + TI)  L:  0     Tibial Influence (Leg to grnd): L:  0       Assessment:   -DJD, hallucal interphalangeal joint, left foot  -Hallux limitus, left foot   -Full thickness wound, plantar aspect of left foot, Love II     Plan:  -Arthroplasty, left hallux interphalangeal joint   -Wound closure, left foot   -Plan for cheilectomy in future     Hiral Zuleta DPM  PGY-3, Pager #: [phone]

## 2020-09-08 ENCOUNTER — APPOINTMENT (OUTPATIENT)
Dept: GENERAL RADIOLOGY | Age: 65
DRG: 857 | End: 2020-09-08
Payer: MEDICARE

## 2020-09-08 ENCOUNTER — HOSPITAL ENCOUNTER (EMERGENCY)
Age: 65
Discharge: ANOTHER HOSPITAL | End: 2020-09-08
Attending: EMERGENCY MEDICINE
Payer: MEDICARE

## 2020-09-08 ENCOUNTER — APPOINTMENT (OUTPATIENT)
Dept: GENERAL RADIOLOGY | Age: 65
End: 2020-09-08
Payer: MEDICARE

## 2020-09-08 ENCOUNTER — HOSPITAL ENCOUNTER (INPATIENT)
Age: 65
LOS: 3 days | Discharge: HOME HEALTH | DRG: 857 | End: 2020-09-11
Attending: EMERGENCY MEDICINE | Admitting: INTERNAL MEDICINE
Payer: MEDICARE

## 2020-09-08 ENCOUNTER — ANESTHESIA EVENT (OUTPATIENT)
Dept: OPERATING ROOM | Age: 65
DRG: 857 | End: 2020-09-08
Payer: MEDICARE

## 2020-09-08 ENCOUNTER — ANESTHESIA (OUTPATIENT)
Dept: OPERATING ROOM | Age: 65
DRG: 857 | End: 2020-09-08
Payer: MEDICARE

## 2020-09-08 VITALS
TEMPERATURE: 98.5 F | BODY MASS INDEX: 38.66 KG/M2 | HEART RATE: 76 BPM | WEIGHT: 218.2 LBS | OXYGEN SATURATION: 99 % | DIASTOLIC BLOOD PRESSURE: 64 MMHG | RESPIRATION RATE: 18 BRPM | SYSTOLIC BLOOD PRESSURE: 191 MMHG | HEIGHT: 63 IN

## 2020-09-08 VITALS
SYSTOLIC BLOOD PRESSURE: 170 MMHG | RESPIRATION RATE: 14 BRPM | OXYGEN SATURATION: 99 % | DIASTOLIC BLOOD PRESSURE: 77 MMHG

## 2020-09-08 PROBLEM — E11.628 DIABETIC FOOT INFECTION (HCC): Status: ACTIVE | Noted: 2020-09-08

## 2020-09-08 PROBLEM — L08.9 DIABETIC FOOT INFECTION (HCC): Status: ACTIVE | Noted: 2020-09-08

## 2020-09-08 LAB
A/G RATIO: 0.7 (ref 1.1–2.2)
ABO/RH: NORMAL
ALBUMIN SERPL-MCNC: 3.1 G/DL (ref 3.4–5)
ALP BLD-CCNC: 117 U/L (ref 40–129)
ALT SERPL-CCNC: 13 U/L (ref 10–40)
ANION GAP SERPL CALCULATED.3IONS-SCNC: 14 MMOL/L (ref 3–16)
ANTIBODY SCREEN: NORMAL
AST SERPL-CCNC: 19 U/L (ref 15–37)
BASOPHILS ABSOLUTE: 0 K/UL (ref 0–0.2)
BASOPHILS RELATIVE PERCENT: 0.3 %
BILIRUB SERPL-MCNC: 0.5 MG/DL (ref 0–1)
BUN BLDV-MCNC: 27 MG/DL (ref 7–20)
C-REACTIVE PROTEIN: 395.1 MG/L (ref 0–5.1)
CALCIUM SERPL-MCNC: 9.5 MG/DL (ref 8.3–10.6)
CHLORIDE BLD-SCNC: 97 MMOL/L (ref 99–110)
CO2: 23 MMOL/L (ref 21–32)
CREAT SERPL-MCNC: 1.5 MG/DL (ref 0.6–1.2)
EOSINOPHILS ABSOLUTE: 0.1 K/UL (ref 0–0.6)
EOSINOPHILS RELATIVE PERCENT: 0.8 %
GFR AFRICAN AMERICAN: 42
GFR NON-AFRICAN AMERICAN: 35
GLOBULIN: 4.4 G/DL
GLUCOSE BLD-MCNC: 128 MG/DL (ref 70–99)
GLUCOSE BLD-MCNC: 129 MG/DL (ref 70–99)
GLUCOSE BLD-MCNC: 131 MG/DL (ref 70–99)
GLUCOSE BLD-MCNC: 145 MG/DL (ref 70–99)
HCT VFR BLD CALC: 28.9 % (ref 36–48)
HEMOGLOBIN: 9.8 G/DL (ref 12–16)
INR BLD: 1.29 (ref 0.86–1.14)
LACTIC ACID: 1 MMOL/L (ref 0.4–2)
LYMPHOCYTES ABSOLUTE: 1.1 K/UL (ref 1–5.1)
LYMPHOCYTES RELATIVE PERCENT: 5.7 %
MCH RBC QN AUTO: 30.7 PG (ref 26–34)
MCHC RBC AUTO-ENTMCNC: 34 G/DL (ref 31–36)
MCV RBC AUTO: 90.3 FL (ref 80–100)
MONOCYTES ABSOLUTE: 1.1 K/UL (ref 0–1.3)
MONOCYTES RELATIVE PERCENT: 5.9 %
NEUTROPHILS ABSOLUTE: 16.6 K/UL (ref 1.7–7.7)
NEUTROPHILS RELATIVE PERCENT: 87.3 %
PDW BLD-RTO: 13.1 % (ref 12.4–15.4)
PERFORMED ON: ABNORMAL
PLATELET # BLD: 369 K/UL (ref 135–450)
PMV BLD AUTO: 9.4 FL (ref 5–10.5)
POTASSIUM REFLEX MAGNESIUM: 3.9 MMOL/L (ref 3.5–5.1)
PREALBUMIN: 6.8 MG/DL (ref 20–40)
PROCALCITONIN: 0.58 NG/ML (ref 0–0.15)
PROTHROMBIN TIME: 15 SEC (ref 10–13.2)
RBC # BLD: 3.2 M/UL (ref 4–5.2)
SARS-COV-2, NAAT: NOT DETECTED
SEDIMENTATION RATE, ERYTHROCYTE: 113 MM/HR (ref 0–30)
SODIUM BLD-SCNC: 134 MMOL/L (ref 136–145)
TOTAL PROTEIN: 7.5 G/DL (ref 6.4–8.2)
WBC # BLD: 19 K/UL (ref 4–11)

## 2020-09-08 PROCEDURE — 85025 COMPLETE CBC W/AUTO DIFF WBC: CPT

## 2020-09-08 PROCEDURE — 87075 CULTR BACTERIA EXCEPT BLOOD: CPT

## 2020-09-08 PROCEDURE — 2580000003 HC RX 258: Performed by: PODIATRIST

## 2020-09-08 PROCEDURE — 0L9W0ZZ DRAINAGE OF LEFT FOOT TENDON, OPEN APPROACH: ICD-10-PCS | Performed by: PODIATRIST

## 2020-09-08 PROCEDURE — 3600000014 HC SURGERY LEVEL 4 ADDTL 15MIN: Performed by: PODIATRIST

## 2020-09-08 PROCEDURE — 3700000001 HC ADD 15 MINUTES (ANESTHESIA): Performed by: PODIATRIST

## 2020-09-08 PROCEDURE — 87206 SMEAR FLUORESCENT/ACID STAI: CPT

## 2020-09-08 PROCEDURE — 1200000000 HC SEMI PRIVATE

## 2020-09-08 PROCEDURE — 3E0102A INTRODUCTION OF ANTI-INFECTIVE ENVELOPE INTO SUBCUTANEOUS TISSUE, OPEN APPROACH: ICD-10-PCS | Performed by: PODIATRIST

## 2020-09-08 PROCEDURE — 2709999900 HC NON-CHARGEABLE SUPPLY: Performed by: PODIATRIST

## 2020-09-08 PROCEDURE — 2580000003 HC RX 258: Performed by: EMERGENCY MEDICINE

## 2020-09-08 PROCEDURE — 3600000004 HC SURGERY LEVEL 4 BASE: Performed by: PODIATRIST

## 2020-09-08 PROCEDURE — 6370000000 HC RX 637 (ALT 250 FOR IP): Performed by: PODIATRIST

## 2020-09-08 PROCEDURE — 71046 X-RAY EXAM CHEST 2 VIEWS: CPT

## 2020-09-08 PROCEDURE — U0002 COVID-19 LAB TEST NON-CDC: HCPCS

## 2020-09-08 PROCEDURE — 99223 1ST HOSP IP/OBS HIGH 75: CPT | Performed by: INTERNAL MEDICINE

## 2020-09-08 PROCEDURE — C1713 ANCHOR/SCREW BN/BN,TIS/BN: HCPCS | Performed by: PODIATRIST

## 2020-09-08 PROCEDURE — 87102 FUNGUS ISOLATION CULTURE: CPT

## 2020-09-08 PROCEDURE — 87205 SMEAR GRAM STAIN: CPT

## 2020-09-08 PROCEDURE — 2580000003 HC RX 258: Performed by: NURSE ANESTHETIST, CERTIFIED REGISTERED

## 2020-09-08 PROCEDURE — 87015 SPECIMEN INFECT AGNT CONCNTJ: CPT

## 2020-09-08 PROCEDURE — 36415 COLL VENOUS BLD VENIPUNCTURE: CPT

## 2020-09-08 PROCEDURE — 85652 RBC SED RATE AUTOMATED: CPT

## 2020-09-08 PROCEDURE — 6360000002 HC RX W HCPCS: Performed by: PODIATRIST

## 2020-09-08 PROCEDURE — 83605 ASSAY OF LACTIC ACID: CPT

## 2020-09-08 PROCEDURE — 96367 TX/PROPH/DG ADDL SEQ IV INF: CPT

## 2020-09-08 PROCEDURE — 85610 PROTHROMBIN TIME: CPT

## 2020-09-08 PROCEDURE — 86140 C-REACTIVE PROTEIN: CPT

## 2020-09-08 PROCEDURE — 99284 EMERGENCY DEPT VISIT MOD MDM: CPT

## 2020-09-08 PROCEDURE — 6360000002 HC RX W HCPCS: Performed by: NURSE ANESTHETIST, CERTIFIED REGISTERED

## 2020-09-08 PROCEDURE — 87116 MYCOBACTERIA CULTURE: CPT

## 2020-09-08 PROCEDURE — 96365 THER/PROPH/DIAG IV INF INIT: CPT

## 2020-09-08 PROCEDURE — 3700000000 HC ANESTHESIA ATTENDED CARE: Performed by: PODIATRIST

## 2020-09-08 PROCEDURE — 2500000003 HC RX 250 WO HCPCS: Performed by: PODIATRIST

## 2020-09-08 PROCEDURE — 87070 CULTURE OTHR SPECIMN AEROBIC: CPT

## 2020-09-08 PROCEDURE — 87077 CULTURE AEROBIC IDENTIFY: CPT

## 2020-09-08 PROCEDURE — 80053 COMPREHEN METABOLIC PANEL: CPT

## 2020-09-08 PROCEDURE — 6370000000 HC RX 637 (ALT 250 FOR IP): Performed by: INTERNAL MEDICINE

## 2020-09-08 PROCEDURE — 2580000003 HC RX 258: Performed by: INTERNAL MEDICINE

## 2020-09-08 PROCEDURE — 83036 HEMOGLOBIN GLYCOSYLATED A1C: CPT

## 2020-09-08 PROCEDURE — 86850 RBC ANTIBODY SCREEN: CPT

## 2020-09-08 PROCEDURE — 6360000002 HC RX W HCPCS: Performed by: INTERNAL MEDICINE

## 2020-09-08 PROCEDURE — 86900 BLOOD TYPING SEROLOGIC ABO: CPT

## 2020-09-08 PROCEDURE — 84145 PROCALCITONIN (PCT): CPT

## 2020-09-08 PROCEDURE — 6360000002 HC RX W HCPCS: Performed by: EMERGENCY MEDICINE

## 2020-09-08 PROCEDURE — 84134 ASSAY OF PREALBUMIN: CPT

## 2020-09-08 PROCEDURE — 96375 TX/PRO/DX INJ NEW DRUG ADDON: CPT

## 2020-09-08 PROCEDURE — 86901 BLOOD TYPING SEROLOGIC RH(D): CPT

## 2020-09-08 PROCEDURE — 73630 X-RAY EXAM OF FOOT: CPT

## 2020-09-08 DEVICE — STIMULAN® RAPID CURE PROVIDED STERILE FOR SINGLE PATIENT USE. STIMULAN® RAPID CURE CONTAINS CALCIUM SULFATE POWDER AND MIXING SOLUTION IN PRE-MEASURED QUANTITIES SO THAT WHEN MIXED TOGETHER IN A STERILE MIXING BOWL, THE RESULTANT PASTE IS TO BE DIGITALLY PACKED INTO OPEN BONE VOID/GAP TO SET INSITU OR PLACED INTO THE MOULD PROVIDED, THE MIXTURE SETS TO FORM BEADS. THE BIODEGRADABLE, RADIOPAQUE BEADS ARE RESORBED IN APPROXIMATELY 30 – 60 DAYS WHEN USED IN ACCORDANCE WITH THE DEVICE LABELLING. STIMULAN® RAPID CURE IS MANUFACTURED FROM SYNTHETIC IMPLANT GRADE CALCIUM SULFATE DIHYDRATE(CASO4.2H2O) THAT RESORBS AND IS REPLACED WITH BONE DURING THE HEALING PROCESS. ALSO, AS THE BONE VOID FILLER BEADS ARE BIODEGRADABLE AND BIOCOMPATIBLE, THEY MAY BE USED AT AN INFECTED SITE.
Type: IMPLANTABLE DEVICE | Site: FOOT | Status: FUNCTIONAL
Brand: STIMULAN® RAPID CURE

## 2020-09-08 RX ORDER — PROPOFOL 10 MG/ML
INJECTION, EMULSION INTRAVENOUS PRN
Status: DISCONTINUED | OUTPATIENT
Start: 2020-09-08 | End: 2020-09-08 | Stop reason: SDUPTHER

## 2020-09-08 RX ORDER — SODIUM CHLORIDE 0.9 % (FLUSH) 0.9 %
10 SYRINGE (ML) INJECTION PRN
Status: DISCONTINUED | OUTPATIENT
Start: 2020-09-08 | End: 2020-09-11 | Stop reason: HOSPADM

## 2020-09-08 RX ORDER — PROMETHAZINE HYDROCHLORIDE 25 MG/1
12.5 TABLET ORAL EVERY 6 HOURS PRN
Status: DISCONTINUED | OUTPATIENT
Start: 2020-09-08 | End: 2020-09-11 | Stop reason: HOSPADM

## 2020-09-08 RX ORDER — ACETAMINOPHEN 650 MG/1
650 SUPPOSITORY RECTAL EVERY 6 HOURS PRN
Status: DISCONTINUED | OUTPATIENT
Start: 2020-09-08 | End: 2020-09-11 | Stop reason: HOSPADM

## 2020-09-08 RX ORDER — INSULIN LISPRO 100 [IU]/ML
0-12 INJECTION, SOLUTION INTRAVENOUS; SUBCUTANEOUS
Status: DISCONTINUED | OUTPATIENT
Start: 2020-09-08 | End: 2020-09-11 | Stop reason: HOSPADM

## 2020-09-08 RX ORDER — VANCOMYCIN HYDROCHLORIDE 1 G/20ML
INJECTION, POWDER, LYOPHILIZED, FOR SOLUTION INTRAVENOUS PRN
Status: DISCONTINUED | OUTPATIENT
Start: 2020-09-08 | End: 2020-09-08 | Stop reason: ALTCHOICE

## 2020-09-08 RX ORDER — LIDOCAINE HYDROCHLORIDE 10 MG/ML
INJECTION, SOLUTION EPIDURAL; INFILTRATION; INTRACAUDAL; PERINEURAL PRN
Status: DISCONTINUED | OUTPATIENT
Start: 2020-09-08 | End: 2020-09-08 | Stop reason: ALTCHOICE

## 2020-09-08 RX ORDER — ASPIRIN 81 MG/1
81 TABLET, CHEWABLE ORAL DAILY
Status: DISCONTINUED | OUTPATIENT
Start: 2020-09-08 | End: 2020-09-11 | Stop reason: HOSPADM

## 2020-09-08 RX ORDER — INSULIN LISPRO 100 [IU]/ML
0-6 INJECTION, SOLUTION INTRAVENOUS; SUBCUTANEOUS NIGHTLY
Status: DISCONTINUED | OUTPATIENT
Start: 2020-09-08 | End: 2020-09-11 | Stop reason: HOSPADM

## 2020-09-08 RX ORDER — AMLODIPINE BESYLATE 2.5 MG/1
2.5 TABLET ORAL DAILY
Status: DISCONTINUED | OUTPATIENT
Start: 2020-09-08 | End: 2020-09-11 | Stop reason: HOSPADM

## 2020-09-08 RX ORDER — DEXTROSE MONOHYDRATE 50 MG/ML
100 INJECTION, SOLUTION INTRAVENOUS PRN
Status: DISCONTINUED | OUTPATIENT
Start: 2020-09-08 | End: 2020-09-11 | Stop reason: HOSPADM

## 2020-09-08 RX ORDER — DEXTROSE MONOHYDRATE 25 G/50ML
12.5 INJECTION, SOLUTION INTRAVENOUS PRN
Status: DISCONTINUED | OUTPATIENT
Start: 2020-09-08 | End: 2020-09-11 | Stop reason: HOSPADM

## 2020-09-08 RX ORDER — SODIUM CHLORIDE 9 MG/ML
INJECTION, SOLUTION INTRAVENOUS CONTINUOUS
Status: DISCONTINUED | OUTPATIENT
Start: 2020-09-08 | End: 2020-09-09

## 2020-09-08 RX ORDER — ACETAMINOPHEN 325 MG/1
650 TABLET ORAL EVERY 6 HOURS PRN
Status: DISCONTINUED | OUTPATIENT
Start: 2020-09-08 | End: 2020-09-11 | Stop reason: HOSPADM

## 2020-09-08 RX ORDER — NICOTINE POLACRILEX 4 MG
15 LOZENGE BUCCAL PRN
Status: DISCONTINUED | OUTPATIENT
Start: 2020-09-08 | End: 2020-09-11 | Stop reason: HOSPADM

## 2020-09-08 RX ORDER — PROPOFOL 10 MG/ML
INJECTION, EMULSION INTRAVENOUS CONTINUOUS PRN
Status: DISCONTINUED | OUTPATIENT
Start: 2020-09-08 | End: 2020-09-08 | Stop reason: SDUPTHER

## 2020-09-08 RX ORDER — CARVEDILOL 6.25 MG/1
6.25 TABLET ORAL 2 TIMES DAILY WITH MEALS
Status: DISCONTINUED | OUTPATIENT
Start: 2020-09-08 | End: 2020-09-11 | Stop reason: HOSPADM

## 2020-09-08 RX ORDER — FENTANYL CITRATE 50 UG/ML
50 INJECTION, SOLUTION INTRAMUSCULAR; INTRAVENOUS EVERY 5 MIN PRN
Status: DISCONTINUED | OUTPATIENT
Start: 2020-09-08 | End: 2020-09-08 | Stop reason: HOSPADM

## 2020-09-08 RX ORDER — LABETALOL HYDROCHLORIDE 5 MG/ML
5 INJECTION, SOLUTION INTRAVENOUS EVERY 6 HOURS PRN
Status: DISCONTINUED | OUTPATIENT
Start: 2020-09-08 | End: 2020-09-11 | Stop reason: HOSPADM

## 2020-09-08 RX ORDER — FENTANYL CITRATE 50 UG/ML
25 INJECTION, SOLUTION INTRAMUSCULAR; INTRAVENOUS EVERY 5 MIN PRN
Status: DISCONTINUED | OUTPATIENT
Start: 2020-09-08 | End: 2020-09-08 | Stop reason: HOSPADM

## 2020-09-08 RX ORDER — BUPIVACAINE HYDROCHLORIDE 5 MG/ML
INJECTION, SOLUTION EPIDURAL; INTRACAUDAL PRN
Status: DISCONTINUED | OUTPATIENT
Start: 2020-09-08 | End: 2020-09-08 | Stop reason: ALTCHOICE

## 2020-09-08 RX ORDER — LISINOPRIL 20 MG/1
20 TABLET ORAL DAILY
Status: DISCONTINUED | OUTPATIENT
Start: 2020-09-08 | End: 2020-09-11 | Stop reason: HOSPADM

## 2020-09-08 RX ORDER — CITALOPRAM 40 MG/1
40 TABLET ORAL DAILY
Status: DISCONTINUED | OUTPATIENT
Start: 2020-09-08 | End: 2020-09-11 | Stop reason: HOSPADM

## 2020-09-08 RX ORDER — SODIUM CHLORIDE 9 MG/ML
INJECTION, SOLUTION INTRAVENOUS CONTINUOUS PRN
Status: DISCONTINUED | OUTPATIENT
Start: 2020-09-08 | End: 2020-09-08 | Stop reason: SDUPTHER

## 2020-09-08 RX ORDER — M-VIT,TX,IRON,MINS/CALC/FOLIC 27MG-0.4MG
1 TABLET ORAL DAILY
COMMUNITY
End: 2021-07-15

## 2020-09-08 RX ORDER — ONDANSETRON 2 MG/ML
4 INJECTION INTRAMUSCULAR; INTRAVENOUS
Status: DISCONTINUED | OUTPATIENT
Start: 2020-09-08 | End: 2020-09-08 | Stop reason: HOSPADM

## 2020-09-08 RX ORDER — ATORVASTATIN CALCIUM 80 MG/1
80 TABLET, FILM COATED ORAL DAILY
Status: DISCONTINUED | OUTPATIENT
Start: 2020-09-08 | End: 2020-09-11 | Stop reason: HOSPADM

## 2020-09-08 RX ORDER — ONDANSETRON 2 MG/ML
4 INJECTION INTRAMUSCULAR; INTRAVENOUS EVERY 6 HOURS PRN
Status: DISCONTINUED | OUTPATIENT
Start: 2020-09-08 | End: 2020-09-11 | Stop reason: HOSPADM

## 2020-09-08 RX ORDER — MORPHINE SULFATE 4 MG/ML
4 INJECTION, SOLUTION INTRAMUSCULAR; INTRAVENOUS ONCE
Status: COMPLETED | OUTPATIENT
Start: 2020-09-08 | End: 2020-09-08

## 2020-09-08 RX ORDER — SODIUM CHLORIDE 0.9 % (FLUSH) 0.9 %
10 SYRINGE (ML) INJECTION EVERY 12 HOURS SCHEDULED
Status: DISCONTINUED | OUTPATIENT
Start: 2020-09-08 | End: 2020-09-11 | Stop reason: HOSPADM

## 2020-09-08 RX ORDER — PANTOPRAZOLE SODIUM 40 MG/1
40 TABLET, DELAYED RELEASE ORAL DAILY
Status: DISCONTINUED | OUTPATIENT
Start: 2020-09-08 | End: 2020-09-11 | Stop reason: HOSPADM

## 2020-09-08 RX ORDER — POLYETHYLENE GLYCOL 3350 17 G/17G
17 POWDER, FOR SOLUTION ORAL DAILY PRN
Status: DISCONTINUED | OUTPATIENT
Start: 2020-09-08 | End: 2020-09-11 | Stop reason: HOSPADM

## 2020-09-08 RX ADMIN — CARVEDILOL 6.25 MG: 6.25 TABLET, FILM COATED ORAL at 13:43

## 2020-09-08 RX ADMIN — INSULIN GLARGINE 20 UNITS: 100 INJECTION, SOLUTION SUBCUTANEOUS at 22:02

## 2020-09-08 RX ADMIN — AMLODIPINE BESYLATE 2.5 MG: 2.5 TABLET ORAL at 13:43

## 2020-09-08 RX ADMIN — CITALOPRAM 40 MG: 40 TABLET, FILM COATED ORAL at 13:43

## 2020-09-08 RX ADMIN — MORPHINE SULFATE 4 MG: 4 INJECTION, SOLUTION INTRAMUSCULAR; INTRAVENOUS at 04:05

## 2020-09-08 RX ADMIN — LISINOPRIL 20 MG: 20 TABLET ORAL at 13:43

## 2020-09-08 RX ADMIN — SODIUM CHLORIDE: 9 INJECTION, SOLUTION INTRAVENOUS at 22:06

## 2020-09-08 RX ADMIN — ATORVASTATIN CALCIUM 80 MG: 80 TABLET, FILM COATED ORAL at 13:43

## 2020-09-08 RX ADMIN — Medication 10 ML: at 22:04

## 2020-09-08 RX ADMIN — CEFEPIME HYDROCHLORIDE 2 G: 2 INJECTION, POWDER, FOR SOLUTION INTRAVENOUS at 16:23

## 2020-09-08 RX ADMIN — VANCOMYCIN HYDROCHLORIDE 1000 MG: 1 INJECTION, POWDER, LYOPHILIZED, FOR SOLUTION INTRAVENOUS at 03:04

## 2020-09-08 RX ADMIN — PROPOFOL 30 MG: 10 INJECTION, EMULSION INTRAVENOUS at 21:01

## 2020-09-08 RX ADMIN — ONDANSETRON 4 MG: 2 INJECTION INTRAMUSCULAR; INTRAVENOUS at 13:33

## 2020-09-08 RX ADMIN — SODIUM CHLORIDE: 9 INJECTION, SOLUTION INTRAVENOUS at 21:01

## 2020-09-08 RX ADMIN — CEFEPIME 2 G: 2 INJECTION, POWDER, FOR SOLUTION INTRAVENOUS at 03:02

## 2020-09-08 RX ADMIN — PANTOPRAZOLE SODIUM 40 MG: 40 TABLET, DELAYED RELEASE ORAL at 13:44

## 2020-09-08 RX ADMIN — SODIUM CHLORIDE: 9 INJECTION, SOLUTION INTRAVENOUS at 12:19

## 2020-09-08 RX ADMIN — PROPOFOL 100 MCG/KG/MIN: 10 INJECTION, EMULSION INTRAVENOUS at 21:01

## 2020-09-08 RX ADMIN — ENOXAPARIN SODIUM 30 MG: 30 INJECTION SUBCUTANEOUS at 13:44

## 2020-09-08 ASSESSMENT — PULMONARY FUNCTION TESTS
PIF_VALUE: 1

## 2020-09-08 ASSESSMENT — ENCOUNTER SYMPTOMS
EYE REDNESS: 0
PHOTOPHOBIA: 0
STRIDOR: 0
CHOKING: 0
EYE PAIN: 0
ABDOMINAL DISTENTION: 0
CHEST TIGHTNESS: 0
APNEA: 0
EYE DISCHARGE: 0
COUGH: 0
WHEEZING: 0
SHORTNESS OF BREATH: 0
EYE ITCHING: 0

## 2020-09-08 ASSESSMENT — PAIN SCALES - GENERAL
PAINLEVEL_OUTOF10: 0
PAINLEVEL_OUTOF10: 8
PAINLEVEL_OUTOF10: 0
PAINLEVEL_OUTOF10: 0
PAINLEVEL_OUTOF10: 7

## 2020-09-08 ASSESSMENT — PAIN DESCRIPTION - PAIN TYPE: TYPE: ACUTE PAIN

## 2020-09-08 ASSESSMENT — PAIN DESCRIPTION - DESCRIPTORS: DESCRIPTORS: ACHING

## 2020-09-08 ASSESSMENT — PAIN DESCRIPTION - PROGRESSION: CLINICAL_PROGRESSION: NOT CHANGED

## 2020-09-08 ASSESSMENT — PAIN DESCRIPTION - ONSET: ONSET: ON-GOING

## 2020-09-08 ASSESSMENT — PAIN DESCRIPTION - FREQUENCY: FREQUENCY: CONTINUOUS

## 2020-09-08 ASSESSMENT — PAIN - FUNCTIONAL ASSESSMENT: PAIN_FUNCTIONAL_ASSESSMENT: ACTIVITIES ARE NOT PREVENTED

## 2020-09-08 ASSESSMENT — PAIN DESCRIPTION - LOCATION: LOCATION: HEAD

## 2020-09-08 NOTE — PROGRESS NOTES
Pre-Operative Note  Resident Note     Patient: Silvia Werner     Procedure: Left foot incision and drainage    Consent: In chart     Labs:        Recent Labs     09/08/20 0228   WBC 19.0*   HGB 9.8*   HCT 28.9*   MCV 90.3           Recent Labs     09/08/20 0228   *   K 3.9   CL 97*   CO2 23   BUN 27*   CREATININE 1.5*        Recent Labs     09/08/20 0228   AST 19   ALT 13   BILITOT 0.5   ALKPHOS 117      No results for input(s): LIPASE, AMYLASE in the last 72 hours. Recent Labs     09/08/20 0228   PROT 7.5      No results for input(s): CKTOTAL, CKMB, CKMBINDEX, TROPONINI in the last 72 hours. Pre-op medications:     Diet: NPO effective immediately    CXR: see chart      EKG: see chart     Echocardiogram: not done    IVF: Normal saline 100 mL/hour    PT/INR: see chart    Type and Screen: done    Bowel prep: Not done    Pregnancy test: Not done    ABX: IV IV Vancomycin and Cefepime    Known risk factors for perioperative complications: Diabetes mellitus, cardiomyopathy, history of blood clots, CKD stage III  Difficulty with intubation will not be anticipated. Anesthesia to see patient.     Gloria COXM, PGY-2  [phone] or Perfect serve    Dr[facility]   Office: [phone]  Cell: [phone]

## 2020-09-08 NOTE — H&P
HISTORY AND PHYSICAL             Date: 9/8/2020        Patient Name: Santiago Hopson     YOB: 1955      Age:  75 y.o. Chief Complaint     Chief Complaint   Patient presents with    Post-op Problem           History Obtained From   patient    History of Present Illness   70-year-old female with a past medical history of insulin-dependent diabetes mellitus, diabetic neuropathy who had a foot surgery approximately 10 days ago, she had a wound check on Thursday with x-rays and it appeared to be normal however on Saturday patient started noticed swelling, redness associated with the fevers. Patient went to [facility] and she was transferred over here as her podiatrist operates here. Patient denies any nausea, vomiting. Patient denies any lightheadedness. Patient denies any chest pain, shortness of breath. She denies any other systemic complaints.     Past Medical History     Past Medical History:   Diagnosis Date    Abnormal echocardiogram     25% on 3/11/14 and 50% on 3/19/14    Back pain     Cardiomyopathy ([address]     EF was 50% on 3/19/14    Chipped tooth     lower left    Dental crown present     veneers    Depression     Depressive disorder 8/13/2014    Diabetes mellitus ([address]     Diabetic infection of right foot ([address] 4/15/2015    Diabetic ulcer of toe of left foot associated with type 2 diabetes mellitus, with fat layer exposed ([address] 4/10/2018    Pt slipped in hot tub latter part of February and has not healed since despite topical treatment    DVT (deep venous thrombosis) ([address]     HTN (hypertension)     Hx of blood clots 2016    left leg    Ischemic cardiomyopathy 4/15/2015    Kidney disease     CHRONIC STAGE 3    Meniere's disease     Mixed hyperlipidemia 3/7/2016    Nicotine abuse     NSTEMI (non-ST elevated myocardial infarction) ([address] 3/13/2014    ANGLE (obstructive sleep apnea)     Osteomyelitis of ankle or foot, acute, left ([address] 6/4/2018    Pneumonia     PONV (postoperative nausea and vomiting)     nausea    Spinal abscess ([address] 3/2014    epidural abscess    Spinal epidural abscess 3/13/2014    Type II diabetes mellitus, uncontrolled ([address] 08/13/2014        Past Surgical History     Past Surgical History:   Procedure Laterality Date    BACK SURGERY  3/2014    DEBRIDEMENT  3/12/14    extensive debridement of bone/muscle and paraspinal empyema    HAMMER TOE SURGERY Left 8/28/2020    ON THE LEFT: HALLUX INTERPHALANGEAL JOINT ARTHRODESIS Y, WOUND CLOSURE, FLEXOR TENOTOMY 4TH AND 5TH DIGITS, TENOTOMY AND CAPSULOTOMY 2ND DIGIT performed by Shannan Mayo DPM at [address]  6/15/1999    complete-think right ovary in.    NASAL SEPTUM SURGERY      SLEEVE GASTRECTOMY  04/02/2019    ROBOTIC ASSISTED LAPAROSCOPIC SLEEVE GASTRECTOMY, LAPAROSCOPIC REDUCIBLE INCISIONAL HERNIA REPAIR     SLEEVE GASTRECTOMY N/A 4/2/2019    ROBOTIC ASSISTED LAPAROSCOPIC SLEEVE GASTRECTOMY, LAPAROSCOPIC REDUCIBLE INCISIONAL HERNIA REPAIR performed by Cy Lyles DO at [address] N/A 2/8/2019    EGD BIOPSY performed by Cy Lyles DO at [address] N/A 2/8/2019    EGD POLYP ABLATION/OTHER performed by Cy Lyles DO at [facility] ENDOSCOPY        Medications Prior to Admission     Prior to Admission medications    Medication Sig Start Date End Date Taking? Authorizing Provider   Liraglutide (VICTOZA) 18 MG/3ML SOPN SC injection Inject 1.2 mg into the skin once a week     Historical Provider, MD   lisinopril (PRINIVIL;ZESTRIL) 20 MG tablet TAKE 1 TABLET BY MOUTH EVERY DAY 8/6/20   MADISON Coughlin CNP   amLODIPine (NORVASC) 2.5 MG tablet TAKE 1 TABLET BY MOUTH EVERY DAY 8/3/20   Burgess Nguyen MD   carvedilol (COREG) 25 MG tablet TAKE 1 TABLET BY MOUTH TWICE A DAY WITH MEALS 8/3/20   Burgess Nguyen MD   Insulin Pen Needle (B-D UF III MINI PEN NEEDLES) 31G X 5 MM MISC Use daily with insulin injection. 7/29/20   Facundo Scanlon MD   citalopram (CELEXA) 40 MG tablet TAKE 1 TABLET BY MOUTH EVERY DAY 6/15/20   Facundo Scanlon MD   atorvastatin (LIPITOR) 80 MG tablet Take 1 tablet by mouth daily 5/7/20   Facundo Scanlon MD   gabapentin (NEURONTIN) 300 MG capsule Take 2 capsules by mouth 3 times daily for 90 days. 4/17/20 8/27/20  Facundo Scanlon MD   pantoprazole (PROTONIX) 40 MG tablet Take 1 tablet by mouth daily 4/15/20   Facundo Scanlon MD   doxazosin (CARDURA) 2 MG tablet TAKE 1 TABLET BY MOUTH EVERY DAY AT NIGHT 3/26/20   Radha Blue MD   blood glucose test strips (ONE TOUCH ULTRA TEST) strip Check FSBS 2-3 times daily. 3/5/20   Facundo Scanlon MD   Insulin Degludec (TRESIBA FLEXTOUCH) 200 UNIT/ML SOPN Inject 40 units into the skin once daily. 1/30/20   Facundo Scanlon MD   ONE TOUCH LANCETS MISC 1 each by Does not apply route 3 times daily 2/14/19   Facundo Scanlon MD   Cholecalciferol (VITAMIN D3) 2000 units CAPS Take by mouth daily    Historical Provider, MD   acetaminophen (TYLENOL) 500 MG tablet Take 1 tablet by mouth every 6 hours as needed for Pain 10/12/18 3/6/20  Facundo Scanlon MD   aspirin 81 MG chewable tablet Take 1 tablet by mouth daily.  3/20/14   Luli Orozco MD        Allergies   Doxycycline    Social History     Social History     Tobacco History     Smoking Status  Former Smoker Quit date  11/10/2013 Smoking Frequency  0.5 packs/day for 10 years (5 pk yrs)    Smokeless Tobacco Use  Never Used          Alcohol History     Alcohol Use Status  Not Currently Drinks/Week  0 Standard drinks or equivalent per week Amount  0.0 standard drinks of alcohol/wk Comment  occasionally          Drug Use     Drug Use Status  No          Sexual Activity     Sexually Active  Not Currently                Family History     Family History   Problem Relation Age of Onset    High Blood Pressure Mother     Cancer Mother     Rheum Arthritis Mother     COPD Father     Cancer Father    [facility] Eosinophils % 0.8 %    Basophils % 0.3 %    Neutrophils Absolute 16.6 (H) 1.7 - 7.7 K/uL    Lymphocytes Absolute 1.1 1.0 - 5.1 K/uL    Monocytes Absolute 1.1 0.0 - 1.3 K/uL    Eosinophils Absolute 0.1 0.0 - 0.6 K/uL    Basophils Absolute 0.0 0.0 - 0.2 K/uL   Lactic Acid, Plasma    Collection Time: 09/08/20  2:28 AM   Result Value Ref Range    Lactic Acid 1.0 0.4 - 2.0 mmol/L   Comprehensive Metabolic Panel w/ Reflex to MG    Collection Time: 09/08/20  2:28 AM   Result Value Ref Range    Sodium 134 (L) 136 - 145 mmol/L    Potassium reflex Magnesium 3.9 3.5 - 5.1 mmol/L    Chloride 97 (L) 99 - 110 mmol/L    CO2 23 21 - 32 mmol/L    Anion Gap 14 3 - 16    Glucose 131 (H) 70 - 99 mg/dL    BUN 27 (H) 7 - 20 mg/dL    CREATININE 1.5 (H) 0.6 - 1.2 mg/dL    GFR Non-African American 35 (A) >60    GFR  42 (A) >60    Calcium 9.5 8.3 - 10.6 mg/dL    Total Protein 7.5 6.4 - 8.2 g/dL    Alb 3.1 (L) 3.4 - 5.0 g/dL    Albumin/Globulin Ratio 0.7 (L) 1.1 - 2.2    Total Bilirubin 0.5 0.0 - 1.0 mg/dL    Alkaline Phosphatase 117 40 - 129 U/L    ALT 13 10 - 40 U/L    AST 19 15 - 37 U/L    Globulin 4.4 g/dL   Procalcitonin    Collection Time: 09/08/20  2:28 AM   Result Value Ref Range    Procalcitonin 0.58 (H) 0.00 - 0.15 ng/mL   COVID-19    Collection Time: 09/08/20  7:44 AM   Result Value Ref Range    SARS-CoV-2, NAAT Not Detected Not Detected        Imaging/Diagnostics Last 24 Hours   Xr Foot Left (min 3 Views)    Result Date: 9/8/2020  EXAMINATION: THREE XRAY VIEWS OF THE LEFT FOOT 9/8/2020 2:42 am COMPARISON: 08/28/2020 HISTORY: ORDERING SYSTEM PROVIDED HISTORY: osteo TECHNOLOGIST PROVIDED HISTORY: Reason for exam:->osteo Reason for Exam: Fever; Headache Acuity: Acute Type of Exam: Initial FINDINGS: Status post osteotomy of the proximal 1st phalanx. A surgical pin is present fusing the resected proximal 1st phalanx and distal 1st phalanx status post resection of the distal 3rd phalanx.   Diffuse soft tissue edema surrounding the 1st digit. No acute osseous abnormality. Small plantar and dorsal calcaneal spurs. Stable postsurgical changes of the 1st proximal and distal phalanges. Diffuse soft tissue edema surrounding the 1st digit. No subcutaneous air. Assessment      Hospital Problems           Last Modified POA    * (Principal) Diabetic foot infection ([address] 9/8/2020 Yes    Essential hypertension (Chronic) 9/8/2020 Yes    Obesity 9/8/2020 Yes    DM type 2 with diabetic peripheral neuropathy (HCC) (Chronic) 9/8/2020 Yes    CKD (chronic kidney disease) stage 3, GFR 30-59 ml/min (HCC) (Chronic) 9/8/2020 Yes          Plan       Diabetic foot infection   Admit to [facility], telemetry  Start IV fluids, start broad-spectrum antibiotics with vancomycin for MRSA coverage, cefepime for Pseudomonas coverage  Infectious disease consultation. Podiatry consultation, discussed with the podiatry team at bedside, they are planning to take her to the OR today for I&D and washout. DM type 2 uncontrolled   Patient takes [place] 40 units in the morning, will switch her to 20 twice daily of Lantus given that she will be n.p.o. today. Sliding scale insulin, Accu-Cheks before meals and at bedtime. Hypoglycemia protocol. Obesity (Body mass index is 36.85 kg/m². ) - Complicating assessment and treatment. Placing patient at risk for multiple co-morbidities as well as early death and contributing to the patient's presentation. CKD III  Creatinine close to baseline.   Monitor closely with ongoing infection and antibiotics usage         DVT ppx: enoxaparin   Consultations Ordered:  IP CONSULT TO PODIATRY  IP CONSULT TO HOSPITALIST  IP CONSULT TO PHARMACY  PHARMACY TO DOSE VANCOMYCIN  IP CONSULT TO INFECTIOUS DISEASES  IP CONSULT TO PODIATRY

## 2020-09-08 NOTE — ED PROVIDER NOTES
Hematological: Negative for adenopathy. Does not bruise/bleed easily. Psychiatric/Behavioral: Negative for agitation, behavioral problems, confusion, decreased concentration, dysphoric mood, hallucinations, self-injury, sleep disturbance and suicidal ideas. The patient is not nervous/anxious and is not hyperactive. Except as noted above the remainder of the review of systems was reviewed and negative.      PAST MEDICAL HISTORY     Past Medical History:   Diagnosis Date    Abnormal echocardiogram     25% on 3/11/14 and 50% on 3/19/14    Back pain     Cardiomyopathy ([address]     EF was 50% on 3/19/14    Chipped tooth     lower left    Dental crown present     veneers    Depression     Depressive disorder 8/13/2014    Diabetes mellitus ([address]     Diabetic infection of right foot ([address] 4/15/2015    Diabetic ulcer of toe of left foot associated with type 2 diabetes mellitus, with fat layer exposed ([address] 4/10/2018    Pt slipped in hot tub latter part of February and has not healed since despite topical treatment    DVT (deep venous thrombosis) ([address]     HTN (hypertension)     Hx of blood clots 2016    left leg    Ischemic cardiomyopathy 4/15/2015    Kidney disease     CHRONIC STAGE 3    Meniere's disease     Mixed hyperlipidemia 3/7/2016    Nicotine abuse     NSTEMI (non-ST elevated myocardial infarction) ([address] 3/13/2014    ANGLE (obstructive sleep apnea)     Osteomyelitis of ankle or foot, acute, left ([address] 6/4/2018    Pneumonia     PONV (postoperative nausea and vomiting)     nausea    Spinal abscess ([address] 3/2014    epidural abscess    Spinal epidural abscess 3/13/2014    Type II diabetes mellitus, uncontrolled ([address] 08/13/2014       SURGICAL HISTORY       Past Surgical History:   Procedure Laterality Date    BACK SURGERY  3/2014    DEBRIDEMENT  3/12/14    extensive debridement of bone/muscle and paraspinal empyema    HAMMER TOE SURGERY Left 8/28/2020    ON THE LEFT: Maggie Delcid INTERPHALANGEAL JOINT ARTHRODESIS Y, WOUND CLOSURE, FLEXOR TENOTOMY 4TH AND 5TH DIGITS, TENOTOMY AND CAPSULOTOMY 2ND DIGIT performed by Jigar Baeza DPM at [address]  6/15/1999    complete-think right ovary in.    NASAL SEPTUM SURGERY      SLEEVE GASTRECTOMY  04/02/2019    ROBOTIC ASSISTED LAPAROSCOPIC SLEEVE GASTRECTOMY, LAPAROSCOPIC REDUCIBLE INCISIONAL HERNIA REPAIR     SLEEVE GASTRECTOMY N/A 4/2/2019    ROBOTIC ASSISTED LAPAROSCOPIC SLEEVE GASTRECTOMY, LAPAROSCOPIC REDUCIBLE INCISIONAL HERNIA REPAIR performed by Nini Stratton DO at [address] N/A 2/8/2019    EGD BIOPSY performed by Nini Stratton DO at [address] [facility] 2/8/2019    EGD POLYP ABLATION/OTHER performed by Nini Stratton DO at [address]       Previous Medications    ACETAMINOPHEN (TYLENOL) 500 MG TABLET    Take 1 tablet by mouth every 6 hours as needed for Pain    AMLODIPINE (NORVASC) 2.5 MG TABLET    TAKE 1 TABLET BY MOUTH EVERY DAY    ASPIRIN 81 MG CHEWABLE TABLET    Take 1 tablet by mouth daily. ATORVASTATIN (LIPITOR) 80 MG TABLET    Take 1 tablet by mouth daily    BLOOD GLUCOSE TEST STRIPS (ONE TOUCH ULTRA TEST) STRIP    Check FSBS 2-3 times daily. CARVEDILOL (COREG) 25 MG TABLET    TAKE 1 TABLET BY MOUTH TWICE A DAY WITH MEALS    CHOLECALCIFEROL (VITAMIN D3) 2000 UNITS CAPS    Take by mouth daily    CITALOPRAM (CELEXA) 40 MG TABLET    TAKE 1 TABLET BY MOUTH EVERY DAY    DOXAZOSIN (CARDURA) 2 MG TABLET    TAKE 1 TABLET BY MOUTH EVERY DAY AT NIGHT    GABAPENTIN (NEURONTIN) 300 MG CAPSULE    Take 2 capsules by mouth 3 times daily for 90 days. INSULIN DEGLUDEC (TRESIBA FLEXTOUCH) 200 UNIT/ML SOPN    Inject 40 units into the skin once daily. INSULIN PEN NEEDLE (B-D UF III MINI PEN NEEDLES) 31G X 5 MM MISC    Use daily with insulin injection.     LIRAGLUTIDE (VICTOZA) 18 MG/3ML SOPN SC INJECTION    Inject 1.2 mg into the normal limits    Narrative:     Performed at:  [facility]  [address] [facility] [address]   Phone [phone]   LACTIC ACID, PLASMA    Narrative:     Performed at:  [facility]  [address] [place], [place] [address]   Phone [phone]   URINE RT REFLEX TO CULTURE       All other labs were withinnormal range or not returned as of this dictation. EMERGENCY DEPARTMENT COURSE and DIFFERENTIAL DIAGNOSIS/MDM:     PMH, Surgical Hx, FH, Social Hx reviewed by myself (ETOH usage, Tobacco usage, Drug usage reviewed by myself, no pertinent Hx)- No Pertinent Hx     Old records were reviewed by me showing    Test results showed    75 yr old s/p L Hallux arthrodesis with wound closure plantar aspect hallux and flexor tenotomy 2nd, 4th, and 5th digit 8/28/20 by Dr Kyara Mcnair. Presents with sepsis today. Wound infection. IV abx and fluids started. Discussed with Dr Angelic Moffett covering for Dr Kyara Mcnair for podiatry. Will transfer to [facility]. as Dr Kyara Mcnair doesn't come here. CRITICAL CARE TIME   Total Critical Caretime was 39 minutes, excluding separately reportable procedures. There was a high probability of clinically significant/life threatening deterioration in the patient's condition which required my urgent intervention. PROCEDURES:  Unlessotherwise noted below, none    FINAL IMPRESSION      1. Septicemia ([address]    2.  Wound infection          DISPOSITION/PLAN   DISPOSITION      Admission/Transfer     (Please note that portions ofthis note were completed with a voice recognition program.  Efforts were made to edit the dictations but occasionally words are mis-transcribed.)    Radha Estrada MD(electronically signed)  Attending Emergency Physician          Radha Estrada MD  09/08/20 [id number]

## 2020-09-08 NOTE — ED TRIAGE NOTES
Intermittent fevers at home for three days, breaking with tylenol and Ibuprofen. CURRENTLY AFEBRILE 98.6. Vital signs stable with baseline hypertension. Patient with emesis off and on for three days, denies diarrhea. Headache for three days. Denies cough, denies SOB, no covid-like symptoms. Foot surgery on 8/28. Denies pain in foot or redness.

## 2020-09-08 NOTE — ED TRIAGE NOTES
Pt had foot surgery here on the 28, now red and swollen went to [facility] and transferred here for her foot doctor

## 2020-09-08 NOTE — CONSULTS
Clinical Pharmacy Progress Note  Medication History     Admit Date: 9/8/2020    Asked to verify home medications by Dr. Izzy Ceron. List of of current medications patient is taking is complete. Home Medication list in EPIC updated to reflect changes noted below. Source of information: patient interview, [facility] (Juan Miguel Logan - [phone])    Changes made to medication list:   Medications removed (no longer taking): · Acetaminophen  · Doxazosin    Medications added:   · MVI    Medication doses / instructions adjusted:   · Gabapentin - it is prescribed as 300mg 2 caps TID, but pt states she only takes 300mg 2 caps daily. This is confirmed via fill history.     Please call with questions--  Thanks--  Singh Oreilly, PharmD, [address] [facility]  [phone] or [id number] ([facility])   9/8/2020 1:36 PM

## 2020-09-08 NOTE — CONSULTS
Clinical Pharmacy Progress Note    Admit date: 9/8/2020     Subjective/Objective:  Pt is a 67yof with PMHx that includes DM 2, CAD, CKD, Meniere's, ischemic cardiomyopaty, HTN, DVT, and depression who recently underwent left hallux arthroplasty with flexor tenotomy's of 2nd, 4th, and 5th digit on 8/28/20. Pt was seen for wound check 9/3 at podiatrist's office with no issues noted. Pt now presenting with fevers and swelling / drainage from left foot wound - being admitted for treatment of LLE diabetic foot infection. Pharmacy is consulted to dose Vancomycin per Dr. Wally Schafer    Current antibiotics:  Cefepime 2g IV q12h - day #1  Vancomycin - Pharmacy to dose - day #1   1g IV x1 9/8 03:00   1.5g IV q24h (to start 9/9)      Recent Labs     09/08/20  0228   *   K 3.9   CL 97*   CO2 23   BUN 27*   CREATININE 1.5*   GLUCOSE 131*       Estimated Creatinine Clearance: 40 mL/min (A) (based on SCr of 1.5 mg/dL (H)). Lab Results   Component Value Date    WBC 19.0 (H) 09/08/2020    HGB 9.8 (L) 09/08/2020    HCT 28.9 (L) 09/08/2020    MCV 90.3 09/08/2020     09/08/2020       Lab Results   Component Value Date    PROTIME 15.0 (H) 09/08/2020    INR 1.29 (H) 09/08/2020       Height:  5' 3\" (160 cm)  Weight:  208 lb (94.3 kg)    Culture results:  SARS-CoV-2 (9/8) = not detected  Wound (9/8) = sent    Prophylaxis:  VTE:  Enoxpaarin  GI:  Pantoprazole    Vaccination screening:  Pneumonia:  Up to date  Influenza:  Not yet available at [facility]    Assessment/Plan:  1)  LLE diabetic foot infection:  Cefepime + Vancomycin - day #1  · Cefepime -   Current dose remains appropriate based on indication and current renal function. Will continue to monitor and adjust as needed per [facility] Renal Dose Adjustment Policy. · Vancomycin - Pharmacy to dose  · Received 1g IV x1 this AM at [facility] ED. Based on estimated kinetics, will start 1.5g IV q24h (next dose tomorrow AM).     · Clinical condition will be monitored closely, and levels will be ordered as clinically indicated.     Please call with questions--  Thanks--  Joseph Vergara, PharmD, [address] [facility]  [phone] or [id number] ([facility])   9/8/2020 1:07 PM

## 2020-09-08 NOTE — CARE COORDINATION
Case Management Assessment           Initial Evaluation                Date / Time of Evaluation: 9/8/2020 4:25 PM                 Assessment Completed by: Saranya Inman    Patient Name: Kj Ovalles     YOB: 1955  Diagnosis: Diabetic foot infection ([facility] [E11.628, L08.9]  Diabetic foot infection ([facility] [E11.628, L08.9]     Date / Time: 9/8/2020  9:42 AM    Patient Admission Status: Inpatient    If patient is discharged prior to next notation, then this note serves as note for discharge by case management. Current PCP: Al Cervantes MD  Clinic Patient: No    Chart Reviewed: Yes  Patient/ Family Interviewed: Yes    Initial assessment completed at bedside with: pt and sister    Hospitalization in the last 30 days: No    Emergency Contacts:  Extended Emergency Contact Information  Primary Emergency Contact: skinny moon  [place] Phone: [phone]  Relation: Child  Secondary Emergency Contact: [address] [place] [address] Phone: [phone]  Mobile Phone: [phone]  Relation: Child    Advance Directives:   Code Status: Full Code    Healthcare Power of : No    Financial  Payor: Mariana David / Plan: Frances Akbar / Product Type: *No Product type* /     Pre-cert required for SNF: Yes    Pharmacy    [id number], [address] [place] [address]. Gladys Renteriaalgo [phone] - f [address] [place] [address]. [facility] [address]  Phone: [phone] Fax: [phone]    [facility] [address], [address] [facility] [phone] - f [phone]  [address] [place] [address]  Phone: [phone] Fax: [phone]    [facility]  [phone] Sujey Mendes [phone] [address] [facility] [address]  Phone: [phone] Fax: [phone]    [facility] [place], [address] [phone] - [phone]  [address] [place] [address]

## 2020-09-08 NOTE — CONSULTS
Infectious Diseases Inpatient Consult Note      Reason for Consult:  Left diabetic foot infection s/p recent surgery      Requesting Physician:  Lilia Johnson     Primary Care Physician:  Ron Mari MD    History Obtained From:  Pt and Medical records     CHIEF COMPLAINT:     Chief Complaint   Patient presents with    Post-op Problem       Left foot infection    HISTORY OF PRESENT ILLNESS:  75 y.o. woman with DM, poor control admitted with left great toe surgical site infection with local redness, cellulitis and drainage, fevers low grade with chills s/p Left great toe surgery: [address] Y, WOUND CLOSURE, FLEXOR TENOTOMY 4TH AND 5TH DIGITS, TENOTOMY AND CAPSULOTOMY 2ND DIGIT  with pin placement on 8/28. WBC elevation with elevated creat noted and seen by Podiatry plans for surgery noted.          Past Medical History:    Past Medical History:   Diagnosis Date    Abnormal echocardiogram     25% on 3/11/14 and 50% on 3/19/14    Back pain     Cardiomyopathy ([address]     EF was 50% on 3/19/14    Chipped tooth     lower left    Dental crown present     veneers    Depression     Depressive disorder 8/13/2014    Diabetes mellitus ([address]     Diabetic infection of right foot ([address] 4/15/2015    Diabetic ulcer of toe of left foot associated with type 2 diabetes mellitus, with fat layer exposed ([address] 4/10/2018    Pt slipped in hot tub latter part of February and has not healed since despite topical treatment    DVT (deep venous thrombosis) ([address]     HTN (hypertension)     Hx of blood clots 2016    left leg    Ischemic cardiomyopathy 4/15/2015    Kidney disease     CHRONIC STAGE 3    Meniere's disease     Mixed hyperlipidemia 3/7/2016    Nicotine abuse     NSTEMI (non-ST elevated myocardial infarction) ([address] 3/13/2014    ANGLE (obstructive sleep apnea)     Osteomyelitis of ankle or foot, acute, left ([address] 6/4/2018    Pneumonia     PONV (postoperative nausea and vomiting) nausea    Spinal abscess [facility] 3/2014    epidural abscess    Spinal epidural abscess 3/13/2014    Type II diabetes mellitus, uncontrolled ([facility] 08/13/2014       Past Surgical History:    Past Surgical History:   Procedure Laterality Date    BACK SURGERY  3/2014    DEBRIDEMENT  3/12/14    extensive debridement of bone/muscle and paraspinal empyema    HAMMER TOE SURGERY Left 8/28/2020    ON THE LEFT: HALLUX INTERPHALANGEAL JOINT ARTHRODESIS Y, WOUND CLOSURE, FLEXOR TENOTOMY 4TH AND 5TH DIGITS, TENOTOMY AND CAPSULOTOMY 2ND DIGIT performed by Jessica Lima DPM at [address] [address]  6/15/1999    complete-think right ovary in.    NASAL SEPTUM SURGERY      SLEEVE GASTRECTOMY  04/02/2019    ROBOTIC ASSISTED LAPAROSCOPIC SLEEVE GASTRECTOMY, LAPAROSCOPIC REDUCIBLE INCISIONAL HERNIA REPAIR     SLEEVE GASTRECTOMY N/A 4/2/2019    ROBOTIC ASSISTED LAPAROSCOPIC SLEEVE GASTRECTOMY, LAPAROSCOPIC REDUCIBLE INCISIONAL HERNIA REPAIR performed by Nancy Bernal DO at [address] N/A 2/8/2019    EGD BIOPSY performed by Nancy Bernal DO at [address] [facility] N/A 2/8/2019    EGD POLYP ABLATION/OTHER performed by Nancy Bernal DO at [facility]       Current Medications:    No outpatient medications have been marked as taking for the 9/8/20 encounter [facility] Encounter).        Allergies:  Doxycycline    Immunizations :   Immunization History   Administered Date(s) Administered    Influenza Virus Vaccine 10/13/2014, 11/10/2015, 10/31/2016    Influenza, Quadv, IM, PF (6 mo and older Fluzone, Flulaval, Fluarix, and 3 yrs and older Afluria) 09/28/2017    Influenza, Quadv, Recombinant, IM PF (Flublok 18 yrs and older) 10/12/2018    Pneumococcal Conjugate 13-valent ([id number]) 03/18/2019    Pneumococcal Polysaccharide ([id number]) 11/03/2005, 03/20/2014    Td, unspecified formulation 04/09/1999    Tdap (Boostrix, Adacel) 11/21/2008    Zoster Live alternative molecular   assay. Negative results do not preclude SARS-CoV-2 infection and   should not be used as the sole basis for patient management decisions. This test has been authorized by the FDA under an Emergency        Urine Culture  No results for input(s): Redell Rota in the last 72 hours. Imaging:   XR CHEST (2 VW)    (Results Pending)     FINAL DIAGNOSIS:     Bone, proximal phalanx left hallux:      - Bone with fibroconnective tissue showing marked reactive and        degenerative changes consistent with clinical history. All pertinent images and reports for the current Hospitalization were reviewed by me.     IMPRESSION:    Patient Active Problem List   Diagnosis    Meniere's disease    Peripheral neuropathy    Depression    Mixed hyperlipidemia    Essential hypertension    Obesity    ASNHL (asymmetrical sensorineural hearing loss)    Poor compliance with medication    DM type 2 with diabetic peripheral neuropathy ([address]    Diabetic ulcer of toe of left foot associated with diabetes mellitus due to underlying condition, with fat layer exposed ([address]    CKD (chronic kidney disease) stage 3, GFR 30-59 ml/min ([facility])    ANGLE (obstructive sleep apnea)    Chronic GERD    Morbid obesity with BMI of 45.0-49.9, adult ([address]    Incisional hernia, without obstruction or gangrene    S/P laparoscopic sleeve gastrectomy    Anemia    Ulcer of toe of right foot, with fat layer exposed ([address]    Ulcer of toe of left foot, with fat layer exposed ([address]    Diabetic foot infection ([address]     Sepsis  Fevers  WBC elevation  Surgical site infection Left great toe  S/p Left foot surgery with great toe arthrodesis and pin placement  CKD  DM poor control   Obesity     Going to OR today given the concern for deep infection post op and she has pin/hardware in place await OR findings to see if this is removed or not and this may impact duration of IV abx course will need long term IV abx therapy        Labs,

## 2020-09-08 NOTE — ED PROVIDER NOTES
[facility]  EMERGENCY DEPARTMENT ENCOUNTER          ATTENDING PHYSICIAN NOTE       Date of evaluation: 9/8/2020    Chief Complaint     Post-op Problem      History of Present Illness     Leonidas Morales is a 75 y.o. female who presents as a transfer from [facility] with a left foot infection. Patient surgery done on August 28 here, please see previous ER note for details, she is now here because her surgeon works at this facility. She did receive cefepime and vancomycin at the previous hospital, COVID was negative, here now for evaluation by podiatry and admission. Review of Systems     Review of Systems--positive left foot pain. Positive for left foot drainage. Positive for fevers and chills. Negative for nausea vomiting. Otherwise negative review of systems    Past Medical, Surgical, Family, and Social History     She has a past medical history of Abnormal echocardiogram, Back pain, Cardiomyopathy ([address] Chipped tooth, Dental crown present, Depression, Depressive disorder, Diabetes mellitus ([address] Diabetic infection of right foot ([address] Diabetic ulcer of toe of left foot associated with type 2 diabetes mellitus, with fat layer exposed ([address] DVT (deep venous thrombosis) ([address] HTN (hypertension), Hx of blood clots, Ischemic cardiomyopathy, Kidney disease, Meniere's disease, Mixed hyperlipidemia, Nicotine abuse, NSTEMI (non-ST elevated myocardial infarction) ([address] ANGLE (obstructive sleep apnea), Osteomyelitis of ankle or foot, acute, left ([address] Pneumonia, PONV (postoperative nausea and vomiting), Spinal abscess ([address] Spinal epidural abscess, and Type II diabetes mellitus, uncontrolled ([address] She has a past surgical history that includes debridement (3/12/14); back surgery (3/2014); Hysterectomy (6/15/1999); Nasal septum surgery; Upper gastrointestinal endoscopy (N/A, 2/8/2019); Upper gastrointestinal endoscopy (N/A, 2/8/2019); Sleeve Gastrectomy (04/02/2019);  Sleeve Gastrectomy (N/A,

## 2020-09-08 NOTE — CONSULTS
Department of Podiatry Consult Note  Resident       Reason for Consult:  Infection  Requesting Physician:  Dr. Daija Benjamin: Fevers, left foot draining     HISTORY OF PRESENT ILLNESS:                The patient is a 75 y.o. female with significant past medical history of please see list below. Podiatry was consulted for infection. Patient does see a podiatrist Dr. Adeel Uriostegui and was last seen last Thursday. Patient is s/p left hallux arthroplasty with flexor tenotomy's of 2nd, 4th, and fifth digit (DOS 8/28/2020). Patient also not been very compliant with her dressing changes per her podiatrist instructions and will not have her wounds covered for some period of time. Patient relates she was doing fine since her last visit up until this Saturday when she noticed increased fluctuation in her temperature. She states her temperature got up to 101 but came back to normal with over-the-counter medications. Her swelling and new draining continuously worsened. Patient states she has no pain to her left foot 2/2 peripheral neuropathy. Patient and family friend was concerned with her increased temperature and swelling to her left foot that she went to  [facility] emergency department for work-up and evaluation. Patient was later transferred to the [facility] emergency department. Patient denies nausea, vomiting, fever, chills, or shortness of breath. Patient has no other pedal complaints during today's examination.         Past Medical History:        Diagnosis Date    Abnormal echocardiogram     25% on 3/11/14 and 50% on 3/19/14    Back pain     Cardiomyopathy ([address]     EF was 50% on 3/19/14    Chipped tooth     lower left    Dental crown present     veneers    Depression     Depressive disorder 8/13/2014    Diabetes mellitus ([address]     Diabetic infection of right foot ([address] 4/15/2015    Diabetic ulcer of toe of left foot associated with type 2 diabetes mellitus, with fat layer exposed ([address] 4/10/2018    Pt slipped in hot tub latter part of February and has not healed since despite topical treatment    DVT (deep venous thrombosis) ([address]     HTN (hypertension)     Hx of blood clots 2016    left leg    Ischemic cardiomyopathy 4/15/2015    Kidney disease     CHRONIC STAGE 3    Meniere's disease     Mixed hyperlipidemia 3/7/2016    Nicotine abuse     NSTEMI (non-ST elevated myocardial infarction) ([address] 3/13/2014    ANGLE (obstructive sleep apnea)     Osteomyelitis of ankle or foot, acute, left ([address] 6/4/2018    Pneumonia     PONV (postoperative nausea and vomiting)     nausea    Spinal abscess ([address] 3/2014    epidural abscess    Spinal epidural abscess 3/13/2014    Type II diabetes mellitus, uncontrolled ([address] 08/13/2014     Past Surgical History:        Procedure Laterality Date    BACK SURGERY  3/2014    DEBRIDEMENT  3/12/14    extensive debridement of bone/muscle and paraspinal empyema    HAMMER TOE SURGERY Left 8/28/2020    ON THE LEFT: HALLUX INTERPHALANGEAL JOINT ARTHRODESIS Y, WOUND CLOSURE, FLEXOR TENOTOMY 4TH AND 5TH DIGITS, TENOTOMY AND CAPSULOTOMY 2ND DIGIT performed by Kenyetta Ramos DPM at [facility]  6/15/1999    complete-think right ovary in.    NASAL SEPTUM SURGERY      SLEEVE GASTRECTOMY  04/02/2019    ROBOTIC ASSISTED LAPAROSCOPIC SLEEVE GASTRECTOMY, LAPAROSCOPIC REDUCIBLE INCISIONAL HERNIA REPAIR     SLEEVE GASTRECTOMY N/A 4/2/2019    ROBOTIC ASSISTED LAPAROSCOPIC SLEEVE GASTRECTOMY, LAPAROSCOPIC REDUCIBLE INCISIONAL HERNIA REPAIR performed by Malcolm Quinones DO at [address] N/A 2/8/2019    EGD BIOPSY performed by Malcolm Quinones DO at [facility] N/A 2/8/2019    EGD POLYP ABLATION/OTHER performed by Malcolm Quinones DO at [facility] ENDOSCOPY     Current Medications:    No current facility-administered medications for this encounter.    Allergies:   Doxycycline  Social History: TOBACCO:   reports that she quit smoking about 6 years ago. She has a 5.00 pack-year smoking history. She has never used smokeless tobacco.  Family History:       Problem Relation Age of Onset    High Blood Pressure Mother     Cancer Mother     Rheum Arthritis Mother     COPD Father     Cancer Father     Osteoarthritis Neg Hx     Asthma Neg Hx     Breast Cancer Neg Hx     Diabetes Neg Hx     Heart Failure Neg Hx     High Cholesterol Neg Hx     Hypertension Neg Hx     Migraines Neg Hx     Ovarian Cancer Neg Hx     Rashes/Skin Problems Neg Hx     Seizures Neg Hx     Stroke Neg Hx     Thyroid Disease Neg Hx      REVIEW OF SYSTEMS:      ROS: A 10 point review of systems was conducted, significant findings as noted in HPI. All other systems negative. PHYSICAL EXAM:      Vitals:    BP (!) 204/82   Pulse 78   Temp 98.6 °F (37 °C) (Oral)   Resp 17   Ht 5' 3\" (1.6 m)   Wt 208 lb (94.3 kg)   LMP 06/15/1999   SpO2 98%   BMI 36.85 kg/m²     LABS:   Recent Labs     09/08/20 0228   WBC 19.0*   HGB 9.8*   HCT 28.9*        Recent Labs     09/08/20 0228   *   K 3.9   CL 97*   CO2 23   BUN 27*   CREATININE 1.5*     Recent Labs     09/08/20 0228   PROT 7.5       VASCULAR: DP and PT pulses are palpable  b/l. CFT is brisk to the digits of the foot b/l. Skin temperature is warm to warm from proximal to distal with focal calor noted Left foot from distal tuft of hallux to midfoot. +1 Pitting forefoot edema noted LLE. No pain with calf compression b/l. NEUROLOGIC: Gross and epicritic sensation is diminished b/l. Protective sensation is diminished at all pedal sites b/l. DERMATOLOGIC: Dermatological changes noted B/L LE. Left foot wound dehiscence noted from prior hallux surgery located at the IPJ. The most distal in the most proximal tissues were well approximated with sutures still intact. Wound measured less than 0.4 cm circumferentially.   Wound base granular with macerated borders and periwound. Wound does probe to bone, tracks proximally from the extensor tendon sheath. No crepitus or malodor noted. Boggy and fluctuant sensation noted just proximal to the digits. After compression of the midfoot to forefoot noted approximately 10 cc of purulent drainage. Acute signs of infection and periwound erythema noted. Picture taken on 9/8/2020    Patient gave verbal consent for the picture taken at today's visit. MUSCULOSKELETAL: Muscle strength is 5/5 for all pedal groups tested. No pain with palpation of the foot or ankle b/l. Ankle joint ROM is decreased in dorsiflexion with the knee extended. Procedure: Bedside incision and drainage left hallux  Patient gave verbal consent. Using sterile suture instrument kit removed 3 sutures from the dorsal aspect of the foot. Next, K wire was removed from the dorsal aspect of the hallux. Next, compressing the left midfoot towards the forefoot expressed 10 cc of purulent murky drainage and continued until no more purulent drainage noted. Next in a sterile basin 50-50 solution of sterile normal saline mixed with Betadine and irrigated the dehisced wound. Next, compressed the left midfoot towards the forefoot and no purulent drainage was expressed only sanguinous less than 3 cc. Next, the wound was gently packed with 1/4 iodoform packing along with Betadine soaked gauze, DSD, and gentle Ace compression. Patient tolerated the procedure well. IMAGING:   Impression    Stable postsurgical changes of the 1st proximal and distal phalanges.     Diffuse soft tissue edema surrounding the 1st digit.  No subcutaneous air.             IMPRESSION/RECOMMENDATIONS:    Diabetic foot infection; left foot  Full-thickness ulceration dorsal aspect; left hallux  S/P HIP arthroplasty, wound closure, tenotomy flexors second, fourth, and fifth digit; left foot (DOS 8/28/2020)  Diabetes mellitus with peripheral neuropathy    -Patient was seen and examined this a.m. at ED bedside  -Afebrile, hypertensive, leukocytosis noted 19.0  -Xrays reviewed left foot; see impression above. -Ordered CRP, ESR, Prealbumin, and A1c; f/u results.  -Wound culture obtained left hallux; follow-up results.  -Sutures were removed from bedside I&D. -Left foot erythema was marked with skin marker and dressed with iodoform packing, DSD, and Ace bandage.  -Continue broad-spectrum antibiotic coverage IV vancomycin and cefepime. -ID consulted; follow-up recommendations.  -Ordered surgical shoe to the room.  -Weightbearing as tolerated with surgical shoe and with assistance. -Instructed patient to elevate B/L lower extremity at all times to aid in edema control.  -Patient was consented for left foot incision and drainage procedure. All risk, benefits and potiental complications of the procedure were disccussed and all questions answered. We discussed the relative risks, benefits, alternatives, complications involved with the planned procedure. We also explained to the patient that residents will be present for the case. The patient understands, consents to the above and would like to proceed with the planned procedure. The patient's written consent has been signed and is in the chart. No guarantees were made. All questions were answered to the patient's satisfaction.   -Patient n.p.o. effective immediately and tentative OR time later on this evening. DISPO: Left foot diabetic foot infection S/P HIP arthroplasty, wound closure, tenotomy flexors second, fourth, and fifth digit. Concern for new onset of diabetic foot infection left foot with increased purulent drainage. Patient n.p.o. effective immediately for tentative washout this evening. Will discuss with attending on any advanced imaging or further treatment. We will follow-up on all labs and ID recommendation will continue to monitor and do local wound care while patient is in house.     - The patient will be staffed with

## 2020-09-08 NOTE — ED NOTES
Bed: [facility]  Expected date:   Expected time:   Means of arrival:   Comments:  antoinette Neves RN  09/08/20 5830

## 2020-09-08 NOTE — ED NOTES
Pt going to 5305 report called to Nataly Roberts then pt to room per this RN in stable condition     Eddie Topete RN  09/08/20 8427

## 2020-09-09 ENCOUNTER — ANESTHESIA EVENT (OUTPATIENT)
Dept: OPERATING ROOM | Age: 65
DRG: 857 | End: 2020-09-09
Payer: MEDICARE

## 2020-09-09 LAB
ANION GAP SERPL CALCULATED.3IONS-SCNC: 11 MMOL/L (ref 3–16)
BASOPHILS ABSOLUTE: 0 K/UL (ref 0–0.2)
BASOPHILS RELATIVE PERCENT: 0.3 %
BUN BLDV-MCNC: 39 MG/DL (ref 7–20)
CALCIUM SERPL-MCNC: 8.9 MG/DL (ref 8.3–10.6)
CHLORIDE BLD-SCNC: 99 MMOL/L (ref 99–110)
CO2: 24 MMOL/L (ref 21–32)
CREAT SERPL-MCNC: 1.4 MG/DL (ref 0.6–1.2)
EOSINOPHILS ABSOLUTE: 0.4 K/UL (ref 0–0.6)
EOSINOPHILS RELATIVE PERCENT: 2.8 %
ESTIMATED AVERAGE GLUCOSE: 329.3 MG/DL
GFR AFRICAN AMERICAN: 45
GFR NON-AFRICAN AMERICAN: 38
GLUCOSE BLD-MCNC: 121 MG/DL (ref 70–99)
GLUCOSE BLD-MCNC: 136 MG/DL (ref 70–99)
GLUCOSE BLD-MCNC: 175 MG/DL (ref 70–99)
GLUCOSE BLD-MCNC: 195 MG/DL (ref 70–99)
GLUCOSE BLD-MCNC: 202 MG/DL (ref 70–99)
HBA1C MFR BLD: 13.1 %
HCT VFR BLD CALC: 27.3 % (ref 36–48)
HEMOGLOBIN: 9 G/DL (ref 12–16)
INR BLD: 1.23 (ref 0.86–1.14)
LYMPHOCYTES ABSOLUTE: 1.1 K/UL (ref 1–5.1)
LYMPHOCYTES RELATIVE PERCENT: 8.4 %
MCH RBC QN AUTO: 29.9 PG (ref 26–34)
MCHC RBC AUTO-ENTMCNC: 32.8 G/DL (ref 31–36)
MCV RBC AUTO: 91.1 FL (ref 80–100)
MONOCYTES ABSOLUTE: 1.2 K/UL (ref 0–1.3)
MONOCYTES RELATIVE PERCENT: 9.3 %
NEUTROPHILS ABSOLUTE: 10.3 K/UL (ref 1.7–7.7)
NEUTROPHILS RELATIVE PERCENT: 79.2 %
PDW BLD-RTO: 13.6 % (ref 12.4–15.4)
PERFORMED ON: ABNORMAL
PLATELET # BLD: 369 K/UL (ref 135–450)
PMV BLD AUTO: 8.7 FL (ref 5–10.5)
POTASSIUM REFLEX MAGNESIUM: 4.1 MMOL/L (ref 3.5–5.1)
PROTHROMBIN TIME: 14.3 SEC (ref 10–13.2)
RBC # BLD: 3 M/UL (ref 4–5.2)
SODIUM BLD-SCNC: 134 MMOL/L (ref 136–145)
WBC # BLD: 13.1 K/UL (ref 4–11)

## 2020-09-09 PROCEDURE — 97535 SELF CARE MNGMENT TRAINING: CPT

## 2020-09-09 PROCEDURE — 6370000000 HC RX 637 (ALT 250 FOR IP): Performed by: PODIATRIST

## 2020-09-09 PROCEDURE — 97116 GAIT TRAINING THERAPY: CPT

## 2020-09-09 PROCEDURE — 1200000000 HC SEMI PRIVATE

## 2020-09-09 PROCEDURE — 97161 PT EVAL LOW COMPLEX 20 MIN: CPT

## 2020-09-09 PROCEDURE — 6360000002 HC RX W HCPCS: Performed by: PODIATRIST

## 2020-09-09 PROCEDURE — 85025 COMPLETE CBC W/AUTO DIFF WBC: CPT

## 2020-09-09 PROCEDURE — 2580000003 HC RX 258: Performed by: PODIATRIST

## 2020-09-09 PROCEDURE — 85610 PROTHROMBIN TIME: CPT

## 2020-09-09 PROCEDURE — 80048 BASIC METABOLIC PNL TOTAL CA: CPT

## 2020-09-09 PROCEDURE — 36415 COLL VENOUS BLD VENIPUNCTURE: CPT

## 2020-09-09 PROCEDURE — 97166 OT EVAL MOD COMPLEX 45 MIN: CPT

## 2020-09-09 RX ADMIN — ASPIRIN 81 MG: 81 TABLET, CHEWABLE ORAL at 09:39

## 2020-09-09 RX ADMIN — CEFEPIME HYDROCHLORIDE 2 G: 2 INJECTION, POWDER, FOR SOLUTION INTRAVENOUS at 15:08

## 2020-09-09 RX ADMIN — INSULIN LISPRO 4 UNITS: 100 INJECTION, SOLUTION INTRAVENOUS; SUBCUTANEOUS at 09:41

## 2020-09-09 RX ADMIN — CARVEDILOL 6.25 MG: 6.25 TABLET, FILM COATED ORAL at 09:39

## 2020-09-09 RX ADMIN — INSULIN LISPRO 2 UNITS: 100 INJECTION, SOLUTION INTRAVENOUS; SUBCUTANEOUS at 12:20

## 2020-09-09 RX ADMIN — INSULIN GLARGINE 20 UNITS: 100 INJECTION, SOLUTION SUBCUTANEOUS at 21:26

## 2020-09-09 RX ADMIN — CITALOPRAM 40 MG: 40 TABLET, FILM COATED ORAL at 09:40

## 2020-09-09 RX ADMIN — AMLODIPINE BESYLATE 2.5 MG: 2.5 TABLET ORAL at 09:40

## 2020-09-09 RX ADMIN — INSULIN LISPRO 1 UNITS: 100 INJECTION, SOLUTION INTRAVENOUS; SUBCUTANEOUS at 21:25

## 2020-09-09 RX ADMIN — CARVEDILOL 6.25 MG: 6.25 TABLET, FILM COATED ORAL at 18:03

## 2020-09-09 RX ADMIN — ENOXAPARIN SODIUM 30 MG: 30 INJECTION SUBCUTANEOUS at 09:40

## 2020-09-09 RX ADMIN — CEFEPIME HYDROCHLORIDE 2 G: 2 INJECTION, POWDER, FOR SOLUTION INTRAVENOUS at 04:05

## 2020-09-09 RX ADMIN — PANTOPRAZOLE SODIUM 40 MG: 40 TABLET, DELAYED RELEASE ORAL at 09:40

## 2020-09-09 RX ADMIN — ATORVASTATIN CALCIUM 80 MG: 80 TABLET, FILM COATED ORAL at 09:39

## 2020-09-09 RX ADMIN — LISINOPRIL 20 MG: 20 TABLET ORAL at 09:40

## 2020-09-09 RX ADMIN — INSULIN GLARGINE 20 UNITS: 100 INJECTION, SOLUTION SUBCUTANEOUS at 09:41

## 2020-09-09 RX ADMIN — VANCOMYCIN HYDROCHLORIDE 1500 MG: 10 INJECTION, POWDER, LYOPHILIZED, FOR SOLUTION INTRAVENOUS at 06:00

## 2020-09-09 ASSESSMENT — PAIN SCALES - GENERAL
PAINLEVEL_OUTOF10: 0

## 2020-09-09 NOTE — PROGRESS NOTES
Occupational Therapy   Occupational Therapy Initial Assessment and Treatment  Date: 2020   Patient Name: Cindy Lee  MRN: [id number]     : 1955    Date of Service: 2020    Discharge Recommendations: Cindy Lee scored a 20/24 on the AM-PAC ADL Inpatient form. Current research shows that an AM-PAC score of 18 or greater is typically associated with a discharge to the patient's home setting. Based on the patient's AM-PAC score, and their current ADL deficits, it is recommended that the patient have 2-3 sessions per week of Occupational Therapy at d/c to increase the patient's independence. At this time, this patient demonstrates the endurance and safety to discharge home with home services prn and a follow up treatment frequency of 2-3x/wk. Please see assessment section for further patient specific details. If patient discharges prior to next session this note will serve as a discharge summary. Please see below for the latest assessment towards goals. OT Equipment Recommendations  Equipment Needed: No  Other: Rec use owned [facility] and owned shower chair if allowed to bathe    Assessment   Performance deficits / Impairments: Decreased functional mobility ; Decreased ADL status; Decreased high-level IADLs;Decreased balance;Decreased endurance  After evaluation and treatment, pt found to be presenting with the above mentioned deficits. Pt would benefit from continued skilled occupational therapy to address these deficits, increasing safety and independence with ADL and functional mobility. She is currently CGA to SBA level for standing ADL, LB ADL, and functional mobility/transfers with RW. Normally she is independent, working on her feet part time. She is motivated. Will continue to assess for discharge needs.      Treatment Diagnosis: decreased ability to perform ADL 2/2 diabetic foot infection s/p I&D  Prognosis: Good  Decision Making: Medium Complexity  REQUIRES OT FOLLOW UP: Yes  Activity Tolerance  Activity Tolerance: Patient Tolerated treatment well  Safety Devices  Safety Devices in place: Yes  Type of devices: Call light within reach; Chair alarm in place; Left in chair;Nurse notified;Gait belt        Patient Diagnosis(es): The encounter diagnosis was Wound infection after surgery. has a past medical history of Abnormal echocardiogram, Back pain, Cardiomyopathy ([address] Chipped tooth, Dental crown present, Depression, Depressive disorder, Diabetes mellitus ([address] Diabetic infection of right foot ([address] Diabetic ulcer of toe of left foot associated with type 2 diabetes mellitus, with fat layer exposed ([address] DVT (deep venous thrombosis) ([address] HTN (hypertension), Hx of blood clots, Ischemic cardiomyopathy, Kidney disease, Meniere's disease, Mixed hyperlipidemia, Nicotine abuse, NSTEMI (non-ST elevated myocardial infarction) ([address] ANGLE (obstructive sleep apnea), Osteomyelitis of ankle or foot, acute, left ([address] Pneumonia, PONV (postoperative nausea and vomiting), Spinal abscess ([address] Spinal epidural abscess, and Type II diabetes mellitus, uncontrolled ([address] has a past surgical history that includes debridement (3/12/14); back surgery (3/2014); Hysterectomy (6/15/1999); Nasal septum surgery; Upper gastrointestinal endoscopy (N/A, 2/8/2019); Upper gastrointestinal endoscopy (N/A, 2/8/2019); Sleeve Gastrectomy (04/02/2019); Sleeve Gastrectomy (N/A, 4/2/2019); Hammer toe surgery (Left, 8/28/2020); and Foot Debridement (Left, 9/8/2020). Treatment Diagnosis: decreased ability to perform ADL 2/2 diabetic foot infection s/p I&D      Restrictions  Position Activity Restriction  Other position/activity restrictions: Full WB    Subjective   General  Chart Reviewed: Yes  Patient assessed for rehabilitation services?: Yes  Family / Caregiver Present: No  Referring Practitioner: Guillaume Otero DPM  Diagnosis: Diabetic foot infection s/p L foot I&D on 9/8/20.   Subjective  Subjective: RN cleared pt for tx. Pt sitting EOB at session start.      Pain Assessment  Pain Assessment: Denies     Social/Functional History  Social/Functional History  Lives With: Alone  Type of Home: Apartment  Home Layout: Two level  Home Access: Stairs to enter with rails  Entrance Stairs - Number of Steps: ~13 NICK  Entrance Stairs - Rails: Both  Bathroom Shower/Tub: Tub/Shower unit  Bathroom Equipment: Grab bars in shower, Grab bars around toilet, Shower chair  Home Equipment: 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       Objective   Vision: Impaired  Vision Exceptions: Wears glasses for reading(contacts full time)  Hearing: Exceptions to [facility]  Hearing Exceptions: Bilateral hearing aid(not present at al)      Orientation  Overall Orientation Status: Within Functional Limits        Balance  Sitting Balance: Stand by assistance  Standing Balance: Contact guard assistance(to SBA with RW)  Functional Mobility  Functional - Mobility Device: Rolling Walker  Activity: To/from bathroom(plus additional distance in pierre to build up functional standing activity tolerance for ADL)  Assist Level: Stand by assistance     ADL  Feeding: Independent(seated)  Grooming: Supervision(to wash hands and face standing at sink with RW)  LE Dressing: Stand by assistance(to don brief seated and standing after education on technique)  Toileting: Supervision(seated hygiene)     Tone RUE  RUE Tone: Normotonic  Tone LUE  LUE Tone: Normotonic  Coordination  Movements Are Fluid And Coordinated: Yes        Bed mobility  Scooting: Stand by assistance(along EOB)     Transfers  Sit to stand: Stand by assistance  Stand to sit: Stand by assistance        Cognition  Overall Cognitive Status: [facility]           Sensation  Overall Sensation Status: Impaired(baseline neuropathy in B feet)                       BUE strength and AROM are WFL based on functional presentation. Education: Role of OT, safe t/f training, safe use of DME, awareness of deficits, discharge planning, ADL as therapeutic exercise, importance of OOB, WB status        Plan   Plan  Times per week: 2-5    AM-PAC Score        AM-[facility] Inpatient Daily Activity Raw Score: 20 (09/09/20 1129)  AM-PAC Inpatient ADL T-Scale Score : 42.03 (09/09/20 1129)  ADL Inpatient CMS 0-100% Score: 38.32 (09/09/20 1129)  ADL Inpatient CMS G-Code Modifier : Dustin Mendez (09/09/20 1129)    Goals  Short term goals  Time Frame for Short term goals: 1 week  Short term goal 1: Toilet t/f with mod I  Short term goal 2: Toileting with mod I  Short term goal 3: LB dresssing with mod I  Patient Goals   Patient goals : \"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 \"       Therapy Time   Individual Concurrent Group Co-treatment   Time In 0850         Time Out 0930         Minutes 40         Timed Code Treatment Minutes: 25 Minutes       If patient is discharged prior to next treatment session, this note will serve as the discharge summary.   Ziggy Andrew OTR/L #[id number]

## 2020-09-09 NOTE — PROGRESS NOTES
Clinical Pharmacy Progress Note    Admit date: 9/8/2020     Subjective/Objective:  Pt is a 67yof with PMHx that includes DM 2, CAD, CKD, Meniere's, ischemic cardiomyopaty, HTN, DVT, and depression who recently underwent left hallux arthroplasty with flexor tenotomy's of 2nd, 4th, and 5th digit on 8/28/20. Pt was seen for wound check 9/3 at podiatrist's office with no issues noted. Pt now presenting with fevers and swelling / drainage from left foot wound - being admitted for treatment of LLE diabetic foot infection. Interval update:  Pt is now s/p LLE I&D (done 9/8). Planning further I&D tomorrow with delayed primary closure. Pharmacy is consulted to dose Vancomycin per Dr. Cecile Moreno    Current antibiotics:  Cefepime 2g IV q12h - day #2  Vancomycin - Pharmacy to dose - day #2   1g IV x1 9/8 03:00   1.5g IV q24h (to start 9/9)      Recent Labs     09/08/20  0228 09/09/20  0539   * 134*   K 3.9 4.1   CL 97* 99   CO2 23 24   BUN 27* 39*   CREATININE 1.5* 1.4*   GLUCOSE 131* 136*       Estimated Creatinine Clearance: 44 mL/min (A) (based on SCr of 1.4 mg/dL (H)). Lab Results   Component Value Date    WBC 13.1 (H) 09/09/2020    HGB 9.0 (L) 09/09/2020    HCT 27.3 (L) 09/09/2020    MCV 91.1 09/09/2020     09/09/2020       Lab Results   Component Value Date    PROTIME 15.0 (H) 09/08/2020    INR 1.29 (H) 09/08/2020       Height:  5' 3\" (160 cm)  Weight:  217 lb 6 oz (98.6 kg)    Culture results:  SARS-CoV-2 (9/8) = not detected  Wound (9/8) = 1+ GPC on gram stain. Final culture pending. Surgical cx (9/8) = sent    Prophylaxis:  VTE:  Enoxpaarin  GI:  Pantoprazole    Vaccination screening:  Pneumonia:  Up to date  Influenza:  Not yet available at [facility]    Assessment/Plan:  1)  LLE diabetic foot infection:  Cefepime + Vancomycin - day #2  · Cefepime -   Current dose remains appropriate based on indication and current renal function.   Will continue to monitor and adjust as needed per [facility] Renal Dose Adjustment Policy. · Vancomycin - Pharmacy to dose  · Continue 1.5g IV q24h. · Clinical condition will be monitored closely, and levels will be ordered as clinically indicated (likely with dose due 9/11 AM).     Please call with questions--  Thanks--  Josette Guerrero, PharmD, [address] [facility]  [phone] or [id number] ([facility])   9/9/2020 11:10 AM

## 2020-09-09 NOTE — ANESTHESIA PRE PROCEDURE
Department of Anesthesiology  Preprocedure Note       Name:  Alfredito Plasencia   Age:  75 y.o.  :  1955                                          MRN:  [id number]         Date:  2020      Surgeon: Iván Montez):  Naima Florence DPM    Procedure: Procedure(s):  LEFT FOOT INCISION AND DRAINAGE, EXTENSOR HALLUCIS LONGUS REPAIR WITH DELAYED PRIMARY CLOSURE    Medications prior to admission:   Prior to Admission medications    Medication Sig Start Date End Date Taking? Authorizing Provider   Multiple Vitamins-Minerals (THERAPEUTIC MULTIVITAMIN-MINERALS) tablet Take 1 tablet by mouth daily    Historical Provider, MD   Liraglutide (VICTOZA) 18 MG/3ML SOPN SC injection Inject 1.2 mg into the skin once a week     Historical Provider, MD   lisinopril (PRINIVIL;ZESTRIL) 20 MG tablet TAKE 1 TABLET BY MOUTH EVERY DAY 20   MADISON Tidwell - CNP   amLODIPine (NORVASC) 2.5 MG tablet TAKE 1 TABLET BY MOUTH EVERY DAY 8/3/20   Ana Laura Dumont MD   carvedilol (COREG) 25 MG tablet TAKE 1 TABLET BY MOUTH TWICE A DAY WITH MEALS 8/3/20   Ana Laura Dumont MD   Insulin Pen Needle (B-D UF III MINI PEN NEEDLES) 31G X 5 MM MISC Use daily with insulin injection. 20   Oscar Tejeda MD   citalopram (CELEXA) 40 MG tablet TAKE 1 TABLET BY MOUTH EVERY DAY 6/15/20   Oscar Tejeda MD   atorvastatin (LIPITOR) 80 MG tablet Take 1 tablet by mouth daily 20   Oscar Tejeda MD   gabapentin (NEURONTIN) 300 MG capsule Take 2 capsules by mouth 3 times daily for 90 days. 20  Oscar Tejeda MD   pantoprazole (PROTONIX) 40 MG tablet Take 1 tablet by mouth daily 4/15/20   Oscar Tejeda MD   blood glucose test strips (ONE TOUCH ULTRA TEST) strip Check FSBS 2-3 times daily. 3/5/20   Oscar Tejeda MD   Insulin Degludec (TRESIBA FLEXTOUCH) 200 UNIT/ML SOPN Inject 40 units into the skin once daily.  20   Oscar Tejeda MD   ONE TOUCH LANCETS MISC 1 each by Does not apply route 3 times daily 19  09/09/2020       CMP:   Lab Results   Component Value Date     09/09/2020    K 4.1 09/09/2020    CL 99 09/09/2020    CO2 24 09/09/2020    BUN 39 09/09/2020    CREATININE 1.4 09/09/2020    GFRAA 45 09/09/2020    AGRATIO 0.7 09/08/2020    LABGLOM 38 09/09/2020    GLUCOSE 136 09/09/2020    PROT 7.5 09/08/2020    CALCIUM 8.9 09/09/2020    BILITOT 0.5 09/08/2020    ALKPHOS 117 09/08/2020    AST 19 09/08/2020    ALT 13 09/08/2020       POC Tests:   Recent Labs     09/09/20  1740   POCGLU 121*       Coags:   Lab Results   Component Value Date    PROTIME 15.0 09/08/2020    INR 1.29 09/08/2020       HCG (If Applicable): No results found for: PREGTESTUR, PREGSERUM, HCG, HCGQUANT     ABGs:   Lab Results   Component Value Date    PHART 7.431 03/13/2014    PO2ART 86.8 03/13/2014    PRG5FRX 31.0 03/13/2014    CDV4ORH 20.2 03/13/2014    BEART -2.8 03/13/2014    J1KIDSXE 96.6 03/13/2014        Type & Screen (If Applicable):  No results found for: LABABO, LABRH    Drug/Infectious Status (If Applicable):  No results found for: HIV, HEPCAB    COVID-19 Screening (If Applicable):   Lab Results   Component Value Date    COVID19 Not Detected 09/08/2020    COVID19 NOT DETECTED 08/24/2020         Anesthesia Evaluation  Patient summary reviewed and Nursing notes reviewed   history of anesthetic complications: PONV. Airway: Mallampati: II  TM distance: >3 FB   Neck ROM: full  Mouth opening: > = 3 FB Dental: normal exam         Pulmonary:normal exam  breath sounds clear to auscultation  (+) pneumonia: resolved,  sleep apnea: on CPAP,            Patient did not smoke on day of surgery.                  Cardiovascular:    (+) hypertension: mild,     (-) past MI    ECG reviewed  Rhythm: regular  Rate: normal           Beta Blocker:  Not on Beta Blocker         Neuro/Psych:   (+) neuromuscular disease:, psychiatric history: stable with treatment            GI/Hepatic/Renal:   (+) GERD: well controlled,          ROS comment: Obese  S/p sleeve gastrectomy . Endo/Other:    (+) DiabetesType II DM, poorly controlled, , .                 Abdominal:       Abdomen: soft. Vascular: negative vascular ROS. Anesthesia Plan      MAC     ASA 3       Induction: intravenous. MIPS: Postoperative opioids intended and Prophylactic antiemetics administered. Anesthetic plan and risks discussed with patient.                       Dashawn Rascon DO   9/9/2020

## 2020-09-09 NOTE — CARE COORDINATION
Cm following, pt going back to OR on 9/10 for further I&D poss closure. Pt will need PT OT evals post-op for DC recs. Will cont to follow care needs.     Electronically signed by Krissy Parkinson RN on 9/9/2020 at 1:16 PM  [phone]

## 2020-09-09 NOTE — PROGRESS NOTES
Physical Therapy    Facility/Department: [facility] SURGERY  Initial Assessment / treatment / discharge    NAME: Jordan Theodore  : 1955  MRN: [id number]    Date of Service: 2020    Discharge Recommendations: Charley Jurado scored a 20/24 on the AM-PAC short mobility form. At this time, no further PT is recommended upon discharge. Recommend patient returns to prior setting with prior services. PT Equipment Recommendations  Equipment Needed: No    Assessment   Assessment: Pt is 75 y.o. female admit with diabetic foot infection, s/p L foot I&D. Pt demos steady gait with use of RW, fairly steady but significantly decreased theodora without use of walker. Rec pt use RW initially at d/c, particularly for longer distances. Pt verbalized understanding. Pt plans to return home alone at d/c. Has family she can call for assist if needed. Pt denies concerns about returning home. Pt likely near her recent functional baseline. Will sign off. Decision Making: Low Complexity  PT Education: PT Role;Gait Training;Functional Mobility Training;Weight-bearing Education;Transfer Training;Equipment  Patient Education: FWB with post op shoe, pt demonstrates good understanding  REQUIRES PT FOLLOW UP: No       Patient Diagnosis(es): The encounter diagnosis was Wound infection after surgery.      has a past medical history of Abnormal echocardiogram, Back pain, Cardiomyopathy ([address] Chipped tooth, Dental crown present, Depression, Depressive disorder, Diabetes mellitus ([address] Diabetic infection of right foot ([address] Diabetic ulcer of toe of left foot associated with type 2 diabetes mellitus, with fat layer exposed ([address] DVT (deep venous thrombosis) ([address] HTN (hypertension), Hx of blood clots, Ischemic cardiomyopathy, Kidney disease, Meniere's disease, Mixed hyperlipidemia, Nicotine abuse, NSTEMI (non-ST elevated myocardial infarction) ([address] ANGLE (obstructive sleep apnea), Osteomyelitis of ankle or foot, acute, left ([facility] Pneumonia, PONV (postoperative nausea and vomiting), Spinal abscess ([facility] Spinal epidural abscess, and Type II diabetes mellitus, uncontrolled ([facility] has a past surgical history that includes debridement (3/12/14); back surgery (3/2014); Hysterectomy (6/15/1999); Nasal septum surgery; Upper gastrointestinal endoscopy (N/A, 2/8/2019); Upper gastrointestinal endoscopy (N/A, 2/8/2019); Sleeve Gastrectomy (04/02/2019); Sleeve Gastrectomy (N/A, 4/2/2019); Hammer toe surgery (Left, 8/28/2020); and Foot Debridement (Left, 9/8/2020). Restrictions  Position Activity Restriction  Other position/activity restrictions: Full WB, post op shoe  Vision/Hearing  Vision: Impaired  Vision Exceptions: Wears glasses for reading  Hearing: Exceptions to [facility]  Hearing Exceptions: Bilateral hearing aid(not here today)     Subjective  General  Chart Reviewed: Yes  Patient assessed for rehabilitation services?: Yes  Additional Pertinent Hx: Admit 9/8 from OSH with fever, HA, foot pain; found to have L foot infection and septicemia; 9/8 L foot I&D; PMHx: L foot surgery 8/28/20, DM, HTN, spinal abscess, OM, NSTEMI, meniere's disease, DVT, cardiomyopathy, sleeve gastrectomy  Referring Practitioner: TOBIN Edmond  Diagnosis: diabetic foot infection  Subjective  Subjective: Pt found supine in bed. Pleasant and agreeable to PT.   Pain Screening  Patient Currently in Pain: Denies       Orientation  Orientation  Overall Orientation Status: Within Normal Limits  Social/Functional History  Social/Functional History  Lives With: Alone  Type of Home: Apartment  Home Layout: One level  Home Access: Stairs to enter with rails  Entrance Stairs - Number of Steps: ~13 NICK  Entrance Stairs - Rails: Both  Bathroom Shower/Tub: Tub/Shower unit  Bathroom Equipment: Grab bars in shower, Grab bars around toilet, Shower chair  Home Equipment: 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  Cognition        Objective          AROM RLE (degrees)  RLE AROM: WNL  AROM LLE (degrees)  LLE General AROM: hip/knee WNL; ankle NT 2* bandage  Strength RLE  Strength RLE: WFL  Strength LLE  Strength LLE: WFL        Bed mobility  Supine to Sit: Supervision  Scooting: Supervision  Transfers  Sit to Stand: Stand by assistance  Stand to sit: Stand by assistance  Ambulation  Ambulation?: Yes  Ambulation 1  Device: Rolling Walker  Other Apparatus: (L post op shoe)  Assistance: Contact guard assistance(progressing to SBA)  Quality of Gait: steady, appropriate theodora, flat foot contact on L  Distance: 140 ft  Comments: also amb 20 ft without AD with CGA with significantly decreased theodora and stride length, guarded posture, fairly steady overall with no overt LOB  Stairs/Curb  Stairs?: Yes(negotiated 4 steps with B handrails (similar to home) and CGA)     Balance  Sitting - Static: Good  Sitting - Dynamic: Good  Standing - Static: Good  Standing - Dynamic: Fair      Treatment included gait and transfer training, pt education.   Plan   Plan  Times per week: d/c acute PT  Safety Devices  Type of devices: Call light within reach, Chair alarm in place, Nurse notified, Left in chair, Gait belt(LEs elevated)    G-Code       OutComes Score                                                  AM-PAC Score  AM-PAC Inpatient Mobility Raw Score : 20 (09/09/20 1341)  AM-PAC Inpatient T-Scale Score : 47.67 (09/09/20 1341)  Mobility Inpatient CMS 0-100% Score: 35.83 (09/09/20 1341)  Mobility Inpatient CMS G-Code Modifier : CJ (09/09/20 1341)          Goals          Therapy Time   Individual Concurrent Group Co-treatment   Time In 1227         Time Out 1250         Minutes 23               Timed Code Treatment Minutes:  8    Total Treatment Minutes:  23    If patient

## 2020-09-09 NOTE — PROGRESS NOTES
Tele removed, per MD. [facility] notified. IVF stopped and IV saline locked.  Electronically signed by Migue Rivas RN on 9/9/2020 at 10:25 AM

## 2020-09-09 NOTE — ANESTHESIA PRE PROCEDURE
Department of Anesthesiology  Preprocedure Note       Name:  Sivan Reddy   Age:  75 y.o.  :  1955                                          MRN:  [id number]         Date:  2020      Surgeon: Staci Chao):  Radha Lewis DPM    Procedure: Procedure(s):  LEFT FOOT DEBRIDEMENT INCISION AND DRAINAGE    Medications prior to admission:   Prior to Admission medications    Medication Sig Start Date End Date Taking? Authorizing Provider   Multiple Vitamins-Minerals (THERAPEUTIC MULTIVITAMIN-MINERALS) tablet Take 1 tablet by mouth daily   Yes Historical Provider, MD   Liraglutide (VICTOZA) 18 MG/3ML SOPN SC injection Inject 1.2 mg into the skin once a week    Yes Historical Provider, MD   lisinopril (PRINIVIL;ZESTRIL) 20 MG tablet TAKE 1 TABLET BY MOUTH EVERY DAY 20  Yes Urszula Piedra, APRN - CNP   amLODIPine (NORVASC) 2.5 MG tablet TAKE 1 TABLET BY MOUTH EVERY DAY 8/3/20  Yes Rosanna Mejia MD   carvedilol (COREG) 25 MG tablet TAKE 1 TABLET BY MOUTH TWICE A DAY WITH MEALS 8/3/20  Yes Rosanna Mejia MD   citalopram (CELEXA) 40 MG tablet TAKE 1 TABLET BY MOUTH EVERY DAY 6/15/20  Yes Deedee Salazar MD   atorvastatin (LIPITOR) 80 MG tablet Take 1 tablet by mouth daily 20  Yes Deedee Salazar MD   gabapentin (NEURONTIN) 300 MG capsule Take 2 capsules by mouth 3 times daily for 90 days. 20 Yes Deedee Salazar MD   pantoprazole (PROTONIX) 40 MG tablet Take 1 tablet by mouth daily 4/15/20  Yes Deedee Salazar MD   Insulin Degludec (TRESIBA FLEXTOUCH) 200 UNIT/ML SOPN Inject 40 units into the skin once daily. 20  Yes Deedee Salazar MD   Cholecalciferol (VITAMIN D3) 2000 units CAPS Take 2,000 Units by mouth daily    Yes Historical Provider, MD   aspirin 81 MG chewable tablet Take 1 tablet by mouth daily. 3/20/14  Yes Dwain Worthington MD   Insulin Pen Needle (B-D UF III MINI PEN NEEDLES) 31G X 5 MM MISC Use daily with insulin injection.  20   Deedee Salazar MD blood glucose test strips (ONE TOUCH ULTRA TEST) strip Check FSBS 2-3 times daily.  3/5/20   Al Serna MD   ONE TOUCH LANCETS MISC 1 each by Does not apply route 3 times daily 2/14/19   Al Serna MD       Current medications:    Current Facility-Administered Medications   Medication Dose Route Frequency Provider Last Rate Last Dose    amLODIPine (NORVASC) tablet 2.5 mg  2.5 mg Oral Daily Puja Colvin MD   2.5 mg at 09/08/20 1343    aspirin chewable tablet 81 mg  81 mg Oral Daily Puja Colvin MD        atorvastatin (LIPITOR) tablet 80 mg  80 mg Oral Daily Puja Colvin MD   80 mg at 09/08/20 1343    carvedilol (COREG) tablet 6.25 mg  6.25 mg Oral BID WC Puja Colvin MD   6.25 mg at 09/08/20 1343    citalopram (CELEXA) tablet 40 mg  40 mg Oral Daily Puja Colvin MD   40 mg at 09/08/20 1343    lisinopril (PRINIVIL;ZESTRIL) tablet 20 mg  20 mg Oral Daily Puja Colvin MD   20 mg at 09/08/20 1343    pantoprazole (PROTONIX) tablet 40 mg  40 mg Oral Daily Puja Colvin MD   40 mg at 09/08/20 1344    sodium chloride flush 0.9 % injection 10 mL  10 mL Intravenous 2 times per day Puja Colvin MD        sodium chloride flush 0.9 % injection 10 mL  10 mL Intravenous PRN Puja Colvin MD        acetaminophen (TYLENOL) tablet 650 mg  650 mg Oral Q6H PRN Puja Colvin MD        Or   [address] [place] [address] acetaminophen (TYLENOL) suppository 650 mg  650 mg Rectal Q6H PRN Puja Colvin MD        polyethylene glycol (GLYCOLAX) packet 17 g  17 g Oral Daily PRN Puja Colvin MD        promethazine (PHENERGAN) tablet 12.5 mg  12.5 mg Oral Q6H PRN Puja Colvin MD        Or    ondansetron (ZOFRAN) injection 4 mg  4 mg Intravenous Q6H PRN Puja Colvin MD   4 mg at 09/08/20 1333    enoxaparin (LOVENOX) injection 30 mg  30 mg Subcutaneous Daily Puja Colvin MD   30 mg at 09/08/20 1344    cefepime (MAXIPIME) 2 g IVPB minibag  2 g Intravenous Q12H Puja Colvin MD   Stopped at 09/08/20 1713    0.9 % sodium chloride infusion   Intravenous Continuous Yuly Avilez  mL/hr at 09/08/20 1219      insulin glargine (LANTUS;BASAGLAR) injection pen 20 Units  20 Units Subcutaneous BID Yuly Avilez MD        insulin lispro (1 Unit Dial) 0-12 Units  0-12 Units Subcutaneous TID WC Yuly Avilez MD        insulin lispro (1 Unit Dial) 0-6 Units  0-6 Units Subcutaneous Nightly Yuly Avilez MD        glucose (GLUTOSE) 40 % oral gel 15 g  15 g Oral PRN Yuly Avilez MD        dextrose 50 % IV solution  12.5 g Intravenous PRN Yuly Avilez MD        glucagon (rDNA) injection 1 mg  1 mg Intramuscular PRN Yuly Avilez MD        dextrose 5 % solution  100 mL/hr Intravenous PRN Yuly Avilez MD        labetalol (NORMODYNE;TRANDATE) injection 5 mg  5 mg Intravenous Q6H PRN Yuly Avilez MD       [facility] [START ON 9/9/2020] vancomycin (VANCOCIN) 1,500 mg in dextrose 5 % 250 mL IVPB  1,500 mg Intravenous Q24H Yuly Avilez MD           Allergies:     Allergies   Allergen Reactions    Doxycycline Other (See Comments)     Yeast infection       Problem List:    Patient Active Problem List   Diagnosis Code    Meniere's disease H81.09    Peripheral neuropathy G62.9    Depression F32.9    Mixed hyperlipidemia E78.2    Essential hypertension I10    Obesity E66.9    ASNHL (asymmetrical sensorineural hearing loss) H90.5    Poor compliance with medication Z91.14    DM type 2 with diabetic peripheral neuropathy ([facility]) E11.42    Diabetic ulcer of toe of left foot associated with diabetes mellitus due to underlying condition, with fat layer exposed ([facility] P12.466, L97.522    CKD (chronic kidney disease) stage 3, GFR 30-59 ml/min ([facility]) N18.3    ANGLE (obstructive sleep apnea) G47.33    Chronic GERD K21.9    Morbid obesity with BMI of 45.0-49.9, adult ([facility]) E66.01, Z68.42    Incisional hernia, without obstruction or gangrene K43.2    S/P laparoscopic sleeve gastrectomy Z98.84    Anemia D64.9    Ulcer of toe of right foot, with fat layer exposed ([address] L97.512    Ulcer of toe of left foot, with fat layer exposed ([address] L97.522    Diabetic foot infection ([address] E11.628, L08.9       Past Medical History:        Diagnosis Date    Abnormal echocardiogram     25% on 3/11/14 and 50% on 3/19/14    Back pain     Cardiomyopathy ([address]     EF was 50% on 3/19/14    Chipped tooth     lower left    Dental crown present     veneers    Depression     Depressive disorder 8/13/2014    Diabetes mellitus ([address]     Diabetic infection of right foot ([address] 4/15/2015    Diabetic ulcer of toe of left foot associated with type 2 diabetes mellitus, with fat layer exposed ([address] 4/10/2018    Pt slipped in hot tub latter part of February and has not healed since despite topical treatment    DVT (deep venous thrombosis) ([address]     HTN (hypertension)     Hx of blood clots 2016    left leg    Ischemic cardiomyopathy 4/15/2015    Kidney disease     CHRONIC STAGE 3    Meniere's disease     Mixed hyperlipidemia 3/7/2016    Nicotine abuse     NSTEMI (non-ST elevated myocardial infarction) ([address] 3/13/2014    ANGLE (obstructive sleep apnea)     Osteomyelitis of ankle or foot, acute, left ([address] 6/4/2018    Pneumonia     PONV (postoperative nausea and vomiting)     nausea    Spinal abscess ([address] 3/2014    epidural abscess    Spinal epidural abscess 3/13/2014    Type II diabetes mellitus, uncontrolled ([address] 08/13/2014       Past Surgical History:        Procedure Laterality Date    BACK SURGERY  3/2014    DEBRIDEMENT  3/12/14    extensive debridement of bone/muscle and paraspinal empyema    HAMMER TOE SURGERY Left 8/28/2020    ON THE LEFT: HALLUX INTERPHALANGEAL JOINT ARTHRODESIS Y, WOUND CLOSURE, FLEXOR TENOTOMY 4TH AND 5TH DIGITS, TENOTOMY AND CAPSULOTOMY 2ND DIGIT performed by Janette Washington DPM at /[facility] 10  6/15/1999    complete-think right ovary in.   [facility] [address] [place] [address] GASTRECTOMY  04/02/2019    ROBOTIC ASSISTED LAPAROSCOPIC SLEEVE GASTRECTOMY, LAPAROSCOPIC REDUCIBLE INCISIONAL HERNIA REPAIR     SLEEVE GASTRECTOMY N/A 2019    ROBOTIC ASSISTED LAPAROSCOPIC SLEEVE GASTRECTOMY, LAPAROSCOPIC REDUCIBLE INCISIONAL HERNIA REPAIR performed by Rowena Wilson DO at [address] N/A 2019    EGD BIOPSY performed by Rowena Wilson DO at [address] [place] [address] N/A 2019    EGD POLYP ABLATION/OTHER performed by Rowena Wilson DO at [address] History:    Social History     Tobacco Use    Smoking status: Former Smoker     Packs/day: 0.50     Years: 10.00     Pack years: 5.00     Last attempt to quit: 11/10/2013     Years since quittin.8    Smokeless tobacco: Never Used   Substance Use Topics    Alcohol use: Not Currently     Alcohol/week: 0.0 standard drinks     Comment: occasionally                                Counseling given: Not Answered      Vital Signs (Current):   Vitals:    20 1107 20 1335 20 1611 20 1936   BP: (!) 203/76 (!) 193/110 (!) 150/67 (!) 147/60   Pulse: 84 83 75 72   Resp: 16 16 16 16   Temp: 100.8 °F (38.2 °C) 100.3 °F (37.9 °C) 99.3 °F (37.4 °C) 98.7 °F (37.1 °C)   TempSrc: Oral Oral Oral Oral   SpO2: 98% 97% 94% 97%   Weight:       Height:                                                  BP Readings from Last 3 Encounters:   20 (!) 147/60   20 (!) 191/64   20 (!) 115/58       NPO Status: Time of last liquid consumption: 0001                        Time of last solid consumption: 0001                        Date of last liquid consumption: 20                        Date of last solid food consumption: 20    BMI:   Wt Readings from Last 3 Encounters:   20 208 lb (94.3 kg)   20 218 lb 3.2 oz (99 kg)   20 212 lb (96.2 kg)     Body mass index is 36.85 kg/m².     CBC:   Lab Results   Component Value Date    WBC 19.0 2020    RBC 3.20 2020    HGB 9.8 09/08/2020    HCT 28.9 09/08/2020    MCV 90.3 09/08/2020    RDW 13.1 09/08/2020     09/08/2020       CMP:   Lab Results   Component Value Date     09/08/2020    K 3.9 09/08/2020    CL 97 09/08/2020    CO2 23 09/08/2020    BUN 27 09/08/2020    CREATININE 1.5 09/08/2020    GFRAA 42 09/08/2020    AGRATIO 0.7 09/08/2020    LABGLOM 35 09/08/2020    GLUCOSE 131 09/08/2020    PROT 7.5 09/08/2020    CALCIUM 9.5 09/08/2020    BILITOT 0.5 09/08/2020    ALKPHOS 117 09/08/2020    AST 19 09/08/2020    ALT 13 09/08/2020       POC Tests:   Recent Labs     09/08/20  1948   POCGLU 145*       Coags:   Lab Results   Component Value Date    PROTIME 15.0 09/08/2020    INR 1.29 09/08/2020       HCG (If Applicable): No results found for: PREGTESTUR, PREGSERUM, HCG, HCGQUANT     ABGs:   Lab Results   Component Value Date    PHART 7.431 03/13/2014    PO2ART 86.8 03/13/2014    VLR9OSS 31.0 03/13/2014    DWE4PXV 20.2 03/13/2014    BEART -2.8 03/13/2014    K2XIAGXA 96.6 03/13/2014        Type & Screen (If Applicable):  No results found for: LABABO, LABRH    Drug/Infectious Status (If Applicable):  No results found for: HIV, HEPCAB    COVID-19 Screening (If Applicable):   Lab Results   Component Value Date    COVID19 Not Detected 09/08/2020    COVID19 NOT DETECTED 08/24/2020         Anesthesia Evaluation  Patient summary reviewed and Nursing notes reviewed   history of anesthetic complications: PONV. Airway: Mallampati: II  TM distance: >3 FB   Neck ROM: full  Mouth opening: > = 3 FB Dental: normal exam         Pulmonary:normal exam  breath sounds clear to auscultation  (+) pneumonia: resolved,  sleep apnea: on CPAP,            Patient did not smoke on day of surgery.                  Cardiovascular:    (+) hypertension: mild,     (-) past MI    ECG reviewed  Rhythm: regular  Rate: normal           Beta Blocker:  Not on Beta Blocker         Neuro/Psych:   (+) neuromuscular disease:, psychiatric history: stable with treatment GI/Hepatic/Renal:   (+) GERD: well controlled,           Endo/Other:    (+) DiabetesType II DM, no interval change, , .                 Abdominal:       Abdomen: soft. Vascular: negative vascular ROS. Anesthesia Plan      MAC     ASA 3 - emergent       Induction: intravenous. MIPS: Postoperative opioids intended and Prophylactic antiemetics administered. Anesthetic plan and risks discussed with patient. Use of blood products discussed with patient whom. Plan discussed with attending and CRNA.     Attending anesthesiologist reviewed and agrees with Pre Eval content              Shaggy Pisano DO   9/8/2020

## 2020-09-09 NOTE — PROGRESS NOTES
Physician Progress Note      Checo Cast  [facility] #:                  [id number]  :                       1955  ADMIT DATE:       2020 9:42 AM  DISCH DATE:  RESPONDING  PROVIDER #:        Jennifer Gutierres MD          QUERY TEXT:    Dear attending provider,    Pt admitted with diabetic foot infection. Noted documentation of  \"Left foot   wound dehiscence noted from prior hallux surgery located at the IPJ. \" on    by Podiatry consultant. If possible, please document in progress notes and   discharge summary:    The medical record reflects the following:  Risk Factors: left foot wound dehiscence, recent prior hallux surgery  Clinical Indicators:  per Podiatry: focal calor noted Left foot from distal   tuft of hallux to midfoot. +1 Pitting forefoot edema noted LLE  Treatment: Podiatry consult, I&D  Options provided:  -- left foot wound dehiscence from prior hallux surgery confirmed POA  -- left foot wound dehiscence from prior hallux surgery ruled out  -- Other - I will add my own diagnosis  -- Disagree - Not applicable / Not valid  -- Disagree - Clinically unable to determine / Unknown  -- Refer to Clinical Documentation Reviewer    PROVIDER RESPONSE TEXT:    The diagnosis of wound dehiscence from prior hallux surgery was confirmed as   POA.     Query created by: Zeny Mcclelland on 2020 3:17 PM      Electronically signed by:  Jennifer Gutierres MD 2020 2:23 PM

## 2020-09-09 NOTE — ANESTHESIA POSTPROCEDURE EVALUATION
Department of Anesthesiology  Postprocedure Note    Patient: Otto Lewis  MRN: [id number]  YOB: 1955  Date of evaluation: 9/8/2020  Time:  9:45 PM     Procedure Summary     Date:  09/08/20 Room / Location:  [address] [place] [address] [address] [place] [address] / [facility]    Anesthesia Start:  2050 Anesthesia Stop:  2145    Procedure:  LEFT FOOT DEBRIDEMENT INCISION AND DRAINAGE (Left ) Diagnosis:  (INCISION AND DRAINAGE LEFT FOOT)    Surgeon:  Tarah Arreola DPM Responsible Provider:  Yamilet Reddy DO    Anesthesia Type:  MAC ASA Status:  3 - Emergent          Anesthesia Type: MAC    Steven Phase I:      Steven Phase II:      Last vitals: Reviewed and per EMR flowsheets. Anesthesia Post Evaluation    Patient location during evaluation: bedside  Patient participation: complete - patient participated  Level of consciousness: awake  Pain score: 0  Airway patency: patent  Nausea & Vomiting: no nausea and no vomiting  Complications: no  Cardiovascular status: blood pressure returned to baseline  Respiratory status: acceptable  Hydration status: euvolemic  Comments:  To floor - report given patient tolerated well

## 2020-09-09 NOTE — OP NOTE
Operative Note        Patient: Sienna Newby  YOB: 1955  MRN: [id number]    Date of Procedure: 9/8/2020    Pre-Op Diagnosis: CELLULITIS, LEFT FOOT; ABSCESS LEFT FOOT     Post-Op Diagnosis: Same       Procedure(s):  LEFT FOOT DEBRIDEMENT INCISION AND DRAINAGE    Surgeon(s):  Jessica Lima DPM    Assistant:  Resident: Shamar Jenkins DPM    Anesthesia: Choice    Injectables: Preop 10 mL 1% Lidocaine plain, Postop 0.5% Marcaine plain    Hemostasis: Anatomical dissection    Materials: Vancomycin beads, 3-0 Nylon      Estimated Blood Loss: less than 20     Complications: None    Specimens:   ID Type Source Tests Collected by Time Destination   1 : 1. fluid left halix Body Fluid Fluid CULTURE, ANAEROBIC, CULTURE, FUNGUS, CULTURE, BODY FLUID, CULTURE WITH SMEAR, ACID FAST BACILLIUS Jessica Lima DPM 9/8/2020 2126      Implants:  * No implants in log *      Drains: * No LDAs found *    Findings: Intraop finding 10 mL purulent drainage that tracking via extensor hallucis longus tendon sheath and stopped midfoot near navicular bone. No tracking or tunneling noted. INDICATIONS FOR PROCEDURE: Patient S/P hallux arthroplasty with flexor tenotomy of digits 2, 4, and 5 (DOS 8/28/2020). Patient had her first postop visit last Thursday, 4/1/8765 with no complications to the surgical site. It was until this Saturday when she noticed increased swelling with the drainage coming from the dorsal aspect of the left hallux. It was not until Tuesday morning when the patient was concerned with the increased drainage and the intermittent fevers that she went to the emergency department. X-rays were revealed to left foot with no subcutaneous emphysema noted. However clinically during physical examination noted 10 mL of purulent drainage coming from dehisced wound from prior surgical incision.   Due to concern of intermittent fever, increased redness and purulent drainage it was warranted for further exploration of left foot I&D later on that night (9/8/2020). This patient has signs and symptoms clinically  consistent with the above mentioned preoperative diagnosis. It was determined that the patient would benefit from surgical intervention. All potential risks, benefits, and complications were discussed with the patient prior to the scheduling of surgery. All the patient's questions were answered and no guarantees were given. The patient wished to proceed with surgery, and informed written consent was obtained. Detail of Procedure: The patient was brought from the preoperative area and placed on the operating table in the supine position. Following induction of monitored anesthesia care with local block consisting of a total of 10 mL of 1% lidocaine plain to anesthetize the patients surgical limb in an Good block fashion. The patients Left lower extremity was then scrubbed, prepped and draped in the usual sterile manner. A time-out was performed. The patient, procedure and operative site were confirmed and the following procedure was then performed. Detail procedure #1 left foot incision and drainage: Attention was directed over the left hallux dehisced wound. Using a #15 blade a sharp linear dissection was made using the same surgical incision from prior surgery approximately 3 cm in length down to subcutaneous tissue. Great care was taken to identify retract all neurovascular structures. All venous bleeders were electrocauterized as necessary. It should be noted we encountered another 5 cc of purulent drainage. Next, surgical wound culture was taken at the inter-phalangeal joint and was passed from the operating field to the back table and sent off for microbiology for cultures and sensitivities. Next, the incision was deepened using a combination of sharp and blunt dissection down to extensor hallucis longus tendon.   It should be noted the most distal aspect of the EHL tendon was slightly friable, but still attached to its insertion point. Next, we compressed the foot and noted slight purulent drainage, and more proximal of the midfoot. Next, using a #15 blade we extended our prior incision another 2 cm. Next, using a combination of blunt and sharp dissection the incision was deepened down to the extent EHL and encountered another 5 mL of purulent drainage. After further compression and exploration no more purulent drainage was encountered. No more tunneling or tracking was noted to the dorsal aspect of the foot. Next, the surgical incision was irrigated with copious amounts of normal saline with gentamicin mixed via pulse lavage. Next, vancomycin beads were placed into the surgical site. Next, the skin was reapproximated using 3-0 nylon to aid in decrease in skin contraction. End of procedure: At this time, a local anesthetic was injected about the incision sites consisting of 10 mL of 0.5% Marcaine plain, for the patient's postoperative comfort. A soft sterile dressing was applied consisting of gauze, cast padding, Kerlix and Ace bandage. The patient tolerated the procedure and anesthesia well. The patient left the OR with vital signs stable and vascular status intact to all remaining digits of the left foot. Following a period of postoperative monitoring the patient will be sent back to his in-house room where he will continue to receive IV antibiotic therapy per the recommendations of Dr. Víctor Tim. Once the wound is converted to clean he will be brought back to the operating room for further debridement, extensor hallucis longus repair, and delayed primary wound closure. Attending was present during entire case and all counts were correct.     Dictated on behalf of TOBIN William DPM   Podiatric Resident, PGY-2  Pager: Perfect serve    Dr. Patricia Munoz, [facility]   Office: [phone]  Cell: [phone]

## 2020-09-09 NOTE — PROGRESS NOTES
Pt alert and oriented. VSS. Left foot dressing clean, dry and intact. Pt verbalizes numbness in toe. CFT <3 seconds. Post op shoe in placed. Pt currently up to chair. Bed in lowest position, call light in reach, bed alarm on. Will continue to monitor.  Electronically signed by Davonte Harper RN on 9/9/2020 at 10:31 AM

## 2020-09-09 NOTE — PROGRESS NOTES
Podiatric Surgery Daily Progress Note      Admit Date: 9/8/2020                           Code:Full Code    Patient seen and examined, labs and records reviewed    Subjective:     Patient seen and examined at bedside this a.m with attending. Patient denies any overnight acute event. Patient denies f/c/n/v/sob/cp. Patient denies any left foot pain and is feeling well overall. Objective     BP (!) 164/72   Pulse 73   Temp 98.4 °F (36.9 °C) (Oral)   Resp 18   Ht 5' 3\" (1.6 m)   Wt 217 lb 6 oz (98.6 kg)   LMP 06/15/1999   SpO2 98%   BMI 38.51 kg/m²      I/O:    Intake/Output Summary (Last 24 hours) at 9/9/2020 0821  Last data filed at 9/9/2020 0616  Gross per 24 hour   Intake 50 ml   Output 159 ml   Net -109 ml              Wt Readings from Last 3 Encounters:   09/09/20 217 lb 6 oz (98.6 kg)   09/08/20 218 lb 3.2 oz (99 kg)   08/28/20 212 lb (96.2 kg)       LABS:    Recent Labs     09/08/20 0228 09/09/20  0539   WBC 19.0* 13.1*   HGB 9.8* 9.0*   HCT 28.9* 27.3*    369        Recent Labs     09/09/20  0539   *   K 4.1   CL 99   CO2 24   BUN 39*   CREATININE 1.4*        Recent Labs     09/08/20 0228 09/08/20  1146   PROT 7.5  --    INR  --  1.29*             LOWER EXTREMITY EXAMINATION    Dressing to Left LE intact. No strikethrough drainage noted to the external dressing. Moderate strikethrough sanguinous drainage noted to the internal dressing layer.       VASCULAR: DP and PT pulses are palpable b/l. CFT is brisk to the digits of the foot b/l. Skin temperature is warm to luke from proximal to distal with improving focal calor noted. Nonpitting forefoot edema noted LLE. No pain with calf compression b/l.     NEUROLOGIC: Gross and epicritic sensation is diminished b/l. Protective sensation is diminished at all pedal sites b/l. DERMATOLOGIC: Dermatological changes noted B/L LE.   Left foot linear surgical incision approximately 4 cm with 3 intact retention sutures and vancomycin beads and wound bed. Erythema and calor improving clinically since admission. No fluctuance, crepitus, or malodor noted. Slight purulent drainage noted at the distal aspect of hallux. Picture taken on 9/9/202020    Patient gave verbal consent for the picture taken at today's visit. MUSCULOSKELETAL: Muscle strength is 5/5 for all pedal groups tested. No pain with palpation of the foot or ankle b/l. Ankle joint ROM is decreased in dorsiflexion with the knee extended. IMAGING:  Impression    Stable postsurgical changes of the 1st proximal and distal phalanges. Diffuse soft tissue edema surrounding the 1st digit.  No subcutaneous air.               ASSESSMENT/PLAN    S/P left foot incision and drainage (DOS 9/8/2020)  Diabetic foot infection; left foot  Full-thickness ulceration dorsal aspect; left hallux  S/P HIP arthroplasty, wound closure, tenotomy flexors second, fourth, and fifth digit; left foot (DOS 8/28/2020)  Diabetes mellitus with peripheral neuropathy       -Patient was seen and examined this afternoon at bedside with attending.  -Hypertensive, afebrile, leukocytosis 13.1 but downtrending since admission. -X-rays reviewed left foot; see impression above.  -Left lower extremity dressed with dry sterile gauze, and Ace bandage. -. 1/  -Hemoglobin A1c 13.1%  -Wound culture left hallux; 1+ Gram positive cocci  -Surgical wound culture left hallux; 1+ Gram positive cocci  -ID following; continue IV vancomycin and cefepime. Anticipating PICC line or chest line for outpatient IV antibiotics. -Weightbearing as tolerated with surgical shoe and with assistance. -Instructed patient to elevate B/L lower extremity at all times to aid in edema control.  -Patient was consented for left foot incision and drainage, extensor hallucis longus repair with delayed primary closure procedure. All risk, benefits and potiental complications of the procedure were disccussed and all questions answered.  We discussed the relative risks, benefits, alternatives, complications involved with the planned procedure. We also explained to the patient that residents will be present for the case. The patient understands, consents to the above and would like to proceed with the planned procedure. The patient's written consent has been signed and is in the chart. No guarantees were made. All questions were answered to the patient's satisfaction.     -Patient will be n.p.o. tomorrow a.m.  -Lovenox held in the AM.  -Tentative OR schedule Thursday evening after 5 PM  -Patient will need medical clearance prior to surgery. DISPO: S/P left foot I&D. Procedure was not definitive. Will go back to the OR this Thursday evening for repeat I&D/washout, EHL repair with delayed primary closure Left foot. We will continue to monitor patient while in house. Patient seen at bedside with Dr. Nicho Falcon. Leland Woods DPM   Podiatric Resident, PGY-2  Pager: Perfect serve  9/9/2020  8:21 AM     Agree with residents above assessment and plan. OR tomorrow evening for further washout and closure. Patient will likely be cleared for discharge Friday morning with PO antibiotics.      Dr. Kenyetta Ramos, [facility]   Office: [phone]  Cell: [phone]

## 2020-09-09 NOTE — BRIEF OP NOTE
Brief Postoperative Note      Patient: Rivka Maxwell  YOB: 1955  MRN: [id number]    Date of Procedure: 9/8/2020    Pre-Op Diagnosis: INCISION AND DRAINAGE LEFT FOOT    Post-Op Diagnosis: Same       Procedure(s):  LEFT FOOT DEBRIDEMENT INCISION AND DRAINAGE    Surgeon(s):  Mary Juárez DPM    Assistant:  Resident: Manuel Sheth DPM    Anesthesia: Choice    Injectables: Preop 10 mL 1% Lidocaine plain, Postop 0.5% Marcaine plain    Hemostasis: Anatomical dissection,    Materials: Vancomycin beads, 3-0 Nylon      Estimated Blood Loss: less than 20     Complications: None      Specimens:   ID Type Source Tests Collected by Time Destination   1 : 1. fluid left halix Body Fluid Fluid CULTURE, ANAEROBIC, CULTURE, FUNGUS, CULTURE, BODY FLUID, CULTURE WITH SMEAR, ACID FAST BACILLIUS Mary Juárez DPM 9/8/2020 2126        Implants:  * No implants in log *      Drains: * No LDAs found *    Findings: Intraop finding 10 mL purulent drainage that tracking via extensor hallucis longus tendon sheath and stopped midfoot near navicular bone. No tracking or tunneling noted. DISPO: S/P Left foot I&D. Procedure was not definitive. Tentative OR time this Thursday for another Left foot I&D, extensor hallucis longus repair with possible delayed primary closure. PT/OT consulted. Continue broad spectrum IV antibiotics. Will f/u on wound cultures and ID recommendations.       Electronically signed by Manuel Sheth DPM on 9/8/2020 at 9:32 PM

## 2020-09-09 NOTE — PLAN OF CARE
Problem: Skin Integrity:  Goal: Skin integrity will be maintained  Description: Skin integrity will be maintained  9/9/2020 1838 by Luh Kirkpatrick RN  Outcome: Ongoing  Note: Pt's skin was assessed this shift and was found to be intact. Pt is able to turn themselves as needed and encouraged to turn frequently while awake. Pt is continent and is rarely moist. No evidence of any skin breakdown so far this shift. Will continue to monitor. 9/9/2020 1838 by Luh Kirkpatrick RN  Outcome: Ongoing  Goal: Skin integrity will improve  Description: Skin integrity will improve  9/9/2020 1838 by Luh Kirkpatrick RN  Outcome: Ongoing  9/9/2020 1838 by Luh Kirkpatrick RN  Outcome: Ongoing     Problem: Tissue Perfusion:  Goal: Ability to maintain adequate tissue perfusion will improve  Description: Ability to maintain adequate tissue perfusion will improve  9/9/2020 1838 by Luh Kirkpatrick RN  Outcome: Ongoing  Note: Pt c/o numbness in left foot d/t neuropathy. CFT <3 seconds.  Will continue to monitor  9/9/2020 1838 by Luh Kirkpatrick RN  Outcome: Ongoing

## 2020-09-09 NOTE — PROGRESS NOTES
Progress Note        Date:9/9/2020       Room:5305/5305-01  Patient Name:Swetha Jurado     YOB: 1955     Age:64 y.o. Chief Complaint   Patient presents with    Post-op Problem            Subjective   Interval History Status: improved. Denies any foot pain  Afebrile overnight  No nausea, vomiting  No cp , no sob. Review of Systems   ROS as mentioned above.     Medications   Scheduled Meds:    amLODIPine  2.5 mg Oral Daily    aspirin  81 mg Oral Daily    atorvastatin  80 mg Oral Daily    carvedilol  6.25 mg Oral BID WC    citalopram  40 mg Oral Daily    lisinopril  20 mg Oral Daily    pantoprazole  40 mg Oral Daily    sodium chloride flush  10 mL Intravenous 2 times per day    enoxaparin  30 mg Subcutaneous Daily    cefepime  2 g Intravenous Q12H    insulin glargine  20 Units Subcutaneous BID    insulin lispro  0-12 Units Subcutaneous TID WC    insulin lispro  0-6 Units Subcutaneous Nightly    vancomycin  1,500 mg Intravenous Q24H     Continuous Infusions:    sodium chloride 100 mL/hr at 09/08/20 2206    dextrose       PRN Meds: sodium chloride flush, acetaminophen **OR** acetaminophen, polyethylene glycol, promethazine **OR** ondansetron, glucose, dextrose, glucagon (rDNA), dextrose, labetalol    Past History    Past Medical History:   has a past medical history of Abnormal echocardiogram, Back pain, Cardiomyopathy ([address] Chipped tooth, Dental crown present, Depression, Depressive disorder, Diabetes mellitus ([address] Diabetic infection of right foot ([address] Diabetic ulcer of toe of left foot associated with type 2 diabetes mellitus, with fat layer exposed ([address] DVT (deep venous thrombosis) ([facility] [address] HTN (hypertension), Hx of blood clots, Ischemic cardiomyopathy, Kidney disease, Meniere's disease, Mixed hyperlipidemia, Nicotine abuse, NSTEMI (non-ST elevated myocardial infarction) ([address] ANGLE (obstructive sleep apnea), Osteomyelitis of ankle or foot, acute, left ([address] Pneumonia, PONV (postoperative nausea and vomiting), Spinal abscess ([address] Spinal epidural abscess, and Type II diabetes mellitus, uncontrolled ([facility] [address] Social History:   reports that she quit smoking about 6 years ago. She has a 5.00 pack-year smoking history. She has never used smokeless tobacco. She reports previous alcohol use. She reports that she does not use drugs. Family History:   Family History   Problem Relation Age of Onset    High Blood Pressure Mother     Cancer Mother     Rheum Arthritis Mother     COPD Father     Cancer Father     Osteoarthritis Neg Hx     Asthma Neg Hx     Breast Cancer Neg Hx     Diabetes Neg Hx     Heart Failure Neg Hx     High Cholesterol Neg Hx     Hypertension Neg Hx     Migraines Neg Hx     Ovarian Cancer Neg Hx     Rashes/Skin Problems Neg Hx     Seizures Neg Hx     Stroke Neg Hx     Thyroid Disease Neg Hx        Physical Examination      Vitals:  BP (!) 164/72   Pulse 73   Temp 98.4 °F (36.9 °C) (Oral)   Resp 18   Ht 5' 3\" (1.6 m)   Wt 217 lb 6 oz (98.6 kg)   LMP 06/15/1999   SpO2 98%   BMI 38.51 kg/m²   Temp (24hrs), Av.1 °F (37.3 °C), Min:97.8 °F (36.6 °C), Max:100.8 °F (38.2 °C)      I/O (24Hr): Intake/Output Summary (Last 24 hours) at 2020 1000  Last data filed at 2020 0616  Gross per 24 hour   Intake 50 ml   Output 159 ml   Net -109 ml       CONSTITUTIONAL:  awake, alert, cooperative, no apparent distress, and appears stated age  EYES:  Lids and lashes normal, pupils equal, round and reactive to light, extra ocular muscles intact, sclera clear, conjunctiva normal  ENT:  Normocephalic, without obvious abnormality, atraumatic, sinuses nontender on palpation, external ears without lesions, oral pharynx with moist mucus membranes, tonsils without erythema or exudates, gums normal and good dentition.   NECK:  Supple, symmetrical, trachea midline, no adenopathy, thyroid symmetric, not enlarged and no tenderness, skin normal  LUNGS: AM  09/09/20

## 2020-09-10 ENCOUNTER — ANESTHESIA (OUTPATIENT)
Dept: OPERATING ROOM | Age: 65
DRG: 857 | End: 2020-09-10
Payer: MEDICARE

## 2020-09-10 VITALS
OXYGEN SATURATION: 98 % | SYSTOLIC BLOOD PRESSURE: 129 MMHG | RESPIRATION RATE: 9 BRPM | DIASTOLIC BLOOD PRESSURE: 60 MMHG | TEMPERATURE: 96.8 F

## 2020-09-10 PROBLEM — T81.49XA WOUND INFECTION AFTER SURGERY: Status: ACTIVE | Noted: 2018-07-06

## 2020-09-10 LAB
ALBUMIN SERPL-MCNC: 2.9 G/DL (ref 3.4–5)
ANION GAP SERPL CALCULATED.3IONS-SCNC: 12 MMOL/L (ref 3–16)
BUN BLDV-MCNC: 34 MG/DL (ref 7–20)
CALCIUM SERPL-MCNC: 9.2 MG/DL (ref 8.3–10.6)
CHLORIDE BLD-SCNC: 101 MMOL/L (ref 99–110)
CO2: 23 MMOL/L (ref 21–32)
CREAT SERPL-MCNC: 1.3 MG/DL (ref 0.6–1.2)
GFR AFRICAN AMERICAN: 49
GFR NON-AFRICAN AMERICAN: 41
GLUCOSE BLD-MCNC: 118 MG/DL (ref 70–99)
GLUCOSE BLD-MCNC: 120 MG/DL (ref 70–99)
GLUCOSE BLD-MCNC: 148 MG/DL (ref 70–99)
GLUCOSE BLD-MCNC: 150 MG/DL (ref 70–99)
GLUCOSE BLD-MCNC: 150 MG/DL (ref 70–99)
GLUCOSE BLD-MCNC: 159 MG/DL (ref 70–99)
GRAM STAIN RESULT: ABNORMAL
ORGANISM: ABNORMAL
PERFORMED ON: ABNORMAL
PHOSPHORUS: 2.5 MG/DL (ref 2.5–4.9)
POTASSIUM SERPL-SCNC: 4.2 MMOL/L (ref 3.5–5.1)
SODIUM BLD-SCNC: 136 MMOL/L (ref 136–145)
WOUND/ABSCESS: ABNORMAL
WOUND/ABSCESS: ABNORMAL

## 2020-09-10 PROCEDURE — 6360000002 HC RX W HCPCS: Performed by: PODIATRIST

## 2020-09-10 PROCEDURE — 36415 COLL VENOUS BLD VENIPUNCTURE: CPT

## 2020-09-10 PROCEDURE — 6370000000 HC RX 637 (ALT 250 FOR IP): Performed by: PODIATRIST

## 2020-09-10 PROCEDURE — 3600000014 HC SURGERY LEVEL 4 ADDTL 15MIN: Performed by: PODIATRIST

## 2020-09-10 PROCEDURE — 97535 SELF CARE MNGMENT TRAINING: CPT

## 2020-09-10 PROCEDURE — 2500000003 HC RX 250 WO HCPCS: Performed by: PODIATRIST

## 2020-09-10 PROCEDURE — 2580000003 HC RX 258: Performed by: PODIATRIST

## 2020-09-10 PROCEDURE — 3600000004 HC SURGERY LEVEL 4 BASE: Performed by: PODIATRIST

## 2020-09-10 PROCEDURE — 3700000001 HC ADD 15 MINUTES (ANESTHESIA): Performed by: PODIATRIST

## 2020-09-10 PROCEDURE — 1200000000 HC SEMI PRIVATE

## 2020-09-10 PROCEDURE — 0LQW0ZZ REPAIR LEFT FOOT TENDON, OPEN APPROACH: ICD-10-PCS | Performed by: PODIATRIST

## 2020-09-10 PROCEDURE — 6360000002 HC RX W HCPCS: Performed by: NURSE ANESTHETIST, CERTIFIED REGISTERED

## 2020-09-10 PROCEDURE — 2580000003 HC RX 258: Performed by: NURSE ANESTHETIST, CERTIFIED REGISTERED

## 2020-09-10 PROCEDURE — 3700000000 HC ANESTHESIA ATTENDED CARE: Performed by: PODIATRIST

## 2020-09-10 PROCEDURE — 7100000000 HC PACU RECOVERY - FIRST 15 MIN: Performed by: PODIATRIST

## 2020-09-10 PROCEDURE — 80069 RENAL FUNCTION PANEL: CPT

## 2020-09-10 PROCEDURE — 6360000002 HC RX W HCPCS: Performed by: INTERNAL MEDICINE

## 2020-09-10 PROCEDURE — 2709999900 HC NON-CHARGEABLE SUPPLY: Performed by: PODIATRIST

## 2020-09-10 PROCEDURE — 0LBW0ZZ EXCISION OF LEFT FOOT TENDON, OPEN APPROACH: ICD-10-PCS | Performed by: PODIATRIST

## 2020-09-10 PROCEDURE — 99232 SBSQ HOSP IP/OBS MODERATE 35: CPT | Performed by: INTERNAL MEDICINE

## 2020-09-10 PROCEDURE — 2580000003 HC RX 258: Performed by: INTERNAL MEDICINE

## 2020-09-10 RX ORDER — PROCHLORPERAZINE EDISYLATE 5 MG/ML
5 INJECTION INTRAMUSCULAR; INTRAVENOUS
Status: DISCONTINUED | OUTPATIENT
Start: 2020-09-10 | End: 2020-09-10 | Stop reason: HOSPADM

## 2020-09-10 RX ORDER — DIPHENHYDRAMINE HYDROCHLORIDE 50 MG/ML
12.5 INJECTION INTRAMUSCULAR; INTRAVENOUS
Status: DISCONTINUED | OUTPATIENT
Start: 2020-09-10 | End: 2020-09-10 | Stop reason: HOSPADM

## 2020-09-10 RX ORDER — ONDANSETRON 2 MG/ML
4 INJECTION INTRAMUSCULAR; INTRAVENOUS
Status: DISCONTINUED | OUTPATIENT
Start: 2020-09-10 | End: 2020-09-10 | Stop reason: HOSPADM

## 2020-09-10 RX ORDER — MEPERIDINE HYDROCHLORIDE 25 MG/ML
12.5 INJECTION INTRAMUSCULAR; INTRAVENOUS; SUBCUTANEOUS EVERY 5 MIN PRN
Status: DISCONTINUED | OUTPATIENT
Start: 2020-09-10 | End: 2020-09-10 | Stop reason: HOSPADM

## 2020-09-10 RX ORDER — SODIUM CHLORIDE 9 MG/ML
INJECTION, SOLUTION INTRAVENOUS CONTINUOUS PRN
Status: DISCONTINUED | OUTPATIENT
Start: 2020-09-10 | End: 2020-09-10 | Stop reason: SDUPTHER

## 2020-09-10 RX ORDER — SENNA AND DOCUSATE SODIUM 50; 8.6 MG/1; MG/1
2 TABLET, FILM COATED ORAL DAILY PRN
Status: DISCONTINUED | OUTPATIENT
Start: 2020-09-10 | End: 2020-09-11 | Stop reason: HOSPADM

## 2020-09-10 RX ORDER — HYDROCODONE BITARTRATE AND ACETAMINOPHEN 5; 325 MG/1; MG/1
1 TABLET ORAL
Status: DISCONTINUED | OUTPATIENT
Start: 2020-09-10 | End: 2020-09-10 | Stop reason: HOSPADM

## 2020-09-10 RX ORDER — BACITRACIN ZINC AND POLYMYXIN B SULFATE 500; 1000 [USP'U]/G; [USP'U]/G
OINTMENT TOPICAL PRN
Status: DISCONTINUED | OUTPATIENT
Start: 2020-09-10 | End: 2020-09-10 | Stop reason: ALTCHOICE

## 2020-09-10 RX ORDER — ZOLPIDEM TARTRATE 5 MG/1
5 TABLET ORAL NIGHTLY PRN
Status: DISCONTINUED | OUTPATIENT
Start: 2020-09-10 | End: 2020-09-11 | Stop reason: HOSPADM

## 2020-09-10 RX ORDER — 0.9 % SODIUM CHLORIDE 0.9 %
500 INTRAVENOUS SOLUTION INTRAVENOUS
Status: DISCONTINUED | OUTPATIENT
Start: 2020-09-10 | End: 2020-09-10 | Stop reason: HOSPADM

## 2020-09-10 RX ORDER — HYDRALAZINE HYDROCHLORIDE 20 MG/ML
5 INJECTION INTRAMUSCULAR; INTRAVENOUS EVERY 10 MIN PRN
Status: DISCONTINUED | OUTPATIENT
Start: 2020-09-10 | End: 2020-09-10 | Stop reason: HOSPADM

## 2020-09-10 RX ORDER — LIDOCAINE HYDROCHLORIDE 10 MG/ML
INJECTION, SOLUTION EPIDURAL; INFILTRATION; INTRACAUDAL; PERINEURAL PRN
Status: DISCONTINUED | OUTPATIENT
Start: 2020-09-10 | End: 2020-09-10 | Stop reason: ALTCHOICE

## 2020-09-10 RX ORDER — PROPOFOL 10 MG/ML
INJECTION, EMULSION INTRAVENOUS PRN
Status: DISCONTINUED | OUTPATIENT
Start: 2020-09-10 | End: 2020-09-10 | Stop reason: SDUPTHER

## 2020-09-10 RX ADMIN — ASPIRIN 81 MG: 81 TABLET, CHEWABLE ORAL at 09:06

## 2020-09-10 RX ADMIN — INSULIN LISPRO 1 UNITS: 100 INJECTION, SOLUTION INTRAVENOUS; SUBCUTANEOUS at 23:39

## 2020-09-10 RX ADMIN — PANTOPRAZOLE SODIUM 40 MG: 40 TABLET, DELAYED RELEASE ORAL at 09:07

## 2020-09-10 RX ADMIN — INSULIN LISPRO 2 UNITS: 100 INJECTION, SOLUTION INTRAVENOUS; SUBCUTANEOUS at 08:31

## 2020-09-10 RX ADMIN — Medication 10 ML: at 23:41

## 2020-09-10 RX ADMIN — PROPOFOL 50 MG: 10 INJECTION, EMULSION INTRAVENOUS at 19:40

## 2020-09-10 RX ADMIN — LABETALOL HYDROCHLORIDE 5 MG: 5 INJECTION, SOLUTION INTRAVENOUS at 17:53

## 2020-09-10 RX ADMIN — INSULIN GLARGINE 20 UNITS: 100 INJECTION, SOLUTION SUBCUTANEOUS at 23:38

## 2020-09-10 RX ADMIN — CEFEPIME HYDROCHLORIDE 2 G: 2 INJECTION, POWDER, FOR SOLUTION INTRAVENOUS at 03:04

## 2020-09-10 RX ADMIN — VANCOMYCIN HYDROCHLORIDE 1500 MG: 10 INJECTION, POWDER, LYOPHILIZED, FOR SOLUTION INTRAVENOUS at 05:58

## 2020-09-10 RX ADMIN — PROPOFOL 50 MG: 10 INJECTION, EMULSION INTRAVENOUS at 19:29

## 2020-09-10 RX ADMIN — PROPOFOL 50 MG: 10 INJECTION, EMULSION INTRAVENOUS at 19:26

## 2020-09-10 RX ADMIN — CARVEDILOL 6.25 MG: 6.25 TABLET, FILM COATED ORAL at 16:24

## 2020-09-10 RX ADMIN — PROPOFOL 50 MG: 10 INJECTION, EMULSION INTRAVENOUS at 19:45

## 2020-09-10 RX ADMIN — CITALOPRAM 40 MG: 40 TABLET, FILM COATED ORAL at 09:06

## 2020-09-10 RX ADMIN — PROPOFOL 50 MG: 10 INJECTION, EMULSION INTRAVENOUS at 19:38

## 2020-09-10 RX ADMIN — CEFTRIAXONE SODIUM 2 G: 2 INJECTION, POWDER, FOR SOLUTION INTRAMUSCULAR; INTRAVENOUS at 13:22

## 2020-09-10 RX ADMIN — CARVEDILOL 6.25 MG: 6.25 TABLET, FILM COATED ORAL at 08:31

## 2020-09-10 RX ADMIN — AMLODIPINE BESYLATE 2.5 MG: 2.5 TABLET ORAL at 09:02

## 2020-09-10 RX ADMIN — PROPOFOL 50 MG: 10 INJECTION, EMULSION INTRAVENOUS at 19:35

## 2020-09-10 RX ADMIN — PROPOFOL 50 MG: 10 INJECTION, EMULSION INTRAVENOUS at 19:32

## 2020-09-10 RX ADMIN — PROPOFOL 50 MG: 10 INJECTION, EMULSION INTRAVENOUS at 19:57

## 2020-09-10 RX ADMIN — SODIUM CHLORIDE: 9 INJECTION, SOLUTION INTRAVENOUS at 19:21

## 2020-09-10 RX ADMIN — PROPOFOL 50 MG: 10 INJECTION, EMULSION INTRAVENOUS at 19:51

## 2020-09-10 RX ADMIN — LISINOPRIL 20 MG: 20 TABLET ORAL at 09:08

## 2020-09-10 RX ADMIN — ATORVASTATIN CALCIUM 80 MG: 80 TABLET, FILM COATED ORAL at 09:04

## 2020-09-10 ASSESSMENT — PULMONARY FUNCTION TESTS
PIF_VALUE: 1
PIF_VALUE: 0
PIF_VALUE: 1

## 2020-09-10 ASSESSMENT — PAIN SCALES - GENERAL
PAINLEVEL_OUTOF10: 0

## 2020-09-10 NOTE — PROGRESS NOTES
Clinical Pharmacy Progress Note    Admit date: 9/8/2020     Subjective/Objective:  Pt is a 67yof with PMHx that includes DM 2, CAD, CKD, Meniere's, ischemic cardiomyopaty, HTN, DVT, and depression who recently underwent left hallux arthroplasty with flexor tenotomy's of 2nd, 4th, and 5th digit on 8/28/20. Pt was seen for wound check 9/3 at podiatrist's office with no issues noted. Pt now presenting with fevers and swelling / drainage from left foot wound - being admitted for treatment of LLE diabetic foot infection. Interval update:  Pt is now s/p LLE I&D (done 9/8). Planning further I&D today with delayed primary closure. Wound culture growing Group B  Strep. Pharmacy is consulted to dose Vancomycin per Dr. Madie Lu    Current antibiotics:  Cefepime 2g IV q12h - day #3  Vancomycin - Pharmacy to dose - day #3   1g IV x1 9/8 03:00   1.5g IV q24h (9/9 - current))      Recent Labs     09/08/20  0228 09/09/20  0539   * 134*   K 3.9 4.1   CL 97* 99   CO2 23 24   BUN 27* 39*   CREATININE 1.5* 1.4*   GLUCOSE 131* 136*       Estimated Creatinine Clearance: 44 mL/min (A) (based on SCr of 1.4 mg/dL (H)). Lab Results   Component Value Date    WBC 13.1 (H) 09/09/2020    HGB 9.0 (L) 09/09/2020    HCT 27.3 (L) 09/09/2020    MCV 91.1 09/09/2020     09/09/2020       Lab Results   Component Value Date    PROTIME 14.3 (H) 09/09/2020    INR 1.23 (H) 09/09/2020       Height:  5' 3\" (160 cm)  Weight:  217 lb 6 oz (98.6 kg)    Vancomycin Levels:  Trough  9/11 @ 05:30 = pending    Culture results:  SARS-CoV-2 (9/8) = not detected  Wound (9/8) = Mixed skin sonya, rare growth.     Group B Strep (Strep agalacticae) - reliably sensitive to b-lactams  Surgical cx (9/8) = sent    Prophylaxis:  VTE:  Enoxpaarin  GI:  Pantoprazole    Vaccination screening:  Pneumonia:  Up to date  Influenza:  Not yet available at [facility]    Assessment/Plan:  1)  LLE diabetic foot infection:  Cefepime + Vancomycin - day #3  · Cefepime -

## 2020-09-10 NOTE — PLAN OF CARE
Problem: Tissue Perfusion:  Goal: Ability to maintain adequate tissue perfusion will improve  Description: Ability to maintain adequate tissue perfusion will improve  9/10/2020 0432 by Han Alcala RN  Outcome: Ongoing  Note: Cap refill is less than 2sec on L. Foot and has good color. Pt says she has neuropathy and her foot is usually numb. Problem: Skin Integrity:  Goal: Skin integrity will improve  Description: Skin integrity will improve    Outcome: Ongoing   Pt continent and able to turn self. No skin breakdown noted.

## 2020-09-10 NOTE — PROGRESS NOTES
(postoperative nausea and vomiting), Spinal abscess ([address] Spinal epidural abscess, and Type II diabetes mellitus, uncontrolled ([facility] Social History:   reports that she quit smoking about 6 years ago. She has a 5.00 pack-year smoking history. She has never used smokeless tobacco. She reports previous alcohol use. She reports that she does not use drugs. Family History:   Family History   Problem Relation Age of Onset    High Blood Pressure Mother     Cancer Mother     Rheum Arthritis Mother     COPD Father     Cancer Father     Osteoarthritis Neg Hx     Asthma Neg Hx     Breast Cancer Neg Hx     Diabetes Neg Hx     Heart Failure Neg Hx     High Cholesterol Neg Hx     Hypertension Neg Hx     Migraines Neg Hx     Ovarian Cancer Neg Hx     Rashes/Skin Problems Neg Hx     Seizures Neg Hx     Stroke Neg Hx     Thyroid Disease Neg Hx        Physical Examination      Vitals:  BP (!) 151/82   Pulse 67   Temp 98.2 °F (36.8 °C) (Oral)   Resp 15   Ht 5' 3\" (1.6 m)   Wt 217 lb 6 oz (98.6 kg)   LMP 06/15/1999   SpO2 96%   BMI 38.51 kg/m²   Temp (24hrs), Av.3 °F (36.8 °C), Min:98.1 °F (36.7 °C), Max:98.5 °F (36.9 °C)      I/O (24Hr): No intake or output data in the 24 hours ending 09/10/20 0951    CONSTITUTIONAL:  awake, alert, cooperative, no apparent distress, and appears stated age  EYES:  Lids and lashes normal, pupils equal, round and reactive to light, extra ocular muscles intact, sclera clear, conjunctiva normal  ENT:  Normocephalic, without obvious abnormality, atraumatic, sinuses nontender on palpation, external ears without lesions, oral pharynx with moist mucus membranes, tonsils without erythema or exudates, gums normal and good dentition.   NECK:  Supple, symmetrical, trachea midline, no adenopathy, thyroid symmetric, not enlarged and no tenderness, skin normal  LUNGS:  CTAB , no resp distress   CARDIOVASCULAR:  Normal apical impulse, regular rate and rhythm, normal S1 and S2, no S3 or S4, and no murmur noted  ABDOMEN:  No scars, normal bowel sounds, soft, non-distended, non-tender, no masses palpated, no hepatosplenomegally  MUSCULOSKELETAL:  LLE wrapped in dressing . NEUROLOGIC:  AAOx3, no focal deficits   SKIN:  no bruising or bleeding    Labs/Imaging/Diagnostics   Labs:  CBC:   Recent Labs     09/08/20 0228 09/09/20  0539   WBC 19.0* 13.1*   HGB 9.8* 9.0*   HCT 28.9* 27.3*   MCV 90.3 91.1    369     BMP:   Recent Labs     09/08/20 0228 09/09/20  0539   * 134*   K 3.9 4.1   CL 97* 99   CO2 23 24   BUN 27* 39*   CREATININE 1.5* 1.4*     Mag:   Lab Results   Component Value Date    MG 1.70 03/20/2014     LIVER PROFILE:   Recent Labs     09/08/20 0228   AST 19   ALT 13   BILITOT 0.5   ALKPHOS 117     PT/INR:   Recent Labs     09/08/20  1146 09/09/20  2116   PROTIME 15.0* 14.3*   INR 1.29* 1.23*     APTT: No results for input(s): APTT in the last 72 hours. BNP:  No results for input(s): BNP in the last 72 hours. CARDIAC ENZYMES: No results for input(s): CKTOTAL, CKMB, TROPONINI in the last 72 hours. Imaging Last 24 Hours:  XR CHEST (2 VW)   Final Result      No acute findings              Assessment      Principal Problem:    Diabetic foot infection (HCC)  Active Problems:    Essential hypertension    Obesity    DM type 2 with diabetic peripheral neuropathy (HCC)    CKD (chronic kidney disease) stage 3, GFR 30-59 ml/min (HCC)  Resolved Problems:    * No resolved hospital problems. *       Plan:        -S/p I &D. Cultures growing BHS group B  -Continue Vanc D3, Cefepime D3. ID following. Need recs on final abx plan . -Continue lantus BID for DM management, monitor BS and adjust accordingly.  -discussed with podiatry , planning on procedure today . -CKD stable. Cr close to baseline  -per patient request will dc tele.    -off IVF    Dispo: Pending ID plan, podiatry plan on surgery        Usha Torres  9:51 AM  09/10/20

## 2020-09-10 NOTE — PROGRESS NOTES
Pre-Operative Note  Resident Note     Patient: Ashish Butt     Procedure: Left foot I&D, EHL repair with delayed primary closure    Consent: In chart     Labs:        Recent Labs     09/08/20 0228 09/09/20  0539   WBC 19.0* 13.1*   HGB 9.8* 9.0*   HCT 28.9* 27.3*   MCV 90.3 91.1    369        Recent Labs     09/08/20 0228 09/09/20  0539   * 134*   K 3.9 4.1   CL 97* 99   CO2 23 24   BUN 27* 39*   CREATININE 1.5* 1.4*        Recent Labs     09/08/20 0228   AST 19   ALT 13   BILITOT 0.5   ALKPHOS 117      No results for input(s): LIPASE, AMYLASE in the last 72 hours. Recent Labs     09/08/20 0228 09/08/20  1146 09/09/20  2116   PROT 7.5  --   --    INR  --  1.29* 1.23*      No results for input(s): CKTOTAL, CKMB, CKMBINDEX, TROPONINI in the last 72 hours. Pre-op medications:     Diet: NPO Past 8AM     CXR: normal      EKG: normal sinus rhythm, no blocks or conduction defects, no ischemic changes     Echocardiogram: not done    IVF: None    PT/INR: 14.3/1.23    Type and Screen: A negative    Bowel prep: Not done    Pregnancy test: Not done    ABX: IV vancomycin and cefepime    Known risk factors for perioperative complications: Diabetes mellitus  Renal dysfunction  Undernutrition    Difficulty with intubation will not be anticipated. Anesthesia to see patient.     Norma Sandoval DPM, PGY-2  [phone] or Perfect serve

## 2020-09-10 NOTE — PROGRESS NOTES
Occupational Therapy  Facility/Department: [facility] [address]  Daily Treatment Note  NAME: Tyrone Cosby  : 1955  MRN: [id number]    Date of Service: 9/10/2020    Discharge Recommendations: Tyrone Cosby scored a 21/24 on the AM-PAC ADL Inpatient form. Current research shows that an AM-PAC score of 18 or greater is typically associated with a discharge to the patient's home setting. Based on the patient's AM-PAC score, and their current ADL deficits, it is recommended that the patient have 2-3 sessions per week of Occupational Therapy at d/c to increase the patient's independence. At this time, this patient demonstrates the endurance and safety to discharge home and a follow up treatment frequency of 2-3x/wk. Please see assessment section for further patient specific details. If patient discharges prior to next session this note will serve as a discharge summary. Please see below for the latest assessment towards goals. Assessment   Performance deficits / Impairments: Decreased functional mobility ; Decreased ADL status; Decreased balance;Decreased endurance;Decreased high-level IADLs  Assessment: Pt reported she is very independent prior to admission. Pt demonstrated transfers and functional mobility with supervision with no device. Pt demonstrated bathing ADLs independently. Will continue to follow pt d/t a procedure pending 9/10 that may change weight bearing status.   Treatment Diagnosis: decreased ability to perform ADL 2/2 diabetic foot infection s/p I&D  Prognosis: Good  OT Education: OT Role;Plan of Care  Patient Education: Pt verbalized understanding and in agreement  REQUIRES OT FOLLOW UP: Yes  Activity Tolerance  Activity Tolerance: Patient Tolerated treatment well           Restrictions  Position Activity Restriction  Other position/activity restrictions: Full WB, post op shoe  Subjective   General  Chart Reviewed: Yes  Patient assessed for rehabilitation services?: Yes  Additional Pertinent Hx: Pt is a 76 yo F admitted to [facility] 9/8 for fevers, headache, and foot pain s/p L Hallux arthrodesis with wound closure plantar aspect hallux and flexor tenotomy 2nd, 4th, and 5th digit 8/28/20. Transferred to [facility] 9/8. Pending procedure 9/10 for Left foot I&D, EHL repair with delayed primary closure. PMHx that includes DM 2, CAD, CKD, Meniere's, ischemic cardiomyopaty, HTN, DVT, and depression. Family / Caregiver Present: No  Referring Practitioner: Andrea Diallo DPM  Diagnosis: Diabetic foot infection s/p L foot I&D on 9/8/20. Subjective  Subjective: Pt found in bathroom getting cleaned up for the morning. Reported she is very independent with needs.   Vital Signs  Patient Currently in Pain: Denies   Orientation  Orientation  Overall Orientation Status: Within Functional Limits  Objective    ADL  Feeding: NPO  Grooming: Independent(Standing at sink- Washed hands/face)  UE Bathing: Independent(in the restrooom sitting at sink on the shower chair: Washed UE with warm wash cloth and body wash)  LE Bathing: Independent(in the restrooom sitting at sink on the shower chair: Washed LE  with warm wash cloth and body wash)  UE Dressing: (Assist to tie back of hospital gown)  LE Dressing: Minimal assistance(Seated on toilet, assist to thread L foot)  Toileting: Supervision(seated hygiene)        Balance  Sitting Balance: Independent(On Toilet, shower chair, regular chair)  Standing Balance: Supervision  Functional Mobility  Functional - Mobility Device: No device  Activity: To/from bathroom(plus additional distance in pierre to build up functional standing activity tolerance for ADL)  Assist Level: Supervision  Toilet Transfers  Toilet - Technique: Ambulating  Equipment Used: Standard toilet(grab bars)  Toilet Transfer: Supervision     Transfers  Sit to stand: Supervision  Stand to sit: Supervision                       Cognition  Overall Cognitive Status: [facility] Plan   Plan  Times per week: 2-5  Current Treatment Recommendations: Self-Care / ADL, Strengthening, ROM      AM-PAC Score        AM-PAC Inpatient Daily Activity Raw Score: 21 (09/10/20 1412)  AM-PAC Inpatient ADL T-Scale Score : 44.27 (09/10/20 1412)  ADL Inpatient CMS 0-100% Score: 32.79 (09/10/20 1412)  ADL Inpatient CMS G-Code Modifier : CJ (09/10/20 1412)    Goals  Short term goals  Time Frame for Short term goals: 1 week  Short term goal 1: Toilet t/f with mod I -ongoing, not met-  Short term goal 2: Toileting with mod I -ongoing, not met-  Short term goal 3: LB dresssing with mod I -ongoing, not met-  Patient Goals   Patient goals : \"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 \"       Therapy Time   Individual Concurrent Group Co-treatment   Time In 1033         Time Out 1049         Minutes 16         Timed Code Treatment Minutes: 16 Minutes   Total Timed Treatment Minutes: 16 Minutes     If patient is discharged prior to next treatment, this note will serve as the discharge. Kael Lindo S/OT    A licensed therapist was present, directed the patient's care, made skilled judgement, and was responsible for assessment and treatment of the patient.

## 2020-09-10 NOTE — PROGRESS NOTES
Pt has been A&Ox4, verbalized understanding of NPO status after 0800, pt was able to receive breakfast prior to NPO time and was given short acting insulin, RN checked with both podiatry and hospitalist and both recommended holding the lantus so lantus is being held, aspirin was given after being ok's by podiatry resident. Will continue to monitor.

## 2020-09-10 NOTE — DISCHARGE INSTR - COC
Continuity of Care Form    Patient Name: Otto Lewis   :  1955  MRN:  [id number]    Admit date:  2020  Discharge date:  2020    Code Status Order: Full Code   Advance Directives:   Advance Care Flowsheet Documentation       Date/Time Healthcare Directive Type of Healthcare Directive Copy in [address] Agent's Name Healthcare Agent's Phone Number    09/10/20 1700  No, patient does not have an advance directive for healthcare treatment -- -- -- -- --    20 1718  No, patient does not have an advance directive for healthcare treatment -- -- -- -- --    20 1717  No, patient does not have an advance directive for healthcare treatment -- -- -- -- --            Admitting Physician:  Jake Hoyos MD  PCP: Tarah Arreola MD    Discharging Nurse: [facility] Unit/Room#: OR/NONE  Discharging Unit Phone Number: [phone]    Emergency Contact:   Extended Emergency Contact Information  Primary Emergency Contact: skinny moon  [place] Phone: [phone]  Relation: Child  Secondary Emergency Contact: [address] Phone: [phone]  Mobile Phone: [phone]  Relation: Child    Past Surgical History:  Past Surgical History:   Procedure Laterality Date    BACK SURGERY  3/2014    DEBRIDEMENT  3/12/14    extensive debridement of bone/muscle and paraspinal empyema    FOOT DEBRIDEMENT Left 2020    LEFT FOOT DEBRIDEMENT INCISION AND DRAINAGE performed by Tarah Arreola DPM at [address] [place] [address] [place] TOE SURGERY Left 2020    ON THE LEFT: [facility], WOUND CLOSURE, FLEXOR TENOTOMY 4TH AND 5TH DIGITS, TENOTOMY AND CAPSULOTOMY 2ND DIGIT performed by Tarah Arreola DPM at [facility]  6/15/1999    complete-think right ovary in.    NASAL SEPTUM SURGERY      SLEEVE GASTRECTOMY  2019    ROBOTIC ASSISTED LAPAROSCOPIC SLEEVE GASTRECTOMY, LAPAROSCOPIC REDUCIBLE INCISIONAL HERNIA REPAIR     SLEEVE GASTRECTOMY N/A 4/2/2019    ROBOTIC ASSISTED LAPAROSCOPIC SLEEVE GASTRECTOMY, LAPAROSCOPIC REDUCIBLE INCISIONAL HERNIA REPAIR performed by Lincoln Tran DO at [address] N/A 2/8/2019    EGD BIOPSY performed by Lincoln Tran DO at [facility] N/A 2/8/2019    EGD POLYP ABLATION/OTHER performed by Lincoln Tran DO at [facility] ENDOSCOPY       Immunization History:   Immunization History   Administered Date(s) Administered    Influenza Virus Vaccine 10/13/2014, 11/10/2015, 10/31/2016    Influenza, Quadv, IM, PF (6 mo and older Fluzone, Flulaval, Fluarix, and 3 yrs and older Afluria) 09/28/2017    Influenza, Rupert Favor, Recombinant, IM PF (Flublok 18 yrs and older) 10/12/2018    Pneumococcal Conjugate 13-valent (Ocexfxy32) 03/18/2019    Pneumococcal Polysaccharide ([id number]) 11/03/2005, 03/20/2014    Td, unspecified formulation 04/09/1999    Tdap (Boostrix, Adacel) 11/21/2008    Zoster Live (Zostavax) 11/12/2014       Active Problems:  Patient Active Problem List   Diagnosis Code    Meniere's disease H81.09    Peripheral neuropathy G62.9    Depression F32.9    Mixed hyperlipidemia E78.2    Essential hypertension I10    Obesity E66.9    ASNHL (asymmetrical sensorineural hearing loss) H90.5    Poor compliance with medication Z91.14    DM type 2 with diabetic peripheral neuropathy ([facility]) E11.42    Diabetic ulcer of toe of left foot associated with diabetes mellitus due to underlying condition, with fat layer exposed ([facility] M13.342, L97.522    CKD (chronic kidney disease) stage 3, GFR 30-59 ml/min ([facility]) N18.3    ANGLE (obstructive sleep apnea) G47.33    Chronic GERD K21.9    Morbid obesity with BMI of 45.0-49.9, adult ([facility]) E66.01, Z68.42    Incisional hernia, without obstruction or gangrene K43.2    S/P laparoscopic sleeve gastrectomy Z98.84    Anemia D64.9    Ulcer of toe of right foot, with fat weight bearing restirctions  Other Medical Equipment (for information only, NOT a DME order):  =  Other Treatments:     Patient's personal belongings (please select all that are sent with patient):  None    RN SIGNATURE:  Electronically signed by Ricci Tariq RN on 9/11/20 at 11:16 AM EDT    CASE MANAGEMENT/SOCIAL WORK SECTION    Inpatient Status Date:     Readmission Risk Assessment Score:  Readmission Risk              Risk of Unplanned Readmission:        27           Discharging to Facility/ [address] [place] [address]. [place] [address]       Phone: [phone]       Fax: [phone]        [facility], [address] [place] [address]       Phone: [phone]       Fax: [phone]        ·     / signature: Electronically signed by Pankaj Murphy RN on 9/11/20 at 10:42 AM EDT    PHYSICIAN SECTION    Prognosis: Fair    Condition at Discharge: Stable    Rehab Potential (if transferring to Rehab): Fair    Recommended Labs or Other Treatments After Discharge:   IV Ceftriaxone x  2 gm x Q 24 HR  STOP DATE  X 10/5  CBC with diff, BMP, ESR, CRP weekly  Fax results to [phone]  No ID follow up necessary if no  new issues    [address] Physician  Phone: [phone]   Fax : [phone]       Physician Certification: I certify the above information and transfer of Jana Magallon  is necessary for the continuing treatment of the diagnosis listed and that she requires Home Care for greater 30 days.      Update Admission H&P: No change in H&P    PHYSICIAN SIGNATURE:  Electronically signed by Hernandez Momin MD on 9/11/20 at 10:02 AM EDT

## 2020-09-10 NOTE — PROGRESS NOTES
VSS, pt denies pain. L. Foot dressing is intact, no drainage noted. Cap refill is less than 3sec. Pt states she usually has numbness in bilateral feet. Pt wearing post-op shoe. Will continue to monitor.

## 2020-09-10 NOTE — CARE COORDINATION
Cm following, Pt to OR today for I&D with delayed closure with Pod service. PT OT recs home, no DME needs.    Electronically signed by Lala Magallon RN on 9/10/2020 at 2:12 PM  [phone]

## 2020-09-11 VITALS
TEMPERATURE: 99.3 F | WEIGHT: 217.37 LBS | HEART RATE: 66 BPM | SYSTOLIC BLOOD PRESSURE: 162 MMHG | DIASTOLIC BLOOD PRESSURE: 68 MMHG | RESPIRATION RATE: 15 BRPM | BODY MASS INDEX: 38.52 KG/M2 | HEIGHT: 63 IN | OXYGEN SATURATION: 97 %

## 2020-09-11 LAB
BODY FLUID CULTURE, STERILE: ABNORMAL
GLUCOSE BLD-MCNC: 191 MG/DL (ref 70–99)
GLUCOSE BLD-MCNC: 83 MG/DL (ref 70–99)
GRAM STAIN RESULT: ABNORMAL
ORGANISM: ABNORMAL
PERFORMED ON: ABNORMAL
PERFORMED ON: NORMAL

## 2020-09-11 PROCEDURE — 02HV33Z INSERTION OF INFUSION DEVICE INTO SUPERIOR VENA CAVA, PERCUTANEOUS APPROACH: ICD-10-PCS | Performed by: INTERNAL MEDICINE

## 2020-09-11 PROCEDURE — 36569 INSJ PICC 5 YR+ W/O IMAGING: CPT

## 2020-09-11 PROCEDURE — 6360000002 HC RX W HCPCS: Performed by: PODIATRIST

## 2020-09-11 PROCEDURE — C1751 CATH, INF, PER/CENT/MIDLINE: HCPCS

## 2020-09-11 PROCEDURE — 97535 SELF CARE MNGMENT TRAINING: CPT

## 2020-09-11 PROCEDURE — 6370000000 HC RX 637 (ALT 250 FOR IP): Performed by: PODIATRIST

## 2020-09-11 PROCEDURE — 2580000003 HC RX 258: Performed by: PODIATRIST

## 2020-09-11 PROCEDURE — 2580000003 HC RX 258: Performed by: INTERNAL MEDICINE

## 2020-09-11 RX ORDER — SODIUM CHLORIDE 0.9 % (FLUSH) 0.9 %
10 SYRINGE (ML) INJECTION EVERY 12 HOURS SCHEDULED
Status: DISCONTINUED | OUTPATIENT
Start: 2020-09-11 | End: 2020-09-11 | Stop reason: HOSPADM

## 2020-09-11 RX ORDER — LIDOCAINE HYDROCHLORIDE 10 MG/ML
5 INJECTION, SOLUTION EPIDURAL; INFILTRATION; INTRACAUDAL; PERINEURAL ONCE
Status: DISCONTINUED | OUTPATIENT
Start: 2020-09-11 | End: 2020-09-11 | Stop reason: HOSPADM

## 2020-09-11 RX ORDER — SODIUM CHLORIDE 0.9 % (FLUSH) 0.9 %
10 SYRINGE (ML) INJECTION PRN
Status: DISCONTINUED | OUTPATIENT
Start: 2020-09-11 | End: 2020-09-11 | Stop reason: HOSPADM

## 2020-09-11 RX ADMIN — ASPIRIN 81 MG: 81 TABLET, CHEWABLE ORAL at 08:57

## 2020-09-11 RX ADMIN — ATORVASTATIN CALCIUM 80 MG: 80 TABLET, FILM COATED ORAL at 08:58

## 2020-09-11 RX ADMIN — CEFTRIAXONE SODIUM 2 G: 2 INJECTION, POWDER, FOR SOLUTION INTRAMUSCULAR; INTRAVENOUS at 13:52

## 2020-09-11 RX ADMIN — ENOXAPARIN SODIUM 30 MG: 30 INJECTION SUBCUTANEOUS at 08:41

## 2020-09-11 RX ADMIN — CARVEDILOL 6.25 MG: 6.25 TABLET, FILM COATED ORAL at 08:13

## 2020-09-11 RX ADMIN — INSULIN LISPRO 2 UNITS: 100 INJECTION, SOLUTION INTRAVENOUS; SUBCUTANEOUS at 13:51

## 2020-09-11 RX ADMIN — AMLODIPINE BESYLATE 2.5 MG: 2.5 TABLET ORAL at 08:57

## 2020-09-11 RX ADMIN — CITALOPRAM 40 MG: 40 TABLET, FILM COATED ORAL at 08:57

## 2020-09-11 RX ADMIN — LISINOPRIL 20 MG: 20 TABLET ORAL at 08:58

## 2020-09-11 RX ADMIN — PANTOPRAZOLE SODIUM 40 MG: 40 TABLET, DELAYED RELEASE ORAL at 08:58

## 2020-09-11 RX ADMIN — Medication 10 ML: at 13:39

## 2020-09-11 ASSESSMENT — PAIN SCALES - GENERAL
PAINLEVEL_OUTOF10: 0
PAINLEVEL_OUTOF10: 0

## 2020-09-11 NOTE — CARE COORDINATION
Case Management Assessment            Discharge Note                    Date / Time of Note: 9/11/2020 11:21 AM                  Discharge Note Completed by: Gala Hodgkins    Patient Name: Tricia Haji   YOB: 1955  Diagnosis: Diabetic foot infection ([facility] [E11.628, L08.9]  Diabetic foot infection ([facility] [E11.628, L08.9]   Date / Time: 9/8/2020  9:42 AM    Current PCP: Vasiliy Lima MD  Clinic patient: No    Hospitalization in the last 30 days: No    Advance Directives:  Code Status: Full Code  [place] DNR form completed and on chart: No    Financial:  Payor: Cuca Clos / Plan: Diane Carlos / Product Type: *No Product type* /      Pharmacy:    Shayna Gupta. [place] [phone] - F [address] [place] [address]. [facility] [address]  Phone: [phone] Fax: [phone]    [facility], [address] [place] [address] [phone] - f [phone]  [address] [place] [address]  Phone: [phone] Fax: [phone]    [facility] [address] [place] [address] [phone] [facility] [phone] [address] [place] [id number]  Phone: [phone] Fax: [phone]    [facility] [place], [address] [facility] [place] [phone] - F [phone]  [address] [place] [address]  Phone: [phone] Fax: [phone]      Assistance purchasing medications?: Potential Assistance Purchasing Medications: No  Assistance provided by Case Management: None at this time    Does patient want to participate in local refill/ meds to beds program?: No    Meds To Beds General Rules:  1. Can ONLY be done Monday- Friday between 8:30am-5pm  2. Prescription(s) must be in pharmacy by 3pm to be filled same day  3. Copy of patient's insurance/ prescription drug card and patient face sheet must be sent along with the prescription(s)  4.  Cost of Rx cannot be added to hospital bill. If financial assistance is needed, please contact unit  or ;  or  CANNOT provide pharmacy voucher for patients co-pays  5. Patients can then  the prescription on their way out of the hospital at discharge, or pharmacy can deliver to the bedside if staff is available. (payment due at time of pick-up or delivery - cash, check, or card accepted)     Able to afford home medications/ co-pay costs: Yes    ADLS:  Current PT AM-PAC Score: 20 /24  Current OT AM-PAC Score: 21 /24      DISCHARGE Disposition: Home with [address]: [facility] skilled care and Home Infusion: [facility] Phone: [phone] Fax: [phone]*    LOC at discharge: Not Applicable  RICHARD Completed: Yes    Notification completed in HENS/PAS?:  Not Applicable    IMM Completed:   Yes, Case management has presented and reviewed IMM letter #2 to the patient and/or family/ POA. Patient and/or family/POA verbalized understanding of their medicare rights and appeal process if needed. Patient and/or family/POA has signed, initialed and placed today's date (9/11/20) and time (1120) on IMM letter #2 on the the appropriate lines. Patient and/or family/POA, copy of letter offered and they are aware that this original copy of IMM letter #2 is available prior to discharge from the paper chart on the unit. Electronic documentation has been entered into epic for IMM letter #2 and original paper copy has been added to the paper chart at the nurses station.      Transportation:  Transportation PLAN for discharge: family   [facility] at [facility]: Yes  [address]: [place] [address]  Phone: [phone]  Fax: [phone]  Orders faxed: Yes    Durable Medical Equipment:  DME Provider: none  Equipment obtained during hospitalization:     Home Oxygen and Respiratory Equipment:  Oxygen needed at discharge?: Not [address]: Not Applicable  Portable tank available for discharge?: Not Indicated    Dialysis:  Dialysis patient: No    Dialysis Center:  Not Applicable      Additional CM Notes: Pt will DC home with [facility] Skilled care for home IV ABX and PT OT, sister will drive home    The Plan for Transition of Care is related to the following treatment goals of Diabetic foot infection ([address] [E15.916, L08.9]  Diabetic foot infection ([address] [M13.201, L08.9]    The Patient and/or patient representative Maia Rodriguez and her family were provided with a choice of provider and agrees with the discharge plan Yes    Freedom of choice list was provided with basic dialogue that supports the patient's individualized plan of care/goals and shares the quality data associated with the providers.  Yes    Care Transitions patient: Yes    Gala Hodgkins, RN  [facility]  Case Management Department  Ph: [phone]  Fax: [phone]

## 2020-09-11 NOTE — OP NOTE
Procedure: The patient was brought from the preoperative area and placed on the operating table in the supine position. Following induction of monitored anesthesia care a local block consisting of a total of 10 mL of 1% lidocaine plain to anesthetize the patients surgical limb in an Good block fashion. The patients left lower extremity was then scrubbed, prepped and draped in the usual sterile manner. A time-out was performed. The patient, procedure and operative site were confirmed and the following procedure was then performed. Detail of procedure #1 incision and drainage; left foot: Attention was directed towards the 6 centimeters later the year surgical incision from previous surgery on 9/10/2020. Surgical site was gently packed with vancomycin beads. Using blunt dissection with curved McGlamry elevator all of the vancomycin beads were removed without incident. Next, using a #15 blade we made a 0.3 cm transverse incision down to the interphalangeal joint space. Next, using Kent periosteal elevator the IPJ space was explored and no purulent drainage encountered only sanguinous drainage. Next after further exploration of the surgical site the wound appeared healthy with red granular bleeding tissue with friable/necrotic extensor hallucis longus tendon close to its insertion site on the hallux. Next, using pulse irrigation system the surgical site was irrigated with copious amounts of normal saline with gentamicin. Detail of procedure #2, #3 extensor hallucis longus tendon repair with delayed primary closure; left foot: Next, attention was redirected to the friable/necrotic extensor hallucis longus tendon. The nonviable tissue was close to the insertion point of the hallux. Next, using a #15 blade all nonviable/necrotic tendon was removed down to healthy tendon. Next, the surgical site was irrigated with copious amounts of normal saline mixed with gentamicin.   Next, the extensor hallucis longus

## 2020-09-11 NOTE — PROGRESS NOTES
Podiatric Surgery Daily Progress Note      Admit Date: 9/8/2020                           Code:Full Code    Patient seen and examined, labs and records reviewed    Subjective:     Patient seen and examined at bedside this a.m. Patient denies any overnight acute event. Patient denies f/c/n/v/sob/cp. Patient is overall happy and excited to get out of the hospital.  Patient denies any acute pain to the left foot. Objective     BP (!) 176/78   Pulse 63   Temp 98.7 °F (37.1 °C) (Oral)   Resp 16   Ht 5' 3\" (1.6 m)   Wt 217 lb 6 oz (98.6 kg)   LMP 06/15/1999   SpO2 96%   BMI 38.51 kg/m²      I/O:    Intake/Output Summary (Last 24 hours) at 9/11/2020 0612  Last data filed at 9/10/2020 2346  Gross per 24 hour   Intake 590 ml   Output 225 ml   Net 365 ml              Wt Readings from Last 3 Encounters:   09/09/20 217 lb 6 oz (98.6 kg)   09/08/20 218 lb 3.2 oz (99 kg)   08/28/20 212 lb (96.2 kg)       LABS:    Recent Labs     09/09/20  0539   WBC 13.1*   HGB 9.0*   HCT 27.3*           Recent Labs     09/10/20  1117      K 4.2      CO2 23   PHOS 2.5   BUN 34*   CREATININE 1.3*        Recent Labs     09/08/20  1146 09/09/20  2116   INR 1.29* 1.23*             LOWER EXTREMITY EXAMINATION    Dressing to left LE intact. No strikethrough drainage noted to the external dressing. Left lower extremity dressing remains intact. Patient is able to wiggle left digits 1 through 5 without complications. IMAGING:  Impression    Stable postsurgical changes of the 1st proximal and distal phalanges.     Diffuse soft tissue edema surrounding the 1st digit.  No subcutaneous air.              ASSESSMENT/PLAN   S/P left foot incision and drainage, extensor hallucis longus repair with delayed primary closure (9/10/2020)  S/P left foot incision and drainage (DOS 9/8/2020)  Diabetic foot infection; left foot  Full-thickness ulceration dorsal aspect; left hallux  S/P HIP arthroplasty, wound closure, tenotomy flexors second, fourth, and fifth digit; left foot (DOS 8/28/2020)  Diabetes mellitus with peripheral neuropathy         -Patient was seen and examined this a.m. at bedside  -Hypertensive, afebrile, no new AM labs. -X-rays reviewed left foot; see impression above. -B/L lower extremity dressings remain intact and will change at his first outpatient appointment.  -Patient weightbearing as tolerated wearing surgical shoe.  -Instructed patient to elevate left lower extremity at all times while laying or sitting to aid in edema control.  -Surgical wound culture grew BHS group B (strep agalactiae)  -ID following; D/C need IV vancomycin and changed to IV Ceftriaxone x 2 gm x q 24 HRS x4 weeks. DISPO: S/P left foot I&D, extensor hallucis longus repair with delayed primary closure. Procedure was felt to be definitive. No more surgical intervention from podiatry standpoint. Patient is okay for discharge from podiatry standpoint. We will follow-up on ID recommendation for outpatient antibiotics. Patient will follow-up with Dr. Fernando Patterson 1 week after being discharged from the hospital.    Patient was seen and examined at bedside with Dr. Fernando Patterson.     Erwin Singh DPM   Podiatric Resident, PGY-2  Pager: [phone] or Perfect serve      9/11/2020  6:12 AM     Dr. Bridget Garcia, [address] [place] [address]   Office: [phone]  Office: [phone]  Cell: [phone]

## 2020-09-11 NOTE — PROGRESS NOTES
Occupational Therapy  Facility/Department: [address]  Daily Treatment/Discharge Note  NAME: Elio Stanley  : 1955  MRN: [id number]    Date of Service: 2020    Discharge Recommendations: Corinne Stakes Cavallaro scored a 24/24 on the AM-PAC ADL Inpatient form. Current research shows that an AM-PAC score of 18 or greater is typically associated with a discharge to the patient's home setting. Assessment   Performance deficits / Impairments: Decreased functional mobility ; Decreased ADL status; Decreased balance;Decreased endurance;Decreased high-level IADLs  Assessment: Pt demonstrated transfers/functional mobility with Mod I. Pt demonstrated grooming and dressing tasks Independently, and toileting with Mod I. Pt reported no concerns with returning home and with regard to change in weight bearing status to WBAT. Pt is at functional baseline for OT. Will continue to follow if change in status occurs. Treatment Diagnosis: decreased ability to perform ADL 2/2 diabetic foot infection s/p I&D  Prognosis: Good  OT Education: OT Role  Patient Education: Pt verbalized understanding and in agreement  REQUIRES OT FOLLOW UP: Yes  Activity Tolerance  Activity Tolerance: Patient Tolerated treatment well           Restrictions  Position Activity Restriction  Other position/activity restrictions: Weight bearing as tolerated, post op shoe  Subjective   General  Chart Reviewed: Yes  Patient assessed for rehabilitation services?: Yes  Additional Pertinent Hx: Pt is a 74 yo F admitted to [facility]  for fevers, headache, and foot pain s/p L Hallux arthrodesis with wound closure plantar aspect hallux and flexor tenotomy 2nd, 4th, and 5th digit 20. Transferred to [facility] . Pending procedure 9/10 for Left foot I&D, EHL repair with delayed primary closure. PMHx that includes DM 2, CAD, CKD, Meniere's, ischemic cardiomyopaty, HTN, DVT, and depression.   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 \"       Therapy Time   Individual Concurrent Group Co-treatment   Time In 1766         Time Out 1129         Minutes 12         Timed Code Treatment Minutes: 12 Minutes   Total Timed Treatment Minutes: 12 Minutes      Minh Cisneros S/OT    A licensed therapist was present, directed the patient's care, made skilled judgement, and was responsible for assessment and treatment of the patient.

## 2020-09-11 NOTE — PROCEDURES
PICC line education:    -Risks  -Benefits  -Alternatives  -Procedure    Discussed the above with patient, verbalized understanding, answered all questions. Provided with information on PICC care to review. PICC tip verified via 3CG (Ok to use). Reported off to patient's  Nurse . Germania Valentino

## 2020-09-11 NOTE — PROGRESS NOTES
Progress Note        Date:9/11/2020       Room:5305/5305-01  Patient Name:Swetha Jurado     YOB: 1955     Age:64 y.o. Chief Complaint   Patient presents with    Post-op Problem            Subjective   Interval History Status: improved. Denies any foot pain  Bs 82 this am, lantus on hold  No nausea, emesis      Review of Systems   ROS as mentioned above.     Medications   Scheduled Meds:    lidocaine 1 % injection  5 mL Intradermal Once    sodium chloride flush  10 mL Intravenous 2 times per day    cefTRIAXone (ROCEPHIN) IV  2 g Intravenous Q24H    enoxaparin  30 mg Subcutaneous Daily    amLODIPine  2.5 mg Oral Daily    aspirin  81 mg Oral Daily    atorvastatin  80 mg Oral Daily    carvedilol  6.25 mg Oral BID WC    citalopram  40 mg Oral Daily    lisinopril  20 mg Oral Daily    pantoprazole  40 mg Oral Daily    sodium chloride flush  10 mL Intravenous 2 times per day    insulin glargine  20 Units Subcutaneous BID    insulin lispro  0-12 Units Subcutaneous TID WC    insulin lispro  0-6 Units Subcutaneous Nightly     Continuous Infusions:    dextrose       PRN Meds: sodium chloride flush, sennosides-docusate sodium, zolpidem, sodium chloride flush, acetaminophen **OR** acetaminophen, polyethylene glycol, promethazine **OR** ondansetron, glucose, dextrose, glucagon (rDNA), dextrose, labetalol    Past History    Past Medical History:   has a past medical history of Abnormal echocardiogram, Back pain, Cardiomyopathy ([facility] Chipped tooth, Dental crown present, Depressive disorder, Diabetic infection of right foot ([facility] [facility] Diabetic ulcer of toe of left foot associated with type 2 diabetes mellitus, with fat layer exposed ([address] DVT (deep venous thrombosis) ([facility] [address] [facility] HTN (hypertension), Hx of blood clots, Ischemic cardiomyopathy, Kidney disease, Meniere's disease, Mixed hyperlipidemia, Nicotine abuse, NSTEMI (non-ST elevated myocardial infarction) ([address] ANGLE (obstructive sleep apnea), Osteomyelitis of ankle or foot, acute, left ([address] Pneumonia, PONV (postoperative nausea and vomiting), Spinal epidural abscess, and Type II diabetes mellitus, uncontrolled ([address] Social History:   reports that she quit smoking about 6 years ago. She has a 5.00 pack-year smoking history. She has never used smokeless tobacco. She reports previous alcohol use. She reports that she does not use drugs. Family History:   Family History   Problem Relation Age of Onset    High Blood Pressure Mother     Cancer Mother     Rheum Arthritis Mother     COPD Father     Cancer Father     Osteoarthritis Neg Hx     Asthma Neg Hx     Breast Cancer Neg Hx     Diabetes Neg Hx     Heart Failure Neg Hx     High Cholesterol Neg Hx     Hypertension Neg Hx     Migraines Neg Hx     Ovarian Cancer Neg Hx     Rashes/Skin Problems Neg Hx     Seizures Neg Hx     Stroke Neg Hx     Thyroid Disease Neg Hx        Physical Examination      Vitals:  /73   Pulse 67   Temp 98.8 °F (37.1 °C) (Oral)   Resp 14   Ht 5' 3\" (1.6 m)   Wt 217 lb 6 oz (98.6 kg)   LMP 06/15/1999   SpO2 97%   BMI 38.51 kg/m²   Temp (24hrs), Av.8 °F (36.6 °C), Min:96.8 °F (36 °C), Max:98.8 °F (37.1 °C)      I/O (24Hr): Intake/Output Summary (Last 24 hours) at 2020 1000  Last data filed at 2020 0758  Gross per 24 hour   Intake 590 ml   Output 425 ml   Net 165 ml       CONSTITUTIONAL:  awake, alert, cooperative, no apparent distress, and appears stated age  EYES:  Lids and lashes normal, pupils equal, round and reactive to light, extra ocular muscles intact, sclera clear, conjunctiva normal  ENT:  Normocephalic, without obvious abnormality, atraumatic, sinuses nontender on palpation, external ears without lesions, oral pharynx with moist mucus membranes, tonsils without erythema or exudates, gums normal and good dentition.   NECK:  Supple, symmetrical, trachea midline, no adenopathy, thyroid symmetric, not enlarged and no tenderness, skin normal  LUNGS:  CTAB , no resp distress   CARDIOVASCULAR:  Normal apical impulse, regular rate and rhythm, normal S1 and S2, no S3 or S4, and no murmur noted  ABDOMEN:  No scars, normal bowel sounds, soft, non-distended, non-tender, no masses palpated, no hepatosplenomegally  MUSCULOSKELETAL:  LLE wrapped in dressing . NEUROLOGIC:  AAOx3, no focal deficits   SKIN:  no bruising or bleeding      Physical exam unchanged compared to yesterday     Labs/Imaging/Diagnostics   Labs:  CBC:   Recent Labs     09/09/20  0539   WBC 13.1*   HGB 9.0*   HCT 27.3*   MCV 91.1        BMP:   Recent Labs     09/09/20  0539 09/10/20  1117   * 136   K 4.1 4.2   CL 99 101   CO2 24 23   PHOS  --  2.5   BUN 39* 34*   CREATININE 1.4* 1.3*     Mag:   Lab Results   Component Value Date    MG 1.70 03/20/2014     LIVER PROFILE:   No results for input(s): AST, ALT, LIPASE, BILIDIR, BILITOT, ALKPHOS in the last 72 hours. Invalid input(s): AMYLASE,  ALB  PT/INR:   Recent Labs     09/08/20  1146 09/09/20  2116   PROTIME 15.0* 14.3*   INR 1.29* 1.23*     APTT: No results for input(s): APTT in the last 72 hours. BNP:  No results for input(s): BNP in the last 72 hours. CARDIAC ENZYMES: No results for input(s): CKTOTAL, CKMB, TROPONINI in the last 72 hours. Imaging Last 24 Hours:  XR CHEST (2 VW)   Final Result      No acute findings              Assessment      Principal Problem:    Diabetic foot infection (HCC)  Active Problems:    Essential hypertension    Obesity    DM type 2 with diabetic peripheral neuropathy (HCC)    Wound infection after surgery    CKD (chronic kidney disease) stage 3, GFR 30-59 ml/min (HCC)    Deep incisional surgical site infection    Group B streptococcal infection    Foot abscess, left    Tenosynovitis of foot  Resolved Problems:    * No resolved hospital problems. *       Plan:        -S/p I &D .extensor hallucis longus repair with delayed primary closure.    Cultures growing BHS group B  -Rocephin D 2. ID following. .  -Continue lantus BID for DM management, monitor BS and adjust accordingly. -CKD stable. Cr close to baseline  -per patient request will dc tele. -off IVF    Dispo: Per ID, PICC, and IV abx. Picc line order in place.  Need final ID infusion orders       Nazia Ojeda  10:00 AM  09/11/20

## 2020-09-11 NOTE — BRIEF OP NOTE
Brief Postoperative Note      Patient: Rivka Maxwell  YOB: 1955  MRN: [id number]    Date of Procedure: 9/10/2020    Pre-Op Diagnosis: Diabetic infection of left foot ([address] [E11.628, L08.9]    Post-Op Diagnosis: Same       Procedure(s):  LEFT FOOT INCISION AND DRAINAGE, EXTENSOR HALLUCIS LONGUS REPAIR WITH DELAYED PRIMARY CLOSURE    Surgeon(s):  Mary Juárez DPM    Assistant:  Resident: TOBIN Beck, MS 4    Anesthesia: Choice    Injectables: Preop 10 mL 1% lidocaine plain    Hemostasis: Anatomical dissection and electrocautery    Materials: 4-0 PDS, 3-0 nylon    Estimated Blood Loss: less than 20    Complications: None    Specimens:   * No specimens in log *    Implants:  * No implants in log *      Drains: * No LDAs found *    Findings: Intra-Op friable EHL tendons at the distal aspect near its insertion. After debridement healthy tendon noted. No more purulent drainage encountered. DISPO: S/P left foot I&D, extensor hallucis longus repair with delayed primary closure. Procedure was felt to be definitive. No more surgical intervention from podiatry standpoint. Patient is okay for discharge from podiatry standpoint. We will follow-up on ID recommendation for outpatient antibiotics.   Patient will follow-up with Dr. Ruthie Sylvester 1 week after being discharged from the hospital.        Electronically signed by Manuel Sheth DPM on 9/10/2020 at 8:23 PM

## 2020-09-11 NOTE — DISCHARGE SUMMARY
Discharge Summary     Date:9/11/2020        Patient Name:Swetha Cervantes     YOB: 1955     Age:64 y.o. Admit Date:9/8/2020   Admission Condition:fair   Discharged Condition:good  Discharge Date: 09/11/20     Discharge Diagnoses   Principal Problem:    Diabetic foot infection ([facility]  Active Problems:    Essential hypertension    Obesity    DM type 2 with diabetic peripheral neuropathy ([facility] [address] [facility]    Wound infection after surgery    CKD (chronic kidney disease) stage 3, GFR 30-59 ml/min ([facility])    Deep incisional surgical site infection    Group B streptococcal infection    Foot abscess, left    Tenosynovitis of foot  Resolved Problems:    * No resolved hospital problems. [facility] Stay   Narrative of Hospital Course:     Patient presented with a diabetic foot infection, she was initially started on broad-spectrum antibiotic. She had I&D done by podiatry, her culture grew group B Streptococcus. Infectious disease was consulted. ID has suggested PICC line and extended course with Rocephin 10/5. Aria Mead [facility] and home health care orders are updated. Her chronic medical problems which is diabetes mellitus and CKD had stayed stable. She will be discharged with outpatient follow-up with the podiatry for wound care  and primary care physician. On dc Patient denies any CP,SOB,Denies any nausea, vomiting, abdominal pain. Denies any diarrhea. Patient denies any palpitations, lightheadedness, orthopnea, PND. Patient doesn't appear to be in any distress on discharge .           Physical exam   Vitals:    09/11/20 0758 09/11/20 0840 09/11/20 0857 09/11/20 1114   BP: (!) 197/76 (!) 169/51 132/73 (!) 162/68   Pulse: 67   66   Resp: 14   15   Temp: 98.8 °F (37.1 °C)   99.3 °F (37.4 °C)   TempSrc: Oral   Oral   SpO2: 97%   97%   Weight:       Height:             Physical Exam  CONSTITUTIONAL:  awake, alert, cooperative, no apparent distress, and appears stated age  EYES:  Lids and lashes normal, pupils equal, round and reactive to light, extra ocular muscles intact, sclera clear, conjunctiva normal  ENT:  Normocephalic, without obvious abnormality, atraumatic, sinuses nontender on palpation, external ears without lesions, oral pharynx with moist mucus membranes, tonsils without erythema or exudates, gums normal and good dentition. NECK:  Supple, symmetrical, trachea midline, no adenopathy, thyroid symmetric, not enlarged and no tenderness, skin normal  LUNGS:  CTAB , no resp distress   CARDIOVASCULAR:  Normal apical impulse, regular rate and rhythm, normal S1 and S2, no S3 or S4, and no murmur noted  ABDOMEN:  No scars, normal bowel sounds, soft, non-distended, non-tender, no masses palpated, no hepatosplenomegally  MUSCULOSKELETAL:  LLE wrapped in dressing . NEUROLOGIC:  AAOx3, no focal deficits   SKIN:  no bruising or bleeding       Consultants:   IP CONSULT TO PODIATRY  IP CONSULT TO HOSPITALIST  IP CONSULT TO PHARMACY  PHARMACY TO DOSE VANCOMYCIN  IP CONSULT TO INFECTIOUS DISEASES  IP CONSULT TO PODIATRY  IP CONSULT TO PHARMACY  IP CONSULT TO NEPHROLOGY  IP CONSULT TO HOME CARE NEEDS    Time Spent on Discharge:  45 minutes were spent in patient examination, evaluation, counseling as well as medication reconciliation, prescriptions for required medications, discharge plan and follow up. Surgeries/Procedures Performed:  Procedure(s):  LEFT FOOT INCISION AND DRAINAGE, EXTENSOR HALLUCIS LONGUS REPAIR WITH DELAYED PRIMARY CLOSURE          Significant Diagnostic Studies:   Recent Labs:  CBC:   Recent Labs     09/09/20  0539   WBC 13.1*   HGB 9.0*   HCT 27.3*   MCV 91.1        BMP:   Recent Labs     09/09/20  0539 09/10/20  1117   * 136   K 4.1 4.2   CL 99 101   CO2 24 23   PHOS  --  2.5   BUN 39* 34*   CREATININE 1.4* 1.3*     Mag:   Lab Results   Component Value Date    MG 1.70 03/20/2014     LIVER PROFILE: No results for input(s): AST, ALT, LIPASE, BILIDIR, BILITOT, ALKPHOS in the last 72 hours.     Invalid input(s): AMYLASE,  ALB  PT/INR:   Recent Labs     09/09/20 2116   PROTIME 14.3*   INR 1.23*     APTT: No results for input(s): APTT in the last 72 hours. BNP:  No results for input(s): BNP in the last 72 hours. CARDIAC ENZYMES: No results for input(s): CKTOTAL, CKMB, TROPONINI in the last 72 hours. Radiology Last 7 Days:  XR CHEST (2 VW)   Final Result      No acute findings          Discharge Plan   Disposition: Home    Provider Follow-Up:   Eduardo Luque MD  [facility] [address] [place] [id number] [place] [address], [address] [place] [address]  [address]  [address]  [phone]    Schedule an appointment as soon as possible for a visit in 1 week  For wound re-check    Eduardo Luque, [address] [facility] [address]  [phone]    In 2 weeks  follow up for management of diabetis, [facility] follow up. [address] [facility] [address] [facility] [address] [facility], [facility]  [address] [address] [facility] [address]  hospitals  [phone]    Schedule an appointment as soon as possible for a visit in 1 week  Follow up visit       Hospital/Incidental Findings Requiring Follow-Up:  XR CHEST (2 VW)   Final Result      No acute findings                Discharge Medications         Medication List      CONTINUE taking these medications    amLODIPine 2.5 MG tablet  Commonly known as:  NORVASC  TAKE 1 TABLET BY MOUTH EVERY DAY     aspirin 81 MG chewable tablet  Take 1 tablet by mouth daily. atorvastatin 80 MG tablet  Commonly known as:  LIPITOR  Take 1 tablet by mouth daily     B-D UF III MINI PEN NEEDLES 31G X 5 MM Misc  Generic drug:  Insulin Pen Needle  Use daily with insulin injection. blood glucose test strips strip  Commonly known as:  ONE TOUCH ULTRA TEST  Check FSBS 2-3 times daily.      carvedilol 25 MG tablet  Commonly known as:  COREG  TAKE 1 TABLET BY MOUTH TWICE A DAY WITH MEALS citalopram 40 MG tablet  Commonly known as:  CELEXA  TAKE 1 TABLET BY MOUTH EVERY DAY     gabapentin 300 MG capsule  Commonly known as:  NEURONTIN  Take 2 capsules by mouth 3 times daily for 90 days. Insulin Degludec 200 UNIT/ML Sopn  Commonly known as:  [place] FlexTouch  Inject 40 units into the skin once daily.      lisinopril 20 MG tablet  Commonly known as:  PRINIVIL;ZESTRIL  TAKE 1 TABLET BY MOUTH EVERY DAY     ONE TOUCH LANCETS Misc  1 each by Does not apply route 3 times daily     pantoprazole 40 MG tablet  Commonly known as:  PROTONIX  Take 1 tablet by mouth daily     therapeutic multivitamin-minerals tablet     Victoza 18 MG/3ML Sopn SC injection  Generic drug:  Liraglutide     Vitamin D3 50 MCG (2000 UT) Caps            Electronically signed by Bhavesh Shukla MD on 9/11/20 at 3:57 PM EDT

## 2020-09-11 NOTE — PLAN OF CARE
Problem: Skin Integrity:  Goal: Skin integrity will be maintained  Description: Skin integrity will be maintained  9/11/2020 0436 by Mikey Junior RN  Outcome: Ongoing  Note: Skin assessed. No new skin breakdown. L. Foot ace wrapped from surgery is intact. Problem: Tissue Perfusion:  Goal: Ability to maintain adequate tissue perfusion will improve  Description: Ability to maintain adequate tissue perfusion will improve  9/11/2020 0436 by Mikey Junior RN  Outcome: Ongoing  Note: Cap refill is less than 3sec on L. Foot and toes are pink/yusuf.

## 2020-09-11 NOTE — PROGRESS NOTES
Patient discharged home with sister. Went over discharge, medication, and follow up instructions. Patient  verbally acknowledged understanding with no questions. Took out IV.

## 2020-09-11 NOTE — PROGRESS NOTES
Patient admitted to pacu post LEFT FOOT INCISION AND DRAINAGE, EXTENSOR HALLUCIS LONGUS REPAIR WITH DELAYED PRIMARY CLOSURE - Left with Dr. Andrea Whitman. Patient connected to bedside monitors; VSS. Patient awake and alert with no complaints of pain.

## 2020-09-11 NOTE — PROGRESS NOTES
PACU Transfer Note    Vitals:    09/10/20 2020   BP: (!) 155/56   Pulse: 59   Resp: 18   Temp:    SpO2: 97%       In: 350 [I.V.:350]  Out: 25     Pain assessment:  none  Pain Level: 0    Report given to Receiving unit RN.    9/10/2020 8:27 PM

## 2020-09-11 NOTE — PROGRESS NOTES
Pt has been A&Ox4, VSS aside from elevated BP, treated with scheduled BP meds this morning and then PRN BP meds this afternoon per order parameters, RN kept hospitalist aware as BP esperanza. Pt had signed consent in chart, NPO since 0800, voided prior to OR, pt was picked up by OR staff at around 1915.

## 2020-09-11 NOTE — PROGRESS NOTES
Pt alert and oriented. VSS. Pt tolerating diet well. Pt denies pain at this. Pt pot-op shoe remains in place. Pt voiding adequately. Pt currently resting in bed with  Belongings and call light in reach. Pt denies request at this time. Will continue to monitor.

## 2020-09-11 NOTE — ANESTHESIA POSTPROCEDURE EVALUATION
Department of Anesthesiology  Postprocedure Note    Patient: Rivka Maxwell  MRN: [id number]  YOB: 1955  Date of evaluation: 9/10/2020  Time:  9:21 PM     Procedure Summary     Date:  09/10/20 Room / Location:  [facility] [address]    Anesthesia Start:  1921 Anesthesia Stop:  2018    Procedure:  LEFT FOOT INCISION AND DRAINAGE, EXTENSOR HALLUCIS LONGUS REPAIR WITH DELAYED PRIMARY CLOSURE (Left ) Diagnosis:       Diabetic infection of left foot ([address]      (Diabetic infection of left foot ([address] [U16.870, L08.9])    Surgeon:  Mary Juárez DPM Responsible Provider:  Jose Raul Clayton DO    Anesthesia Type:  MAC ASA Status:  3          Anesthesia Type: MAC    Steven Phase I: Steven Score: 10    Steven Phase II:      Last vitals: Reviewed and per EMR flowsheets.        Anesthesia Post Evaluation    Patient location during evaluation: PACU  Patient participation: complete - patient participated  Level of consciousness: awake and alert  Pain score: 0  Airway patency: patent  Nausea & Vomiting: no nausea and no vomiting  Complications: no  Cardiovascular status: hemodynamically stable  Respiratory status: acceptable  Hydration status: stable

## 2020-09-12 ENCOUNTER — CARE COORDINATION (OUTPATIENT)
Dept: CASE MANAGEMENT | Age: 65
End: 2020-09-12

## 2020-09-12 NOTE — CARE COORDINATION
[facility] Transitions Initial Follow Up Call    Call within 2 business days of discharge: Yes    Patient:  Fadia Fabian Patient :  1955  MRN:  [id number]   Reason for Admission:  DIABETIC FOOT INFECTION   Discharge Date:  20  RARS:  Shashi      CTC attempt to reach Pt regarding recent hospital discharge. CTC left voice recording with call back number requesting a call back. Follow up appointments:    Future Appointments   Date Time Provider Gael Turcios   2020  1:00 PM Valerio Arce MD [facility]   2020  1:00 PM Kathia Singh MD [facility]       [facility] Essence Barnes, SEJALN, RN  Care Transition Coordinator  Contact Number:  [phone]

## 2020-09-14 ENCOUNTER — CARE COORDINATION (OUTPATIENT)
Dept: CASE MANAGEMENT | Age: 65
End: 2020-09-14

## 2020-09-14 LAB — ANAEROBIC CULTURE: NORMAL

## 2020-09-14 NOTE — CARE COORDINATION
[facility] Transitions Initial Follow Up Call    20      Patient:  Liudmila Hilton Patient :  1955  MRN:  [id number]   Reason for Admission:  Diabetic foot infection   Discharge Date:  20  RARS:  Shashi      Non-face-to-face services provided:  Communication with home health agencies or other community services the patient is currently using- CTN spoke with Kiesha Lopez with [facility]. who confirms order was received and SOC was . CTC attempt to reach Pt regarding recent hospital discharge. CTC left voice recording with call back number requesting a call back. Follow up appointments:    Future Appointments   Date Time Provider Gael Turcios   2020  1:00 PM Jose Enrique Arnold MD [facility]   2020  1:00 PM MD [facility]       SEJAL ApodacaN, RN  Care Transition Coordinator  Contact Number:  [phone]

## 2020-09-15 ENCOUNTER — CARE COORDINATION (OUTPATIENT)
Dept: CASE MANAGEMENT | Age: 65
End: 2020-09-15

## 2020-09-15 NOTE — CARE COORDINATION
[facility] Transitions Initial Follow Up Call    9/15/20    Patient:  Xuan Castorena Patient :  1955  MRN:  [id number]   Reason for Admission:  Diabetic foot infection   Discharge Date:  20  RARS:  Shashi      CTC attempt to reach Pt regarding recent hospital discharge. CTC left voice recording with call back number requesting a call back. Follow up appointments:    Future Appointments   Date Time Provider [facility] Karolina   2020  1:00 PM Torrey Gutierrez MD [facility]   2020  1:00 PM MD [facility]       Barbar Doughty V. Isa Kayser, BSN, RN  Care Transition Coordinator  Contact Number:  [phone]

## 2020-09-16 NOTE — PROGRESS NOTES
Physician Progress Note      Rick Cuba  CSN #:                  [id number]  :                       1955  ADMIT DATE:       2020 9:42 AM  DISCH DATE:        2020 3:25 PM  RESPONDING  PROVIDER #:        Boris Daugherty MD          QUERY TEXT:    Dear attending provider,    Pt admitted with Left foot surgical wound dehiscence/infection and per ID &   Podiatry, also has left foot cellulitis. Pt noted to have DM 2. If possible,   please document in progress notes and discharge summary the relationship, if   any, between cellulitis and DM. The medical record reflects the following:  Risk Factors: left foot cellulitis, DM2  Clinical Indicators: fevers, local redness, Group B strep  Treatment: ID consult, podiatry consult, Left foot surgery with great toe   arthrodesis and pin placement  Options provided:  -- left foot cellulitis likely due to Diabetes  -- left foot cellulitis unrelated to Diabetes  -- Other - I will add my own diagnosis  -- Disagree - Not applicable / Not valid  -- Disagree - Clinically unable to determine / Unknown  -- Refer to Clinical Documentation Reviewer    PROVIDER RESPONSE TEXT:    Left foot cellulitis likely d/t Diabetes.     Query created by: Shanique Abdalla on 2020 3:20 PM      Electronically signed by:  Boris Daugherty MD 2020 3:57 PM

## 2020-09-17 NOTE — PROGRESS NOTES
increased drainage and   the intermittent fevers that she went to the emergency department; during   physical examination noted 10 mL of purulent drainage coming from dehisced   wound from prior surgical incision. \"  Treatment: was on Vanc and cefepime, now on Rocephin, I&D, ID and Podiatry   consult  Options provided:  -- Wound infection due to hallux arthroplasty with flexor tenotomy of digits   2, 4, and 5  -- Wound infection unrelated to hallux arthroplasty with flexor tenotomy of   digits 2, 4, and 5  -- Other - I will add my own diagnosis  -- Disagree - Not applicable / Not valid  -- Disagree - Clinically unable to determine / Unknown  -- Refer to Clinical Documentation Reviewer    PROVIDER RESPONSE TEXT:    This patient has Wound infection due to hallux arthroplasty with flexor   tenotomy of digits 2, 4, and 5 .     Query created by: Jorge Castorena on 9/17/2020 7:54 AM      Electronically signed by:  Rashaad Choi MD 9/17/2020 12:14 PM

## 2020-09-29 ENCOUNTER — TELEPHONE (OUTPATIENT)
Dept: BARIATRICS/WEIGHT MGMT | Age: 65
End: 2020-09-29

## 2020-09-29 NOTE — LETTER
Emilee Klinefelter, [facility]  [address] [place] [address] [address]  [phone]  Phone  [phone]  Fax    Concepcion Larry,    We are interested in your progress and how you have been feeling! As you know, it is important to complete regular follow-up visits to monitor your health after surgery and to insure the best success of your procedure. Please call the office today at [phone] to schedule an appointment. We look forward to seeing you! Emilee Klinefelter, [facility]  [address] [place] [address] [address]  [phone]  Phone  [phone]  Fax

## 2020-10-05 ENCOUNTER — TELEPHONE (OUTPATIENT)
Dept: INFECTIOUS DISEASES | Age: 65
End: 2020-10-05

## 2020-10-05 NOTE — TELEPHONE ENCOUNTER
Pt with L D[facility] on iv ceftriaxone, end date 10/5  Attempt to call, Pharmacist left message.   Messaged Podiatrist    Maintain PICC until decision about whether to end iv antibiotics

## 2020-10-12 LAB
FUNGUS (MYCOLOGY) CULTURE: NORMAL
FUNGUS STAIN: NORMAL

## 2020-10-12 RX ORDER — CITALOPRAM 40 MG/1
40 TABLET ORAL DAILY
Qty: 90 TABLET | Refills: 1 | Status: SHIPPED | OUTPATIENT
Start: 2020-10-12 | End: 2021-04-10

## 2020-10-19 ENCOUNTER — NURSE ONLY (OUTPATIENT)
Dept: PRIMARY CARE CLINIC | Age: 65
End: 2020-10-19
Payer: MEDICARE

## 2020-10-19 ENCOUNTER — OFFICE VISIT (OUTPATIENT)
Dept: INTERNAL MEDICINE CLINIC | Age: 65
End: 2020-10-19
Payer: MEDICARE

## 2020-10-19 VITALS
DIASTOLIC BLOOD PRESSURE: 70 MMHG | HEART RATE: 74 BPM | OXYGEN SATURATION: 98 % | BODY MASS INDEX: 37.52 KG/M2 | RESPIRATION RATE: 18 BRPM | WEIGHT: 211.8 LBS | SYSTOLIC BLOOD PRESSURE: 118 MMHG | TEMPERATURE: 97 F

## 2020-10-19 PROCEDURE — 99213 OFFICE O/P EST LOW 20 MIN: CPT | Performed by: FAMILY MEDICINE

## 2020-10-19 PROCEDURE — 99211 OFF/OP EST MAY X REQ PHY/QHP: CPT | Performed by: NURSE PRACTITIONER

## 2020-10-19 RX ORDER — LIRAGLUTIDE 6 MG/ML
1.2 INJECTION SUBCUTANEOUS DAILY
Qty: 10 PEN | Refills: 3 | Status: SHIPPED | OUTPATIENT
Start: 2020-10-19 | End: 2021-04-06 | Stop reason: SDUPTHER

## 2020-10-19 RX ORDER — PANTOPRAZOLE SODIUM 40 MG/1
40 TABLET, DELAYED RELEASE ORAL DAILY
Qty: 90 TABLET | Refills: 1 | Status: SHIPPED | OUTPATIENT
Start: 2020-10-19 | End: 2021-04-10

## 2020-10-19 RX ORDER — INSULIN DEGLUDEC 200 U/ML
INJECTION, SOLUTION SUBCUTANEOUS
Qty: 5 PEN | Refills: 1 | Status: SHIPPED | OUTPATIENT
Start: 2020-10-19 | End: 2021-01-13

## 2020-10-19 NOTE — PROGRESS NOTES
Preoperative Consultation      Orestes Mark  YOB: 1955    Date of Service:  10/19/2020    Vitals:    10/19/20 1354   BP: 118/70   Pulse: 74   Resp: 18   Temp: 97 °F (36.1 °C)   TempSrc: Temporal   SpO2: 98%   Weight: 211 lb 12.8 oz (96.1 kg)      Wt Readings from Last 2 Encounters:   10/19/20 211 lb 12.8 oz (96.1 kg)   09/09/20 217 lb 6 oz (98.6 kg)     BP Readings from Last 3 Encounters:   10/19/20 118/70   09/11/20 (!) 162/68   09/10/20 129/60        Chief Complaint   Patient presents with   Ardyth Moritz Pre-op Exam     dermal graft left foot; DOS:  10/23/20: Dr Laura Carreon  fax: [phone]     Allergies   Allergen Reactions    Doxycycline Other (See Comments)     Yeast infection     Outpatient Medications Marked as Taking for the 10/19/20 encounter (Office Visit) with Jacob Brenner MD   Medication Sig Dispense Refill    pantoprazole (PROTONIX) 40 MG tablet Take 1 tablet by mouth daily 90 tablet 1    Liraglutide (VICTOZA) 18 MG/3ML SOPN SC injection Inject 1.2 mg into the skin daily 10 pen 3    Insulin Degludec (TRESIBA FLEXTOUCH) 200 UNIT/ML SOPN Inject 44 units into the skin once daily. 5 pen 1       This patient presents to the office today for a preoperative consultation at the request of surgeon, Dr. Laura Carreon, who plans on performing application of dermal graft substitute left foot on October 23.        Known anesthesia problems: None    Patient Active Problem List   Diagnosis    Meniere's disease    Peripheral neuropathy    Depression    Mixed hyperlipidemia    Essential hypertension    Obesity    ASNHL (asymmetrical sensorineural hearing loss)    Poor compliance with medication    DM type 2 with diabetic peripheral neuropathy (HCC)    Wound infection after surgery    Diabetic ulcer of toe of left foot associated with diabetes mellitus due to underlying condition, with fat layer exposed ([address]    CKD (chronic kidney disease) stage 3, GFR 30-59 ml/min    ANGLE (obstructive sleep apnea)  Chronic GERD    Morbid obesity with BMI of 45.0-49.9, adult ([address]    Incisional hernia, without obstruction or gangrene    S/P laparoscopic sleeve gastrectomy    Anemia    Ulcer of toe of right foot, with fat layer exposed ([address]    Ulcer of toe of left foot, with fat layer exposed ([address]    Diabetic foot infection ([address]    Deep incisional surgical site infection    Group B streptococcal infection    Foot abscess, left    Tenosynovitis of foot       Past Medical History:   Diagnosis Date    Abnormal echocardiogram     25% on 3/11/14 and 50% on 3/19/14    Back pain     Cardiomyopathy ([address]     EF was 50% on 3/19/14    Chipped tooth     lower left    Dental crown present     veneers    Depressive disorder 8/13/2014    Diabetic infection of right foot ([address] 4/15/2015    Diabetic ulcer of toe of left foot associated with type 2 diabetes mellitus, with fat layer exposed ([address] 4/10/2018    Pt slipped in hot tub latter part of February and has not healed since despite topical treatment    DVT (deep venous thrombosis) ([address]     HTN (hypertension)     Hx of blood clots 2016    left leg    Ischemic cardiomyopathy 4/15/2015    Kidney disease     CHRONIC STAGE 3    Meniere's disease     Mixed hyperlipidemia 3/7/2016    Nicotine abuse     NSTEMI (non-ST elevated myocardial infarction) ([address] 3/13/2014    ANGLE (obstructive sleep apnea)     Osteomyelitis of ankle or foot, acute, left ([address] 6/4/2018    Pneumonia     PONV (postoperative nausea and vomiting)     nausea    Spinal epidural abscess 3/13/2014    Type II diabetes mellitus, uncontrolled ([address] 08/13/2014     Past Surgical History:   Procedure Laterality Date    BACK SURGERY  3/2014    DEBRIDEMENT  3/12/14    extensive debridement of bone/muscle and paraspinal empyema    FOOT DEBRIDEMENT Left 9/8/2020    LEFT FOOT DEBRIDEMENT INCISION AND DRAINAGE performed by David Vogel DPM at [facility] Left 8/28/2020    ON THE LEFT: HALLUX INTERPHALANGEAL JOINT ARTHRODESIS Y, WOUND CLOSURE, FLEXOR TENOTOMY 4TH AND 5TH DIGITS, TENOTOMY AND CAPSULOTOMY 2ND DIGIT performed by Carin Vergara DPM at [facility]  6/15/1999    complete-think right ovary in.    NASAL SEPTUM SURGERY      NERVE SURGERY Left 9/10/2020    LEFT FOOT INCISION AND DRAINAGE, EXTENSOR HALLUCIS LONGUS REPAIR WITH DELAYED PRIMARY CLOSURE performed by Carin Vergara DPM at [address]  04/02/2019    ROBOTIC ASSISTED LAPAROSCOPIC SLEEVE GASTRECTOMY, LAPAROSCOPIC REDUCIBLE INCISIONAL HERNIA REPAIR     SLEEVE GASTRECTOMY N/A 4/2/2019    ROBOTIC ASSISTED LAPAROSCOPIC SLEEVE GASTRECTOMY, LAPAROSCOPIC REDUCIBLE INCISIONAL HERNIA REPAIR performed by Jo Singh DO at [address] N/A 2/8/2019    EGD BIOPSY performed by Jo Singh DO at [address] [facility] N/A 2/8/2019    EGD POLYP ABLATION/OTHER performed by Jo Singh DO at [facility] ENDOSCOPY     Family History   Problem Relation Age of Onset    High Blood Pressure Mother     Cancer Mother     Rheum Arthritis Mother     COPD Father     Cancer Father     Osteoarthritis Neg Hx     Asthma Neg Hx     Breast Cancer Neg Hx     Diabetes Neg Hx     Heart Failure Neg Hx     High Cholesterol Neg Hx     Hypertension Neg Hx     Migraines Neg Hx     Ovarian Cancer Neg Hx     Rashes/Skin Problems Neg Hx     Seizures Neg Hx     Stroke Neg Hx     Thyroid Disease Neg Hx      Social History     Socioeconomic History    Marital status:       Spouse name: Not on file    Number of children: Not on file    Years of education: Not on file    Highest education level: Not on file   Occupational History    Not on file   Social Needs    Financial resource strain: Not on file    Food insecurity     Worry: Not on file     Inability: Not on file    Transportation needs     Medical: Not on file     Non-medical: Not on file   Tobacco Use    Smoking status: Former Smoker     Packs/day: 0.50     Years: 10.00     Pack years: 5.00     Last attempt to quit: 11/10/2013     Years since quittin.9    Smokeless tobacco: Never Used   Substance and Sexual Activity    Alcohol use: Not Currently     Alcohol/week: 0.0 standard drinks     Comment: occasionally    Drug use: No    Sexual activity: Not Currently   Lifestyle    Physical activity     Days per week: Not on file     Minutes per session: Not on file    Stress: Not on file   Relationships    Social connections     Talks on phone: Not on file     Gets together: Not on file     Attends Rastafari service: Not on file     Active member of club or organization: Not on file     Attends meetings of clubs or organizations: Not on file     Relationship status: Not on file    Intimate partner violence     Fear of current or ex partner: Not on file     Emotionally abused: Not on file     Physically abused: Not on file     Forced sexual activity: Not on file   Other Topics Concern    Not on file   Social History Narrative    Not on file       Review of Systems  A comprehensive review of systems was negative except for what was noted in the HPI. Physical Exam   Constitutional: She is oriented to person, place, and time. She appears well-developed and well-nourished. No distress. HENT:   Head: Normocephalic and atraumatic. Mouth/Throat: Uvula is midline, oropharynx is clear and moist and mucous membranes are normal.   Eyes: Conjunctivae and EOM are normal. Pupils are equal, round, and reactive to light. Neck: Trachea normal and normal range of motion. Neck supple. No JVD present. Carotid bruit is not present. No mass and no thyromegaly present. Cardiovascular: Normal rate, regular rhythm, normal heart sounds and intact distal pulses. Exam reveals no gallop and no friction rub. No murmur heard.   Pulmonary/Chest: Effort normal and breath sounds normal. No respiratory distress. She has no wheezes. She has no rales. Abdominal: Soft. Normal aorta and bowel sounds are normal. She exhibits no distension and no mass. There is no hepatosplenomegaly. No tenderness. Musculoskeletal: She exhibits no edema and no tenderness. Neurological: She is alert and oriented to person, place, and time. She has normal strength. No cranial nerve deficit or sensory deficit. Coordination and gait normal.   Skin: Skin is warm and dry. No rash noted. No erythema. Psychiatric: She has a normal mood and affect. Her behavior is normal.            Assessment:       75 y.o. patient with planned surgery as above. Known risk factors for perioperative complications: Diabetes mellitus, Hypertension, Obstructive sleep apnea        Plan:     1. Preoperative workup as follows: ECG, hemoglobin, hematocrit, electrolytes, creatinine, glucose  2. Change in medication regimen before surgery: Take lisinopril,amlodopine and carvedilol on morning of surgery with sip of water, and hold all other medications until after surgery, Discontinue ASA 7 days before surgery  3. Prophylaxis for cardiac events with perioperative beta-blockers: Currently taking  carvedilol  4. No contraindications to planned surgery

## 2020-10-20 ENCOUNTER — TELEPHONE (OUTPATIENT)
Dept: INFECTIOUS DISEASES | Age: 65
End: 2020-10-20

## 2020-10-20 LAB — SARS-COV-2: NOT DETECTED

## 2020-10-20 NOTE — TELEPHONE ENCOUNTER
Spoke with [facility] @ [facility]. Patient refused labs on Monday. [facility] stated the wound looks better, it still remains warm around site, wound is not closed, no purulent drainage. Patient is scheduled for a graft procedure on 10/23/2020.

## 2020-10-21 ENCOUNTER — TELEPHONE (OUTPATIENT)
Dept: INFECTIOUS DISEASES | Age: 65
End: 2020-10-21

## 2020-10-21 RX ORDER — ATORVASTATIN CALCIUM 80 MG/1
80 TABLET, FILM COATED ORAL DAILY
Qty: 90 TABLET | Refills: 1 | Status: SHIPPED | OUTPATIENT
Start: 2020-10-21 | End: 2021-04-28

## 2020-10-21 RX ORDER — INSULIN GLARGINE 100 [IU]/ML
44 INJECTION, SOLUTION SUBCUTANEOUS NIGHTLY
Qty: 40 ML | Refills: 1 | Status: SHIPPED | OUTPATIENT
Start: 2020-10-21 | End: 2021-01-13

## 2020-10-21 RX ORDER — GABAPENTIN 300 MG/1
600 CAPSULE ORAL 3 TIMES DAILY
Qty: 540 CAPSULE | Refills: 0 | Status: SHIPPED | OUTPATIENT
Start: 2020-10-21 | End: 2021-11-09

## 2020-10-21 RX ORDER — PEN NEEDLE, DIABETIC 31 GX5/16"
NEEDLE, DISPOSABLE MISCELLANEOUS
Qty: 100 EACH | Refills: 1 | Status: SHIPPED | OUTPATIENT
Start: 2020-10-21 | End: 2020-11-30 | Stop reason: SDUPTHER

## 2020-10-21 NOTE — PROGRESS NOTES
[facility] PRE-SURGICAL TESTING INSTRUCTIONS                              PRIOR TO PROCEDURE DATE:  1. Please follow any guidelines/instructions prior to your procedure as advised by your surgeon. 2. Arrange for someone to drive you home and be with you for the first 24 hours after discharge for your safety after your procedure for which you received sedation. Ensure it is someone we can share information with regarding your discharge. 3. You must contact your surgeon for instructions IF:   You are taking any blood thinners, aspirin, anti-inflammatory or vitamin E.   There is a change in your physical condition such as a cold, fever, rash, cuts, sores or any other infection, especially near your surgical site. 4. Do not drink alcohol the day before or day of your procedure. 5. A Pre-op History and Physical for surgery MUST be completed by your Physician or Urgent Care within 30 days of your procedure date. Please bring a copy with you on the day of your procedure and along with any other testing performed. THE DAY OF YOUR PROCEDURE:  1. Follow instructions for ARRIVAL TIME as DIRECTED BY YOUR SURGEON. I    2. Enter the MAIN entrance from Site Tour and follow the signs to the free [employer] or [employer] parking (offered free of charge 6am-5pm). 3. Enter the Main Entrance of the hospital (do not enter from the lower level of the parking garage). Upon entrance, check in with the  at the main desk on your left. If no one is available at the desk, proceed into the [facility] Waiting Room and go through the door directly into the [facility]. There is a Check-in desk ACROSS from Room 5 (marked with a sign hanging from the ceiling). The phone number for the surgery center is [phone]. 4. Please call [phone] option #2 option #2 if you have not been preregistered yet. On the day of your procedure bring your insurance card and photo ID.  You will be room.    13. If you have a Living Will or Durable Power of , please bring a copy on the day of your procedure. 15. With your permission, one family member may accompany you while you are being prepared for surgery. Once you are ready, additional family members may join you. HOW WE KEEP YOU SAFE and WORK TO PREVENT SURGICAL SITE INFECTIONS:  1. Health care workers should always check your ID bracelet to verify your name and birth date. You will be asked many times to state your name, date of birth, and allergies. 2. Health care workers should always clean their hands with soap or alcohol gel before providing care to you. It is okay to ask anyone if they cleaned their hands before they touch you. 3. You will be actively involved in verifying the type of procedure you are having and ensuring the correct surgical site. This will be confirmed multiple times prior to your procedure. Do NOT tim your surgery site UNLESS instructed to by your surgeon. 4. Do not shave or wax for 72 hours prior to procedure near your operative site. Shaving with a razor can irritate your skin and make it easier to develop an infection. On the day of your procedure, any hair that needs to be removed near the surgical site will be clipped by a healthcare worker using a special clippers designed to avoid skin irritation. 5. When you are in the operating room, your surgical site will be cleansed with a special soap, and in most cases, you will be given an antibiotic before the surgery begins. What to expect AFTER YOUR PROCEDURE:  1. Immediately following your procedure, your will be taken to the PACU for the first phase of your recovery. Your nurse will help you recover from any potential side effects of anesthesia, such as extreme drowsiness, changes in your vital signs or breathing patterns. Nausea, headache, muscle aches, or sore throat may also occur after anesthesia.   Your nurse will help you manage these potential side effects. 2. For comfort and safety, arrange to have someone at home with you for the first 24 hours after discharge. 3. You and your family will be given written instructions about your diet, activity, dressing care, medications, and return visits. 4. Once at home, should issues with nausea, pain, or bleeding occur, or should you notice any signs of infection, you should call your surgeon. 5. Always clean your hands before and after caring for your wound. Do not let your family touch your surgery site without cleaning their hands. 6. Narcotic pain medications can cause significant constipation. You may want to add a stool softener to your postoperative medication schedule or speak to your surgeon on how best to manage this SIDE EFFECT. SPECIAL INSTRUCTIONS   Thank you for allowing us to care for you. We strive to exceed your expectations in the delivery of care and service provided to you and your family. If you need to contact us for any reason, please call us at [phone]    Instructions reviewed with patient during preadmission testing phone interview. Niecy Kerr. 10/21/2020 .9:52 AM      ADDITIONAL EDUCATIONAL INFORMATION REVIEWED PER PHONE WITH YOU AND/OR YOUR FAMILY:  Yes Antibacterial Soap

## 2020-10-21 NOTE — TELEPHONE ENCOUNTER
Called pt -   She reports that pt is doing great, has small wound.   Labs sent today     Take med - last day today  Had labs sent today  If labs ok, will have PICC removed if labs normal.

## 2020-10-21 NOTE — TELEPHONE ENCOUNTER
NOTE:  Eleuterio Coles not covered per [facility] fax. They suggested to try Lantus.

## 2020-10-21 NOTE — RESULT ENCOUNTER NOTE

## 2020-10-22 NOTE — TELEPHONE ENCOUNTER
L DFI, admit [facility] 9/8, abscess, L hallux surg. Ci;t + GBS  Seen by Dr Saran Greenberg, rx iv ceftriaxone x 4 weeks    Iv antbiotics extended, has completed 6 weeks. L foot with no sign infection.   Seen note 10/21  Last labs with ESR 63, CPR 8 (<5)    Will end iv / remove PICC  Has f/u with Dr Kimberly James, Podiatry

## 2020-10-23 ENCOUNTER — TELEPHONE (OUTPATIENT)
Dept: INFECTIOUS DISEASES | Age: 65
End: 2020-10-23

## 2020-10-23 ENCOUNTER — HOSPITAL ENCOUNTER (OUTPATIENT)
Age: 65
Setting detail: OUTPATIENT SURGERY
Discharge: HOME | End: 2020-10-23
Attending: PODIATRIST | Admitting: PODIATRIST
Payer: MEDICARE

## 2020-10-23 VITALS
WEIGHT: 210 LBS | TEMPERATURE: 98.2 F | HEIGHT: 63 IN | OXYGEN SATURATION: 97 % | RESPIRATION RATE: 18 BRPM | DIASTOLIC BLOOD PRESSURE: 96 MMHG | HEART RATE: 71 BPM | BODY MASS INDEX: 37.21 KG/M2 | SYSTOLIC BLOOD PRESSURE: 170 MMHG

## 2020-10-23 PROCEDURE — 2500000003 HC RX 250 WO HCPCS: Performed by: PODIATRIST

## 2020-10-23 PROCEDURE — 7100000010 HC PHASE II RECOVERY - FIRST 15 MIN: Performed by: PODIATRIST

## 2020-10-23 PROCEDURE — 2709999900 HC NON-CHARGEABLE SUPPLY: Performed by: PODIATRIST

## 2020-10-23 PROCEDURE — 3600000004 HC SURGERY LEVEL 4 BASE: Performed by: PODIATRIST

## 2020-10-23 PROCEDURE — 2580000003 HC RX 258: Performed by: PODIATRIST

## 2020-10-23 PROCEDURE — 7100000011 HC PHASE II RECOVERY - ADDTL 15 MIN: Performed by: PODIATRIST

## 2020-10-23 PROCEDURE — 3600000014 HC SURGERY LEVEL 4 ADDTL 15MIN: Performed by: PODIATRIST

## 2020-10-23 DEVICE — ALLOGRAFT TISS CRYOPRESERVED 2X2 CM STRAVIX: Type: IMPLANTABLE DEVICE | Site: FOOT | Status: FUNCTIONAL

## 2020-10-23 RX ORDER — BUPIVACAINE HYDROCHLORIDE 5 MG/ML
INJECTION, SOLUTION EPIDURAL; INTRACAUDAL PRN
Status: DISCONTINUED | OUTPATIENT
Start: 2020-10-23 | End: 2020-10-23 | Stop reason: ALTCHOICE

## 2020-10-23 RX ORDER — MAGNESIUM HYDROXIDE 1200 MG/15ML
LIQUID ORAL CONTINUOUS PRN
Status: COMPLETED | OUTPATIENT
Start: 2020-10-23 | End: 2020-10-23

## 2020-10-23 ASSESSMENT — PAIN SCALES - GENERAL: PAINLEVEL_OUTOF10: 0

## 2020-10-23 NOTE — PROCEDURES
Order noted for D/C of RAJNI PICC, line removed per protocol, line noted to be 43 cm site unremarkable,  vaseline dressing applied, pt instructed to removed 10/24/20 and monitor site for signs of infection. Pt v/u.

## 2020-10-23 NOTE — OP NOTE
Operative Note      Patient: Annabelle Cordova  YOB: 1955  MRN: [id number]    Date of Procedure: 10/23/2020    Pre-Op Diagnosis: Dehiscence of surgical wound, subsequent encounter [T81.31XD] Type 2 diabetes mellitus with foot ulcer, unspecified whether long term insulin use ([facility] [G14.575, L97.509]    Post-Op Diagnosis: Same    Procedure(s): On the Left    15004 Surgical preparation of wound bed  55051 Application of dermal graft substitute    Surgeon(s):  Silva Johnson DPM    Assistant:   Resident: Hilda Mason DPM; Monse Husain MS IV    Anesthesia: Local anesthetic     Hemostasis: Anatomic dissection, direct pressure     Estimated Blood Loss (mL): less than 50      Materials: 3-0 Nylon     Injectables: Pre: 20 cc 0.5% marcaine plain; Post: None     Complications: None     Specimens:   * No specimens in log *    Implants: Stravix 2 x 2 cm amniotic tissue graft      Drains: * No LDAs found *    Findings:  Intra-operatively dehiscence surgical wound down to and including deep fascial layer. Following debridement wound measured 3.0 x 0.75 x 0.75 cm with healthy bleeding tissue noted    Indications for the procedure: The patient presents to the operating room today due to dehiscence of surgical wound to the dorsum of the right foot with a history of type 2 diabetes. Patient had previously had surgical correction of arthritis to the left hallux and resection of sesamoid within the FHL tendon. During her postoperative period she had walked all over her initial incision and had subsequently been infected. She was then treated with I&D of the left foot and placed on IV antibiotics per infectious disease recommendations. Since then, the wound has granulated well but yet has become stagnant with wound healing and at this time is determined that the patient would benefit from wound bed preparation and graft application.   All potential risk, benefits and complications were discussed with the patient prior to scheduling the procedure. All patient's questions were answered no guarantees were given. The patient wished to proceed with surgery and written informed consent was obtained. Detailed Description of Procedure: The patient was brought from the preoperative area into the operating room and placed on the operating room table in the supine position with care to pad all bony prominences. Next, a preoperative anesthetic block consisting of 20 cc of 0.5% Marcaine plain was injected in a left ankle block fashion. Next, the left lower extremity was then scrubbed, prepped, and draped in the usual sterile fashion. A timeout was then performed. The patient, procedure, and operative site were identified and confirmed. Procedure #1: Surgical preparation of wound bed, Left foot: At this time, attention was directed towards the dorsal aspect of the left foot where a previous surgically dehisced wound was noted overlying the first ray region with fibrous tissue noted to the wound bed. Next, utilizing a curette the nonviable soft tissue was excisionally debrided down to and including the deep fascial layer. Next, utilizing a #15 blade the skin edges were debrided down to healthy bleeding soft tissue. Next, the wound was then irrigated with copious amounts of normal sterile saline. Upon reinspection of the wound bed a portion of the lateral aspect of the extensor hallucis longus tendon was exposed however it was noted to be covered within its tendon sheath. No underlying purulent drainage or abscess was encountered. Procedure #2: Application of dermal graft substitute, Left foot: At this time, attention was then directed towards the back table where a [employer] Stravix 2 x 2 centimeter allograft was prepared according to the recommended manufactures instructions. Next, utilizing a #15 blade the allograft was pie crusted to prevent postoperative hematoma/seroma formation.   Next, attention was then directed back towards the wound bed where the allograft was sutured to the wound bed utilizing 3-0 nylon in simple interrupted suture fashion. Any excess graft was trimmed utilizing a pickups and iris scissors. Next, the wound was then irrigated with copious amounts of normal sterile saline. Next, the wound bed was then dressed with Adaptic, DSD, sterile cast padding and Ace bandage. End of procedure: The patient tolerated the procedure and anesthesia well. The patient was transferred from the operating room to PACU with vital signs stable vascular status intact to left lower extremity. Following a period of postoperative monitoring, the patient will be discharged home with written and oral wound care instructions per Dr. Fransico Perdomo. The patient is to follow-up with Dr. Fransico Perdomo within the next 5 to 7 days for her first postoperative visit. The patient is to keep the dressing clean, dry, and intact. The patient is to call if any complications occur. The patient will be dispensed a postoperative shoe in PACU and is heel weightbearing as tolerated to the left foot. Dictated on behalf of Dr. Fransico Perdomo, D.P.M.     Electronically signed by Vijay Rodriguez DPM on 10/23/2020 at 10:40 AM    Dr. Jacque Mendoza, [address] [place] [address]   Office: [phone]  Cell: [phone]

## 2020-10-23 NOTE — PROGRESS NOTES
Spoke with Dr. Malick Tillman office, Janeen De Oliveira, per Dr. Malick Tillman office and per his note on the patient it will be ok to remove patients PICC after procedure today.

## 2020-10-23 NOTE — H&P
Migdalia Hodge    [id number]    [facility] Same Day Surgery Update H & P  Department of General Surgery   Surgical Service   Pre-operative History and Physical  Last H & P within the last 30 days. DIAGNOSIS:   Dehiscence of surgical wound, subsequent encounter [T81.31XD]  Type 2 diabetes mellitus with foot ulcer, unspecified whether long term insulin use ([address] [E11.621, L97.509]    Procedure(s):  ON THE LEFT: SURGICAL PREPARATION OF WOUND BED, APPLICATION OF DERMAL GRAFT SUBSTITUTE     HISTORY OF PRESENT ILLNESS:   Patient with diabetic ulcer of the left foot presents today for the above procedure. Covid 19:  Patient denies fever, chills, cough or known exposure to Covid-19.        Past Medical History:        Diagnosis Date    Abnormal echocardiogram     25% on 3/11/14 and 50% on 3/19/14    Back pain     Cardiomyopathy ([address]     EF was 50% on 3/19/14    Chipped tooth     lower left    Dental crown present     veneers    Depressive disorder 8/13/2014    Diabetic infection of right foot ([address] 4/15/2015    Diabetic ulcer of toe of left foot associated with type 2 diabetes mellitus, with fat layer exposed ([address] 4/10/2018    Pt slipped in hot tub latter part of February and has not healed since despite topical treatment    DVT (deep venous thrombosis) ([address]     HTN (hypertension)     Hx of blood clots 2016    left leg    Ischemic cardiomyopathy 4/15/2015    Kidney disease     CHRONIC STAGE 3    Meniere's disease     Mixed hyperlipidemia 3/7/2016    Nicotine abuse     NSTEMI (non-ST elevated myocardial infarction) ([address] 3/13/2014    ANGLE (obstructive sleep apnea)     Osteomyelitis of ankle or foot, acute, left (HCC) 6/4/2018    Pneumonia     PONV (postoperative nausea and vomiting)     nausea    Spinal epidural abscess 3/13/2014    Type II diabetes mellitus, uncontrolled ([address] 08/13/2014     Past Surgical History:        Procedure Laterality Date    BACK SURGERY  3/2014    DEBRIDEMENT  3/12/14    extensive debridement of bone/muscle and paraspinal empyema    DILATION AND CURETTAGE OF UTERUS      ENDOSCOPY, COLON, DIAGNOSTIC      FOOT DEBRIDEMENT Left 9/8/2020    LEFT FOOT DEBRIDEMENT INCISION AND DRAINAGE performed by Gely Abad DPM at [address] [facility] [address] TOE SURGERY Left 8/28/2020    ON THE LEFT: [address] [facility] [address], WOUND CLOSURE, FLEXOR TENOTOMY 4TH AND 5TH DIGITS, TENOTOMY AND CAPSULOTOMY 2ND DIGIT [facility]  [facility]    complete-think right ovary in.    NASAL SEPTUM SURGERY      NERVE SURGERY Left 9/10/2020    LEFT FOOT INCISION AND DRAINAGE, EXTENSOR HALLUCIS LONGUS REPAIR WITH DELAYED PRIMARY CLOSURE performed by Gely Abad DPM at [place] shut behind right ear    SLEEVE GASTRECTOMY  04/02/2019    ROBOTIC ASSISTED LAPAROSCOPIC SLEEVE GASTRECTOMY, LAPAROSCOPIC REDUCIBLE INCISIONAL HERNIA REPAIR     SLEEVE GASTRECTOMY N/A 4/2/2019    ROBOTIC ASSISTED LAPAROSCOPIC SLEEVE GASTRECTOMY, LAPAROSCOPIC REDUCIBLE INCISIONAL HERNIA REPAIR performed by Ruthann Pan DO at [facility] [facility] N/A 2/8/2019    EGD BIOPSY performed by Ruthann Pan DO at [place] [address] N/A 2/8/2019    EGD POLYP ABLATION/OTHER performed by Ruthann Pan DO at [facility] ENDOSCOPY     Past Social History:  Social History     Socioeconomic History    Marital status:       Spouse name: None    Number of children: None    Years of education: None    Highest education level: None   Occupational History    None   Social Needs    Financial resource strain: None    Food insecurity     Worry: None     Inability: None    Transportation needs     Medical: None     Non-medical: None   Tobacco Use    Smoking status: Former Smoker     Packs/day: 0.50     Years: 10.00     Pack years: 5.00     Last attempt to quit: 11/10/2013     Years since quittin.9    Smokeless tobacco: Never Used   Substance and Sexual Activity    Alcohol use: Not Currently     Alcohol/week: 0.0 standard drinks     Comment: occasionally    Drug use: No    Sexual activity: Not Currently   Lifestyle    Physical activity     Days per week: None     Minutes per session: None    Stress: None   Relationships    Social connections     Talks on phone: None     Gets together: None     Attends Gnosticism service: None     Active member of club or organization: None     Attends meetings of clubs or organizations: None     Relationship status: None    Intimate partner violence     Fear of current or ex partner: None     Emotionally abused: None     Physically abused: None     Forced sexual activity: None   Other Topics Concern    None   Social History Narrative    None         Medications Prior to Admission:      Prior to Admission medications    Medication Sig Start Date End Date Taking? Authorizing Provider   pantoprazole (PROTONIX) 40 MG tablet Take 1 tablet by mouth daily 10/19/20  Yes Simi Parham MD   Liraglutide (VICTOZA) 18 MG/3ML SOPN SC injection Inject 1.2 mg into the skin daily 10/19/20  Yes Simi Parham MD   Insulin Degludec (TRESIBA FLEXTOUCH) 200 UNIT/ML SOPN Inject 44 units into the skin once daily. 10/19/20  Yes Simi Parham MD   citalopram (CELEXA) 40 MG tablet Take 1 tablet by mouth daily 10/12/20  Yes Simi Parham MD   amLODIPine (NORVASC) 2.5 MG tablet TAKE 1 TABLET BY MOUTH EVERY DAY 8/3/20  Yes Celestina Woodward MD   carvedilol (COREG) 25 MG tablet TAKE 1 TABLET BY MOUTH TWICE A DAY WITH MEALS 8/3/20  Yes Celsetina Woodward MD   B-D UF III MINI PEN NEEDLES 31G X 5 MM MISC USE DAILY WITH INSULIN INJECTION.  10/21/20   Simi Parham MD   atorvastatin (LIPITOR) 80 MG tablet Take 1 tablet by mouth daily 10/21/20   Simi Parham MD   gabapentin (NEURONTIN) 300 MG capsule Take 2 capsules by mouth 3 times daily for 90

## 2020-10-23 NOTE — PROGRESS NOTES
Ambulatory Surgery/Procedure Discharge Note    Vitals:    10/23/20 1043   BP: (!) 170/96   Pulse: 71   Resp: 18   Temp: 98.2 °F (36.8 °C)   SpO2: 97%       No intake/output data recorded. Restroom use offered before discharge. yes  Pain assessment:  Pain Level: 0        Patient discharged to home/self care.  Patient discharged via wheel chair by transporter to waiting family/S.O.       10/23/2020 11:22 AM

## 2020-10-23 NOTE — BRIEF OP NOTE
Brief Postoperative Note      Patient: Heri Lee  YOB: 1955  MRN: [id number]    Date of Procedure: 10/23/2020    Pre-Op Diagnosis: Dehiscence of surgical wound, subsequent encounter [T81.31XD] Type 2 diabetes mellitus with foot ulcer, unspecified whether long term insulin use ([facility] [E11.621, L97.509]    Post-Op Diagnosis: Same       Procedure(s):  ON THE LEFT: SURGICAL PREPARATION OF WOUND BED, APPLICATION OF DERMAL GRAFT SUBSTITUTE    Surgeon(s):  Zara Burger DPM    Assistant:  Resident: Jacques Marinelli DPM    Anesthesia: Local anesthetic    Hemostasis: Anatomic dissection, direct pressure    Estimated Blood Loss (mL): less than 50     Materials: 3-0 Nylon    Injectables: Pre: 20 cc 0.5% marcaine plain; Post: None    Complications: None    Specimens:   * No specimens in log *    Implants: Stravix 2 x 2 cm amniotic tissue graft      Drains: * No LDAs found *    Findings: Intra-operatively dehiscence surgical wound down to and including deep fascial layer.  Following debridement wound measured 3.0 x 0.75 x 0.75 cm with healthy bleeding tissue noted    Electronically signed by Jacques Marinelli DPM on 10/23/2020 at 10:37 AM

## 2020-10-27 LAB
AFB CULTURE (MYCOBACTERIA): NORMAL
AFB SMEAR: NORMAL

## 2020-10-29 ENCOUNTER — PATIENT MESSAGE (OUTPATIENT)
Dept: INTERNAL MEDICINE CLINIC | Age: 65
End: 2020-10-29

## 2020-10-29 RX ORDER — TRAZODONE HYDROCHLORIDE 100 MG/1
100 TABLET ORAL NIGHTLY PRN
Qty: 30 TABLET | Refills: 0 | Status: SHIPPED | OUTPATIENT
Start: 2020-10-29 | End: 2020-11-23

## 2020-10-29 NOTE — TELEPHONE ENCOUNTER
From: Johnathon León  To: Kayla Crews MD  Sent: 10/29/2020 1:57 AM EDT  Subject: Prescription Question    10/28/20    Hello. .... When I was in last week I spoke with Dr. Tony English regarding having some trouble getting to sleep. I did buy the Benadryl and I have been taking two tablets plus melatonin and its just really is not working for. Its very difficult for me to get to sleep. I was wondering if Dr. Tony English can put a prescription In for Ambien, so that I may be able to try and to see if that helps me. Thank you.     Fredy Wilson

## 2020-11-21 RX ORDER — LISINOPRIL 20 MG/1
TABLET ORAL
Qty: 90 TABLET | Refills: 1 | Status: ON HOLD | OUTPATIENT
Start: 2020-11-21 | End: 2021-03-15 | Stop reason: HOSPADM

## 2020-11-23 RX ORDER — FUROSEMIDE 20 MG/1
TABLET ORAL
Qty: 60 TABLET | Refills: 3 | OUTPATIENT
Start: 2020-11-23

## 2020-11-23 RX ORDER — TRAZODONE HYDROCHLORIDE 100 MG/1
100 TABLET ORAL NIGHTLY PRN
Qty: 90 TABLET | Refills: 0 | Status: SHIPPED | OUTPATIENT
Start: 2020-11-23 | End: 2021-02-18

## 2020-11-24 ENCOUNTER — TELEPHONE (OUTPATIENT)
Dept: PRIMARY CARE CLINIC | Age: 65
End: 2020-11-24

## 2020-11-24 NOTE — TELEPHONE ENCOUNTER
----- Message from Rachel Olszewski sent at 11/24/2020 10:42 AM EST -----  Subject: Message to Provider    QUESTIONS  Information for Provider? dm follow up    joints swelling   screened green . wants to reschedule appt from 12/24 for something sooner   . willing to see NP  ---------------------------------------------------------------------------  --------------  CALL BACK INFO  What is the best way for the office to contact you? OK to leave message on   voicemail  Preferred Call Back Phone Number? [phone]  ---------------------------------------------------------------------------  --------------  SCRIPT ANSWERS  Relationship to Patient?  Self

## 2020-11-24 NOTE — TELEPHONE ENCOUNTER
Called pt. Left detailed VM:      We can get her in sooner. .. HOWEVER:     Pt's A1C was last done 9/8/20. She is on Medicare. Medicare has started refusing A1C orders if they are less than 3 months. If she wants to chance it and possibly pay for the test herself, she can come in earlier.

## 2020-11-30 RX ORDER — PEN NEEDLE, DIABETIC 31 GX5/16"
NEEDLE, DISPOSABLE MISCELLANEOUS
Qty: 100 EACH | Refills: 1 | Status: SHIPPED | OUTPATIENT
Start: 2020-11-30 | End: 2021-04-05

## 2020-11-30 NOTE — TELEPHONE ENCOUNTER
Medication:   Requested Prescriptions      No prescriptions requested or ordered in this encounter        Last Filled:      Patient Phone Number: [phone] (home)     Last appt: Visit date not found   Next appt: 12/23/2020    Last OARRS: No flowsheet data found.

## 2020-12-29 ENCOUNTER — TELEPHONE (OUTPATIENT)
Dept: PRIMARY CARE CLINIC | Age: 65
End: 2020-12-29

## 2020-12-29 ENCOUNTER — OFFICE VISIT (OUTPATIENT)
Dept: PRIMARY CARE CLINIC | Age: 65
End: 2020-12-29
Payer: MEDICARE

## 2020-12-29 PROCEDURE — 99211 OFF/OP EST MAY X REQ PHY/QHP: CPT | Performed by: NURSE PRACTITIONER

## 2020-12-29 NOTE — PROGRESS NOTES
Swetha Jurado received a viral test for COVID-19. They were educated on isolation and quarantine as appropriate. For any symptoms, they were directed to seek care from their PCP, given contact information to establish with a doctor, directed to an urgent care or the emergency room.

## 2020-12-29 NOTE — TELEPHONE ENCOUNTER
Called and spoke with pt.   Gave her # to call for [facility] flu/Covid clinic
Pt called asking for a referral to go get Covid test please call once it is dome so she can go she says she would like to have it done asap [phone]
I have reviewed and confirmed nurses' notes...

## 2020-12-31 LAB — SARS-COV-2, NAA: NOT DETECTED

## 2021-01-13 RX ORDER — INSULIN GLARGINE 100 [IU]/ML
44 INJECTION, SOLUTION SUBCUTANEOUS NIGHTLY
Qty: 40 ML | Refills: 1 | Status: SHIPPED | OUTPATIENT
Start: 2021-01-13 | End: 2021-04-06 | Stop reason: SDUPTHER

## 2021-02-19 RX ORDER — TRAZODONE HYDROCHLORIDE 100 MG/1
100 TABLET ORAL NIGHTLY PRN
Qty: 90 TABLET | Refills: 0 | Status: SHIPPED | OUTPATIENT
Start: 2021-02-19 | End: 2021-04-06

## 2021-03-11 ENCOUNTER — APPOINTMENT (OUTPATIENT)
Dept: GENERAL RADIOLOGY | Age: 66
DRG: 871 | End: 2021-03-11
Payer: MEDICARE

## 2021-03-11 ENCOUNTER — HOSPITAL ENCOUNTER (INPATIENT)
Age: 66
LOS: 3 days | Discharge: HOME | DRG: 871 | End: 2021-03-15
Attending: EMERGENCY MEDICINE | Admitting: STUDENT IN AN ORGANIZED HEALTH CARE EDUCATION/TRAINING PROGRAM
Payer: MEDICARE

## 2021-03-11 ENCOUNTER — APPOINTMENT (OUTPATIENT)
Dept: CT IMAGING | Age: 66
DRG: 871 | End: 2021-03-11
Payer: MEDICARE

## 2021-03-11 DIAGNOSIS — N18.31 STAGE 3A CHRONIC KIDNEY DISEASE (HCC): Chronic | ICD-10-CM

## 2021-03-11 DIAGNOSIS — A41.9 SEPTICEMIA (HCC): ICD-10-CM

## 2021-03-11 DIAGNOSIS — R77.8 ELEVATED TROPONIN: ICD-10-CM

## 2021-03-11 DIAGNOSIS — I10 ESSENTIAL HYPERTENSION: Chronic | ICD-10-CM

## 2021-03-11 DIAGNOSIS — K52.9 COLITIS: ICD-10-CM

## 2021-03-11 DIAGNOSIS — E08.10 DIABETIC KETOACIDOSIS WITHOUT COMA ASSOCIATED WITH DIABETES MELLITUS DUE TO UNDERLYING CONDITION (HCC): Primary | ICD-10-CM

## 2021-03-11 LAB
A/G RATIO: 1.1 (ref 1.1–2.2)
ALBUMIN SERPL-MCNC: 3.4 G/DL (ref 3.4–5)
ALP BLD-CCNC: 112 U/L (ref 40–129)
ALT SERPL-CCNC: 29 U/L (ref 10–40)
ANION GAP SERPL CALCULATED.3IONS-SCNC: 17 MMOL/L (ref 3–16)
ANION GAP SERPL CALCULATED.3IONS-SCNC: 18 MMOL/L (ref 3–16)
AST SERPL-CCNC: 50 U/L (ref 15–37)
BACTERIA: ABNORMAL /HPF
BASOPHILS ABSOLUTE: 0.1 K/UL (ref 0–0.2)
BASOPHILS RELATIVE PERCENT: 0.2 %
BETA-HYDROXYBUTYRATE: 0.31 MMOL/L (ref 0–0.27)
BILIRUB SERPL-MCNC: 0.4 MG/DL (ref 0–1)
BILIRUBIN URINE: NEGATIVE
BLOOD, URINE: ABNORMAL
BUN BLDV-MCNC: 45 MG/DL (ref 7–20)
BUN BLDV-MCNC: 47 MG/DL (ref 7–20)
CALCIUM SERPL-MCNC: 8.4 MG/DL (ref 8.3–10.6)
CALCIUM SERPL-MCNC: 8.6 MG/DL (ref 8.3–10.6)
CHLORIDE BLD-SCNC: 89 MMOL/L (ref 99–110)
CHLORIDE BLD-SCNC: 95 MMOL/L (ref 99–110)
CHP ED QC CHECK: NORMAL
CLARITY: ABNORMAL
CO2: 20 MMOL/L (ref 21–32)
CO2: 20 MMOL/L (ref 21–32)
COLOR: YELLOW
COMMENT UA: ABNORMAL
CREAT SERPL-MCNC: 3.1 MG/DL (ref 0.6–1.2)
CREAT SERPL-MCNC: 3.5 MG/DL (ref 0.6–1.2)
EOSINOPHILS ABSOLUTE: 0 K/UL (ref 0–0.6)
EOSINOPHILS RELATIVE PERCENT: 0 %
EPITHELIAL CELLS, UA: ABNORMAL /HPF (ref 0–5)
GFR AFRICAN AMERICAN: 16
GFR AFRICAN AMERICAN: 18
GFR NON-AFRICAN AMERICAN: 13
GFR NON-AFRICAN AMERICAN: 15
GLOBULIN: 3.1 G/DL
GLUCOSE BLD-MCNC: 496 MG/DL (ref 70–99)
GLUCOSE BLD-MCNC: 574 MG/DL (ref 70–99)
GLUCOSE BLD-MCNC: 648 MG/DL (ref 70–99)
GLUCOSE BLD-MCNC: >600 MG/DL (ref 70–99)
GLUCOSE URINE: >=1000 MG/DL
HCT VFR BLD CALC: 43.3 % (ref 36–48)
HEMOGLOBIN: 13.9 G/DL (ref 12–16)
KETONES, URINE: NEGATIVE MG/DL
LACTIC ACID: 5.1 MMOL/L (ref 0.4–2)
LEUKOCYTE ESTERASE, URINE: NEGATIVE
LIPASE: 60 U/L (ref 13–60)
LYMPHOCYTES ABSOLUTE: 1.1 K/UL (ref 1–5.1)
LYMPHOCYTES RELATIVE PERCENT: 4.1 %
MCH RBC QN AUTO: 30.2 PG (ref 26–34)
MCHC RBC AUTO-ENTMCNC: 32.2 G/DL (ref 31–36)
MCV RBC AUTO: 93.8 FL (ref 80–100)
MICROSCOPIC EXAMINATION: YES
MONOCYTES ABSOLUTE: 1 K/UL (ref 0–1.3)
MONOCYTES RELATIVE PERCENT: 3.9 %
NEUTROPHILS ABSOLUTE: 24.9 K/UL (ref 1.7–7.7)
NEUTROPHILS RELATIVE PERCENT: 91.8 %
NITRITE, URINE: NEGATIVE
PDW BLD-RTO: 14.1 % (ref 12.4–15.4)
PERFORMED ON: ABNORMAL
PERFORMED ON: ABNORMAL
PH UA: 5.5 (ref 5–8)
PLATELET # BLD: 227 K/UL (ref 135–450)
PMV BLD AUTO: 9.9 FL (ref 5–10.5)
POTASSIUM REFLEX MAGNESIUM: 5.5 MMOL/L (ref 3.5–5.1)
POTASSIUM SERPL-SCNC: 4.4 MMOL/L (ref 3.5–5.1)
PROTEIN UA: 100 MG/DL
RBC # BLD: 4.61 M/UL (ref 4–5.2)
RBC UA: ABNORMAL /HPF (ref 0–4)
SODIUM BLD-SCNC: 127 MMOL/L (ref 136–145)
SODIUM BLD-SCNC: 132 MMOL/L (ref 136–145)
SPECIFIC GRAVITY UA: 1.02 (ref 1–1.03)
TOTAL PROTEIN: 6.5 G/DL (ref 6.4–8.2)
TROPONIN: 0.03 NG/ML
URINE TYPE: ABNORMAL
UROBILINOGEN, URINE: 0.2 E.U./DL
WBC # BLD: 27.1 K/UL (ref 4–11)
WBC UA: ABNORMAL /HPF (ref 0–5)

## 2021-03-11 PROCEDURE — 74176 CT ABD & PELVIS W/O CONTRAST: CPT

## 2021-03-11 PROCEDURE — 96365 THER/PROPH/DIAG IV INF INIT: CPT

## 2021-03-11 PROCEDURE — 2580000003 HC RX 258: Performed by: NURSE PRACTITIONER

## 2021-03-11 PROCEDURE — 93005 ELECTROCARDIOGRAM TRACING: CPT | Performed by: NURSE PRACTITIONER

## 2021-03-11 PROCEDURE — 36415 COLL VENOUS BLD VENIPUNCTURE: CPT

## 2021-03-11 PROCEDURE — 6370000000 HC RX 637 (ALT 250 FOR IP): Performed by: EMERGENCY MEDICINE

## 2021-03-11 PROCEDURE — 71046 X-RAY EXAM CHEST 2 VIEWS: CPT

## 2021-03-11 PROCEDURE — 96375 TX/PRO/DX INJ NEW DRUG ADDON: CPT

## 2021-03-11 PROCEDURE — 80053 COMPREHEN METABOLIC PANEL: CPT

## 2021-03-11 PROCEDURE — 83690 ASSAY OF LIPASE: CPT

## 2021-03-11 PROCEDURE — 96367 TX/PROPH/DG ADDL SEQ IV INF: CPT

## 2021-03-11 PROCEDURE — 84484 ASSAY OF TROPONIN QUANT: CPT

## 2021-03-11 PROCEDURE — 2500000003 HC RX 250 WO HCPCS: Performed by: EMERGENCY MEDICINE

## 2021-03-11 PROCEDURE — 83605 ASSAY OF LACTIC ACID: CPT

## 2021-03-11 PROCEDURE — 81001 URINALYSIS AUTO W/SCOPE: CPT

## 2021-03-11 PROCEDURE — 6360000002 HC RX W HCPCS: Performed by: EMERGENCY MEDICINE

## 2021-03-11 PROCEDURE — 87086 URINE CULTURE/COLONY COUNT: CPT

## 2021-03-11 PROCEDURE — 82010 KETONE BODYS QUAN: CPT

## 2021-03-11 PROCEDURE — 2580000003 HC RX 258: Performed by: EMERGENCY MEDICINE

## 2021-03-11 PROCEDURE — 99285 EMERGENCY DEPT VISIT HI MDM: CPT

## 2021-03-11 PROCEDURE — 87040 BLOOD CULTURE FOR BACTERIA: CPT

## 2021-03-11 PROCEDURE — 72125 CT NECK SPINE W/O DYE: CPT

## 2021-03-11 PROCEDURE — 6370000000 HC RX 637 (ALT 250 FOR IP): Performed by: NURSE PRACTITIONER

## 2021-03-11 PROCEDURE — 70450 CT HEAD/BRAIN W/O DYE: CPT

## 2021-03-11 PROCEDURE — 85025 COMPLETE CBC W/AUTO DIFF WBC: CPT

## 2021-03-11 RX ORDER — SODIUM CHLORIDE, SODIUM LACTATE, POTASSIUM CHLORIDE, CALCIUM CHLORIDE 600; 310; 30; 20 MG/100ML; MG/100ML; MG/100ML; MG/100ML
1000 INJECTION, SOLUTION INTRAVENOUS ONCE
Status: COMPLETED | OUTPATIENT
Start: 2021-03-11 | End: 2021-03-11

## 2021-03-11 RX ORDER — VANCOMYCIN HYDROCHLORIDE 250 MG/1
500 CAPSULE ORAL ONCE
Status: COMPLETED | OUTPATIENT
Start: 2021-03-11 | End: 2021-03-11

## 2021-03-11 RX ORDER — NICOTINE POLACRILEX 4 MG
15 LOZENGE BUCCAL PRN
Status: DISCONTINUED | OUTPATIENT
Start: 2021-03-11 | End: 2021-03-13 | Stop reason: SDUPTHER

## 2021-03-11 RX ORDER — DEXTROSE MONOHYDRATE 50 MG/ML
100 INJECTION, SOLUTION INTRAVENOUS PRN
Status: DISCONTINUED | OUTPATIENT
Start: 2021-03-11 | End: 2021-03-13 | Stop reason: SDUPTHER

## 2021-03-11 RX ORDER — SODIUM CHLORIDE AND POTASSIUM CHLORIDE .9; .15 G/100ML; G/100ML
SOLUTION INTRAVENOUS CONTINUOUS
Status: DISCONTINUED | OUTPATIENT
Start: 2021-03-11 | End: 2021-03-12

## 2021-03-11 RX ORDER — DEXTROSE MONOHYDRATE 25 G/50ML
12.5 INJECTION, SOLUTION INTRAVENOUS PRN
Status: DISCONTINUED | OUTPATIENT
Start: 2021-03-11 | End: 2021-03-13 | Stop reason: SDUPTHER

## 2021-03-11 RX ADMIN — CEFTRIAXONE 1000 MG: 1 INJECTION, POWDER, FOR SOLUTION INTRAMUSCULAR; INTRAVENOUS at 22:52

## 2021-03-11 RX ADMIN — INSULIN HUMAN 10 UNITS: 100 INJECTION, SOLUTION PARENTERAL at 19:33

## 2021-03-11 RX ADMIN — SODIUM CHLORIDE, SODIUM LACTATE, POTASSIUM CHLORIDE, AND CALCIUM CHLORIDE 1000 ML: .6; .31; .03; .02 INJECTION, SOLUTION INTRAVENOUS at 21:08

## 2021-03-11 RX ADMIN — SODIUM CHLORIDE 0.1 UNITS/KG/HR: 9 INJECTION, SOLUTION INTRAVENOUS at 23:24

## 2021-03-11 RX ADMIN — VANCOMYCIN HYDROCHLORIDE 500 MG: 250 CAPSULE ORAL at 22:00

## 2021-03-11 RX ADMIN — METRONIDAZOLE 500 MG: 500 INJECTION, SOLUTION INTRAVENOUS at 21:08

## 2021-03-11 RX ADMIN — POTASSIUM CHLORIDE AND SODIUM CHLORIDE: 900; 150 INJECTION, SOLUTION INTRAVENOUS at 22:52

## 2021-03-11 RX ADMIN — SODIUM CHLORIDE, POTASSIUM CHLORIDE, SODIUM LACTATE AND CALCIUM CHLORIDE 1000 ML: 600; 310; 30; 20 INJECTION, SOLUTION INTRAVENOUS at 19:17

## 2021-03-11 ASSESSMENT — ENCOUNTER SYMPTOMS
SORE THROAT: 0
COLOR CHANGE: 0
VOMITING: 0
WHEEZING: 0
DIARRHEA: 1
NAUSEA: 0
SHORTNESS OF BREATH: 0
COUGH: 0
BACK PAIN: 0
ABDOMINAL PAIN: 1

## 2021-03-11 ASSESSMENT — PAIN SCALES - GENERAL: PAINLEVEL_OUTOF10: 0

## 2021-03-11 NOTE — ED PROVIDER NOTES
**ADVANCED PRACTICE PROVIDER, I HAVE EVALUATED THIS PATIENT**        [facility] 2W ICU  EMERGENCY DEPARTMENT ENCOUNTER      Pt Name: Rafael Bowen  NLY:[id number]  Bin[place]ader 1955  Date of evaluation: 3/11/2021  Provider: MADISON Bazzi CNP      Chief Complaint:    Chief Complaint   Patient presents with    Fatigue     took 2 pills for her bowels because she was constipated, took 2 trazodone pills 4 hours apart at 11pm and 3am because she wanted to sleep, patient has fallen approximately 7 times last night, received 2nd moderna COVID vaccine yesterday       Nursing Notes, Past Medical Hx, Past Surgical Hx, Social Hx, Allergies, and Family Hx were all reviewed and agreed with or any disagreements were addressed in the HPI.    HPI:  (Location, Duration, Timing, Severity, Quality, Assoc Sx, Context, Modifying factors)  This is a  77 y.o. female presents emergency department with frequent falls, patient states that she has felt constipated over the past couple of days, she took 2 stool softeners around 7:00 last night, around 11 PM she was having a hard time sleeping, she took one of her trazodone's, and then again at 3 AM she started having loose stools, was having a hard time getting up and getting to the bathroom and had fallen about 5-7 times. Patient states she took a second trazodone at 3 AM because she wanted to sleep however she kept getting up trying to get to the bathroom but continued to fall. She does report that she received her second Moderna Covid vaccine yesterday. She does complain of allover body aches from falling, states that she possibly hit her head however she denies any loss of consciousness, she denies any numbness tingling or paresthesias. However, she is had some generalized abdominal cramping and several loose bowel movements since taking all stool softeners.   She denies any additional complaints, no pain on exam, does admit to being type II diabetic however she states she is on insulin as well, did not take her blood sugar last night. She denies any additional complaints. No additional aggravating relieving factors. Patient presents awake, alert and in no acute distress or toxic appearance.     PastMedical/Surgical History:      Diagnosis Date    Abnormal echocardiogram     25% on 3/11/14 and 50% on 3/19/14    Back pain     Cardiomyopathy ([address]     EF was 50% on 3/19/14    Chipped tooth     lower left    Dental crown present     veneers    Depressive disorder 8/13/2014    Diabetic infection of right foot ([address] 4/15/2015    Diabetic ulcer of toe of left foot associated with type 2 diabetes mellitus, with fat layer exposed ([address] 4/10/2018    Pt slipped in hot tub latter part of February and has not healed since despite topical treatment    DVT (deep venous thrombosis) ([address]     HTN (hypertension)     Hx of blood clots 2016    left leg    Ischemic cardiomyopathy 4/15/2015    Kidney disease     CHRONIC STAGE 3    Meniere's disease     Mixed hyperlipidemia 3/7/2016    Nicotine abuse     NSTEMI (non-ST elevated myocardial infarction) ([address] 3/13/2014    ANGLE (obstructive sleep apnea)     Osteomyelitis of ankle or foot, acute, left ([address] 6/4/2018    Pneumonia     PONV (postoperative nausea and vomiting)     nausea    Spinal epidural abscess 3/13/2014    Type II diabetes mellitus, uncontrolled ([address] 08/13/2014         Procedure Laterality Date    BACK SURGERY  3/2014    DEBRIDEMENT  3/12/14    extensive debridement of bone/muscle and paraspinal empyema    DILATION AND CURETTAGE OF UTERUS      ENDOSCOPY, COLON, DIAGNOSTIC      FOOT DEBRIDEMENT Left 9/8/2020    LEFT FOOT DEBRIDEMENT INCISION AND DRAINAGE performed by Isamar Avina DPM at [address] [place] [address] [place] TOE SURGERY Left 8/28/2020    ON THE LEFT: [address] [facility] [address], WOUND CLOSURE, FLEXOR TENOTOMY 4TH AND 5TH DIGITS, TENOTOMY AND CAPSULOTOMY 2ND DIGIT performed by Isamar Avina TOBIN at [facility]  6/15/1999    complete-think right ovary in.    NASAL SEPTUM SURGERY      NERVE SURGERY Left 9/10/2020    LEFT FOOT INCISION AND DRAINAGE, EXTENSOR HALLUCIS LONGUS REPAIR WITH DELAYED PRIMARY CLOSURE performed by Sheeba Weiss DPM at [facility] behind right ear    PRESSURE ULCER DEBRIDEMENT Left 10/23/2020    ON THE LEFT: SURGICAL PREPARATION OF WOUND BED, APPLICATION OF DERMAL GRAFT SUBSTITUTE performed by Sheeba Weiss DPM at [address]  04/02/2019    ROBOTIC ASSISTED LAPAROSCOPIC SLEEVE GASTRECTOMY, LAPAROSCOPIC REDUCIBLE INCISIONAL HERNIA REPAIR     SLEEVE GASTRECTOMY N/A 4/2/2019    ROBOTIC ASSISTED LAPAROSCOPIC SLEEVE GASTRECTOMY, LAPAROSCOPIC REDUCIBLE INCISIONAL HERNIA REPAIR [facility] N/A 2/8/2019    EGD BIOPSY performed by Danuta Timmons DO at [facility] N/A 2/8/2019    EGD POLYP ABLATION/OTHER performed by Danuta Timmons DO at [facility] ENDOSCOPY       Medications:  Current Discharge Medication List      CONTINUE these medications which have NOT CHANGED    Details   traZODone (DESYREL) 100 MG tablet Take 1 tablet by mouth nightly as needed for Sleep Pt needs to come in and be seen before more rx's are given. Qty: 90 tablet, Refills: 0      insulin glargine (LANTUS SOLOSTAR) 100 UNIT/ML injection pen Inject 44 Units into the skin nightly  Qty: 40 mL, Refills: 1      lisinopril (PRINIVIL;ZESTRIL) 20 MG tablet TAKE 1 TABLET BY MOUTH EVERY DAY  Qty: 90 tablet, Refills: 1      atorvastatin (LIPITOR) 80 MG tablet Take 1 tablet by mouth daily  Qty: 90 tablet, Refills: 1      gabapentin (NEURONTIN) 300 MG capsule Take 2 capsules by mouth 3 times daily for 90 days.   Qty: 540 capsule, Refills: 0    Associated Diagnoses: DM type 2 with diabetic peripheral neuropathy (HCC)      pantoprazole (PROTONIX) 40 MG tablet Take 1 tablet by mouth daily  Qty: 90 tablet, Refills: 1      Liraglutide (VICTOZA) 18 MG/3ML SOPN SC injection Inject 1.2 mg into the skin daily  Qty: 10 pen, Refills: 3      citalopram (CELEXA) 40 MG tablet Take 1 tablet by mouth daily  Qty: 90 tablet, Refills: 1      Multiple Vitamins-Minerals (THERAPEUTIC MULTIVITAMIN-MINERALS) tablet Take 1 tablet by mouth daily      amLODIPine (NORVASC) 2.5 MG tablet TAKE 1 TABLET BY MOUTH EVERY DAY  Qty: 90 tablet, Refills: 1    Associated Diagnoses: Essential hypertension      carvedilol (COREG) 25 MG tablet TAKE 1 TABLET BY MOUTH TWICE A DAY WITH MEALS  Qty: 180 tablet, Refills: 1    Associated Diagnoses: Essential hypertension      Cholecalciferol (VITAMIN D3) 2000 units CAPS Take 2,000 Units by mouth daily       aspirin 81 MG chewable tablet Take 1 tablet by mouth daily. Qty: 30 tablet      Insulin Pen Needle (B-D UF III MINI PEN NEEDLES) 31G X 5 MM MISC Use twice a day  Qty: 100 each, Refills: 1    Associated Diagnoses: DM type 2 with diabetic peripheral neuropathy (HCC)      blood glucose test strips (ONE TOUCH ULTRA TEST) strip Check FSBS 2-3 times daily. Qty: 300 strip, Refills: 1    Associated Diagnoses: DM type 2 with diabetic peripheral neuropathy (HCC)      ONE TOUCH LANCETS MISC 1 each by Does not apply route 3 times daily  Qty: 100 each, Refills: 11    Associated Diagnoses: DM type 2 with diabetic peripheral neuropathy ([facility]               Review of Systems:  Review of Systems   Constitutional: Negative for chills and fever. HENT: Negative for congestion and sore throat. Respiratory: Negative for cough, shortness of breath and wheezing. Cardiovascular: Negative for chest pain. Gastrointestinal: Positive for abdominal pain and diarrhea. Negative for nausea and vomiting. Patient complains of left and right side abdominal cramping associate with nausea but no vomiting.   She states that she thought she was constipated, she took 2 stool softeners last night around 7 PM and has had multiple loose stools since then, she denies any blood in her stool. Genitourinary: Negative for difficulty urinating and dysuria. Musculoskeletal: Negative for back pain. Skin: Negative for color change. Neurological: Positive for weakness. Negative for numbness and headaches. Patient presents today after frequent falls, states that she is fallen several times since last evening. She does not recall any lightheadedness, dizziness, headaches neck pain or neck stiffness. Positives and Pertinent negatives as per HPI. Except as noted above in the ROS, problem specific ROS was completed and is negative. Physical Exam:  Physical Exam  Vitals signs and nursing note reviewed. Constitutional:       Appearance: She is well-developed. She is not diaphoretic. HENT:      Head: Normocephalic. Right Ear: External ear normal.      Left Ear: External ear normal.   Eyes:      General: No scleral icterus. Right eye: No discharge. Left eye: No discharge. Extraocular Movements: Extraocular movements intact. Pupils: Pupils are equal, round, and reactive to light. Neck:      Musculoskeletal: Normal range of motion and neck supple. Cardiovascular:      Rate and Rhythm: Normal rate and regular rhythm. Comments: Normal S1 and 2, peripheral pulses 2+, no edema observed  Pulmonary:      Effort: Pulmonary effort is normal. No respiratory distress. Breath sounds: Normal breath sounds. Comments: Airway patent with symmetric rise and fall of chest, lungs are clear anteriorly and posteriorly, patient is not tachypneic or dyspneic, saturations are 98% on room air. Abdominal:      Palpations: Abdomen is soft. Tenderness: There is guarding. Comments: Abdomen is grossly protuberant.   Bowel sounds are hypoactive, she has tenderness in both the left and right side of her abdomen but no rebound tenderness or guarding on exam.  No ascites or rigidity. Musculoskeletal: Normal range of motion. Skin:     General: Skin is warm. Coloration: Skin is not pale. Neurological:      General: No focal deficit present. Mental Status: She is alert and oriented to person, place, and time. GCS: GCS eye subscore is 4. GCS verbal subscore is 5. GCS motor subscore is 6. Cranial Nerves: Cranial nerves are intact. Sensory: Sensation is intact. Motor: Motor function is intact. Comments: Patient is awake, alert following commands correctly, neurologic intact no focal deficits. She does appear slightly anxious upon ED arrival as she states she is very shaky and worked up from all the frequent falling. She is moving her arms and legs are resting in stretcher with equal strength and power of 5\5. She has no numbness or tingling or paresthesias. No saddle anesthesia, no loss of bowel bladder control or urinary tension. She is following all commands correctly. She is awake, alert, with NIH of 0.    Psychiatric:         Behavior: Behavior normal.         MEDICAL DECISION MAKING    Vitals:    Vitals:    03/12/21 0600 03/12/21 0700 03/12/21 0758 03/12/21 0900   BP: (!) 110/42 (!) 88/46 (!) 109/42 (!) 96/40   Pulse: 69 65 66 64   Resp: 16 13 15 16   Temp:   98.2 °F (36.8 °C)    TempSrc:   Oral    SpO2: 93% 94% 96% 93%   Weight:       Height:           LABS:  Labs Reviewed   CBC WITH AUTO DIFFERENTIAL - Abnormal; Notable for the following components:       Result Value    WBC 27.1 (*)     Neutrophils Absolute 24.9 (*)     All other components within normal limits    Narrative:     Performed at:  [address] [address]   Phone [phone]   COMPREHENSIVE METABOLIC PANEL W/ REFLEX TO MG FOR LOW K - Abnormal; Notable for the following components:    Sodium 127 (*)     Potassium reflex Magnesium 5.5 (*)     Chloride 89 (*)     CO2 20 (*)     Anion Gap 18 (*)     Glucose 648 (*)     BUN 45 (*)     CREATININE 3.1 (*)     GFR Non- 15 (*)     GFR  18 (*)     AST 50 (*)     All other components within normal limits    Narrative:     [address] [place] [address],  Chemistry results called to and read back by Ursula Ramirez RN. , 03/11/2021  19:28, by Beatriz Aponte  Performed at:  [facility]  [address], [place]   Phone [phone]   TROPONIN - Abnormal; Notable for the following components:    Troponin 0.03 (*)     All other components within normal limits    Narrative:     Performed at:  [place] [address], [facility]    Phone [phone]   URINALYSIS - Abnormal; Notable for the following components:    Clarity, UA CLOUDY (*)     Glucose, Ur >=1000 (*)     Blood, Urine MODERATE (*)     Protein,  (*)     All other components within normal limits    Narrative:     Performed at:  [address] [place] [address]   Phone [phone]   BETA-HYDROXYBUTYRATE - Abnormal; Notable for the following components:    Beta-Hydroxybutyrate 0.31 (*)     All other components within normal limits    Narrative:     Performed at:  [address] [facility] [employer]   Phone [phone]   LACTIC ACID, PLASMA - Abnormal; Notable for the following components:    Lactic Acid 5.1 (*)     All other components within normal limits    Narrative:     Robyn Mari tel. [phone],  Chemistry results called to and read back by Emanuel Montenegro RN. ,  03/11/2021 22:08, by Beatriz Aponte  Performed at:  [address] [place]   Phone [phone]   BASIC METABOLIC PANEL - Abnormal; Notable for the following components:    Sodium 132 (*)     Chloride 95 (*)     CO2 20 (*)     Anion Gap 17 (*)     Glucose 574 (*)     BUN 47 (*)     CREATININE 3.5 (*)     GFR Non- 13 (*)     GFR  16 (*)     All other components within normal limits    Narrative:     Performed at:  [place] [address], [facility] [employer]   Phone [phone]   MICROSCOPIC URINALYSIS - Abnormal; Notable for the following components:    Bacteria, UA 3+ (*)     All other components within normal limits    Narrative:     Performed at:  [place] [address], [facility] [employer]   Phone [phone]   BASIC METABOLIC PANEL W/ REFLEX TO MG FOR LOW K - Abnormal; Notable for the following components:    Sodium 133 (*)     CO2 19 (*)     Glucose 231 (*)     BUN 50 (*)     CREATININE 3.6 (*)     GFR Non- 13 (*)     GFR  15 (*)     Calcium 7.9 (*)     All other components within normal limits    Narrative:     Performed at:  [place] [address], [place] [employer]   Phone [phone]   LACTATE, SEPSIS - Abnormal; Notable for the following components:    Lactic Acid, Sepsis 2.3 (*)     All other components within normal limits    Narrative:     Performed at:  [place] [address], [facility] [employer]   Phone [phone]   CBC WITH AUTO DIFFERENTIAL - Abnormal; Notable for the following components:    WBC 18.2 (*)     RBC 3.54 (*)     Hemoglobin 10.6 (*)     Hematocrit 31.9 (*)     Neutrophils Absolute 14.7 (*)     All other components within normal limits    Narrative:     Performed at:  [place] [address], [facility]   Phone [phone]   BASIC METABOLIC PANEL W/ REFLEX TO MG FOR LOW K - Abnormal; Notable for the following components:    Sodium 131 (*)     Glucose 209 (*)     BUN 52 (*)     CREATININE 4.1 (*)     GFR Non- American 11 (*)     GFR  13 (*)     Calcium 8.0 (*)     All other components within normal limits    Narrative:     Performed at:  [facility]  [address], [facility] [address]   Phone [phone]   HEPATIC FUNCTION PANEL - Abnormal; Notable for the following components:     Total Protein 5.5 (*)     Albumin 2.8 (*)     AST 52 (*)     All other components within normal limits    Narrative:     Performed at:  [facility]  [address] [facility] [employer] [address]   Phone [phone]   POCT GLUCOSE - Abnormal; Notable for the following components:    POC Glucose >600 (*)     All other components within normal limits    Narrative:     Performed at:  [facility]  [address], [facility] [address]   Phone [phone]   POCT GLUCOSE - Abnormal; Notable for the following components:    POC Glucose 496 (*)     All other components within normal limits    Narrative:     Performed at:  [facility]  [address] [facility] [employer] [address]   Phone [phone]   POCT GLUCOSE - Abnormal; Notable for the following components:    POC Glucose 471 (*)     All other components within normal limits    Narrative:     Performed at:  [facility]  [address], [facility] [address]   Phone [phone]   POCT GLUCOSE - Abnormal; Notable for the following components:    POC Glucose 401 (*)     All other components within normal limits    Narrative:     Performed at:  [facility]  [address], [facility] [address]   Phone [phone]   POCT GLUCOSE - Abnormal; Notable for the following components:    POC Glucose 326 (*)     All other components within normal limits    Narrative:     Performed at:  [facility]  [address] [place] [address]   Phone [phone]   POCT GLUCOSE - Abnormal; Notable for the following components:    POC Glucose 240 (*)     All other components within normal limits    Narrative:     Performed at:  [facility]  [address] [facility] [address]   Phone [phone]   POCT GLUCOSE - Abnormal; Notable for the following components:    POC Glucose 239 (*)     All other components within normal limits    Narrative:     Performed at:  [facility]  [address] [facility] [employer] [address]   Phone [phone]   POCT GLUCOSE - Abnormal; Notable for the following components:    POC Glucose 222 (*)     All other components within normal limits    Narrative:     Performed at:  [facility]  [address] [facility] [address]   Phone [phone]   POCT GLUCOSE - Abnormal; Notable for the following components:    POC Glucose 236 (*)     All other components within normal limits    Narrative:     Performed at:  [facility]  [address] [facility] [address]   Phone [phone]   POCT GLUCOSE - Abnormal; Notable for the following components:    POC Glucose 224 (*)     All other components within normal limits    Narrative:     Performed at:  [facility]  [address] [facility] [address]   Phone [phone]   POCT GLUCOSE - Abnormal; Notable for the following components:    POC Glucose 215 (*)     All other components within normal limits    Narrative:     Performed at:  [facility]  [address] [facility] [address]   Phone [phone]   POCT GLUCOSE - Abnormal; Notable for the following components:    POC Glucose 220 (*)     All other components within normal limits    Narrative:     Performed at:  [facility]  [address] [place] [address]   Phone [phone]   POCT GLUCOSE - Normal   CULTURE, BLOOD 1   CULTURE, BLOOD 2   CULTURE, URINE   GASTROINTESTINAL PANEL, MOLECULAR   C DIFF TOXIN/ANTIGEN   LIPASE    Narrative:     Performed at:  [facility]  [address] [place], [place] [address]   Phone [phone]   LACTIC ACID, PLASMA    Narrative:     Performed at:  [facility]  [address] [place], [place] [address]   Phone [phone]   BASIC METABOLIC PANEL W/ REFLEX TO MG FOR LOW K   BASIC METABOLIC PANEL W/ REFLEX TO MG FOR LOW K   PHOSPHORUS   PHOSPHORUS   PHOSPHORUS   MAGNESIUM   MAGNESIUM   MAGNESIUM   BASIC METABOLIC PANEL W/ REFLEX TO MG FOR LOW K   PHOSPHORUS   MAGNESIUM   PROCALCITONIN   LACTIC ACID, PLASMA   LACTIC ACID, PLASMA   BLOOD OCCULT STOOL SCREEN #1   POCT GLUCOSE   POCT GLUCOSE   POCT GLUCOSE   POCT GLUCOSE   POCT GLUCOSE   POCT GLUCOSE   POCT GLUCOSE   POCT GLUCOSE   POCT GLUCOSE   POCT GLUCOSE   POCT GLUCOSE   POCT GLUCOSE   POCT GLUCOSE   POCT GLUCOSE   POCT GLUCOSE   POCT GLUCOSE   POCT GLUCOSE   POCT GLUCOSE   POCT GLUCOSE   POCT GLUCOSE   POCT GLUCOSE   POCT GLUCOSE   POCT GLUCOSE   POCT GLUCOSE   POCT GLUCOSE   POCT GLUCOSE   POCT GLUCOSE   POCT GLUCOSE   POCT GLUCOSE   POCT GLUCOSE   POCT GLUCOSE   POCT GLUCOSE   POCT GLUCOSE   POCT GLUCOSE   POCT GLUCOSE   POCT GLUCOSE   POCT GLUCOSE   POCT GLUCOSE   POCT GLUCOSE   POCT GLUCOSE   POCT GLUCOSE   POCT GLUCOSE   POCT GLUCOSE   POCT GLUCOSE   POCT GLUCOSE   POCT GLUCOSE        Remainder of labs reviewed and werenegative at this time or not returned at the time of this note.     RADIOLOGY:   Non-plain film images such as CT, Ultrasound and MRI are read by the radiologist. Marianna URBINA APRN - CNP have directly visualized the radiologic plain film image(s) with the below findings:        Interpretation per the Radiologist below, if available at the time of this note:    CT Head WO Contrast Final Result   No acute intracranial abnormality. No significant change from prior studies         CT Cervical Spine WO Contrast   Final Result   No evidence for fracture or malalignment cervical spine         CT ABDOMEN PELVIS WO CONTRAST Additional Contrast? None   Final Result   1. Long segment wall thickening and surrounding inflammation involving the   mid to distal transverse, descending and sigmoid colon. Differential   considerations would include ischemic colitis, antibiotic associated colitis,   infectious colitis, or inflammatory bowel disease   2. Small amount of free fluid present within the pelvis   3. Nonobstructing bilateral intrarenal calculi         XR CHEST (2 VW)   Final Result   No acute cardiopulmonary disease. MEDICAL DECISION MAKING / ED COURSE:    Because of high probability of sudden clinical deterioration of the patient's condition and risk of further deterioration, critical care time required my full attention to the patient's condition; which included chart data review, documentation, medication ordering, reviewing the patient's old records, reevaluation patient's cardiac, pulmonary and neurological status. Reevaluation of vital signs. Consultations with ED attending and admitting physician. Ordering, interpreting reviewing diagnostic testing. Therefore a critical care time was 35 minutes of direct attention to the patient's condition did not include time spent on procedures.     PROCEDURES:   Procedures    None    Patient was given:  Medications   glucose (GLUTOSE) 40 % oral gel 15 g (has no administration in time range)   dextrose 50 % IV solution (has no administration in time range)   glucagon (rDNA) injection 1 mg (has no administration in time range)   dextrose 5 % solution (has no administration in time range)   insulin regular (HUMULIN R;NOVOLIN R) injection 10 Units (10 Units Intravenous Not Given 3/11/21 6366)   sodium chloride flush 0.9 % injection 10 mL frequent falls, patient states that she has felt constipated over the past couple of days, she took 2 stool softeners around 7:00 last night, around 11 PM she was having a hard time sleeping, she took one of her trazodone's, and then again at 3 AM she started having loose stools, was having a hard time getting up and getting to the bathroom and had fallen about 5-7 times. Patient states she took a second trazodone at 3 AM because she wanted to sleep however she kept getting up trying to get to the bathroom but continued to fall. She does report that she received her second Moderna Covid vaccine yesterday. She does complain of allover body aches from falling, states that she possibly hit her head however she denies any loss of consciousness, she denies any numbness tingling or paresthesias. After evaluation and examination the patient her blood sugar is greater than 500 based on the glucometer, she was ordered IV access, IV fluids, blood work, analysis, chest x-ray, EKG because of her frequent falls a CT scan of the head and neck. She also ordered a CT scan of the abdomen because having abdominal cramping and tenderness, I do believe this could be related to her feeling constipated and then taking 2 stool softeners. Blood sugars greater than 600, I ordered insulin. CBC shows a leukocytosis with a white count 27,100, no acute anemia, I do believe is could be related to elevation of her blood sugar. Patient was ordered beta hydroxybutyrate, lactic acid, blood cultures and blood gas. However, at 1915 I gave face-to-face report to Dr. Dianna Taylor, will assume care of patient, patient's blood work, urinalysis and radiology imaging is still pending, I did inform that the patient will need to be admitted to the hospital once these results have completed. Please see her documentation for further disposition and plan of care The patient tolerated their visit well. I evaluated the patient.   The physician was available for consultation as needed. The patient and / or the family were informed of the results of any tests, a time was given to answer questions, a plan was proposed and they agreed with plan. CLINICAL IMPRESSION:  1. Diabetic ketoacidosis without coma associated with diabetes mellitus due to underlying condition ([facility]    2. Colitis    3. Elevated troponin    4. Septicemia ([facility]        DISPOSITION        PATIENT REFERRED TO:  No follow-up provider specified.     DISCHARGE MEDICATIONS:  Current Discharge Medication List          DISCONTINUED MEDICATIONS:  Current Discharge Medication List                 (Please note the MDM and HPI sections of this note were completed with a voice recognition program.  Efforts were made to edit the dictations but occasionally words are mis-transcribed.)    Electronically signed, MADISON Collado CNP,          MADISON Collado CNP  03/12/21 1050

## 2021-03-12 PROBLEM — R65.21 SEPTIC SHOCK (HCC): Status: ACTIVE | Noted: 2021-03-12

## 2021-03-12 PROBLEM — K52.9 COLITIS: Status: ACTIVE | Noted: 2021-03-12

## 2021-03-12 PROBLEM — N18.9 ACUTE KIDNEY INJURY SUPERIMPOSED ON CKD (HCC): Status: ACTIVE | Noted: 2021-03-12

## 2021-03-12 PROBLEM — N17.9 ACUTE KIDNEY INJURY SUPERIMPOSED ON CKD (HCC): Status: ACTIVE | Noted: 2021-03-12

## 2021-03-12 PROBLEM — A41.9 SEPSIS (HCC): Status: ACTIVE | Noted: 2021-03-12

## 2021-03-12 LAB
ALBUMIN SERPL-MCNC: 2.8 G/DL (ref 3.4–5)
ALP BLD-CCNC: 84 U/L (ref 40–129)
ALT SERPL-CCNC: 32 U/L (ref 10–40)
ANION GAP SERPL CALCULATED.3IONS-SCNC: 11 MMOL/L (ref 3–16)
ANION GAP SERPL CALCULATED.3IONS-SCNC: 12 MMOL/L (ref 3–16)
ANION GAP SERPL CALCULATED.3IONS-SCNC: 13 MMOL/L (ref 3–16)
ANION GAP SERPL CALCULATED.3IONS-SCNC: 15 MMOL/L (ref 3–16)
ANION GAP SERPL CALCULATED.3IONS-SCNC: 9 MMOL/L (ref 3–16)
AST SERPL-CCNC: 52 U/L (ref 15–37)
BASOPHILS ABSOLUTE: 0 K/UL (ref 0–0.2)
BASOPHILS RELATIVE PERCENT: 0.1 %
BILIRUB SERPL-MCNC: 0.3 MG/DL (ref 0–1)
BILIRUBIN DIRECT: <0.2 MG/DL (ref 0–0.3)
BILIRUBIN, INDIRECT: ABNORMAL MG/DL (ref 0–1)
BUN BLDV-MCNC: 42 MG/DL (ref 7–20)
BUN BLDV-MCNC: 47 MG/DL (ref 7–20)
BUN BLDV-MCNC: 49 MG/DL (ref 7–20)
BUN BLDV-MCNC: 50 MG/DL (ref 7–20)
BUN BLDV-MCNC: 52 MG/DL (ref 7–20)
C DIFF TOXIN/ANTIGEN: NORMAL
CALCIUM SERPL-MCNC: 7.3 MG/DL (ref 8.3–10.6)
CALCIUM SERPL-MCNC: 7.4 MG/DL (ref 8.3–10.6)
CALCIUM SERPL-MCNC: 7.6 MG/DL (ref 8.3–10.6)
CALCIUM SERPL-MCNC: 7.9 MG/DL (ref 8.3–10.6)
CALCIUM SERPL-MCNC: 8 MG/DL (ref 8.3–10.6)
CHLORIDE BLD-SCNC: 102 MMOL/L (ref 99–110)
CHLORIDE BLD-SCNC: 105 MMOL/L (ref 99–110)
CHLORIDE BLD-SCNC: 106 MMOL/L (ref 99–110)
CHLORIDE BLD-SCNC: 99 MMOL/L (ref 99–110)
CHLORIDE BLD-SCNC: 99 MMOL/L (ref 99–110)
CO2: 19 MMOL/L (ref 21–32)
CO2: 19 MMOL/L (ref 21–32)
CO2: 20 MMOL/L (ref 21–32)
CO2: 20 MMOL/L (ref 21–32)
CO2: 21 MMOL/L (ref 21–32)
CREAT SERPL-MCNC: 3.1 MG/DL (ref 0.6–1.2)
CREAT SERPL-MCNC: 3.5 MG/DL (ref 0.6–1.2)
CREAT SERPL-MCNC: 3.6 MG/DL (ref 0.6–1.2)
CREAT SERPL-MCNC: 3.6 MG/DL (ref 0.6–1.2)
CREAT SERPL-MCNC: 4.1 MG/DL (ref 0.6–1.2)
EKG ATRIAL RATE: 72 BPM
EKG DIAGNOSIS: NORMAL
EKG P AXIS: 21 DEGREES
EKG P-R INTERVAL: 182 MS
EKG Q-T INTERVAL: 428 MS
EKG QRS DURATION: 80 MS
EKG QTC CALCULATION (BAZETT): 468 MS
EKG R AXIS: -32 DEGREES
EKG T AXIS: 67 DEGREES
EKG VENTRICULAR RATE: 72 BPM
EOSINOPHILS ABSOLUTE: 0 K/UL (ref 0–0.6)
EOSINOPHILS RELATIVE PERCENT: 0 %
GFR AFRICAN AMERICAN: 13
GFR AFRICAN AMERICAN: 15
GFR AFRICAN AMERICAN: 15
GFR AFRICAN AMERICAN: 16
GFR AFRICAN AMERICAN: 18
GFR NON-AFRICAN AMERICAN: 11
GFR NON-AFRICAN AMERICAN: 13
GFR NON-AFRICAN AMERICAN: 15
GLUCOSE BLD-MCNC: 107 MG/DL (ref 70–99)
GLUCOSE BLD-MCNC: 109 MG/DL (ref 70–99)
GLUCOSE BLD-MCNC: 118 MG/DL (ref 70–99)
GLUCOSE BLD-MCNC: 125 MG/DL (ref 70–99)
GLUCOSE BLD-MCNC: 128 MG/DL (ref 70–99)
GLUCOSE BLD-MCNC: 136 MG/DL (ref 70–99)
GLUCOSE BLD-MCNC: 140 MG/DL (ref 70–99)
GLUCOSE BLD-MCNC: 141 MG/DL (ref 70–99)
GLUCOSE BLD-MCNC: 162 MG/DL (ref 70–99)
GLUCOSE BLD-MCNC: 169 MG/DL (ref 70–99)
GLUCOSE BLD-MCNC: 181 MG/DL (ref 70–99)
GLUCOSE BLD-MCNC: 197 MG/DL (ref 70–99)
GLUCOSE BLD-MCNC: 209 MG/DL (ref 70–99)
GLUCOSE BLD-MCNC: 211 MG/DL (ref 70–99)
GLUCOSE BLD-MCNC: 215 MG/DL (ref 70–99)
GLUCOSE BLD-MCNC: 220 MG/DL (ref 70–99)
GLUCOSE BLD-MCNC: 222 MG/DL (ref 70–99)
GLUCOSE BLD-MCNC: 224 MG/DL (ref 70–99)
GLUCOSE BLD-MCNC: 231 MG/DL (ref 70–99)
GLUCOSE BLD-MCNC: 236 MG/DL (ref 70–99)
GLUCOSE BLD-MCNC: 239 MG/DL (ref 70–99)
GLUCOSE BLD-MCNC: 240 MG/DL (ref 70–99)
GLUCOSE BLD-MCNC: 326 MG/DL (ref 70–99)
GLUCOSE BLD-MCNC: 401 MG/DL (ref 70–99)
GLUCOSE BLD-MCNC: 471 MG/DL (ref 70–99)
HCT VFR BLD CALC: 31.9 % (ref 36–48)
HEMOGLOBIN: 10.6 G/DL (ref 12–16)
LACTIC ACID, SEPSIS: 2.3 MMOL/L (ref 0.4–1.9)
LACTIC ACID: 1 MMOL/L (ref 0.4–2)
LACTIC ACID: 1.4 MMOL/L (ref 0.4–2)
LACTIC ACID: 1.9 MMOL/L (ref 0.4–2)
LYMPHOCYTES ABSOLUTE: 1.2 K/UL (ref 1–5.1)
LYMPHOCYTES RELATIVE PERCENT: 6.9 %
MAGNESIUM: 1.5 MG/DL (ref 1.8–2.4)
MCH RBC QN AUTO: 29.8 PG (ref 26–34)
MCHC RBC AUTO-ENTMCNC: 33.2 G/DL (ref 31–36)
MCV RBC AUTO: 89.9 FL (ref 80–100)
MONOCYTES ABSOLUTE: 1.3 K/UL (ref 0–1.3)
MONOCYTES RELATIVE PERCENT: 7.6 %
NEUTROPHILS ABSOLUTE: 14.7 K/UL (ref 1.7–7.7)
NEUTROPHILS RELATIVE PERCENT: 85.4 %
OCCULT BLOOD DIAGNOSTIC: ABNORMAL
PDW BLD-RTO: 14 % (ref 12.4–15.4)
PERFORMED ON: ABNORMAL
PHOSPHORUS: 2.4 MG/DL (ref 2.5–4.9)
PHOSPHORUS: 2.4 MG/DL (ref 2.5–4.9)
PHOSPHORUS: 2.5 MG/DL (ref 2.5–4.9)
PHOSPHORUS: 2.6 MG/DL (ref 2.5–4.9)
PLATELET # BLD: 190 K/UL (ref 135–450)
PMV BLD AUTO: 10.5 FL (ref 5–10.5)
POTASSIUM REFLEX MAGNESIUM: 3.9 MMOL/L (ref 3.5–5.1)
POTASSIUM REFLEX MAGNESIUM: 4 MMOL/L (ref 3.5–5.1)
POTASSIUM REFLEX MAGNESIUM: 4.1 MMOL/L (ref 3.5–5.1)
PROCALCITONIN: >100 NG/ML (ref 0–0.15)
RBC # BLD: 3.54 M/UL (ref 4–5.2)
SODIUM BLD-SCNC: 131 MMOL/L (ref 136–145)
SODIUM BLD-SCNC: 133 MMOL/L (ref 136–145)
SODIUM BLD-SCNC: 134 MMOL/L (ref 136–145)
SODIUM BLD-SCNC: 135 MMOL/L (ref 136–145)
SODIUM BLD-SCNC: 137 MMOL/L (ref 136–145)
TOTAL PROTEIN: 5.5 G/DL (ref 6.4–8.2)
URINE CULTURE, ROUTINE: NORMAL
WBC # BLD: 18.2 K/UL (ref 4–11)

## 2021-03-12 PROCEDURE — 83735 ASSAY OF MAGNESIUM: CPT

## 2021-03-12 PROCEDURE — 2580000003 HC RX 258: Performed by: INTERNAL MEDICINE

## 2021-03-12 PROCEDURE — 87449 NOS EACH ORGANISM AG IA: CPT

## 2021-03-12 PROCEDURE — 2500000003 HC RX 250 WO HCPCS: Performed by: STUDENT IN AN ORGANIZED HEALTH CARE EDUCATION/TRAINING PROGRAM

## 2021-03-12 PROCEDURE — 80076 HEPATIC FUNCTION PANEL: CPT

## 2021-03-12 PROCEDURE — 93010 ELECTROCARDIOGRAM REPORT: CPT | Performed by: INTERNAL MEDICINE

## 2021-03-12 PROCEDURE — G0328 FECAL BLOOD SCRN IMMUNOASSAY: HCPCS

## 2021-03-12 PROCEDURE — 2000000000 HC ICU R&B

## 2021-03-12 PROCEDURE — 6370000000 HC RX 637 (ALT 250 FOR IP): Performed by: STUDENT IN AN ORGANIZED HEALTH CARE EDUCATION/TRAINING PROGRAM

## 2021-03-12 PROCEDURE — 80048 BASIC METABOLIC PNL TOTAL CA: CPT

## 2021-03-12 PROCEDURE — 6360000002 HC RX W HCPCS: Performed by: INTERNAL MEDICINE

## 2021-03-12 PROCEDURE — 6360000002 HC RX W HCPCS: Performed by: STUDENT IN AN ORGANIZED HEALTH CARE EDUCATION/TRAINING PROGRAM

## 2021-03-12 PROCEDURE — 85025 COMPLETE CBC W/AUTO DIFF WBC: CPT

## 2021-03-12 PROCEDURE — 2580000003 HC RX 258: Performed by: STUDENT IN AN ORGANIZED HEALTH CARE EDUCATION/TRAINING PROGRAM

## 2021-03-12 PROCEDURE — 99223 1ST HOSP IP/OBS HIGH 75: CPT | Performed by: INTERNAL MEDICINE

## 2021-03-12 PROCEDURE — 87493 C DIFF AMPLIFIED PROBE: CPT

## 2021-03-12 PROCEDURE — 84145 PROCALCITONIN (PCT): CPT

## 2021-03-12 PROCEDURE — 36415 COLL VENOUS BLD VENIPUNCTURE: CPT

## 2021-03-12 PROCEDURE — 84100 ASSAY OF PHOSPHORUS: CPT

## 2021-03-12 PROCEDURE — 83605 ASSAY OF LACTIC ACID: CPT

## 2021-03-12 PROCEDURE — 6370000000 HC RX 637 (ALT 250 FOR IP): Performed by: INTERNAL MEDICINE

## 2021-03-12 PROCEDURE — 87505 NFCT AGENT DETECTION GI: CPT

## 2021-03-12 PROCEDURE — 87324 CLOSTRIDIUM AG IA: CPT

## 2021-03-12 PROCEDURE — 2500000003 HC RX 250 WO HCPCS: Performed by: INTERNAL MEDICINE

## 2021-03-12 PROCEDURE — 94760 N-INVAS EAR/PLS OXIMETRY 1: CPT

## 2021-03-12 RX ORDER — SODIUM CHLORIDE 0.9 % (FLUSH) 0.9 %
10 SYRINGE (ML) INJECTION EVERY 12 HOURS SCHEDULED
Status: DISCONTINUED | OUTPATIENT
Start: 2021-03-12 | End: 2021-03-12 | Stop reason: SDUPTHER

## 2021-03-12 RX ORDER — HEPARIN SODIUM 5000 [USP'U]/ML
5000 INJECTION, SOLUTION INTRAVENOUS; SUBCUTANEOUS EVERY 8 HOURS SCHEDULED
Status: DISCONTINUED | OUTPATIENT
Start: 2021-03-12 | End: 2021-03-15 | Stop reason: HOSPADM

## 2021-03-12 RX ORDER — 0.9 % SODIUM CHLORIDE 0.9 %
500 INTRAVENOUS SOLUTION INTRAVENOUS ONCE
Status: COMPLETED | OUTPATIENT
Start: 2021-03-12 | End: 2021-03-12

## 2021-03-12 RX ORDER — POTASSIUM CHLORIDE 7.45 MG/ML
10 INJECTION INTRAVENOUS PRN
Status: DISCONTINUED | OUTPATIENT
Start: 2021-03-12 | End: 2021-03-12

## 2021-03-12 RX ORDER — 0.9 % SODIUM CHLORIDE 0.9 %
2000 INTRAVENOUS SOLUTION INTRAVENOUS ONCE
Status: COMPLETED | OUTPATIENT
Start: 2021-03-12 | End: 2021-03-12

## 2021-03-12 RX ORDER — INSULIN GLARGINE 100 [IU]/ML
44 INJECTION, SOLUTION SUBCUTANEOUS NIGHTLY
Status: DISCONTINUED | OUTPATIENT
Start: 2021-03-13 | End: 2021-03-15 | Stop reason: HOSPADM

## 2021-03-12 RX ORDER — SODIUM CHLORIDE 0.9 % (FLUSH) 0.9 %
10 SYRINGE (ML) INJECTION PRN
Status: DISCONTINUED | OUTPATIENT
Start: 2021-03-12 | End: 2021-03-12 | Stop reason: SDUPTHER

## 2021-03-12 RX ORDER — MAGNESIUM SULFATE 1 G/100ML
1000 INJECTION INTRAVENOUS ONCE
Status: COMPLETED | OUTPATIENT
Start: 2021-03-12 | End: 2021-03-12

## 2021-03-12 RX ORDER — ACETAMINOPHEN 325 MG/1
650 TABLET ORAL EVERY 6 HOURS PRN
Status: DISCONTINUED | OUTPATIENT
Start: 2021-03-12 | End: 2021-03-15 | Stop reason: HOSPADM

## 2021-03-12 RX ORDER — ASPIRIN 81 MG/1
81 TABLET, CHEWABLE ORAL DAILY
Status: DISCONTINUED | OUTPATIENT
Start: 2021-03-12 | End: 2021-03-15 | Stop reason: HOSPADM

## 2021-03-12 RX ORDER — SODIUM CHLORIDE 0.9 % (FLUSH) 0.9 %
10 SYRINGE (ML) INJECTION EVERY 12 HOURS SCHEDULED
Status: DISCONTINUED | OUTPATIENT
Start: 2021-03-12 | End: 2021-03-12

## 2021-03-12 RX ORDER — MAGNESIUM SULFATE 1 G/100ML
1000 INJECTION INTRAVENOUS PRN
Status: DISCONTINUED | OUTPATIENT
Start: 2021-03-12 | End: 2021-03-12

## 2021-03-12 RX ORDER — VANCOMYCIN HYDROCHLORIDE 250 MG/1
250 CAPSULE ORAL EVERY 6 HOURS SCHEDULED
Status: DISCONTINUED | OUTPATIENT
Start: 2021-03-12 | End: 2021-03-15 | Stop reason: HOSPADM

## 2021-03-12 RX ORDER — ONDANSETRON 2 MG/ML
4 INJECTION INTRAMUSCULAR; INTRAVENOUS EVERY 6 HOURS PRN
Status: DISCONTINUED | OUTPATIENT
Start: 2021-03-12 | End: 2021-03-15 | Stop reason: HOSPADM

## 2021-03-12 RX ORDER — DEXTROSE, SODIUM CHLORIDE, AND POTASSIUM CHLORIDE 5; .45; .15 G/100ML; G/100ML; G/100ML
INJECTION INTRAVENOUS CONTINUOUS PRN
Status: DISCONTINUED | OUTPATIENT
Start: 2021-03-12 | End: 2021-03-13

## 2021-03-12 RX ORDER — PANTOPRAZOLE SODIUM 40 MG/1
40 TABLET, DELAYED RELEASE ORAL DAILY
Status: DISCONTINUED | OUTPATIENT
Start: 2021-03-12 | End: 2021-03-15 | Stop reason: HOSPADM

## 2021-03-12 RX ORDER — LIDOCAINE HYDROCHLORIDE 10 MG/ML
5 INJECTION, SOLUTION EPIDURAL; INFILTRATION; INTRACAUDAL; PERINEURAL ONCE
Status: DISCONTINUED | OUTPATIENT
Start: 2021-03-12 | End: 2021-03-12

## 2021-03-12 RX ORDER — SODIUM CHLORIDE 0.9 % (FLUSH) 0.9 %
10 SYRINGE (ML) INJECTION PRN
Status: DISCONTINUED | OUTPATIENT
Start: 2021-03-12 | End: 2021-03-15 | Stop reason: HOSPADM

## 2021-03-12 RX ORDER — ATORVASTATIN CALCIUM 80 MG/1
80 TABLET, FILM COATED ORAL DAILY
Status: DISCONTINUED | OUTPATIENT
Start: 2021-03-12 | End: 2021-03-15 | Stop reason: HOSPADM

## 2021-03-12 RX ORDER — SODIUM CHLORIDE 9 MG/ML
INJECTION, SOLUTION INTRAVENOUS CONTINUOUS
Status: DISCONTINUED | OUTPATIENT
Start: 2021-03-12 | End: 2021-03-12

## 2021-03-12 RX ORDER — ACETAMINOPHEN 650 MG/1
650 SUPPOSITORY RECTAL EVERY 6 HOURS PRN
Status: DISCONTINUED | OUTPATIENT
Start: 2021-03-12 | End: 2021-03-15 | Stop reason: HOSPADM

## 2021-03-12 RX ORDER — SODIUM CHLORIDE, SODIUM LACTATE, POTASSIUM CHLORIDE, CALCIUM CHLORIDE 600; 310; 30; 20 MG/100ML; MG/100ML; MG/100ML; MG/100ML
INJECTION, SOLUTION INTRAVENOUS CONTINUOUS
Status: DISCONTINUED | OUTPATIENT
Start: 2021-03-12 | End: 2021-03-12

## 2021-03-12 RX ORDER — CITALOPRAM 20 MG/1
40 TABLET ORAL DAILY
Status: DISCONTINUED | OUTPATIENT
Start: 2021-03-12 | End: 2021-03-15 | Stop reason: HOSPADM

## 2021-03-12 RX ORDER — DEXTROSE MONOHYDRATE 25 G/50ML
12.5 INJECTION, SOLUTION INTRAVENOUS PRN
Status: DISCONTINUED | OUTPATIENT
Start: 2021-03-12 | End: 2021-03-13 | Stop reason: SDUPTHER

## 2021-03-12 RX ORDER — GABAPENTIN 100 MG/1
100 CAPSULE ORAL 3 TIMES DAILY
Status: DISCONTINUED | OUTPATIENT
Start: 2021-03-12 | End: 2021-03-15 | Stop reason: HOSPADM

## 2021-03-12 RX ADMIN — CITALOPRAM HYDROBROMIDE 40 MG: 20 TABLET ORAL at 08:01

## 2021-03-12 RX ADMIN — SODIUM CHLORIDE 500 ML: 9 INJECTION, SOLUTION INTRAVENOUS at 12:20

## 2021-03-12 RX ADMIN — SODIUM CHLORIDE 9.84 UNITS/HR: 9 INJECTION, SOLUTION INTRAVENOUS at 08:14

## 2021-03-12 RX ADMIN — Medication 10 ML: at 10:44

## 2021-03-12 RX ADMIN — POTASSIUM CHLORIDE, DEXTROSE MONOHYDRATE AND SODIUM CHLORIDE: 150; 5; 450 INJECTION, SOLUTION INTRAVENOUS at 19:53

## 2021-03-12 RX ADMIN — HEPARIN SODIUM 5000 UNITS: 5000 INJECTION INTRAVENOUS; SUBCUTANEOUS at 20:34

## 2021-03-12 RX ADMIN — POTASSIUM CHLORIDE, DEXTROSE MONOHYDRATE AND SODIUM CHLORIDE: 150; 5; 450 INJECTION, SOLUTION INTRAVENOUS at 04:07

## 2021-03-12 RX ADMIN — PIPERACILLIN AND TAZOBACTAM 3375 MG: 3; .375 INJECTION, POWDER, LYOPHILIZED, FOR SOLUTION INTRAVENOUS at 05:05

## 2021-03-12 RX ADMIN — SODIUM CHLORIDE 500 ML: 9 INJECTION, SOLUTION INTRAVENOUS at 13:24

## 2021-03-12 RX ADMIN — SODIUM CHLORIDE 2000 ML: 9 INJECTION, SOLUTION INTRAVENOUS at 14:29

## 2021-03-12 RX ADMIN — GABAPENTIN 100 MG: 100 CAPSULE ORAL at 10:39

## 2021-03-12 RX ADMIN — ATORVASTATIN CALCIUM 80 MG: 80 TABLET, FILM COATED ORAL at 08:01

## 2021-03-12 RX ADMIN — INSULIN GLARGINE 44 UNITS: 100 INJECTION, SOLUTION SUBCUTANEOUS at 23:56

## 2021-03-12 RX ADMIN — SODIUM CHLORIDE 500 ML: 9 INJECTION, SOLUTION INTRAVENOUS at 10:33

## 2021-03-12 RX ADMIN — POTASSIUM CHLORIDE, DEXTROSE MONOHYDRATE AND SODIUM CHLORIDE: 150; 5; 450 INJECTION, SOLUTION INTRAVENOUS at 11:45

## 2021-03-12 RX ADMIN — HEPARIN SODIUM 5000 UNITS: 5000 INJECTION INTRAVENOUS; SUBCUTANEOUS at 06:49

## 2021-03-12 RX ADMIN — VANCOMYCIN HYDROCHLORIDE 250 MG: 250 CAPSULE ORAL at 13:24

## 2021-03-12 RX ADMIN — GABAPENTIN 100 MG: 100 CAPSULE ORAL at 20:35

## 2021-03-12 RX ADMIN — VANCOMYCIN HYDROCHLORIDE 250 MG: 250 CAPSULE ORAL at 16:56

## 2021-03-12 RX ADMIN — PANTOPRAZOLE SODIUM 40 MG: 40 TABLET, DELAYED RELEASE ORAL at 06:50

## 2021-03-12 RX ADMIN — GABAPENTIN 100 MG: 100 CAPSULE ORAL at 16:56

## 2021-03-12 RX ADMIN — CEFEPIME HYDROCHLORIDE 1000 MG: 1 INJECTION, POWDER, FOR SOLUTION INTRAMUSCULAR; INTRAVENOUS at 16:57

## 2021-03-12 RX ADMIN — METRONIDAZOLE 500 MG: 500 INJECTION, SOLUTION INTRAVENOUS at 18:57

## 2021-03-12 RX ADMIN — VANCOMYCIN HYDROCHLORIDE 250 MG: 250 CAPSULE ORAL at 23:38

## 2021-03-12 RX ADMIN — HEPARIN SODIUM 5000 UNITS: 5000 INJECTION INTRAVENOUS; SUBCUTANEOUS at 13:24

## 2021-03-12 RX ADMIN — ASPIRIN 81 MG: 81 TABLET, CHEWABLE ORAL at 10:33

## 2021-03-12 RX ADMIN — MAGNESIUM SULFATE 1000 MG: 1 INJECTION INTRAVENOUS at 20:35

## 2021-03-12 ASSESSMENT — PAIN SCALES - GENERAL: PAINLEVEL_OUTOF10: 0

## 2021-03-12 NOTE — PROGRESS NOTES
0155: Pt admitted to ICU from ED via stretcher. Pt transferred to ICU bed via sheet draw method without incident. Insulin and fluids infusing in PIVs as indicated in MAR. Bedside report with Spencer RAMÍREZ from ED. Pt is A&Ox4, denies pain, NSR, on RA. Pt skin assessed. Call light within reach, bed exit alarm on, pt resting comfortably. Per pt, no member of family needs to be notified by RN.     Electronically signed by Marielos Penn RN on 3/12/2021 at 8:05 AM

## 2021-03-12 NOTE — CONSULTS
Consult received  Full note to follow    [address]  3/12/2021    [facility] [address]  Office : [phone]  Fax :[phone]

## 2021-03-12 NOTE — H&P
Hospital Medicine History & Physical      PCP: Ruby Pierre MD    Date of Admission: 3/11/2021    Date of Service: Pt seen/examined on 3/11/2021 and Admitted to Inpatient       Chief Complaint: Nausea, vomiting, generalized weakness, diarrhea      History Of Present Illness: The patient is a 77 y.o. female with past medical history of previous cardiomyopathy, depressive disorder, type 2 diabetes with previous ulceration and infection of her left foot that is healed now, previous DVT in the past, hypertension, CKD stage III, previous Ménière's disease, ANGLE who presents to [facility] with concern for persistent worsening nausea and vomiting with some generalized weakness and some notable symptoms starting since she got back from [place]. She apparently was in [place] prior to 2 days ago but prior to leaving [place] and coming back home she was notably not feeling well. She was nauseous at times and did have one episode of vomiting prior to coming back home. She also was notably not taking her insulin for her diabetes appropriately, however she notes that her blood sugars when she got home were still in the 170s. She ran out of her insulin while in [place] and so she was unable to obtain the appropriate amount of insulin. She apparently then the next day she got back in 1 day before presenting to the ED today, she apparently got her second Covid vaccine even though she was already not feeling well. Daughter notes that patient did seem somewhat okay when she first visited her at home but patient does remark that she has been feeling more generalized weakness, nausea, poor appetite, inability to get up and move around and sometimes stumbling to the ground.   She has also had some initial constipation the day prior to presenting to the ED and so she took a a large SURGERY  3/2014    DEBRIDEMENT  3/12/14    extensive debridement of bone/muscle and paraspinal empyema    DILATION AND CURETTAGE OF UTERUS      ENDOSCOPY, COLON, DIAGNOSTIC      FOOT DEBRIDEMENT Left 9/8/2020    LEFT FOOT DEBRIDEMENT INCISION AND DRAINAGE performed by Vick Coppola DPM at [address] [facility] [address] TOE SURGERY Left 8/28/2020    ON THE LEFT: [address] [facility] [address], WOUND CLOSURE, FLEXOR TENOTOMY 4TH AND 5TH DIGITS, TENOTOMY AND CAPSULOTOMY 2ND DIGIT performed by Vick Coppola DPM at [address]  6/15/1999    complete-think right ovary in.    NASAL SEPTUM SURGERY      NERVE SURGERY Left 9/10/2020    LEFT FOOT INCISION AND DRAINAGE, EXTENSOR HALLUCIS LONGUS REPAIR WITH DELAYED PRIMARY CLOSURE performed by Vick Coppola DPM at [facility] behind right ear    PRESSURE ULCER DEBRIDEMENT Left 10/23/2020    ON THE LEFT: SURGICAL PREPARATION OF WOUND BED, APPLICATION OF DERMAL GRAFT SUBSTITUTE performed by Vick Coppola DPM at [address]  04/02/2019    ROBOTIC ASSISTED LAPAROSCOPIC SLEEVE GASTRECTOMY, LAPAROSCOPIC REDUCIBLE INCISIONAL HERNIA REPAIR     SLEEVE GASTRECTOMY N/A 4/2/2019    ROBOTIC ASSISTED LAPAROSCOPIC SLEEVE GASTRECTOMY, LAPAROSCOPIC REDUCIBLE INCISIONAL HERNIA REPAIR performed by Antonia Aguillon DO at [facility] N/A 2/8/2019    EGD BIOPSY performed by Antonia Aguillon DO at [place] [address] N/A 2/8/2019    EGD POLYP ABLATION/OTHER performed by Antonia Aguillon DO at [address] ENDOSCOPY       Medications Prior to Admission:    Prior to Admission medications    Medication Sig Start Date End Date Taking? Authorizing Provider   traZODone (DESYREL) 100 MG tablet Take 1 tablet by mouth nightly as needed for Sleep Pt needs to come in and be seen before more rx's are given.  2/19/21  Yes Reshma Lujan MD   insulin glargine (LANTUS SOLOSTAR) 100 UNIT/ML injection pen Inject 44 Units into the skin nightly  Patient taking differently: Inject 44 Units into the skin every morning  1/13/21  Yes Pearl Velazquez MD   lisinopril (PRINIVIL;ZESTRIL) 20 MG tablet TAKE 1 TABLET BY MOUTH EVERY DAY 11/21/20  Yes Pearl Velazquez MD   atorvastatin (LIPITOR) 80 MG tablet Take 1 tablet by mouth daily 10/21/20  Yes Pearl Velazquez MD   gabapentin (NEURONTIN) 300 MG capsule Take 2 capsules by mouth 3 times daily for 90 days. Patient taking differently: Take 600 mg by mouth 2 times daily. 10/21/20 3/11/21 Yes eParl Velazquez MD   pantoprazole (PROTONIX) 40 MG tablet Take 1 tablet by mouth daily 10/19/20  Yes Pearl Velazquez MD   Liraglutide (VICTOZA) 18 MG/3ML SOPN SC injection Inject 1.2 mg into the skin daily 10/19/20  Yes Pearl Velazquez MD   citalopram (CELEXA) 40 MG tablet Take 1 tablet by mouth daily 10/12/20  Yes Pearl Velazquez MD   Multiple Vitamins-Minerals (THERAPEUTIC MULTIVITAMIN-MINERALS) tablet Take 1 tablet by mouth daily   Yes Historical Provider, MD   amLODIPine (NORVASC) 2.5 MG tablet TAKE 1 TABLET BY MOUTH EVERY DAY 8/3/20  Yes Tabitha Miller MD   carvedilol (COREG) 25 MG tablet TAKE 1 TABLET BY MOUTH TWICE A DAY WITH MEALS 8/3/20  Yes Tabitha Miller MD   Cholecalciferol (VITAMIN D3) 2000 units CAPS Take 2,000 Units by mouth daily    Yes Historical Provider, MD   aspirin 81 MG chewable tablet Take 1 tablet by mouth daily. 3/20/14  Yes Norma Ray MD   Insulin Pen Needle (B-D UF III MINI PEN NEEDLES) 31G X 5 MM MISC Use twice a day 11/30/20   Pearl Velazquez MD   blood glucose test strips (ONE TOUCH ULTRA TEST) strip Check FSBS 2-3 times daily. 3/5/20   Pearl Velazquez MD   ONE TOUCH LANCETS MISC 1 each by Does not apply route 3 times daily 2/14/19   Pearl Velazquez MD       Allergies:  Doxycycline    Social History:  The patient currently lives home    TOBACCO:   reports that she quit smoking about 7 years ago.  She has a 5.00 pack-year smoking history. She has never used smokeless tobacco.  ETOH:   reports current alcohol use. Family History:  Reviewed in detail and negative for DM, Early CAD, Cancer, CVA. Positive as follows:        Problem Relation Age of Onset    High Blood Pressure Mother     Cancer Mother     Rheum Arthritis Mother     COPD Father     Cancer Father     Osteoarthritis Neg Hx     Asthma Neg Hx     Breast Cancer Neg Hx     Diabetes Neg Hx     Heart Failure Neg Hx     High Cholesterol Neg Hx     Hypertension Neg Hx     Migraines Neg Hx     Ovarian Cancer Neg Hx     Rashes/Skin Problems Neg Hx     Seizures Neg Hx     Stroke Neg Hx     Thyroid Disease Neg Hx        REVIEW OF SYSTEMS:   as noted in the HPI. All other systems reviewed and negative. PHYSICAL EXAM:    BP (!) 110/42   Pulse 69   Temp 96.2 °F (35.7 °C) (Axillary)   Resp 16   Ht 5' 3\" (1.6 m)   Wt 212 lb 1.3 oz (96.2 kg) Comment: bed transfer since last temp  LMP 06/15/1999   SpO2 93%   BMI 37.57 kg/m²     General appearance: Fatigued appearing, no acute respiratory stress, conversational although ill-appearing to some degree  HEENT Normal cephalic, atraumatic without obvious deformity. Pupils equal, round, and reactive to light. Extra ocular muscles intact. Conjunctivae/corneas clear. Dry mucous membranes  Neck: Supple, no JVD  Lungs: Clear to auscultation, bilaterally without Rales/Wheezes/Rhonchi with good respiratory effort. Heart: Regular rate and rhythm with Normal S1/S2 without murmurs, rubs or gallops, point of maximum impulse non-displaced  Abdomen: Mild diffuse abdominal tenderness on palpation, nondistended, nonrigid, active bowel sounds  Extremities: Trace bilateral lower extremity edema noted  Skin: No rashes  Neurologic: Alert and oriented X 3, neurovascularly intact with sensory/motor intact upper extremities/lower extremities, bilaterally.   Cranial nerves: II-XII intact, grossly non-focal.  Mental status: Alert, oriented, thought content appropriate. Capillary Refill: Acceptable  < 3 seconds  Peripheral Pulses: +3 Easily felt, not easily obliterated with pressure    03/11/21 2030  CT Cervical Spine WO Contrast   Performed: 03/11/21 2018  Final        Impression: No evidence for fracture or malalignment cervical spine       03/11/21 2028  CT Head WO Contrast   Performed: 03/11/21 2018  Final        Impression: No acute intracranial abnormality. No significant change from prior studies       03/11/21 1811  XR CHEST (2 VW)   Performed: 03/11/21 1759  Final  Specimen: Chest        Impression: No acute cardiopulmonary disease. CT abdomen and pelvis without IV contrast:  1. Long segment wall thickening and surrounding inflammation involving the   mid to distal transverse, descending and sigmoid colon.  Differential   considerations would include ischemic colitis, antibiotic associated colitis,   infectious colitis, or inflammatory bowel disease   2. Small amount of free fluid present within the pelvis   3. Nonobstructing bilateral intrarenal calculi           CBC   Recent Labs     03/11/21  1734 03/12/21  0429   WBC 27.1* 18.2*   HGB 13.9 10.6*   HCT 43.3 31.9*    190      RENAL  Recent Labs     03/11/21  1844 03/11/21 2041 03/12/21  0429   * 132* 133*   K 5.5* 4.4 4.0   CL 89* 95* 99   CO2 20* 20* 19*   BUN 45* 47* 50*   CREATININE 3.1* 3.5* 3.6*     LFT'S  Recent Labs     03/11/21  1844   AST 50*   ALT 29   BILITOT 0.4   ALKPHOS 112     COAG  No results for input(s): INR in the last 72 hours.   CARDIAC ENZYMES  Recent Labs     03/11/21  1844   TROPONINI 0.03*       U/A:    Lab Results   Component Value Date    NITRITE neg 08/01/2016    COLORU Yellow 03/11/2021    WBCUA 0-2 03/11/2021    RBCUA 3-4 03/11/2021    BACTERIA 3+ 03/11/2021    CLARITYU CLOUDY 03/11/2021    SPECGRAV 1.020 03/11/2021    LEUKOCYTESUR Negative 03/11/2021    BLOODU MODERATE 03/11/2021    GLUCOSEU >=1000 03/11/2021 ABG    Lab Results   Component Value Date    DZS2HFS 20.2 03/13/2014    BEART -2.8 03/13/2014    Y9CRGDLA 96.6 03/13/2014    PHART 7.431 03/13/2014    DUG0XMW 31.0 03/13/2014    PO2ART 86.8 03/13/2014    EVB6TLL 21.2 03/13/2014           Active Hospital Problems    Diagnosis Date Noted    Septic shock ([facility] [A41.9, R65.21] 03/12/2021     Priority: High    Colitis [K52.9] 03/12/2021     Priority: High    Acute kidney injury superimposed on CKD ([facility] [N17.9, N18.9] 03/12/2021     Priority: High    DKA, type 2 ([facility] [E11.10] 03/11/2014     Priority: High    DM type 2 with diabetic peripheral neuropathy ([facility] [E11.42] 09/28/2017         PHYSICIANS CERTIFICATION:    I certify that Michael Stone is expected to be hospitalized for greater than 2 midnights based on the following assessment and plan:      ASSESSMENT/PLAN:  · Patient initially hypotensive in the ED, IV fluid resuscitation improved this, continue IV fluids  · Started on DKA protocol with IV fluids and insulin drip due to elevated sugars and elevated B hydroxybutyrate, continue every 4 BMPs with mag and phosphorus for monitoring  · Started on antibiotics with Zosyn for colitis, patient also did receive oral vancomycin for possible C. difficile colitis in the ED  · C. difficile and GI bacterial pathogen stool samples are pending, will possibly resume vancomycin if C. difficile test comes back positive  · GI consult for further assistance with evaluation for significant colitis possibly suggestive of infectious versus ischemic versus inflammatory bowel disease colitis    DVT Prophylaxis: Heparin  Diet: Diet NPO Effective Now  Code Status: Full Code  PT/OT Eval Status: Ambulatory    Dispo -pending clinical course       Rohan Ramirez DO    Thank you Pierce Leavitt MD for the opportunity to be involved in this patient's care. If you have any questions or concerns please feel free to contact me at [phone].

## 2021-03-12 NOTE — PROGRESS NOTES
4 Eyes Skin Assessment     NAME:  Morena Soliman  YOB: 1955  MEDICAL RECORD NUMBER:  [id number]    The patient is being assess for  Admission    I agree that 2 RN's have performed a thorough Head to Toe Skin Assessment on the patient. ALL assessment sites listed below have been assessed. Areas assessed by both nurses:    Head, Face, Ears, Shoulders, Back, Chest, Arms, Elbows, Hands, Sacrum. Buttock, Coccyx, Ischium and Legs. Feet and Heels        Does the Patient have a Wound?  No noted wound(s)       Nicho Prevention initiated:  Yes   Wound Care Orders initiated:  No    Pressure Injury (Stage 3,4, Unstageable, DTI, NWPT, and Complex wounds) if present place consult order under [de-identified] No    New and Established Ostomies if present place consult order under : No      Nurse 1 eSignature: Electronically signed by Carlos Enrique Wyatt RN on 3/12/21 at 7:35 AM EST    **SHARE this note so that the co-signing nurse is able to place an eSignature**    Nurse 2 eSignature: Electronically signed by Gloria Rowe RN on 3/12/21 at 7:25 PM EST

## 2021-03-12 NOTE — CONSULTS
REASON FOR CONSULTATION/CC: severe sepsis with NEHA      Consult at request of Maddie Barroso MD for     PCP: Kristin Islas MD  Established Pulmonologist:  None    HISTORY OF PRESENT ILLNESS: Eric Rivera is a 77y.o. year old female with a history of   who presents with      She presented with several day hx of diarrhea with lower abd pain with fatigue and decreased urine out put. Assessment:        Diverticulosis  Severe Sepsis  Likely C . Diff  NEHA  Lactic acid / metabolic acidosis  DM with hyperglycemia   Hypomagnesemia   Hyponatremia  status post covid 19 vaccine     Plan:      Hospital Day 0     Severe sepsis  - Will give 2 additional liters of fluid  - agree with concern for C diff.   - change antibiotics to PO Vanc, flagyl  - likely does not need other antibiotics but change to Vanc cefepime  - follow Procalcitonin    -     NEHA  - give 2 additional liters of saline  - very concerning for need for HD  - discussed with patient      DM  - per Maddie Barroso MD   - improving control      Hyponatremia  - monitor for fluid              This note was transcribed using [id number]. Please disregard any translational errors.     Thank you for the consult    [place] [address] Pulmonary, Sleep and Critical Care  [phone]             Data:     PAST MEDICAL HISTORY:  Past Medical History:   Diagnosis Date    Abnormal echocardiogram     25% on 3/11/14 and 50% on 3/19/14    Back pain     Cardiomyopathy ([address]     EF was 50% on 3/19/14    Chipped tooth     lower left    Dental crown present     veneers    Depressive disorder 8/13/2014    Diabetic infection of right foot ([address] 4/15/2015    Diabetic ulcer of toe of left foot associated with type 2 diabetes mellitus, with fat layer exposed ([address] 4/10/2018    Pt slipped in hot tub latter part of February and has not healed since despite topical treatment    DVT (deep venous thrombosis) (HCC)     HTN (hypertension)     Hx by Laura Herrera DO at [address] [facility] N/A 2/8/2019    EGD BIOPSY performed by Laura Herrera DO at [address] [facility] N/A 2/8/2019    EGD POLYP ABLATION/OTHER performed by Laura Herrera DO at [address] [facility] [address]:  family history includes COPD in her father; Cancer in her father and mother; High Blood Pressure in her mother; Rheum Arthritis in her mother. SOCIAL HISTORY:   reports that she quit smoking about 7 years ago. She has a 5.00 pack-year smoking history. She has never used smokeless tobacco.    Scheduled Meds:   heparin (porcine)  5,000 Units Subcutaneous 3 times per day    atorvastatin  80 mg Oral Daily    pantoprazole  40 mg Oral Daily    citalopram  40 mg Oral Daily    piperacillin-tazobactam  3,375 mg Intravenous Q12H    aspirin  81 mg Oral Daily    gabapentin  100 mg Oral TID    lidocaine 1 % injection  5 mL Intradermal Once    sodium chloride flush  10 mL Intravenous 2 times per day    sodium chloride  500 mL Intravenous Once    vancomycin  250 mg Oral 4 times per day       Continuous Infusions:   dextrose 5% and 0.45% NaCl with KCl 20 mEq 150 mL/hr at 03/12/21 1145    insulin 2.94 Units/hr (03/12/21 1322)    dextrose         PRN Meds:  acetaminophen **OR** acetaminophen, dextrose, dextrose 5% and 0.45% NaCl with KCl 20 mEq, ondansetron, sodium chloride flush, glucose, dextrose, glucagon (rDNA), dextrose    ALLERGIES:  Patient is allergic to doxycycline.     REVIEW OF SYSTEMS:  Constitutional: Negative for fever    HENT: Negative for sore throat  Eyes: Negative for redness   Respiratory: Negative for dyspnea, cough  Cardiovascular: Negative for chest pain  Gastrointestinal: Negative for vomiting, diarrhea    Genitourinary: Negative for hematuria   Musculoskeletal: Negative for arthralgias   Skin: Negative for rash  Neurological: Negative for syncope  Hematological: Negative for adenopathy  Psychiatric/Behavorial: Negative for anxiety    Objective:   PHYSICAL EXAM:  Blood pressure (!) 127/57, pulse 65, temperature 98.3 °F (36.8 °C), temperature source Oral, resp. rate 17, height 5' 3\" (1.6 m), weight 212 lb 1.3 oz (96.2 kg), last menstrual period 06/15/1999, SpO2 96 %, not currently breastfeeding.'  Gen: No distress. Eyes: PERRL. No sclera icterus. No conjunctival injection. ENT: No discharge. Pharynx clear. External appearance of ears and nose normal.  Neck: Trachea midline. No obvious mass. Resp: No accessory muscle use. No crackles. No wheezes. No rhonchi. CV: Regular rate. Regular rhythm. No murmur or rub. No edema. GI:  + tender. Non-distended. No hernia. Skin: Warm, dry, normal texture and turgor. No nodule on exposed extremities. Lymph: No cervical LAD. No supraclavicular LAD. M/S: No cyanosis. No clubbing. No joint deformity. Neuro: Moves all four extremities. Psych: Oriented x 3. No anxiety. Awake. Alert. Intact judgement and insight. Data Reviewed:   LABS:  CBC:   Recent Labs     03/11/21  1734 03/12/21  0429   WBC 27.1* 18.2*   HGB 13.9 10.6*   HCT 43.3 31.9*   MCV 93.8 89.9    190     BMP:   Recent Labs     03/11/21  2041 03/12/21  0429 03/12/21  0915   * 133* 131*   K 4.4 4.0 3.9   CL 95* 99 99   CO2 20* 19* 21   PHOS  --   --  2.4*   BUN 47* 50* 52*   CREATININE 3.5* 3.6* 4.1*     LIVER PROFILE:   Recent Labs     03/11/21  1844 03/12/21  0915   AST 50* 52*   ALT 29 32   LIPASE 60.0  --    BILIDIR  --  <0.2   BILITOT 0.4 0.3   ALKPHOS 112 84     PT/INR: No results for input(s): PROTIME, INR in the last 72 hours. APTT: No results for input(s): APTT in the last 72 hours.   UA:  Recent Labs     03/11/21 2046   COLORU Yellow   PHUR 5.5   WBCUA 0-2   RBCUA 3-4   BACTERIA 3+*   CLARITYU CLOUDY*   SPECGRAV 1.020   LEUKOCYTESUR Negative   UROBILINOGEN 0.2   BILIRUBINUR Negative   BLOODU MODERATE*   GLUCOSEU >=1000*     No results for input(s): PHART, YPB1IDB, PO2ART in the last 72 hours. Vent Information  Skin Assessment: Clean, dry, & intact  SpO2: 96 %    Radiology Review:  Pertinent images / reports were reviewed as a part of this visit. CT Chest w/ contrast: No results found for this or any previous visit. CT Chest w/o contrast: No results found for this or any previous visit. CTPA: No results found for this or any previous visit. CXR PA/LAT:   Results for orders placed during the hospital encounter of 03/11/21   XR CHEST (2 VW)    Narrative EXAMINATION:  TWO XRAY VIEWS OF THE CHEST    3/11/2021 5:53 pm    COMPARISON:  09/08/2020    HISTORY:  ORDERING SYSTEM PROVIDED HISTORY: Frequent falls  TECHNOLOGIST PROVIDED HISTORY:  Reason for exam:->Frequent falls  Reason for Exam: fatigue with frequent falls  Acuity: Acute  Type of Exam: Initial    FINDINGS:  The cardiac silhouette is normal.  There is moderate calcification of the  aorta. Hilar contours are normal.  There is no focal airspace disease or  pleural effusion. No acute osseous abnormality. Impression No acute cardiopulmonary disease. CXR portable: No results found for this or any previous visit.

## 2021-03-12 NOTE — PROGRESS NOTES
1000 - Patient had watery brownish red bowel movement on bedpan. 12 - Patient's daughter, Siva Cain, called RN for an update. Update given and questions answered. RN notified daughter of visiting policy. Either Malgorzata or patient's son will be in to visit today. 1025 - [employer] message sent to Dr. Ravin Lemons regarding patient's labs and notified that patient had bloody stool. 36 - Dr. Lilia Cortes called RN back. See new order for blood occult stool. MD does not want replacements for K 3.9. Also, continue with current continuous fluids and d/c LR order. 1204 - Patient's BP 87/41 (52). [employer] message sent to Dr. Lilia Cortes. 1212 - See new order from Dr. Lilia Cortes for another 500 mL bolus and for PICC line. 200 - Dr. Lilia Cortes to bedside. See new orders for another 500mL bolus for a total of 1.5 L. BP goal of MAP>65. If patient does not meet goal after fluid bolus', then start levophed. 1257 - [employer] message sent to Dr. Dov Beal with nephrology to get approval for PICC line. 56 - Dr. Dov Beal called RN. Stated he would prefer if patient did not get a PICC line due to her Stage 3 CKD and instead get a central line placed. 26 - Dr. Lilia Cortes made aware of Dr. Lacey Inman response to PICC line. Dr. Lilia Cortes consulting critical care. 26 - Dr. Christin Bowden to bedside. See new orders for an additional 2 L bolus. 1800 - BP improved with MAP>65 and urine output has improved as well. 1900 - Shift report given to [facility].     Electronically signed by Nico Garza RN on 3/12/2021 at 7:25 PM

## 2021-03-12 NOTE — CONSULTS
GASTROENTEROLOGY INPATIENT CONSULTATION        IDENTIFYING DATA/REASON FOR CONSULTATION   PATIENT:  Krystal Michele  MRN:  [id number]  ADMIT DATE: 3/11/2021  TIME OF EVALUATION: 3/12/2021 8:42 AM  HOSPITAL STAY:   LOS: 0 days     REASON FOR CONSULTATION: Significant colitis with septic shock    HISTORY OF PRESENT ILLNESS   Krystal Michele is a 77 y.o. female with a PMH of DM, HTN, cardiomyopathy, NSTEMI, ANGLE, DVT, osteomyelitis, CKD who presented on 3/11/2021 with fatigue. We have been consulted regarding significant colitis with septic shock of uncertain etiology. Patient states she has been constipated for 2 weeks while traveling. Upon return patient had minimal bowel movement that she described as guy so she took laxative on 3/10 which then caused her to have extreme diarrhea. She had her second Covid vaccine on 3/10. Patient denies melena, black stools, mucus in her stool, and any recent antibiotic use. Patient now localizes her pain to her lower abdomen. Patient's blood sugars were in the 600s at the time of presentation to the hospital and was admitted to ICU with DKA. WBC 27.1 --> 18.2. HGB 13.9 --> 10.6. CT A/P without contrast on 3/11 shows long segment wall thickening and surrounding inflammation involving the mid to distal transverse, descending and sigmoid colon. Small amount of free fluid present within the pelvis. Prior Endoscopic Evaluations:  EGD 2/8/2019  Stomach abnormal with multiple polyps , biopsy of the antrum obtained to rule out H.Pylori, then retroflexed and the gastroesophageal junction was inspected. There was no hiatal hernia and no evidence of Matos's      Multiple polyps were seen in the proximal stomach too many to count. A polypectomy was done using hot snare technique of one of the bigger polyps in the mid-stomach. Entire polyp was removed and hemostasis maintained. Surgical Pathology 2/8/2019  FINAL DIAGNOSIS:        A.  Gastric biopsy (rule out Helicobacter):    - No diagnostic alterations (no evidence of Helicobacter); see        Comment.        B. Gastric polyp:      - A few slightly dilated glands supporting fundic gland polyp.        PAST MEDICAL, SURGICAL, FAMILY, and SOCIAL HISTORY     Past Medical History:   Diagnosis Date    Abnormal echocardiogram     25% on 3/11/14 and 50% on 3/19/14    Back pain     Cardiomyopathy ([address]     EF was 50% on 3/19/14    Chipped tooth     lower left    Dental crown present     veneers    Depressive disorder 8/13/2014    Diabetic infection of right foot ([address] 4/15/2015    Diabetic ulcer of toe of left foot associated with type 2 diabetes mellitus, with fat layer exposed ([address] 4/10/2018    Pt slipped in hot tub latter part of February and has not healed since despite topical treatment    DVT (deep venous thrombosis) ([address]     HTN (hypertension)     Hx of blood clots 2016    left leg    Ischemic cardiomyopathy 4/15/2015    Kidney disease     CHRONIC STAGE 3    Meniere's disease     Mixed hyperlipidemia 3/7/2016    Nicotine abuse     NSTEMI (non-ST elevated myocardial infarction) ([address] 3/13/2014    ANGLE (obstructive sleep apnea)     Osteomyelitis of ankle or foot, acute, left ([address] 6/4/2018    Pneumonia     PONV (postoperative nausea and vomiting)     nausea    Spinal epidural abscess 3/13/2014    Type II diabetes mellitus, uncontrolled ([address] 08/13/2014     Past Surgical History:   Procedure Laterality Date    BACK SURGERY  3/2014    DEBRIDEMENT  3/12/14    extensive debridement of bone/muscle and paraspinal empyema    DILATION AND CURETTAGE OF UTERUS      ENDOSCOPY, COLON, DIAGNOSTIC      FOOT DEBRIDEMENT Left 9/8/2020    LEFT FOOT DEBRIDEMENT INCISION AND DRAINAGE performed by Kahlil Cote DPM at [address] [place] [address] [address] TOE SURGERY Left 8/28/2020    ON THE LEFT: [address] [facility] [address], WOUND CLOSURE, FLEXOR TENOTOMY 4TH AND 5TH DIGITS, TENOTOMY AND CAPSULOTOMY 2ND DIGIT performed by Uvaldo Vaughn DPM at [address]  6/15/1999    complete-think right ovary in.    NASAL SEPTUM SURGERY      NERVE SURGERY Left 9/10/2020    LEFT FOOT INCISION AND DRAINAGE, EXTENSOR HALLUCIS LONGUS REPAIR WITH DELAYED PRIMARY CLOSURE performed by Uvaldo Vaughn DPM at [facility] behind right ear    PRESSURE ULCER DEBRIDEMENT Left 10/23/2020    ON THE LEFT: SURGICAL PREPARATION OF WOUND BED, APPLICATION OF DERMAL GRAFT SUBSTITUTE performed by Uvaldo Vaughn DPM at [address]  04/02/2019    ROBOTIC ASSISTED LAPAROSCOPIC SLEEVE GASTRECTOMY, LAPAROSCOPIC REDUCIBLE INCISIONAL HERNIA REPAIR     SLEEVE GASTRECTOMY N/A 4/2/2019    ROBOTIC ASSISTED LAPAROSCOPIC SLEEVE GASTRECTOMY, LAPAROSCOPIC REDUCIBLE INCISIONAL HERNIA REPAIR performed by Adriane Sharma DO at [facility] N/A 2/8/2019    EGD BIOPSY performed by Adriane Sharma DO at [address] [facility] N/A 2/8/2019    EGD POLYP ABLATION/OTHER performed by Adriane Sharma DO at [address] ENDOSCOPY     Family History   Problem Relation Age of Onset    High Blood Pressure Mother     Cancer Mother     Rheum Arthritis Mother     COPD Father     Cancer Father     Osteoarthritis Neg Hx     Asthma Neg Hx     Breast Cancer Neg Hx     Diabetes Neg Hx     Heart Failure Neg Hx     High Cholesterol Neg Hx     Hypertension Neg Hx     Migraines Neg Hx     Ovarian Cancer Neg Hx     Rashes/Skin Problems Neg Hx     Seizures Neg Hx     Stroke Neg Hx     Thyroid Disease Neg Hx      Social History     Socioeconomic History    Marital status:       Spouse name: None    Number of children: None    Years of education: None    Highest education level: None   Occupational History    None   Social Needs    Financial resource strain: None    Food insecurity     Worry: None     Inability: None    Transportation needs     Medical: infection       REVIEW OF SYSTEMS   Pertinent ROS noted in HPI    PHYSICAL EXAM     Vitals:    03/12/21 0400 03/12/21 0500 03/12/21 0600 03/12/21 0758   BP: 82/61 (!) 107/49 (!) 110/42 (!) 109/42   Pulse: 69 69 69 66   Resp: 16 19 16 15   Temp: 96.2 °F (35.7 °C)   98.2 °F (36.8 °C)   TempSrc: Axillary   Oral   SpO2: 93% 94% 93% 96%   Weight:       Height:           No intake/output data recorded. Physical Exam:  General appearance: alert, cooperative, no distress, appears stated age. Eyes: Anicteric  Head: Normocephalic, without obvious abnormality  Lungs: clear to auscultation bilaterally, Normal Effort  Heart: regular rate and rhythm, normal S1 and S2, no murmurs or rubs  Abdomen: soft, non-distended, non-tender. Bowel sounds normal. No masses,  no organomegaly. Extremities: atraumatic, no cyanosis or edema  Skin: warm and dry, no jaundice  Neuro: Grossly intact, A&OX3      LABS AND IMAGING   Laboratory   Recent Labs     03/11/21  1734 03/12/21  0429   WBC 27.1* 18.2*   HGB 13.9 10.6*   HCT 43.3 31.9*   MCV 93.8 89.9    190     Recent Labs     03/11/21  1844 03/11/21  2041 03/12/21  0429   * 132* 133*   K 5.5* 4.4 4.0   CL 89* 95* 99   CO2 20* 20* 19*   BUN 45* 47* 50*   CREATININE 3.1* 3.5* 3.6*     Recent Labs     03/11/21  1844   AST 50*   ALT 29   BILITOT 0.4   ALKPHOS 112     Recent Labs     03/11/21  1844   LIPASE 60.0     No results for input(s): PROTIME, INR in the last 72 hours. Imaging  CT Head WO Contrast   Final Result   No acute intracranial abnormality. No significant change from prior studies         CT Cervical Spine WO Contrast   Final Result   No evidence for fracture or malalignment cervical spine         CT ABDOMEN PELVIS WO CONTRAST Additional Contrast? None   Final Result   1. Long segment wall thickening and surrounding inflammation involving the   mid to distal transverse, descending and sigmoid colon.   Differential   considerations would include ischemic colitis, antibiotic associated colitis,   infectious colitis, or inflammatory bowel disease   2. Small amount of free fluid present within the pelvis   3. Nonobstructing bilateral intrarenal calculi         XR CHEST (2 VW)   Final Result   No acute cardiopulmonary disease. ASSESSMENT AND RECOMMENDATIONS   Dalia Lincoln is a 77 y.o. female with a PMH of DM, HTN, cardiomyopathy, NSTEMI, ANGLE, DVT, osteomyelitis, CKD who presented on 3/11/2021 with fatigue. We have been consulted regarding significant colitis with septic shock of uncertain etiology. CT A/P without contrast on 3/11 shows long segment wall thickening and surrounding inflammation involving the mid to distal transverse, descending and sigmoid colon. Small amount of free fluid present within the pelvis. IMPRESSION:    1. Colitis   -CT A/P without contrast on 3/11 shows long segment wall thickening and surrounding inflammation involving the mid to distal transverse, descending and sigmoid colon. Small amount of free fluid present within the pelvis. -WBC 27.1 --> 18.2. HGB 13.9 --> 10.6.    -Patient denies melena, black stools, mucus in her stool, and any recent antibiotic use.  -2-week history of constipation with diarrhea following laxative  -Differential Diagnosis: Highly suspicious for ischemic colitis based on history and CT findings of segmental colitis. Additional considerations include infectious colitis or less likely IBD. 2. Septic Shock  3. NEHA on CKD  4. DKA  5. History of cardiomyopathy  6. Hypertension      RECOMMENDATIONS:    -Continue on PPI and Zosyn.  -Supportive care  -Patient would likely benefit from a colonoscopy for further evaluation. At this point will see how she responds with antibiotics and plan for outpatient colonoscopy.   Will pursue sooner colonoscopy if no improvement with antibiotics      If you have any questions or need any further information, please feel free to contact our consult team.  Thank you for allowing us to participate in the care of Jero Decker. The note was completed using Dragon voice recognition transcription. Every effort was made to ensure accuracy; however, inadvertent transcription errors may be present despite my best efforts to edit errors.       Amari Lema PA-C

## 2021-03-12 NOTE — PROGRESS NOTES
Hospitalist Progress Note      PCP: Julieta Mcdonald MD    Date of Admission: 3/11/2021      Subjective: improved abdominal pain, no nausea or vomiting now, no chest pain or SOB. Medications:  Reviewed    Infusion Medications    dextrose 5% and 0.45% NaCl with KCl 20 mEq 150 mL/hr at 03/12/21 0407    insulin 9.84 Units/hr (03/12/21 0814)    dextrose       Scheduled Medications    sodium chloride flush  10 mL Intravenous 2 times per day    heparin (porcine)  5,000 Units Subcutaneous 3 times per day    atorvastatin  80 mg Oral Daily    pantoprazole  40 mg Oral Daily    citalopram  40 mg Oral Daily    piperacillin-tazobactam  3,375 mg Intravenous Q12H    insulin regular  10 Units Intravenous Once     PRN Meds: sodium chloride flush, acetaminophen **OR** acetaminophen, dextrose, dextrose 5% and 0.45% NaCl with KCl 20 mEq, ondansetron, glucose, dextrose, glucagon (rDNA), dextrose      Intake/Output Summary (Last 24 hours) at 3/12/2021 0611  Last data filed at 3/12/2021 0759  Gross per 24 hour   Intake --   Output 42 ml   Net -42 ml       Physical Exam Performed:    BP (!) 109/42   Pulse 66   Temp 98.2 °F (36.8 °C) (Oral)   Resp 15   Ht 5' 3\" (1.6 m)   Wt 212 lb 1.3 oz (96.2 kg) Comment: bed transfer since last temp  LMP 06/15/1999   SpO2 96%   BMI 37.57 kg/m²     General appearance: No apparent distress  Neck: Supple  Respiratory:  Normal respiratory effort. Clear to auscultation, bilaterally without Rales/Wheezes/Rhonchi. Cardiovascular: Regular rate and rhythm with normal S1/S2 without murmurs, rubs or gallops. Abdomen: Soft, non-tender, non-distended  Musculoskeletal: No clubbing, cyanosis  Skin: Skin color, texture, turgor normal.  No rashes or lesions.   Neurologic:  No focal weakness   Psychiatric: Alert   Capillary Refill: Brisk,< 3 seconds   Peripheral Pulses: +2 palpable, equal bilaterally       Labs:   Recent Labs     03/11/21  1734 03/12/21  0429   WBC 27.1* 18.2*   HGB 13.9 10.6*   HCT 43.3 31.9*    190     Recent Labs     03/11/21  1844 03/11/21 2041 03/12/21  0429   * 132* 133*   K 5.5* 4.4 4.0   CL 89* 95* 99   CO2 20* 20* 19*   BUN 45* 47* 50*   CREATININE 3.1* 3.5* 3.6*   CALCIUM 8.4 8.6 7.9*     Recent Labs     03/11/21  1844   AST 50*   ALT 29   BILITOT 0.4   ALKPHOS 112     No results for input(s): INR in the last 72 hours. Recent Labs     03/11/21  1844   TROPONINI 0.03*       Urinalysis:      Lab Results   Component Value Date    NITRU Negative 03/11/2021    WBCUA 0-2 03/11/2021    BACTERIA 3+ 03/11/2021    RBCUA 3-4 03/11/2021    BLOODU MODERATE 03/11/2021    SPECGRAV 1.020 03/11/2021    GLUCOSEU >=1000 03/11/2021       Radiology:  CT Head WO Contrast   Final Result   No acute intracranial abnormality. No significant change from prior studies         CT Cervical Spine WO Contrast   Final Result   No evidence for fracture or malalignment cervical spine         CT ABDOMEN PELVIS WO CONTRAST Additional Contrast? None   Final Result   1. Long segment wall thickening and surrounding inflammation involving the   mid to distal transverse, descending and sigmoid colon. Differential   considerations would include ischemic colitis, antibiotic associated colitis,   infectious colitis, or inflammatory bowel disease   2. Small amount of free fluid present within the pelvis   3. Nonobstructing bilateral intrarenal calculi         XR CHEST (2 VW)   Final Result   No acute cardiopulmonary disease. Assessment/Plan:    Active Hospital Problems    Diagnosis    Septic shock ([address] [A41.9, R65.21]    Colitis [K52.9]    Acute kidney injury superimposed on CKD ([address] [N17.9, N18.9]    DM type 2 with diabetic peripheral neuropathy ([address] [E11.42]    DKA, type 2 ([address] [E11.10]        1.  Severe sepsis/septic shock, due to colitis, appears ischemic, I can not exclude infectious at this time, iv zosyn for now, keep po vancomycin pendin stool for c-dif, lactic acid elevated, improved though, hypotensive, adding boluses, getting PICC, will start levophed when needed, discussed with nurse,  GI consulted. 2. DKA, iv insulin, iv fluids  3. High anion gap metabolic acidosis with elevated LA, IV fluids, trend LA. 4. NEHA, likely shock related, ATN, iv fluids for now, consulted nephrology. 5. H/o cardiomyopathy, monitor closely on iv fluids  6. Generalized weakness, due to above  7. Essential hypertension, currently hypotensive. Po medications on hold. Critical care tome including, bt not limited to PE, ordering and following on critical labs, ordering critical iv medications, in a critically ill patient, and not including procedure time 40 minutes.        Diet: Diet NPO Effective Now  Code Status: Full Code        Dispo - ICU    Narinder Pressley MD

## 2021-03-12 NOTE — CONSULTS
Office: [phone]       Fax: [phone]      Nephrology Initial Consult Note        Patient's Name: Marthann Harada Date: 3/11/2021  Date of Visit: 3/12/2021    Reason for Consult: NEHA  Requesting Physician:  Cori White MD  PCP: Julieta Mcdonald MD    Chief Complaint:  Diarrhea     History of Present Illness:       Gilson Muller is a 77 y.o. female with PMHx of hypertension, cardiomyopathy, DM, CKD who was hospitalized on 3/11/2021 with complaints of vomiting, diarrhea  Recently took laxative  No abdominal pain   No dysuria, hematuria  Feels weak  Was hypotensive  NSAID use: Denies   IV contrast: None recent   Home meds reviewed and noted to be on lisinopril    Past Medical History:   Diagnosis Date    Abnormal echocardiogram     25% on 3/11/14 and 50% on 3/19/14    Back pain     Cardiomyopathy ([address]     EF was 50% on 3/19/14    Chipped tooth     lower left    Dental crown present     veneers    Depressive disorder 8/13/2014    Diabetic infection of right foot ([address] 4/15/2015    Diabetic ulcer of toe of left foot associated with type 2 diabetes mellitus, with fat layer exposed ([address] 4/10/2018    Pt slipped in hot tub latter part of February and has not healed since despite topical treatment    DVT (deep venous thrombosis) ([address]     HTN (hypertension)     Hx of blood clots 2016    left leg    Ischemic cardiomyopathy 4/15/2015    Kidney disease     CHRONIC STAGE 3    Meniere's disease     Mixed hyperlipidemia 3/7/2016    Nicotine abuse     NSTEMI (non-ST elevated myocardial infarction) ([address] 3/13/2014    ANGLE (obstructive sleep apnea)     Osteomyelitis of ankle or foot, acute, left (HCC) 6/4/2018    Pneumonia     PONV (postoperative nausea and vomiting)     nausea    Spinal epidural abscess 3/13/2014    Type II diabetes mellitus, uncontrolled [facility] 08/13/2014       Past Surgical History:   Procedure Laterality Date    BACK SURGERY  3/2014    DEBRIDEMENT  3/12/14    extensive debridement of bone/muscle and paraspinal empyema    DILATION AND CURETTAGE OF UTERUS      ENDOSCOPY, COLON, DIAGNOSTIC      FOOT DEBRIDEMENT Left 9/8/2020    LEFT FOOT DEBRIDEMENT INCISION AND DRAINAGE performed by Myla Fournier DPM at [facility] [place] TOE SURGERY Left 8/28/2020    ON THE LEFT: [address] [facility] [address], WOUND CLOSURE, FLEXOR TENOTOMY 4TH AND 5TH DIGITS, TENOTOMY AND CAPSULOTOMY 2ND DIGIT performed by Myla Fournier DPM at [address]  6/15/1999    complete-think right ovary in.    NASAL SEPTUM SURGERY      NERVE SURGERY Left 9/10/2020    LEFT FOOT INCISION AND DRAINAGE, EXTENSOR HALLUCIS LONGUS REPAIR WITH DELAYED PRIMARY CLOSURE performed by Myla [facility]nier DPM at [facility] behind right ear    PRESSURE ULCER DEBRIDEMENT Left 10/23/2020    ON THE LEFT: SURGICAL PREPARATION OF WOUND BED, APPLICATION OF DERMAL GRAFT SUBSTITUTE performed by Myla Fournier DPM at [address]  04/02/2019    ROBOTIC ASSISTED LAPAROSCOPIC SLEEVE GASTRECTOMY, LAPAROSCOPIC REDUCIBLE INCISIONAL HERNIA REPAIR     SLEEVE GASTRECTOMY N/A 4/2/2019    ROBOTIC ASSISTED LAPAROSCOPIC SLEEVE GASTRECTOMY, LAPAROSCOPIC REDUCIBLE INCISIONAL HERNIA REPAIR performed by Pelon Mcadams DO at [address] [facility] N/A 2/8/2019    EGD BIOPSY performed by Pelon Mcadams DO at [facility] N/A 2/8/2019    EGD POLYP ABLATION/OTHER performed by Pelon Mcadams DO at [address] ENDOSCOPY       Family History   Problem Relation Age of Onset    High Blood Pressure Mother     Cancer Mother     Rheum Arthritis Mother     COPD Father     Cancer Father     Osteoarthritis Neg Hx     Asthma Neg Hx     Breast Cancer Neg Hx  Diabetes Neg Hx     Heart Failure Neg Hx     High Cholesterol Neg Hx     Hypertension Neg Hx     Migraines Neg Hx     Ovarian Cancer Neg Hx     Rashes/Skin Problems Neg Hx     Seizures Neg Hx     Stroke Neg Hx     Thyroid Disease Neg Hx         reports that she quit smoking about 7 years ago. She has a 5.00 pack-year smoking history. She has never used smokeless tobacco. She reports current alcohol use. She reports that she does not use drugs. Medications: Allergies:  Doxycycline    Scheduled Meds:   heparin (porcine)  5,000 Units Subcutaneous 3 times per day    atorvastatin  80 mg Oral Daily    pantoprazole  40 mg Oral Daily    citalopram  40 mg Oral Daily    piperacillin-tazobactam  3,375 mg Intravenous Q12H    aspirin  81 mg Oral Daily    gabapentin  100 mg Oral TID    sodium chloride  500 mL Intravenous Once    lidocaine 1 % injection  5 mL Intradermal Once    sodium chloride flush  10 mL Intravenous 2 times per day    sodium chloride  500 mL Intravenous Once    vancomycin  250 mg Oral 4 times per day     Continuous Infusions:   dextrose 5% and 0.45% NaCl with KCl 20 mEq 150 mL/hr at 03/12/21 1145    insulin 5.6 Units/hr (03/12/21 1218)    dextrose         Labs:  CBC:   Recent Labs     03/11/21  1734 03/12/21  0429   WBC 27.1* 18.2*   HGB 13.9 10.6*    190     Ca/Mg/Phos:   Recent Labs     03/11/21  2041 03/12/21  0429 03/12/21  0915   CALCIUM 8.6 7.9* 8.0*     UA:  Recent Labs     03/11/21 2046   COLORU Yellow   CLARITYU CLOUDY*   GLUCOSEU >=1000*   BILIRUBINUR Negative   KETUA Negative   SPECGRAV 1.020   BLOODU MODERATE*   PHUR 5.5   PROTEINU 100*   UROBILINOGEN 0.2   NITRU Negative   LEUKOCYTESUR Negative   LABMICR YES   URINETYPE NotGiven      Urine Microscopic:   Recent Labs     03/11/21 2046   BACTERIA 3+*   COMU see below   WBCUA 0-2   RBCUA 3-4   EPIU 2-5     Urine Chemistry: No results for input(s): CLUR, LABCREA, PROTEINUR, NAUR in the last 72 hours.       ROS: All systems reviewed and negative except as in HPI    Objective:     Vitals: BP (!) 87/41   Pulse 64   Temp 98.3 °F (36.8 °C) (Oral)   Resp 14   Ht 5' 3\" (1.6 m)   Wt 212 lb 1.3 oz (96.2 kg) Comment: bed transfer since last temp  LMP 06/15/1999   SpO2 93%   BMI 37.57 kg/m²    Wt Readings from Last 3 Encounters:   03/12/21 212 lb 1.3 oz (96.2 kg)   10/23/20 210 lb (95.3 kg)   10/19/20 211 lb 12.8 oz (96.1 kg)      24HR INTAKE/OUTPUT:      Intake/Output Summary (Last 24 hours) at 3/12/2021 1257  Last data filed at 3/12/2021 1224  Gross per 24 hour   Intake 610 ml   Output 137 ml   Net 473 ml     Constitutional:  awake, NAD  HEENT:  MMM, No icterus  Neck: no bruits, No JVD  Cardiovascular:  reg rhythm  Respiratory: CTA, no crackles  Abdomen:  +BS, soft, NT, ND  Ext: no lower extremity edema  Psychiatric: mood and affect appropriate  Skin: dry/intact  CNS: alert, no agitation    IMAGING:  CT Head WO Contrast   Final Result   No acute intracranial abnormality. No significant change from prior studies         CT Cervical Spine WO Contrast   Final Result   No evidence for fracture or malalignment cervical spine         CT ABDOMEN PELVIS WO CONTRAST Additional Contrast? None   Final Result   1. Long segment wall thickening and surrounding inflammation involving the   mid to distal transverse, descending and sigmoid colon. Differential   considerations would include ischemic colitis, antibiotic associated colitis,   infectious colitis, or inflammatory bowel disease   2. Small amount of free fluid present within the pelvis   3. Nonobstructing bilateral intrarenal calculi         XR CHEST (2 VW)   Final Result   No acute cardiopulmonary disease.              Assessment :     1. NEHA on CKD 3b  -Non-Oliguric  -Baseline creat: 1.3 Now 3.1-->3.6->4.1  -UA conc, prot ++  -Volume: Hypovolemic  -Electrolytes: Hyponatremia and No Dyskalemia  -Acid-Base: NAGMA    Recent Labs     03/11/21  1844 03/11/21 2041 03/12/21  0429 03/12/21  0915   BUN 45* 47* 50* 52*   CREATININE 3.1* 3.5* 3.6* 4.1*     Recent Labs     03/11/21  1844 03/11/21  2041 03/12/21  0429 03/12/21  0915   * 132* 133* 131*   K 5.5* 4.4 4.0 3.9   CO2 20* 20* 19* 21         2. HTN  -Blood pressure low    BP Readings from Last 1 Encounters:   03/12/21 (!) 87/41       3. Sepsis    4. HFrEF  - TTE (feb 2019): LVEF 50-55%. , LVH    5. DM    Plan:     - IVF for hemodynamic support  - Hold ACEI  - Stool studies  - Antimicrobials per primary team   - Monitor BMP    -Monitor I/O, UOP  -Maintain MAP>65  -Avoid nephrotoxin, if able. -Dose meds to current eGFR    Thank you for allowing us to participate in care of [facility] . We will continue to follow. Feel free to contact me with any questions.       Trena Nissen  3/12/2021    [facility] [address]  Office : [phone]  Fax :[phone]

## 2021-03-12 NOTE — ED NOTES
FSBS 401, no change to insulin gtt.  Patient tolerating well     Jocelyn House, DARRELL  03/12/21 7047

## 2021-03-12 NOTE — ED PROVIDER NOTES
Attending Supervisory Note/Shared Visit   I have personally performed a face to face diagnostic evaluation on this patient. I have reviewed the mid-levels findings and agree. History and Exam by me shows a 64-year-old female who presents for evaluation of fatigue, and multiple falls at home. Patient reports that 2 days ago she returned from [place] where she had been staying with her sister. She states that she ran out of insulin during the trip, and the last time she had any insulin was Tuesday. She returned home yesterday, and did have her second Covid vaccine at that time. She states that she has been very weak for the past 2 days, she did take some stool softeners, has had frequent bowel movements. She states that she is also taken multiple doses of trazodone trying to sleep. She has had multiple falls at home due to syncope, does endorse head injury. Patient complains of some left lower quadrant abdominal pain as well. On exam she is well-appearing, afebrile. She has initial low blood pressures with systolics in the 91U, but normal heart rate and oxygen saturation. Initial blood glucose greater than 600. CBC shows blood glucose 648, creatinine of 3.1 from baseline around 1. She does have an anion gap of 18. Potassium was hemolyzed, but 5.5. Lipase is normal.  Troponin is elevated 0.03, suspect secondary to strain from systemic illness. No EKG changes noted. CBC also notes a significant leukocytosis of 27. Rest x-ray without focal consolidation. Given falls, did obtain CT imaging of the head and cervical spine which show no acute intracranial abnormality. Given abdominal pain, we did obtain CT of the abdomen pelvis which show wall thickening throughout the distal transverse, descending, and sigmoid colon concerning for colitis. Stool cultures as well as C. difficile testing was sent.   Given degree of leukocytosis, more concern for infectious pathology rather than ischemic process at this time.  Patient was covered with Flagyl and oral vancomycin for possible C. difficile infection. Lactate was elevated at 5.1. Blood cultures were sent. Beta hydroxybutyrate is 0.31. BMP was repeated given hemolysis of potassium on the initial.  After 2 L of IV fluid, creatinine is actually uptrending at 3.5. Patient remains acidotic with CO2 of 20, anion gap of 17. Blood glucose is 574, potassium is 4.4. Patient was transitioned to sodium chloride with 20 mill equivalents of potassium and started on insulin bolus and drip for treatment of DKA. She will be admitted to the ICU for further management of colitis, diabetic ketoacidosis, troponin elevation. Patient and her daughter were updated at bedside and agreeable plan of care. Stable at the time of admission. Per my interpretation:    Electrocardiogram (ECG) 3/11/2021 1854  RATE: normal  RHYTHM: normal sinus  AXIS: Left axis deviation  INTERVALS: normal  ST-T WAVE CHANGES: No evidence of ST segment elevation or T-wave inversion  Prior for comparison 8/13/2020    CRITICAL CARE TIME  Total Critical Care time was 46 minutes, excluding separately reportable procedures. There was a high probability of clinically significant/life threatening deterioration in the patient's condition which required my urgent intervention.       Rodolfo Burch MD  Attending Emergency Physician        Rodolfo Burch MD  03/11/21 8348

## 2021-03-13 LAB
A/G RATIO: 1 (ref 1.1–2.2)
ALBUMIN SERPL-MCNC: 2.9 G/DL (ref 3.4–5)
ALP BLD-CCNC: 79 U/L (ref 40–129)
ALT SERPL-CCNC: 94 U/L (ref 10–40)
ANION GAP SERPL CALCULATED.3IONS-SCNC: 11 MMOL/L (ref 3–16)
AST SERPL-CCNC: 145 U/L (ref 15–37)
BASOPHILS ABSOLUTE: 0 K/UL (ref 0–0.2)
BASOPHILS RELATIVE PERCENT: 0.2 %
BILIRUB SERPL-MCNC: <0.2 MG/DL (ref 0–1)
BUN BLDV-MCNC: 39 MG/DL (ref 7–20)
C. DIFFICILE TOXIN MOLECULAR: ABNORMAL
CALCIUM SERPL-MCNC: 7.7 MG/DL (ref 8.3–10.6)
CHLORIDE BLD-SCNC: 103 MMOL/L (ref 99–110)
CO2: 19 MMOL/L (ref 21–32)
CREAT SERPL-MCNC: 2.8 MG/DL (ref 0.6–1.2)
EOSINOPHILS ABSOLUTE: 0 K/UL (ref 0–0.6)
EOSINOPHILS RELATIVE PERCENT: 0.1 %
GFR AFRICAN AMERICAN: 20
GFR NON-AFRICAN AMERICAN: 17
GI BACTERIAL PATHOGENS BY PCR: NORMAL
GLOBULIN: 3 G/DL
GLUCOSE BLD-MCNC: 134 MG/DL (ref 70–99)
GLUCOSE BLD-MCNC: 140 MG/DL (ref 70–99)
GLUCOSE BLD-MCNC: 170 MG/DL (ref 70–99)
GLUCOSE BLD-MCNC: 177 MG/DL (ref 70–99)
GLUCOSE BLD-MCNC: 204 MG/DL (ref 70–99)
GLUCOSE BLD-MCNC: 236 MG/DL (ref 70–99)
GLUCOSE BLD-MCNC: 259 MG/DL (ref 70–99)
HCT VFR BLD CALC: 28.3 % (ref 36–48)
HEMOGLOBIN: 9.4 G/DL (ref 12–16)
LYMPHOCYTES ABSOLUTE: 0.7 K/UL (ref 1–5.1)
LYMPHOCYTES RELATIVE PERCENT: 4.8 %
MAGNESIUM: 1.6 MG/DL (ref 1.8–2.4)
MCH RBC QN AUTO: 30 PG (ref 26–34)
MCHC RBC AUTO-ENTMCNC: 33.2 G/DL (ref 31–36)
MCV RBC AUTO: 90.4 FL (ref 80–100)
MONOCYTES ABSOLUTE: 0.8 K/UL (ref 0–1.3)
MONOCYTES RELATIVE PERCENT: 5.6 %
NEUTROPHILS ABSOLUTE: 12.4 K/UL (ref 1.7–7.7)
NEUTROPHILS RELATIVE PERCENT: 89.3 %
ORGANISM: ABNORMAL
PDW BLD-RTO: 14.6 % (ref 12.4–15.4)
PERFORMED ON: ABNORMAL
PLATELET # BLD: 154 K/UL (ref 135–450)
PMV BLD AUTO: 10.1 FL (ref 5–10.5)
POTASSIUM SERPL-SCNC: 3.9 MMOL/L (ref 3.5–5.1)
PROCALCITONIN: 78.31 NG/ML (ref 0–0.15)
RBC # BLD: 3.13 M/UL (ref 4–5.2)
SODIUM BLD-SCNC: 133 MMOL/L (ref 136–145)
TOTAL PROTEIN: 5.9 G/DL (ref 6.4–8.2)
WBC # BLD: 13.8 K/UL (ref 4–11)

## 2021-03-13 PROCEDURE — 6360000002 HC RX W HCPCS: Performed by: STUDENT IN AN ORGANIZED HEALTH CARE EDUCATION/TRAINING PROGRAM

## 2021-03-13 PROCEDURE — 97162 PT EVAL MOD COMPLEX 30 MIN: CPT | Performed by: PHYSICAL THERAPIST

## 2021-03-13 PROCEDURE — 2500000003 HC RX 250 WO HCPCS: Performed by: INTERNAL MEDICINE

## 2021-03-13 PROCEDURE — 85025 COMPLETE CBC W/AUTO DIFF WBC: CPT

## 2021-03-13 PROCEDURE — 6360000002 HC RX W HCPCS

## 2021-03-13 PROCEDURE — 97530 THERAPEUTIC ACTIVITIES: CPT | Performed by: PHYSICAL THERAPIST

## 2021-03-13 PROCEDURE — 6370000000 HC RX 637 (ALT 250 FOR IP): Performed by: INTERNAL MEDICINE

## 2021-03-13 PROCEDURE — 36415 COLL VENOUS BLD VENIPUNCTURE: CPT

## 2021-03-13 PROCEDURE — 84145 PROCALCITONIN (PCT): CPT

## 2021-03-13 PROCEDURE — 6370000000 HC RX 637 (ALT 250 FOR IP): Performed by: STUDENT IN AN ORGANIZED HEALTH CARE EDUCATION/TRAINING PROGRAM

## 2021-03-13 PROCEDURE — 6360000002 HC RX W HCPCS: Performed by: INTERNAL MEDICINE

## 2021-03-13 PROCEDURE — 2580000003 HC RX 258: Performed by: INTERNAL MEDICINE

## 2021-03-13 PROCEDURE — 2060000000 HC ICU INTERMEDIATE R&B

## 2021-03-13 PROCEDURE — 99232 SBSQ HOSP IP/OBS MODERATE 35: CPT | Performed by: INTERNAL MEDICINE

## 2021-03-13 PROCEDURE — 83735 ASSAY OF MAGNESIUM: CPT

## 2021-03-13 PROCEDURE — 94760 N-INVAS EAR/PLS OXIMETRY 1: CPT

## 2021-03-13 PROCEDURE — APPNB15 APP NON BILLABLE TIME 0-15 MINS: Performed by: NURSE PRACTITIONER

## 2021-03-13 PROCEDURE — 97116 GAIT TRAINING THERAPY: CPT | Performed by: PHYSICAL THERAPIST

## 2021-03-13 PROCEDURE — 80053 COMPREHEN METABOLIC PANEL: CPT

## 2021-03-13 RX ORDER — CALCIUM GLUCONATE 20 MG/ML
2000 INJECTION, SOLUTION INTRAVENOUS ONCE
Status: COMPLETED | OUTPATIENT
Start: 2021-03-13 | End: 2021-03-13

## 2021-03-13 RX ORDER — LISINOPRIL 20 MG/1
20 TABLET ORAL DAILY
Status: DISCONTINUED | OUTPATIENT
Start: 2021-03-13 | End: 2021-03-14

## 2021-03-13 RX ORDER — DEXTROSE MONOHYDRATE 50 MG/ML
100 INJECTION, SOLUTION INTRAVENOUS PRN
Status: DISCONTINUED | OUTPATIENT
Start: 2021-03-13 | End: 2021-03-15 | Stop reason: HOSPADM

## 2021-03-13 RX ORDER — DEXTROSE MONOHYDRATE 25 G/50ML
12.5 INJECTION, SOLUTION INTRAVENOUS PRN
Status: DISCONTINUED | OUTPATIENT
Start: 2021-03-13 | End: 2021-03-15 | Stop reason: HOSPADM

## 2021-03-13 RX ORDER — NICOTINE POLACRILEX 4 MG
15 LOZENGE BUCCAL PRN
Status: DISCONTINUED | OUTPATIENT
Start: 2021-03-13 | End: 2021-03-15 | Stop reason: HOSPADM

## 2021-03-13 RX ORDER — DICYCLOMINE HYDROCHLORIDE 10 MG/1
10 CAPSULE ORAL 3 TIMES DAILY PRN
Status: DISCONTINUED | OUTPATIENT
Start: 2021-03-13 | End: 2021-03-15 | Stop reason: HOSPADM

## 2021-03-13 RX ORDER — MAGNESIUM SULFATE IN WATER 40 MG/ML
2000 INJECTION, SOLUTION INTRAVENOUS ONCE
Status: COMPLETED | OUTPATIENT
Start: 2021-03-13 | End: 2021-03-13

## 2021-03-13 RX ORDER — MAGNESIUM SULFATE 1 G/100ML
1000 INJECTION INTRAVENOUS ONCE
Status: DISCONTINUED | OUTPATIENT
Start: 2021-03-13 | End: 2021-03-13

## 2021-03-13 RX ADMIN — VANCOMYCIN HYDROCHLORIDE 250 MG: 250 CAPSULE ORAL at 23:37

## 2021-03-13 RX ADMIN — GABAPENTIN 100 MG: 100 CAPSULE ORAL at 13:54

## 2021-03-13 RX ADMIN — CALCIUM GLUCONATE 2000 MG: 20 INJECTION, SOLUTION INTRAVENOUS at 08:41

## 2021-03-13 RX ADMIN — GABAPENTIN 100 MG: 100 CAPSULE ORAL at 19:53

## 2021-03-13 RX ADMIN — INSULIN LISPRO 1 UNITS: 100 INJECTION, SOLUTION INTRAVENOUS; SUBCUTANEOUS at 17:21

## 2021-03-13 RX ADMIN — PANTOPRAZOLE SODIUM 40 MG: 40 TABLET, DELAYED RELEASE ORAL at 05:46

## 2021-03-13 RX ADMIN — MAGNESIUM SULFATE HEPTAHYDRATE 2000 MG: 40 INJECTION, SOLUTION INTRAVENOUS at 10:45

## 2021-03-13 RX ADMIN — HEPARIN SODIUM 5000 UNITS: 5000 INJECTION INTRAVENOUS; SUBCUTANEOUS at 20:55

## 2021-03-13 RX ADMIN — METRONIDAZOLE 500 MG: 500 INJECTION, SOLUTION INTRAVENOUS at 02:42

## 2021-03-13 RX ADMIN — INSULIN LISPRO 2 UNITS: 100 INJECTION, SOLUTION INTRAVENOUS; SUBCUTANEOUS at 12:25

## 2021-03-13 RX ADMIN — METRONIDAZOLE 500 MG: 500 INJECTION, SOLUTION INTRAVENOUS at 13:15

## 2021-03-13 RX ADMIN — VANCOMYCIN HYDROCHLORIDE 250 MG: 250 CAPSULE ORAL at 05:46

## 2021-03-13 RX ADMIN — GABAPENTIN 100 MG: 100 CAPSULE ORAL at 08:45

## 2021-03-13 RX ADMIN — ASPIRIN 81 MG: 81 TABLET, CHEWABLE ORAL at 08:45

## 2021-03-13 RX ADMIN — CITALOPRAM HYDROBROMIDE 40 MG: 20 TABLET ORAL at 08:45

## 2021-03-13 RX ADMIN — DICYCLOMINE HYDROCHLORIDE 10 MG: 10 CAPSULE ORAL at 17:42

## 2021-03-13 RX ADMIN — LISINOPRIL 20 MG: 20 TABLET ORAL at 13:54

## 2021-03-13 RX ADMIN — INSULIN GLARGINE 44 UNITS: 100 INJECTION, SOLUTION SUBCUTANEOUS at 19:58

## 2021-03-13 RX ADMIN — VANCOMYCIN HYDROCHLORIDE 250 MG: 250 CAPSULE ORAL at 12:25

## 2021-03-13 RX ADMIN — METRONIDAZOLE 500 MG: 500 INJECTION, SOLUTION INTRAVENOUS at 17:42

## 2021-03-13 RX ADMIN — INSULIN LISPRO 1 UNITS: 100 INJECTION, SOLUTION INTRAVENOUS; SUBCUTANEOUS at 20:00

## 2021-03-13 RX ADMIN — HEPARIN SODIUM 5000 UNITS: 5000 INJECTION INTRAVENOUS; SUBCUTANEOUS at 13:55

## 2021-03-13 RX ADMIN — HEPARIN SODIUM 5000 UNITS: 5000 INJECTION INTRAVENOUS; SUBCUTANEOUS at 05:46

## 2021-03-13 RX ADMIN — ATORVASTATIN CALCIUM 80 MG: 80 TABLET, FILM COATED ORAL at 08:45

## 2021-03-13 RX ADMIN — VANCOMYCIN HYDROCHLORIDE 250 MG: 250 CAPSULE ORAL at 17:20

## 2021-03-13 RX ADMIN — CEFEPIME HYDROCHLORIDE 1000 MG: 1 INJECTION, POWDER, FOR SOLUTION INTRAMUSCULAR; INTRAVENOUS at 02:42

## 2021-03-13 ASSESSMENT — PAIN SCALES - GENERAL
PAINLEVEL_OUTOF10: 0
PAINLEVEL_OUTOF10: 0

## 2021-03-13 NOTE — PROGRESS NOTES
seconds   Peripheral Pulses: +2 palpable, equal bilaterally       Labs:   Recent Labs     03/11/21  1734 03/12/21  0429 03/13/21  0429   WBC 27.1* 18.2* 13.8*   HGB 13.9 10.6* 9.4*   HCT 43.3 31.9* 28.3*    190 154     Recent Labs     03/12/21  1324 03/12/21  1642 03/12/21 2015 03/13/21  0429   * 134* 137 133*   K 3.9 3.9 4.1 3.9    105 106 103   CO2 20* 20* 19* 19*   BUN 49* 47* 42* 39*   CREATININE 3.6* 3.5* 3.1* 2.8*   CALCIUM 7.6* 7.4* 7.3* 7.7*   PHOS 2.4* 2.5 2.6  --      Recent Labs     03/11/21  1844 03/12/21  0915 03/13/21 0429   AST 50* 52* 145*   ALT 29 32 94*   BILIDIR  --  <0.2  --    BILITOT 0.4 0.3 <0.2   ALKPHOS 112 84 79     No results for input(s): INR in the last 72 hours. Recent Labs     03/11/21  1844   TROPONINI 0.03*       Urinalysis:      Lab Results   Component Value Date    NITRU Negative 03/11/2021    WBCUA 0-2 03/11/2021    BACTERIA 3+ 03/11/2021    RBCUA 3-4 03/11/2021    BLOODU MODERATE 03/11/2021    SPECGRAV 1.020 03/11/2021    GLUCOSEU >=1000 03/11/2021       Radiology:  CT Head WO Contrast   Final Result   No acute intracranial abnormality. No significant change from prior studies         CT Cervical Spine WO Contrast   Final Result   No evidence for fracture or malalignment cervical spine         CT ABDOMEN PELVIS WO CONTRAST Additional Contrast? None   Final Result   1. Long segment wall thickening and surrounding inflammation involving the   mid to distal transverse, descending and sigmoid colon. Differential   considerations would include ischemic colitis, antibiotic associated colitis,   infectious colitis, or inflammatory bowel disease   2. Small amount of free fluid present within the pelvis   3. Nonobstructing bilateral intrarenal calculi         XR CHEST (2 VW)   Final Result   No acute cardiopulmonary disease.                  Assessment/Plan:    Active Hospital Problems    Diagnosis    Septic shock ([address] [A41.9, R65.21]    Colitis [K52.9]   

## 2021-03-13 NOTE — PLAN OF CARE
Problem:  Bowel/Gastric:  Goal: Bowel function will improve  Description: Bowel function will improve  Outcome: Ongoing     Problem: Fluid Volume:  Goal: Maintenance of adequate hydration will improve  Description: Maintenance of adequate hydration will improve  Outcome: Ongoing     Problem: Skin Integrity:  Goal: Risk for impaired skin integrity will decrease  Description: Risk for impaired skin integrity will decrease  Outcome: Ongoing

## 2021-03-13 NOTE — PROGRESS NOTES
2,000 mg Intravenous Once    heparin (porcine)  5,000 Units Subcutaneous 3 times per day    atorvastatin  80 mg Oral Daily    pantoprazole  40 mg Oral Daily    citalopram  40 mg Oral Daily    aspirin  81 mg Oral Daily    gabapentin  100 mg Oral TID    vancomycin  250 mg Oral 4 times per day    metroNIDAZOLE  500 mg Intravenous Q8H    cefepime  1,000 mg Intravenous Q12H    insulin glargine  44 Units Subcutaneous Nightly       Continuous Infusions:   dextrose 5% and 0.45% NaCl with KCl 20 mEq Stopped (03/13/21 0230)    insulin 3.3 Units/hr (03/13/21 0100)    dextrose         PRN Meds:  acetaminophen **OR** acetaminophen, dextrose, dextrose 5% and 0.45% NaCl with KCl 20 mEq, ondansetron, sodium chloride flush, glucose, dextrose, glucagon (rDNA), dextrose    Labs reviewed:  CBC:   Recent Labs     03/11/21  1734 03/12/21 0429 03/13/21 0429   WBC 27.1* 18.2* 13.8*   HGB 13.9 10.6* 9.4*   HCT 43.3 31.9* 28.3*   MCV 93.8 89.9 90.4    190 154     BMP:   Recent Labs     03/12/21  1324 03/12/21  1642 03/12/21 2015 03/13/21 0429   * 134* 137 133*   K 3.9 3.9 4.1 3.9    105 106 103   CO2 20* 20* 19* 19*   PHOS 2.4* 2.5 2.6  --    BUN 49* 47* 42* 39*   CREATININE 3.6* 3.5* 3.1* 2.8*     LIVER PROFILE:   Recent Labs     03/11/21  1844 03/12/21  0915 03/13/21 0429   AST 50* 52* 145*   ALT 29 32 94*   LIPASE 60.0  --   --    BILIDIR  --  <0.2  --    BILITOT 0.4 0.3 <0.2   ALKPHOS 112 84 79     PT/INR: No results for input(s): PROTIME, INR in the last 72 hours. APTT: No results for input(s): APTT in the last 72 hours. UA:  Recent Labs     03/11/21 2046   COLORU Yellow   PHUR 5.5   WBCUA 0-2   RBCUA 3-4   BACTERIA 3+*   CLARITYU CLOUDY*   SPECGRAV 1.020   LEUKOCYTESUR Negative   UROBILINOGEN 0.2   BILIRUBINUR Negative   BLOODU MODERATE*   GLUCOSEU >=1000*     No results for input(s): PH, PCO2, PO2 in the last 72 hours.       Films:  Radiology Review:  Pertinent images / reports were reviewed as a part of this visit. CT ABDOMEN PELVIS WO CONTRAST Additional Contrast? None  Narrative: EXAMINATION:  CT OF THE ABDOMEN AND PELVIS WITHOUT CONTRAST 3/11/2021 2:49 pm    TECHNIQUE:  CT of the abdomen and pelvis was performed without the administration of  intravenous contrast. Multiplanar reformatted images are provided for review. Dose modulation, iterative reconstruction, and/or weight based adjustment of  the mA/kV was utilized to reduce the radiation dose to as low as reasonably  achievable. COMPARISON:  None    HISTORY:  ORDERING SYSTEM PROVIDED HISTORY: abd pain with feelings of constipation  TECHNOLOGIST PROVIDED HISTORY:  Reason for exam:->abd pain with feelings of constipation  Additional Contrast?->None  Reason for Exam: abd pain with feeling of constipation  Acuity: Acute  Type of Exam: Initial    FINDINGS:  Lower Chest: Dependent changes are present within the lung bases. Coronary  arterial calcifications are present. Organs: Mild degree of hepatic steatosis is present. Cholelithiasis is  present without evidence of cholecystitis. Pancreas is partially fatty  replaced. The spleen, adrenal glands, appear normal.  Nonobstructing  bilateral intrarenal calculi are present, largest measuring 3 mm. GI/Bowel: A small hiatal hernia is present. Postsurgical changes are present  involving the stomach, related to prior gastric sleeve surgery. Small bowel  appears normal without evidence of obstruction. The appendix is normal.  Long segment wall thickening and surrounding inflammation is present  involving the mid to distal transverse colon, descending colon, and  rectosigmoid colon. Scattered colonic diverticula are present. No  pneumatosis is present. Pelvis: Uterus is surgically absent. A Elmore catheter is within the urinary  bladder. A 2.5 cm cyst is present on the right ovary    Peritoneum/Retroperitoneum: Small amount of free fluid is present within the  pelvis.   Atherosclerotic Contrast  Narrative: EXAMINATION:  CT OF THE HEAD WITHOUT CONTRAST  3/11/2021 7:49 pm    TECHNIQUE:  CT of the head was performed without the administration of intravenous  contrast. Dose modulation, iterative reconstruction, and/or weight based  adjustment of the mA/kV was utilized to reduce the radiation dose to as low  as reasonably achievable. COMPARISON:  March 10, 2016 March 10, 2014    HISTORY:  ORDERING SYSTEM PROVIDED HISTORY: Frequent falls  TECHNOLOGIST PROVIDED HISTORY:  Reason for exam:->Frequent falls  Has a \"code stroke\" or \"stroke alert\" been called? ->No  Decision Support Exception->Emergency Medical Condition (MA)  Reason for Exam: frequent falls  Acuity: Acute  Type of Exam: Initial  Mechanism of Injury:  fall    FINDINGS:  BRAIN/VENTRICLES: There is no acute intracranial hemorrhage, mass effect or  midline shift. No abnormal extra-axial fluid collection. There are few  white changes in periventricular white matter mild volume loss the gray-white  differentiation is maintained without evidence of an acute infarct. There is  no evidence of hydrocephalus. ORBITS: The visualized portion of the orbits demonstrate no acute abnormality. SINUSES: The visualized paranasal sinuses and mastoid air cells demonstrate  no acute abnormality. Stable appearance to right mastoidectomy. SOFT TISSUES/SKULL:  No acute abnormality of the visualized skull or soft  tissues. Impression: No acute intracranial abnormality. No significant change from prior studies  XR CHEST (2 VW)  Narrative: EXAMINATION:  TWO XRAY VIEWS OF THE CHEST    3/11/2021 5:53 pm    COMPARISON:  09/08/2020    HISTORY:  ORDERING SYSTEM PROVIDED HISTORY: Frequent falls  TECHNOLOGIST PROVIDED HISTORY:  Reason for exam:->Frequent falls  Reason for Exam: fatigue with frequent falls  Acuity: Acute  Type of Exam: Initial    FINDINGS:  The cardiac silhouette is normal.  There is moderate calcification of the  aorta.   Hilar contours are normal.  There is no focal airspace disease or  pleural effusion. No acute osseous abnormality. Impression: No acute cardiopulmonary disease.           Access  Arterial       PICC  PICC Single Lumen 09/11/20 Left Brachial (Active)   Number of days: 182           CVC               Thank you for this consult,    Claudine [address]us [facility]

## 2021-03-13 NOTE — PLAN OF CARE
and services will improve  Description: Ability to identify and utilize available resources and services will improve  3/13/2021 0856 by Piter Mckeon RN  Outcome: Ongoing  3/13/2021 0041 by Mercedez Willingham RN  Outcome: Ongoing  Goal: Ability to manage health-related needs will improve  Description: Ability to manage health-related needs will improve  3/13/2021 0856 by Piter Mckeon RN  Outcome: Ongoing  3/13/2021 0041 by Mercedez Willingham RN  Outcome: Ongoing     Problem: Physical Regulation:  Goal: Diagnostic test results will improve  Description: Diagnostic test results will improve  3/13/2021 0856 by Piter Mckeon RN  Outcome: Ongoing  3/13/2021 0041 by Mercedez Willingham RN  Outcome: Ongoing  Goal: Complications related to the disease process, condition or treatment will be avoided or minimized  Description: Complications related to the disease process, condition or treatment will be avoided or minimized  3/13/2021 0856 by Piter Mckeon RN  Outcome: Ongoing  3/13/2021 0041 by Mercedez Willingham RN  Outcome: Ongoing  Goal: Ability to maintain clinical measurements within normal limits will improve  Description: Ability to maintain clinical measurements within normal limits will improve  3/13/2021 0856 by Piter Mckeon RN  Outcome: Ongoing  3/13/2021 0041 by Mercedez Willingham RN  Outcome: Ongoing     Problem: Sensory:  Goal: Ability to identify factors that increase the pain will improve  Description: Ability to identify factors that increase the pain will improve  3/13/2021 0856 by Piter Mckeon RN  Outcome: Ongoing  3/13/2021 0041 by Mercedez Willingham RN  Outcome: Ongoing  Goal: Ability to notify healthcare provider of pain before it becomes unmanageable or unbearable will improve  Description: Ability to notify healthcare provider of pain before it becomes unmanageable or unbearable will improve  3/13/2021 0856 by Piter Mckeon RN  Outcome: Ongoing  3/13/2021 0041 by Mercedez Willingham RN  Outcome: Ongoing  Goal: Pain level will decrease Description: Pain level will decrease  3/13/2021 0856 by Ronna Alston RN  Outcome: Ongoing  3/13/2021 0041 by Brooke Gruber RN  Outcome: Ongoing     Problem: Coping:  Goal: Ability to adjust to condition or change in health will improve  Description: Ability to adjust to condition or change in health will improve  3/13/2021 0856 by Ronna Alston RN  Outcome: Ongoing  3/13/2021 0041 by Brooke Gruber RN  Outcome: Ongoing     Problem: Metabolic:  Goal: Ability to maintain appropriate glucose levels will improve  Description: Ability to maintain appropriate glucose levels will improve  3/13/2021 0856 by Ronna Alston RN  Outcome: Ongoing  3/13/2021 0041 by Brooke Gruber RN  Outcome: Ongoing     Problem: Nutritional:  Goal: Maintenance of adequate nutrition will improve  Description: Maintenance of adequate nutrition will improve  3/13/2021 0856 by Ronna Alston RN  Outcome: Ongoing  3/13/2021 0041 by Brooke Gruber RN  Outcome: Ongoing  Goal: Progress toward achieving an optimal weight will improve  Description: Progress toward achieving an optimal weight will improve  3/13/2021 0856 by Ronna Alston RN  Outcome: Ongoing  3/13/2021 0041 by Brooke Gruber RN  Outcome: Ongoing     Problem: Skin Integrity:  Goal: Risk for impaired skin integrity will decrease  Description: Risk for impaired skin integrity will decrease  3/13/2021 0856 by Ronna Alston RN  Outcome: Ongoing  3/13/2021 0041 by Brooke Gruber RN  Outcome: Ongoing  Goal: Will show no infection signs and symptoms  Description: Will show no infection signs and symptoms  Outcome: Ongoing  Goal: Absence of new skin breakdown  Description: Absence of new skin breakdown  Outcome: Ongoing     Problem: Tissue Perfusion:  Goal: Adequacy of tissue perfusion will improve  Description: Adequacy of tissue perfusion will improve  3/13/2021 0856 by Ronna Alston RN  Outcome: Ongoing  3/13/2021 0041 by Brooke Gruber RN  Outcome: Ongoing     Problem: Falls - Risk of:  Goal: Will remain free from falls  Description: Will remain free from falls  Outcome: Ongoing  Goal: Absence of physical injury  Description: Absence of physical injury  Outcome: Ongoing

## 2021-03-13 NOTE — PROGRESS NOTES
Physical assessment completed. See flow sheets. Plan of care discussed and all questions answered. IV's patient and infusing well. Insulin drip continues. Labs drawn. Bed locked and in low position. Bedside table and call light within reach. Side rails up 2/4. Patient denies pain or needs at this time.

## 2021-03-13 NOTE — PROGRESS NOTES
GASTROENTEROLOGY INPATIENT CONSULTATION        IDENTIFYING DATA/REASON FOR CONSULTATION   PATIENT:  Krystal Michele  MRN:  [id number]  ADMIT DATE: 3/11/2021  TIME OF EVALUATION: 3/12/2021 10:13 PM  HOSPITAL STAY:   LOS: 0 days     REASON FOR CONSULTATION: Significant colitis with septic shock    SUBJECTIVE   Patient seen and examined. Patient reports she is feeling better. She reports decreased abdominal cramping. She continues to have nonbloody frequent diarrhea. She estimates 6-8 stools yesterday.     PAST MEDICAL, SURGICAL, FAMILY, and SOCIAL HISTORY     Past Medical History:   Diagnosis Date    Abnormal echocardiogram     25% on 3/11/14 and 50% on 3/19/14    Back pain     Cardiomyopathy ([address]     EF was 50% on 3/19/14    Chipped tooth     lower left    Dental crown present     veneers    Depressive disorder 8/13/2014    Diabetic infection of right foot ([address] 4/15/2015    Diabetic ulcer of toe of left foot associated with type 2 diabetes mellitus, with fat layer exposed ([address] 4/10/2018    Pt slipped in hot tub latter part of February and has not healed since despite topical treatment    DVT (deep venous thrombosis) ([address]     HTN (hypertension)     Hx of blood clots 2016    left leg    Ischemic cardiomyopathy 4/15/2015    Kidney disease     CHRONIC STAGE 3    Meniere's disease     Mixed hyperlipidemia 3/7/2016    Nicotine abuse     NSTEMI (non-ST elevated myocardial infarction) ([address] 3/13/2014    ANGLE (obstructive sleep apnea)     Osteomyelitis of ankle or foot, acute, left ([address] 6/4/2018    Pneumonia     PONV (postoperative nausea and vomiting)     nausea    Spinal epidural abscess 3/13/2014    Type II diabetes mellitus, uncontrolled ([address] 08/13/2014     Past Surgical History:   Procedure Laterality Date    BACK SURGERY  3/2014    DEBRIDEMENT  3/12/14    extensive debridement of bone/muscle and paraspinal empyema    DILATION AND CURETTAGE OF UTERUS      ENDOSCOPY, COLON, DIAGNOSTIC  FOOT DEBRIDEMENT Left 9/8/2020    LEFT FOOT DEBRIDEMENT INCISION AND DRAINAGE performed by Chandu Keating DPM at [facility] [address] TOE SURGERY Left 8/28/2020    ON THE LEFT: [address], WOUND CLOSURE, FLEXOR TENOTOMY 4TH AND 5TH DIGITS, TENOTOMY AND CAPSULOTOMY 2ND DIGIT performed by Chandu Keating DPM at [facility]  6/15/1999    complete-think right ovary in.    NASAL SEPTUM SURGERY      NERVE SURGERY Left 9/10/2020    LEFT FOOT INCISION AND DRAINAGE, EXTENSOR HALLUCIS LONGUS REPAIR WITH DELAYED PRIMARY CLOSURE performed by Chandu Keating DPM at [place] shut behind right ear    PRESSURE ULCER DEBRIDEMENT Left 10/23/2020    ON THE LEFT: SURGICAL PREPARATION OF WOUND BED, APPLICATION OF DERMAL GRAFT SUBSTITUTE performed by Chandu Keating DPM at [address]  04/02/2019    ROBOTIC ASSISTED LAPAROSCOPIC SLEEVE GASTRECTOMY, LAPAROSCOPIC REDUCIBLE INCISIONAL HERNIA REPAIR     SLEEVE GASTRECTOMY N/A 4/2/2019    ROBOTIC ASSISTED LAPAROSCOPIC SLEEVE GASTRECTOMY, LAPAROSCOPIC REDUCIBLE INCISIONAL HERNIA REPAIR performed by Tan Rascon DO at [address] N/A 2/8/2019    EGD BIOPSY performed by Tan Rascon DO at [address] N/A 2/8/2019    EGD POLYP ABLATION/OTHER performed by Tan Rascon DO at [facility] ENDOSCOPY     Family History   Problem Relation Age of Onset    High Blood Pressure Mother     Cancer Mother     Rheum Arthritis Mother     COPD Father     Cancer Father     Osteoarthritis Neg Hx     Asthma Neg Hx     Breast Cancer Neg Hx     Diabetes Neg Hx     Heart Failure Neg Hx     High Cholesterol Neg Hx     Hypertension Neg Hx     Migraines Neg Hx     Ovarian Cancer Neg Hx     Rashes/Skin Problems Neg Hx     Seizures Neg Hx     Stroke Neg Hx     Thyroid Disease Neg Hx      Social History     Socioeconomic History    Marital status:      Spouse name: None    Number of children: None    Years of education: None    Highest education level: None   Occupational History    None   Social Needs    Financial resource strain: None    Food insecurity     Worry: None     Inability: None    Transportation needs     Medical: None     Non-medical: None   Tobacco Use    Smoking status: Former Smoker     Packs/day: 0.50     Years: 10.00     Pack years: 5.00     Quit date: 11/10/2013     Years since quittin.3    Smokeless tobacco: Never Used   Substance and Sexual Activity    Alcohol use:  Yes     Alcohol/week: 0.0 standard drinks     Comment: occasionally    Drug use: No    Sexual activity: Not Currently   Lifestyle    Physical activity     Days per week: None     Minutes per session: None    Stress: None   Relationships    Social connections     Talks on phone: None     Gets together: None     Attends Anabaptist service: None     Active member of club or organization: None     Attends meetings of clubs or organizations: None     Relationship status: None    Intimate partner violence     Fear of current or ex partner: None     Emotionally abused: None     Physically abused: None     Forced sexual activity: None   Other Topics Concern    None   Social History Narrative    None       MEDICATIONS   SCHEDULED:  heparin (porcine), 5,000 Units, 3 times per day  atorvastatin, 80 mg, Daily  pantoprazole, 40 mg, Daily  citalopram, 40 mg, Daily  aspirin, 81 mg, Daily  gabapentin, 100 mg, TID  vancomycin, 250 mg, 4 times per day  metroNIDAZOLE, 500 mg, Q8H  cefepime, 1,000 mg, Q12H      FLUIDS/DRIPS:     dextrose 5% and 0.45% NaCl with KCl 20 mEq 150 mL/hr at 21 1953    insulin 4.5 Units/hr (21 2100)    dextrose       PRNs: acetaminophen, 650 mg, Q6H PRN    Or  acetaminophen, 650 mg, Q6H PRN  dextrose, 12.5 g, PRN  dextrose 5% and 0.45% NaCl with KCl 20 mEq, , Continuous PRN  ondansetron, 4 mg, Q6H PRN  sodium chloride flush, 10 mL, PRN  glucose, 15 g, PRN  dextrose, 12.5 g, PRN  glucagon (rDNA), 1 mg, PRN  dextrose, 100 mL/hr, PRN      ALLERGIES:  She   Allergies   Allergen Reactions    Doxycycline Other (See Comments)     Yeast infection       REVIEW OF SYSTEMS   Pertinent ROS noted in HPI    PHYSICAL EXAM     Vitals:    03/12/21 1900 03/12/21 1954 03/12/21 2000 03/12/21 2100   BP: (!) 141/55  (!) 167/56 (!) 151/61   Pulse: 75 76 76 77   Resp: 18  19 17   Temp: 98.5 °F (36.9 °C)      TempSrc: Oral      SpO2: 94%  95% 95%   Weight:       Height:           I/O last 3 completed shifts: In: 9243 [I.V.:10; IV Piggyback:1600]  Out: 1 [Urine:272]      Physical Exam:  General appearance: alert, cooperative, no distress, appears stated age. HEENT: Normocephalic, atraumatic without obvious abnormality, no scleral icterus, normal conjunctiva, trachea midline, moist mucous membranes  Lungs: clear to auscultation bilaterally, Normal Effort, no wheezing  Heart: regular rate and rhythm, normal S1 and S2, no murmurs or rubs  Abdomen: soft, non-distended, non-tender. Bowel sounds normal. No masses,  no organomegaly. Extremities: atraumatic, no cyanosis or edema  Skin: warm and dry, no jaundice  Neuro: Grossly intact, A&OX3      LABS AND IMAGING   Laboratory   Recent Labs     03/11/21  1734 03/12/21  0429   WBC 27.1* 18.2*   HGB 13.9 10.6*   HCT 43.3 31.9*   MCV 93.8 89.9    190     Recent Labs     03/12/21  1324 03/12/21  1642 03/12/21 2015   * 134* 137   K 3.9 3.9 4.1    105 106   CO2 20* 20* 19*   PHOS 2.4* 2.5 2.6   BUN 49* 47* 42*   CREATININE 3.6* 3.5* 3.1*     Recent Labs     03/11/21  1844 03/12/21  0915   AST 50* 52*   ALT 29 32   BILIDIR  --  <0.2   BILITOT 0.4 0.3   ALKPHOS 112 84     Recent Labs     03/11/21  1844   LIPASE 60.0     No results for input(s): PROTIME, INR in the last 72 hours. Imaging  CT Head WO Contrast   Final Result   No acute intracranial abnormality.       No significant change from prior studies         CT Cervical Spine WO Contrast   Final Result   No evidence for fracture or malalignment cervical spine         CT ABDOMEN PELVIS WO CONTRAST Additional Contrast? None   Final Result   1. Long segment wall thickening and surrounding inflammation involving the   mid to distal transverse, descending and sigmoid colon. Differential   considerations would include ischemic colitis, antibiotic associated colitis,   infectious colitis, or inflammatory bowel disease   2. Small amount of free fluid present within the pelvis   3. Nonobstructing bilateral intrarenal calculi         XR CHEST (2 VW)   Final Result   No acute cardiopulmonary disease. ASSESSMENT AND RECOMMENDATIONS   Rafael Bowen is a 68-year-old female with past medical history of DM-II, hypertension, CHF, CAD, ANGLE, DVT, CKD, osteomyelitis who presented [facility] 3/11/2021 with weakness, nausea, vomiting and diarrhea.     1. DKA: Resolved, s/p insulin drip in the ICU. 2. Severe C. Diff colitis: Patient currently being treated with Vanco, IV Flagyl. Continue to monitor stool output and daily abdominal exams. 3. NEHA on CKD: Presumed prerenal 2/2 #1 and #2. Nephrology consulted, recommend monitoring serum creatinine with IV hydration. 4. Lactic acidosis: Resolved    If you have any questions or need any further information, please feel free to contact our consult team.  Thank you for allowing us to participate in the care of Rafael Bowen. The note was completed using Dragon voice recognition transcription. Every effort was made to ensure accuracy; however, inadvertent transcription errors may be present despite my best efforts to edit errors.     Kelly Esqueda MD

## 2021-03-13 NOTE — PROGRESS NOTES
Office: [phone]       Fax: [phone]      Nephrology Initial Consult Note        Patient's Name: Conrad Kapadia Date: 3/11/2021  Date of Visit: 3/13/2021    Reason for Consult: NEHA  Requesting Physician:  Phylicia Ruano MD  PCP: Beth Márquez MD    Chief Complaint:  Diarrhea     History of Present Illness:       Kirit Marcus is a 77 y.o. female with PMHx of hypertension, cardiomyopathy, DM, CKD who was hospitalized on 3/11/2021 with complaints of vomiting, diarrhea  Recently took laxative  No abdominal pain   No dysuria, hematuria  Feels weak  Was hypotensive  NSAID use: Denies   IV contrast: None recent   Home meds reviewed and noted to be on lisinopril      03/13/21    Feels better  Good Uo   No Diarrhea         Past Medical History:   Diagnosis Date    Abnormal echocardiogram     25% on 3/11/14 and 50% on 3/19/14    Back pain     Cardiomyopathy ([address]     EF was 50% on 3/19/14    Chipped tooth     lower left    Dental crown present     veneers    Depressive disorder 8/13/2014    Diabetic infection of right foot ([address] 4/15/2015    Diabetic ulcer of toe of left foot associated with type 2 diabetes mellitus, with fat layer exposed ([address] 4/10/2018    Pt slipped in hot tub latter part of February and has not healed since despite topical treatment    DVT (deep venous thrombosis) ([address]     HTN (hypertension)     Hx of blood clots 2016    left leg    Ischemic cardiomyopathy 4/15/2015    Kidney disease     CHRONIC STAGE 3    Meniere's disease     Mixed hyperlipidemia 3/7/2016    Nicotine abuse     NSTEMI (non-ST elevated myocardial infarction) ([address] 3/13/2014    ANGLE (obstructive sleep apnea)     Osteomyelitis of ankle or foot, acute, left ([address] 6/4/2018    Pneumonia     PONV (postoperative nausea and vomiting)     nausea  Spinal epidural abscess 3/13/2014    Type II diabetes mellitus, uncontrolled [facility] 08/13/2014       Past Surgical History:   Procedure Laterality Date    BACK SURGERY  3/2014    DEBRIDEMENT  3/12/14    extensive debridement of bone/muscle and paraspinal empyema    DILATION AND CURETTAGE OF UTERUS      ENDOSCOPY, COLON, DIAGNOSTIC      FOOT DEBRIDEMENT Left 9/8/2020    LEFT FOOT DEBRIDEMENT INCISION AND DRAINAGE performed by Killian Ashley DPM at [address] [facility] [address] TOE SURGERY Left 8/28/2020    ON THE LEFT: [address], WOUND CLOSURE, FLEXOR TENOTOMY 4TH AND 5TH DIGITS, TENOTOMY AND CAPSULOTOMY 2ND DIGIT performed by Killian Ashley DPM at [facility]  6/15/1999    complete-think right ovary in.    NASAL SEPTUM SURGERY      NERVE SURGERY Left 9/10/2020    LEFT FOOT INCISION AND DRAINAGE, EXTENSOR HALLUCIS LONGUS REPAIR WITH DELAYED PRIMARY CLOSURE performed by Killian Ashley DPM at [place] shut behind right ear    PRESSURE ULCER DEBRIDEMENT Left 10/23/2020    ON THE LEFT: SURGICAL PREPARATION OF WOUND BED, APPLICATION OF DERMAL GRAFT SUBSTITUTE performed by Killian Ashley DPM at [address]  04/02/2019    ROBOTIC ASSISTED LAPAROSCOPIC SLEEVE GASTRECTOMY, LAPAROSCOPIC REDUCIBLE INCISIONAL HERNIA REPAIR     SLEEVE GASTRECTOMY N/A 4/2/2019    ROBOTIC ASSISTED LAPAROSCOPIC SLEEVE GASTRECTOMY, LAPAROSCOPIC REDUCIBLE INCISIONAL HERNIA REPAIR performed by Angela Sharma DO at [address] [place] [address] N/A 2/8/2019    EGD BIOPSY performed by Angela Sharma DO at [facility] N/A 2/8/2019    EGD POLYP ABLATION/OTHER performed by Angela Sharma DO at [facility] ENDOSCOPY       Family History   Problem Relation Age of Onset    High Blood Pressure Mother     Cancer Mother     Rheum Arthritis Mother     COPD Father     Cancer Father    Jeanie Henriquez Osteoarthritis Neg Hx     Asthma Neg Hx     Breast Cancer Neg Hx     Diabetes Neg Hx     Heart Failure Neg Hx     High Cholesterol Neg Hx     Hypertension Neg Hx     Migraines Neg Hx     Ovarian Cancer Neg Hx     Rashes/Skin Problems Neg Hx     Seizures Neg Hx     Stroke Neg Hx     Thyroid Disease Neg Hx         reports that she quit smoking about 7 years ago. She has a 5.00 pack-year smoking history. She has never used smokeless tobacco. She reports current alcohol use. She reports that she does not use drugs. Medications:    Allergies:  Doxycycline    Scheduled Meds:   insulin lispro  0-6 Units Subcutaneous TID WC    insulin lispro  0-3 Units Subcutaneous Nightly    lisinopril  20 mg Oral Daily    heparin (porcine)  5,000 Units Subcutaneous 3 times per day    atorvastatin  80 mg Oral Daily    pantoprazole  40 mg Oral Daily    citalopram  40 mg Oral Daily    aspirin  81 mg Oral Daily    gabapentin  100 mg Oral TID    vancomycin  250 mg Oral 4 times per day    metroNIDAZOLE  500 mg Intravenous Q8H    insulin glargine  44 Units Subcutaneous Nightly     Continuous Infusions:   dextrose         Labs:  CBC:   Recent Labs     03/11/21  1734 03/12/21  0429 03/13/21  0429   WBC 27.1* 18.2* 13.8*   HGB 13.9 10.6* 9.4*    190 154     Ca/Mg/Phos:   Recent Labs     03/12/21  1324 03/12/21  1642 03/12/21 2015 03/13/21  0429   CALCIUM 7.6* 7.4* 7.3* 7.7*   MG 1.50* 1.50* 1.50* 1.60*   PHOS 2.4* 2.5 2.6  --      UA:  Recent Labs     03/11/21 2046   COLORU Yellow   CLARITYU CLOUDY*   GLUCOSEU >=1000*   BILIRUBINUR Negative   KETUA Negative   SPECGRAV 1.020   BLOODU MODERATE*   PHUR 5.5   PROTEINU 100*   UROBILINOGEN 0.2   NITRU Negative   LEUKOCYTESUR Negative   LABMICR YES   URINETYPE NotGiven      Urine Microscopic:   Recent Labs     03/11/21 2046   BACTERIA 3+*   COMU see below   WBCUA 0-2   RBCUA 3-4   EPIU 2-5     Urine Chemistry: No results for input(s): Eric Meza in the last 72 hours. Objective:     Vitals: BP (!) 169/58   Pulse 70   Temp 98.1 °F (36.7 °C) (Oral)   Resp 18   Ht 5' 3\" (1.6 m)   Wt 210 lb 5.1 oz (95.4 kg)   LMP 06/15/1999   SpO2 99%   BMI 37.26 kg/m²    Wt Readings from Last 3 Encounters:   03/13/21 210 lb 5.1 oz (95.4 kg)   10/23/20 210 lb (95.3 kg)   10/19/20 211 lb 12.8 oz (96.1 kg)      24HR INTAKE/OUTPUT:      Intake/Output Summary (Last 24 hours) at 3/13/2021 1621  Last data filed at 3/13/2021 1530  Gross per 24 hour   Intake 7100.3 ml   Output 3578 ml   Net 3522.3 ml     Constitutional:  awake, NAD  HEENT:  MMM, No icterus  Neck: no bruits, No JVD  Cardiovascular:  reg rhythm  Respiratory: CTA, no crackles  Abdomen:  +BS, soft, NT, ND  Ext: no lower extremity edema  Psychiatric: mood and affect appropriate  Skin: dry/intact  CNS: alert, no agitation    IMAGING:  CT Head WO Contrast   Final Result   No acute intracranial abnormality. No significant change from prior studies         CT Cervical Spine WO Contrast   Final Result   No evidence for fracture or malalignment cervical spine         CT ABDOMEN PELVIS WO CONTRAST Additional Contrast? None   Final Result   1. Long segment wall thickening and surrounding inflammation involving the   mid to distal transverse, descending and sigmoid colon. Differential   considerations would include ischemic colitis, antibiotic associated colitis,   infectious colitis, or inflammatory bowel disease   2. Small amount of free fluid present within the pelvis   3. Nonobstructing bilateral intrarenal calculi         XR CHEST (2 VW)   Final Result   No acute cardiopulmonary disease.              Assessment :     1. NEHA on CKD 3b  -Non-Oliguric  -Baseline creat: 1.3 Now 3.1-->3.6->4.1  -UA conc, prot ++  -Volume: Hypovolemic  -Electrolytes: Hyponatremia and No Dyskalemia  -Acid-Base: NAGMA    Recent Labs     03/12/21  0915 03/12/21  1324 03/12/21  1642 03/12/21 2015 03/13/21  0429   BUN 52* 49* 47* 42* 39* CREATININE 4.1* 3.6* 3.5* 3.1* 2.8*     Recent Labs     03/12/21  0915 03/12/21  1324 03/12/21  1642 03/12/21 2015 03/13/21  0429   * 135* 134* 137 133*   K 3.9 3.9 3.9 4.1 3.9   CO2 21 20* 20* 19* 19*   MG 1.50* 1.50* 1.50* 1.50* 1.60*         2. HTN  -Blood pressure low    BP Readings from Last 1 Encounters:   03/13/21 (!) 169/58       3. Sepsis    4. HFrEF  - TTE (feb 2019): LVEF 50-55%. , LVH    5. C diff      Plan:     - IVF for hemodynamic support  - Hold ACEI  - Stool studies  - Antimicrobials per primary team   - Monitor BMP    -Monitor I/O, UOP  -Maintain MAP>65  -Avoid nephrotoxin, if able. -Dose meds to current eGFR    Thank you for allowing us to participate in care of [facility] . We will continue to follow. Feel free to contact me with any questions.       Dalia Abdul  3/13/2021    [facility] [address]  Office : [phone]  Fax :[phone]

## 2021-03-13 NOTE — PROGRESS NOTES
Physical Therapy    Facility/Department: [address] ICU  Initial Assessment    NAME: Dalia Lincoln  : 1955  MRN: [id number]    Date of Service: 3/13/2021    Discharge Recommendations:  Continue to assess pending progress   PT Equipment Recommendations  Other: continue to assess  Dalia Lincoln scored a 18/24 on the AM-PAC short mobility form. Current research shows that an AM-PAC score of 18 or greater is typically associated with a discharge to the patient's home setting. Based on the patient's AM-PAC score and their current functional mobility deficits, it is recommended that the patient have 2-3 sessions per week of Physical Therapy at d/c to increase the patient's independence. At this time, this patient demonstrates the endurance however will need to make progress in therapy for pt to safely to discharge home with and a follow up treatment frequency of 2-3x/wk. If pt not safe to return home then an alternate discharge disposition will be needed. Please see assessment section for further patient specific details. If patient discharges prior to next session this note will serve as a discharge summary. Please see below for the latest assessment towards goals. Assessment   Body structures, Functions, Activity limitations: Decreased functional mobility   Assessment: pt is a 76 yo female who was adm to hosp with nausea and diarrhea; pt (+) C-diff. Pt currently is below her baseline of being Ind without an AD; pt currently unsteady with early fatigue for functional tasks;  Anticipate pt will continue to make progress while in hosp and be able to return home with PRN assist and home PT however if pt isn't safe to go home at discharge then may need an alternate discharge disposition  Prognosis: Good  Decision Making: Medium Complexity  Clinical Presentation: evolving  PT Education: PT Role;General Safety  Barriers to Learning: none  REQUIRES PT FOLLOW UP: Yes  Activity Tolerance  Activity Tolerance: Patient Tolerated treatment well       Patient Diagnosis(es): The primary encounter diagnosis was Diabetic ketoacidosis without coma associated with diabetes mellitus due to underlying condition ([address] Diagnoses of Colitis, Elevated troponin, and Septicemia ([address] were also pertinent to this visit. has a past medical history of Abnormal echocardiogram, Back pain, Cardiomyopathy ([address] Chipped tooth, Dental crown present, Depressive disorder, Diabetic infection of right foot ([address] Diabetic ulcer of toe of left foot associated with type 2 diabetes mellitus, with fat layer exposed ([address] DVT (deep venous thrombosis) ([address] HTN (hypertension), Hx of blood clots, Ischemic cardiomyopathy, Kidney disease, Meniere's disease, Mixed hyperlipidemia, Nicotine abuse, NSTEMI (non-ST elevated myocardial infarction) ([address] ANGLE (obstructive sleep apnea), Osteomyelitis of ankle or foot, acute, left ([address] Pneumonia, PONV (postoperative nausea and vomiting), Spinal epidural abscess, and Type II diabetes mellitus, uncontrolled ([address] has a past surgical history that includes debridement (3/12/14); back surgery (3/2014); Hysterectomy (6/15/1999); Nasal septum surgery; Upper gastrointestinal endoscopy (N/A, 2/8/2019); Upper gastrointestinal endoscopy (N/A, 2/8/2019); Sleeve Gastrectomy (04/02/2019); Sleeve Gastrectomy (N/A, 4/2/2019); Hammer toe surgery (Left, 8/28/2020); Foot Debridement (Left, 9/8/2020); Nerve Surgery (Left, 9/10/2020); Endoscopy, colon, diagnostic; Dilation and curettage of uterus; other surgical history; and Pressure ulcer debridement (Left, 10/23/2020). Restrictions  Restrictions/Precautions  Restrictions/Precautions: Contact Precautions(C-diff)  Position Activity Restriction  Other position/activity restrictions: catheter and IV attached  Vision/Hearing  Vision: Within Functional Limits  Hearing: Within functional limits     Subjective  General  Chart Reviewed:  Yes  Additional Pertinent Hx: The patient is a 77 y.o. female with past medical history of previous cardiomyopathy, depressive disorder, type 2 diabetes with previous ulceration and infection of her left foot that is healed now, previous DVT in the past, hypertension, CKD stage III, previous Ménière's disease, ANGLE who presents to [facility] with concern for persistent worsening nausea and vomiting with some generalized weakness and some notable symptoms starting since she got back from [place]. She apparently was in [place] prior to 2 days ago but prior to leaving [place] and coming back home she was notably not feeling well. She was nauseous at times and did have one episode of vomiting prior to coming back home. She also was notably not taking her insulin for her diabetes appropriately, however she notes that her blood sugars when she got home were still in the 170s. She ran out of her insulin while in [place] and so she was unable to obtain the appropriate amount of insulin. She apparently then the next day she got back in 1 day before presenting to the ED today, she apparently got her second Covid vaccine even though she was already not feeling well. Daughter notes that patient did seem somewhat okay when she first visited her at home but patient does remark that she has been feeling more generalized weakness, nausea, poor appetite, inability to get up and move around and sometimes stumbling to the ground. She has also had some initial constipation the day prior to presenting to the ED and so she took a a large amount of laxatives and then she developed very significant diarrhea afterwards that is still not abated at this time. She denies any blood to the stool however. She denies any other recent symptoms of fever, chills, chest pain, shortness of breath, dizziness, syncope, vision change, focal weakness, blood in urine/stool/sputum. She has still had recurrent episodes of nausea and some vomiting prior to presenting today.   She was also unable to sleep yesterday and took an extra dose of her trazodone pills but was still unable to sleep due to her persistent diarrhea and nausea. Patient did notably have a fall at some point at home prior to coming to the ED but did not have any notable head injury or other injuries.   Response To Previous Treatment: Not applicable  Family / Caregiver Present: No  Follows Commands: Within Functional Limits  Subjective  Subjective: pt very pleasant and agreeable to working with therapy  Pain Screening  Patient Currently in Pain: Denies          Orientation  Orientation  Overall Orientation Status: Within Functional Limits  Social/Functional History  Social/Functional History  Lives With: Alone  Type of Home: Apartment  Home Layout: One level  Home Access: Stairs to enter with rails, Stairs to enter without rails  Entrance Stairs - Number of Steps: from garage into home 13 NICK with 1 rail and then 13 steps to her apt with B rails; if she enters through front then 4 steps without rail and 13 steps with B rails to her apt  Bathroom Shower/Tub: Tub/Shower unit  Bathroom Equipment: Shower chair  Home Equipment: Standard walker  ADL Assistance: Independent  Homemaking Assistance: Independent  Ambulation Assistance: Independent  Transfer Assistance: Independent  Active : Yes  Mode of Transportation: Car  Cognition   Cognition  Overall Cognitive Status: WFL    Objective          AROM RLE (degrees)  RLE AROM: WFL  AROM LLE (degrees)  LLE AROM : WFL  Strength RLE  Strength RLE: WFL  Strength LLE  Strength LLE: WFL        Bed mobility  Supine to Sit: Minimal assistance  Transfers  Sit to Stand: Contact guard assistance;Minimal Assistance  Stand to sit: Contact guard assistance;Minimal Assistance  Ambulation  Ambulation?: Yes  Ambulation 1  Surface: level tile  Device: No Device;Hand-Held Assist  Assistance: Minimal assistance  Quality of Gait: pt unsteady  Distance: 12'  Comments: anticipate pt will be more steady with a RW therefore RW placed in her room  Stairs/Curb  Stairs?: No     Balance  Sitting - Static: Good  Sitting - Dynamic: Good  Standing - Static: Fair;-  Standing - Dynamic: Fair;-      Upon standing from be pt was slightly soiled, assisted with cleansing with bath wipe; pt left in chair with LEs elevated and all needs in reach  Plan   Plan  Times per week: 3-5  Current Treatment Recommendations: Functional Mobility Training  Safety Devices  Type of devices: Call light within reach, Left in chair, Nurse notified, All fall risk precautions in place, Gait belt      AM-PAC Score  AM-PAC Inpatient Mobility Raw Score : 18 (21)  AM-PAC Inpatient T-Scale Score : 43.63 (21)  Mobility Inpatient CMS 0-100% Score: 46.58 (21)  Mobility Inpatient CMS G-Code Modifier : CK (21)          Goals  Short term goals  Time Frame for Short term goals: by discharge  Short term goal 1: bed mob MI  Short term goal 2: transfers MI  Short term goal 3: amb Sherice  1560' with or without AD SBA/sup  Short term goal 4: negotiate stairs when able  Patient Goals   Patient goals : to return home       Therapy Time   Individual Concurrent Group Co-treatment   Time In 1127         Time Out 1220         Minutes 53                 ALVIN JARRETT, PT    [address] [facility],Third Floor, PT, [address]12

## 2021-03-13 NOTE — PROGRESS NOTES
Patient resting well. Gap has remained closed. Lantus 44 units given per MD orders. Will monitor for additional two hours then turn off Insulin drip. Patient denies needs at this time.

## 2021-03-14 LAB
A/G RATIO: 0.9 (ref 1.1–2.2)
ALBUMIN SERPL-MCNC: 2.7 G/DL (ref 3.4–5)
ALP BLD-CCNC: 74 U/L (ref 40–129)
ALT SERPL-CCNC: 75 U/L (ref 10–40)
ANION GAP SERPL CALCULATED.3IONS-SCNC: 10 MMOL/L (ref 3–16)
AST SERPL-CCNC: 59 U/L (ref 15–37)
BASOPHILS ABSOLUTE: 0.1 K/UL (ref 0–0.2)
BASOPHILS RELATIVE PERCENT: 0.5 %
BILIRUB SERPL-MCNC: <0.2 MG/DL (ref 0–1)
BUN BLDV-MCNC: 30 MG/DL (ref 7–20)
CALCIUM SERPL-MCNC: 8.1 MG/DL (ref 8.3–10.6)
CHLORIDE BLD-SCNC: 107 MMOL/L (ref 99–110)
CO2: 21 MMOL/L (ref 21–32)
CREAT SERPL-MCNC: 2.1 MG/DL (ref 0.6–1.2)
EOSINOPHILS ABSOLUTE: 0.1 K/UL (ref 0–0.6)
EOSINOPHILS RELATIVE PERCENT: 1.1 %
GFR AFRICAN AMERICAN: 28
GFR NON-AFRICAN AMERICAN: 23
GLOBULIN: 3 G/DL
GLUCOSE BLD-MCNC: 105 MG/DL (ref 70–99)
GLUCOSE BLD-MCNC: 108 MG/DL (ref 70–99)
GLUCOSE BLD-MCNC: 126 MG/DL (ref 70–99)
GLUCOSE BLD-MCNC: 157 MG/DL (ref 70–99)
GLUCOSE BLD-MCNC: 194 MG/DL (ref 70–99)
GLUCOSE BLD-MCNC: 198 MG/DL (ref 70–99)
HCT VFR BLD CALC: 28.4 % (ref 36–48)
HEMOGLOBIN: 9.5 G/DL (ref 12–16)
LYMPHOCYTES ABSOLUTE: 1 K/UL (ref 1–5.1)
LYMPHOCYTES RELATIVE PERCENT: 8.5 %
MAGNESIUM: 2.1 MG/DL (ref 1.8–2.4)
MCH RBC QN AUTO: 30.1 PG (ref 26–34)
MCHC RBC AUTO-ENTMCNC: 33.5 G/DL (ref 31–36)
MCV RBC AUTO: 89.8 FL (ref 80–100)
MONOCYTES ABSOLUTE: 0.8 K/UL (ref 0–1.3)
MONOCYTES RELATIVE PERCENT: 6.9 %
NEUTROPHILS ABSOLUTE: 9.7 K/UL (ref 1.7–7.7)
NEUTROPHILS RELATIVE PERCENT: 83 %
PDW BLD-RTO: 14.4 % (ref 12.4–15.4)
PERFORMED ON: ABNORMAL
PLATELET # BLD: 172 K/UL (ref 135–450)
PMV BLD AUTO: 9.8 FL (ref 5–10.5)
POTASSIUM SERPL-SCNC: 3.6 MMOL/L (ref 3.5–5.1)
PROCALCITONIN: 48.11 NG/ML (ref 0–0.15)
RBC # BLD: 3.16 M/UL (ref 4–5.2)
SODIUM BLD-SCNC: 138 MMOL/L (ref 136–145)
TOTAL PROTEIN: 5.7 G/DL (ref 6.4–8.2)
WBC # BLD: 11.7 K/UL (ref 4–11)

## 2021-03-14 PROCEDURE — 6370000000 HC RX 637 (ALT 250 FOR IP): Performed by: STUDENT IN AN ORGANIZED HEALTH CARE EDUCATION/TRAINING PROGRAM

## 2021-03-14 PROCEDURE — 83735 ASSAY OF MAGNESIUM: CPT

## 2021-03-14 PROCEDURE — 2580000003 HC RX 258: Performed by: INTERNAL MEDICINE

## 2021-03-14 PROCEDURE — 6370000000 HC RX 637 (ALT 250 FOR IP): Performed by: INTERNAL MEDICINE

## 2021-03-14 PROCEDURE — 6360000002 HC RX W HCPCS: Performed by: STUDENT IN AN ORGANIZED HEALTH CARE EDUCATION/TRAINING PROGRAM

## 2021-03-14 PROCEDURE — 84145 PROCALCITONIN (PCT): CPT

## 2021-03-14 PROCEDURE — 2500000003 HC RX 250 WO HCPCS: Performed by: INTERNAL MEDICINE

## 2021-03-14 PROCEDURE — 36415 COLL VENOUS BLD VENIPUNCTURE: CPT

## 2021-03-14 PROCEDURE — 2060000000 HC ICU INTERMEDIATE R&B

## 2021-03-14 PROCEDURE — 80053 COMPREHEN METABOLIC PANEL: CPT

## 2021-03-14 PROCEDURE — 85025 COMPLETE CBC W/AUTO DIFF WBC: CPT

## 2021-03-14 RX ORDER — BACITRACIN, NEOMYCIN, POLYMYXIN B 400; 3.5; 5 [USP'U]/G; MG/G; [USP'U]/G
OINTMENT TOPICAL 2 TIMES DAILY PRN
Status: DISCONTINUED | OUTPATIENT
Start: 2021-03-14 | End: 2021-03-15 | Stop reason: HOSPADM

## 2021-03-14 RX ORDER — CARVEDILOL 12.5 MG/1
12.5 TABLET ORAL 2 TIMES DAILY WITH MEALS
Status: DISCONTINUED | OUTPATIENT
Start: 2021-03-14 | End: 2021-03-15 | Stop reason: HOSPADM

## 2021-03-14 RX ADMIN — CITALOPRAM HYDROBROMIDE 40 MG: 20 TABLET ORAL at 08:06

## 2021-03-14 RX ADMIN — GABAPENTIN 100 MG: 100 CAPSULE ORAL at 08:06

## 2021-03-14 RX ADMIN — INSULIN GLARGINE 44 UNITS: 100 INJECTION, SOLUTION SUBCUTANEOUS at 21:30

## 2021-03-14 RX ADMIN — METRONIDAZOLE 500 MG: 500 INJECTION, SOLUTION INTRAVENOUS at 17:21

## 2021-03-14 RX ADMIN — Medication 10 ML: at 21:31

## 2021-03-14 RX ADMIN — HEPARIN SODIUM 5000 UNITS: 5000 INJECTION INTRAVENOUS; SUBCUTANEOUS at 14:52

## 2021-03-14 RX ADMIN — VANCOMYCIN HYDROCHLORIDE 250 MG: 250 CAPSULE ORAL at 05:10

## 2021-03-14 RX ADMIN — VANCOMYCIN HYDROCHLORIDE 250 MG: 250 CAPSULE ORAL at 12:55

## 2021-03-14 RX ADMIN — GABAPENTIN 100 MG: 100 CAPSULE ORAL at 14:52

## 2021-03-14 RX ADMIN — LISINOPRIL 20 MG: 20 TABLET ORAL at 08:06

## 2021-03-14 RX ADMIN — CARVEDILOL 12.5 MG: 12.5 TABLET, FILM COATED ORAL at 17:21

## 2021-03-14 RX ADMIN — PANTOPRAZOLE SODIUM 40 MG: 40 TABLET, DELAYED RELEASE ORAL at 05:10

## 2021-03-14 RX ADMIN — CARVEDILOL 12.5 MG: 12.5 TABLET, FILM COATED ORAL at 10:28

## 2021-03-14 RX ADMIN — HEPARIN SODIUM 5000 UNITS: 5000 INJECTION INTRAVENOUS; SUBCUTANEOUS at 05:10

## 2021-03-14 RX ADMIN — ATORVASTATIN CALCIUM 80 MG: 80 TABLET, FILM COATED ORAL at 08:06

## 2021-03-14 RX ADMIN — HEPARIN SODIUM 5000 UNITS: 5000 INJECTION INTRAVENOUS; SUBCUTANEOUS at 21:29

## 2021-03-14 RX ADMIN — METRONIDAZOLE 500 MG: 500 INJECTION, SOLUTION INTRAVENOUS at 03:06

## 2021-03-14 RX ADMIN — GABAPENTIN 100 MG: 100 CAPSULE ORAL at 21:29

## 2021-03-14 RX ADMIN — VANCOMYCIN HYDROCHLORIDE 250 MG: 250 CAPSULE ORAL at 17:21

## 2021-03-14 RX ADMIN — INSULIN LISPRO 1 UNITS: 100 INJECTION, SOLUTION INTRAVENOUS; SUBCUTANEOUS at 21:29

## 2021-03-14 RX ADMIN — METRONIDAZOLE 500 MG: 500 INJECTION, SOLUTION INTRAVENOUS at 10:29

## 2021-03-14 RX ADMIN — ASPIRIN 81 MG: 81 TABLET, CHEWABLE ORAL at 08:05

## 2021-03-14 RX ADMIN — DICYCLOMINE HYDROCHLORIDE 10 MG: 10 CAPSULE ORAL at 08:06

## 2021-03-14 ASSESSMENT — PAIN SCALES - GENERAL
PAINLEVEL_OUTOF10: 0
PAINLEVEL_OUTOF10: 0

## 2021-03-14 NOTE — PROGRESS NOTES
GASTROENTEROLOGY INPATIENT CONSULTATION        IDENTIFYING DATA/REASON FOR CONSULTATION   PATIENT:  Yulisa Navarro  MRN:  [id number]  ADMIT DATE: 3/11/2021  TIME OF EVALUATION: 3/14/2021 7:35 AM  HOSPITAL STAY:   LOS: 2 days     REASON FOR CONSULTATION: Significant colitis with septic shock    SUBJECTIVE   Patient seen and examined. Patient reports she is feeling better. She reports decreased abdominal cramping. She continues to have nonbloody diarrhea, 4-5 episodes yesterday.     PAST MEDICAL, SURGICAL, FAMILY, and SOCIAL HISTORY     Past Medical History:   Diagnosis Date    Abnormal echocardiogram     25% on 3/11/14 and 50% on 3/19/14    Back pain     Cardiomyopathy ([address]     EF was 50% on 3/19/14    Chipped tooth     lower left    Dental crown present     veneers    Depressive disorder 8/13/2014    Diabetic infection of right foot ([address] 4/15/2015    Diabetic ulcer of toe of left foot associated with type 2 diabetes mellitus, with fat layer exposed ([address] 4/10/2018    Pt slipped in hot tub latter part of February and has not healed since despite topical treatment    DVT (deep venous thrombosis) ([address]     HTN (hypertension)     Hx of blood clots 2016    left leg    Ischemic cardiomyopathy 4/15/2015    Kidney disease     CHRONIC STAGE 3    Meniere's disease     Mixed hyperlipidemia 3/7/2016    Nicotine abuse     NSTEMI (non-ST elevated myocardial infarction) ([address] 3/13/2014    ANGLE (obstructive sleep apnea)     Osteomyelitis of ankle or foot, acute, left ([address] 6/4/2018    Pneumonia     PONV (postoperative nausea and vomiting)     nausea    Spinal epidural abscess 3/13/2014    Type II diabetes mellitus, uncontrolled ([address] 08/13/2014     Past Surgical History:   Procedure Laterality Date    BACK SURGERY  3/2014    DEBRIDEMENT  3/12/14    extensive debridement of bone/muscle and paraspinal empyema    DILATION AND CURETTAGE OF UTERUS      ENDOSCOPY, COLON, DIAGNOSTIC      FOOT DEBRIDEMENT Left 9/8/2020    LEFT FOOT DEBRIDEMENT INCISION AND DRAINAGE performed by Sheeba Weiss DPM at [address] [facility] [address] TOE SURGERY Left 8/28/2020    ON THE LEFT: [address], WOUND CLOSURE, FLEXOR TENOTOMY 4TH AND 5TH DIGITS, TENOTOMY AND CAPSULOTOMY 2ND DIGIT [facility]  6/[facility]    complete-think right ovary in.    NASAL SEPTUM SURGERY      NERVE SURGERY Left 9/10/2020    LEFT FOOT INCISION AND DRAINAGE, EXTENSOR HALLUCIS LONGUS REPAIR WITH DELAYED PRIMARY CLOSURE performed by Sheeba Weiss DPM at [place] shut behind right ear    PRESSURE ULCER DEBRIDEMENT Left 10/23/2020    ON THE LEFT: SURGICAL PREPARATION OF WOUND BED, APPLICATION OF DERMAL GRAFT SUBSTITUTE performed by Sheeba Weiss DPM at [address]  04/02/2019    ROBOTIC ASSISTED LAPAROSCOPIC SLEEVE GASTRECTOMY, LAPAROSCOPIC REDUCIBLE INCISIONAL HERNIA REPAIR     SLEEVE GASTRECTOMY N/A 4/2/2019    ROBOTIC ASSISTED LAPAROSCOPIC SLEEVE GASTRECTOMY, LAPAROSCOPIC REDUCIBLE INCISIONAL HERNIA REPAIR performed by Danuta Timmons DO at [place] [address] N/A 2/8/2019    EGD BIOPSY performed by Danuta Timmons DO at [email or web address] [facility] N/A 2/8/2019    EGD POLYP ABLATION/OTHER performed by Danuta Timmons DO at [facility] ENDOSCOPY     Family History   Problem Relation Age of Onset    High Blood Pressure Mother     Cancer Mother     Rheum Arthritis Mother     COPD Father     Cancer Father     Osteoarthritis Neg Hx     Asthma Neg Hx     Breast Cancer Neg Hx     Diabetes Neg Hx     Heart Failure Neg Hx     High Cholesterol Neg Hx     Hypertension Neg Hx     Migraines Neg Hx     Ovarian Cancer Neg Hx     Rashes/Skin Problems Neg Hx     Seizures Neg Hx     Stroke Neg Hx     Thyroid Disease Neg Hx      Social History     Socioeconomic History    Marital status:      Spouse name: None    Number of children: None    Years of education: None    Highest education level: None   Occupational History    None   Social Needs    Financial resource strain: None    Food insecurity     Worry: None     Inability: None    Transportation needs     Medical: None     Non-medical: None   Tobacco Use    Smoking status: Former Smoker     Packs/day: 0.50     Years: 10.00     Pack years: 5.00     Quit date: 11/10/2013     Years since quittin.3    Smokeless tobacco: Never Used   Substance and Sexual Activity    Alcohol use:  Yes     Alcohol/week: 0.0 standard drinks     Comment: occasionally    Drug use: No    Sexual activity: Not Currently   Lifestyle    Physical activity     Days per week: None     Minutes per session: None    Stress: None   Relationships    Social connections     Talks on phone: None     Gets together: None     Attends [employer] service: None     Active member of club or organization: None     Attends meetings of clubs or organizations: None     Relationship status: None    Intimate partner violence     Fear of current or ex partner: None     Emotionally abused: None     Physically abused: None     Forced sexual activity: None   Other Topics Concern    None   Social History Narrative    None       MEDICATIONS   SCHEDULED:  insulin lispro, 0-6 Units, TID WC  insulin lispro, 0-3 Units, Nightly  lisinopril, 20 mg, Daily  heparin (porcine), 5,000 Units, 3 times per day  atorvastatin, 80 mg, Daily  pantoprazole, 40 mg, Daily  citalopram, 40 mg, Daily  aspirin, 81 mg, Daily  gabapentin, 100 mg, TID  vancomycin, 250 mg, 4 times per day  metroNIDAZOLE, 500 mg, Q8H  insulin glargine, 44 Units, Nightly      FLUIDS/DRIPS:     dextrose       PRNs: glucose, 15 g, PRN  dextrose, 12.5 g, PRN  glucagon (rDNA), 1 mg, PRN  dextrose, 100 mL/hr, PRN  dicyclomine, 10 mg, TID PRN  acetaminophen, 650 mg, Q6H PRN    Or  acetaminophen, 650 mg, Q6H PRN  ondansetron, 4 mg, Q6H PRN  sodium chloride flush, 10 mL, PRN      ALLERGIES:  She   Allergies   Allergen Reactions    Doxycycline Other (See Comments)     Yeast infection       REVIEW OF SYSTEMS   Pertinent ROS noted in HPI    PHYSICAL EXAM     Vitals:    03/13/21 2100 03/14/21 0000 03/14/21 0400 03/14/21 0600   BP: (!) 182/67 (!) 135/45 (!) 141/52    Pulse: 66 66 61    Resp: 20 16 14    Temp:  98.3 °F (36.8 °C) 98 °F (36.7 °C)    TempSrc:  Oral Oral    SpO2:  100% 100%    Weight:    218 lb 11.1 oz (99.2 kg)   Height:    5' 3\" (1.6 m)       I/O last 3 completed shifts: In: 2045 [P.O.:1580; I.V.:265; IV Piggyback:200]  Out: 2200 [Urine:2200]      Physical Exam:  General appearance: alert, cooperative, no distress, appears stated age. HEENT: Normocephalic, atraumatic without obvious abnormality, no scleral icterus, normal conjunctiva, trachea midline, moist mucous membranes  Lungs: clear to auscultation bilaterally, Normal Effort, no wheezing  Heart: regular rate and rhythm, normal S1 and S2, no murmurs or rubs  Abdomen: soft, non-distended, non-tender. Bowel sounds normal. No masses,  no organomegaly.    Extremities: atraumatic, no cyanosis or edema  Skin: warm and dry, no jaundice  Neuro: Grossly intact, A&OX3      LABS AND IMAGING   Laboratory   Recent Labs     03/12/21  0429 03/13/21 0429 03/14/21  0509   WBC 18.2* 13.8* 11.7*   HGB 10.6* 9.4* 9.5*   HCT 31.9* 28.3* 28.4*   MCV 89.9 90.4 89.8    154 172     Recent Labs     03/12/21  1324 03/12/21  1642 03/12/21 2015 03/13/21 0429 03/14/21  0509   * 134* 137 133* 138   K 3.9 3.9 4.1 3.9 3.6    105 106 103 107   CO2 20* 20* 19* 19* 21   PHOS 2.4* 2.5 2.6  --   --    BUN 49* 47* 42* 39* 30*   CREATININE 3.6* 3.5* 3.1* 2.8* 2.1*     Recent Labs     03/12/21  0915 03/13/21  0429 03/14/21  0509   AST 52* 145* 59*   ALT 32 94* 75*   BILIDIR <0.2  --   --    BILITOT 0.3 <0.2 <0.2   ALKPHOS 84 79 74     Recent Labs     03/11/21  1844   LIPASE 60.0     No results for input(s): PROTIME, INR in the last 72 hours. Imaging  CT Head WO Contrast   Final Result   No acute intracranial abnormality. No significant change from prior studies         CT Cervical Spine WO Contrast   Final Result   No evidence for fracture or malalignment cervical spine         CT ABDOMEN PELVIS WO CONTRAST Additional Contrast? None   Final Result   1. Long segment wall thickening and surrounding inflammation involving the   mid to distal transverse, descending and sigmoid colon. Differential   considerations would include ischemic colitis, antibiotic associated colitis,   infectious colitis, or inflammatory bowel disease   2. Small amount of free fluid present within the pelvis   3. Nonobstructing bilateral intrarenal calculi         XR CHEST (2 VW)   Final Result   No acute cardiopulmonary disease. ASSESSMENT AND RECOMMENDATIONS   Misael Ruvalcaba is a 49-year-old female with past medical history of DM-II, hypertension, CHF, CAD, ANGLE, DVT, CKD, osteomyelitis who presented [facility] 3/11/2021 with weakness, nausea, vomiting and diarrhea.     1. DKA: Resolved, s/p insulin drip in the ICU. 2. Severe C. Diff colitis: Patient currently being treated with Vanco, IV Flagyl. Continue to monitor stool output and daily abdominal exams. 3. NEHA on CKD: Presumed prerenal 2/2 #1 and #2. Nephrology consulted, recommend monitoring serum creatinine with IV hydration. 4. Lactic acidosis: Resolved    If you have any questions or need any further information, please feel free to contact our consult team.  Thank you for allowing us to participate in the care of Misael Ruvalcaba. The note was completed using Dragon voice recognition transcription. Every effort was made to ensure accuracy; however, inadvertent transcription errors may be present despite my best efforts to edit errors.     Pito Fuentes MD

## 2021-03-14 NOTE — PLAN OF CARE
resources and services will improve  Description: Ability to identify and utilize available resources and services will improve  3/14/2021 0822 by Alejandro Neff RN  Outcome: Ongoing  3/14/2021 0338 by Barney Ng RN  Outcome: Ongoing  Goal: Ability to manage health-related needs will improve  Description: Ability to manage health-related needs will improve  3/14/2021 0822 by Alejandro Neff RN  Outcome: Ongoing  3/14/2021 0338 by Barney Ng RN  Outcome: Ongoing     Problem: Physical Regulation:  Goal: Diagnostic test results will improve  Description: Diagnostic test results will improve  3/14/2021 0822 by Alejandro Neff RN  Outcome: Ongoing  3/14/2021 0338 by Barney Ng RN  Outcome: Ongoing  Goal: Complications related to the disease process, condition or treatment will be avoided or minimized  Description: Complications related to the disease process, condition or treatment will be avoided or minimized  3/14/2021 0822 by Alejandro Neff RN  Outcome: Ongoing  3/14/2021 0338 by Barney Ng RN  Outcome: Ongoing  Goal: Ability to maintain clinical measurements within normal limits will improve  Description: Ability to maintain clinical measurements within normal limits will improve  3/14/2021 0822 by Alejandro Neff RN  Outcome: Ongoing  3/14/2021 0338 by Barney Ng RN  Outcome: Ongoing     Problem: Sensory:  Goal: Ability to identify factors that increase the pain will improve  Description: Ability to identify factors that increase the pain will improve  3/14/2021 0822 by Alejandro Neff RN  Outcome: Ongoing  3/14/2021 0338 by Barney Ng RN  Outcome: Ongoing  Goal: Ability to notify healthcare provider of pain before it becomes unmanageable or unbearable will improve  Description: Ability to notify healthcare provider of pain before it becomes unmanageable or unbearable will improve  3/14/2021 0822 by Alejandro Neff RN  Outcome: Ongoing  3/14/2021 0338 by Barney Ng RN  Outcome: Ongoing  Goal: Pain level will decrease  Description: Pain level will decrease  3/14/2021 3633 by True Hodgkins, RN  Outcome: Ongoing  3/14/2021 0338 by Ashok Grossman RN  Outcome: Ongoing     Problem: Coping:  Goal: Ability to adjust to condition or change in health will improve  Description: Ability to adjust to condition or change in health will improve  3/14/2021 0822 by True Hodgkins, RN  Outcome: Ongoing  3/14/2021 0338 by Ashok Grossman RN  Outcome: Ongoing     Problem: Metabolic:  Goal: Ability to maintain appropriate glucose levels will improve  Description: Ability to maintain appropriate glucose levels will improve  3/14/2021 0822 by True Hodgkins, RN  Outcome: Ongoing  3/14/2021 0338 by Ashok Grossman RN  Outcome: Ongoing     Problem: Nutritional:  Goal: Maintenance of adequate nutrition will improve  Description: Maintenance of adequate nutrition will improve  3/14/2021 0822 by True Hodgkins, RN  Outcome: Ongoing  3/14/2021 0338 by Ashok Grossman RN  Outcome: Ongoing  Goal: Progress toward achieving an optimal weight will improve  Description: Progress toward achieving an optimal weight will improve  3/14/2021 0822 by True Hodgkins, RN  Outcome: Ongoing  3/14/2021 0338 by Ashok Grossman RN  Outcome: Ongoing     Problem: Skin Integrity:  Goal: Risk for impaired skin integrity will decrease  Description: Risk for impaired skin integrity will decrease  3/14/2021 0822 by True Hodgkins, RN  Outcome: Ongoing  3/14/2021 0338 by Ashok Grossman RN  Outcome: Ongoing  Goal: Will show no infection signs and symptoms  Description: Will show no infection signs and symptoms  3/14/2021 0822 by True Hodgkins, RN  Outcome: Ongoing  3/14/2021 0338 by Ashok Grossman RN  Outcome: Ongoing  Goal: Absence of new skin breakdown  Description: Absence of new skin breakdown  3/14/2021 0822 by True Hodgkins, RN  Outcome: Ongoing  3/14/2021 0338 by Ashok Grossman RN  Outcome: Ongoing     Problem: Tissue Perfusion:  Goal: Adequacy of tissue perfusion will improve  Description: Adequacy of tissue perfusion will improve  3/14/2021 9997 by Noah Quach RN  Outcome: Ongoing  3/14/2021 0338 by Jonn Martinez RN  Outcome: Ongoing     Problem: Falls - Risk of:  Goal: Will remain free from falls  Description: Will remain free from falls  3/14/2021 0822 by Noah Quach RN  Outcome: Ongoing  3/14/2021 0338 by Jonn Martinez RN  Outcome: Ongoing  Goal: Absence of physical injury  Description: Absence of physical injury  3/14/2021 0822 by Noah Quach RN  Outcome: Ongoing  3/14/2021 0338 by Jonn Martinez RN  Outcome: Ongoing

## 2021-03-14 NOTE — PROGRESS NOTES
Office: [phone]       Fax: [phone]      Nephrology Initial Consult Note        Patient's Name: Kellie Reardon Date: 3/11/2021  Date of Visit: 3/14/2021    Reason for Consult: NEHA  Requesting Physician:  Melida Babinski, MD  PCP: Jaymie Diego MD    Chief Complaint:  Diarrhea     History of Present Illness:       Misael Ruvalcaba is a 77 y.o. female with PMHx of hypertension, cardiomyopathy, DM, CKD who was hospitalized on 3/11/2021 with complaints of vomiting, diarrhea  Recently took laxative  No abdominal pain   No dysuria, hematuria  Feels weak  Was hypotensive  NSAID use: Denies   IV contrast: None recent   Home meds reviewed and noted to be on lisinopril      03/14/21    Feels better  Good Uo   + Diarrhea   Cr better         Past Medical History:   Diagnosis Date    Abnormal echocardiogram     25% on 3/11/14 and 50% on 3/19/14    Back pain     Cardiomyopathy ([address]     EF was 50% on 3/19/14    Chipped tooth     lower left    Dental crown present     veneers    Depressive disorder 8/13/2014    Diabetic infection of right foot ([address] 4/15/2015    Diabetic ulcer of toe of left foot associated with type 2 diabetes mellitus, with fat layer exposed ([address] 4/10/2018    Pt slipped in hot tub latter part of February and has not healed since despite topical treatment    DVT (deep venous thrombosis) ([address]     HTN (hypertension)     Hx of blood clots 2016    left leg    Ischemic cardiomyopathy 4/15/2015    Kidney disease     CHRONIC STAGE 3    Meniere's disease     Mixed hyperlipidemia 3/7/2016    Nicotine abuse     NSTEMI (non-ST elevated myocardial infarction) ([address] 3/13/2014    ANGLE (obstructive sleep apnea)     Osteomyelitis of ankle or foot, acute, left ([address] 6/4/2018    Pneumonia     PONV (postoperative nausea and vomiting) nausea    Spinal epidural abscess 3/13/2014    Type II diabetes mellitus, uncontrolled ([address] 08/13/2014       Past Surgical History:   Procedure Laterality Date    BACK SURGERY  3/2014    DEBRIDEMENT  3/12/14    extensive debridement of bone/muscle and paraspinal empyema    DILATION AND CURETTAGE OF UTERUS      ENDOSCOPY, COLON, DIAGNOSTIC      FOOT DEBRIDEMENT Left 9/8/2020    LEFT FOOT DEBRIDEMENT INCISION AND DRAINAGE performed by Jaymie Diego DPM at [address] [facility] TOE SURGERY Left 8/28/2020    ON THE LEFT: [address] [facility] [address], WOUND CLOSURE, FLEXOR TENOTOMY 4TH AND 5TH DIGITS, TENOTOMY AND CAPSULOTOMY 2ND DIGIT performed by Jaymie Diego DPM at [facility] 10  6/15/1999    complete-think right ovary in.    NASAL SEPTUM SURGERY      NERVE SURGERY Left 9/10/2020    LEFT FOOT INCISION AND DRAINAGE, EXTENSOR HALLUCIS LONGUS REPAIR WITH DELAYED PRIMARY CLOSURE performed by Jaymie Diego DPM at [place] shut behind right ear    PRESSURE ULCER DEBRIDEMENT Left 10/23/2020    ON THE LEFT: SURGICAL PREPARATION OF WOUND BED, APPLICATION OF DERMAL GRAFT SUBSTITUTE performed by Jaymie Diego DPM at [address]  04/02/2019    ROBOTIC ASSISTED LAPAROSCOPIC SLEEVE GASTRECTOMY, LAPAROSCOPIC REDUCIBLE INCISIONAL HERNIA REPAIR     SLEEVE GASTRECTOMY N/A 4/2/2019    ROBOTIC ASSISTED LAPAROSCOPIC SLEEVE GASTRECTOMY, LAPAROSCOPIC REDUCIBLE INCISIONAL HERNIA REPAIR performed by Ortiz Ospina DO at [address] N/A 2/8/2019    EGD BIOPSY performed by Ortiz Ospina DO at [address] [facility] N/A 2/8/2019    EGD POLYP ABLATION/OTHER performed by Ortiz Ospina DO at [facility] ENDOSCOPY       Family History   Problem Relation Age of Onset    High Blood Pressure Mother     Cancer Mother     Rheum Arthritis Mother     COPD Father     Cancer Father    Chanell Lemos Osteoarthritis Neg Hx     Asthma Neg Hx     Breast Cancer Neg Hx     Diabetes Neg Hx     Heart Failure Neg Hx     High Cholesterol Neg Hx     Hypertension Neg Hx     Migraines Neg Hx     Ovarian Cancer Neg Hx     Rashes/Skin Problems Neg Hx     Seizures Neg Hx     Stroke Neg Hx     Thyroid Disease Neg Hx         reports that she quit smoking about 7 years ago. She has a 5.00 pack-year smoking history. She has never used smokeless tobacco. She reports current alcohol use. She reports that she does not use drugs. Medications:    Allergies:  Doxycycline    Scheduled Meds:   insulin lispro  0-6 Units Subcutaneous TID WC    insulin lispro  0-3 Units Subcutaneous Nightly    lisinopril  20 mg Oral Daily    heparin (porcine)  5,000 Units Subcutaneous 3 times per day    atorvastatin  80 mg Oral Daily    pantoprazole  40 mg Oral Daily    citalopram  40 mg Oral Daily    aspirin  81 mg Oral Daily    gabapentin  100 mg Oral TID    vancomycin  250 mg Oral 4 times per day    metroNIDAZOLE  500 mg Intravenous Q8H    insulin glargine  44 Units Subcutaneous Nightly     Continuous Infusions:   dextrose         Labs:  CBC:   Recent Labs     03/12/21  0429 03/13/21 0429 03/14/21  0509   WBC 18.2* 13.8* 11.7*   HGB 10.6* 9.4* 9.5*    154 172     Ca/Mg/Phos:   Recent Labs     03/12/21  1324 03/12/21  1642 03/12/21 2015 03/13/21  0429 03/14/21  0509   CALCIUM 7.6* 7.4* 7.3* 7.7* 8.1*   MG 1.50* 1.50* 1.50* 1.60* 2.10   PHOS 2.4* 2.5 2.6  --   --      UA:  Recent Labs     03/11/21 2046   COLORU Yellow   CLARITYU CLOUDY*   GLUCOSEU >=1000*   BILIRUBINUR Negative   KETUA Negative   SPECGRAV 1.020   BLOODU MODERATE*   PHUR 5.5   PROTEINU 100*   UROBILINOGEN 0.2   NITRU Negative   LEUKOCYTESUR Negative   LABMICR YES   URINETYPE NotGiven      Urine Microscopic:   Recent Labs     03/11/21 2046   BACTERIA 3+*   COMU see below   WBCUA 0-2   RBCUA 3-4   EPIU 2-5     Urine Chemistry: No results for input(s): Debe Sides, PROTEINUR, NAUR in the last 72 hours. Objective:     Vitals: BP (!) 183/58   Pulse 61   Temp 98.2 °F (36.8 °C) (Oral)   Resp 14   Ht 5' 3\" (1.6 m)   Wt 218 lb 11.1 oz (99.2 kg)   LMP 06/15/1999   SpO2 100%   BMI 38.74 kg/m²    Wt Readings from Last 3 Encounters:   03/14/21 218 lb 11.1 oz (99.2 kg)   10/23/20 210 lb (95.3 kg)   10/19/20 211 lb 12.8 oz (96.1 kg)      24HR INTAKE/OUTPUT:      Intake/Output Summary (Last 24 hours) at 3/14/2021 0820  Last data filed at 3/14/2021 0809  Gross per 24 hour   Intake 2045 ml   Output 2250 ml   Net -205 ml     Constitutional:  awake, NAD  HEENT:  MMM, No icterus  Neck: no bruits, No JVD  Cardiovascular:  reg rhythm  Respiratory: CTA, no crackles  Abdomen:  +BS, soft, NT, ND  Ext: no lower extremity edema  Psychiatric: mood and affect appropriate  Skin: dry/intact  CNS: alert, no agitation    IMAGING:  CT Head WO Contrast   Final Result   No acute intracranial abnormality. No significant change from prior studies         CT Cervical Spine WO Contrast   Final Result   No evidence for fracture or malalignment cervical spine         CT ABDOMEN PELVIS WO CONTRAST Additional Contrast? None   Final Result   1. Long segment wall thickening and surrounding inflammation involving the   mid to distal transverse, descending and sigmoid colon. Differential   considerations would include ischemic colitis, antibiotic associated colitis,   infectious colitis, or inflammatory bowel disease   2. Small amount of free fluid present within the pelvis   3. Nonobstructing bilateral intrarenal calculi         XR CHEST (2 VW)   Final Result   No acute cardiopulmonary disease.              Assessment :     1. NEHA on CKD 3b  -Non-Oliguric  -Baseline creat: 1.3 Now 3.1-->3.6->4.1  -UA conc, prot ++  -Volume: Hypovolemic  -Electrolytes: Hyponatremia and No Dyskalemia  -Acid-Base: NAGMA    Recent Labs     03/12/21  1324 03/12/21  1642 03/12/21 2015 03/13/21  6254 03/14/21  0509   BUN 49* 47* 42* 39* 30*   CREATININE 3.6* 3.5* 3.1* 2.8* 2.1*     Recent Labs     03/12/21  1324 03/12/21  1642 03/12/21 2015 03/13/21  0429 03/14/21  0509   * 134* 137 133* 138   K 3.9 3.9 4.1 3.9 3.6   CO2 20* 20* 19* 19* 21   MG 1.50* 1.50* 1.50* 1.60* 2.10         2. HTN  -Blood pressure low    BP Readings from Last 1 Encounters:   03/14/21 (!) 183/58       3. Sepsis    4. HFrEF  - TTE (feb 2019): LVEF 50-55%. , LVH    5. C diff      Plan:     - IVF for hemodynamic support  - Hold ACEI  - Stool studies - C diff   - Antimicrobials per primary team   - Monitor BMP    -Monitor I/O, UOP  -Maintain MAP>65  -Avoid nephrotoxin, if able. -Dose meds to current eGFR    Thank you for allowing us to participate in care of [facility] . We will continue to follow. Feel free to contact me with any questions.       Mathew Patterson  3/14/2021    [facility] [address]  Office : [phone]  Fax :[phone]

## 2021-03-14 NOTE — PLAN OF CARE
Problem: Activity:  Goal: Risk for activity intolerance will decrease  Description: Risk for activity intolerance will decrease  Outcome: Ongoing     Problem:  Bowel/Gastric:  Goal: Bowel function will improve  Description: Bowel function will improve  Outcome: Ongoing  Goal: Diagnostic test results will improve  Description: Diagnostic test results will improve  Outcome: Ongoing  Goal: Occurrences of nausea will decrease  Description: Occurrences of nausea will decrease  Outcome: Ongoing  Goal: Occurrences of vomiting will decrease  Description: Occurrences of vomiting will decrease  Outcome: Ongoing     Problem: Fluid Volume:  Goal: Maintenance of adequate hydration will improve  Description: Maintenance of adequate hydration will improve  Outcome: Ongoing  Goal: Ability to maintain a balanced intake and output will improve  Description: Ability to maintain a balanced intake and output will improve  Outcome: Ongoing     Problem: Health Behavior:  Goal: Ability to state signs and symptoms to report to health care provider will improve  Description: Ability to state signs and symptoms to report to health care provider will improve  Outcome: Ongoing  Goal: Ability to identify and utilize available resources and services will improve  Description: Ability to identify and utilize available resources and services will improve  Outcome: Ongoing  Goal: Ability to manage health-related needs will improve  Description: Ability to manage health-related needs will improve  Outcome: Ongoing     Problem: Physical Regulation:  Goal: Diagnostic test results will improve  Description: Diagnostic test results will improve  Outcome: Ongoing  Goal: Complications related to the disease process, condition or treatment will be avoided or minimized  Description: Complications related to the disease process, condition or treatment will be avoided or minimized  Outcome: Ongoing  Goal: Ability to maintain clinical measurements within normal limits will improve  Description: Ability to maintain clinical measurements within normal limits will improve  Outcome: Ongoing     Problem: Sensory:  Goal: Ability to identify factors that increase the pain will improve  Description: Ability to identify factors that increase the pain will improve  Outcome: Ongoing  Goal: Ability to notify healthcare provider of pain before it becomes unmanageable or unbearable will improve  Description: Ability to notify healthcare provider of pain before it becomes unmanageable or unbearable will improve  Outcome: Ongoing  Goal: Pain level will decrease  Description: Pain level will decrease  Outcome: Ongoing     Problem: Coping:  Goal: Ability to adjust to condition or change in health will improve  Description: Ability to adjust to condition or change in health will improve  Outcome: Ongoing     Problem: Metabolic:  Goal: Ability to maintain appropriate glucose levels will improve  Description: Ability to maintain appropriate glucose levels will improve  Outcome: Ongoing     Problem: Nutritional:  Goal: Maintenance of adequate nutrition will improve  Description: Maintenance of adequate nutrition will improve  Outcome: Ongoing  Goal: Progress toward achieving an optimal weight will improve  Description: Progress toward achieving an optimal weight will improve  Outcome: Ongoing     Problem: Skin Integrity:  Goal: Risk for impaired skin integrity will decrease  Description: Risk for impaired skin integrity will decrease  Outcome: Ongoing  Goal: Will show no infection signs and symptoms  Description: Will show no infection signs and symptoms  Outcome: Ongoing  Goal: Absence of new skin breakdown  Description: Absence of new skin breakdown  Outcome: Ongoing     Problem: Tissue Perfusion:  Goal: Adequacy of tissue perfusion will improve  Description: Adequacy of tissue perfusion will improve  Outcome: Ongoing     Problem: Falls - Risk of:  Goal: Will remain free from falls

## 2021-03-14 NOTE — PROGRESS NOTES
Progress Note  Admit Date: 3/11/2021      PCP: Radha Mcclain MD     CC: F/U for nausea vomiting weakness and diarrhea    Days in hospital:  2    SUBJECTIVE / Interval History:   pt feels tired   still having 6-8 loose BM    some cramping         Allergies  Doxycycline    Medications    Scheduled Meds:   insulin lispro  0-6 Units Subcutaneous TID WC    insulin lispro  0-3 Units Subcutaneous Nightly    lisinopril  20 mg Oral Daily    heparin (porcine)  5,000 Units Subcutaneous 3 times per day    atorvastatin  80 mg Oral Daily    pantoprazole  40 mg Oral Daily    citalopram  40 mg Oral Daily    aspirin  81 mg Oral Daily    gabapentin  100 mg Oral TID    vancomycin  250 mg Oral 4 times per day    metroNIDAZOLE  500 mg Intravenous Q8H    insulin glargine  44 Units Subcutaneous Nightly     Continuous Infusions:   dextrose         PRN Meds:  glucose, dextrose, glucagon (rDNA), dextrose, dicyclomine, acetaminophen **OR** acetaminophen, ondansetron, sodium chloride flush    Vitals    BP (!) 183/58   Pulse 61   Temp 98.2 °F (36.8 °C) (Oral)   Resp 14   Ht 5' 3\" (1.6 m)   Wt 218 lb 11.1 oz (99.2 kg)   LMP 06/15/1999   SpO2 100%   BMI 38.74 kg/m²     Exam:    Gen: No distress. Eyes: PERRL. No sclera icterus. No conjunctival injection. ENT: No discharge. Pharynx clear. External appearance of ears and nose normal.  Neck: Trachea midline. No obvious mass. Resp: No accessory muscle use. No crackles. No wheezes. No rhonchi. No dullness on percussion. CV: Regular rate. Regular rhythm. No murmur or rub. No edema. GI: Non-tender. Non-distended. No hernia. Skin: Warm, dry, normal texture and turgor. No nodule on exposed extremities. Lymph: No cervical LAD. No supraclavicular LAD. M/S: No cyanosis. No clubbing. No joint deformity. Neuro: Moves all four extremities. CN 2-12 tested, no defect noted. Psych: Oriented x 3. No anxiety. Awake. Alert. Intact judgement and insight.     Data LABS  CBC:   Recent Labs     03/12/21  0429 03/13/21 0429 03/14/21  0509   WBC 18.2* 13.8* 11.7*   HGB 10.6* 9.4* 9.5*   HCT 31.9* 28.3* 28.4*   MCV 89.9 90.4 89.8    154 172     BMP:   Recent Labs     03/12/21  1324 03/12/21  1642 03/12/21 2015 03/13/21  0429 03/14/21  0509   * 134* 137 133* 138   K 3.9 3.9 4.1 3.9 3.6    105 106 103 107   CO2 20* 20* 19* 19* 21   PHOS 2.4* 2.5 2.6  --   --    BUN 49* 47* 42* 39* 30*   CREATININE 3.6* 3.5* 3.1* 2.8* 2.1*   GLUCOSE 107* 125* 181* 140* 157*     POC GLUCOSE:    Recent Labs     03/13/21  1701 03/13/21  1946 03/13/21  2332 03/14/21  0214 03/14/21  0745   POCGLU 177* 236* 259* 198* 105*     LIVER PROFILE:   Recent Labs     03/11/21  1844 03/12/21  0915 03/13/21  0429 03/14/21  0509   AST 50* 52* 145* 59*   ALT 29 32 94* 75*   LIPASE 60.0  --   --   --    LABALBU 3.4 2.8* 2.9* 2.7*   BILIDIR  --  <0.2  --   --    BILITOT 0.4 0.3 <0.2 <0.2   ALKPHOS 112 84 79 74     PT/INR: No results for input(s): PROTIME, INR in the last 72 hours. APTT: No results for input(s): APTT in the last 72 hours. UA:  Recent Labs     03/11/21 2046   COLORU Yellow   PHUR 5.5   WBCUA 0-2   RBCUA 3-4   BACTERIA 3+*   CLARITYU CLOUDY*   SPECGRAV 1.020   LEUKOCYTESUR Negative   UROBILINOGEN 0.2   BILIRUBINUR Negative   BLOODU MODERATE*   GLUCOSEU >=1000*   KETUA Negative     Microbiology:  Wound Culture:   Recent Labs     03/12/21  1045   ORG C. difficile toxin B gene detected*     Nasal Culture:   Recent Labs     03/12/21  1045   ORG C. difficile toxin B gene detected*     Blood Culture:   Recent Labs     03/11/21 1930 03/11/21 2041   BC No Growth to date. Any change in status will be called. --    BLOODCULT2  --  No Growth to date. Any change in status will be called. Fungal Culture:   No results for input(s): FUNGSM in the last 72 hours. No results for input(s): FUNCXBLD in the last 72 hours.   CSF Culture:  No results for input(s): COLORCSF, APPEARCSF, CFTUBE, CLOTCSF, WBCCSF, RBCCSF, NEUTCSF, NUMCELLSCSF, LYMPHSCSF, MONOCSF, GLUCCSF, VOLCSF in the last 72 hours. Respiratory Culture:  No results for input(s): Chryl Massing in the last 72 hours. AFB:No results for input(s): AFBSMEAR in the last 72 hours. Urine Culture  Recent Labs     03/11/21 2046   LABURIN No growth at 18 to 36 hours       RADIOLOGY:    CT Head WO Contrast   Final Result   No acute intracranial abnormality. No significant change from prior studies         CT Cervical Spine WO Contrast   Final Result   No evidence for fracture or malalignment cervical spine         CT ABDOMEN PELVIS WO CONTRAST Additional Contrast? None   Final Result   1. Long segment wall thickening and surrounding inflammation involving the   mid to distal transverse, descending and sigmoid colon. Differential   considerations would include ischemic colitis, antibiotic associated colitis,   infectious colitis, or inflammatory bowel disease   2. Small amount of free fluid present within the pelvis   3. Nonobstructing bilateral intrarenal calculi         XR CHEST (2 VW)   Final Result   No acute cardiopulmonary disease. CONSULTS:    IP CONSULT TO GI  IP CONSULT TO NEPHROLOGY  IP CONSULT TO CRITICAL CARE    ASSESSMENT AND PLAN:      Active Problems:    DKA, type 2 ([address]    DM type 2 with diabetic peripheral neuropathy ([address]    Septic shock ([address]    Colitis    Acute kidney injury superimposed on CKD ([address]    Diabetic ketoacidosis without coma associated with diabetes mellitus due to underlying condition ([address]  Resolved Problems:    * No resolved hospital problems. *    Patient is a 24-year-old female with a past medical history of cardiomyopathy, diabetes, DVT, hypertension, CKD stage III, Ménière's disease, ANGLE who presented with worsening nausea vomiting generalized weakness. Patient has not been taking her insulin for a few days prior to admission. Patient also got her Covid vaccine around that time.   Prior completed using EMR. Every effort was made to ensure accuracy; however, inadvertent computerized transcription errors may be present.        Renato Hernandez MD

## 2021-03-15 VITALS
BODY MASS INDEX: 38.67 KG/M2 | RESPIRATION RATE: 16 BRPM | TEMPERATURE: 97.2 F | HEIGHT: 63 IN | WEIGHT: 218.26 LBS | HEART RATE: 62 BPM | OXYGEN SATURATION: 99 % | DIASTOLIC BLOOD PRESSURE: 65 MMHG | SYSTOLIC BLOOD PRESSURE: 135 MMHG

## 2021-03-15 LAB
A/G RATIO: 0.8 (ref 1.1–2.2)
ALBUMIN SERPL-MCNC: 2.7 G/DL (ref 3.4–5)
ALP BLD-CCNC: 84 U/L (ref 40–129)
ALT SERPL-CCNC: 53 U/L (ref 10–40)
ANION GAP SERPL CALCULATED.3IONS-SCNC: 10 MMOL/L (ref 3–16)
AST SERPL-CCNC: 33 U/L (ref 15–37)
BASOPHILS ABSOLUTE: 0 K/UL (ref 0–0.2)
BASOPHILS RELATIVE PERCENT: 0.4 %
BILIRUB SERPL-MCNC: <0.2 MG/DL (ref 0–1)
BLOOD CULTURE, ROUTINE: NORMAL
BUN BLDV-MCNC: 25 MG/DL (ref 7–20)
CALCIUM SERPL-MCNC: 8.5 MG/DL (ref 8.3–10.6)
CHLORIDE BLD-SCNC: 108 MMOL/L (ref 99–110)
CO2: 22 MMOL/L (ref 21–32)
CREAT SERPL-MCNC: 1.9 MG/DL (ref 0.6–1.2)
CULTURE, BLOOD 2: NORMAL
EOSINOPHILS ABSOLUTE: 0.4 K/UL (ref 0–0.6)
EOSINOPHILS RELATIVE PERCENT: 3.8 %
GFR AFRICAN AMERICAN: 32
GFR NON-AFRICAN AMERICAN: 26
GLOBULIN: 3.2 G/DL
GLUCOSE BLD-MCNC: 103 MG/DL (ref 70–99)
GLUCOSE BLD-MCNC: 185 MG/DL (ref 70–99)
GLUCOSE BLD-MCNC: 77 MG/DL (ref 70–99)
HCT VFR BLD CALC: 28.2 % (ref 36–48)
HEMOGLOBIN: 9.4 G/DL (ref 12–16)
LYMPHOCYTES ABSOLUTE: 1.6 K/UL (ref 1–5.1)
LYMPHOCYTES RELATIVE PERCENT: 16 %
MAGNESIUM: 1.7 MG/DL (ref 1.8–2.4)
MCH RBC QN AUTO: 30.3 PG (ref 26–34)
MCHC RBC AUTO-ENTMCNC: 33.5 G/DL (ref 31–36)
MCV RBC AUTO: 90.5 FL (ref 80–100)
MONOCYTES ABSOLUTE: 0.8 K/UL (ref 0–1.3)
MONOCYTES RELATIVE PERCENT: 8.7 %
NEUTROPHILS ABSOLUTE: 6.9 K/UL (ref 1.7–7.7)
NEUTROPHILS RELATIVE PERCENT: 71.1 %
PDW BLD-RTO: 14.4 % (ref 12.4–15.4)
PERFORMED ON: ABNORMAL
PERFORMED ON: NORMAL
PLATELET # BLD: 180 K/UL (ref 135–450)
PMV BLD AUTO: 10.7 FL (ref 5–10.5)
POTASSIUM SERPL-SCNC: 3.3 MMOL/L (ref 3.5–5.1)
RBC # BLD: 3.12 M/UL (ref 4–5.2)
SODIUM BLD-SCNC: 140 MMOL/L (ref 136–145)
TOTAL PROTEIN: 5.9 G/DL (ref 6.4–8.2)
WBC # BLD: 9.7 K/UL (ref 4–11)

## 2021-03-15 PROCEDURE — 6360000002 HC RX W HCPCS: Performed by: STUDENT IN AN ORGANIZED HEALTH CARE EDUCATION/TRAINING PROGRAM

## 2021-03-15 PROCEDURE — 2500000003 HC RX 250 WO HCPCS: Performed by: INTERNAL MEDICINE

## 2021-03-15 PROCEDURE — 6370000000 HC RX 637 (ALT 250 FOR IP): Performed by: STUDENT IN AN ORGANIZED HEALTH CARE EDUCATION/TRAINING PROGRAM

## 2021-03-15 PROCEDURE — 80053 COMPREHEN METABOLIC PANEL: CPT

## 2021-03-15 PROCEDURE — 6370000000 HC RX 637 (ALT 250 FOR IP): Performed by: INTERNAL MEDICINE

## 2021-03-15 PROCEDURE — 94760 N-INVAS EAR/PLS OXIMETRY 1: CPT

## 2021-03-15 PROCEDURE — 97165 OT EVAL LOW COMPLEX 30 MIN: CPT

## 2021-03-15 PROCEDURE — 36415 COLL VENOUS BLD VENIPUNCTURE: CPT

## 2021-03-15 PROCEDURE — 85025 COMPLETE CBC W/AUTO DIFF WBC: CPT

## 2021-03-15 PROCEDURE — 83735 ASSAY OF MAGNESIUM: CPT

## 2021-03-15 PROCEDURE — 97530 THERAPEUTIC ACTIVITIES: CPT | Performed by: PHYSICAL THERAPIST

## 2021-03-15 PROCEDURE — 97535 SELF CARE MNGMENT TRAINING: CPT

## 2021-03-15 RX ORDER — LANOLIN ALCOHOL/MO/W.PET/CERES
400 CREAM (GRAM) TOPICAL DAILY
Status: DISCONTINUED | OUTPATIENT
Start: 2021-03-15 | End: 2021-03-15 | Stop reason: HOSPADM

## 2021-03-15 RX ORDER — MAGNESIUM SULFATE 1 G/100ML
1000 INJECTION INTRAVENOUS ONCE
Status: DISCONTINUED | OUTPATIENT
Start: 2021-03-15 | End: 2021-03-15

## 2021-03-15 RX ORDER — AMLODIPINE BESYLATE 5 MG/1
5 TABLET ORAL DAILY
Qty: 30 TABLET | Refills: 2 | Status: SHIPPED | OUTPATIENT
Start: 2021-03-15 | End: 2021-04-22 | Stop reason: ALTCHOICE

## 2021-03-15 RX ORDER — POTASSIUM CHLORIDE 20 MEQ/1
40 TABLET, EXTENDED RELEASE ORAL ONCE
Status: COMPLETED | OUTPATIENT
Start: 2021-03-15 | End: 2021-03-15

## 2021-03-15 RX ORDER — VANCOMYCIN HYDROCHLORIDE 250 MG/1
250 CAPSULE ORAL 4 TIMES DAILY
Qty: 40 CAPSULE | Refills: 0 | Status: SHIPPED | OUTPATIENT
Start: 2021-03-15 | End: 2021-03-25

## 2021-03-15 RX ORDER — AMLODIPINE BESYLATE 5 MG/1
5 TABLET ORAL DAILY
Status: DISCONTINUED | OUTPATIENT
Start: 2021-03-15 | End: 2021-03-15 | Stop reason: HOSPADM

## 2021-03-15 RX ADMIN — PANTOPRAZOLE SODIUM 40 MG: 40 TABLET, DELAYED RELEASE ORAL at 09:04

## 2021-03-15 RX ADMIN — HEPARIN SODIUM 5000 UNITS: 5000 INJECTION INTRAVENOUS; SUBCUTANEOUS at 05:59

## 2021-03-15 RX ADMIN — POLYMYXIN B SULFATE, BACITRACIN ZINC, NEOMYCIN SULFATE: 5000; 3.5; 4 OINTMENT TOPICAL at 08:58

## 2021-03-15 RX ADMIN — CITALOPRAM HYDROBROMIDE 40 MG: 20 TABLET ORAL at 08:58

## 2021-03-15 RX ADMIN — AMLODIPINE BESYLATE 5 MG: 5 TABLET ORAL at 09:04

## 2021-03-15 RX ADMIN — POTASSIUM CHLORIDE 40 MEQ: 1500 TABLET, EXTENDED RELEASE ORAL at 08:58

## 2021-03-15 RX ADMIN — VANCOMYCIN HYDROCHLORIDE 250 MG: 250 CAPSULE ORAL at 00:07

## 2021-03-15 RX ADMIN — Medication 400 MG: at 12:21

## 2021-03-15 RX ADMIN — ATORVASTATIN CALCIUM 80 MG: 80 TABLET, FILM COATED ORAL at 08:58

## 2021-03-15 RX ADMIN — VANCOMYCIN HYDROCHLORIDE 250 MG: 250 CAPSULE ORAL at 06:00

## 2021-03-15 RX ADMIN — ASPIRIN 81 MG: 81 TABLET, CHEWABLE ORAL at 08:58

## 2021-03-15 RX ADMIN — CARVEDILOL 12.5 MG: 12.5 TABLET, FILM COATED ORAL at 08:58

## 2021-03-15 RX ADMIN — METRONIDAZOLE 500 MG: 500 INJECTION, SOLUTION INTRAVENOUS at 04:20

## 2021-03-15 RX ADMIN — GABAPENTIN 100 MG: 100 CAPSULE ORAL at 08:58

## 2021-03-15 RX ADMIN — VANCOMYCIN HYDROCHLORIDE 250 MG: 250 CAPSULE ORAL at 12:21

## 2021-03-15 ASSESSMENT — PAIN SCALES - WONG BAKER
WONGBAKER_NUMERICALRESPONSE: 0

## 2021-03-15 NOTE — PROGRESS NOTES
Office: [phone]       Fax: [phone]      Nephrology Progress Note        Patient's Name: Sandeep Mack Date: 3/11/2021  Date of Visit: 3/15/2021    Reason for Consult:  NEHA      Subjective: Lazarus Samuel is a 77 y.o. female with PMHx of hypertension, cardiomyopathy, DM, CKD who was hospitalized on 3/11/2021 with complaints of vomiting, diarrhea  Recently took laxative  No abdominal pain   No dysuria, hematuria  Feels weak  Was hypotensive  NSAID use: Denies   IV contrast: None recent   Home meds reviewed and noted to be on lisinopril    INTERVAL HISTORY    Feels better  Shortness of breath: No   UOP: Fair  Creat: stable     Medications:    Allergies:  Doxycycline    Scheduled Meds:   magnesium sulfate  1,000 mg Intravenous Once    amLODIPine  5 mg Oral Daily    carvedilol  12.5 mg Oral BID WC    insulin lispro  0-6 Units Subcutaneous TID WC    insulin lispro  0-3 Units Subcutaneous Nightly    heparin (porcine)  5,000 Units Subcutaneous 3 times per day    atorvastatin  80 mg Oral Daily    pantoprazole  40 mg Oral Daily    citalopram  40 mg Oral Daily    aspirin  81 mg Oral Daily    gabapentin  100 mg Oral TID    vancomycin  250 mg Oral 4 times per day    metroNIDAZOLE  500 mg Intravenous Q8H    insulin glargine  44 Units Subcutaneous Nightly     Continuous Infusions:   dextrose         Labs:  CBC:   Recent Labs     03/13/21  0429 03/14/21  0509 03/15/21  0440   WBC 13.8* 11.7* 9.7   HGB 9.4* 9.5* 9.4*    172 180     Ca/Mg/Phos:   Recent Labs     03/12/21  1324 03/12/21  1642 03/12/21 2015 03/13/21  0429 03/14/21  0509 03/15/21  0440   CALCIUM 7.6* 7.4* 7.3* 7.7* 8.1* 8.5   MG 1.50* 1.50* 1.50* 1.60* 2.10 1.70*   PHOS 2.4* 2.5 2.6  --   --   --            Objective:     Vitals: BP (!) 196/73   Pulse 64   Temp 97.5 °F 133* 138 140   K 3.9 4.1 3.9 3.6 3.3*   CO2 20* 19* 19* 21 22   MG 1.50* 1.50* 1.60* 2.10 1.70*         2. HTN  -Blood pressure at goal     BP Readings from Last 1 Encounters:   03/15/21 135/65       3. HFrEF  - TTE (feb 2019): LVEF 50-55%. , LVH    4. C diff      5. DM    Plan:     - minimize IVF   - Hold ACEI  - Stool studies - C diff on oral Vanco  - replace K, Mg  - Monitor BMP    -Monitor I/O, UOP  -Maintain MAP>65  -Avoid nephrotoxin, if able. -Dose meds to current eGFR    Thank you for allowing us to participate in care of [facility] . We will continue to follow. Feel free to contact me with any questions.       Trena Nissen  3/15/2021    [facility] [address] [place] [address] S  Office : [phone]  Fax :[phone]

## 2021-03-15 NOTE — DISCHARGE SUMMARY
Hospital Medicine Discharge Summary    Patient ID: Tez Sen      Patient's PCP: Killian Ashley MD    Admit Date: 3/11/2021      Discharge Date:   3/15/2021    Admitting Physician: Rosa Boland DO     Discharge Physician: Yvette Cuellar MD     Discharge Diagnoses: Active Hospital Problems    Diagnosis Date Noted    Diabetic ketoacidosis without coma associated with diabetes mellitus due to underlying condition ([facility] [E08.10]     Septic shock ([address] [A41.9, R65.21] 03/12/2021    Colitis [K52.9] 03/12/2021    Acute kidney injury superimposed on CKD ([address] [N17.9, N18.9] 03/12/2021    DM type 2 with diabetic peripheral neuropathy ([facility] [E11.42] 09/28/2017    DKA, type 2 ([address] [E11.10] 03/11/2014       The patient was seen and examined on day of discharge and this discharge summary is in conjunction with any daily progress note from day of discharge. Hospital Course: Patient is a 69-year-old female with a past medical history of cardiomyopathy, diabetes, DVT, hypertension, CKD stage III, Ménière's disease, ANGLE who presented with worsening nausea vomiting generalized weakness. Patient has not been taking her insulin for a few days prior to admission. Patient also got her Covid vaccine around that time. Prior to admission patient was nauseated with poor appetite unable to get up from the ground. Patient had some constipation took a large amount of laxative after which she had diarrhea. Then patient had recurrent nausea vomiting resulting in fall. She was admitted with DKA , severe sepsis severe C. difficile colitis with NEHA on CKD. Patient's DKA resolved with conservative treatment. She is started back on her home dose of insulin. She had severe sepsis with colitis, but is initially treated as bacterial colitis and was found to have C. difficile colitis hence antibiotic was switched to Vanco and Flagyl.   Patient also had NEHA on CKD stage III, creatinine peaked to 4.6 and is trending down.        DKA-resolved     Diabetes type 2  -Restarted on Lantus with sliding scale insulin  -Monitor blood sugars  -stable at discharge     Severe sepsis-improving, white count trending down  -Procalcitonin trending down     C. difficile colitis-still having diarrhea, frequency decreased  -On oral vancomycin with IV Flagyl, day 4  -complete additional 10 days of PO Vancomycin QID at discharge for 14 day total course     Acute kidney injury on chronic kidney disease stage III  -Creatinine trending down. And peaked at 4.6. Baseline creatinine 1.2  -Hold nephrotoxic medications  -CT abdomen on admission showed a long segment of inflammation involving transverse colon all the way to the sigmoid colon  -hold ACEi at discharge  -repeat BMP in 1 week     History of cardiomyopathy  -Monitor for fluid overload     Generalized weakness  -PT OT     Hypertension  -Start Coreg  -Resume Norvasc  -Hold lisinopril due to NEHA     Hypotension-resolved     Hypomagnesemia/hypokalemia  -Replace and monitor        Exam:     /65   Pulse 62   Temp 97.2 °F (36.2 °C) (Oral)   Resp 16   Ht 5' 3\" (1.6 m)   Wt 218 lb 4.1 oz (99 kg)   LMP 06/15/1999   SpO2 99%   BMI 38.66 kg/m²       General appearance:  No apparent distress, appears stated age and cooperative. HEENT:  Normal cephalic, atraumatic without obvious deformity. Pupils equal, round, and reactive to light. Extra ocular muscles intact. Conjunctivae/corneas clear. Neck: Supple, with full range of motion. No jugular venous distention. Trachea midline. Respiratory:  Normal respiratory effort. Clear to auscultation, bilaterally without Rales/Wheezes/Rhonchi. Cardiovascular:  Regular rate and rhythm with normal S1/S2 without murmurs, rubs or gallops. Abdomen: Soft, non-tender, non-distended with normal bowel sounds. Musculoskeletal:  No clubbing, cyanosis or edema bilaterally. Full range of motion without deformity.   Skin: Skin color, texture, turgor normal.  No rashes or lesions. Neurologic:  Neurovascularly intact without any focal sensory/motor deficits. Cranial nerves: II-XII intact, grossly non-focal.  Psychiatric:  Alert and oriented, thought content appropriate, normal insight  Capillary Refill: Brisk,< 3 seconds   Peripheral Pulses: +2 palpable, equal bilaterally       Labs: For convenience and continuity at follow-up the following most recent labs are provided:      CBC:    Lab Results   Component Value Date    WBC 9.7 03/15/2021    HGB 9.4 03/15/2021    HCT 28.2 03/15/2021     03/15/2021       Renal:    Lab Results   Component Value Date     03/15/2021    K 3.3 03/15/2021    K 4.1 03/12/2021     03/15/2021    CO2 22 03/15/2021    BUN 25 03/15/2021    CREATININE 1.9 03/15/2021    CALCIUM 8.5 03/15/2021    PHOS 2.6 03/12/2021         Significant Diagnostic Studies    Radiology:   CT Head WO Contrast   Final Result   No acute intracranial abnormality. No significant change from prior studies         CT Cervical Spine WO Contrast   Final Result   No evidence for fracture or malalignment cervical spine         CT ABDOMEN PELVIS WO CONTRAST Additional Contrast? None   Final Result   1. Long segment wall thickening and surrounding inflammation involving the   mid to distal transverse, descending and sigmoid colon. Differential   considerations would include ischemic colitis, antibiotic associated colitis,   infectious colitis, or inflammatory bowel disease   2. Small amount of free fluid present within the pelvis   3. Nonobstructing bilateral intrarenal calculi         XR CHEST (2 VW)   Final Result   No acute cardiopulmonary disease.                 Consults:     IP CONSULT TO GI  IP CONSULT TO NEPHROLOGY  IP CONSULT TO CRITICAL CARE    Disposition:  home     Condition at Discharge: Stable    Discharge Instructions/Follow-up:  meds as prescribed, BMP in 1 week    Code Status:  Full Code     Activity: activity as tolerated    Diet: diabetic diet      Discharge Medications:     Current Discharge Medication List           Details   vancomycin (VANCOCIN) 250 MG capsule Take 1 capsule by mouth 4 times daily for 10 days  Qty: 40 capsule, Refills: 0              Details   amLODIPine (NORVASC) 5 MG tablet Take 1 tablet by mouth daily  Qty: 30 tablet, Refills: 2    Associated Diagnoses: Essential hypertension              Details   traZODone (DESYREL) 100 MG tablet Take 1 tablet by mouth nightly as needed for Sleep Pt needs to come in and be seen before more rx's are given. Qty: 90 tablet, Refills: 0      insulin glargine (LANTUS SOLOSTAR) 100 UNIT/ML injection pen Inject 44 Units into the skin nightly  Qty: 40 mL, Refills: 1      atorvastatin (LIPITOR) 80 MG tablet Take 1 tablet by mouth daily  Qty: 90 tablet, Refills: 1      gabapentin (NEURONTIN) 300 MG capsule Take 2 capsules by mouth 3 times daily for 90 days. Qty: 540 capsule, Refills: 0    Associated Diagnoses: DM type 2 with diabetic peripheral neuropathy (HCC)      pantoprazole (PROTONIX) 40 MG tablet Take 1 tablet by mouth daily  Qty: 90 tablet, Refills: 1      Liraglutide (VICTOZA) 18 MG/3ML SOPN SC injection Inject 1.2 mg into the skin daily  Qty: 10 pen, Refills: 3      citalopram (CELEXA) 40 MG tablet Take 1 tablet by mouth daily  Qty: 90 tablet, Refills: 1      Multiple Vitamins-Minerals (THERAPEUTIC MULTIVITAMIN-MINERALS) tablet Take 1 tablet by mouth daily      carvedilol (COREG) 25 MG tablet TAKE 1 TABLET BY MOUTH TWICE A DAY WITH MEALS  Qty: 180 tablet, Refills: 1    Associated Diagnoses: Essential hypertension      Cholecalciferol (VITAMIN D3) 2000 units CAPS Take 2,000 Units by mouth daily       aspirin 81 MG chewable tablet Take 1 tablet by mouth daily.   Qty: 30 tablet      Insulin Pen Needle (B-D UF III MINI PEN NEEDLES) 31G X 5 MM MISC Use twice a day  Qty: 100 each, Refills: 1    Associated Diagnoses: DM type 2 with diabetic peripheral neuropathy (HCC)      blood glucose test strips (ONE TOUCH ULTRA TEST) strip Check FSBS 2-3 times daily. Qty: 300 strip, Refills: 1    Associated Diagnoses: DM type 2 with diabetic peripheral neuropathy (HCC)      ONE TOUCH LANCETS MISC 1 each by Does not apply route 3 times daily  Qty: 100 each, Refills: 11    Associated Diagnoses: DM type 2 with diabetic peripheral neuropathy ([facility]             Time Spent on discharge is more than 30 minutes in the examination, evaluation, counseling and review of medications and discharge plan. Signed:    Mishel Paul MD   3/15/2021      Thank you Callie Sabillon MD for the opportunity to be involved in this patient's care. If you have any questions or concerns please feel free to contact me at [phone].

## 2021-03-15 NOTE — PROGRESS NOTES
Gastroenterology Progress Note    Lance Brooks is a 77 y.o. female patient. Active Problems:    DKA, type 2 ([facility]    DM type 2 with diabetic peripheral neuropathy (HCC)    Septic shock (HCC)    Colitis    Acute kidney injury superimposed on CKD ([facility]    Diabetic ketoacidosis without coma associated with diabetes mellitus due to underlying condition ([facility]  Resolved Problems:    * No resolved hospital problems. *      SUBJECTIVE:  She wants to go home. No nausea, vomiting, abdominal pain. Diarrhea has improved. No fevers.     Current Facility-Administered Medications: carvedilol (COREG) tablet 12.5 mg, 12.5 mg, Oral, BID WC  neomycin-bacitracin-polymyxin (NEOSPORIN) ointment, , Topical, BID PRN  glucose (GLUTOSE) 40 % oral gel 15 g, 15 g, Oral, PRN  dextrose 50 % IV solution, 12.5 g, Intravenous, PRN  glucagon (rDNA) injection 1 mg, 1 mg, Intramuscular, PRN  dextrose 5 % solution, 100 mL/hr, Intravenous, PRN  insulin lispro (HUMALOG) injection vial 0-6 Units, 0-6 Units, Subcutaneous, TID WC  insulin lispro (HUMALOG) injection vial 0-3 Units, 0-3 Units, Subcutaneous, Nightly  dicyclomine (BENTYL) capsule 10 mg, 10 mg, Oral, TID PRN  acetaminophen (TYLENOL) tablet 650 mg, 650 mg, Oral, Q6H PRN **OR** acetaminophen (TYLENOL) suppository 650 mg, 650 mg, Rectal, Q6H PRN  heparin (porcine) injection 5,000 Units, 5,000 Units, Subcutaneous, 3 times per day  ondansetron (ZOFRAN) injection 4 mg, 4 mg, Intravenous, Q6H PRN  atorvastatin (LIPITOR) tablet 80 mg, 80 mg, Oral, Daily  pantoprazole (PROTONIX) tablet 40 mg, 40 mg, Oral, Daily  citalopram (CELEXA) tablet 40 mg, 40 mg, Oral, Daily  aspirin chewable tablet 81 mg, 81 mg, Oral, Daily  gabapentin (NEURONTIN) capsule 100 mg, 100 mg, Oral, TID  sodium chloride flush 0.9 % injection 10 mL, 10 mL, Intravenous, PRN  vancomycin (VANCOCIN) capsule 250 mg, 250 mg, Oral, 4 times per day  metronidazole (FLAGYL) 500 mg in NaCl 100 mL IVPB premix, 500 mg, Intravenous, Q8H  insulin glargine (LANTUS) injection vial 44 Units, 44 Units, Subcutaneous, Nightly    Physical    VITALS:  BP (!) 196/82   Pulse 63   Temp 98 °F (36.7 °C) (Oral)   Resp 16   Ht 5' 3\" (1.6 m)   Wt 218 lb 4.1 oz (99 kg)   LMP 06/15/1999   SpO2 96%   BMI 38.66 kg/m²   TEMPERATURE:  Current - Temp: 98 °F (36.7 °C); Max - Temp  Av.1 °F (36.7 °C)  Min: 97.7 °F (36.5 °C)  Max: 98.5 °F (36.9 °C)    NAD  Eyes: No icterus  RRR  Lungs CTA Bilaterally, normal effort  Abdomen soft, ND, NT, Bowel sounds normal.  Ext: no edema  Neuro: No tremor  Psych: A&Ox3    Data    Data Review:    Recent Labs     21  0509 03/15/21  0440   WBC 13.8* 11.7* 9.7   HGB 9.4* 9.5* 9.4*   HCT 28.3* 28.4* 28.2*   MCV 90.4 89.8 90.5    172 180     Recent Labs     21  1324 21  1642 21  0509 03/15/21  0440   * 134* 137 133* 138 140   K 3.9 3.9 4.1 3.9 3.6 3.3*    105 106 103 107 108   CO2 20* 20* 19* 19* 21 22   PHOS 2.4* 2.5 2.6  --   --   --    BUN 49* 47* 42* 39* 30* 25*   CREATININE 3.6* 3.5* 3.1* 2.8* 2.1* 1.9*     Recent Labs     21  0915 21  0509 03/15/21  0440   AST 52* 145* 59* 33   ALT 32 94* 75* 53*   BILIDIR <0.2  --   --   --    BILITOT 0.3 <0.2 <0.2 <0.2   ALKPHOS 84 79 74 84     No results for input(s): LIPASE, AMYLASE in the last 72 hours. No results for input(s): PROTIME, INR in the last 72 hours. No results for input(s): PTT in the last 72 hours. ASSESSMENT:  69-year-old female with past medical history of DM-II, hypertension, CHF, CAD, ANGLE, DVT, CKD, osteomyelitis who presented [facility] 3/11/2021 with weakness, nausea, vomiting and diarrhea. 1. DKA: Resolved, s/p insulin drip in the ICU. 2. Severe C. Diff colitis: Patient currently being treated with Vanco, IV Flagyl. Continue to monitor stool output and daily abdominal exams. 3. NEHA on CKD: Presumed prerenal 2/2 #1 and #2.   Nephrology consulted, recommend monitoring serum creatinine with IV hydration. PLAN :  Would complete po vancomycin x 14 days. OK to stop IV flagyl on discharge  She wants to go home and I am okay with discharge. Thank you for allowing me to participate in the care of your patient. Please feel free to contact me with any concerns.   [address], MD

## 2021-03-15 NOTE — PROGRESS NOTES
Physical Therapy  Misael Ruvalcaba  [phone]  [id number]    Pt in bed upon arrival; pt declining getting up OOB as she reports she has been back and forth to bathroom several times and is just tired. Pt lives on 2nd floor apt, discussed safety issues at home and stair management. Pt reports she has a cane at home if she needs it. She also reported that she will have someone with her when she gets home to assist with stairs and does not plan on leaving her apt until she is feeling better. Pt declined any home therapy; educated pt that if she gets home and feels she needs therapy then she can call her PCP for an order. Pt reported \"I am going home today no matter what\". Pt stated she had everything she needs right now and just wants to get some rest.  Will follow while in hosp but anticipate she will be safe to return home at discharge. Pt may benefit from home health aide to assist with household tasks/laundry as her laundry is 2 flights down from her apt, will discuss with case management.   Pt left in bed with all needs in reach  [place], [place], [address]

## 2021-03-15 NOTE — ADT AUTH CERT
Patient Information      Patient Name   Winter Chow Sex   Female    1955 N            Demographics  Comment          Last edited by  on  at      Address: Home Phone:  Work Phone: Mobile Phone:     [address]    [phone] -- [phone]     SSN: Insurance: Marital Status: Yazidism:      [address]         Length of Stay      Actual Admission Date      3 days 2021          ADT Events       Unit Room Bed Service Event     21 1656 [facility] EMERGENCY DEPT 008 B-08 Emergency Medicine Admission     21 0033 [facility] EMERGENCY DEPT 008 -08 ICU/CCU Patient Update     21 0156 [facility] EMERGENCY DEPT 008 - ICU/CCU Transfer Out     21 0156 [facility] 2 ICU [id number] 2109 ICU/CCU Transfer In     21 0716 [facility] 2 ICU [id number] 2109 Internal Medicine Patient Update     21 1106 [facility] 2W ICU [id number] 2109 Med/Surg Patient Update     21 1212 [facility] 2W ICU [id number] 2109 Intermediate/Progressive Care Patient Update     21 1325 [facility] 2 ICU [id number] 2109 Intermediate/Progressive Care Patient Update     21 1652 [facility] 2W ICU [id number] 2109 Intermediate/Progressive Care Transfer Out     21 [facility] U. 2. PROGRESSIVE [id number] 5108 Intermediate/Progressive Care Transfer In     03/15/21 400 [facility] 510 Internal Medicine Patient Update         ADT Related Orders (From admission, onward) Comment  Collapse          Last edited by  on  at         PATIENT STATUS (FROM ED OR OR/PROCEDURAL) Inpatient ONE TIME     Order Details Start Time Order ID Status   Authorizing Provider: Edith Gordon DO    Service: ICU/CCU    Attending Provider: Edith Gordon DO     21 0030 [id number] Completed      Question Answer Comment   Patient Class Inpatient    REQUIRED: Diagnosis Septic shock ([facility]    Estimated Length of Stay Estimated stay of more than 2 midnights    Admitting Provider Mendez OLIVIA    Telemetry Bed Required? Yes                       Patient Demographics    Name Patient ID SSN Gender Identity Birth Date   Rafal Dubon" [id number]  Female [birth year] (77 yrs)   Address Phone Email Employer    [employer] [phone] (B)   [phone] (M) [email or web address] [facility] Race Occupation Emp Status    [employer] Retired    Reg Status PCP Date Last Verified Next Review Date    Verified Arredondo [place], [place]  [phone] 03/11/21 04/10/21    Admission Date Discharge Date Admitting Provider     03/11/21 Beauregard Oar, DO     Marital Status Mormonism      [address]      Emergency Contact 1 Emergency Contact 2 Emergency Contact [address] (G)   [phone] (X)   [phone] Fayetta Bones) Nicholette Sniff [phone] ([address] [place] [address]) Lizzette Poet ([place])   [place]   [phone] (F)   [phone] (M)   Most Recent Utilization Review       Sepsis and Other Febrile Illness, without Focal Infection - Care Day 1 (3/12/2021) by Nneka Medrano RN       Review Status   Completed      Criteria Review      Care Day: 1 Care Date: 3/12/2021 Level of Care: ICU    Guideline Day 1    Level Of Care    (X) ICU [G] or floor    Clinical Status    (X) * Clinical Indications met [H]    (X) Possible fever, elevated WBC, and altered mental status    3/15/2021 9:05 AM EDT by Rosa Elena Horton      wbc 27.1    Routes    (X) IV fluids, parenteral medications    3/15/2021 9:05 AM EDT by Rosa Elena Horton      IV NS 3500 ml bolus in ER  magnesium sulfate 1000 mg in dextrose 5% 100 mL IVPB  0.9% NaCl with KCl 20 mEq infusion   Rate: 125 mL/hr  lactated ringers infusion 1,000 mL once x2    Interventions    (X) WBC, cultures, chemistries, urinalysis, CXR, other imaging as indicated    3/15/2021 9:05 AM EDT by Rosa Elena Horton      WBC27.1 (*)    Neutrophils Zfhdxwaq06.9 (*)    PRYROR013 (*)    Potassium reflex Magnesium5.5 (*)    Vmxkhwyd03 (*)     (*)    Anion Gap18 (*)    [id number] (*)   [id number] (*)    CREATININE3.1 (*)   AST 50  trop 0.03    (X) Evaluation for source of fever [I]    Medications    (X) Possible antimicrobial treatment    3/15/2021 9:05 AM EDT by Orion Landeros      Rocephin 1000 mg IV   flagyl 500 mg IV  Vanco 500 mg po  zosyn 3.375 mg IV    * Milestone   Additional Notes       General appearance: Fatigued appearing, no acute respiratory stress, conversational although ill-appearing to some degree   HEENT Normal cephalic, atraumatic without obvious deformity.  Pupils equal, round, and reactive to light.  Extra ocular muscles intact.  Conjunctivae/corneas clear.  Dry mucous membranes   Neck: Supple, no JVD   Lungs: Clear to auscultation, bilaterally without Rales/Wheezes/Rhonchi with good respiratory effort. Heart: Regular rate and rhythm with Normal S1/S2 without murmurs, rubs or gallops, point of maximum impulse non-displaced   Abdomen: Mild diffuse abdominal tenderness on palpation, nondistended, nonrigid, active bowel sounds   Extremities: Trace bilateral lower extremity edema noted   Skin: No rashes   Neurologic: Alert and oriented X 3, neurovascularly intact with sensory/motor intact upper extremities/lower extremities, bilaterally.  Cranial nerves: II-XII intact, grossly non-focal.   Mental status: Alert, oriented, thought content appropriate. Capillary Refill: Acceptable  < 3 seconds   Peripheral Pulses: +3 Easily felt, not easily obliterated with pressure      ASSESSMENT/PLAN:   ? Patient initially hypotensive in the ED, IV fluid resuscitation improved this, continue IV fluids   ? Started on DKA protocol with IV fluids and insulin drip due to elevated sugars and elevated B hydroxybutyrate, continue every 4 BMPs with mag and phosphorus for monitoring   ?  Started on antibiotics with Zosyn for colitis, patient also did receive oral vancomycin for possible C. difficile colitis in the ED   ? C. difficile and GI bacterial pathogen stool samples are pending, will possibly resume vancomycin if C. difficile test comes back positive   ?  GI consult for further assistance with evaluation for significant colitis possibly suggestive of infectious versus ischemic versus inflammatory bowel disease colitis       DVT Prophylaxis: Heparin   Diet: Diet NPO Effective Now   Code Status: Full Code   PT/OT Eval Status: Ambulatory

## 2021-03-15 NOTE — PROGRESS NOTES
Occupational Therapy   Occupational Therapy Initial Assessment and Discharge  Date: 3/15/2021   Patient Name: Morena Soliman  MRN: [id number]     : 1955    Date of Service: 3/15/2021    Discharge Recommendations:  Home with assist PRN  OT Equipment Recommendations  Equipment Needed: No    Morena Soliman scored a 21/24 on the AM-PAC ADL Inpatient form. At this time, no further OT is recommended upon discharge due to functioning near baseline  Recommend patient returns to prior setting with prior services. Assessment   Performance deficits / Impairments: Decreased safe awareness;Decreased ADL status; Decreased functional mobility   Assessment: Pt is a 78 yo female admitted 3/11 for nausea and diarrhea with + CDiff. PTA, pt lives alone and is  independent with ADLs, IADLs, mobility without AD, and driving. Today, pt functioning near baseline completing bed mobility at Mod I, and transfers and fxl mobility with RW with supervision with no LOB or unsteadiness- pt does need some cues for RW management. Pt supervision for LB dressing and toileting, and Mod I for sink side grooming. Pt with functional BUE strength and ROM. No further acute OT needs at this time. Rec d/c home with intermittent assist/supervision- pt reports family/friends can provide  Treatment Diagnosis: Nausea and Diarrhea  Prognosis: Good  Decision Making: Low Complexity  OT Education: OT Role;Plan of Care;Transfer Training  Patient Education: importance of mobility and being out of bed  Barriers to Learning: pt with mild resistance to evaluation, however, cooperative  No Skilled OT: Safe to return home; No OT goals identified; At baseline function  REQUIRES OT FOLLOW UP: No  Activity Tolerance  Activity Tolerance: Patient Tolerated treatment well  Activity Tolerance: HR 60 at EOS. No SOB or fatigue  Safety Devices  Safety Devices in place: Yes  Type of devices: Nurse notified; Left in bed;Call light within reach(pt refuses gait belt.  Pt up ad sahil within the room)         Patient Diagnosis(es): The primary encounter diagnosis was Diabetic ketoacidosis without coma associated with diabetes mellitus due to underlying condition ([address] Diagnoses of Colitis, Elevated troponin, and Septicemia ([address] were also pertinent to this visit. has a past medical history of Abnormal echocardiogram, Back pain, Cardiomyopathy ([address] Chipped tooth, Clostridium difficile diarrhea, Dental crown present, Depressive disorder, Diabetic infection of right foot ([address] Diabetic ulcer of toe of left foot associated with type 2 diabetes mellitus, with fat layer exposed ([address] DVT (deep venous thrombosis) ([address] HTN (hypertension), Hx of blood clots, Ischemic cardiomyopathy, Kidney disease, Meniere's disease, Mixed hyperlipidemia, Nicotine abuse, NSTEMI (non-ST elevated myocardial infarction) ([address] ANGLE (obstructive sleep apnea), Osteomyelitis of ankle or foot, acute, left ([address] Pneumonia, PONV (postoperative nausea and vomiting), Spinal epidural abscess, and Type II diabetes mellitus, uncontrolled ([address] has a past surgical history that includes debridement (3/12/14); back surgery (3/2014); Hysterectomy (6/15/1999); Nasal septum surgery; Upper gastrointestinal endoscopy (N/A, 2/8/2019); Upper gastrointestinal endoscopy (N/A, 2/8/2019); Sleeve Gastrectomy (04/02/2019); Sleeve Gastrectomy (N/A, 4/2/2019); Hammer toe surgery (Left, 8/28/2020); Foot Debridement (Left, 9/8/2020); Nerve Surgery (Left, 9/10/2020); Endoscopy, colon, diagnostic; Dilation and curettage of uterus; other surgical history; and Pressure ulcer debridement (Left, 10/23/2020).     Treatment Diagnosis: Nausea and Diarrhea      Restrictions  Restrictions/Precautions  Restrictions/Precautions: Contact Precautions(C-diff)  Position Activity Restriction  Other position/activity restrictions: catheter and IV attached    Subjective   General  Chart Reviewed: Yes  Patient assessed for rehabilitation services?: Yes  Additional Pertinent Hx: The patient is a 77 y.o. female with past medical history of previous cardiomyopathy, depressive disorder, type 2 diabetes with previous ulceration and infection of her left foot that is healed now, previous DVT in the past, hypertension, CKD stage III, previous Ménière's disease, ANGLE who presents to [facility] with concern for persistent worsening nausea and vomiting with some generalized weakness and some notable symptoms starting since she got back from [place]. She apparently was in [place] prior to 2 days ago but prior to leaving [place] and coming back home she was notably not feeling well. She was nauseous at times and did have one episode of vomiting prior to coming back home. She also was notably not taking her insulin for her diabetes appropriately, however she notes that her blood sugars when she got home were still in the 170s. She ran out of her insulin while in [place] and so she was unable to obtain the appropriate amount of insulin. She apparently then the next day she got back in 1 day before presenting to the ED today, she apparently got her second Covid vaccine even though she was already not feeling well. Daughter notes that patient did seem somewhat okay when she first visited her at home but patient does remark that she has been feeling more generalized weakness, nausea, poor appetite, inability to get up and move around and sometimes stumbling to the ground. She has also had some initial constipation the day prior to presenting to the ED and so she took a a large amount of laxatives and then she developed very significant diarrhea afterwards that is still not abated at this time. She denies any blood to the stool however. She denies any other recent symptoms of fever, chills, chest pain, shortness of breath, dizziness, syncope, vision change, focal weakness, blood in urine/stool/sputum.   She has still had recurrent episodes of nausea and some vomiting prior to presenting today. She was also unable to sleep yesterday and took an extra dose of her trazodone pills but was still unable to sleep due to her persistent diarrhea and nausea. Patient did notably have a fall at some point at home prior to coming to the ED but did not have any notable head injury or other injuries. Family / Caregiver Present: No  Referring Practitioner: Jarek Gutiérrez MD  Diagnosis: Nausea and Diarrhea  Subjective  Subjective: Pt sleeping in bed upon arrival. Pt agreeable to OT evaluation. Pt reports no pain at this time  General Comment  Comments: RN ok for eval    Social/Functional History  Social/Functional History  Lives With: Alone  Type of Home: Apartment  Home Layout: One level  Home Access: Stairs to enter with rails, Stairs to enter without rails  Entrance Stairs - Number of Steps: from garage into home 13 NICK with 1 rail and then 13 steps to her apt with B rails; if she enters through front then 4 steps without rail and 13 steps with B rails to her apt  Bathroom Shower/Tub: Tub/Shower unit, Shower chair with back  Bathroom Toilet: Standard  Bathroom Equipment: Shower chair  Home Equipment: Standard walker  ADL Assistance: Independent  Homemaking Assistance: Independent  Ambulation Assistance: Independent  Transfer Assistance: Independent  Active : Yes  Mode of Transportation: Car  Occupation: Retired     Objective   Vision: Within Functional Limits  Hearing: Within functional limits    Orientation  Overall Orientation Status: Within Normal Limits     Balance  Sitting Balance: Modified independent   Standing Balance: Supervision  Standing Balance  Time: 1 +1 min  Activity: standing for brief management and grooming at sink with RW with supervision  Functional Mobility  Functional - Mobility Device: Rolling Walker  Activity: To/from bathroom(bed<>bathroom)  Assist Level: Supervision  Functional Mobility Comments: cues for RW management.  No LOB or unsteadiness  Toilet Transfers  Toilet - Technique: Ambulating(RW)  Equipment Used: Standard toilet  Toilet Transfer: Supervision  ADL  Grooming: Modified independent   LE Dressing: Supervision  Toileting: Supervision  Additional Comments: Anticipate Mod I/Supervision with bathing seated and Mod I UB dressing based on dynamic standing balance, PLOF, performance in other ADLs and cognition  Tone RUE  RUE Tone: Normotonic  Tone LUE  LUE Tone: Normotonic  Coordination  Movements Are Fluid And Coordinated: Yes     Bed mobility  Supine to Sit: Modified independent  Sit to Supine: Modified independent  Comment: HOB elevated  Transfers  Sit to stand: Supervision  Stand to sit: Supervision  Transfer Comments: from EOB and toilet to RW.      Cognition  Overall Cognitive Status: WFL     Sensation  Overall Sensation Status: WNL      LUE AROM (degrees)  LUE AROM : WFL  RUE AROM (degrees)  RUE AROM : WFL  LUE Strength  Gross LUE Strength: WFL  RUE Strength  Gross RUE Strength: WFL    Plan   Plan  Times per week: 0  Plan Comment: no OT acute goals at this time     AM-PAC Score        AM-[facility] Inpatient Daily Activity Raw Score: 21 (03/15/21 0826)  AM-PAC Inpatient ADL T-Scale Score : 44.27 (03/15/21 0826)  ADL Inpatient CMS 0-100% Score: 32.79 (03/15/21 0826)  ADL Inpatient CMS G-Code Modifier : CJ (03/15/21 4064)    Goals  Short term goals  Time Frame for Short term goals: no OT acute goals at this time  Patient Goals   Patient goals : go home       Therapy Time   Individual Concurrent Group Co-treatment   Time In 0755         Time Out 0820         Minutes 25         Timed Code Treatment Minutes: 10 Minutes(15 eval)     BILL Bell, OTR/L

## 2021-03-16 ENCOUNTER — CARE COORDINATION (OUTPATIENT)
Dept: CASE MANAGEMENT | Age: 66
End: 2021-03-16

## 2021-03-16 NOTE — CARE COORDINATION
could provide information. Writer spoke with patient, Miah Vega. She stated she was having some nausea, described as \"lump in her throat\" and some diarrhea, which patient states is much better. She stated has not vomited. She stated is not weak, but feels \"worn out\". She stated is eating and drinking. Kati stated her son is bringing her batteries for her glucometer at lunch time, has not been able to check BG. Patient aware of BMP to be drawn 3-. Patient stated \"I'm going to the bathroom now\" and hung up the phone.

## 2021-03-17 ENCOUNTER — CARE COORDINATION (OUTPATIENT)
Dept: CASE MANAGEMENT | Age: 66
End: 2021-03-17

## 2021-03-17 NOTE — CARE COORDINATION
[facility] Transitions Follow Up Call    3/17/2021    Patient: Vicenta Patino  Patient : 1955   MRN: [id number]  Reason for Admission: diabetic ketoacidosis,colitis  Discharge Date: 3/15/21 RARS: Readmission Risk Score: 20    Attempted to reach patient via phone for post hospital transition call. VM left stating purpose of call along with my contact information requesting a return call. Care Transitions Subsequent and Final Call    Subsequent and Final Calls  Care Transitions Interventions  Other Interventions: Follow Up  No future appointments.     Britney Patel RN

## 2021-03-18 ENCOUNTER — CARE COORDINATION (OUTPATIENT)
Dept: CASE MANAGEMENT | Age: 66
End: 2021-03-18

## 2021-03-18 NOTE — CARE COORDINATION
[facility] Transitions Follow Up Call    3/18/2021    Patient: Gregorio Ballesteros  Patient : 1955   MRN: [id number]  Reason for Admission: diabetic ketoacidosis, DM   Discharge Date: 3/15/21 RARS: Readmission Risk Score: 20      Needs to be reviewed by the provider   Additional needs identified to be addressed with provider No  none           Method of communication with provider : none    Discussed COVID-19 related testing which was not done at this time. Test results were not done. Patient informed of results, if available? Not done this admission    Care Transition Nurse (CTN) contacted the patient by telephone to follow up after admission on 3/11/2021 Verified name and  with patient as identifiers. Advance Care Planning:   Does patient have an Advance Directive:  not on file. Plan for follow-up call in 5-7 days based on severity of symptoms and risk factors. Plan for next call: symptom management-BG, diarrhea  CTN provided contact information for future needs. Writer spoke briefly with patient, Kati. She stated she did get glucometer working, has not checked BG yet today but stated was 109 yesterday. Care Transitions Subsequent and Final Call    Subsequent and Final Calls  Care Transitions Interventions  Other Interventions: Follow Up  No future appointments.     Anuja Hill RN

## 2021-03-22 DIAGNOSIS — N18.31 STAGE 3A CHRONIC KIDNEY DISEASE (HCC): Chronic | ICD-10-CM

## 2021-03-22 DIAGNOSIS — I10 ESSENTIAL HYPERTENSION: Chronic | ICD-10-CM

## 2021-03-22 LAB
ALBUMIN SERPL-MCNC: 3.4 G/DL (ref 3.4–5)
ANION GAP SERPL CALCULATED.3IONS-SCNC: 15 MMOL/L (ref 3–16)
BUN BLDV-MCNC: 11 MG/DL (ref 7–20)
CALCIUM SERPL-MCNC: 8.4 MG/DL (ref 8.3–10.6)
CHLORIDE BLD-SCNC: 102 MMOL/L (ref 99–110)
CO2: 26 MMOL/L (ref 21–32)
CREAT SERPL-MCNC: 1.3 MG/DL (ref 0.6–1.2)
CREATININE URINE: 267.1 MG/DL (ref 28–259)
GFR AFRICAN AMERICAN: 49
GFR NON-AFRICAN AMERICAN: 41
GLUCOSE BLD-MCNC: 208 MG/DL (ref 70–99)
MICROALBUMIN UR-MCNC: 740 MG/DL
MICROALBUMIN/CREAT UR-RTO: 2770.5 MG/G (ref 0–30)
PHOSPHORUS: 4.3 MG/DL (ref 2.5–4.9)
POTASSIUM SERPL-SCNC: 4.1 MMOL/L (ref 3.5–5.1)
SODIUM BLD-SCNC: 143 MMOL/L (ref 136–145)

## 2021-03-25 ENCOUNTER — CARE COORDINATION (OUTPATIENT)
Dept: CASE MANAGEMENT | Age: 66
End: 2021-03-25

## 2021-03-25 NOTE — CARE COORDINATION
[facility] Transitions Follow Up Call    3/25/2021    Patient: Rafael Bowen  Patient : 1955   MRN: [id number]  Reason for Admission: diabetic ketoacidosis, colitis  Discharge Date: 3/15/21 RARS: Readmission Risk Score: 20    Attempted to reach patient via phone for  post hospital transition call. VM left with my contact information requesting a return call. Needs to be reviewed by the provider   Additional needs identified to be addressed with provider No  none           Method of communication with provider : none    Discussed COVID-19 related testing which was not done at this time. Test results were not done. Patient informed of results, if available? Not done this admission. Care Transition Nurse (CTN) contacted the patienttelephone to follow up after admission on 3/11/2021. Verified name and  with patient as identifiers. Addressed changes since last contact: symptom management-BG, weakness  Discharged needs reviewed: none  Follow up appointment completed? No    Advance Care Planning:   Does patient have an Advance Directive:  not on file. Discussed appropriate site of care based on symptoms and resources available to patient including: PCP. The patient agrees to contact the PCP office for questions related to their healthcare. Patients top risk factors for readmission: medical condition  Interventions to address risk factors: Obtained and reviewed discharge summary and/or continuity of care documents    Plan for follow-up call in 7-10 days based on severity of symptoms and risk factors. Plan for next call: symptom management-BG, weakness  CTN provided contact information for future needs. Patient, \[facility]\"" returned call. She stated was getting better and her colon was better. She stated has about 80% of her strength back. She stated did not check her BG this morning but has been running between 109-148.         Care Transitions Subsequent and Final Call Subsequent and Final Calls  Care Transitions Interventions  Other Interventions: Follow Up  No future appointments.     Jones Meyer RN

## 2021-03-31 DIAGNOSIS — E11.42 DM TYPE 2 WITH DIABETIC PERIPHERAL NEUROPATHY (HCC): ICD-10-CM

## 2021-03-31 RX ORDER — PEN NEEDLE, DIABETIC 31 GX5/16"
NEEDLE, DISPOSABLE MISCELLANEOUS
Qty: 100 EACH | Refills: 1 | OUTPATIENT
Start: 2021-03-31

## 2021-04-02 ENCOUNTER — CARE COORDINATION (OUTPATIENT)
Dept: CASE MANAGEMENT | Age: 66
End: 2021-04-02

## 2021-04-02 DIAGNOSIS — E11.42 DM TYPE 2 WITH DIABETIC PERIPHERAL NEUROPATHY (HCC): ICD-10-CM

## 2021-04-02 NOTE — CARE COORDINATION
[facility] Transitions Follow Up Call    2021    Patient: Ioana Garcia  Patient : 1955   MRN: [id number]  Reason for Admission: diabetic ketoacidosis, colitis   Discharge Date: 3/15/21 RARS: Readmission Risk Score: 20      Attempted to reach patient via phone for post hospital transition call. VM left with my contact information requesting a return call. Care Transitions Subsequent and Final Call    Subsequent and Final Calls  Care Transitions Interventions  Other Interventions:            Follow Up  Future Appointments   Date Time Provider Gael Turcios   2021  2:00 PM Flower Dalal MD [facility]   2021  1:45 PM Meño Da Silva MD [facility]       Jesse Fry RN

## 2021-04-05 RX ORDER — PEN NEEDLE, DIABETIC 31 GX5/16"
NEEDLE, DISPOSABLE MISCELLANEOUS
Qty: 100 EACH | Refills: 1 | Status: SHIPPED | OUTPATIENT
Start: 2021-04-05 | End: 2021-07-06

## 2021-04-06 ENCOUNTER — TELEPHONE (OUTPATIENT)
Dept: PHARMACY | Facility: CLINIC | Age: 66
End: 2021-04-06

## 2021-04-06 ENCOUNTER — OFFICE VISIT (OUTPATIENT)
Dept: PRIMARY CARE CLINIC | Age: 66
End: 2021-04-06
Payer: MEDICARE

## 2021-04-06 VITALS
OXYGEN SATURATION: 97 % | WEIGHT: 214.4 LBS | SYSTOLIC BLOOD PRESSURE: 151 MMHG | HEART RATE: 70 BPM | RESPIRATION RATE: 18 BRPM | BODY MASS INDEX: 37.98 KG/M2 | TEMPERATURE: 98.1 F | DIASTOLIC BLOOD PRESSURE: 73 MMHG

## 2021-04-06 DIAGNOSIS — E78.2 MIXED HYPERLIPIDEMIA: ICD-10-CM

## 2021-04-06 DIAGNOSIS — E11.42 DM TYPE 2 WITH DIABETIC PERIPHERAL NEUROPATHY (HCC): Primary | Chronic | ICD-10-CM

## 2021-04-06 DIAGNOSIS — A04.72 CLOSTRIDIUM DIFFICILE COLITIS: ICD-10-CM

## 2021-04-06 DIAGNOSIS — Z23 NEED FOR PNEUMOCOCCAL VACCINATION: ICD-10-CM

## 2021-04-06 DIAGNOSIS — N17.9 ACUTE KIDNEY INJURY SUPERIMPOSED ON CKD (HCC): ICD-10-CM

## 2021-04-06 DIAGNOSIS — E11.10 DIABETIC KETOACIDOSIS WITHOUT COMA ASSOCIATED WITH TYPE 2 DIABETES MELLITUS (HCC): ICD-10-CM

## 2021-04-06 DIAGNOSIS — N18.9 ACUTE KIDNEY INJURY SUPERIMPOSED ON CKD (HCC): ICD-10-CM

## 2021-04-06 DIAGNOSIS — I10 ESSENTIAL HYPERTENSION: Chronic | ICD-10-CM

## 2021-04-06 PROBLEM — A41.9 SEPTIC SHOCK (HCC): Status: RESOLVED | Noted: 2021-03-12 | Resolved: 2021-04-06

## 2021-04-06 PROBLEM — L97.522 ULCER OF TOE OF LEFT FOOT, WITH FAT LAYER EXPOSED (HCC): Status: RESOLVED | Noted: 2020-01-10 | Resolved: 2021-04-06

## 2021-04-06 PROBLEM — R65.21 SEPTIC SHOCK (HCC): Status: RESOLVED | Noted: 2021-03-12 | Resolved: 2021-04-06

## 2021-04-06 PROBLEM — T81.49XA WOUND INFECTION AFTER SURGERY: Status: RESOLVED | Noted: 2018-07-06 | Resolved: 2021-04-06

## 2021-04-06 LAB — HBA1C MFR BLD: 10.4 %

## 2021-04-06 PROCEDURE — 90732 PPSV23 VACC 2 YRS+ SUBQ/IM: CPT | Performed by: FAMILY MEDICINE

## 2021-04-06 PROCEDURE — 83036 HEMOGLOBIN GLYCOSYLATED A1C: CPT | Performed by: FAMILY MEDICINE

## 2021-04-06 PROCEDURE — G0009 ADMIN PNEUMOCOCCAL VACCINE: HCPCS | Performed by: FAMILY MEDICINE

## 2021-04-06 PROCEDURE — 99214 OFFICE O/P EST MOD 30 MIN: CPT | Performed by: FAMILY MEDICINE

## 2021-04-06 RX ORDER — LIRAGLUTIDE 6 MG/ML
1.8 INJECTION SUBCUTANEOUS DAILY
Qty: 10 PEN | Refills: 3 | Status: SHIPPED | OUTPATIENT
Start: 2021-04-06 | End: 2021-11-29

## 2021-04-06 RX ORDER — LANOLIN ALCOHOL/MO/W.PET/CERES
3 CREAM (GRAM) TOPICAL DAILY
COMMUNITY
End: 2021-04-06

## 2021-04-06 RX ORDER — CHOLECALCIFEROL (VITAMIN D3) 125 MCG
5 CAPSULE ORAL DAILY
Qty: 30 TABLET | Refills: 1 | COMMUNITY
Start: 2021-04-06

## 2021-04-06 RX ORDER — LISINOPRIL 20 MG/1
TABLET ORAL
Qty: 90 TABLET | Refills: 1 | Status: SHIPPED | OUTPATIENT
Start: 2021-04-06 | End: 2021-07-15 | Stop reason: SDUPTHER

## 2021-04-06 RX ORDER — CARVEDILOL 25 MG/1
25 TABLET ORAL 2 TIMES DAILY WITH MEALS
Qty: 180 TABLET | Refills: 1 | Status: SHIPPED | OUTPATIENT
Start: 2021-04-06 | End: 2021-09-30

## 2021-04-06 RX ORDER — INSULIN GLARGINE 100 [IU]/ML
54 INJECTION, SOLUTION SUBCUTANEOUS NIGHTLY
Qty: 40 ML | Refills: 1 | Status: SHIPPED | OUTPATIENT
Start: 2021-04-06 | End: 2021-07-15 | Stop reason: SDUPTHER

## 2021-04-06 ASSESSMENT — ENCOUNTER SYMPTOMS
CONSTIPATION: 0
SHORTNESS OF BREATH: 0
BLOOD IN STOOL: 0
ABDOMINAL PAIN: 0
DIARRHEA: 0

## 2021-04-06 NOTE — PROGRESS NOTES
2021     Rolo Ordoñez (:  1955) is a 77 y.o. female, here for evaluation of the following medical concerns:    HPI  Presented to the office for follow-up. 3 weeks ago 2021 she was admitted to the hospital with diabetic ketoacidosis acute kidney injury and C. difficile colitis. Apparently patient is not feeling well and prior to admission she has nausea vomiting and was not giving him herself insulin. She also constipation and took some laxative later developed diarrhea. At the hospital her DKA improved with the IV fluid and insulin treatment. Renal profile improved after IV fluid hydration. The BUN/creatinine is trending down upon discharge. At the time  lisinopril was  hold improperly. For the C. difficile colitis he was discharged on p.o. vancomycin and Flagyl. He was discharged home on Lantus insulin at 50 units daily and restarted on Victoza. Patient is back home and reported to be doing fairly well. She has no more diarrhea and she is done with the antibiotics. She does not check her blood sugar but denies hypoglycemic episode but today at the office HbA1c is 10%. And her blood pressure is somewhat elevated at 917 systolic. Patient denies chest pain or shortness of breath. Denies bowel or urinary disturbance. Review of Systems   Constitutional: Negative for activity change and appetite change. Eyes: Negative for visual disturbance. Respiratory: Negative for shortness of breath. Cardiovascular: Negative for chest pain and leg swelling. Gastrointestinal: Negative for abdominal pain, blood in stool, constipation and diarrhea. Genitourinary: Negative for difficulty urinating, frequency, hematuria, menstrual problem and urgency. Neurological: Negative for dizziness and syncope. Psychiatric/Behavioral: Negative for behavioral problems. Prior to Visit Medications    Medication Sig Taking?  Authorizing Provider   carvedilol (COREG) 25 MG tablet Take 1 tablet by mouth 2 times daily (with meals) Yes Luz Moreland MD   insulin glargine (LANTUS SOLOSTAR) 100 UNIT/ML injection pen Inject 54 Units into the skin nightly Yes Luz Moreland MD   Liraglutide (VICTOZA) 18 MG/3ML SOPN SC injection Inject 1.8 mg into the skin daily Yes Luz Moreland MD   melatonin 5 MG TABS tablet Take 1 tablet by mouth daily Yes Luz Moreland MD   lisinopril (PRINIVIL;ZESTRIL) 20 MG tablet TAKE 1 TABLET BY MOUTH EVERY DAY Yes Luz Moreland MD   atorvastatin (LIPITOR) 80 MG tablet Take 1 tablet by mouth daily Yes Luz Morelnad MD   pantoprazole (PROTONIX) 40 MG tablet Take 1 tablet by mouth daily Yes Luz Moreland MD   citalopram (CELEXA) 40 MG tablet Take 1 tablet by mouth daily Yes Luz Moreland MD   Cholecalciferol (VITAMIN D3) 2000 units CAPS Take 2,000 Units by mouth daily  Yes Historical Provider, MD   aspirin 81 MG chewable tablet Take 1 tablet by mouth daily. Yes Analilia Al MD   Insulin Pen Needle (B-D UF III MINI PEN NEEDLES) 31G X 5 MM MISC USE AS DIRECTED TWICE A DAY  Luz Moreland MD   amLODIPine (NORVASC) 5 MG tablet Take 1 tablet by mouth daily  Patient taking differently: Take 2.5 mg by mouth daily   Ellis Lynn MD   gabapentin (NEURONTIN) 300 MG capsule Take 2 capsules by mouth 3 times daily for 90 days. Patient taking differently: Take 600 mg by mouth 2 times daily. Luz Moreland MD   Multiple Vitamins-Minerals (THERAPEUTIC MULTIVITAMIN-MINERALS) tablet Take 1 tablet by mouth daily  Historical Provider, MD   blood glucose test strips (ONE TOUCH ULTRA TEST) strip Check FSBS 2-3 times daily.   Luz Moreland MD   ONE TOUCH LANCETS MISC 1 each by Does not apply route 3 times daily  Luz Moreland MD        Social History     Tobacco Use    Smoking status: Former Smoker     Packs/day: 0.50     Years: 10.00     Pack years: 5.00     Quit date: 11/10/2013     Years since quittin.4    Smokeless tobacco: Never Used   Substance Use Topics    Alcohol use: Yes     Alcohol/week: 0.0 standard drinks     Comment: occasionally        Vitals:    04/06/21 1414   BP: (!) 151/73   Pulse: 70   Resp: 18   Temp: 98.1 °F (36.7 °C)   TempSrc: Temporal   SpO2: 97%   Weight: 214 lb 6.4 oz (97.3 kg)     Estimated body mass index is 37.98 kg/m² as calculated from the following:    Height as of 3/14/21: 5' 3\" (1.6 m). Weight as of this encounter: 214 lb 6.4 oz (97.3 kg). Physical Exam  Constitutional:       Appearance: She is well-developed. HENT:      Head: Normocephalic. Right Ear: External ear normal.      Nose: Nose normal.   Eyes:      Conjunctiva/sclera: Conjunctivae normal.      Pupils: Pupils are equal, round, and reactive to light. Neck:      Musculoskeletal: Normal range of motion and neck supple. Thyroid: No thyromegaly. Cardiovascular:      Rate and Rhythm: Normal rate and regular rhythm. Heart sounds: Normal heart sounds. No murmur. No friction rub. Pulmonary:      Effort: Pulmonary effort is normal.      Breath sounds: Normal breath sounds. Abdominal:      General: Bowel sounds are normal.      Palpations: Abdomen is soft. There is no mass. Musculoskeletal: Normal range of motion. Lymphadenopathy:      Cervical: No cervical adenopathy. Skin:     General: Skin is warm. Findings: No rash. Neurological:      Mental Status: She is alert and oriented to person, place, and time. ASSESSMENT/PLAN:  1. DM type 2 with diabetic peripheral neuropathy (HCC)  Poorly controlled. Will increase Lantus from 50 to 54 units daily. Continue Victoza 1.8 mg daily. She cannot tolerate Metformin due to GI side effects  - POCT glycosylated hemoglobin (Hb A1C)    2. Diabetic ketoacidosis without coma associated with type 2 diabetes mellitus ([address]  Resolved    3. Clostridium difficile colitis  Treated with po Vanco and Flagyl. Diarrhea is now resolved    4. Essential hypertension  BP still elevated.  Restart Lisinopril 20 mg daily- if okay with her nephrologist. Continue Amlodipine 5 mg daily and Coreg 25 mg BID  - carvedilol (COREG) 25 MG tablet; Take 1 tablet by mouth 2 times daily (with meals)  Dispense: 180 tablet; Refill: 1    5. Acute kidney injury superimposed on CKD (HCC)  BUN  ( 25) and creatinine ( 1.9)  are about baseline. Blood pressure is elevated so will restart lisinopril 20 mg daily ift is okay with her nephrologist.  Avoid NSAIDs and other nephrotoxic drugs. Avoid dehydration. 6. Mixed hyperlipidemia  Continue Lipitor 80 mg daily. Will check lipid panel next blood draw.     7. Need for pneumococcal vaccination  - Pneumococcal polysaccharide vaccine 23-valent greater than or equal to 1yo subcutaneous/IM      RTC in 2 mos and PRN    An electronic signature was used to authenticate this note.    --Tatyana Lutz MD on 4/6/2021 at 3:04 PM

## 2021-04-07 NOTE — TELEPHONE ENCOUNTER
[facility] HEALTH CLINICAL PHARMACY REVIEW: Post-Discharge Transitions of Care ([facility])    Attempted to reach patient to review medications. Left message asking for return call. Will continue to attempt to contact as appropriate. Noted patient had declined medication review with CTN following discharge; did attend PCP OV yesterday.      Claudine Villalba PharmD, [address]  Direct: [phone]  Department, toll free: [phone], option 7

## 2021-04-08 ENCOUNTER — CARE COORDINATION (OUTPATIENT)
Dept: CASE MANAGEMENT | Age: 66
End: 2021-04-08

## 2021-04-08 NOTE — TELEPHONE ENCOUNTER
Attempting again to reach patient.  Did not leave another message; will send MyChart message.    =======================================================   For [address] [facility] Patient?: Yes  Outreach Status: Patient Unreachable  Care Coordinator Outreach to Patient?: Yes  Time Spent (min): 5

## 2021-04-10 RX ORDER — CITALOPRAM 40 MG/1
40 TABLET ORAL DAILY
Qty: 90 TABLET | Refills: 1 | Status: SHIPPED | OUTPATIENT
Start: 2021-04-10 | End: 2021-09-30

## 2021-04-10 RX ORDER — PANTOPRAZOLE SODIUM 40 MG/1
40 TABLET, DELAYED RELEASE ORAL DAILY
Qty: 90 TABLET | Refills: 1 | Status: SHIPPED | OUTPATIENT
Start: 2021-04-10 | End: 2021-09-30

## 2021-04-28 RX ORDER — ATORVASTATIN CALCIUM 80 MG/1
80 TABLET, FILM COATED ORAL DAILY
Qty: 90 TABLET | Refills: 1 | Status: SHIPPED | OUTPATIENT
Start: 2021-04-28 | End: 2021-11-02

## 2021-05-07 ENCOUNTER — TELEPHONE (OUTPATIENT)
Dept: PRIMARY CARE CLINIC | Age: 66
End: 2021-05-07

## 2021-05-10 ENCOUNTER — CARE COORDINATION (OUTPATIENT)
Dept: CARE COORDINATION | Age: 66
End: 2021-05-10

## 2021-05-10 NOTE — CARE COORDINATION
ACM reached out to patient. Patient did say she was going to be leaving for [place] and declined care coordination at this time.

## 2021-05-11 RX ORDER — AMLODIPINE BESYLATE 5 MG/1
TABLET ORAL
Qty: 30 TABLET | Refills: 2 | OUTPATIENT
Start: 2021-05-11

## 2021-05-20 RX ORDER — TRAZODONE HYDROCHLORIDE 100 MG/1
100 TABLET ORAL NIGHTLY PRN
Qty: 90 TABLET | Refills: 0 | Status: SHIPPED | OUTPATIENT
Start: 2021-05-20 | End: 2021-11-09

## 2021-05-24 ENCOUNTER — CARE COORDINATION (OUTPATIENT)
Dept: CARE COORDINATION | Age: 66
End: 2021-05-24

## 2021-05-27 NOTE — CARE COORDINATION
ACM spoke with pt again.   She is not interested in [address] [place] [address] program.  Pt disconnected prior to getting any information regarding the program     No further outreach

## 2021-05-28 ENCOUNTER — OFFICE VISIT (OUTPATIENT)
Dept: ORTHOPEDIC SURGERY | Age: 66
End: 2021-05-28
Payer: MEDICARE

## 2021-05-28 VITALS — HEIGHT: 63 IN | WEIGHT: 213 LBS | BODY MASS INDEX: 37.74 KG/M2

## 2021-05-28 DIAGNOSIS — M70.22 OLECRANON BURSITIS, LEFT ELBOW: Primary | ICD-10-CM

## 2021-05-28 DIAGNOSIS — M25.522 ELBOW PAIN, LEFT: ICD-10-CM

## 2021-05-28 PROCEDURE — 20605 DRAIN/INJ JOINT/BURSA W/O US: CPT | Performed by: ORTHOPAEDIC SURGERY

## 2021-05-28 PROCEDURE — 99203 OFFICE O/P NEW LOW 30 MIN: CPT | Performed by: ORTHOPAEDIC SURGERY

## 2021-05-28 RX ORDER — ACETAMINOPHEN 500 MG
500 TABLET ORAL EVERY 6 HOURS PRN
COMMUNITY

## 2021-05-28 RX ORDER — BUPIVACAINE HYDROCHLORIDE 2.5 MG/ML
0.5 INJECTION, SOLUTION INFILTRATION; PERINEURAL ONCE
Status: COMPLETED | OUTPATIENT
Start: 2021-05-28 | End: 2021-05-28

## 2021-05-28 RX ORDER — BETAMETHASONE SODIUM PHOSPHATE AND BETAMETHASONE ACETATE 3; 3 MG/ML; MG/ML
3 INJECTION, SUSPENSION INTRA-ARTICULAR; INTRALESIONAL; INTRAMUSCULAR; SOFT TISSUE ONCE
Status: COMPLETED | OUTPATIENT
Start: 2021-05-28 | End: 2021-05-28

## 2021-05-28 RX ADMIN — BUPIVACAINE HYDROCHLORIDE 1.25 MG: 2.5 INJECTION, SOLUTION INFILTRATION; PERINEURAL at 09:09

## 2021-05-28 RX ADMIN — BETAMETHASONE SODIUM PHOSPHATE AND BETAMETHASONE ACETATE 3 MG: 3; 3 INJECTION, SUSPENSION INTRA-ARTICULAR; INTRALESIONAL; INTRAMUSCULAR; SOFT TISSUE at 09:09

## 2021-05-28 NOTE — PROGRESS NOTES
Anuj Posadas  [id number]  May 28, 2021    Chief Complaint   Patient presents with    Elbow Pain     Left       History: The patient is a 49-year-old female who is here for evaluation of her left elbow. The patient reportedly fell onto her left elbow after a syncopal episode. She then recently noted some swelling over the posterior aspect of her elbow. She is right-hand dominant. The patient does have an extensive past medical history including diabetes, lumbar abscess with sepsis, diabetes, hypertension and chronic kidney disease. She denies any numbness or tingling. This is a consult from Truman Lewis MD for left elbow pain and swelling. The patient's  past medical history, medications, allergies,  family history, social history, and have been reviewed, and dated and are recorded in the chart. Pertinent items are noted in HPI. Review of systems reviewed from Pertinent History Form dated on 5/28/2021 and available in the patient's chart under the Media tab. Vitals:  Ht 5' 3\" (1.6 m)   Wt 213 lb (96.6 kg)   LMP 06/15/1999   BMI 37.73 kg/m²     Physical: On examination today, the patient is alert and oriented x3. The patient does have moderate swelling over the posterior aspect of the left elbow. There is fluctuance over the olecranon bursa. There is no evidence of erythema or warmth. She has full range of motion of the left elbow. She has full range of motion of the left wrist and hand. She is neurovascularly intact in the left upper extremity. There are no lesions or ulcerations within the skin. X-rays: 3 views of the left elbow obtained in the office today were extensively reviewed. There is moderate posterior swelling. There is no evidence of fracture or dislocation. Impression: Aseptic left elbow olecranon bursitis    Plan: After a betadine prep of the posterior elbow/olecranon, we aspirated the elbow. We obtained approximately 2 mL of blood-tinged fluid.  Using the same

## 2021-07-03 DIAGNOSIS — E11.42 DM TYPE 2 WITH DIABETIC PERIPHERAL NEUROPATHY (HCC): ICD-10-CM

## 2021-07-06 ENCOUNTER — TELEPHONE (OUTPATIENT)
Dept: PRIMARY CARE CLINIC | Age: 66
End: 2021-07-06

## 2021-07-06 RX ORDER — NAPROXEN 500 MG/1
500 TABLET ORAL 2 TIMES DAILY WITH MEALS
Qty: 30 TABLET | Refills: 0 | Status: SHIPPED | OUTPATIENT
Start: 2021-07-06 | End: 2021-07-12

## 2021-07-06 RX ORDER — PEN NEEDLE, DIABETIC 31 GX5/16"
NEEDLE, DISPOSABLE MISCELLANEOUS
Qty: 200 EACH | Refills: 1 | Status: SHIPPED | OUTPATIENT
Start: 2021-07-06 | End: 2021-11-22

## 2021-07-06 NOTE — TELEPHONE ENCOUNTER
Called pt, she states she does not feel her dentist will give her pain medication and that is why she is asking Dr Abraham Esparza.

## 2021-07-06 NOTE — TELEPHONE ENCOUNTER
Name of Caller: Amber Lam contact HILARYAKTY:[phone]     Reason for call:  abscess (tooth pain)    Patient called requesting pain medication for a painful abscess. Pt has a dentist appointment today for an abscess (tooth pain).

## 2021-07-09 ENCOUNTER — TELEPHONE (OUTPATIENT)
Dept: PRIMARY CARE CLINIC | Age: 66
End: 2021-07-09

## 2021-07-09 ENCOUNTER — HOSPITAL ENCOUNTER (EMERGENCY)
Age: 66
Discharge: HOME | End: 2021-07-10
Attending: EMERGENCY MEDICINE
Payer: MEDICARE

## 2021-07-09 DIAGNOSIS — K08.89 PAIN, DENTAL: ICD-10-CM

## 2021-07-09 DIAGNOSIS — N17.9 AKI (ACUTE KIDNEY INJURY) (HCC): ICD-10-CM

## 2021-07-09 DIAGNOSIS — I10 ESSENTIAL HYPERTENSION: Primary | ICD-10-CM

## 2021-07-09 LAB
HCT VFR BLD CALC: 34 % (ref 36–48)
HEMOGLOBIN: 11.4 G/DL (ref 12–16)
MCH RBC QN AUTO: 29.4 PG (ref 26–34)
MCHC RBC AUTO-ENTMCNC: 33.6 G/DL (ref 31–36)
MCV RBC AUTO: 87.6 FL (ref 80–100)
PDW BLD-RTO: 13.4 % (ref 12.4–15.4)
PLATELET # BLD: 258 K/UL (ref 135–450)
PMV BLD AUTO: 9.7 FL (ref 5–10.5)
RBC # BLD: 3.88 M/UL (ref 4–5.2)
WBC # BLD: 9 K/UL (ref 4–11)

## 2021-07-09 PROCEDURE — 96372 THER/PROPH/DIAG INJ SC/IM: CPT

## 2021-07-09 PROCEDURE — 36415 COLL VENOUS BLD VENIPUNCTURE: CPT

## 2021-07-09 PROCEDURE — 99284 EMERGENCY DEPT VISIT MOD MDM: CPT

## 2021-07-09 PROCEDURE — 80048 BASIC METABOLIC PNL TOTAL CA: CPT

## 2021-07-09 PROCEDURE — 85027 COMPLETE CBC AUTOMATED: CPT

## 2021-07-09 PROCEDURE — 93005 ELECTROCARDIOGRAM TRACING: CPT | Performed by: EMERGENCY MEDICINE

## 2021-07-09 ASSESSMENT — PAIN SCALES - GENERAL: PAINLEVEL_OUTOF10: 7

## 2021-07-09 ASSESSMENT — PAIN SCALES - WONG BAKER: WONGBAKER_NUMERICALRESPONSE: 6

## 2021-07-09 ASSESSMENT — PAIN DESCRIPTION - PROGRESSION: CLINICAL_PROGRESSION: GRADUALLY WORSENING

## 2021-07-09 ASSESSMENT — PAIN DESCRIPTION - FREQUENCY: FREQUENCY: CONTINUOUS

## 2021-07-09 ASSESSMENT — PAIN - FUNCTIONAL ASSESSMENT: PAIN_FUNCTIONAL_ASSESSMENT: PREVENTS OR INTERFERES SOME ACTIVE ACTIVITIES AND ADLS

## 2021-07-09 ASSESSMENT — PAIN DESCRIPTION - DESCRIPTORS: DESCRIPTORS: ACHING;CONSTANT;THROBBING

## 2021-07-09 ASSESSMENT — PAIN DESCRIPTION - LOCATION: LOCATION: FACE

## 2021-07-09 ASSESSMENT — PAIN DESCRIPTION - ONSET: ONSET: ON-GOING

## 2021-07-09 ASSESSMENT — PAIN DESCRIPTION - PAIN TYPE: TYPE: ACUTE PAIN

## 2021-07-09 ASSESSMENT — PAIN DESCRIPTION - ORIENTATION: ORIENTATION: RIGHT

## 2021-07-09 NOTE — TELEPHONE ENCOUNTER
Patient went to have tooth extracted and /110. First reading was 166/105 . Manually readings 199/92 and 205/110. Please call and advise ASAP. Tooth is abscessed.

## 2021-07-10 VITALS
DIASTOLIC BLOOD PRESSURE: 81 MMHG | SYSTOLIC BLOOD PRESSURE: 177 MMHG | TEMPERATURE: 97.6 F | HEART RATE: 65 BPM | OXYGEN SATURATION: 97 % | RESPIRATION RATE: 14 BRPM

## 2021-07-10 LAB
ANION GAP SERPL CALCULATED.3IONS-SCNC: 15 MMOL/L (ref 3–16)
BUN BLDV-MCNC: 29 MG/DL (ref 7–20)
CALCIUM SERPL-MCNC: 8.9 MG/DL (ref 8.3–10.6)
CHLORIDE BLD-SCNC: 95 MMOL/L (ref 99–110)
CO2: 21 MMOL/L (ref 21–32)
CREAT SERPL-MCNC: 1.8 MG/DL (ref 0.6–1.2)
GFR AFRICAN AMERICAN: 34
GFR NON-AFRICAN AMERICAN: 28
GLUCOSE BLD-MCNC: 456 MG/DL (ref 70–99)
POTASSIUM REFLEX MAGNESIUM: 4 MMOL/L (ref 3.5–5.1)
SODIUM BLD-SCNC: 131 MMOL/L (ref 136–145)

## 2021-07-10 PROCEDURE — 6370000000 HC RX 637 (ALT 250 FOR IP): Performed by: EMERGENCY MEDICINE

## 2021-07-10 RX ORDER — AMLODIPINE BESYLATE 10 MG/1
10 TABLET ORAL DAILY
Qty: 10 TABLET | Refills: 0 | Status: SHIPPED | OUTPATIENT
Start: 2021-07-10 | End: 2021-07-15 | Stop reason: SDUPTHER

## 2021-07-10 RX ORDER — HYDROCODONE BITARTRATE AND ACETAMINOPHEN 5; 325 MG/1; MG/1
1 TABLET ORAL EVERY 4 HOURS PRN
Qty: 8 TABLET | Refills: 0 | Status: SHIPPED | OUTPATIENT
Start: 2021-07-10 | End: 2021-07-13

## 2021-07-10 RX ORDER — HYDROCODONE BITARTRATE AND ACETAMINOPHEN 5; 325 MG/1; MG/1
1 TABLET ORAL ONCE
Status: COMPLETED | OUTPATIENT
Start: 2021-07-10 | End: 2021-07-10

## 2021-07-10 RX ADMIN — INSULIN HUMAN 5 UNITS: 100 INJECTION, SOLUTION PARENTERAL at 01:22

## 2021-07-10 RX ADMIN — HYDROCODONE BITARTRATE AND ACETAMINOPHEN 1 TABLET: 5; 325 TABLET ORAL at 01:16

## 2021-07-10 ASSESSMENT — PAIN SCALES - GENERAL: PAINLEVEL_OUTOF10: 5

## 2021-07-10 NOTE — ED TRIAGE NOTES
En Aguilar is a 77 y.o. female brought herself to the ER for eval of hypertension. The patient tried to have a dental procedure today but could not because her blood pressure was too high. The patient called her PCP but didn't get a response so she came in. The patient is alert and oriented with an open and patent airway with a normal respiratory effort.

## 2021-07-10 NOTE — ED PROVIDER NOTES
[facility]      Pt Name: Damián Garcia  MRN: [id number]  [place] 1955  Date of evaluation: 7/9/2021  Provider: Kalia Sparrow MD    CHIEF COMPLAINT       Chief Complaint   Patient presents with    Hypertension     pt stes she could not have dental appointment today for an abscess due to HTN, she is compliant with meds       HISTORY OF PRESENT ILLNESS    Damián Garcia is a 77 y.o. female who presents to the emergency department with essential hypertension. Patient states she was at her dentist today to have a tooth removed and her blood pressure was elevated. She was told her blood pressure was too elevated to have the tooth removed therefore was sent home. States she has been checking her blood pressure throughout the day and has been markedly elevated so came to the emergency room. Currently endorses 10 out of 10 pain to the affected area of her tooth. Otherwise has no other complaints. Has been taking tramadol and ibuprofen without relief. No other associated symptoms. Nursing Notes were reviewed. Including nursing noted for FM, Surgical History, Past Medical History, Social History, vitals, and allergies; agree with all. REVIEW OF SYSTEMS       Review of Systems    Except as noted above the remainder of the review of systems was reviewed and negative.      PAST MEDICAL HISTORY     Past Medical History:   Diagnosis Date    Abnormal echocardiogram     25% on 3/11/14 and 50% on 3/19/14    Back pain     Cardiomyopathy ([address]     EF was 50% on 3/19/14    Chipped tooth     lower left    Clostridium difficile diarrhea 03/12/2021    Dental crown present     veneers    Depressive disorder 08/13/2014    Diabetic infection of right foot ([address] 04/15/2015    Diabetic ulcer of toe of left foot associated with type 2 diabetes mellitus, with fat layer exposed ([address] 04/10/2018    Pt slipped in hot tub latter part of February and has not healed since despite topical treatment    DVT (deep venous thrombosis) (formerly [facility])     HTN (hypertension)     Hx of blood clots 2016    left leg    Ischemic cardiomyopathy 04/15/2015    Kidney disease     CHRONIC STAGE 3    Meniere's disease     Mixed hyperlipidemia 03/07/2016    Nicotine abuse     NSTEMI (non-ST elevated myocardial infarction) ([facility] 03/13/2014    ANGLE (obstructive sleep apnea)     Osteomyelitis of ankle or foot, acute, left ([facility] 06/04/2018    Pneumonia     PONV (postoperative nausea and vomiting)     nausea    Spinal epidural abscess 03/13/2014    Type II diabetes mellitus, uncontrolled ([facility] 08/13/2014       SURGICAL HISTORY       Past Surgical History:   Procedure Laterality Date    BACK SURGERY  3/2014    DEBRIDEMENT  3/12/14    extensive debridement of bone/muscle and paraspinal empyema    DILATION AND CURETTAGE OF UTERUS      ENDOSCOPY, COLON, DIAGNOSTIC      FOOT DEBRIDEMENT Left 9/8/2020    LEFT FOOT DEBRIDEMENT INCISION AND DRAINAGE performed by Ofelia Mulligan DPM at [address] [facility] [address] TOE SURGERY Left 8/28/2020    ON THE LEFT: [address] [facility] [address], WOUND CLOSURE, FLEXOR TENOTOMY 4TH AND 5TH DIGITS, TENOTOMY AND CAPSULOTOMY 2ND DIGIT performed by Ofelia Mulligan DPM at [facility]  6/15/1999    complete-think right ovary in.    NASAL SEPTUM SURGERY      NERVE SURGERY Left 9/10/2020    LEFT FOOT INCISION AND DRAINAGE, EXTENSOR HALLUCIS LONGUS REPAIR WITH DELAYED PRIMARY CLOSURE performed by Ofelia Mulligan DPM at [place] behind right ear    PRESSURE ULCER DEBRIDEMENT Left 10/23/2020    ON THE LEFT: SURGICAL PREPARATION OF WOUND BED, APPLICATION OF DERMAL GRAFT SUBSTITUTE performed by Ofelia Mulligan DPM at [address]  04/02/2019    ROBOTIC ASSISTED LAPAROSCOPIC SLEEVE GASTRECTOMY, LAPAROSCOPIC REDUCIBLE [facility] GASTRECTOMY N/A 4/2/2019    ROBOTIC ASSISTED LAPAROSCOPIC SLEEVE GASTRECTOMY, LAPAROSCOPIC REDUCIBLE INCISIONAL HERNIA REPAIR performed by Good Jacob DO at [facility] N/A 2/8/2019    EGD BIOPSY performed by Good Jacob DO at [address] [facility] N/A 2/8/2019    EGD POLYP ABLATION/OTHER performed by Good Jacob DO at [address]       Discharge Medication List as of 7/10/2021  1:24 AM      CONTINUE these medications which have NOT CHANGED    Details   B-D UF III MINI PEN NEEDLES 31G X 5 MM MISC Disp-200 each, R-1, NormalUSE AS DIRECTED TWICE A DAY      naproxen (NAPROSYN) 500 MG tablet Take 1 tablet by mouth 2 times daily (with meals) for 15 days, Disp-30 tablet, R-0Normal      acetaminophen (TYLENOL) 500 MG tablet Take 500 mg by mouth every 6 hours as needed for PainHistorical Med      traZODone (DESYREL) 100 MG tablet Take 1 tablet by mouth nightly as needed for Sleep, Disp-90 tablet, R-0Normal      atorvastatin (LIPITOR) 80 MG tablet Take 1 tablet by mouth daily, Disp-90 tablet, R-1Normal      Probiotic Acidophilus (FLORANEX) TABS Take 1 tablet by mouth 2 times daily, Disp-60 tablet, R-2Normal      citalopram (CELEXA) 40 MG tablet Take 1 tablet by mouth daily, Disp-90 tablet, R-1Normal      pantoprazole (PROTONIX) 40 MG tablet Take 1 tablet by mouth daily, Disp-90 tablet, R-1Normal      carvedilol (COREG) 25 MG tablet Take 1 tablet by mouth 2 times daily (with meals), Disp-180 tablet, R-1Normal      insulin glargine (LANTUS SOLOSTAR) 100 UNIT/ML injection pen Inject 54 Units into the skin nightly, Disp-40 mL, R-1Normal      Liraglutide (VICTOZA) 18 MG/3ML SOPN SC injection Inject 1.8 mg into the skin daily, Disp-10 pen, R-3Normal      melatonin 5 MG TABS tablet Take 1 tablet by mouth daily, Disp-30 tablet, R-1OTC      lisinopril (PRINIVIL;ZESTRIL) 20 MG tablet TAKE 1 TABLET BY MOUTH EVERY DAY, Disp-90 tablet, R-1Normal gabapentin (NEURONTIN) 300 MG capsule Take 2 capsules by mouth 3 times daily for 90 days. , Disp-540 capsule, R-0Normal      Multiple Vitamins-Minerals (THERAPEUTIC MULTIVITAMIN-MINERALS) tablet Take 1 tablet by mouth dailyHistorical Med      blood glucose test strips (ONE TOUCH ULTRA TEST) strip Disp-300 strip, R-1, NormalCheck FSBS 2-3 times daily. ONE TOUCH LANCETS MISC 3 TIMES DAILY Starting Thu 2019, Disp-100 each, R-11, Normal      Cholecalciferol (VITAMIN D3) 2000 units CAPS Take 2,000 Units by mouth daily Historical Med      aspirin 81 MG chewable tablet Take 1 tablet by mouth daily. , Disp-30 tablet             ALLERGIES     Doxycycline    FAMILY HISTORY        Family History   Problem Relation Age of Onset    High Blood Pressure Mother     Cancer Mother     Rheum Arthritis Mother     COPD Father     Cancer Father     Osteoarthritis Neg Hx     Asthma Neg Hx     Breast Cancer Neg Hx     Diabetes Neg Hx     Heart Failure Neg Hx     High Cholesterol Neg Hx     Hypertension Neg Hx     Migraines Neg Hx     Ovarian Cancer Neg Hx     Rashes/Skin Problems Neg Hx     Seizures Neg Hx     Stroke Neg Hx     Thyroid Disease Neg Hx        SOCIAL HISTORY       Social History     Socioeconomic History    Marital status:      Spouse name: None    Number of children: None    Years of education: None    Highest education level: None   Occupational History    None   Tobacco Use    Smoking status: Former Smoker     Packs/day: 0.50     Years: 10.00     Pack years: 5.00     Quit date: 11/10/2013     Years since quittin.6    Smokeless tobacco: Never Used   Vaping Use    Vaping Use: Never used   Substance and Sexual Activity    Alcohol use:  Yes     Alcohol/week: 0.0 standard drinks     Comment: occasionally    Drug use: No    Sexual activity: Not Currently   Other Topics Concern    None   Social History Narrative    None     Social Determinants of Health     Financial Resource Strain:     Difficulty of Paying Living Expenses:    Food Insecurity:     Worried About Running Out of Food in the Last Year:     [address] in the Last Year:    Transportation Needs:     Lack of Transportation (Medical):  Lack of Transportation (Non-Medical):    Physical Activity:     Days of Exercise per Week:     Minutes of Exercise per Session:    Stress:     Feeling of Stress :    Social Connections:     Frequency of Communication with Friends and Family:     Frequency of Social Gatherings with Friends and Family:     Attends [employer] Services:     Active Member of Clubs or Organizations:     Attends Club or Organization Meetings:     Marital Status:    Intimate Partner Violence:     Fear of Current or Ex-Partner:     Emotionally Abused:     Physically Abused:     Sexually Abused:        PHYSICAL EXAM       ED Triage Vitals [07/09/21 2211]   BP Temp Temp Source Pulse Resp SpO2 Height Weight   (!) 199/80 97.6 °F (36.4 °C) Oral 74 18 99 % -- --       Physical Exam  Vitals and nursing note reviewed. Constitutional:       General: She is not in acute distress. Appearance: She is well-developed. She is not ill-appearing, toxic-appearing or diaphoretic. HENT:      Head: Normocephalic and atraumatic. Right Ear: External ear normal.      Left Ear: External ear normal.      Mouth/Throat:      Mouth: Mucous membranes are moist.      Pharynx: Oropharynx is clear. No oropharyngeal exudate or posterior oropharyngeal erythema. Comments: No significant dental swelling or dental abscess noted. Eyes:      General:         Right eye: No discharge. Left eye: No discharge. Conjunctiva/sclera: Conjunctivae normal.      Pupils: Pupils are equal, round, and reactive to light. Cardiovascular:      Rate and Rhythm: Normal rate and regular rhythm. Heart sounds: No murmur heard. Pulmonary:      Effort: Pulmonary effort is normal. No respiratory distress.       Breath sounds: Normal breath sounds. No wheezing or rales. Abdominal:      General: Bowel sounds are normal. There is no distension. Palpations: Abdomen is soft. There is no mass. Tenderness: There is no abdominal tenderness. There is no guarding or rebound. Genitourinary:     Comments: Deferred  Musculoskeletal:         General: No deformity. Normal range of motion. Cervical back: Normal range of motion and neck supple. Skin:     General: Skin is warm. Findings: No erythema or rash. Neurological:      Mental Status: She is alert and oriented to person, place, and time. She is not disoriented. Cranial Nerves: No cranial nerve deficit. Motor: No atrophy or abnormal muscle tone. Coordination: Coordination normal.   Psychiatric:         Behavior: Behavior normal.         Thought Content:  Thought content normal.         DIAGNOSTIC RESULTS     RADIOLOGY:   Non-plain film images such as CT, Ultrasoundand MRI are read by the radiologist. Plain radiographic images are visualized and preliminarily interpreted by the emergency physician with the below findings:    None    ED BEDSIDE ULTRASOUND:   Performed by ED Physician - none    LABS:  Labs Reviewed   BASIC METABOLIC PANEL W/ REFLEX TO MG FOR LOW K - Abnormal; Notable for the following components:       Result Value    Sodium 131 (*)     Chloride 95 (*)     Glucose 456 (*)     BUN 29 (*)     CREATININE 1.8 (*)     GFR Non- 28 (*)     GFR  34 (*)     All other components within normal limits    Narrative:     Performed at:  [facility]  [address] [place] [address]   Phone [phone]   CBC - Abnormal; Notable for the following components:    RBC 3.88 (*)     Hemoglobin 11.4 (*)     Hematocrit 34.0 (*)     All other components within normal limits    Narrative:     Performed at:  [facility]  [address] [facility] [place] [address]   Phone [phone]       All other labs were withinnormal range or not returned as of this dictation. EMERGENCY DEPARTMENT COURSE and DIFFERENTIAL DIAGNOSIS/MDM:     PMH, Surgical Hx, FH, Social Hx reviewed by myself (ETOH usage, Tobacco usage, Drug usage reviewed by myself, no pertinent Hx)- No Pertinent Hx     Old records were reviewed by me    13-year-old female with essential hypertension. Discussed going up on her Norvasc 2.5-->10. She does have mild renal insufficiency. Her baseline is around 1.2-1.3 she is at 1.8 today. Discussed stopping the ibuprofen. Increasing fluid intake. Discussed importance of holding off on nephrotoxic medications. Told her to stop taking tramadol. She is to have her blood redrawn in 1 to 2 days to make sure her kidney function has returned to normal.  She verbalized understanding. Prescribed her a dose/script of Norco after OARRS report reviewed. She is currently on amoxicillin for her dental caries and tooth infection. She has another appointment with the dentist Monday morning to have the tooth pulled. Discussed close PCP follow-up. I estimate there is LOW risk for Sepsis, MI, Stroke, Tamponade, PTX, Toxicity or other life threatening etiology thus I consider the discharge disposition reasonable. The patient is at low risk for mortality based on demographic, history and clinical factors. Given the best available information and clinical assessment, I estimate the risk of hospitalization to be greater than risk of treatment at home. I have explained to the patient that the risk could rapidly change, given precautions for return and instructions. Explained to patient that the risk for mortality is low based on demographic, history and clinical factors. I discussed with patient the results of evaluation in the ED, diagnosis, care, and prognosis. The plan is to discharge to home.   Patient is in agreement with plan and questions have been answered. I also discussed with patient the reasons which may require a return visit and the importance of follow-up care. The patient is well-appearing, nontoxic, and improved at the time of discharge. Patient agrees to call to arrange follow-up care as directed. Patient understands to return immediately for worsening/change in symptoms. CRITICAL CARE TIME   Total Critical Caretime was 21 minutes, excluding separately reportable procedures. There was a high probability of clinically significant/life threatening deterioration in the patient's condition which required my urgent intervention. PROCEDURES:  Unlessotherwise noted below, none    FINAL IMPRESSION      1. Essential hypertension    2. NEHA (acute kidney injury) ([facility]    3. Pain, dental          DISPOSITION/PLAN   DISPOSITION Decision To Discharge 07/10/2021 12:27:30 AM    PATIENT REFERRED TO:  Villa Andrews MD  [address] [place] [id number]  [phone]    Call today        DISCHARGE MEDICATIONS:  Discharge Medication List as of 7/10/2021  1:24 AM      START taking these medications    Details   HYDROcodone-acetaminophen (NORCO) 5-325 MG per tablet Take 1 tablet by mouth every 4 hours as needed for Pain for up to 3 days. Intended supply: 3 days.  Take lowest dose possible to manage pain, Disp-8 tablet, R-0Print                (Please note that portions ofthis note were completed with a voice recognition program.  Efforts were made to edit the dictations but occasionally words are mis-transcribed.)    Gavino Vivar MD(electronically signed)  Attending Emergency Physician        Gavino Vivar MD  07/10/21 8727       Gavino Vivar MD  07/10/21 8931

## 2021-07-10 NOTE — ED NOTES
.Pt discharged at this time. Discharge instructions and medications reviewed,  Questions were answered. PT verbalized understanding. Follow up appointments were discussed.          Jazmín Nicole RN  07/10/21 0130

## 2021-07-11 LAB
EKG ATRIAL RATE: 70 BPM
EKG DIAGNOSIS: NORMAL
EKG P AXIS: 34 DEGREES
EKG P-R INTERVAL: 206 MS
EKG Q-T INTERVAL: 408 MS
EKG QRS DURATION: 68 MS
EKG QTC CALCULATION (BAZETT): 440 MS
EKG R AXIS: -14 DEGREES
EKG T AXIS: 146 DEGREES
EKG VENTRICULAR RATE: 70 BPM

## 2021-07-11 PROCEDURE — 93010 ELECTROCARDIOGRAM REPORT: CPT | Performed by: INTERNAL MEDICINE

## 2021-07-12 NOTE — TELEPHONE ENCOUNTER
Called pt. She states that she went to the ER. They changed her meds. I tried to get her to come in today. She said she just had the dental surgery. Made appt for this Thursday.

## 2021-07-15 ENCOUNTER — OFFICE VISIT (OUTPATIENT)
Dept: PRIMARY CARE CLINIC | Age: 66
End: 2021-07-15
Payer: MEDICARE

## 2021-07-15 VITALS
WEIGHT: 212.6 LBS | BODY MASS INDEX: 37.66 KG/M2 | OXYGEN SATURATION: 98 % | SYSTOLIC BLOOD PRESSURE: 149 MMHG | HEART RATE: 64 BPM | DIASTOLIC BLOOD PRESSURE: 63 MMHG | RESPIRATION RATE: 18 BRPM

## 2021-07-15 DIAGNOSIS — G47.33 OSA (OBSTRUCTIVE SLEEP APNEA): ICD-10-CM

## 2021-07-15 DIAGNOSIS — M70.22 OLECRANON BURSITIS, LEFT ELBOW: ICD-10-CM

## 2021-07-15 DIAGNOSIS — N17.9 ACUTE KIDNEY INJURY SUPERIMPOSED ON CKD (HCC): Primary | ICD-10-CM

## 2021-07-15 DIAGNOSIS — N18.9 ACUTE KIDNEY INJURY SUPERIMPOSED ON CKD (HCC): Primary | ICD-10-CM

## 2021-07-15 DIAGNOSIS — E11.42 DM TYPE 2 WITH DIABETIC PERIPHERAL NEUROPATHY (HCC): Chronic | ICD-10-CM

## 2021-07-15 DIAGNOSIS — I10 ESSENTIAL HYPERTENSION: Chronic | ICD-10-CM

## 2021-07-15 LAB — HBA1C MFR BLD: 12.5 %

## 2021-07-15 PROCEDURE — 99214 OFFICE O/P EST MOD 30 MIN: CPT | Performed by: FAMILY MEDICINE

## 2021-07-15 PROCEDURE — 83036 HEMOGLOBIN GLYCOSYLATED A1C: CPT | Performed by: FAMILY MEDICINE

## 2021-07-15 RX ORDER — INSULIN GLARGINE 100 [IU]/ML
60 INJECTION, SOLUTION SUBCUTANEOUS NIGHTLY
Qty: 5 PEN | Refills: 3 | Status: SHIPPED | OUTPATIENT
Start: 2021-07-15 | End: 2021-11-15

## 2021-07-15 RX ORDER — LISINOPRIL 40 MG/1
TABLET ORAL
Qty: 90 TABLET | Refills: 1 | Status: SHIPPED | OUTPATIENT
Start: 2021-07-15 | End: 2022-01-27

## 2021-07-15 RX ORDER — AMLODIPINE BESYLATE 10 MG/1
10 TABLET ORAL DAILY
Qty: 90 TABLET | Refills: 1 | Status: SHIPPED | OUTPATIENT
Start: 2021-07-15 | End: 2022-01-17

## 2021-07-15 ASSESSMENT — ENCOUNTER SYMPTOMS
ABDOMINAL PAIN: 0
BLOOD IN STOOL: 0
DIARRHEA: 0
CONSTIPATION: 0
SHORTNESS OF BREATH: 0

## 2021-07-15 NOTE — PROGRESS NOTES
7/15/2021     Sean Be (:  1955) is a 77 y.o. female, here for evaluation of the following medical concerns:    HPI  Patient presented to the office for follow-up. She was recently seen at the emergency room a week ago due to high blood pressure. Apparently has dental abscess but patient but could not make dental appointment  due to high blood pressure. Patient reported that blood pressure at home went up to 899 systolic. Patient reported that she has been taking ibuprofen for dental pain but without any relief. At the emergency room blood pressure was still elevated at 199/80 and so amlodipine was increased from 5 to 10 mg daily. She also takse lisinopril 20 mg daily and Coreg 25 mg twice daily. Work-up showed worsening renal profile and was advised to see nephrologist and avoid NSAIDs including ibuprofen. She was also advised to avoid dehydration. Her diabetes is also poorly controlled, random blood sugar at the emergency room was above 400. Patient takes insulin and Victoza but admitted that sometimes is missing medication. She is supposed to be on insulin 54 units daily but only taking 50 units daily. She denies hypoglycemic episodes. Patient denies chest pain or shortness of breath. Denies bowel or urinary disturbance. Review of Systems   Constitutional: Negative for activity change and appetite change. Eyes: Negative for visual disturbance. Respiratory: Negative for shortness of breath. Cardiovascular: Negative for chest pain and leg swelling. Gastrointestinal: Negative for abdominal pain, blood in stool, constipation and diarrhea. Genitourinary: Negative for difficulty urinating, frequency, hematuria, menstrual problem and urgency. Neurological: Negative for dizziness and syncope. Psychiatric/Behavioral: Negative for behavioral problems. Prior to Visit Medications    Medication Sig Taking?  Authorizing Provider   insulin glargine (LANTUS SOLOSTAR) 100 UNIT/ML injection pen Inject 60 Units into the skin nightly Yes Drew Stevenson MD   lisinopril (PRINIVIL;ZESTRIL) 40 MG tablet TAKE 1 TABLET BY MOUTH EVERY DAY Yes Drew Stevenson MD   amLODIPine (NORVASC) 10 MG tablet Take 1 tablet by mouth daily  Edith Bejarano MD   B-D UF III MINI PEN NEEDLES 31G X 5 MM MISC USE AS DIRECTED TWICE A DAY  Drew Stevenson MD   acetaminophen (TYLENOL) 500 MG tablet Take 500 mg by mouth every 6 hours as needed for Pain  Historical Provider, MD   traZODone (DESYREL) 100 MG tablet Take 1 tablet by mouth nightly as needed for Sleep  Drew Stevenson MD   atorvastatin (LIPITOR) 80 MG tablet Take 1 tablet by mouth daily  Drew Stevenson MD   Probiotic Acidophilus (FLORANEX) TABS Take 1 tablet by mouth 2 times daily  Raysajuany Araujo MD   citalopram (CELEXA) 40 MG tablet Take 1 tablet by mouth daily  Drew Stevenson MD   pantoprazole (PROTONIX) 40 MG tablet Take 1 tablet by mouth daily  Drew Stevenson MD   carvedilol (COREG) 25 MG tablet Take 1 tablet by mouth 2 times daily (with meals)  Drew Stevenson MD   Liraglutide (VICTOZA) 18 MG/3ML SOPN SC injection Inject 1.8 mg into the skin daily  Drew Stevenson MD   melatonin 5 MG TABS tablet Take 1 tablet by mouth daily  Drew Stevenson MD   gabapentin (NEURONTIN) 300 MG capsule Take 2 capsules by mouth 3 times daily for 90 days. Patient taking differently: Take 600 mg by mouth 2 times daily. Drew Stevenson MD   blood glucose test strips (ONE TOUCH ULTRA TEST) strip Check FSBS 2-3 times daily. Drew Stevenson MD   ONE TOUCH LANCETS MISC 1 each by Does not apply route 3 times daily  Drew Stevenson MD   Cholecalciferol (VITAMIN D3) 2000 units CAPS Take 2,000 Units by mouth daily   Historical Provider, MD   aspirin 81 MG chewable tablet Take 1 tablet by mouth daily.   Tish Alfaro MD        Social History     Tobacco Use    Smoking status: Former Smoker     Packs/day: 0.50     Years: 10.00     Pack years: 5.00     Quit date: 11/10/2013     Years since quittin.6    Smokeless tobacco: Never Used   Substance Use Topics    Alcohol use: Yes     Alcohol/week: 0.0 standard drinks     Comment: occasionally        Vitals:    07/15/21 1102   BP: (!) 149/63   Pulse: 64   Resp: 18   SpO2: 98%   Weight: 212 lb 9.6 oz (96.4 kg)     Estimated body mass index is 37.66 kg/m² as calculated from the following:    Height as of 21: 5' 3\" (1.6 m). Weight as of this encounter: 212 lb 9.6 oz (96.4 kg). Physical Exam  Constitutional:       General: She is not in acute distress. Appearance: She is well-developed. HENT:      Head: Normocephalic. Eyes:      Conjunctiva/sclera: Conjunctivae normal.   Neck:      Thyroid: No thyromegaly. Cardiovascular:      Rate and Rhythm: Normal rate and regular rhythm. Heart sounds: Normal heart sounds. No murmur heard. Pulmonary:      Effort: No respiratory distress. Breath sounds: Normal breath sounds. No wheezing or rales. Abdominal:      General: There is no distension. Palpations: Abdomen is soft. Musculoskeletal:         General: Normal range of motion. Cervical back: Neck supple. Skin:     General: Skin is warm. Findings: No rash. Neurological:      Mental Status: She is alert and oriented to person, place, and time. Psychiatric:         Behavior: Behavior normal.         ASSESSMENT/PLAN:  1. Acute kidney injury superimposed on CKD ([address]  Avoid NSAIDS and other nephrotoxic drug. Avoid dehydration. Follow up with her nephrologist !. Check BMP today    2. DM type 2 with diabetic peripheral neuropathy (HCC)  Poorly controlled due to poor compliance. Increase Lantus insulin to 60 units daily. Continue Victoza 1.8 mg subcu daily. Will consider mealtime insulin next visit    3. Essential hypertension  Poorly controlled. Amlodipine recently increased from 5 mg to 10 mg daily. She also take Coreg 25 twice daily . Blood pressure is still high.   Will increase lisinopril to 40 mg daily. Goal is blood pressure < 130/80    4. Olecranon bursitis, left elbow  Recently drained by surgeon but it came back. Advised elbow brace    5. ANGLE (obstructive sleep apnea)  Encouraged regular use of CPAP.       RTC in 2 mos    An electronic signature was used to authenticate this note.    --Farhat Barriga MD on 7/15/2021 at 11:39 AM

## 2021-08-18 ENCOUNTER — PATIENT MESSAGE (OUTPATIENT)
Dept: BARIATRICS/WEIGHT MGMT | Age: 66
End: 2021-08-18

## 2021-09-30 DIAGNOSIS — I10 ESSENTIAL HYPERTENSION: Chronic | ICD-10-CM

## 2021-09-30 RX ORDER — LISINOPRIL 20 MG/1
20 TABLET ORAL DAILY
Qty: 90 TABLET | Refills: 1 | Status: SHIPPED | OUTPATIENT
Start: 2021-09-30 | End: 2021-11-09

## 2021-09-30 RX ORDER — PANTOPRAZOLE SODIUM 40 MG/1
40 TABLET, DELAYED RELEASE ORAL DAILY
Qty: 90 TABLET | Refills: 1 | Status: SHIPPED | OUTPATIENT
Start: 2021-09-30 | End: 2022-08-31

## 2021-09-30 RX ORDER — CITALOPRAM 40 MG/1
40 TABLET ORAL DAILY
Qty: 90 TABLET | Refills: 1 | Status: SHIPPED | OUTPATIENT
Start: 2021-09-30 | End: 2022-04-20

## 2021-09-30 RX ORDER — CARVEDILOL 25 MG/1
25 TABLET ORAL 2 TIMES DAILY WITH MEALS
Qty: 180 TABLET | Refills: 1 | Status: ON HOLD | OUTPATIENT
Start: 2021-09-30 | End: 2022-07-24 | Stop reason: HOSPADM

## 2021-10-15 ENCOUNTER — CLINICAL DOCUMENTATION (OUTPATIENT)
Dept: OTHER | Age: 66
End: 2021-10-15

## 2021-11-02 RX ORDER — ATORVASTATIN CALCIUM 80 MG/1
80 TABLET, FILM COATED ORAL DAILY
Qty: 90 TABLET | Refills: 0 | Status: SHIPPED | OUTPATIENT
Start: 2021-11-02 | End: 2022-01-31

## 2021-11-09 ENCOUNTER — OFFICE VISIT (OUTPATIENT)
Dept: PRIMARY CARE CLINIC | Age: 66
End: 2021-11-09
Payer: MEDICARE

## 2021-11-09 VITALS
RESPIRATION RATE: 18 BRPM | HEART RATE: 64 BPM | DIASTOLIC BLOOD PRESSURE: 70 MMHG | OXYGEN SATURATION: 96 % | SYSTOLIC BLOOD PRESSURE: 139 MMHG

## 2021-11-09 DIAGNOSIS — E78.2 MIXED HYPERLIPIDEMIA: ICD-10-CM

## 2021-11-09 DIAGNOSIS — N18.32 STAGE 3B CHRONIC KIDNEY DISEASE (HCC): Chronic | ICD-10-CM

## 2021-11-09 DIAGNOSIS — G47.33 OSA (OBSTRUCTIVE SLEEP APNEA): ICD-10-CM

## 2021-11-09 DIAGNOSIS — E11.42 DM TYPE 2 WITH DIABETIC PERIPHERAL NEUROPATHY (HCC): Primary | ICD-10-CM

## 2021-11-09 DIAGNOSIS — I10 ESSENTIAL HYPERTENSION: Chronic | ICD-10-CM

## 2021-11-09 PROBLEM — L97.512 ULCER OF TOE OF RIGHT FOOT, WITH FAT LAYER EXPOSED (HCC): Status: RESOLVED | Noted: 2019-11-14 | Resolved: 2021-11-09

## 2021-11-09 LAB — HBA1C MFR BLD: 6.6 %

## 2021-11-09 PROCEDURE — 83036 HEMOGLOBIN GLYCOSYLATED A1C: CPT | Performed by: FAMILY MEDICINE

## 2021-11-09 PROCEDURE — 99214 OFFICE O/P EST MOD 30 MIN: CPT | Performed by: FAMILY MEDICINE

## 2021-11-09 ASSESSMENT — ENCOUNTER SYMPTOMS
SHORTNESS OF BREATH: 0
ABDOMINAL PAIN: 0
CONSTIPATION: 0
BLOOD IN STOOL: 0
DIARRHEA: 0

## 2021-11-09 NOTE — PROGRESS NOTES
2021     Jose Paez (:  1955) is a 74 y.o. female, here for evaluation of the following medical concerns:    HPI  Patient presented to the office for follow-up. She has diabetes fairly controlled. HbA1c today 6.6%. She takes Victoza 1.8 mg daily and Lantus 60 units daily. She denies hypoglycemic episode. She has hypertension fairly controlled. She takes Coreg 25 twice daily, lisinopril 40 mg daily and amlodipine 10 mg daily. She has hyperlipidemia and reported to be compliant with the statin. She takes Lipitor 80 mg daily. She has chronic back pain and takes Tylenol as needed she was wondering if it is okay to see a chiropractor or go to [address] [place] [address]. She denies chest pain or shortness of breath. Denies bowel or urinary disturbance. Review of Systems   Constitutional: Negative for activity change and appetite change. Eyes: Negative for visual disturbance. Respiratory: Negative for shortness of breath. Cardiovascular: Negative for chest pain and leg swelling. Gastrointestinal: Negative for abdominal pain, blood in stool, constipation and diarrhea. Genitourinary: Negative for difficulty urinating, frequency, hematuria, menstrual problem and urgency. Neurological: Negative for dizziness and syncope. Psychiatric/Behavioral: Negative for behavioral problems. Prior to Visit Medications    Medication Sig Taking? Authorizing Provider   furosemide (LASIX) 20 MG tablet TAKE 1 TABLET BY MOUTH EVERY DAY Yes Des Coffman MD   atorvastatin (LIPITOR) 80 MG tablet Take 1 tablet by mouth daily To the patient: Please call to make an appointment. Phone: .  Yes Ayaka Phan MD   carvedilol (COREG) 25 MG tablet Take 1 tablet by mouth 2 times daily (with meals) Yes Ayaka Phan MD   citalopram (CELEXA) 40 MG tablet Take 1 tablet by mouth daily Yes Ayaka Phan MD   pantoprazole (PROTONIX) 40 MG tablet Take 1 tablet by mouth daily Yes Rolo Barkley MD [place]   insulin glargine (LANTUS SOLOSTAR) 100 UNIT/ML injection pen Inject 60 Units into the skin nightly Yes Jessica Darby MD   lisinopril (PRINIVIL;ZESTRIL) 40 MG tablet TAKE 1 TABLET BY MOUTH EVERY DAY Yes Jessica Darby MD   amLODIPine (NORVASC) 10 MG tablet Take 1 tablet by mouth daily Yes Jessica Darby MD   Liraglutide (VICTOZA) 18 MG/3ML SOPN SC injection Inject 1.8 mg into the skin daily Yes Jessica Darby MD   melatonin 5 MG TABS tablet Take 1 tablet by mouth daily Yes Jessica Darby MD   Cholecalciferol (VITAMIN D3) 2000 units CAPS Take 2,000 Units by mouth daily  Yes Historical Provider, MD   aspirin 81 MG chewable tablet Take 1 tablet by mouth daily. Yes Gretchen Lopes MD   B-ROSEANN UF III MINI PEN NEEDLES 31G X 5 MM MISC USE AS DIRECTED TWICE A DAY  Jessica Darby MD   acetaminophen (TYLENOL) 500 MG tablet Take 500 mg by mouth every 6 hours as needed for Pain  Historical Provider, MD   gabapentin (NEURONTIN) 300 MG capsule Take 2 capsules by mouth 3 times daily for 90 days. Patient taking differently: Take 600 mg by mouth 2 times daily. Jessica Darby MD   blood glucose test strips (ONE TOUCH ULTRA TEST) strip Check FSBS 2-3 times daily. Jessica Darby MD   ONE TOUCH LANCETS MISC 1 each by Does not apply route 3 times daily  Jessica Darby MD        Social History     Tobacco Use    Smoking status: Former Smoker     Packs/day: 0.50     Years: 10.00     Pack years: 5.00     Quit date: 11/10/2013     Years since quittin.0    Smokeless tobacco: Never Used   Substance Use Topics    Alcohol use: Yes     Alcohol/week: 0.0 standard drinks     Comment: occasionally        Vitals:    21 1448   BP: 139/70   Pulse: 64   Resp: 18   SpO2: 96%     Estimated body mass index is 40.28 kg/m² as calculated from the following:    Height as of 21: 5' 3\" (1.6 m). Weight as of 21: 227 lb 6.4 oz (103.1 kg).     Physical Exam  Constitutional:       Appearance: She is well-developed. HENT:      Head: Normocephalic. Right Ear: External ear normal.      Nose: Nose normal.   Eyes:      Conjunctiva/sclera: Conjunctivae normal.      Pupils: Pupils are equal, round, and reactive to light. Neck:      Thyroid: No thyromegaly. Cardiovascular:      Rate and Rhythm: Normal rate and regular rhythm. Heart sounds: Normal heart sounds. No murmur heard. No friction rub. Pulmonary:      Effort: Pulmonary effort is normal.      Breath sounds: Normal breath sounds. Abdominal:      General: Bowel sounds are normal.      Palpations: Abdomen is soft. There is no mass. Musculoskeletal:         General: Normal range of motion. Cervical back: Normal range of motion and neck supple. Lymphadenopathy:      Cervical: No cervical adenopathy. Skin:     General: Skin is warm. Findings: No rash. Neurological:      Mental Status: She is alert and oriented to person, place, and time. ASSESSMENT/PLAN:  1. DM type 2 with diabetic peripheral neuropathy (HCC)  Controlled. HbA1c today 6.6%. Continue Victoza 1.8 mg daily and Lantus 60units daily   - POCT glycosylated hemoglobin (Hb A1C)    2. Mixed hyperlipidemia  Controlled. Continue Lipitor 80 mg daily. 3. Essential hypertension  Patient takes Coreg 25 mg twice daily, lisinopril 40 mg daily and amlodipine 10 mg daily. 4. ANGLE (obstructive sleep apnea)  Encouraged regular use of CPAP. 5. Stage 3b chronic kidney disease ([address]  Stable renal profile. Avoid NSAIDs and other nephrotoxic drugs. Avoid dehydration.       RTC in 3 mos    An electronic signature was used to authenticate this note.    --Zulema Negrete MD on 11/9/2021 at 3:29 PM

## 2021-11-15 ENCOUNTER — CLINICAL DOCUMENTATION (OUTPATIENT)
Dept: OTHER | Age: 66
End: 2021-11-15

## 2021-11-16 RX ORDER — INSULIN GLARGINE 100 [IU]/ML
INJECTION, SOLUTION SUBCUTANEOUS
Qty: 45 PEN | Refills: 1 | Status: SHIPPED | OUTPATIENT
Start: 2021-11-16 | End: 2022-01-10 | Stop reason: SDUPTHER

## 2021-11-21 DIAGNOSIS — E11.42 DM TYPE 2 WITH DIABETIC PERIPHERAL NEUROPATHY (HCC): ICD-10-CM

## 2021-11-22 RX ORDER — PEN NEEDLE, DIABETIC 31 GX5/16"
NEEDLE, DISPOSABLE MISCELLANEOUS
Qty: 100 EACH | Refills: 1 | Status: SHIPPED | OUTPATIENT
Start: 2021-11-22 | End: 2022-01-21

## 2021-11-29 RX ORDER — LIRAGLUTIDE 6 MG/ML
1.8 INJECTION SUBCUTANEOUS DAILY
Qty: 2 PEN | Refills: 6 | Status: SHIPPED | OUTPATIENT
Start: 2021-11-29 | End: 2022-04-20

## 2021-12-21 ENCOUNTER — TELEPHONE (OUTPATIENT)
Dept: PRIMARY CARE CLINIC | Age: 66
End: 2021-12-21

## 2022-01-03 ENCOUNTER — APPOINTMENT (OUTPATIENT)
Dept: GENERAL RADIOLOGY | Age: 67
DRG: 291 | End: 2022-01-03
Payer: MEDICARE

## 2022-01-03 ENCOUNTER — HOSPITAL ENCOUNTER (INPATIENT)
Age: 67
LOS: 2 days | Discharge: HOME | DRG: 291 | End: 2022-01-05
Attending: INTERNAL MEDICINE | Admitting: INTERNAL MEDICINE
Payer: MEDICARE

## 2022-01-03 DIAGNOSIS — R09.89 PULMONARY VASCULAR CONGESTION: Primary | ICD-10-CM

## 2022-01-03 DIAGNOSIS — R09.02 HYPOXIA: ICD-10-CM

## 2022-01-03 DIAGNOSIS — Z91.199 NON COMPLIANCE WITH MEDICAL TREATMENT: ICD-10-CM

## 2022-01-03 LAB
A/G RATIO: 1 (ref 1.1–2.2)
ALBUMIN SERPL-MCNC: 3.8 G/DL (ref 3.4–5)
ALP BLD-CCNC: 104 U/L (ref 40–129)
ALT SERPL-CCNC: <5 U/L (ref 10–40)
ANION GAP SERPL CALCULATED.3IONS-SCNC: 11 MMOL/L (ref 3–16)
AST SERPL-CCNC: 9 U/L (ref 15–37)
BASOPHILS ABSOLUTE: 0.1 K/UL (ref 0–0.2)
BASOPHILS RELATIVE PERCENT: 0.7 %
BILIRUB SERPL-MCNC: 0.5 MG/DL (ref 0–1)
BUN BLDV-MCNC: 27 MG/DL (ref 7–20)
CALCIUM SERPL-MCNC: 9.3 MG/DL (ref 8.3–10.6)
CHLORIDE BLD-SCNC: 102 MMOL/L (ref 99–110)
CO2: 22 MMOL/L (ref 21–32)
CREAT SERPL-MCNC: 1.9 MG/DL (ref 0.6–1.2)
EOSINOPHILS ABSOLUTE: 0.1 K/UL (ref 0–0.6)
EOSINOPHILS RELATIVE PERCENT: 1.5 %
GFR AFRICAN AMERICAN: 32
GFR NON-AFRICAN AMERICAN: 26
GLUCOSE BLD-MCNC: 128 MG/DL (ref 70–99)
GLUCOSE BLD-MCNC: 163 MG/DL (ref 70–99)
HCT VFR BLD CALC: 29.6 % (ref 36–48)
HEMOGLOBIN: 9.6 G/DL (ref 12–16)
LIPASE: 17 U/L (ref 13–60)
LYMPHOCYTES ABSOLUTE: 1.1 K/UL (ref 1–5.1)
LYMPHOCYTES RELATIVE PERCENT: 11.5 %
MCH RBC QN AUTO: 28.2 PG (ref 26–34)
MCHC RBC AUTO-ENTMCNC: 32.5 G/DL (ref 31–36)
MCV RBC AUTO: 86.7 FL (ref 80–100)
MONOCYTES ABSOLUTE: 0.9 K/UL (ref 0–1.3)
MONOCYTES RELATIVE PERCENT: 9.2 %
NEUTROPHILS ABSOLUTE: 7.4 K/UL (ref 1.7–7.7)
NEUTROPHILS RELATIVE PERCENT: 77.1 %
PDW BLD-RTO: 15.5 % (ref 12.4–15.4)
PERFORMED ON: ABNORMAL
PLATELET # BLD: 239 K/UL (ref 135–450)
PMV BLD AUTO: 9.4 FL (ref 5–10.5)
POTASSIUM REFLEX MAGNESIUM: 4.1 MMOL/L (ref 3.5–5.1)
PRO-BNP: 1145 PG/ML (ref 0–124)
RAPID INFLUENZA  B AGN: NEGATIVE
RAPID INFLUENZA A AGN: NEGATIVE
RBC # BLD: 3.42 M/UL (ref 4–5.2)
SARS-COV-2, NAAT: NOT DETECTED
SODIUM BLD-SCNC: 135 MMOL/L (ref 136–145)
TOTAL PROTEIN: 7.7 G/DL (ref 6.4–8.2)
TROPONIN: <0.01 NG/ML
WBC # BLD: 9.6 K/UL (ref 4–11)

## 2022-01-03 PROCEDURE — 36415 COLL VENOUS BLD VENIPUNCTURE: CPT

## 2022-01-03 PROCEDURE — 93005 ELECTROCARDIOGRAM TRACING: CPT | Performed by: NURSE PRACTITIONER

## 2022-01-03 PROCEDURE — 83880 ASSAY OF NATRIURETIC PEPTIDE: CPT

## 2022-01-03 PROCEDURE — 87804 INFLUENZA ASSAY W/OPTIC: CPT

## 2022-01-03 PROCEDURE — 6360000002 HC RX W HCPCS: Performed by: NURSE PRACTITIONER

## 2022-01-03 PROCEDURE — 6370000000 HC RX 637 (ALT 250 FOR IP): Performed by: NURSE PRACTITIONER

## 2022-01-03 PROCEDURE — 85025 COMPLETE CBC W/AUTO DIFF WBC: CPT

## 2022-01-03 PROCEDURE — 87635 SARS-COV-2 COVID-19 AMP PRB: CPT

## 2022-01-03 PROCEDURE — 83690 ASSAY OF LIPASE: CPT

## 2022-01-03 PROCEDURE — 84484 ASSAY OF TROPONIN QUANT: CPT

## 2022-01-03 PROCEDURE — 1200000000 HC SEMI PRIVATE

## 2022-01-03 PROCEDURE — 96374 THER/PROPH/DIAG INJ IV PUSH: CPT

## 2022-01-03 PROCEDURE — 71046 X-RAY EXAM CHEST 2 VIEWS: CPT

## 2022-01-03 PROCEDURE — 80053 COMPREHEN METABOLIC PANEL: CPT

## 2022-01-03 PROCEDURE — G0378 HOSPITAL OBSERVATION PER HR: HCPCS

## 2022-01-03 PROCEDURE — 99284 EMERGENCY DEPT VISIT MOD MDM: CPT

## 2022-01-03 PROCEDURE — 6370000000 HC RX 637 (ALT 250 FOR IP): Performed by: INTERNAL MEDICINE

## 2022-01-03 PROCEDURE — 2580000003 HC RX 258: Performed by: INTERNAL MEDICINE

## 2022-01-03 RX ORDER — PANTOPRAZOLE SODIUM 40 MG/1
40 TABLET, DELAYED RELEASE ORAL NIGHTLY
Status: DISCONTINUED | OUTPATIENT
Start: 2022-01-03 | End: 2022-01-05 | Stop reason: HOSPADM

## 2022-01-03 RX ORDER — CITALOPRAM 10 MG/1
40 TABLET ORAL NIGHTLY
Status: DISCONTINUED | OUTPATIENT
Start: 2022-01-03 | End: 2022-01-05 | Stop reason: HOSPADM

## 2022-01-03 RX ORDER — AMLODIPINE BESYLATE 5 MG/1
10 TABLET ORAL NIGHTLY
Status: DISCONTINUED | OUTPATIENT
Start: 2022-01-03 | End: 2022-01-05 | Stop reason: HOSPADM

## 2022-01-03 RX ORDER — FUROSEMIDE 10 MG/ML
40 INJECTION INTRAMUSCULAR; INTRAVENOUS ONCE
Status: COMPLETED | OUTPATIENT
Start: 2022-01-03 | End: 2022-01-03

## 2022-01-03 RX ORDER — INSULIN GLARGINE 100 [IU]/ML
54 INJECTION, SOLUTION SUBCUTANEOUS NIGHTLY
Status: DISCONTINUED | OUTPATIENT
Start: 2022-01-03 | End: 2022-01-05 | Stop reason: HOSPADM

## 2022-01-03 RX ORDER — POLYETHYLENE GLYCOL 3350 17 G/17G
17 POWDER, FOR SOLUTION ORAL DAILY PRN
Status: DISCONTINUED | OUTPATIENT
Start: 2022-01-03 | End: 2022-01-05 | Stop reason: HOSPADM

## 2022-01-03 RX ORDER — ASPIRIN 81 MG/1
81 TABLET, CHEWABLE ORAL NIGHTLY
Status: DISCONTINUED | OUTPATIENT
Start: 2022-01-03 | End: 2022-01-05 | Stop reason: HOSPADM

## 2022-01-03 RX ORDER — LISINOPRIL 10 MG/1
40 TABLET ORAL NIGHTLY
Status: DISCONTINUED | OUTPATIENT
Start: 2022-01-03 | End: 2022-01-05 | Stop reason: HOSPADM

## 2022-01-03 RX ORDER — DEXTROSE MONOHYDRATE 25 G/50ML
12.5 INJECTION, SOLUTION INTRAVENOUS PRN
Status: DISCONTINUED | OUTPATIENT
Start: 2022-01-03 | End: 2022-01-05 | Stop reason: HOSPADM

## 2022-01-03 RX ORDER — ACETAMINOPHEN 325 MG/1
650 TABLET ORAL EVERY 4 HOURS PRN
Status: DISCONTINUED | OUTPATIENT
Start: 2022-01-03 | End: 2022-01-05 | Stop reason: HOSPADM

## 2022-01-03 RX ORDER — ONDANSETRON 2 MG/ML
4 INJECTION INTRAMUSCULAR; INTRAVENOUS EVERY 4 HOURS PRN
Status: DISCONTINUED | OUTPATIENT
Start: 2022-01-03 | End: 2022-01-05 | Stop reason: HOSPADM

## 2022-01-03 RX ORDER — ATORVASTATIN CALCIUM 40 MG/1
80 TABLET, FILM COATED ORAL NIGHTLY
Status: DISCONTINUED | OUTPATIENT
Start: 2022-01-03 | End: 2022-01-05 | Stop reason: HOSPADM

## 2022-01-03 RX ORDER — SODIUM CHLORIDE 0.9 % (FLUSH) 0.9 %
10 SYRINGE (ML) INJECTION PRN
Status: DISCONTINUED | OUTPATIENT
Start: 2022-01-03 | End: 2022-01-05 | Stop reason: HOSPADM

## 2022-01-03 RX ORDER — NICOTINE POLACRILEX 4 MG
15 LOZENGE BUCCAL PRN
Status: DISCONTINUED | OUTPATIENT
Start: 2022-01-03 | End: 2022-01-05 | Stop reason: HOSPADM

## 2022-01-03 RX ORDER — CARVEDILOL 6.25 MG/1
25 TABLET ORAL 2 TIMES DAILY WITH MEALS
Status: DISCONTINUED | OUTPATIENT
Start: 2022-01-03 | End: 2022-01-05 | Stop reason: HOSPADM

## 2022-01-03 RX ORDER — DEXTROSE MONOHYDRATE 50 MG/ML
100 INJECTION, SOLUTION INTRAVENOUS PRN
Status: DISCONTINUED | OUTPATIENT
Start: 2022-01-03 | End: 2022-01-05 | Stop reason: HOSPADM

## 2022-01-03 RX ORDER — PSEUDOEPHEDRINE HCL 60 MG/1
60 TABLET ORAL ONCE
Status: COMPLETED | OUTPATIENT
Start: 2022-01-03 | End: 2022-01-03

## 2022-01-03 RX ORDER — FUROSEMIDE 10 MG/ML
40 INJECTION INTRAMUSCULAR; INTRAVENOUS 2 TIMES DAILY
Status: DISCONTINUED | OUTPATIENT
Start: 2022-01-03 | End: 2022-01-05 | Stop reason: HOSPADM

## 2022-01-03 RX ORDER — SODIUM CHLORIDE 0.9 % (FLUSH) 0.9 %
10 SYRINGE (ML) INJECTION EVERY 12 HOURS SCHEDULED
Status: DISCONTINUED | OUTPATIENT
Start: 2022-01-03 | End: 2022-01-05 | Stop reason: HOSPADM

## 2022-01-03 RX ORDER — LANOLIN ALCOHOL/MO/W.PET/CERES
6 CREAM (GRAM) TOPICAL NIGHTLY
Status: DISCONTINUED | OUTPATIENT
Start: 2022-01-03 | End: 2022-01-05 | Stop reason: HOSPADM

## 2022-01-03 RX ORDER — ACETAMINOPHEN 650 MG/1
650 SUPPOSITORY RECTAL EVERY 4 HOURS PRN
Status: DISCONTINUED | OUTPATIENT
Start: 2022-01-03 | End: 2022-01-05 | Stop reason: HOSPADM

## 2022-01-03 RX ORDER — SODIUM CHLORIDE 9 MG/ML
25 INJECTION, SOLUTION INTRAVENOUS PRN
Status: DISCONTINUED | OUTPATIENT
Start: 2022-01-03 | End: 2022-01-05 | Stop reason: HOSPADM

## 2022-01-03 RX ADMIN — Medication 6 MG: at 22:32

## 2022-01-03 RX ADMIN — ASPIRIN 81 MG 81 MG: 81 TABLET ORAL at 22:31

## 2022-01-03 RX ADMIN — PSEUDOEPHEDRINE HYDROCHLORIDE 60 MG: 60 TABLET ORAL at 17:56

## 2022-01-03 RX ADMIN — INSULIN GLARGINE 54 UNITS: 100 INJECTION, SOLUTION SUBCUTANEOUS at 22:38

## 2022-01-03 RX ADMIN — SODIUM CHLORIDE, PRESERVATIVE FREE 10 ML: 5 INJECTION INTRAVENOUS at 22:32

## 2022-01-03 RX ADMIN — FUROSEMIDE 40 MG: 10 INJECTION, SOLUTION INTRAVENOUS at 17:27

## 2022-01-03 RX ADMIN — ATORVASTATIN CALCIUM 80 MG: 40 TABLET, FILM COATED ORAL at 22:31

## 2022-01-03 RX ADMIN — AMLODIPINE BESYLATE 10 MG: 5 TABLET ORAL at 22:32

## 2022-01-03 RX ADMIN — CITALOPRAM HYDROBROMIDE 40 MG: 10 TABLET ORAL at 22:32

## 2022-01-03 RX ADMIN — PANTOPRAZOLE SODIUM 40 MG: 40 TABLET, DELAYED RELEASE ORAL at 22:31

## 2022-01-03 RX ADMIN — CARVEDILOL 25 MG: 6.25 TABLET, FILM COATED ORAL at 22:31

## 2022-01-03 RX ADMIN — LISINOPRIL 40 MG: 10 TABLET ORAL at 22:32

## 2022-01-03 ASSESSMENT — ENCOUNTER SYMPTOMS
COUGH: 0
BACK PAIN: 0
WHEEZING: 0
NAUSEA: 0
SORE THROAT: 0
ABDOMINAL PAIN: 0
CHEST TIGHTNESS: 0
VOMITING: 0
EYE PAIN: 0
DIARRHEA: 0
SHORTNESS OF BREATH: 1

## 2022-01-03 NOTE — ED PROVIDER NOTES
[address] [facility]        Pt Name: Jocelyn Sahu  MRN: [id number]  [place] 1955  Date of evaluation: 1/3/2022  Provider: MADISON Bush - NALINI  PCP: Yaz Callahan MD  Note Started: 4:57 PM EST       VITA. I have evaluated this patient. My supervising physician was available for consultation. CHIEF COMPLAINT       Chief Complaint   Patient presents with    Shortness of Breath     x 2 days; per EMS pt O2 sat was 90% on RA at home; pt states she's been around a neighbor with pneumonia  no resp hx       HISTORY OF PRESENT ILLNESS   (Location, Timing/Onset, Context/Setting, Quality, Duration, Modifying Factors, Severity, Associated Signs and Symptoms)  Note limiting factors. Chief Complaint: Shortness of breath and postnasal drip    Jocelyn Sahu is a 74 y.o. female who presents to the ED today with complaints of shortness of breath and postnasal drip. Symptoms started yesterday. Her shortness of breath has progressively worsened. She has tried over-the-counter nasal decongestions and antihistamines without any relief. No chest pain. No cough, fevers or chills. Is fully vaccinated against COVID-19 including a booster. States that her neighbor had pneumonia recently and they share the same space. She is uncertain but does not know whether or not her neighbor had COVID-19. She is not endorsing any fevers chills body aches. She denies any chest pain, abdominal pain, nausea vomiting or diarrhea. Came to the ED for her further evaluation and treatment. Is not a smoker and has no history of asthma or COPD. Nursing Notes were all reviewed and agreed with or any disagreements were addressed in the HPI. REVIEW OF SYSTEMS    (2-9 systems for level 4, 10 or more for level 5)     Review of Systems   Constitutional: Negative for chills, fatigue and fever. HENT: Positive for postnasal drip.  Negative for congestion and sore throat. Eyes: Negative for pain and visual disturbance. Respiratory: Positive for shortness of breath. Negative for cough, chest tightness and wheezing. Cardiovascular: Negative for chest pain, palpitations and leg swelling. Gastrointestinal: Negative for abdominal pain, diarrhea, nausea and vomiting. Genitourinary: Negative for dysuria and hematuria. Musculoskeletal: Negative for back pain, myalgias, neck pain and neck stiffness. Skin: Negative for rash and wound. Allergic/Immunologic: Negative for immunocompromised state. Neurological: Negative for dizziness and light-headedness. All other systems reviewed and are negative. Positives and Pertinent negatives as per HPI. Except as noted above in the ROS, all other systems were reviewed and negative.        PAST MEDICAL HISTORY     Past Medical History:   Diagnosis Date    Abnormal echocardiogram     25% on 3/11/14 and 50% on 3/19/14    Back pain     Cardiomyopathy ([address]     EF was 50% on 3/19/14    Chipped tooth     lower left    Clostridium difficile diarrhea 03/12/2021    Dental crown present     veneers    Depressive disorder 08/13/2014    Diabetic infection of right foot ([address] 04/15/2015    Diabetic ulcer of toe of left foot associated with type 2 diabetes mellitus, with fat layer exposed ([address] 04/10/2018    Pt slipped in hot tub latter part of February and has not healed since despite topical treatment    DVT (deep venous thrombosis) ([address]     HTN (hypertension)     Hx of blood clots 2016    left leg    Ischemic cardiomyopathy 04/15/2015    Kidney disease     CHRONIC STAGE 3    Meniere's disease     Mixed hyperlipidemia 03/07/2016    Nicotine abuse     NSTEMI (non-ST elevated myocardial infarction) ([address] 03/13/2014    ANGLE (obstructive sleep apnea)     Osteomyelitis of ankle or foot, acute, left (HCC) 06/04/2018    Pneumonia     PONV (postoperative nausea and vomiting)     nausea    Spinal epidural MG TABLET    Take 1 tablet by mouth daily    ASPIRIN 81 MG CHEWABLE TABLET    Take 1 tablet by mouth daily. ATORVASTATIN (LIPITOR) 80 MG TABLET    Take 1 tablet by mouth daily To the patient: Please call to make an appointment. Phone: [phone]. B-D UF III MINI PEN NEEDLES 31G X 5 MM MISC    USE AS DIRECTED TWICE A DAY    BLOOD GLUCOSE TEST STRIPS (ONE TOUCH ULTRA TEST) STRIP    Check FSBS 2-3 times daily.     CARVEDILOL (COREG) 25 MG TABLET    Take 1 tablet by mouth 2 times daily (with meals)    CHOLECALCIFEROL (VITAMIN D3) 2000 UNITS CAPS    Take 2,000 Units by mouth daily     CITALOPRAM (CELEXA) 40 MG TABLET    Take 1 tablet by mouth daily    FUROSEMIDE (LASIX) 20 MG TABLET    TAKE 1 TABLET BY MOUTH EVERY DAY    LANTUS SOLOSTAR 100 UNIT/ML INJECTION PEN    INJECT 54 UNITS INTO THE SKIN NIGHTLY    LIRAGLUTIDE (VICTOZA) 18 MG/3ML SOPN SC INJECTION    Inject 1.8 mg into the skin daily    LISINOPRIL (PRINIVIL;ZESTRIL) 40 MG TABLET    TAKE 1 TABLET BY MOUTH EVERY DAY    MELATONIN 5 MG TABS TABLET    Take 1 tablet by mouth daily    ONE TOUCH LANCETS MISC    1 each by Does not apply route 3 times daily    PANTOPRAZOLE (PROTONIX) 40 MG TABLET    Take 1 tablet by mouth daily         ALLERGIES     Doxycycline    FAMILYHISTORY       Family History   Problem Relation Age of Onset    High Blood Pressure Mother     Cancer Mother     Rheum Arthritis Mother     COPD Father     Cancer Father     Osteoarthritis Neg Hx     Asthma Neg Hx     Breast Cancer Neg Hx     Diabetes Neg Hx     Heart Failure Neg Hx     High Cholesterol Neg Hx     Hypertension Neg Hx     Migraines Neg Hx     Ovarian Cancer Neg Hx     Rashes/Skin Problems Neg Hx     Seizures Neg Hx     Stroke Neg Hx     Thyroid Disease Neg Hx           SOCIAL HISTORY       Social History     Tobacco Use    Smoking status: Former Smoker     Packs/day: 0.50     Years: 10.00     Pack years: 5.00     Quit date: 11/10/2013     Years since quittin.1  Smokeless tobacco: Never Used   Vaping Use    Vaping Use: Never used   Substance Use Topics    Alcohol use: Yes     Alcohol/week: 0.0 standard drinks     Comment: occasionally    Drug use: No       SCREENINGS             PHYSICAL EXAM    (up to 7 for level 4, 8 or more for level 5)     ED Triage Vitals [01/03/22 1634]   BP Temp Temp Source Pulse Resp SpO2 Height Weight   (!) 159/67 98.6 °F (37 °C) Oral 67 16 94 % 5' 3\" (1.6 m) 240 lb 1.3 oz (108.9 kg)       Physical Exam  Vitals and nursing note reviewed. Constitutional:       General: She is not in acute distress. Appearance: Normal appearance. She is well-developed. She is obese. She is not ill-appearing, toxic-appearing or diaphoretic. HENT:      Head: Normocephalic and atraumatic. No raccoon eyes, Randhawa's sign, right periorbital erythema or left periorbital erythema. Right Ear: Hearing and external ear normal.      Left Ear: Hearing and external ear normal.      Nose: Nose normal. No laceration, nasal tenderness, mucosal edema, congestion or rhinorrhea. Right Nostril: No epistaxis. Left Nostril: No epistaxis. Mouth/Throat:      Lips: Pink. No lesions. Mouth: Mucous membranes are moist.      Tongue: No lesions. Tongue does not deviate from midline. Pharynx: Oropharynx is clear. Uvula midline. No pharyngeal swelling, oropharyngeal exudate, posterior oropharyngeal erythema or uvula swelling. Tonsils: No tonsillar exudate or tonsillar abscesses. Eyes:      General: Lids are normal.         Right eye: No discharge. Left eye: No discharge. Extraocular Movements: Extraocular movements intact. Neck:      Trachea: Phonation normal. No abnormal tracheal secretions or tracheal deviation. Comments: No meningismus   Cardiovascular:      Rate and Rhythm: Normal rate and regular rhythm. Pulses: Normal pulses. Heart sounds: Normal heart sounds. No murmur heard. No friction rub. No gallop. Pulmonary:      Effort: Pulmonary effort is normal. No respiratory distress. Breath sounds: Normal breath sounds. No stridor. No decreased breath sounds, wheezing, rhonchi or rales. Abdominal:      General: Bowel sounds are normal. There is no distension. Palpations: Abdomen is soft. Tenderness: There is no abdominal tenderness. There is no guarding or rebound. Musculoskeletal:         General: Normal range of motion. Cervical back: Full passive range of motion without pain, normal range of motion and neck supple. No rigidity. No spinous process tenderness or muscular tenderness. Lymphadenopathy:      Cervical: No cervical adenopathy. Skin:     General: Skin is warm and dry. Capillary Refill: Capillary refill takes less than 2 seconds. Neurological:      General: No focal deficit present. Mental Status: She is alert and oriented to person, place, and time. GCS: GCS eye subscore is 4. GCS verbal subscore is 5. GCS motor subscore is 6. Sensory: Sensation is intact. Motor: Motor function is intact. Gait: Gait is intact.    Psychiatric:         Mood and Affect: Mood normal.         Behavior: Behavior normal.         DIAGNOSTIC RESULTS   LABS:    Labs Reviewed   CBC WITH AUTO DIFFERENTIAL - Abnormal; Notable for the following components:       Result Value    RBC 3.42 (*)     Hemoglobin 9.6 (*)     Hematocrit 29.6 (*)     RDW 15.5 (*)     All other components within normal limits    Narrative:     Performed at:  [address] [address]   Phone [phone]   COMPREHENSIVE METABOLIC PANEL W/ REFLEX TO MG FOR LOW K - Abnormal; Notable for the following components:    Sodium 135 (*)     Glucose 163 (*)     BUN 27 (*)     CREATININE 1.9 (*)     GFR Non- 26 (*)     GFR African American 32 (*)     Albumin/Globulin Ratio 1.0 (*)     ALT <5 (*)     AST 9 (*)     All other components within normal limits    Narrative:     Performed at:  [facility]  [address], [place] [address]   Phone [phone]   BRAIN NATRIURETIC PEPTIDE - Abnormal; Notable for the following components:    Pro-BNP 1,145 (*)     All other components within normal limits    Narrative:     Performed at:  [facility]  [address] [facility] [place] [address]   Phone [phone]   RAPID INFLUENZA A/B ANTIGENS    Narrative:     Performed at:  [facility]  [address] [place] [address]   Phone [phone] [phone]    Narrative:     Performed at:  [facility] [address] [place] [address]   Phone [phone]   LIPASE    Narrative:     Performed at:  [facility]  [facility] [address], [facility] [place] [address]   Phone [phone]   TROPONIN    Narrative:     Performed at:  [address] [facility] [place] [address]   Phone [phone]       When ordered only abnormal lab results are displayed. All other labs were within normal range or not returned as of this dictation. EKG: When ordered, EKG's are interpreted by the Emergency Department Physician in the absence of a cardiologist.  Please see their note for interpretation of EKG. RADIOLOGY:   Non-plain film images such as CT, Ultrasound and MRI are read by the radiologist. Plain radiographic images are visualized and preliminarily interpreted by the ED Provider with the below findings:        Interpretation per the Radiologist below, if available at the time of this note:    XR CHEST (2 VW)   Final Result   Findings suggest congestive heart failure           No results found.         PROCEDURES   Unless otherwise noted below, none     Procedures    CRITICAL CARE TIME   CRITICAL CARE NOTE:  There was a high probability of clinically significant life-threatening deterioration of the patient's condition requiring my urgent intervention. Total critical care time was at least 10 minutes. This includes vital sign monitoring, pulse oximetry monitoring, telemetry monitoring, clinical response to the IV medications, reviewing the nursing notes, consultation time, dictation/documentation time, and interpretation of the labwork. This excludes any separately billable procedures performed. CONSULTS:  None      EMERGENCY DEPARTMENT COURSE and DIFFERENTIAL DIAGNOSIS/MDM:   Vitals:    Vitals:    01/03/22 1800 01/03/22 1900 01/03/22 1909 01/03/22 1911   BP:       Pulse:       Resp:   26 20   Temp:       TempSrc:       SpO2: 95% (!) 87% (!) 87% 93%   Weight:       Height:           Patient was given the following medications:  Medications   pseudoephedrine (SUDAFED) tablet 60 mg (60 mg Oral Given 1/3/22 1756)   furosemide (LASIX) injection 40 mg (40 mg IntraVENous Given 1/3/22 1727)           MDM see HPI and above for full presentation physical exam.  Differential diagnoses include ACS, arrhythmia, CHF, COPD, asthma, bacterial pneumonia, viral pneumonia, viral illness, COVID-19, influenza, anemia, other    Patient has no leukocytosis, no significant anemia. Stable anemia with hemoglobin of 9.6 and hematocrit of 29.6. No significant electrolyte derangements. CKD is at baseline with creatinine of 1.9, BUN of 27 GFR of 26. LFTs and lipase within normal limits. Rapid flu and rapid Covid are both negative. BNP is slightly elevated at 1145. She has pulmonary vascular congestion on her chest x-ray with findings suggestive of CHF. When speaking to her about her furosemide regimen she states that \"I was stopped taking that. \"  When asked why she stopped taking it she states that \"I did not like how I had to pee all the time at night. \" she did not notify her physician that she had stopped this medication.   She does see not only Dr. Na Nieto with [facility] cardiology but also sees Dr. Kingsley Simental with nephrology as well as her primary care provider Dr. Ric Sun. Right now she is not hypoxic. Will ambulate to ascertain whether or not she becomes hypoxic or is severely dyspneic with this. In the interim we will give her a dose of IV Lasix, 40 mg. Trial of her after some Lasix with a ambulation trial on pulse ox. She was slightly dyspneic but not overtly so. However she dropped to 87% on room air. It took her several minutes to recover. Is on 2 L nasal cannula. Typically does not require any oxygen at home. Will need to be admitted for her hypoxia secondary to her CHF. I therefore consulted the hospitalist who agreed to admit patient and write orders for admission. FINAL IMPRESSION      1. Pulmonary vascular congestion    2. Non compliance with medical treatment    3. Hypoxia          DISPOSITION/PLAN   DISPOSITION        PATIENT REFERRED TO:  No follow-up provider specified.     DISCHARGE MEDICATIONS:  New Prescriptions    No medications on file       DISCONTINUED MEDICATIONS:  Discontinued Medications    No medications on file              (Please note that portions of this note were completed with a voice recognition program.  Efforts were made to edit the dictations but occasionally words are mis-transcribed.)    MADISON Agee CNP (electronically signed)            MADISON Agee CNP  01/03/22 MADISON Polo CNP  01/03/22 2002

## 2022-01-03 NOTE — ED PROVIDER NOTES
EKG: Normal sinus rhythm, rate of 67, age-indeterminate anterior septal infarct. Rhythm strip shows sinus rhythm with a rate of 67, AL interval 198 ms, QRS 70 ms with no other ectopy as interpreted by me. This compared to an EKG dated 3/11/2021 no significant changes noted.       Lucila Guillen MD  01/03/22 7330

## 2022-01-03 NOTE — ED NOTES
Bed: B-04  Expected date:   Expected time:   Means of arrival: [facility]  Comments:  ANGELICA Gutierrez RN  01/03/22 9474

## 2022-01-04 PROBLEM — R09.89 PULMONARY VASCULAR CONGESTION: Status: ACTIVE | Noted: 2022-01-04

## 2022-01-04 PROBLEM — J96.01 ACUTE RESPIRATORY FAILURE WITH HYPOXIA (HCC): Status: ACTIVE | Noted: 2022-01-04

## 2022-01-04 PROBLEM — E11.9 DMII (DIABETES MELLITUS, TYPE 2) (HCC): Status: ACTIVE | Noted: 2017-09-28

## 2022-01-04 PROBLEM — I50.33 ACUTE ON CHRONIC DIASTOLIC (CONGESTIVE) HEART FAILURE (HCC): Status: ACTIVE | Noted: 2022-01-04

## 2022-01-04 LAB
ANION GAP SERPL CALCULATED.3IONS-SCNC: 18 MMOL/L (ref 3–16)
BASOPHILS ABSOLUTE: 0.1 K/UL (ref 0–0.2)
BASOPHILS RELATIVE PERCENT: 1 %
BILIRUBIN URINE: NEGATIVE
BLOOD, URINE: NEGATIVE
BUN BLDV-MCNC: 37 MG/DL (ref 7–20)
CALCIUM SERPL-MCNC: 9.4 MG/DL (ref 8.3–10.6)
CHLORIDE BLD-SCNC: 103 MMOL/L (ref 99–110)
CLARITY: CLEAR
CO2: 20 MMOL/L (ref 21–32)
COLOR: YELLOW
CREAT SERPL-MCNC: 2.3 MG/DL (ref 0.6–1.2)
CREATININE URINE: 39.2 MG/DL (ref 28–259)
EKG ATRIAL RATE: 67 BPM
EKG DIAGNOSIS: NORMAL
EKG P AXIS: 48 DEGREES
EKG P-R INTERVAL: 198 MS
EKG Q-T INTERVAL: 404 MS
EKG QRS DURATION: 70 MS
EKG QTC CALCULATION (BAZETT): 426 MS
EKG R AXIS: -8 DEGREES
EKG T AXIS: 58 DEGREES
EKG VENTRICULAR RATE: 67 BPM
EOSINOPHILS ABSOLUTE: 0.2 K/UL (ref 0–0.6)
EOSINOPHILS RELATIVE PERCENT: 1.9 %
EPITHELIAL CELLS, UA: 1 /HPF (ref 0–5)
GFR AFRICAN AMERICAN: 26
GFR NON-AFRICAN AMERICAN: 21
GLUCOSE BLD-MCNC: 112 MG/DL (ref 70–99)
GLUCOSE BLD-MCNC: 120 MG/DL (ref 70–99)
GLUCOSE BLD-MCNC: 122 MG/DL (ref 70–99)
GLUCOSE BLD-MCNC: 60 MG/DL (ref 70–99)
GLUCOSE BLD-MCNC: 80 MG/DL (ref 70–99)
GLUCOSE BLD-MCNC: 97 MG/DL (ref 70–99)
GLUCOSE BLD-MCNC: 98 MG/DL (ref 70–99)
GLUCOSE URINE: NEGATIVE MG/DL
HCT VFR BLD CALC: 28.6 % (ref 36–48)
HEMOGLOBIN: 9.4 G/DL (ref 12–16)
HYALINE CASTS: 2 /LPF (ref 0–8)
KETONES, URINE: NEGATIVE MG/DL
LEUKOCYTE ESTERASE, URINE: NEGATIVE
LV EF: 63 %
LVEF MODALITY: NORMAL
LYMPHOCYTES ABSOLUTE: 1.5 K/UL (ref 1–5.1)
LYMPHOCYTES RELATIVE PERCENT: 16 %
MCH RBC QN AUTO: 28.4 PG (ref 26–34)
MCHC RBC AUTO-ENTMCNC: 32.8 G/DL (ref 31–36)
MCV RBC AUTO: 86.5 FL (ref 80–100)
MICROSCOPIC EXAMINATION: YES
MONOCYTES ABSOLUTE: 0.9 K/UL (ref 0–1.3)
MONOCYTES RELATIVE PERCENT: 9.4 %
NEUTROPHILS ABSOLUTE: 6.8 K/UL (ref 1.7–7.7)
NEUTROPHILS RELATIVE PERCENT: 71.7 %
NITRITE, URINE: NEGATIVE
PDW BLD-RTO: 15.9 % (ref 12.4–15.4)
PERFORMED ON: ABNORMAL
PERFORMED ON: NORMAL
PERFORMED ON: NORMAL
PH UA: 5.5 (ref 5–8)
PLATELET # BLD: 255 K/UL (ref 135–450)
PMV BLD AUTO: 9.1 FL (ref 5–10.5)
POTASSIUM REFLEX MAGNESIUM: 3.9 MMOL/L (ref 3.5–5.1)
PROTEIN PROTEIN: 82 MG/DL
PROTEIN UA: 100 MG/DL
PROTEIN/CREAT RATIO: 2.1 MG/DL
RBC # BLD: 3.3 M/UL (ref 4–5.2)
RBC UA: 0 /HPF (ref 0–4)
SODIUM BLD-SCNC: 141 MMOL/L (ref 136–145)
SPECIFIC GRAVITY UA: 1.01 (ref 1–1.03)
URINE TYPE: ABNORMAL
UROBILINOGEN, URINE: 0.2 E.U./DL
WBC # BLD: 9.5 K/UL (ref 4–11)
WBC UA: 1 /HPF (ref 0–5)

## 2022-01-04 PROCEDURE — 96372 THER/PROPH/DIAG INJ SC/IM: CPT

## 2022-01-04 PROCEDURE — 80048 BASIC METABOLIC PNL TOTAL CA: CPT

## 2022-01-04 PROCEDURE — 6360000002 HC RX W HCPCS: Performed by: INTERNAL MEDICINE

## 2022-01-04 PROCEDURE — 85025 COMPLETE CBC W/AUTO DIFF WBC: CPT

## 2022-01-04 PROCEDURE — 6370000000 HC RX 637 (ALT 250 FOR IP): Performed by: FAMILY MEDICINE

## 2022-01-04 PROCEDURE — 84156 ASSAY OF PROTEIN URINE: CPT

## 2022-01-04 PROCEDURE — 36415 COLL VENOUS BLD VENIPUNCTURE: CPT

## 2022-01-04 PROCEDURE — 81001 URINALYSIS AUTO W/SCOPE: CPT

## 2022-01-04 PROCEDURE — 6370000000 HC RX 637 (ALT 250 FOR IP): Performed by: INTERNAL MEDICINE

## 2022-01-04 PROCEDURE — 93010 ELECTROCARDIOGRAM REPORT: CPT | Performed by: INTERNAL MEDICINE

## 2022-01-04 PROCEDURE — 83036 HEMOGLOBIN GLYCOSYLATED A1C: CPT

## 2022-01-04 PROCEDURE — 96376 TX/PRO/DX INJ SAME DRUG ADON: CPT

## 2022-01-04 PROCEDURE — 82570 ASSAY OF URINE CREATININE: CPT

## 2022-01-04 PROCEDURE — G0378 HOSPITAL OBSERVATION PER HR: HCPCS

## 2022-01-04 PROCEDURE — 2580000003 HC RX 258: Performed by: INTERNAL MEDICINE

## 2022-01-04 PROCEDURE — 1200000000 HC SEMI PRIVATE

## 2022-01-04 PROCEDURE — 99223 1ST HOSP IP/OBS HIGH 75: CPT | Performed by: HOSPITALIST

## 2022-01-04 RX ORDER — BACITRACIN ZINC AND POLYMYXIN B SULFATE 500; 1000 [USP'U]/G; [USP'U]/G
OINTMENT TOPICAL 2 TIMES DAILY
Status: DISCONTINUED | OUTPATIENT
Start: 2022-01-04 | End: 2022-01-05 | Stop reason: HOSPADM

## 2022-01-04 RX ORDER — GABAPENTIN 100 MG/1
300 CAPSULE ORAL 2 TIMES DAILY
COMMUNITY
End: 2022-05-11 | Stop reason: SDUPTHER

## 2022-01-04 RX ADMIN — SODIUM CHLORIDE, PRESERVATIVE FREE 10 ML: 5 INJECTION INTRAVENOUS at 08:52

## 2022-01-04 RX ADMIN — SODIUM CHLORIDE, PRESERVATIVE FREE 10 ML: 5 INJECTION INTRAVENOUS at 21:53

## 2022-01-04 RX ADMIN — ATORVASTATIN CALCIUM 80 MG: 40 TABLET, FILM COATED ORAL at 21:47

## 2022-01-04 RX ADMIN — CARVEDILOL 25 MG: 6.25 TABLET, FILM COATED ORAL at 17:45

## 2022-01-04 RX ADMIN — CARVEDILOL 25 MG: 6.25 TABLET, FILM COATED ORAL at 08:50

## 2022-01-04 RX ADMIN — AMLODIPINE BESYLATE 10 MG: 5 TABLET ORAL at 21:48

## 2022-01-04 RX ADMIN — ENOXAPARIN SODIUM 40 MG: 100 INJECTION SUBCUTANEOUS at 08:49

## 2022-01-04 RX ADMIN — FUROSEMIDE 40 MG: 10 INJECTION, SOLUTION INTRAMUSCULAR; INTRAVENOUS at 17:45

## 2022-01-04 RX ADMIN — ASPIRIN 81 MG 81 MG: 81 TABLET ORAL at 21:48

## 2022-01-04 RX ADMIN — BACITRACIN ZINC AND POLYMYXIN B SULFATE: 500; 10000 OINTMENT TOPICAL at 22:01

## 2022-01-04 RX ADMIN — PANTOPRAZOLE SODIUM 40 MG: 40 TABLET, DELAYED RELEASE ORAL at 21:47

## 2022-01-04 RX ADMIN — LISINOPRIL 40 MG: 10 TABLET ORAL at 21:47

## 2022-01-04 RX ADMIN — FUROSEMIDE 40 MG: 10 INJECTION, SOLUTION INTRAMUSCULAR; INTRAVENOUS at 08:50

## 2022-01-04 RX ADMIN — Medication 6 MG: at 21:47

## 2022-01-04 RX ADMIN — INSULIN GLARGINE 54 UNITS: 100 INJECTION, SOLUTION SUBCUTANEOUS at 21:47

## 2022-01-04 RX ADMIN — CITALOPRAM HYDROBROMIDE 40 MG: 10 TABLET ORAL at 21:47

## 2022-01-04 ASSESSMENT — PAIN SCALES - GENERAL: PAINLEVEL_OUTOF10: 0

## 2022-01-04 NOTE — H&P
Hospital Medicine  History and Physical    PCP: Kane Mcclain MD  Patient Name: Sheron King    Date of Service: Pt seen/examined on 1/3/22 and admitted to Inpatient with expected LOS greater than two midnights due to medical therapy    CHIEF COMPLAINT:  Pt c/o shortness of breath  HISTORY OF PRESENT ILLNESS: Pt is an 74y.o. year-old female with a history of hypertension, hyperlipidemia, diabetes mellitus, chronic diastolic congestive heart failure, CKD 3 and morbid obesity. She presents to the emergency room via EMS for evaluation following a 2-day history of increased shortness of breath. She states that her shortness of breath is worse with exertion and improved with rest.  She had increased concern because she has a neighbor that has pneumonia. She reports that she has sinus congestion with postnasal drip. She has tried over-the-counter medications which have not helped. In the emergency room she was found to have pulmonary vascular congestion. She does state that she is not compliant with taking her Lasix, because it makes her urinate too often. She is being admitted for further evaluation and treatment. Associated signs and symptoms do not include fever or chills, nausea, vomiting, abdominal pain, hemoptysis, hematochezia, diarrhea, constipation or urinary symptoms.         Past Medical History:        Diagnosis Date    Abnormal echocardiogram     25% on 3/11/14 and 50% on 3/19/14    Back pain     Cardiomyopathy ([address]     EF was 50% on 3/19/14    Chipped tooth     lower left    Clostridium difficile diarrhea 03/12/2021    Dental crown present     veneers    Depressive disorder 08/13/2014    Diabetic infection of right foot ([address] 04/15/2015    Diabetic ulcer of toe of left foot associated with type 2 diabetes mellitus, with fat layer exposed ([address] 04/10/2018    Pt slipped in hot tub latter part of February and has not healed since despite topical treatment    DVT (deep venous thrombosis) ([facility]     HTN (hypertension)     Hx of blood clots 2016    left leg    Ischemic cardiomyopathy 04/15/2015    Kidney disease     CHRONIC STAGE 3    Meniere's disease     Mixed hyperlipidemia 03/07/2016    Nicotine abuse     NSTEMI (non-ST elevated myocardial infarction) ([facility] 03/13/2014    ANGLE (obstructive sleep apnea)     Osteomyelitis of ankle or foot, acute, left ([facility] 06/04/2018    Pneumonia     PONV (postoperative nausea and vomiting)     nausea    Spinal epidural abscess 03/13/2014    Type II diabetes mellitus, uncontrolled ([facility] 08/13/2014       Past Surgical History:        Procedure Laterality Date    BACK SURGERY  3/2014    DEBRIDEMENT  3/12/14    extensive debridement of bone/muscle and paraspinal empyema    DILATION AND CURETTAGE OF UTERUS      ENDOSCOPY, COLON, DIAGNOSTIC      FOOT DEBRIDEMENT Left 9/8/2020    LEFT FOOT DEBRIDEMENT INCISION AND DRAINAGE performed by Narendra Wynn DPM at [address] [facility] [address] TOE SURGERY Left 8/28/2020    ON THE LEFT: [address] [facility] [address], WOUND CLOSURE, FLEXOR TENOTOMY 4TH AND 5TH DIGITS, TENOTOMY AND CAPSULOTOMY 2ND DIGIT performed by Narendra Wynn DPM at /[place] [id number]  6/15/1999    complete-think right ovary in.    NASAL SEPTUM SURGERY      NERVE SURGERY Left 9/10/2020    LEFT FOOT INCISION AND DRAINAGE, EXTENSOR HALLUCIS LONGUS REPAIR WITH DELAYED PRIMARY CLOSURE performed by Narendra Wynn DPM at [place] shut behind right ear    PRESSURE ULCER DEBRIDEMENT Left 10/23/2020    ON THE LEFT: SURGICAL PREPARATION OF WOUND BED, APPLICATION OF DERMAL GRAFT SUBSTITUTE performed by Narendra Wynn DPM at [address]  04/02/2019    ROBOTIC ASSISTED LAPAROSCOPIC SLEEVE GASTRECTOMY, LAPAROSCOPIC REDUCIBLE INCISIONAL HERNIA REPAIR     SLEEVE GASTRECTOMY N/A 4/2/2019    ROBOTIC ASSISTED LAPAROSCOPIC SLEEVE GASTRECTOMY, LAPAROSCOPIC REDUCIBLE INCISIONAL HERNIA REPAIR performed by Trish Chatterjee DO at [address] [facility] N/A 2/8/2019    EGD BIOPSY performed by Trish Chatterjee DO at [facility] N/A 2/8/2019    EGD POLYP ABLATION/OTHER performed by Trish Chatterjee DO at [facility] ENDOSCOPY       Allergies:  Doxycycline    Medications Prior to Admission:    Prior to Admission medications    Medication Sig Start Date End Date Taking? Authorizing Provider   Liraglutide (VICTOZA) 18 MG/3ML SOPN SC injection Inject 1.8 mg into the skin daily  Patient taking differently: Inject 1.8 mg into the skin every morning  11/29/21  Yes Corky Judge MD   LANTUS SOLOSTAR 100 UNIT/ML injection pen INJECT 54 UNITS INTO THE SKIN NIGHTLY  Patient taking differently: Inject 54 Units into the skin every morning  11/16/21  Yes Corky Judge MD   furosemide (LASIX) 20 MG tablet TAKE 1 TABLET BY MOUTH EVERY DAY 11/2/21  Yes Froylan Bagley MD   atorvastatin (LIPITOR) 80 MG tablet Take 1 tablet by mouth daily To the patient: Please call to make an appointment. Phone: [phone]. Patient taking differently: Take 80 mg by mouth every evening To the patient: Please call to make an appointment.   Phone: [phone]. 11/2/21  Yes Corky Judge MD   carvedilol (COREG) 25 MG tablet Take 1 tablet by mouth 2 times daily (with meals) 9/30/21  Yes Corky Judge MD   citalopram (CELEXA) 40 MG tablet Take 1 tablet by mouth daily  Patient taking differently: Take 40 mg by mouth every evening  9/30/21  Yes Corky Judge MD   pantoprazole (PROTONIX) 40 MG tablet Take 1 tablet by mouth daily  Patient taking differently: Take 40 mg by mouth every evening  9/30/21  Yes Corky Judge MD   lisinopril (PRINIVIL;ZESTRIL) 40 MG tablet TAKE 1 TABLET BY MOUTH EVERY DAY  Patient taking differently: Take 40 mg by mouth every evening  7/15/21  Yes Corky Judge MD   amLODIPine (NORVASC) 10 MG tablet Take 1 tablet by mouth daily  Patient taking differently: Take 10 mg by mouth every evening  7/15/21  Yes Gladys Ralph MD   acetaminophen (TYLENOL) 500 MG tablet Take 500 mg by mouth every 6 hours as needed for Pain   Yes Historical Provider, MD   melatonin 5 MG TABS tablet Take 1 tablet by mouth daily 4/6/21  Yes Gladys Ralph MD   Cholecalciferol (VITAMIN D3) 2000 units CAPS Take 2,000 Units by mouth every evening    Yes Historical Provider, MD   aspirin 81 MG chewable tablet Take 1 tablet by mouth daily. Patient taking differently: Take 81 mg by mouth every evening  3/20/14  Yes Ale Castellanos MD   B-D UF III MINI PEN NEEDLES 31G X 5 MM MISC USE AS DIRECTED TWICE A DAY 11/22/21   Gladys Ralph MD   blood glucose test strips (ONE TOUCH ULTRA TEST) strip Check FSBS 2-3 times daily. 3/5/20   Gladys Ralph MD   ONE TOUCH LANCETS MISC 1 each by Does not apply route 3 times daily 2/14/19   Gladys Ralph MD       Family History:       Problem Relation Age of Onset    High Blood Pressure Mother     Cancer Mother     Rheum Arthritis Mother     COPD Father     Cancer Father     Osteoarthritis Neg Hx     Asthma Neg Hx     Breast Cancer Neg Hx     Diabetes Neg Hx     Heart Failure Neg Hx     High Cholesterol Neg Hx     Hypertension Neg Hx     Migraines Neg Hx     Ovarian Cancer Neg Hx     Rashes/Skin Problems Neg Hx     Seizures Neg Hx     Stroke Neg Hx     Thyroid Disease Neg Hx      Social History:   TOBACCO:   reports that she quit smoking about 8 years ago. She has a 5.00 pack-year smoking history. She has never used smokeless tobacco.  ETOH:   reports current alcohol use.   OCCUPATION:      REVIEW OF SYSTEMS:  A full review of systems was performed and is negative except for that which appears in the HPI    Physical Exam:    Vitals: BP (!) 180/74   Pulse 66   Temp 98 °F (36.7 °C) (Oral)   Resp 20   Ht 5' 3\" (1.6 m)   Wt 240 lb 1.3 oz (108.9 kg)   LMP 06/15/1999   SpO2 94%   BMI 42.53 kg/m²   General appearance: WD/WN 74y.o. year-old female who is alert, appears stated age and is cooperative  HEENT: Head: Normocephalic, no lesions, without obvious abnormality. Eye: Normal external eye, conjunctiva, lids cornea, PEERL. Ears: Normal external ears. Non-tender. Nose: Normal external nose, mucus membranes and septum. Pharynx: Dental Hygiene adequate. Normal buccal mucosa. Normal pharynx. Neck: no adenopathy, no carotid bruit, no JVD, supple, symmetrical, trachea midline and thyroid not enlarged, symmetric, no tenderness/mass/nodules  Lungs: clear to auscultation bilaterally and no use of accessory muscles  Heart: regular rate and rhythm, S1, S2 normal. II/VI JUAN F no click, rub or gallop and normal apical impulse  Abdomen: soft, non-tender; bowel sounds normal; no masses, no organomegaly  Extremities: extremities atraumatic, no cyanosis. 2+ edema bilateral lower extremities. Homans sign is negative, no sign of DVT. Capillary Refill: Acceptable < 3 seconds   Peripheral Pulses: +3 easily felt, not easily obliterated with pressures   Skin: Skin color, texture, turgor normal. No rashes or lesions on exposed skin  Neurologic: Neurovascularly intact without any focal sensory/motor deficits. Cranial nerves: II-XII intact, grossly non-focal. Gait was not tested.   Mental Status: Alert and oriented, thought content appropriate, normal insight    CBC:   Recent Labs     01/03/22  1704   WBC 9.6   HGB 9.6*        BMP:    Recent Labs     01/03/22  1704   *   K 4.1      CO2 22   BUN 27*   CREATININE 1.9*   GLUCOSE 163*     Troponin:   Recent Labs     01/03/22  1704   TROPONINI <0.01     PT/INR:  No results found for: PTINR  U/A:    Lab Results   Component Value Date    LEUKOCYTESUR Negative 03/11/2021    NITRITE neg 08/01/2016    RBCUA 3-4 03/11/2021    SPECGRAV 1.020 03/11/2021    UROBILINOGEN 0.2 03/11/2021    BILIRUBINUR Negative 03/11/2021    BILIRUBINUR neg 08/01/2016    BLOODU MODERATE 03/11/2021 GLUCOSEU >=1000 03/11/2021    PROTEINU 100 03/11/2021         RAD:   I have independently reviewed and interpreted the imaging studies below and based my recommendations to the patient on those findings. XR CHEST (2 VW)    Result Date: 1/3/2022  EXAMINATION: TWO XRAY VIEWS OF THE CHEST 1/3/2022 4:50 pm COMPARISON: 03/11/2021 HISTORY: ORDERING SYSTEM PROVIDED HISTORY: sob, no cough TECHNOLOGIST PROVIDED HISTORY: Reason for exam:->sob, no cough Reason for Exam: sob, no cough FINDINGS: Cardiomegaly. Pulmonary vascular congestion. Possible pulmonary edema. Possible small bilateral pleural effusions. No focal airspace disease. No pneumothorax. Findings suggest congestive heart failure       EKG:   Read by ER in the absence of a Cardiologist shows normal sinus rhythm, rate of 67, age-indeterminate anterior septal infarct. Rhythm strip shows sinus rhythm with a rate of 67, IA interval 198 ms, QRS 70 ms with no other ectopy      Assessment:   Principal Problem:    Acute on chronic diastolic (congestive) heart failure (HCC)  Active Problems:    Mixed hyperlipidemia    Essential hypertension    Morbid obesity due to excess calories (HCC)    Poor compliance with medication    DMII (diabetes mellitus, type 2) (HCC)    CKD (chronic kidney disease) stage 3, GFR 30-59 ml/min (HCC)    Pulmonary vascular congestion    Acute respiratory failure with hypoxia ([facility])  Resolved Problems:    * No resolved hospital problems. *      Plan:       Acute on chronic diastolic (congestive) heart failure (HCC) with Pulmonary vascular congestion - A 2D Echocardiogram on 02/14/2019 shows an EF of 55-60%. A follow up Echocardiogram has been ordered to reassess cardiac function. Diurese with IV Lasix. Monitor strict I&Os and daily weights. A low sodium fluid restricted diet has been ordered.    - Poor compliance with medication -patient states that she has been noncompliant with her Lasix because it makes her urinate too frequently and is inconvenient. Advised to take medication as directed    Acute respiratory failure with hypoxia ([address] -due to above. Provide oxygen as needed to maintain an oxygen saturation of 92% or greater. Chronic Renal Failure stage III - Renal function is at/near baseline and is stable; Monitor renal function and avoid Nephrotoxic agents as able     Diabetes mellitus II - Lantus, SSI and carb control diet    Essential (primary) hypertension - continue home meds and monitor blood pressure    Hyperlipidemia - No current evidence of Rhabdomyolysis or other adverse effects. Continue statin therapy while in the hospital    Morbid obesity due to excess calories (Body mass index is 42.53 kg/m². ) - Complicating assessment and treatment. Placing patient at risk for multiple co-morbidities as well as early death and contributing to the patient's presentation.  on weight loss when appropriate. DVT Prophylaxis: Lovenox  Diet: ADULT DIET; Regular; 5 carb choices (75 gm/meal); Low Sodium (2 gm); 1500 ml  Code Status: Full Code  (Advanced care planning has been discussed with patient and/or responsible family member and is reflected in the code status.  Further orders associated with this have been entered if appropriate)    Disposition: Anticipate that patient will remain in the hospital for 2 to 3 days depending on further evaluation and clinical course     Please note that over 50 minutes was spent in evaluating the patient, review of records and results, discussion with staff/family, etc.    Lars Hill MD

## 2022-01-04 NOTE — ED NOTES
Handoff report given to Abdirahman Sellers RN to assume care. No further questions at this time. I will place transport to room 4128. If any questions arise, please call [phone].      Keith Aldridge RN  01/03/22 1258

## 2022-01-04 NOTE — PROGRESS NOTES
Physician Progress Note      Caleb Wong  [facility] #:                  [id number]  :                       1955  ADMIT DATE:       1/3/2022 4:26 PM  DISCH DATE:  RESPONDING  PROVIDER #:        Chilango Wilburn TEXT:    Patient admitted with CHF . Noted documentation of acute respiratory failure   in H&P and pn's. . In order to support the diagnosis of acute respiratory   failure, please include additional clinical indicators in your documentation. Or please document if the diagnosis of acute respiratory failure has been   ruled out after further study. The medical record reflects the following:  Risk Factors: chf, dm,ckd, anemia  Clinical Indicators: Documentation per ED of  EMS pt O2 sat was 90% on RA at   home;  Pulmonary effort is normal. No respiratory distress. Normal breath   sounds. No stridor. No decreased breath sounds, wheezing, rhonchi or rales. Trial of her after some Lasix with a ambulation trial on pulse ox. She was   slightly dyspneic but not overtly so. However she dropped to 87% on room air. It took her several minutes to recover. Is on 2 L nasal cannula. Typically   does not require any oxygen at home. Will need to be admitted for her   hypoxia secondary to her CHF. RR 16-20,   87%ra an  Treatment: [email or web address], monitor resp status, monitor o2 sats, wean o2    Thank You, Des Contreras RN CDS [email or web address]    Acute Respiratory Failure Clinical Indicators per [employer] [employer] Training Guide and   Quick Reference Guide:  pO2 < 60 mmHg or SpO2 (pulse oximetry) < 91% breathing room air  pCO2 > 50 and pH < 7.35  P/F ratio (pO2 / FIO2) < 300  pO2 decrease or pCO2 increase by 10 mmHg from baseline (if known)  Supplemental oxygen of 40% or more  Presence of respiratory distress, tachypnea, dyspnea, shortness of breath,   wheezing  Unable to speak in complete sentences  Use of accessory muscles to breathe  Extreme anxiety and feeling of impending doom  Tripod position  Confusion/altered mental status/obtunded  Options provided:  -- Acute Respiratory Failure as evidenced by, Please document evidence. -- Acute Respiratory Failure ruled out after study  -- Acute Respiratory Failure ruled out after study and Chronic Respiratory   Failure confirmed  -- Other - I will add my own diagnosis  -- Disagree - Not applicable / Not valid  -- Disagree - Clinically unable to determine / Unknown  -- Refer to Clinical Documentation Reviewer    PROVIDER RESPONSE TEXT:    This patient is in acute respiratory failure as evidenced by increased   respiratory effort, requiring 2L supplemental oxygen. Query created by:  Sayda Contreras on 1/4/2022 1:09 PM      Electronically signed by:  Omi Tinajero 1/4/2022 1:16 PM

## 2022-01-04 NOTE — ED NOTES
While pt was sitting pt HR 85 O2 sat 92%, on ambulation pt HR 89 O2 sat 87%. EDNP notified.      Keith Aldridge RN  01/03/22 [id number]

## 2022-01-04 NOTE — PROGRESS NOTES
..4 Eyes Skin Assessment     NAME:  Lavon Bae  YOB: 1955  MEDICAL RECORD NUMBER:  [id number]    The patient is being assess for  Admission    I agree that 2 RN's have performed a thorough Head to Toe Skin Assessment on the patient. ALL assessment sites listed below have been assessed. Areas assessed by both nurses:    Head, Face, Ears, Shoulders, Back, Chest, Arms, Elbows, Hands, Sacrum. Buttock, Coccyx, Ischium and Legs. Feet and Heels        Does the Patient have a Wound? Yes wound(s) were present on assessment.  LDA wound assessment was Initiated and completed        Nicho Prevention initiated:  No   Wound Care Orders initiated:  Yes    Pressure Injury (Stage 3,4, Unstageable, DTI, NWPT, and Complex wounds) if present place consult order under [de-identified] Yes    New and Established Ostomies if present place consult order under : No      Nurse 1 eSignature: Electronically signed by Deloris Gerber RN on 1/4/22 at 5:12 AM EST    **SHARE this note so that the co-signing nurse is able to place an eSignature**    Nurse 2 eSignature: Electronically signed by Jcarlos Henry RN on 1/4/22 at 5:16 AM EST

## 2022-01-04 NOTE — PROGRESS NOTES
New wound orders placed. See MAR. Wound assessed under Right 1st Digit. Patient states she goes to weekly treatments.

## 2022-01-04 NOTE — PLAN OF CARE
Problem:  Activity:  Goal: Ability to tolerate increased activity will improve  Description: Ability to tolerate increased activity will improve  Outcome: Ongoing

## 2022-01-04 NOTE — CONSULTS
Office: [phone]       Fax: [phone]      Nephrology Initial Consult Note        Patient's Name: Jacy Hyde Date: 1/3/2022  Date of Visit: 1/4/2022    Reason for Consult: NEHA  Requesting Physician:  Bernardo Casrto DO  PCP: Kevin Almonte MD    Chief Complaint:  shortness of breath      History of Present Illness: Chong Gregorio is a 74 y.o. female with PMHx of hypertension, CKD, DM who was hospitalized on 1/3/2022 with complaints of shortness of breath   No fever  No chest pain   Per pt, she was not taking her lasix daily.   NSAID use: Denies   IV contrast: None recent   Home meds reviewed    Past Medical History:   Diagnosis Date    Abnormal echocardiogram     25% on 3/11/14 and 50% on 3/19/14    Back pain     Cardiomyopathy ([address]     EF was 50% on 3/19/14    Chipped tooth     lower left    Clostridium difficile diarrhea 03/12/2021    Dental crown present     veneers    Depressive disorder 08/13/2014    Diabetic infection of right foot ([address] 04/15/2015    Diabetic ulcer of toe of left foot associated with type 2 diabetes mellitus, with fat layer exposed ([address] 04/10/2018    Pt slipped in hot tub latter part of February and has not healed since despite topical treatment    DVT (deep venous thrombosis) ([address]     HTN (hypertension)     Hx of blood clots 2016    left leg    Ischemic cardiomyopathy 04/15/2015    Kidney disease     CHRONIC STAGE 3    Meniere's disease     Mixed hyperlipidemia 03/07/2016    Nicotine abuse     NSTEMI (non-ST elevated myocardial infarction) ([address] 03/13/2014    ANGLE (obstructive sleep apnea)     Osteomyelitis of ankle or foot, acute, left (HCC) 06/04/2018    Pneumonia     PONV (postoperative nausea and vomiting)     nausea    Spinal epidural abscess 03/13/2014    Type II diabetes mellitus, uncontrolled ([facility] 08/13/2014       Past Surgical History:   Procedure Laterality Date    BACK SURGERY  3/2014    DEBRIDEMENT  3/12/14    extensive debridement of bone/muscle and paraspinal empyema    DILATION AND CURETTAGE OF UTERUS      ENDOSCOPY, COLON, DIAGNOSTIC      FOOT DEBRIDEMENT Left 9/8/2020    LEFT FOOT DEBRIDEMENT INCISION AND DRAINAGE performed by Aman Goddard DPM at [address] [facility] TOE SURGERY Left 8/28/2020    ON THE LEFT: [address] [facility] [address], WOUND CLOSURE, FLEXOR TENOTOMY 4TH AND 5TH DIGITS, TENOTOMY AND CAPSULOTOMY 2ND DIGIT performed by Aman Goddard DPM at [facility] [address]  6/15/1999    complete-think right ovary in.    NASAL SEPTUM SURGERY      NERVE SURGERY Left 9/10/2020    LEFT FOOT INCISION AND DRAINAGE, EXTENSOR HALLUCIS LONGUS REPAIR WITH DELAYED PRIMARY CLOSURE performed by Aman Goddard DPM at [facility] behind right ear    PRESSURE ULCER DEBRIDEMENT Left 10/23/2020    ON THE LEFT: SURGICAL PREPARATION OF WOUND BED, APPLICATION OF DERMAL GRAFT SUBSTITUTE performed by Aman Goddard DPM at [address]  04/02/2019    ROBOTIC ASSISTED LAPAROSCOPIC SLEEVE GASTRECTOMY, LAPAROSCOPIC REDUCIBLE INCISIONAL HERNIA REPAIR     SLEEVE GASTRECTOMY N/A 4/2/2019    ROBOTIC ASSISTED LAPAROSCOPIC SLEEVE GASTRECTOMY, LAPAROSCOPIC REDUCIBLE INCISIONAL HERNIA REPAIR performed by Ila Mccian DO at [facility] N/A 2/8/2019    EGD BIOPSY performed by Ila Mccain DO at [facility] N/A 2/8/2019    EGD POLYP ABLATION/OTHER performed by Ila Mccain DO at [address] ENDOSCOPY       Family History   Problem Relation Age of Onset    High Blood Pressure Mother     Cancer Mother     Rheum Arthritis Mother     COPD Father     Cancer Father     Osteoarthritis Neg Hx     Asthma Neg Hx     Breast Cancer Neg Hx  Diabetes Neg Hx     Heart Failure Neg Hx     High Cholesterol Neg Hx     Hypertension Neg Hx     Migraines Neg Hx     Ovarian Cancer Neg Hx     Rashes/Skin Problems Neg Hx     Seizures Neg Hx     Stroke Neg Hx     Thyroid Disease Neg Hx         reports that she quit smoking about 8 years ago. She has a 5.00 pack-year smoking history. She has never used smokeless tobacco. She reports current alcohol use. She reports that she does not use drugs. Medications: Allergies:  Doxycycline    Scheduled Meds:   pantoprazole  40 mg Oral Nightly    melatonin  6 mg Oral Nightly    lisinopril  40 mg Oral Nightly    insulin glargine  54 Units SubCUTAneous Nightly    citalopram  40 mg Oral Nightly    carvedilol  25 mg Oral BID WC    atorvastatin  80 mg Oral Nightly    aspirin  81 mg Oral Nightly    amLODIPine  10 mg Oral Nightly    insulin lispro  0-12 Units SubCUTAneous TID WC    insulin lispro  0-6 Units SubCUTAneous Nightly    sodium chloride flush  10 mL IntraVENous 2 times per day    enoxaparin  40 mg SubCUTAneous Daily    furosemide  40 mg IntraVENous BID     Continuous Infusions:   dextrose      sodium chloride         Labs:  CBC:   Recent Labs     01/03/22  1704 01/04/22  0525   WBC 9.6 9.5   HGB 9.6* 9.4*    255     Ca/Mg/Phos:   Recent Labs     01/03/22  1704 01/04/22  0525   CALCIUM 9.3 9.4     UA:No results for input(s): Terri Yañez, Edith Patel, Dwayne Brilliant, PROTEINU, UROBILINOGEN, NITRU, LEUKOCYTESUR, Rocio Neat in the last 72 hours. Urine Microscopic: No results for input(s): LABCAST, BACTERIA, COMU, HYALCAST, WBCUA, RBCUA, EPIU in the last 72 hours. Urine Chemistry: No results for input(s): Roosevelt Lango, PROTEINUR, NAUR in the last 72 hours.       ROS:     All systems reviewed and negative except as in HPI    Objective:     Vitals: BP (!) 147/80   Pulse 62   Temp 97.9 °F (36.6 °C) (Oral)   Resp 20   Ht 5' 3\" (1.6 m)   Wt 229 lb 12.8 oz (104.2 kg)   LMP 06/15/1999   SpO2 95%   BMI 40.71 kg/m²    Wt Readings from Last 3 Encounters:   01/04/22 229 lb 12.8 oz (104.2 kg)   09/02/21 227 lb 6.4 oz (103.1 kg)   07/15/21 212 lb 9.6 oz (96.4 kg)      24HR INTAKE/OUTPUT:      Intake/Output Summary (Last 24 hours) at 1/4/2022 1333  Last data filed at 1/4/2022 1303  Gross per 24 hour   Intake 758 ml   Output 750 ml   Net 8 ml     Constitutional:  awake, NAD  HEENT:  MMM, No icterus  Neck: no bruits, No JVD  Cardiovascular:  reg rhythm  Respiratory: Diminished at bases   Abdomen:  +BS, soft, NT, ND  Ext: trace lower extremity edema  Psychiatric: mood and affect appropriate  Skin: dry/intact  CNS: alert, no agitation    IMAGING:  XR CHEST (2 VW)   Final Result   Findings suggest congestive heart failure             Assessment :     1. NEHA on CKD3b  -Non-Oliguric  -Baseline creat: 1.8-1.9 Now 1.9->2.3  -UA: none  -abdominal CT scan (3/2021): non obs bilateral intrarenal calculi are present, largest measuring 3 mm. -Volume: Hypervolemic   -Electrolytes: No Dyskalemia  -Acid-Base: AGMA and Metabolic alkalosis   -history of NSAID use    Recent Labs     01/03/22  1704 01/04/22  0525   BUN 27* 37*   CREATININE 1.9* 2.3*     Recent Labs     01/03/22  1704 01/04/22  0525   * 141   K 4.1 3.9   CO2 22 20*       2. HTN  -Blood pressure high    BP Readings from Last 1 Encounters:   01/04/22 (!) 147/80       3. DM    4. CAD/CHF  - TTE (2022): pending    Plan:     - diuresis as tolerated  - may continue Lisinopril for now  - UA  - optimize BP meds  - Monitor BMP    -Monitor I/O, UOP  -Maintain MAP>65  -Avoid nephrotoxin, if able. -Dose meds to current eGFR    Thank you for allowing us to participate in care of [facility] . We will continue to follow. Feel free to contact me with any questions.       Amy Celestin MD  1/4/2022    [facility] [address]  Office : [phone]  Fax :[phone]

## 2022-01-04 NOTE — PROGRESS NOTES
Pt is alert and oriented x4, sitting up in the bed. Pt is tolerating PO intake well, no complaints of pain. Wound care nurse changed dressing of wound on right foot, wound is intact, dry, and clean. Call light within reach, able to make needs known, fall precautions in place, will monitor.     Electronically signed by Ricci Allison on 1/4/2022 at 2:26 PM

## 2022-01-04 NOTE — PROGRESS NOTES
Patient arrived to floor, oriented to room and call light system, able to verbalize needs. [place] ([facility]) sandwich and sugar free drink given per request. Denies pain and discomfort at this time. All medications administered as ordered. Will continue to monitor.

## 2022-01-04 NOTE — PROGRESS NOTES
Pharmacy Medication Reconciliation Note     List of medications Alysia Guerrero is currently taking is complete. Source of information:   1. Conversation with patient at bedside  2. EMR    Notes regarding home medications:   1. Patient reports taking only Lantus and Victoza PTA in the ED. Patient reports taking all meds in the evening and she has not taken anything oral today. 2. Patient reports taking Lasix and Protonix PRN--she was not aware these were scheduled QD. She also reports taking Coreg QHS and not BID as prescribed. I informed patient of importance of taking medications as prescribed for maximum benefit.   3. Reports she is out of ASA and takes when she remembers     Patient denies taking any other OTC or herbal medications other than those listed     uQang Kwon, Pharmacy Intern  1/3/2022  8:14 PM

## 2022-01-04 NOTE — PROGRESS NOTES
Hospitalist Progress Note      PCP: Romana Sandy MD    Date of Admission: 1/3/2022    Chief Complaint: [facility] Course:     Impression:    69-year-old female medical history significant for HTN HLD DM2 chronic diastolic heart failure CKD 3 and morbid obesity. Presents with worsening shortness of breath on exertion. Found to be in acute exacerbation of chronic diastolic heart failure. A/P:    1. Acute hypoxic respiratory failure: Secondary to diastolic heart failure exacerbation. Until his acute nasal cannula at night now will wean as tolerated. 2.  Acute on chronic diastolic heart failure: Pending echocardiogram repeat, IV diuresis with Lasix strict I's and O's Daily weights. 3.  CKD 3: Stable    4. DM2: L AI and SSI    5. HTN: Continue home medications    6. Morbid obesity: Counseled lifestyle modifications    CODE STATUS: Full code    DVT prophylaxis: Lovenox    Transition of care: Hopefully home    Anticipated date of discharge: 1 to 2 days    24-hour care plan:    Continue IV diuresis increase mobility, wean oxygen as tolerated. Hopeful for discharge in the next day or 2 if she is able to wean from oxygen and her functional capacity improves. Subjective:  Resting in bed, still a bit SOB.         Medications:  Reviewed    Infusion Medications    dextrose      sodium chloride       Scheduled Medications    pantoprazole  40 mg Oral Nightly    melatonin  6 mg Oral Nightly    lisinopril  40 mg Oral Nightly    insulin glargine  54 Units SubCUTAneous Nightly    citalopram  40 mg Oral Nightly    carvedilol  25 mg Oral BID WC    atorvastatin  80 mg Oral Nightly    aspirin  81 mg Oral Nightly    amLODIPine  10 mg Oral Nightly    insulin lispro  0-12 Units SubCUTAneous TID WC    insulin lispro  0-6 Units SubCUTAneous Nightly    sodium chloride flush  10 mL IntraVENous 2 times per day    enoxaparin  40 mg SubCUTAneous Daily    furosemide  40 mg IntraVENous BID     PRN Meds: glucose, dextrose, glucagon (rDNA), dextrose, sodium chloride flush, sodium chloride, ondansetron, polyethylene glycol, acetaminophen **OR** acetaminophen      Intake/Output Summary (Last 24 hours) at 1/4/2022 1132  Last data filed at 1/4/2022 1008  Gross per 24 hour   Intake 518 ml   Output 300 ml   Net 218 ml       Exam:    BP (!) 147/80   Pulse 62   Temp 97.9 °F (36.6 °C) (Oral)   Resp 20   Ht 5' 3\" (1.6 m)   Wt 229 lb 12.8 oz (104.2 kg)   LMP 06/15/1999   SpO2 95%   BMI 40.71 kg/m²     General appearance: No apparent distress, appears stated age and cooperative. NC in place. HEENT: Pupils equal, round, and reactive to light. Conjunctivae/corneas clear. Neck: Supple, with full range of motion. No jugular venous distention. Trachea midline. Respiratory:  Decreased breath sounds bilaterally. Cardiovascular: Regular rate and rhythm with normal S1/S2 without murmurs, rubs or gallops. Abdomen: Soft, non-tender, non-distended with normal bowel sounds. Musculoskeletal: mild edema. Skin: Skin color, texture, turgor normal.  No rashes or lesions. Neurologic:  Neurovascularly intact without any focal sensory/motor deficits. Cranial nerves: II-XII intact, grossly non-focal.  Psychiatric: Alert and oriented, thought content appropriate, normal insight  Capillary Refill: Brisk,< 3 seconds   Peripheral Pulses: +2 palpable, equal bilaterally       Labs:   Recent Labs     01/03/22  1704 01/04/22  0525   WBC 9.6 9.5   HGB 9.6* 9.4*   HCT 29.6* 28.6*    255     Recent Labs     01/03/22  1704 01/04/22  0525   * 141   K 4.1 3.9    103   CO2 22 20*   BUN 27* 37*   CREATININE 1.9* 2.3*   CALCIUM 9.3 9.4     Recent Labs     01/03/22  1704   AST 9*   ALT <5*   BILITOT 0.5   ALKPHOS 104     No results for input(s): INR in the last 72 hours.   Recent Labs     01/03/22  1704   TROPONINI <0.01       Urinalysis:      Lab Results   Component Value Date    NITRU Negative 03/11/2021    WBCUA 0-2 03/11/2021    BACTERIA 3+ 03/11/2021    RBCUA 3-4 03/11/2021    BLOODU MODERATE 03/11/2021    SPECGRAV 1.020 03/11/2021    GLUCOSEU >=1000 03/11/2021       Radiology:  XR CHEST (2 VW)   Final Result   Findings suggest congestive heart failure                 Assessment/Plan:    Active Hospital Problems    Diagnosis Date Noted    Acute on chronic diastolic (congestive) heart failure ([facility] [I50.33] 01/04/2022    Pulmonary vascular congestion [R09.89] 01/04/2022    Acute respiratory failure with hypoxia (HCC) [J96.01] 01/04/2022    CKD (chronic kidney disease) stage 3, GFR 30-59 ml/min ([facility] [N18.30] 12/11/2018    DMII (diabetes mellitus, type 2) ([facility] [E11.9] 09/28/2017    Poor compliance with medication [Z91.14] 06/14/2017    Morbid obesity due to excess calories ([facility] [E66.01] 08/01/2016    Essential hypertension [I10] 03/07/2016    Mixed hyperlipidemia [E78.2] 03/07/2016          Millie Marley DO

## 2022-01-04 NOTE — CONSULTS
Consult received  Full note to follow    Gina Rangel MD  1/4/2022    [facility] [address]  Office : [phone]  Fax :[phone]

## 2022-01-04 NOTE — CARE COORDINATION
[facility] Nurse  Consult Note       NAME:  Sriram Cintron  MEDICAL RECORD NUMBER:  [id number]  AGE: 74 y.o. GENDER: female  : 1955  TODAY'S DATE:  2022    Subjective   Reason for WOCN Evaluation and Assessment: Diabetic wound under right big toe      Sriram Cintron is a 74 y.o. female referred by:   [] Physician  [x] Nursing  [] Other:     Wound Identification:  Wound Type: diabetic  Contributing Factors: diabetes    Wound History: pt has chronic wound issues with feet. Left foot wounds are well healed, including incision. Right great toe wound follows outpt. podiatry.    Current Wound Care Treatment:  PSO to right great toe wound with bandaid    Patient Goal of Care:  [x] Wound Healing  [] Odor Control  [] Palliative Care  [] Pain Control   [] Other:         PAST MEDICAL HISTORY        Diagnosis Date    Abnormal echocardiogram     25% on 3/11/14 and 50% on 3/19/14    Back pain     Cardiomyopathy ([address]     EF was 50% on 3/19/14    Chipped tooth     lower left    Clostridium difficile diarrhea 2021    Dental crown present     veneers    Depressive disorder 2014    Diabetic infection of right foot ([address] 04/15/2015    Diabetic ulcer of toe of left foot associated with type 2 diabetes mellitus, with fat layer exposed ([address] 04/10/2018    Pt slipped in hot tub latter part of February and has not healed since despite topical treatment    DVT (deep venous thrombosis) ([address]     HTN (hypertension)     Hx of blood clots     left leg    Ischemic cardiomyopathy 04/15/2015    Kidney disease     CHRONIC STAGE 3    Meniere's disease     Mixed hyperlipidemia 2016    Nicotine abuse     NSTEMI (non-ST elevated myocardial infarction) ([address] 2014    ANGLE (obstructive sleep apnea)     Osteomyelitis of ankle or foot, acute, left (HCC) 2018    Pneumonia     PONV (postoperative nausea and vomiting)     nausea    Spinal epidural abscess 03/13/2014    Type II diabetes mellitus, uncontrolled ([facility] 08/13/2014       PAST SURGICAL HISTORY    Past Surgical History:   Procedure Laterality Date    BACK SURGERY  3/2014    DEBRIDEMENT  3/12/14    extensive debridement of bone/muscle and paraspinal empyema    DILATION AND CURETTAGE OF UTERUS      ENDOSCOPY, COLON, DIAGNOSTIC      FOOT DEBRIDEMENT Left 9/8/2020    LEFT FOOT DEBRIDEMENT INCISION AND DRAINAGE performed by Jero Wilkinson DPM at [address] [facility] [address] TOE SURGERY Left 8/28/2020    ON THE LEFT: [address], WOUND CLOSURE, FLEXOR TENOTOMY 4TH AND 5TH DIGITS, TENOTOMY AND CAPSULOTOMY 2ND DIGIT performed by Jero Wilkinson DPM at [place] [address]  6/15/1999    complete-think right ovary in.    NASAL SEPTUM SURGERY      NERVE SURGERY Left 9/10/2020    LEFT FOOT INCISION AND DRAINAGE, EXTENSOR HALLUCIS LONGUS REPAIR WITH DELAYED PRIMARY CLOSURE performed by Jero Wilkinson DPM at [place] behind right ear    PRESSURE ULCER DEBRIDEMENT Left 10/23/2020    ON THE LEFT: SURGICAL PREPARATION OF WOUND BED, APPLICATION OF DERMAL GRAFT SUBSTITUTE performed by Jero Wilkinson DPM at [facility]  04/02/2019    ROBOTIC ASSISTED LAPAROSCOPIC SLEEVE GASTRECTOMY, LAPAROSCOPIC REDUCIBLE INCISIONAL HERNIA REPAIR     SLEEVE GASTRECTOMY N/A 4/2/2019    ROBOTIC ASSISTED LAPAROSCOPIC SLEEVE GASTRECTOMY, LAPAROSCOPIC REDUCIBLE INCISIONAL HERNIA REPAIR performed by Shine Fiore DO at [address] N/A 2/8/2019    EGD BIOPSY performed by Shine Fiore DO at [facility] N/A 2/8/2019    EGD POLYP ABLATION/OTHER performed by Shine Fiore DO at [facility] ENDOSCOPY       FAMILY HISTORY    Family History   Problem Relation Age of Onset    High Blood Pressure Mother     Cancer Mother     Rheum Arthritis Mother     COPD Father     Cancer Father Take 1 tablet by mouth daily (Patient taking differently: Take 40 mg by mouth every evening ) 90 tablet 1    pantoprazole (PROTONIX) 40 MG tablet Take 1 tablet by mouth daily (Patient taking differently: Take 40 mg by mouth every evening ) 90 tablet 1    lisinopril (PRINIVIL;ZESTRIL) 40 MG tablet TAKE 1 TABLET BY MOUTH EVERY DAY (Patient taking differently: Take 40 mg by mouth every evening ) 90 tablet 1    amLODIPine (NORVASC) 10 MG tablet Take 1 tablet by mouth daily (Patient taking differently: Take 10 mg by mouth every evening ) 90 tablet 1    acetaminophen (TYLENOL) 500 MG tablet Take 500 mg by mouth every 6 hours as needed for Pain      melatonin 5 MG TABS tablet Take 1 tablet by mouth daily 30 tablet 1    Cholecalciferol (VITAMIN D3) 2000 units CAPS Take 2,000 Units by mouth every evening       aspirin 81 MG chewable tablet Take 1 tablet by mouth daily. (Patient taking differently: Take 81 mg by mouth every evening ) 30 tablet     B-D UF III MINI PEN NEEDLES 31G X 5 MM MISC USE AS DIRECTED TWICE A  each 1    blood glucose test strips (ONE TOUCH ULTRA TEST) strip Check FSBS 2-3 times daily.  300 strip 1    ONE TOUCH LANCETS MISC 1 each by Does not apply route 3 times daily 100 each 11       Objective    BP (!) 147/80   Pulse 62   Temp 97.9 °F (36.6 °C) (Oral)   Resp 20   Ht 5' 3\" (1.6 m)   Wt 229 lb 12.8 oz (104.2 kg)   LMP 06/15/1999   SpO2 95%   BMI 40.71 kg/m²     LABS:  WBC:    Lab Results   Component Value Date    WBC 9.5 01/04/2022     H/H:    Lab Results   Component Value Date    HGB 9.4 01/04/2022    HCT 28.6 01/04/2022     PTT:  No results found for: APTT, PTT[APTT}  PT/INR:    Lab Results   Component Value Date    PROTIME 14.3 09/09/2020    INR 1.23 09/09/2020     HgBA1c:    Lab Results   Component Value Date    LABA1C 6.6 11/09/2021       Assessment   Nicho Risk Score: Nicho Scale Score: 20    Patient Active Problem List   Diagnosis Code    Meniere's disease H81.09    Peripheral neuropathy G62.9    Depression F32. A    Mixed hyperlipidemia E78.2    Essential hypertension I10    Morbid obesity due to excess calories ([facility]) E66.01    ASNHL (asymmetrical sensorineural hearing loss) H90.3    Poor compliance with medication Z91.14    DMII (diabetes mellitus, type 2) ([facility]) E11.9    Diabetic ulcer of toe of left foot associated with diabetes mellitus due to underlying condition, with fat layer exposed ([facility] M54.878, Q83.055    CKD (chronic kidney disease) stage 3, GFR 30-59 ml/min ([facility]) N18.30    ANGLE (obstructive sleep apnea) G47.33    Chronic GERD K21.9    Morbid obesity with BMI of 45.0-49.9, adult ([facility]) E66.01, Z68.42    Incisional hernia, without obstruction or gangrene K43.2    S/P laparoscopic sleeve gastrectomy Z98.84    Anemia D64.9    Diabetic foot infection ([facility]) E11.628, L08.9    Deep incisional surgical site infection T81.42XA    Tenosynovitis of foot M65.9    Colitis K52.9    Acute kidney injury superimposed on CKD ([facility]) N17.9, N18.9    Acute on chronic diastolic (congestive) heart failure ([facility]) I50.33    Pulmonary vascular congestion R09.89    Acute respiratory failure with hypoxia ([facility]) J96.01       Measurements:        Wound 01/03/22 Right;Plantar;Medial (Active)   Wound Image   01/04/22 1414   Wound Etiology Diabetic 01/04/22 1414   Dressing Status New dressing applied;Clean 01/04/22 1414   Wound Cleansed Cleansed with saline 01/04/22 1414   Dressing Change Due 01/04/22 01/04/22 1414   Wound Length (cm) 0.5 cm 01/04/22 1414   Wound Width (cm) 0.5 cm 01/04/22 1414   Wound Depth (cm) 0.3 cm 01/04/22 1414   Wound Surface Area (cm^2) 0.25 cm^2 01/04/22 1414   Wound Volume (cm^3) 0.075 cm^3 01/04/22 1414   Wound Assessment Pink/red 01/04/22 1414   Drainage Amount None 01/04/22 1414   Odor None 01/04/22 1414   Nicole-wound Assessment Intact 01/04/22 1414   Margins Defined edges 01/04/22 1414   Number of days: 0     Pt seen.  Has diabetic wound to right great toe. Last A1C 6.6 Wound bed is clean, does not probe to bone. Left foot incisions are well healed. Has neuropathy; denies c/o pain. Does have trace pedal edema bilat with palpable pedal pulses. Response to treatment:  Well tolerated by patient. Pain Assessment:  Severity:  0 / 10      Plan   Plan of Care: Wound 01/03/22 Right;Plantar;Medial-Dressing/Treatment:  (bandaid; polysporin at home)  -cont current home tx of PSA and bandaid twice daily. Specialty Bed Required : No   [] Low Air Loss   [] Pressure Redistribution  [] Fluid Immersion  [] Bariatric  [] Total Pressure Relief  [] Other:     Current Diet: ADULT DIET; Regular; 5 carb choices (75 gm/meal);  Low Sodium (2 gm); 1500 ml  Dietician consult:  No    Discharge Plan:  Placement for patient upon discharge: home with support    Patient appropriate for Outpatient [address] [facility] [facility] [address]: Yes    Referrals:  []   [] [address] [employer] [facility]  [] Supplies  [] Other    Patient/Caregiver Teaching:  Level of patient/caregiver understanding able to:   [] Indicates understanding       [] Needs reinforcement  [] Unsuccessful      [x] Verbal Understanding  [] Demonstrated understanding       [] No evidence of learning  [] Refused teaching         [] N/A       Electronically signed by Yoly Trevizo on 1/4/2022 at 2:19 PM

## 2022-01-04 NOTE — PROGRESS NOTES
Pt stating she was feeling \"woozy\" BS check it was 60. Carton of milk and two whole grain crackers given, BS 90 after 15 minutes.

## 2022-01-04 NOTE — PROGRESS NOTES
Physician Progress Note      Santana Diana  [facility] #:                  [id number]  :                       1955  ADMIT DATE:       1/3/2022 4:26 PM  DISCH DATE:  RESPONDING  PROVIDER #:        Melva Parnell          QUERY TEXT:    Pt admitted with CHF . Pt noted to also have HTN, Cardiomyopathy. If possible,   please document in progress notes and discharge summary the etiology of CHF,   if able to be determined. The medical record reflects the following:  Risk Factors: chf, cardiomyopathy, htn, ckd ,obesity  Clinical Indicators: Documentation of history of hypertension, cardiomyopathy. Echocardiogram on 2019 shows an EF of 55-60%. Repeat ECHO-p  Treatment: iv lasix, home meds norvasc, asa,coreg,lipitor, lisinopril    Thank You, Maida Contreras RN CDS CRCR  [email or web address]  Options provided:  -- CHF due to Hypertensive Heart Disease  -- CHF due to Hypertensive Heart Disease and Cardiomyopathy  -- CHF not due to Hypertension but due to Cardiomyopathy  -- Other - I will add my own diagnosis  -- Disagree - Not applicable / Not valid  -- Disagree - Clinically unable to determine / Unknown  -- Refer to Clinical Documentation Reviewer    PROVIDER RESPONSE TEXT:    This patient has CHF due to hypertensive heart disease and Cardiomyopathy    Query created by:  Work, Wendelin Hashimoto on 2022 1:16 PM      Electronically signed by:  Melva Parnell 2022 1:25 PM

## 2022-01-04 NOTE — PROGRESS NOTES
Hospitalist Progress Note      PCP: Aurelio River MD    Date of Admission: 1/3/2022    Chief Complaint: [facility] Course:     Impression:    57-year-old female medical history significant for HTN HLD DM2 chronic diastolic heart failure CKD 3 and morbid obesity. Presents with worsening shortness of breath on exertion. Found to be in acute exacerbation of chronic diastolic heart failure. A/P:    1. Acute hypoxic respiratory failure: Secondary to diastolic heart failure exacerbation. Until his acute nasal cannula at night now will wean as tolerated. 2.  Acute on chronic diastolic heart failure: Pending echocardiogram repeat, IV diuresis with Lasix strict I's and O's Daily weights. 3.  corinne on ckd: nephrology consulted. 4.  DM2: L AI and SSI    5. HTN: Continue home medications    6. Morbid obesity: Counseled lifestyle modifications    CODE STATUS: Full code    DVT prophylaxis: Lovenox    Transition of care: Hopefully home    Anticipated date of discharge: 1 to 2 days    24-hour care plan:    Continue IV diuresis increase mobility, wean oxygen as tolerated. Hopeful for discharge in the next day or 2 if she is able to wean from oxygen and her functional capacity improves. Subjective:  Resting in bed, still a bit SOB.         Medications:  Reviewed    Infusion Medications    dextrose      sodium chloride       Scheduled Medications    pantoprazole  40 mg Oral Nightly    melatonin  6 mg Oral Nightly    lisinopril  40 mg Oral Nightly    insulin glargine  54 Units SubCUTAneous Nightly    citalopram  40 mg Oral Nightly    carvedilol  25 mg Oral BID WC    atorvastatin  80 mg Oral Nightly    aspirin  81 mg Oral Nightly    amLODIPine  10 mg Oral Nightly    insulin lispro  0-12 Units SubCUTAneous TID WC    insulin lispro  0-6 Units SubCUTAneous Nightly    sodium chloride flush  10 mL IntraVENous 2 times per day    enoxaparin  40 mg SubCUTAneous Daily    furosemide  40 mg IntraVENous BID     PRN Meds: glucose, dextrose, glucagon (rDNA), dextrose, sodium chloride flush, sodium chloride, ondansetron, polyethylene glycol, acetaminophen **OR** acetaminophen      Intake/Output Summary (Last 24 hours) at 1/4/2022 1250  Last data filed at 1/4/2022 1008  Gross per 24 hour   Intake 518 ml   Output 300 ml   Net 218 ml       Exam:    BP (!) 147/80   Pulse 62   Temp 97.9 °F (36.6 °C) (Oral)   Resp 20   Ht 5' 3\" (1.6 m)   Wt 229 lb 12.8 oz (104.2 kg)   LMP 06/15/1999   SpO2 95%   BMI 40.71 kg/m²     General appearance: No apparent distress, appears stated age and cooperative. NC in place. HEENT: Pupils equal, round, and reactive to light. Conjunctivae/corneas clear. Neck: Supple, with full range of motion. No jugular venous distention. Trachea midline. Respiratory:  Decreased breath sounds bilaterally. Cardiovascular: Regular rate and rhythm with normal S1/S2 without murmurs, rubs or gallops. Abdomen: Soft, non-tender, non-distended with normal bowel sounds. Musculoskeletal: mild edema. Skin: Skin color, texture, turgor normal.  No rashes or lesions. Neurologic:  Neurovascularly intact without any focal sensory/motor deficits. Cranial nerves: II-XII intact, grossly non-focal.  Psychiatric: Alert and oriented, thought content appropriate, normal insight  Capillary Refill: Brisk,< 3 seconds   Peripheral Pulses: +2 palpable, equal bilaterally       Labs:   Recent Labs     01/03/22  1704 01/04/22  0525   WBC 9.6 9.5   HGB 9.6* 9.4*   HCT 29.6* 28.6*    255     Recent Labs     01/03/22  1704 01/04/22  0525   * 141   K 4.1 3.9    103   CO2 22 20*   BUN 27* 37*   CREATININE 1.9* 2.3*   CALCIUM 9.3 9.4     Recent Labs     01/03/22  1704   AST 9*   ALT <5*   BILITOT 0.5   ALKPHOS 104     No results for input(s): INR in the last 72 hours.   Recent Labs     01/03/22  1704   TROPONINI <0.01       Urinalysis:      Lab Results   Component Value Date    NITRU Negative 03/11/2021    WBCUA 0-2 03/11/2021    BACTERIA 3+ 03/11/2021    RBCUA 3-4 03/11/2021    BLOODU MODERATE 03/11/2021    SPECGRAV 1.020 03/11/2021    GLUCOSEU >=1000 03/11/2021       Radiology:  XR CHEST (2 VW)   Final Result   Findings suggest congestive heart failure                 Assessment/Plan:    Active Hospital Problems    Diagnosis Date Noted    Acute on chronic diastolic (congestive) heart failure ([address] [I50.33] 01/04/2022    Pulmonary vascular congestion [R09.89] 01/04/2022    Acute respiratory failure with hypoxia (HCC) [J96.01] 01/04/2022    CKD (chronic kidney disease) stage 3, GFR 30-59 ml/min ([address] [N18.30] 12/11/2018    DMII (diabetes mellitus, type 2) ([address] [E11.9] 09/28/2017    Poor compliance with medication [Z91.14] 06/14/2017    Morbid obesity due to excess calories ([address] [E66.01] 08/01/2016    Essential hypertension [I10] 03/07/2016    Mixed hyperlipidemia [E78.2] 03/07/2016          Tala Lopes DO

## 2022-01-04 NOTE — PLAN OF CARE
Problem: OXYGENATION/RESPIRATORY FUNCTION  Goal: Patient will achieve/maintain normal respiratory rate/effort  Description: Respiratory rate and effort will be within normal limits for the patient  Outcome: Ongoing  Note: Educated on cough and deep breathing techniques. Problem: FLUID AND ELECTROLYTE IMBALANCE  Goal: Fluid and electrolyte balance are achieved/maintained  Outcome: Ongoing  Note: Educated on intake/output, daily weights, fluid restriction and low sodium diet.

## 2022-01-05 VITALS
WEIGHT: 230.38 LBS | HEIGHT: 63 IN | HEART RATE: 61 BPM | BODY MASS INDEX: 40.82 KG/M2 | SYSTOLIC BLOOD PRESSURE: 132 MMHG | DIASTOLIC BLOOD PRESSURE: 89 MMHG | OXYGEN SATURATION: 92 % | TEMPERATURE: 98.3 F | RESPIRATION RATE: 15 BRPM

## 2022-01-05 LAB
ANION GAP SERPL CALCULATED.3IONS-SCNC: 13 MMOL/L (ref 3–16)
BASOPHILS ABSOLUTE: 0 K/UL (ref 0–0.2)
BASOPHILS RELATIVE PERCENT: 0.6 %
BUN BLDV-MCNC: 41 MG/DL (ref 7–20)
CALCIUM SERPL-MCNC: 8.9 MG/DL (ref 8.3–10.6)
CHLORIDE BLD-SCNC: 99 MMOL/L (ref 99–110)
CO2: 25 MMOL/L (ref 21–32)
CREAT SERPL-MCNC: 2.2 MG/DL (ref 0.6–1.2)
EOSINOPHILS ABSOLUTE: 0.3 K/UL (ref 0–0.6)
EOSINOPHILS RELATIVE PERCENT: 3.4 %
ESTIMATED AVERAGE GLUCOSE: 139.9 MG/DL
GFR AFRICAN AMERICAN: 27
GFR NON-AFRICAN AMERICAN: 22
GLUCOSE BLD-MCNC: 125 MG/DL (ref 70–99)
GLUCOSE BLD-MCNC: 50 MG/DL (ref 70–99)
GLUCOSE BLD-MCNC: 83 MG/DL (ref 70–99)
HBA1C MFR BLD: 6.5 %
HCT VFR BLD CALC: 26.6 % (ref 36–48)
HEMOGLOBIN: 9.1 G/DL (ref 12–16)
LYMPHOCYTES ABSOLUTE: 1.8 K/UL (ref 1–5.1)
LYMPHOCYTES RELATIVE PERCENT: 22.4 %
MAGNESIUM: 1.8 MG/DL (ref 1.8–2.4)
MCH RBC QN AUTO: 29.1 PG (ref 26–34)
MCHC RBC AUTO-ENTMCNC: 34.1 G/DL (ref 31–36)
MCV RBC AUTO: 85.4 FL (ref 80–100)
MONOCYTES ABSOLUTE: 0.8 K/UL (ref 0–1.3)
MONOCYTES RELATIVE PERCENT: 10.1 %
NEUTROPHILS ABSOLUTE: 5.2 K/UL (ref 1.7–7.7)
NEUTROPHILS RELATIVE PERCENT: 63.5 %
PDW BLD-RTO: 15.8 % (ref 12.4–15.4)
PERFORMED ON: ABNORMAL
PERFORMED ON: NORMAL
PLATELET # BLD: 256 K/UL (ref 135–450)
PMV BLD AUTO: 9.2 FL (ref 5–10.5)
POTASSIUM REFLEX MAGNESIUM: 3.4 MMOL/L (ref 3.5–5.1)
RBC # BLD: 3.11 M/UL (ref 4–5.2)
SODIUM BLD-SCNC: 137 MMOL/L (ref 136–145)
WBC # BLD: 8.2 K/UL (ref 4–11)

## 2022-01-05 PROCEDURE — 6370000000 HC RX 637 (ALT 250 FOR IP): Performed by: INTERNAL MEDICINE

## 2022-01-05 PROCEDURE — 2580000003 HC RX 258: Performed by: INTERNAL MEDICINE

## 2022-01-05 PROCEDURE — 99233 SBSQ HOSP IP/OBS HIGH 50: CPT | Performed by: HOSPITALIST

## 2022-01-05 PROCEDURE — 6360000002 HC RX W HCPCS: Performed by: INTERNAL MEDICINE

## 2022-01-05 PROCEDURE — 96376 TX/PRO/DX INJ SAME DRUG ADON: CPT

## 2022-01-05 PROCEDURE — G0378 HOSPITAL OBSERVATION PER HR: HCPCS

## 2022-01-05 PROCEDURE — 36415 COLL VENOUS BLD VENIPUNCTURE: CPT

## 2022-01-05 PROCEDURE — 6370000000 HC RX 637 (ALT 250 FOR IP): Performed by: HOSPITALIST

## 2022-01-05 PROCEDURE — 85025 COMPLETE CBC W/AUTO DIFF WBC: CPT

## 2022-01-05 PROCEDURE — 6370000000 HC RX 637 (ALT 250 FOR IP): Performed by: FAMILY MEDICINE

## 2022-01-05 PROCEDURE — 83735 ASSAY OF MAGNESIUM: CPT

## 2022-01-05 PROCEDURE — 96372 THER/PROPH/DIAG INJ SC/IM: CPT

## 2022-01-05 PROCEDURE — 80048 BASIC METABOLIC PNL TOTAL CA: CPT

## 2022-01-05 RX ORDER — FUROSEMIDE 40 MG/1
40 TABLET ORAL 2 TIMES DAILY
Qty: 60 TABLET | Refills: 3 | Status: SHIPPED | OUTPATIENT
Start: 2022-01-05 | End: 2022-06-21 | Stop reason: SDUPTHER

## 2022-01-05 RX ORDER — FUROSEMIDE 40 MG/1
40 TABLET ORAL 2 TIMES DAILY
Status: DISCONTINUED | OUTPATIENT
Start: 2022-01-05 | End: 2022-01-05 | Stop reason: HOSPADM

## 2022-01-05 RX ORDER — BACITRACIN ZINC AND POLYMYXIN B SULFATE 500; 1000 [USP'U]/G; [USP'U]/G
OINTMENT TOPICAL
Qty: 2 G | Refills: 1 | Status: SHIPPED | OUTPATIENT
Start: 2022-01-05 | End: 2022-01-10

## 2022-01-05 RX ORDER — POTASSIUM CHLORIDE 750 MG/1
20 TABLET, FILM COATED, EXTENDED RELEASE ORAL DAILY
Status: DISCONTINUED | OUTPATIENT
Start: 2022-01-05 | End: 2022-01-05 | Stop reason: HOSPADM

## 2022-01-05 RX ADMIN — FUROSEMIDE 40 MG: 10 INJECTION, SOLUTION INTRAMUSCULAR; INTRAVENOUS at 08:08

## 2022-01-05 RX ADMIN — SODIUM CHLORIDE, PRESERVATIVE FREE 10 ML: 5 INJECTION INTRAVENOUS at 08:10

## 2022-01-05 RX ADMIN — CARVEDILOL 25 MG: 6.25 TABLET, FILM COATED ORAL at 08:08

## 2022-01-05 RX ADMIN — POTASSIUM CHLORIDE 20 MEQ: 750 TABLET, FILM COATED, EXTENDED RELEASE ORAL at 11:13

## 2022-01-05 RX ADMIN — ENOXAPARIN SODIUM 40 MG: 100 INJECTION SUBCUTANEOUS at 08:09

## 2022-01-05 RX ADMIN — BACITRACIN ZINC AND POLYMYXIN B SULFATE: 500; 10000 OINTMENT TOPICAL at 08:10

## 2022-01-05 ASSESSMENT — PAIN SCALES - GENERAL: PAINLEVEL_OUTOF10: 0

## 2022-01-05 NOTE — PROGRESS NOTES
IV removed. Telemetry removed. Discharge instructions reviewed with pt, all questions and concerns addressed. Verbalized understanding of follow ups needed and where to obtain prescriptions. Pt discharged home ambulatory at this time with family.

## 2022-01-05 NOTE — PROGRESS NOTES
Office: [phone]       Fax: [phone]      Nephrology Progress Note        Patient's Name: Fabrice Morgan Date: 1/3/2022  Date of Visit: 1/5/2022    Reason for Consult:  NEHA       History of Present Illness: Jerlene Kocher is a 74 y.o. female with PMHx of hypertension, CKD, DM who was hospitalized on 1/3/2022 with complaints of shortness of breath   No fever. No chest pain   Per pt, she was not taking her lasix daily. NSAID use: Denies   IV contrast: None recent   Home meds reviewed    INTERVAL HISTORY    Feels better  Shortness of breath: No   UOP: Fair  Creat: trending down       Medications:    Allergies:  Doxycycline    Scheduled Meds:   bacitracin-polymyxin b   Topical BID    pantoprazole  40 mg Oral Nightly    melatonin  6 mg Oral Nightly    lisinopril  40 mg Oral Nightly    insulin glargine  54 Units SubCUTAneous Nightly    citalopram  40 mg Oral Nightly    carvedilol  25 mg Oral BID WC    atorvastatin  80 mg Oral Nightly    aspirin  81 mg Oral Nightly    amLODIPine  10 mg Oral Nightly    insulin lispro  0-12 Units SubCUTAneous TID WC    insulin lispro  0-6 Units SubCUTAneous Nightly    sodium chloride flush  10 mL IntraVENous 2 times per day    enoxaparin  40 mg SubCUTAneous Daily    furosemide  40 mg IntraVENous BID     Continuous Infusions:   dextrose      sodium chloride         Labs:  CBC:   Recent Labs     01/03/22  1704 01/04/22  0525 01/05/22  0525   WBC 9.6 9.5 8.2   HGB 9.6* 9.4* 9.1*    255 256     Ca/Mg/Phos:   Recent Labs     01/03/22  1704 01/04/22  0525 01/05/22  0525   CALCIUM 9.3 9.4 8.9   MG  --   --  1.80     UA:  Recent Labs     01/04/22  1530   COLORU YELLOW   CLARITYU Clear   GLUCOSEU Negative   BILIRUBINUR Negative   KETUA Negative   SPECGRAV 1.009   BLOODU Negative   PHUR 5.5   PROTEINU 100*   UROBILINOGEN 0.2   NITRU Negative   LEUKOCYTESUR Negative   LABMICR YES   URINETYPE NotGiven      Urine Microscopic:   Recent Labs     01/04/22  1530   HYALCAST 2   WBCUA 1   RBCUA 0   EPIU 1     Urine Chemistry:   Recent Labs     01/04/22  1530   LABCREA 39.2   PROTEINUR 82.00*         Objective:     Vitals: /89   Pulse 61   Temp 98.3 °F (36.8 °C) (Oral)   Resp 15   Ht 5' 3\" (1.6 m)   Wt 230 lb 6.1 oz (104.5 kg)   LMP 06/15/1999   SpO2 92%   BMI 40.81 kg/m²    Wt Readings from Last 3 Encounters:   01/05/22 230 lb 6.1 oz (104.5 kg)   09/02/21 227 lb 6.4 oz (103.1 kg)   07/15/21 212 lb 9.6 oz (96.4 kg)      24HR INTAKE/OUTPUT:      Intake/Output Summary (Last 24 hours) at 1/5/2022 6422  Last data filed at 1/5/2022 6126  Gross per 24 hour   Intake 1372 ml   Output 2600 ml   Net -1228 ml     Constitutional:  awake, NAD  HEENT:  MMM, No icterus  Neck: no bruits, No JVD  Cardiovascular:  reg rhythm  Respiratory: Diminished at bases   Abdomen:  +BS, soft, NT, ND  Ext: trace lower extremity edema  CNS: alert, no agitation    IMAGING:  XR CHEST (2 VW)   Final Result   Findings suggest congestive heart failure             Assessment :     1. NEHA on CKD3b  -Non-Oliguric  -Baseline creat: 1.8-1.9 Now 1.9->2.3->2.2  -UA: reviewed  -[facility] 2  -abdominal CT scan (3/2021): non obs bilateral intrarenal calculi are present, largest measuring 3 mm. -Volume: Hypervolemic   -Electrolytes: Hypokalemia  -Acid-Base: AGMA and Metabolic alkalosis   -history of NSAID use    Recent Labs     01/03/22  1704 01/04/22  0525 01/05/22  0525   BUN 27* 37* 41*   CREATININE 1.9* 2.3* 2.2*     Recent Labs     01/03/22  1704 01/04/22  0525 01/05/22  0525   * 141 137   K 4.1 3.9 3.4*   CO2 22 20* 25   MG  --   --  1.80       2. HTN  -Blood pressure at goal     BP Readings from Last 1 Encounters:   01/05/22 132/89       3. DM    4. CAD/CHF  - TTE (2022): pending    Plan:     - diuresis as tolerated.   lb  - may continue Lisinopril for now  - replace K  - optimize BP meds  - Monitor BMP    -Monitor I/O, UOP  -Maintain MAP>65  -Avoid nephrotoxin, if able. -Dose meds to current eGFR    Thank you for allowing us to participate in care of [facility] . We will continue to follow. Feel free to contact me with any questions.       Eren Eddy MD  1/5/2022    [facility] [address] [place] [address]  Office : [phone]  Fax :[phone]

## 2022-01-05 NOTE — CARE COORDINATION
CASE MANAGEMENT DISCHARGE SUMMARY:    DISCHARGE DATE: 1/5/22    DISCHARGED TO HOME   Spoke with pt who reports no dc needs.   Pts son will transport home  Electronically signed by RAY Mullins on 1/5/2022 at 12:32 PM

## 2022-01-05 NOTE — PROGRESS NOTES
Hospitalist  Progress Note       Lavon Bae is a 74 y.o. female   1955     SUBJECTIVE:   77-year-old female medical history significant for HTN HLD DM2 chronic diastolic heart failure CKD 3 and morbid obesity. Presents with worsening shortness of breath on exertion. Found to be in acute exacerbation of chronic diastolic heart failure. 1/5/  Patient seen and examined says she feels much better and would like to go home shortness of breath is better no chest pain    OBJECTIVE:    Review of Systems:  General appearance: alert, appears stated age and cooperative  Skin: Skin color, texture, normal. No rashes or lesions  HEENT: No nose bleed, headache, vision problems  CV: C/O chest pain, tightness, pressure,   Respiratory: C/o no SOB, AREVALO, Orthopnea, PND  GI: No abdominal pain, black stool, bloating  Limbs: No c/o edema, pain, swelling, intermittent claudication, joint pains  Neuro: No dizziness, lightheadedness, syncope, gait problems, memory problems  Psych: grossly normal. No SI/depression. Vitals:   Blood pressure 132/89, pulse 61, temperature 98.3 °F (36.8 °C), temperature source Oral, resp. rate 15, height 5' 3\" (1.6 m), weight 230 lb 6.1 oz (104.5 kg), last menstrual period 06/15/1999, SpO2 92 %, not currently breastfeeding.     HEENT: AT, NC, PERRLA  Neck: No JVD  Heart: S1 S2 audible, no murmur   Lungs: CTA   Abdomen: Nontender   Limbs: No edema   CNS: no focal deficit      Past Medical History:   Diagnosis Date    Abnormal echocardiogram     25% on 3/11/14 and 50% on 3/19/14    Back pain     Cardiomyopathy ([address]     EF was 50% on 3/19/14    Chipped tooth     lower left    Clostridium difficile diarrhea 03/12/2021    Dental crown present     veneers    Depressive disorder 08/13/2014    Diabetic infection of right foot ([address] 04/15/2015    Diabetic ulcer of toe of left foot associated with type 2 diabetes mellitus, with fat layer exposed ([address] 04/10/2018    Pt slipped in hot tub latter part of February and has not healed since despite topical treatment    DVT (deep venous thrombosis) ([facility]     HTN (hypertension)     Hx of blood clots 2016    left leg    Ischemic cardiomyopathy 04/15/2015    Kidney disease     CHRONIC STAGE 3    Meniere's disease     Mixed hyperlipidemia 03/07/2016    Nicotine abuse     NSTEMI (non-ST elevated myocardial infarction) ([address] 03/13/2014    ANGLE (obstructive sleep apnea)     Osteomyelitis of ankle or foot, acute, left ([facility]) 06/04/2018    Pneumonia     PONV (postoperative nausea and vomiting)     nausea    Spinal epidural abscess 03/13/2014    Type II diabetes mellitus, uncontrolled ([address] 08/13/2014        Patient Active Problem List   Diagnosis    Meniere's disease    Peripheral neuropathy    Depression    Mixed hyperlipidemia    Essential hypertension    Morbid obesity due to excess calories ([address]    ASNHL (asymmetrical sensorineural hearing loss)    Poor compliance with medication    DMII (diabetes mellitus, type 2) ([address]    Diabetic ulcer of toe of left foot associated with diabetes mellitus due to underlying condition, with fat layer exposed ([address]    CKD (chronic kidney disease) stage 3, GFR 30-59 ml/min ([facility])    ANGLE (obstructive sleep apnea)    Chronic GERD    Morbid obesity with BMI of 45.0-49.9, adult ([address]    Incisional hernia, without obstruction or gangrene    S/P laparoscopic sleeve gastrectomy    Anemia    Diabetic foot infection ([address]    Deep incisional surgical site infection    Tenosynovitis of foot    Colitis    Acute kidney injury superimposed on CKD ([address]    Acute on chronic diastolic (congestive) heart failure ([facility])    Pulmonary vascular congestion    Acute respiratory failure with hypoxia ([facility])        Allergies   Allergen Reactions    Doxycycline Other (See Comments)     Yeast infection        Current Inpatient Medications:    Current Facility-Administered Medications   Medication Dose Route Frequency Provider Last Rate Last Admin    potassium chloride (KLOR-CON) extended release tablet 20 mEq  20 mEq Oral Daily Pedro Nicole MD   20 mEq at 01/05/22 1113    bacitracin-polymyxin b (POLYSPORIN) ointment   Topical BID [place] Jack    Given at 01/05/22 0810    pantoprazole (PROTONIX) tablet 40 mg  40 mg Oral Nightly Kayleen Montez MD   40 mg at 01/04/22 2147    melatonin tablet 6 mg  6 mg Oral Nightly Kayleen Montez MD   6 mg at 01/04/22 2147    lisinopril (PRINIVIL;ZESTRIL) tablet 40 mg  40 mg Oral Nightly Kayleen Montez MD   40 mg at 01/04/22 2147    insulin glargine (LANTUS) injection vial 54 Units  54 Units SubCUTAneous Nightly Kayleen Montez MD   54 Units at 01/04/22 2147    citalopram (CELEXA) tablet 40 mg  40 mg Oral Nightly Kayleen Montez MD   40 mg at 01/04/22 2147    carvedilol (COREG) tablet 25 mg  25 mg Oral BID ENRIQUE Montez MD   25 mg at 01/05/22 5540    atorvastatin (LIPITOR) tablet 80 mg  80 mg Oral Nightly Kayleen Montez MD   80 mg at 01/04/22 2147    aspirin chewable tablet 81 mg  81 mg Oral Nightly Kayleen Montez MD   81 mg at 01/04/22 2148    amLODIPine (NORVASC) tablet 10 mg  10 mg Oral Nightly Kayleen Montez MD   10 mg at 01/04/22 2148    insulin lispro (HUMALOG) injection vial 0-12 Units  0-12 Units SubCUTAneous TID ENRIQUE Montez MD        insulin lispro (HUMALOG) injection vial 0-6 Units  0-6 Units SubCUTAneous Nightly Kayleen Montez MD        glucose (GLUTOSE) 40 % oral gel 15 g  15 g Oral PRN Kayleen Montez MD        dextrose 50 % IV solution  12.5 g IntraVENous PRN Kayleen Montez MD        glucagon (rDNA) injection 1 mg  1 mg IntraMUSCular PRN Kayleen Montez MD        dextrose 5 % solution  100 mL/hr IntraVENous PRN Kayleen Montez MD        sodium chloride flush 0.9 % injection 10 mL  10 mL IntraVENous 2 times per day Kayleen Montez MD   10 mL at 01/05/22 0810    sodium chloride flush 0.9 % injection 10 mL  10 mL IntraVENous PRN Dori Calderon MD        0.9 % sodium chloride infusion  25 mL IntraVENous PRN Dori Calderon MD        ondansetron [facility]) injection 4 mg  4 mg IntraVENous Q4H PRN Dori Calderon MD        polyethylene glycol [facility]) packet 17 g  17 g Oral Daily PRN Dori Claderon MD        acetaminophen (TYLENOL) tablet 650 mg  650 mg Oral Q4H PRN Dori Calderon MD        Or    acetaminophen (TYLENOL) suppository 650 mg  650 mg Rectal Q4H PRN Dori Calderon MD        enoxaparin (LOVENOX) injection 40 mg  40 mg SubCUTAneous Daily Dori Calderon MD   40 mg at 01/05/22 0809    furosemide (LASIX) injection 40 mg  40 mg IntraVENous BID Dori Calderon MD   40 mg at 01/05/22 1508           Labs:  CBC with Differential:    Lab Results   Component Value Date    WBC 8.2 01/05/2022    RBC 3.11 01/05/2022    HGB 9.1 01/05/2022    HCT 26.6 01/05/2022     01/05/2022    MCV 85.4 01/05/2022    MCH 29.1 01/05/2022    MCHC 34.1 01/05/2022    RDW 15.8 01/05/2022    NRBC 1 03/16/2014    BANDSPCT 13 03/16/2014    METASPCT 1 03/16/2014    LYMPHOPCT 22.4 01/05/2022    MONOPCT 10.1 01/05/2022    MYELOPCT 1 03/16/2014    BASOPCT 0.6 01/05/2022    MONOSABS 0.8 01/05/2022    LYMPHSABS 1.8 01/05/2022    EOSABS 0.3 01/05/2022    BASOSABS 0.0 01/05/2022     CMP:    Lab Results   Component Value Date     01/05/2022    K 3.4 01/05/2022    CL 99 01/05/2022    CO2 25 01/05/2022    BUN 41 01/05/2022    CREATININE 2.2 01/05/2022    GFRAA 27 01/05/2022    AGRATIO 1.0 01/03/2022    LABGLOM 22 01/05/2022    GLUCOSE 50 01/05/2022    PROT 7.7 01/03/2022    LABALBU 3.8 01/03/2022    CALCIUM 8.9 01/05/2022    BILITOT 0.5 01/03/2022    ALKPHOS 104 01/03/2022    AST 9 01/03/2022    ALT <5 01/03/2022     Hepatic Function Panel:    Lab Results   Component Value Date    ALKPHOS 104 01/03/2022    ALT <5 01/03/2022    AST 9 01/03/2022    PROT 7.7 01/03/2022    BILITOT 0.5 01/03/2022    BILIDIR <0.2 03/12/2021    IBILI see below 03/12/2021 LABALBU 3.8 01/03/2022     Magnesium:    Lab Results   Component Value Date    MG 1.80 01/05/2022     PT/INR:    Lab Results   Component Value Date    PROTIME 14.3 09/09/2020    INR 1.23 09/09/2020     Last 3 Troponin:    Lab Results   Component Value Date    TROPONINI <0.01 01/03/2022    TROPONINI 0.03 03/11/2021    TROPONINI 0.28 03/14/2014     U/A:    Lab Results   Component Value Date    NITRITE neg 08/01/2016    COLORU YELLOW 01/04/2022    PHUR 5.5 01/04/2022    LABCAST 10-20 Hyaline 09/30/2019    WBCUA 1 01/04/2022    RBCUA 0 01/04/2022    YEAST Present 03/10/2014    BACTERIA 3+ 03/11/2021    CLARITYU Clear 01/04/2022    SPECGRAV 1.009 01/04/2022    LEUKOCYTESUR Negative 01/04/2022    UROBILINOGEN 0.2 01/04/2022    BILIRUBINUR Negative 01/04/2022    BILIRUBINUR neg 08/01/2016    BLOODU Negative 01/04/2022    GLUCOSEU Negative 01/04/2022     ABG:    Lab Results   Component Value Date    PHART 7.431 03/13/2014    TOQ2FII 31.0 03/13/2014    PO2ART 86.8 03/13/2014    ONY1JBU 20.2 03/13/2014    BEART -2.8 03/13/2014    FYG7ZRJ 21.2 03/13/2014    L8WEQTAY 96.6 03/13/2014     FLP:    Lab Results   Component Value Date    TRIG 77 11/27/2018    HDL 64 11/27/2018    LDLCALC 139 11/27/2018    LABVLDL 15 11/27/2018     TSH:    Lab Results   Component Value Date    TSH 2.24 10/04/2017      DATA:   ECG: Sinus Rhythm       ASSESSMENT:   1 acute hypoxic respiratory failure secondary to diastolic heart failure  2 acute diastolic heart failure 2D echo showed normal LV function EF 65%  3 CKD creatinine appears to be at baseline nephrology evaluation noted  4 diabetes continue sliding scale  5 hypertension  Well controlled  6 morbid obesity chronic due to excess caloric intake  PLAN   Discharge planning when okay from nephrology standpoint

## 2022-01-05 NOTE — DISCHARGE SUMMARY
Hospital Medicine Discharge Summary    Jesús Sloan  :  1955  MRN:  [id number]    Admit date:  1/3/2022  Discharge date:  2022    Admitting Physician:  Shanna Sorto MD  Primary Care Physician:  Yuly Coker MD      Discharge Diagnoses:    Principal Problem:    Acute on chronic diastolic (congestive) heart failure ([address]  Active Problems:    Mixed hyperlipidemia    Essential hypertension    Morbid obesity due to excess calories ([address]    Poor compliance with medication    DMII (diabetes mellitus, type 2) (HCC)    CKD (chronic kidney disease) stage 3, GFR 30-59 ml/min (HCC)    Pulmonary vascular congestion    Acute respiratory failure with hypoxia ([address]  Resolved Problems:    * No resolved hospital problems. [facility] Course: Jesús Sloan is a 74 y.o. female that was admitted and treated at [facility] for the following medical issues:   67-year-old female medical history significant for HTN HLD DM2 chronic diastolic heart failure CKD 3 and morbid obesity.  Presents with worsening shortness of breath on exertion.  Found to be in acute exacerbation of chronic diastolic heart failure. /  Patient seen and examined says she feels much better and would like to go home shortness of breath is better no chest pain  Per nephrology patient clinically stable to be discharged on Lasix 40 mg p.o. twice daily    Principal Problem:    Acute on chronic diastolic (congestive) heart failure ([address]  Active Problems:    Mixed hyperlipidemia    Essential hypertension    Morbid obesity due to excess calories ([address]    Poor compliance with medication    DMII (diabetes mellitus, type 2) (HCC)    CKD (chronic kidney disease) stage 3, GFR 30-59 ml/min (HCC)    Pulmonary vascular congestion    Acute respiratory failure with hypoxia (HCC)  Resolved Problems:    * No resolved hospital problems.  *      Patient was seen by the following consultants while admitted to [facility]: Consults:  IP CONSULT TO NEPHROLOGY    Significant Diagnostic Studies:    ECHO Complete 2D W Doppler W Color    Result Date: 1/4/2022  Transthoracic Echocardiography Report (TTE)  Demographics   Patient Name       [facility]   Date of Study      01/04/2022         Gender              Female   Patient Number     [id number]         Date of Birth       1955   Visit Number       [id number]          Age                 74 year(s)   Accession Number   [id number]         Room Number         6678   Corporate ID       [id number]            Natalya Rice   Ordering Physician Kellen Haji MD        Physician           Isabela Valentino MD  Procedure Type of Study   TTE procedure:ECHOCARDIOGRAM COMPLETE 2D W DOPPLER W COLOR. Procedure Date Date: 01/04/2022 Start: 11:51 AM Study Location: [facility] - Echo Lab Technical Quality: Adequate visualization Indications:Congestive heart failure. Additional Indications:HTN,HLD,NSTEMI,DM,CKD,CMP, FORMER SMOKER,. Patient Status: Routine Height: 63 inches Weight: 229.01 pounds BSA: 2.05 m2 BMI: 40.57 kg/m2 Rhythm: Within normal limits HR: 67 bpm BP: 147/80 mmHg  Conclusions   Summary  Normal left ventricle size, wall thickness, and systolic function with an  estimated ejection fraction of 60-65%. No regional wall motion abnormalities  are seen. The right ventricle is normal in size and function. Aortic sclerosis with no significant stenosis (peak velocity 2.1m/s and mean  gradient of 12mmHg). Trivial mitral and tricuspid regurgitation.    Signature   ------------------------------------------------------------------  Electronically signed by Rudolph Coto MD  (Interpreting physician) on 01/04/2022 at 03:31 PM  ------------------------------------------------------------------   Findings   Left Ventricle Normal left ventricle size, wall thickness, and systolic function with an  estimated ejection fraction of 60-65%. No regional wall motion abnormalities  are seen. Indeterminate diastolic function. Mitral Valve  Mitral valve leaflets appear mildly thickened. Mild mitral annular calcification. No evidence of mitral stenosis. Trivial mitral regurgitation. Left Atrium  The left atrial size is normal.   Aortic Valve  Tricuspid aortic valve. Aortic sclerosis with no significant stenosis (peak velocity 2.1m/s and mean  gradient of 12mmHg). No evidence of aortic valve regurgitation. Aorta  The ascending aorta is normal in size. Right Ventricle  The right ventricle is normal in size and function. Tricuspid Valve  Tricuspid valve is structurally normal.  Trivial tricuspid regurgitation. Right Atrium  The right atrium is normal in size. Pulmonic Valve  The pulmonic valve is not well visualized. No evidence of pulmonic valve regurgitation. No evidence of pulmonic valve stenosis. Pericardial Effusion  No pericardial effusion noted. Pleural Effusion  No pleural effusion. Miscellaneous  IVC size is normal (<2.1cm) and collapses > 50% with respiration consistent  with normal RA pressure (3mmHg). Estimated pulmonary artery systolic pressure is elevated at 36 mmHg assuming  a right atrial pressure of 3 mmHg.   M-Mode/2D Measurements (cm)   LV Diastolic Dimension: 4.19 cm LV Systolic Dimension: 7.35 cm  LV Septum Diastolic: 1 cm  LV PW Diastolic: 1 cm           AO Root Dimension: 2.7 cm  RV Diastolic Dimension: 3.01 cm                                  LA Area: 24.1 cm2  LVOT: 1.7 cm                    LA volume/Index: 68 ml /33 ml/m2  Doppler Measurements   AV Peak Velocity: 233 cm/s     MV Peak E-Wave: 94 cm/s  AV Peak Gradient: 21.72 mmHg   MV Peak A-Wave: 124 cm/s  AV Mean Gradient: 11 mmHg      MV E/A Ratio: 0.76  LVOT Peak Velocity: 122 cm/s   MV Mean Gradient: 3 mmHg  AV Area (Continuity):1.33 cm2  MV Max P mmHg                                 MV Vmax:135 cm/s  TR Velocity:288 cm/s           MV VTI:41.8 cm/s  TR Gradient:33.18 mmHg  Estimated RAP:3 mmHg           MV Area (continuity): 2.19 cm2  Estimated RVSP: 36 mmHg        MV Deceleration Time: 299 msec  E' Septal Velocity: 6.37 cm/s  E' Lateral Velocity: 5.92 cm/s  PV Peak Velocity: 98.7 cm/s  PV Peak Gradient: 3.9 mmHg   Aortic Valve   Peak Velocity: 233 cm/s     Mean Velocity: 168 cm/s  Peak Gradient: 21.72 mmHg   Mean Gradient: 11 mmHg  Area (continuity): 1.33 cm2  AV VTI: 54.8 cm  Aorta   Aortic Root: 2.7 cm  Ascending Aorta: 3.4 cm  LVOT Diameter: 1.7 cm      XR CHEST (2 VW)    Result Date: 1/3/2022  EXAMINATION: TWO XRAY VIEWS OF THE CHEST 1/3/2022 4:50 pm COMPARISON: 2021 HISTORY: ORDERING SYSTEM PROVIDED HISTORY: sob, no cough TECHNOLOGIST PROVIDED HISTORY: Reason for exam:->sob, no cough Reason for Exam: sob, no cough FINDINGS: Cardiomegaly. Pulmonary vascular congestion. Possible pulmonary edema. Possible small bilateral pleural effusions. No focal airspace disease. No pneumothorax. Findings suggest congestive heart failure       Discharge Medications:         Medication List      START taking these medications    bacitracin-polymyxin b [id number] UNIT/GM ointment  Commonly known as: POLYSPORIN  Apply topically 2 times daily. CHANGE how you take these medications    amLODIPine 10 MG tablet  Commonly known as: NORVASC  Take 1 tablet by mouth daily  What changed: when to take this     aspirin 81 MG chewable tablet  Take 1 tablet by mouth daily. What changed: when to take this     atorvastatin 80 MG tablet  Commonly known as: LIPITOR  Take 1 tablet by mouth daily To the patient: Please call to make an appointment. Phone: [phone].   What changed: when to take this     citalopram 40 MG tablet  Commonly known as: CELEXA  Take 1 tablet by mouth daily  What changed: when to take this     furosemide 40 MG tablet  Commonly known as: LASIX  Take 1 tablet by mouth 2 times daily  What changed:   · medication strength  · See the new instructions. Lantus SoloStar 100 UNIT/ML injection pen  Generic drug: insulin glargine  INJECT 54 UNITS INTO THE SKIN NIGHTLY  What changed: See the new instructions. lisinopril 40 MG tablet  Commonly known as: PRINIVIL;ZESTRIL  TAKE 1 TABLET BY MOUTH EVERY DAY  What changed:   · how much to take  · how to take this  · when to take this  · additional instructions     pantoprazole 40 MG tablet  Commonly known as: PROTONIX  Take 1 tablet by mouth daily  What changed: when to take this     Victoza 18 MG/3ML Sopn SC injection  Generic drug: Liraglutide  Inject 1.8 mg into the skin daily  What changed: when to take this        CONTINUE taking these medications    acetaminophen 500 MG tablet  Commonly known as: TYLENOL     B-D UF III MINI PEN NEEDLES 31G X 5 MM Misc  Generic drug: Insulin Pen Needle  USE AS DIRECTED TWICE A DAY     blood glucose test strips strip  Commonly known as: ONE TOUCH ULTRA TEST  Check FSBS 2-3 times daily. carvedilol 25 MG tablet  Commonly known as: COREG  Take 1 tablet by mouth 2 times daily (with meals)     gabapentin 100 MG capsule  Commonly known as: NEURONTIN     melatonin 5 MG Tabs tablet  Take 1 tablet by mouth daily     ONE TOUCH LANCETS Misc  1 each by Does not apply route 3 times daily     Vitamin D3 50 MCG (2000 UT) Caps           Where to Get Your Medications      These medications were sent to [facility], [address] [facility]. Giana Mercado [phone] [place] [address]    Phone: [phone]   · furosemide 40 MG tablet     You can get these medications from any pharmacy    Bring a paper prescription for each of these medications  · bacitracin-polymyxin b [id number] UNIT/GM ointment         Disposition:   Discharged to Home. Any [facility] needs that were indicated and/or required as been addressed and set up by Social Work. Condition at discharge: Pt was medically stable at the time of discharge. Activity: activity as tolerated    Total time taken for discharging this patient: 40 minutes. Greater than 70% of time was spent focused exclusively on this patient. Time was taken to review chart, discuss plans with consultants, reconciling medications, discussing plan answering questions with patient. Signed:  Lennox Menjivar MD  1/5/2022, 11:43 AM  ----------------------------------------------------------------------------------------------------------------------    Rolo Ordoñez,     Please return to ER or call 911 if you develop any significant signs or symptoms.     I may not have addressed all of your medical illnesses or the abnormal blood work or imaging therefore please ask your PCP, Sarah Bills MD ,  to obtain [id number] [facility] record to follow up on all of the abnormal labs, imaging and findings that I have and have not addressed during your hospitalization.      Discharging you from the hospital does not mean that your medical care ends here and now. You may still need additional work up, investigation, monitoring, and treatment to be handled from this point on by outside providers including your PCP, Sarah Bills MD , Specialists and other healthcare providers.      Please review your list of discharge medications prior to resuming medications you might still have at home, as the medications you need to be taking, dosages or how often you must take them may have changed. For medication questions, contact your retail pharmacy and your PCP, Sarah Bills MD .     ** I STRONGLY RECOMMEND that you follow up with Sarah Bills MD within 3 to 5 days for a post hospitalization evaluation. This specific office visit is covered by your insurance, and is not the same as your annual doctor visit/ check up. This office visit is important, as it may prevent need for repeat and/or future hospitalizations. **    Your medical team at [facility] ([facility]) appreciates the opportunity to work with you to get well!     Sincerely,  Antwon Dejesus MD

## 2022-01-05 NOTE — PROGRESS NOTES
Discharge noted. Pt has received 60 minutes of heart failure education. She has a follow up appointment arranged with in 7 days of discharge.  She has appropriate discharge instructions

## 2022-01-05 NOTE — PLAN OF CARE
Problem:  Activity:  Goal: Ability to tolerate increased activity will improve  Description: Ability to tolerate increased activity will improve  Outcome: Ongoing     Problem: OXYGENATION/RESPIRATORY FUNCTION  Goal: Patient will maintain patent airway  Outcome: Ongoing  Goal: Patient will achieve/maintain normal respiratory rate/effort  Description: Respiratory rate and effort will be within normal limits for the patient  Outcome: Ongoing     Problem: HEMODYNAMIC STATUS  Goal: Patient has stable vital signs and fluid balance  Outcome: Ongoing     Problem: FLUID AND ELECTROLYTE IMBALANCE  Goal: Fluid and electrolyte balance are achieved/maintained  Outcome: Ongoing     Problem: ACTIVITY INTOLERANCE/IMPAIRED MOBILITY  Goal: Mobility/activity is maintained at optimum level for patient  Outcome: Ongoing

## 2022-01-05 NOTE — PLAN OF CARE
Problem:  Activity:  Goal: Ability to tolerate increased activity will improve  Description: Ability to tolerate increased activity will improve  1/5/2022 1029 by Fran England RN  Outcome: Ongoing  1/5/2022 0354 by Donita Luna RN  Outcome: Ongoing     Problem: OXYGENATION/RESPIRATORY FUNCTION  Goal: Patient will maintain patent airway  1/5/2022 1029 by Fran England RN  Outcome: Ongoing  1/5/2022 0354 by Donita Luna RN  Outcome: Ongoing  Goal: Patient will achieve/maintain normal respiratory rate/effort  Description: Respiratory rate and effort will be within normal limits for the patient  1/5/2022 1029 by Fran England RN  Outcome: Ongoing  1/5/2022 0354 by Donita Luna RN  Outcome: Ongoing     Problem: HEMODYNAMIC STATUS  Goal: Patient has stable vital signs and fluid balance  1/5/2022 1029 by Fran England RN  Outcome: Ongoing  1/5/2022 0354 by Donita Luna RN  Outcome: Ongoing     Problem: FLUID AND ELECTROLYTE IMBALANCE  Goal: Fluid and electrolyte balance are achieved/maintained  1/5/2022 1029 by Fran England RN  Outcome: Ongoing  1/5/2022 0354 by Donita Luna RN  Outcome: Ongoing     Problem: ACTIVITY INTOLERANCE/IMPAIRED MOBILITY  Goal: Mobility/activity is maintained at optimum level for patient  1/5/2022 1029 by Fran England RN  Outcome: Ongoing  1/5/2022 0354 by Donita Luna RN  Outcome: Ongoing

## 2022-01-06 ENCOUNTER — TELEPHONE (OUTPATIENT)
Dept: PRIMARY CARE CLINIC | Age: 67
End: 2022-01-06

## 2022-01-06 ENCOUNTER — CARE COORDINATION (OUTPATIENT)
Dept: CASE MANAGEMENT | Age: 67
End: 2022-01-06

## 2022-01-06 NOTE — TELEPHONE ENCOUNTER
[facility] Transitions Initial Follow Up Call    Outreach made within 2 business days of discharge: Yes    Patient: Krystle Weber Patient : 1955   MRN: [id number]  Reason for Admission: There are no discharge diagnoses documented for the most recent discharge. Discharge Date: 22       Spoke with: Carter Wlals    Discharge department/facility: [facility] Interactive Patient Contact:  Was patient able to fill all prescriptions: Yes  Was patient instructed to bring all medications to the follow-up visit: Yes  Is patient taking all medications as directed in the discharge summary?  Yes  Does patient understand their discharge instructions: Yes  Does patient have questions or concerns that need addressed prior to 7-14 day follow up office visit: no    Scheduled appointment with PCP within 7-14 days    Follow Up  Future Appointments   Date Time Provider Gael Turcios   1/10/2022  2:30 PM Cammie Mccurdy MD [id number], [place]

## 2022-01-06 NOTE — CARE COORDINATION
[facility] Transitions Initial Follow Up Call    Call within 2 business days of discharge: Yes    Patient: Parag Cortes Patient : 1955   MRN: [id number]  Reason for Admission: CHF exacerbation  Discharge Date: 22 RARS: Readmission Risk Score: 14.6 ( )      Last Discharge [facility]       Complaint Diagnosis Description Type Department Provider    1/3/22 Shortness of Breath Pulmonary vascular congestion . .. ED to Hosp-Admission (Discharged) (ADMITTED) Brian Block MD; Alonzo Dalton ... Spoke with: no one    Facility: [facility] Transitions 24 Hour Call    Do you have all of your prescriptions and are they filled?: Yes  Care Transitions Interventions         Follow Up  Future Appointments   Date Time Provider Gael Turcios   1/10/2022  2:30 PM MD Rickey Aguayo [facility] - [facility]       Follow up outreach call attempt, no answer. CTN left VM with contact information and request for return call. CTN will continue with outreach call attempts. Noted that pt has a HFU .10.22 with PCP.     SEJAL PerazaN, RN   Care Transition Nurse  Mobile: [phone]

## 2022-01-07 ENCOUNTER — FOLLOWUP TELEPHONE ENCOUNTER (OUTPATIENT)
Dept: INPATIENT UNIT | Age: 67
End: 2022-01-07

## 2022-01-07 ENCOUNTER — CARE COORDINATION (OUTPATIENT)
Dept: CASE MANAGEMENT | Age: 67
End: 2022-01-07

## 2022-01-07 NOTE — CARE COORDINATION
Care Transitions Outreach Attempt    Call within 2 business days of discharge: Yes   Attempted to reach patient for transitions of care follow up. Unable to reach patient. Left HIPPA Compliant TAL Edouard LPN, [facility]  PH: [phone]      Patient: Max Jiang Patient : 1955 MRN: [id number]    Last Discharge [facility]       Complaint Diagnosis Description Type Department Provider    1/3/22 Shortness of Breath Pulmonary vascular congestion . .. ED to Hosp-Admission (Discharged) (ADMITTED) Tanisha Diallo MD; Becky Crouch ... Was this an external facility discharge?  No Discharge Facility:     Noted following upcoming appointments from discharge chart review:   [facility] follow up appointment(s):   Future Appointments   Date Time Provider Gael Turcios   1/10/2022  2:30 PM MD Rosa Elena Kemp [facility] [place] - [facility]     Non-[facility] follow up appointment(s):

## 2022-01-07 NOTE — PROGRESS NOTES
Attempted x3 to reach patient for a follow up phone call. Unable to leave voice mail, as no recording ever came on.

## 2022-01-10 ENCOUNTER — OFFICE VISIT (OUTPATIENT)
Dept: PRIMARY CARE CLINIC | Age: 67
End: 2022-01-10
Payer: MEDICARE

## 2022-01-10 VITALS
WEIGHT: 220 LBS | SYSTOLIC BLOOD PRESSURE: 110 MMHG | HEART RATE: 64 BPM | RESPIRATION RATE: 18 BRPM | BODY MASS INDEX: 38.97 KG/M2 | DIASTOLIC BLOOD PRESSURE: 56 MMHG | OXYGEN SATURATION: 94 %

## 2022-01-10 DIAGNOSIS — I10 ESSENTIAL HYPERTENSION: ICD-10-CM

## 2022-01-10 DIAGNOSIS — G47.33 OSA (OBSTRUCTIVE SLEEP APNEA): ICD-10-CM

## 2022-01-10 DIAGNOSIS — I50.33 ACUTE ON CHRONIC DIASTOLIC (CONGESTIVE) HEART FAILURE (HCC): Primary | ICD-10-CM

## 2022-01-10 DIAGNOSIS — E11.21 TYPE 2 DIABETES MELLITUS WITH DIABETIC NEPHROPATHY, WITH LONG-TERM CURRENT USE OF INSULIN (HCC): ICD-10-CM

## 2022-01-10 DIAGNOSIS — Z91.14 POOR COMPLIANCE WITH MEDICATION: ICD-10-CM

## 2022-01-10 DIAGNOSIS — J96.01 ACUTE RESPIRATORY FAILURE WITH HYPOXIA (HCC): ICD-10-CM

## 2022-01-10 DIAGNOSIS — Z79.4 TYPE 2 DIABETES MELLITUS WITH DIABETIC NEPHROPATHY, WITH LONG-TERM CURRENT USE OF INSULIN (HCC): ICD-10-CM

## 2022-01-10 DIAGNOSIS — E78.2 MIXED HYPERLIPIDEMIA: ICD-10-CM

## 2022-01-10 PROCEDURE — 1111F DSCHRG MED/CURRENT MED MERGE: CPT | Performed by: FAMILY MEDICINE

## 2022-01-10 PROCEDURE — 99496 TRANSJ CARE MGMT HIGH F2F 7D: CPT | Performed by: FAMILY MEDICINE

## 2022-01-10 RX ORDER — INSULIN GLARGINE 100 [IU]/ML
INJECTION, SOLUTION SUBCUTANEOUS
Qty: 5 PEN | Refills: 3
Start: 2022-01-10 | End: 2022-04-20

## 2022-01-10 ASSESSMENT — ENCOUNTER SYMPTOMS
SHORTNESS OF BREATH: 0
ABDOMINAL PAIN: 0
BLOOD IN STOOL: 0
CONSTIPATION: 0
DIARRHEA: 0

## 2022-01-10 NOTE — PROGRESS NOTES
Post-Discharge Transitional Care Management Services or Hospital Follow Up      Luisito Zhao   YOB: 1955    Date of Office Visit:  1/10/2022  Date of Hospital Admission: 1/3/22  Date of Hospital Discharge: 1/5/22  Readmission Risk Score(high >=14%.  Medium >=10%):Readmission Risk Score: 14.6 ( )      Care management risk score Rising risk (score 2-5) and Complex Care (Scores >=6): 4     Non face to face  following discharge, date last encounter closed (first attempt may have been earlier): 1/7/2022 11:52 AM 1/7/2022 11:52 AM    Call initiated 2 business days of discharge: Yes     Patient Active Problem List   Diagnosis    Meniere's disease    Peripheral neuropathy    Depression    Mixed hyperlipidemia    Essential hypertension    Morbid obesity due to excess calories ([address]    ASNHL (asymmetrical sensorineural hearing loss)    Poor compliance with medication    DMII (diabetes mellitus, type 2) ([address]    Diabetic ulcer of toe of left foot associated with diabetes mellitus due to underlying condition, with fat layer exposed ([address]    CKD (chronic kidney disease) stage 3, GFR 30-59 ml/min (HCC)    ANGLE (obstructive sleep apnea)    Chronic GERD    Morbid obesity with BMI of 45.0-49.9, adult ([address]    Incisional hernia, without obstruction or gangrene    S/P laparoscopic sleeve gastrectomy    Anemia    Diabetic foot infection ([address]    Deep incisional surgical site infection    Tenosynovitis of foot    Colitis    Acute kidney injury superimposed on CKD ([address]    Acute on chronic diastolic (congestive) heart failure ([address]    Pulmonary vascular congestion    Acute respiratory failure with hypoxia (HCC)       Allergies   Allergen Reactions    Doxycycline Other (See Comments)     Yeast infection       Medications listed as ordered at the time of discharge from hospital     Medication List          Accurate as of January 10, 2022  6:44 PM. If you have any questions, ask your nurse or doctor. CHANGE how you take these medications    amLODIPine 10 MG tablet  Commonly known as: NORVASC  Take 1 tablet by mouth daily  What changed: when to take this     aspirin 81 MG chewable tablet  Take 1 tablet by mouth daily. What changed: when to take this     atorvastatin 80 MG tablet  Commonly known as: LIPITOR  Take 1 tablet by mouth daily To the patient: Please call to make an appointment. Phone: [phone]. What changed: when to take this     citalopram 40 MG tablet  Commonly known as: CELEXA  Take 1 tablet by mouth daily  What changed: when to take this     Lantus SoloStar 100 UNIT/ML injection pen  Generic drug: insulin glargine  Inject 54 units in AM and 20 units in PM  What changed: See the new instructions. Changed by: Olivier Pan MD     lisinopril 40 MG tablet  Commonly known as: PRINIVIL;ZESTRIL  TAKE 1 TABLET BY MOUTH EVERY DAY  What changed:   · how much to take  · how to take this  · when to take this  · additional instructions     pantoprazole 40 MG tablet  Commonly known as: PROTONIX  Take 1 tablet by mouth daily  What changed: when to take this     Victoza 18 MG/3ML Sopn SC injection  Generic drug: Liraglutide  Inject 1.8 mg into the skin daily  What changed: when to take this        CONTINUE taking these medications    acetaminophen 500 MG tablet  Commonly known as: TYLENOL     B-D UF III MINI PEN NEEDLES 31G X 5 MM Misc  Generic drug: Insulin Pen Needle  USE AS DIRECTED TWICE A DAY     blood glucose test strips strip  Commonly known as: ONE TOUCH ULTRA TEST  Check FSBS 2-3 times daily.      carvedilol 25 MG tablet  Commonly known as: COREG  Take 1 tablet by mouth 2 times daily (with meals)     furosemide 40 MG tablet  Commonly known as: LASIX  Take 1 tablet by mouth 2 times daily     gabapentin 100 MG capsule  Commonly known as: NEURONTIN     melatonin 5 MG Tabs tablet  Take 1 tablet by mouth daily     ONE TOUCH LANCETS Misc  1 each by Does not apply route 3 times daily Vitamin D3 48 MCG (2000 UT) Caps        STOP taking these medications    bacitracin-polymyxin b [id number] UNIT/GM ointment  Commonly known as: POLYSPORIN  Stopped by: Truman Lewis MD           Where to Get Your Medications      Information about where to get these medications is not yet available    Ask your nurse or doctor about these medications  · Lantus SoloStar 100 UNIT/ML injection pen           Medications marked \"taking\" at this time  Outpatient Medications Marked as Taking for the 1/10/22 encounter (Office Visit) with Armen Mays MD   Medication Sig Dispense Refill    insulin glargine (LANTUS SOLOSTAR) 100 UNIT/ML injection pen Inject 54 units in AM and 20 units in PM 5 pen 3    furosemide (LASIX) 40 MG tablet Take 1 tablet by mouth 2 times daily 60 tablet 3    pantoprazole (PROTONIX) 40 MG tablet Take 1 tablet by mouth daily (Patient taking differently: Take 40 mg by mouth every evening ) 90 tablet 1        Medications patient taking as of now reconciled against medications ordered at time of hospital discharge: Yes    Chief Complaint   Patient presents with    Follow-Up from Hospital       HPI    Inpatient course: Discharge summary reviewed- see chart. Interval history/Current status:      47-year-old female medical history significant for HTN HLD DM2 chronic diastolic heart failure CKD 3 and morbid obesity. She presented to the hospital with worsening shortness of breath on exertionand  found to be in acute exacerbation of chronic diastolic heart failure. Patient symptoms has now iimproved with diuretics. She was discharged home on Lasix 40 mg twice a day. Patient reported that her breathing is almost back to baseline. Oxygen saturation on room air is 94%. Review of Systems   Constitutional: Negative for activity change and appetite change. Eyes: Negative for visual disturbance. Respiratory: Negative for shortness of breath.     Cardiovascular: Negative for chest pain and leg swelling. Gastrointestinal: Negative for abdominal pain, blood in stool, constipation and diarrhea. Genitourinary: Negative for difficulty urinating, frequency, hematuria, menstrual problem and urgency. Neurological: Negative for dizziness and syncope. Psychiatric/Behavioral: Negative for behavioral problems. Vitals:    01/10/22 1440   BP: (!) 110/56   Pulse: 64   Resp: 18   SpO2: 94%   Weight: 220 lb (99.8 kg)     Body mass index is 38.97 kg/m². Wt Readings from Last 3 Encounters:   01/10/22 220 lb (99.8 kg)   01/05/22 230 lb 6.1 oz (104.5 kg)   09/02/21 227 lb 6.4 oz (103.1 kg)     BP Readings from Last 3 Encounters:   01/10/22 (!) 110/56   01/05/22 132/89   11/09/21 139/70       Physical Exam  Constitutional:       Appearance: She is well-developed. HENT:      Head: Normocephalic. Right Ear: External ear normal.      Nose: Nose normal.   Eyes:      Conjunctiva/sclera: Conjunctivae normal.      Pupils: Pupils are equal, round, and reactive to light. Neck:      Thyroid: No thyromegaly. Cardiovascular:      Rate and Rhythm: Normal rate and regular rhythm. Heart sounds: Normal heart sounds. No murmur heard. No friction rub. Pulmonary:      Effort: Pulmonary effort is normal.      Breath sounds: Normal breath sounds. Abdominal:      General: Bowel sounds are normal.      Palpations: Abdomen is soft. There is no mass. Musculoskeletal:         General: Normal range of motion. Cervical back: Normal range of motion and neck supple. Lymphadenopathy:      Cervical: No cervical adenopathy. Skin:     General: Skin is warm. Findings: No rash. Neurological:      Mental Status: She is alert and oriented to person, place, and time. Assessment/Plan:  1. Acute on chronic diastolic (congestive) heart failure (HCC)  Improved. Continue Lasix 40 mg twice daily    2. Acute respiratory failure with hypoxia (HCC)  Improved. Oxygen saturation on room air is 94%.   She is not oxygen and reported that her breathing is almost back to baseline. 3. Type 2 diabetes mellitus with diabetic nephropathy, with long-term current use of insulin ([facility])  HbA1c was 6.5% last 1/4/2022. Continue current medication, Victoza 1.8 mg daily and Lantus 54 units in a.m. and 20 units in p.m.    4. Essential hypertension  Controlled. Continue lisinopril 40 mg daily, amlodipine 10 mg daily and Coreg 25 mg twice daily    5. Mixed hyperlipidemia  Controlled. Continue Lipitor 80 mg daily. Will check lipid panel next blood draw. 6. ANGLE (obstructive sleep apnea)  Encouraged regular use of CPAP.     7. Poor compliance with medication          Medical Decision Making: high complexity

## 2022-01-17 RX ORDER — AMLODIPINE BESYLATE 10 MG/1
10 TABLET ORAL EVERY EVENING
Qty: 90 TABLET | Refills: 1 | Status: SHIPPED | OUTPATIENT
Start: 2022-01-17 | End: 2022-05-06

## 2022-01-21 DIAGNOSIS — E11.42 DM TYPE 2 WITH DIABETIC PERIPHERAL NEUROPATHY (HCC): ICD-10-CM

## 2022-01-21 RX ORDER — PEN NEEDLE, DIABETIC 31 GX5/16"
NEEDLE, DISPOSABLE MISCELLANEOUS
Qty: 200 EACH | Refills: 1 | Status: SHIPPED | OUTPATIENT
Start: 2022-01-21 | End: 2022-06-28

## 2022-01-27 RX ORDER — LISINOPRIL 40 MG/1
TABLET ORAL
Qty: 90 TABLET | Refills: 1 | Status: SHIPPED | OUTPATIENT
Start: 2022-01-27 | End: 2022-06-27

## 2022-01-31 RX ORDER — ATORVASTATIN CALCIUM 80 MG/1
80 TABLET, FILM COATED ORAL EVERY EVENING
Qty: 90 TABLET | Refills: 1 | Status: SHIPPED | OUTPATIENT
Start: 2022-01-31 | End: 2022-06-27

## 2022-02-17 ENCOUNTER — HOSPITAL ENCOUNTER (OUTPATIENT)
Dept: MAMMOGRAPHY | Age: 67
Discharge: HOME | End: 2022-02-21

## 2022-02-17 DIAGNOSIS — Z12.31 VISIT FOR SCREENING MAMMOGRAM: ICD-10-CM

## 2022-02-21 RX ORDER — ONDANSETRON 4 MG/1
TABLET, ORALLY DISINTEGRATING ORAL
Qty: 20 TABLET | Refills: 0 | Status: SHIPPED | OUTPATIENT
Start: 2022-02-21 | End: 2022-06-21

## 2022-02-25 ENCOUNTER — TELEPHONE (OUTPATIENT)
Dept: FAMILY MEDICINE CLINIC | Age: 67
End: 2022-02-25

## 2022-02-25 DIAGNOSIS — M54.9 SPINAL PAIN: Primary | ICD-10-CM

## 2022-02-25 NOTE — TELEPHONE ENCOUNTER
----- Message from Amisha Kearney sent at 2/24/2022  1:31 PM EST -----  Subject: Message to Provider    QUESTIONS  Information for Provider? Pt called in and was asking to padmaja with Muna   or Gus Vail tried to call the office no answer if someone can give pt a call.  ---------------------------------------------------------------------------  --------------  [address]  What is the best way for the office to contact you? OK to leave message on   voicemail  Preferred Call Back Phone Number? [phone]  ---------------------------------------------------------------------------  --------------  SCRIPT ANSWERS  Relationship to Patient?  Self

## 2022-02-25 NOTE — TELEPHONE ENCOUNTER
Pt is having spinal pain [phone] fax, phone:  [phone] (??). Pt requesting referral states that she unable to walk.

## 2022-04-20 RX ORDER — INSULIN GLARGINE 100 [IU]/ML
INJECTION, SOLUTION SUBCUTANEOUS
Qty: 66 ML | Refills: 1 | Status: SHIPPED | OUTPATIENT
Start: 2022-04-20 | End: 2022-10-24

## 2022-04-20 RX ORDER — CITALOPRAM 40 MG/1
40 TABLET ORAL EVERY EVENING
Qty: 90 TABLET | Refills: 1 | Status: SHIPPED | OUTPATIENT
Start: 2022-04-20 | End: 2022-08-23

## 2022-04-20 RX ORDER — LIRAGLUTIDE 6 MG/ML
1.8 INJECTION SUBCUTANEOUS DAILY
Qty: 27 ML | Refills: 1 | Status: SHIPPED | OUTPATIENT
Start: 2022-04-20 | End: 2022-09-19

## 2022-05-06 RX ORDER — AMLODIPINE BESYLATE 10 MG/1
10 TABLET ORAL DAILY
Qty: 90 TABLET | Refills: 0 | Status: SHIPPED | OUTPATIENT
Start: 2022-05-06 | End: 2022-06-27

## 2022-05-10 NOTE — TELEPHONE ENCOUNTER
Pt states pharmacy gave her a different diabetic med    She usually takes [place], but they gave her Lantus Solo star. Is t his a change she did not know about or is it a mistake?            PHONE [phone]
The previous issue has been taken care of my Vincenzo Shirley.
airway patent/breath sounds equal/good air movement/respirations non-labored/clear to auscultation bilaterally

## 2022-05-11 RX ORDER — GABAPENTIN 100 MG/1
200 CAPSULE ORAL 3 TIMES DAILY
Qty: 540 CAPSULE | Refills: 1 | Status: ON HOLD | OUTPATIENT
Start: 2022-05-11 | End: 2022-07-24 | Stop reason: HOSPADM

## 2022-05-25 ENCOUNTER — HOSPITAL ENCOUNTER (OUTPATIENT)
Dept: MAMMOGRAPHY | Age: 67
Discharge: HOME | End: 2022-05-25

## 2022-05-25 DIAGNOSIS — Z12.31 VISIT FOR SCREENING MAMMOGRAM: ICD-10-CM

## 2022-05-27 ENCOUNTER — HOSPITAL ENCOUNTER (OUTPATIENT)
Dept: MAMMOGRAPHY | Age: 67
Discharge: HOME | End: 2022-05-27

## 2022-06-15 NOTE — CONSULTS
ANTICOAGULATION THERAPY EDUCATION   PATIENT  INSTRUCTION    Your Guide to Using Warfarin:  Please refer to this handout for questions regarding your medication, Coumadin/Warfarin. This handout includes information on dosing, blood testing, possible side effects of Coumadin/Warfarin, using other medications, dietary guidelines and safety concerns while on anticoagulation therapy. Please contact your primary care physician and/or seek appropriate medical care if you experience:  Â· A serious fall  Â· Increased menstrual bleeding   Â· An injury to your head  Â· Notice a different color urine or stool   Â· Increased bleeding with teeth brushing   Â· If you have any other unusual bruising   Â· Have bleeding from your nose or a cut that doesn't stop bleeding         Call your physician immediately if you notice any signs and symptoms of a blood clot such as:  Â· Sudden weakness in any limb  Â· Dizziness or faintness   Â· Numbness or tingling anywhere  Â· New shortness of breath or chest pain   Â· Sudden onset of slurred speech or inability to speak  Â· New pain, swelling, redness or heat in any extremity   Â· Visual changes or loss of sight in either eye         Other factors that may affect your anticoagulation therapy include:  Â· Fever  Â· Stress   Â· Diarrhea  Â· Changes in exercise/activity level   Â· Vomiting         While on Anticoagulation therapy avoid:  Â· Pregnancy, using birth control and hormone replacement therapy    Â· Body piercing or tattoos      Please inform family members and other health care providers that you are on anticoagulation therapy. Make sure to carry an up-to-date medication list. You can also wear a medical alert bracelet to identify that you are on anticoagulation therapy. If you are utilizing an injectable anticoagulation medication you should be aware of how and where the medication should be injected and how to appropriately dispose of the injection needles (sharps).     Additional patient Please see original consult note.       Nata Cameron  Podiatry resident, PGY 2  perfect serve education materials provided to you:  Â· Important Facts about your Coumadin (color handout)  Â· Vitamin K Food List  Â· Travel Fitness Tips for Patients on Coumadin  Â· Prevention and Treatment of Nosebleeds  Â· When To Call Your Physician  Â· Potential and Documented Interaction of Herbs with Coumadin/Warfarin  Â· Lab hours for [facility]

## 2022-06-21 ENCOUNTER — OFFICE VISIT (OUTPATIENT)
Dept: PRIMARY CARE CLINIC | Age: 67
End: 2022-06-21
Payer: MEDICARE

## 2022-06-21 VITALS
HEIGHT: 63 IN | DIASTOLIC BLOOD PRESSURE: 60 MMHG | BODY MASS INDEX: 41.64 KG/M2 | HEART RATE: 63 BPM | WEIGHT: 235 LBS | SYSTOLIC BLOOD PRESSURE: 133 MMHG | RESPIRATION RATE: 18 BRPM | TEMPERATURE: 98.6 F

## 2022-06-21 DIAGNOSIS — I10 ESSENTIAL HYPERTENSION: Chronic | ICD-10-CM

## 2022-06-21 DIAGNOSIS — Z00.00 INITIAL MEDICARE ANNUAL WELLNESS VISIT: ICD-10-CM

## 2022-06-21 DIAGNOSIS — E11.22 TYPE 2 DIABETES MELLITUS WITH STAGE 3B CHRONIC KIDNEY DISEASE, WITH LONG-TERM CURRENT USE OF INSULIN (HCC): ICD-10-CM

## 2022-06-21 DIAGNOSIS — N18.32 TYPE 2 DIABETES MELLITUS WITH STAGE 3B CHRONIC KIDNEY DISEASE, WITH LONG-TERM CURRENT USE OF INSULIN (HCC): ICD-10-CM

## 2022-06-21 DIAGNOSIS — I50.32 CHRONIC DIASTOLIC CHF (CONGESTIVE HEART FAILURE) (HCC): ICD-10-CM

## 2022-06-21 DIAGNOSIS — N18.32 STAGE 3B CHRONIC KIDNEY DISEASE (HCC): Chronic | ICD-10-CM

## 2022-06-21 DIAGNOSIS — Z79.4 TYPE 2 DIABETES MELLITUS WITH STAGE 3B CHRONIC KIDNEY DISEASE, WITH LONG-TERM CURRENT USE OF INSULIN (HCC): ICD-10-CM

## 2022-06-21 DIAGNOSIS — D63.1 ANEMIA ASSOCIATED WITH CHRONIC RENAL FAILURE: ICD-10-CM

## 2022-06-21 DIAGNOSIS — N18.9 ANEMIA ASSOCIATED WITH CHRONIC RENAL FAILURE: ICD-10-CM

## 2022-06-21 PROBLEM — L97.522 DIABETIC ULCER OF TOE OF LEFT FOOT ASSOCIATED WITH DIABETES MELLITUS DUE TO UNDERLYING CONDITION, WITH FAT LAYER EXPOSED (HCC): Chronic | Status: RESOLVED | Noted: 2018-11-09 | Resolved: 2022-06-21

## 2022-06-21 PROBLEM — I50.33 ACUTE ON CHRONIC DIASTOLIC (CONGESTIVE) HEART FAILURE (HCC): Status: RESOLVED | Noted: 2022-01-04 | Resolved: 2022-06-21

## 2022-06-21 PROBLEM — N17.9 ACUTE KIDNEY INJURY SUPERIMPOSED ON CKD (HCC): Status: RESOLVED | Noted: 2021-03-12 | Resolved: 2022-06-21

## 2022-06-21 PROBLEM — E08.621 DIABETIC ULCER OF TOE OF LEFT FOOT ASSOCIATED WITH DIABETES MELLITUS DUE TO UNDERLYING CONDITION, WITH FAT LAYER EXPOSED (HCC): Chronic | Status: RESOLVED | Noted: 2018-11-09 | Resolved: 2022-06-21

## 2022-06-21 PROBLEM — J96.01 ACUTE RESPIRATORY FAILURE WITH HYPOXIA (HCC): Status: RESOLVED | Noted: 2022-01-04 | Resolved: 2022-06-21

## 2022-06-21 PROBLEM — K52.9 COLITIS: Status: RESOLVED | Noted: 2021-03-12 | Resolved: 2022-06-21

## 2022-06-21 PROBLEM — R09.89 PULMONARY VASCULAR CONGESTION: Status: RESOLVED | Noted: 2022-01-04 | Resolved: 2022-06-21

## 2022-06-21 LAB — HBA1C MFR BLD: 6.8 %

## 2022-06-21 PROCEDURE — 99213 OFFICE O/P EST LOW 20 MIN: CPT | Performed by: FAMILY MEDICINE

## 2022-06-21 PROCEDURE — 3044F HG A1C LEVEL LT 7.0%: CPT | Performed by: FAMILY MEDICINE

## 2022-06-21 PROCEDURE — G0438 PPPS, INITIAL VISIT: HCPCS | Performed by: FAMILY MEDICINE

## 2022-06-21 PROCEDURE — 1123F ACP DISCUSS/DSCN MKR DOCD: CPT | Performed by: FAMILY MEDICINE

## 2022-06-21 PROCEDURE — 83036 HEMOGLOBIN GLYCOSYLATED A1C: CPT | Performed by: FAMILY MEDICINE

## 2022-06-21 RX ORDER — FUROSEMIDE 40 MG/1
40 TABLET ORAL DAILY
Qty: 90 TABLET | Refills: 1 | Status: ON HOLD | OUTPATIENT
Start: 2022-06-21 | End: 2022-07-24 | Stop reason: HOSPADM

## 2022-06-21 ASSESSMENT — PATIENT HEALTH QUESTIONNAIRE - PHQ9
1. LITTLE INTEREST OR PLEASURE IN DOING THINGS: 0
6. FEELING BAD ABOUT YOURSELF - OR THAT YOU ARE A FAILURE OR HAVE LET YOURSELF OR YOUR FAMILY DOWN: 0
SUM OF ALL RESPONSES TO PHQ QUESTIONS 1-9: 1
10. IF YOU CHECKED OFF ANY PROBLEMS, HOW DIFFICULT HAVE THESE PROBLEMS MADE IT FOR YOU TO DO YOUR WORK, TAKE CARE OF THINGS AT HOME, OR GET ALONG WITH OTHER PEOPLE: 0
5. POOR APPETITE OR OVEREATING: 0
8. MOVING OR SPEAKING SO SLOWLY THAT OTHER PEOPLE COULD HAVE NOTICED. OR THE OPPOSITE, BEING SO FIGETY OR RESTLESS THAT YOU HAVE BEEN MOVING AROUND A LOT MORE THAN USUAL: 0
3. TROUBLE FALLING OR STAYING ASLEEP: 0
7. TROUBLE CONCENTRATING ON THINGS, SUCH AS READING THE NEWSPAPER OR WATCHING TELEVISION: 0
SUM OF ALL RESPONSES TO PHQ9 QUESTIONS 1 & 2: 1
SUM OF ALL RESPONSES TO PHQ QUESTIONS 1-9: 1
2. FEELING DOWN, DEPRESSED OR HOPELESS: 1
4. FEELING TIRED OR HAVING LITTLE ENERGY: 0
9. THOUGHTS THAT YOU WOULD BE BETTER OFF DEAD, OR OF HURTING YOURSELF: 0

## 2022-06-21 NOTE — PATIENT INSTRUCTIONS
Personalized Preventive Plan for Aurelia Dwyer - 6/21/2022  Medicare offers a range of preventive health benefits. Some of the tests and screenings are paid in full while other may be subject to a deductible, co-insurance, and/or copay. Some of these benefits include a comprehensive review of your medical history including lifestyle, illnesses that may run in your family, and various assessments and screenings as appropriate. After reviewing your medical record and screening and assessments performed today your provider may have ordered immunizations, labs, imaging, and/or referrals for you. A list of these orders (if applicable) as well as your Preventive Care list are included within your After Visit Summary for your review. Other Preventive Recommendations:    · A preventive eye exam performed by an eye specialist is recommended every 1-2 years to screen for glaucoma; cataracts, macular degeneration, and other eye disorders. · A preventive dental visit is recommended every 6 months. · Try to get at least 150 minutes of exercise per week or 10,000 steps per day on a pedometer . · Order or download the FREE \"Exercise & Physical Activity: Your Everyday Guide\" from [employer]. Call 0-188.331.4658 or search [employer] online. · You need 5394-5751 mg of calcium and 9846-4412 IU of vitamin D per day. It is possible to meet your calcium requirement with diet alone, but a vitamin D supplement is usually necessary to meet this goal.  · When exposed to the sun, use a sunscreen that protects against both UVA and UVB radiation with an SPF of 30 or greater. Reapply every 2 to 3 hours or after sweating, drying off with a towel, or swimming. · Always wear a seat belt when traveling in a car. Always wear a helmet when riding a bicycle or motorcycle.

## 2022-06-21 NOTE — PROGRESS NOTES
denies chest pain or shortness of breath. Denies bowel or urinary disturbance. There is no blood in the stool. Patient's complete Health Risk Assessment and screening values have been reviewed and are found in Flowsheets. The following problems were reviewed today and where indicated follow up appointments were made and/or referrals ordered.     Positive Risk Factor Screenings with Interventions:               General Health and ACP:  General  In general, how would you say your health is?: Good  In the past 7 days, have you experienced any of the following: New or Increased Pain, New or Increased Fatigue, Loneliness, Social Isolation, Stress or Anger?: No  Do you get the social and emotional support that you need?: Yes  Do you have a Living Will?: (!) No    Advance Directives     Power of  Living Will ACP-Advance Directive ACP-Power of     Not on File Not on File Not on File Not on File      General Health Risk Interventions:  · No Living Will: Advance Care Planning addressed with patient today    Health Habits/Nutrition:     Physical Activity: Inactive    Days of Exercise per Week: 0 days    Minutes of Exercise per Session: 0 min     Have you lost any weight without trying in the past 3 months?: No  Body mass index: (!) 41.62  Have you seen the dentist within the past year?: Yes    Health Habits/Nutrition Interventions:  · Inadequate physical activity:  patient is not ready to increase his/her physical activity level at this time    Hearing/Vision:  Do you or your family notice any trouble with your hearing that hasn't been managed with hearing aids?: (!) Yes  Do you have difficulty driving, watching TV, or doing any of your daily activities because of your eyesight?: No  Have you had an eye exam within the past year?: Yes  No exam data present    Hearing/Vision Interventions:  · Hearing concerns:  patient declines any further evaluation/treatment for hearing issues            Objective Vitals:    06/21/22 1315   BP: 133/60   Pulse: 63   Resp: 18   Temp: 98.6 °F (37 °C)   TempSrc: Temporal   Weight: 235 lb (106.6 kg)   Height: 5' 3\" (1.6 m)      Body mass index is 41.63 kg/m². General Appearance: alert and oriented to person, place and time, well developed and well- nourished, in no acute distress  Skin: warm and dry, no rash or erythema  Head: normocephalic and atraumatic  Eyes: pupils equal, round, and reactive to light, extraocular eye movements intact, conjunctivae normal  ENT: tympanic membrane, external ear and ear canal normal bilaterally, nose without deformity, nasal mucosa and turbinates normal without polyps  Neck: supple and non-tender without mass, no thyromegaly or thyroid nodules, no cervical lymphadenopathy  Pulmonary/Chest: clear to auscultation bilaterally- no wheezes, rales or rhonchi, normal air movement, no respiratory distress  Cardiovascular: normal rate, regular rhythm, normal S1 and S2, no murmurs, rubs, clicks, or gallops, distal pulses intact, no carotid bruits  Abdomen: soft, non-tender, non-distended, normal bowel sounds, no masses or organomegaly  Extremities: no cyanosis, clubbing or edema  Musculoskeletal: normal range of motion, no joint swelling, deformity or tenderness  Neurologic: reflexes normal and symmetric, no cranial nerve deficit, gait, coordination and speech normal       Allergies   Allergen Reactions    Doxycycline Other (See Comments)     Yeast infection     Prior to Visit Medications    Medication Sig Taking? Authorizing Provider   furosemide (LASIX) 40 MG tablet Take 1 tablet by mouth daily Yes Serina Moralez MD   gabapentin (NEURONTIN) 100 MG capsule Take 2 capsules by mouth 3 times daily for 180 days. Yes Serina Moralez MD   amLODIPine (NORVASC) 10 MG tablet Take 1 tablet by mouth daily To the patient: Please call to make an appointment for fasting labs. Phone: [phone].  Yes Serina Morlaez MD   Liraglutide (VICTOZA) 18 MG/3ML SOPN SC injection Inject 1.8 mg into the skin daily Yes Osiris Cox MD   insulin glargine (LANTUS SOLOSTAR) 100 UNIT/ML injection pen Inject 54 units in AM and 20 units in PM Yes Osiris Cox MD   citalopram (CELEXA) 40 MG tablet Take 1 tablet by mouth every evening Yes Osiris Cox MD   atorvastatin (LIPITOR) 80 MG tablet Take 1 tablet by mouth every evening Yes Osiris Cox MD   lisinopril (PRINIVIL;ZESTRIL) 40 MG tablet TAKE 1 TABLET BY MOUTH EVERY DAY Yes Osiris Cox MD   carvedilol (COREG) 25 MG tablet Take 1 tablet by mouth 2 times daily (with meals) Yes Osiris Cox MD   pantoprazole (PROTONIX) 40 MG tablet Take 1 tablet by mouth daily  Patient taking differently: Take 40 mg by mouth every evening  Yes Osiris Cox MD   melatonin 5 MG TABS tablet Take 1 tablet by mouth daily Yes Osiris Cox MD   Cholecalciferol (VITAMIN D3) 2000 units CAPS Take 2,000 Units by mouth every evening  Yes Historical Provider, MD   aspirin 81 MG chewable tablet Take 1 tablet by mouth daily. Patient taking differently: Take 81 mg by mouth every evening  Yes Rhoda Apley, MD   B-D UF III MINI PEN NEEDLES 31G X 5 MM MISC USE AS DIRECTED TWICE A DAY  Osiris Cox MD   acetaminophen (TYLENOL) 500 MG tablet Take 500 mg by mouth every 6 hours as needed for Pain  Historical Provider, MD   blood glucose test strips (ONE TOUCH ULTRA TEST) strip Check FSBS 2-3 times daily.   Osiris Cox MD   ONE TOUCH LANCETS MISC 1 each by Does not apply route 3 times daily  Osiris Cox MD       [facility] (Including outside providers/suppliers regularly involved in providing care):   Patient Care Team:  Osiris Cox MD as PCP - General (Internal Medicine)  Osiris Cox MD as PCP - [facility]  Javier Saenz DPM as Consulting Physician (Podiatry)     Reviewed and updated this visit:  Tobacco  Allergies  Meds  Med Hx  Surg Hx  Soc Hx  Fam Hx

## 2022-06-27 ENCOUNTER — TELEPHONE (OUTPATIENT)
Dept: PRIMARY CARE CLINIC | Age: 67
End: 2022-06-27

## 2022-06-27 DIAGNOSIS — E11.42 DM TYPE 2 WITH DIABETIC PERIPHERAL NEUROPATHY (HCC): ICD-10-CM

## 2022-06-27 RX ORDER — AMLODIPINE BESYLATE 10 MG/1
10 TABLET ORAL DAILY
Qty: 90 TABLET | Refills: 1 | Status: ON HOLD | OUTPATIENT
Start: 2022-06-27 | End: 2022-07-24 | Stop reason: HOSPADM

## 2022-06-27 RX ORDER — ATORVASTATIN CALCIUM 80 MG/1
80 TABLET, FILM COATED ORAL DAILY
Qty: 90 TABLET | Refills: 1 | Status: SHIPPED | OUTPATIENT
Start: 2022-06-27

## 2022-06-27 RX ORDER — LISINOPRIL 40 MG/1
40 TABLET ORAL DAILY
Qty: 90 TABLET | Refills: 1 | Status: ON HOLD | OUTPATIENT
Start: 2022-06-27 | End: 2022-07-24 | Stop reason: HOSPADM

## 2022-06-27 NOTE — TELEPHONE ENCOUNTER
Pt stated that she was looking at her medication and realized the dosage of gabapentin (NEURONTIN) 100 MG capsule [[id number]] with directions take 2 capsules by mouth 3 times daily for 180 days is wrong. Stated she shouldn't be taking 600mg a day its suppose to be 300 MG a day.  Asked for it to be corrected.

## 2022-06-28 RX ORDER — PEN NEEDLE, DIABETIC 31 GX5/16"
NEEDLE, DISPOSABLE MISCELLANEOUS
Qty: 200 EACH | Refills: 1 | Status: SHIPPED | OUTPATIENT
Start: 2022-06-28 | End: 2022-08-22

## 2022-07-05 RX ORDER — GABAPENTIN 300 MG/1
600 CAPSULE ORAL 3 TIMES DAILY
Qty: 540 CAPSULE | Refills: 1 | Status: ON HOLD | OUTPATIENT
Start: 2022-07-05 | End: 2022-07-24 | Stop reason: HOSPADM

## 2022-07-11 DIAGNOSIS — N18.32 STAGE 3B CHRONIC KIDNEY DISEASE (HCC): ICD-10-CM

## 2022-07-12 LAB
ALBUMIN SERPL-MCNC: 4.3 G/DL (ref 3.4–5)
ANION GAP SERPL CALCULATED.3IONS-SCNC: 14 MMOL/L (ref 3–16)
BUN BLDV-MCNC: 55 MG/DL (ref 7–20)
CALCIUM SERPL-MCNC: 9.3 MG/DL (ref 8.3–10.6)
CHLORIDE BLD-SCNC: 100 MMOL/L (ref 99–110)
CO2: 23 MMOL/L (ref 21–32)
CREAT SERPL-MCNC: 3 MG/DL (ref 0.6–1.2)
CREATININE URINE: 178.1 MG/DL (ref 28–259)
GFR AFRICAN AMERICAN: 19
GFR NON-AFRICAN AMERICAN: 15
GLUCOSE BLD-MCNC: 165 MG/DL (ref 70–99)
MICROALBUMIN UR-MCNC: 45.2 MG/DL
MICROALBUMIN/CREAT UR-RTO: 253.8 MG/G (ref 0–30)
PHOSPHORUS: 4.5 MG/DL (ref 2.5–4.9)
POTASSIUM SERPL-SCNC: 4.4 MMOL/L (ref 3.5–5.1)
SODIUM BLD-SCNC: 137 MMOL/L (ref 136–145)

## 2022-07-18 ENCOUNTER — HOSPITAL ENCOUNTER (INPATIENT)
Age: 67
LOS: 6 days | Discharge: HOME HEALTH | DRG: 871 | End: 2022-07-24
Attending: EMERGENCY MEDICINE | Admitting: STUDENT IN AN ORGANIZED HEALTH CARE EDUCATION/TRAINING PROGRAM
Payer: MEDICARE

## 2022-07-18 ENCOUNTER — APPOINTMENT (OUTPATIENT)
Dept: CT IMAGING | Age: 67
DRG: 871 | End: 2022-07-18
Payer: MEDICARE

## 2022-07-18 ENCOUNTER — APPOINTMENT (OUTPATIENT)
Dept: GENERAL RADIOLOGY | Age: 67
DRG: 871 | End: 2022-07-18
Payer: MEDICARE

## 2022-07-18 DIAGNOSIS — N18.9 ACUTE KIDNEY INJURY SUPERIMPOSED ON CKD (HCC): ICD-10-CM

## 2022-07-18 DIAGNOSIS — N17.9 ACUTE KIDNEY INJURY SUPERIMPOSED ON CKD (HCC): ICD-10-CM

## 2022-07-18 DIAGNOSIS — J96.01 ACUTE RESPIRATORY FAILURE WITH HYPOXIA (HCC): Primary | ICD-10-CM

## 2022-07-18 DIAGNOSIS — J18.9 PNEUMONIA OF BOTH LUNGS DUE TO INFECTIOUS ORGANISM, UNSPECIFIED PART OF LUNG: ICD-10-CM

## 2022-07-18 PROBLEM — J96.00 ACUTE RESPIRATORY FAILURE (HCC): Status: ACTIVE | Noted: 2022-07-18

## 2022-07-18 PROBLEM — A41.9 SEPSIS (HCC): Status: ACTIVE | Noted: 2022-07-18

## 2022-07-18 PROBLEM — E11.9 DM2 (DIABETES MELLITUS, TYPE 2) (HCC): Status: ACTIVE | Noted: 2022-07-18

## 2022-07-18 LAB
A/G RATIO: 1.2 (ref 1.1–2.2)
ALBUMIN SERPL-MCNC: 3.8 G/DL (ref 3.4–5)
ALP BLD-CCNC: 101 U/L (ref 40–129)
ALT SERPL-CCNC: 10 U/L (ref 10–40)
ANION GAP SERPL CALCULATED.3IONS-SCNC: 15 MMOL/L (ref 3–16)
AST SERPL-CCNC: 9 U/L (ref 15–37)
BACTERIA: NORMAL /HPF
BASE EXCESS VENOUS: -0.8 MMOL/L
BASOPHILS ABSOLUTE: 0.1 K/UL (ref 0–0.2)
BASOPHILS RELATIVE PERCENT: 0.5 %
BILIRUB SERPL-MCNC: 0.4 MG/DL (ref 0–1)
BILIRUBIN URINE: NEGATIVE
BLOOD, URINE: NEGATIVE
BUN BLDV-MCNC: 64 MG/DL (ref 7–20)
CALCIUM SERPL-MCNC: 9.2 MG/DL (ref 8.3–10.6)
CARBOXYHEMOGLOBIN: 1.5 %
CHLORIDE BLD-SCNC: 100 MMOL/L (ref 99–110)
CLARITY: CLEAR
CO2: 22 MMOL/L (ref 21–32)
COLOR: YELLOW
CREAT SERPL-MCNC: 3.5 MG/DL (ref 0.6–1.2)
EOSINOPHILS ABSOLUTE: 0.2 K/UL (ref 0–0.6)
EOSINOPHILS RELATIVE PERCENT: 1.5 %
EPITHELIAL CELLS, UA: 3 /HPF (ref 0–5)
FERRITIN: 73.5 NG/ML (ref 15–150)
GFR AFRICAN AMERICAN: 16
GFR NON-AFRICAN AMERICAN: 13
GLUCOSE BLD-MCNC: 128 MG/DL (ref 70–99)
GLUCOSE BLD-MCNC: 197 MG/DL (ref 70–99)
GLUCOSE BLD-MCNC: 228 MG/DL (ref 70–99)
GLUCOSE BLD-MCNC: 234 MG/DL (ref 70–99)
GLUCOSE URINE: NEGATIVE MG/DL
HCO3 VENOUS: 25 MMOL/L (ref 23–29)
HCT VFR BLD CALC: 26.4 % (ref 36–48)
HCT VFR BLD CALC: 27 % (ref 36–48)
HEMOGLOBIN: 8.7 G/DL (ref 12–16)
HYALINE CASTS: 6 /LPF (ref 0–8)
IMMATURE RETIC FRACT: 0.43 (ref 0.21–0.37)
IRON SATURATION: 5 % (ref 15–50)
IRON: 14 UG/DL (ref 37–145)
KETONES, URINE: NEGATIVE MG/DL
LEUKOCYTE ESTERASE, URINE: NEGATIVE
LYMPHOCYTES ABSOLUTE: 1 K/UL (ref 1–5.1)
LYMPHOCYTES RELATIVE PERCENT: 8.4 %
MCH RBC QN AUTO: 28.8 PG (ref 26–34)
MCHC RBC AUTO-ENTMCNC: 33 G/DL (ref 31–36)
MCV RBC AUTO: 87.4 FL (ref 80–100)
METHEMOGLOBIN VENOUS: 0.6 %
MICROSCOPIC EXAMINATION: YES
MONOCYTES ABSOLUTE: 1 K/UL (ref 0–1.3)
MONOCYTES RELATIVE PERCENT: 8 %
NEUTROPHILS ABSOLUTE: 10 K/UL (ref 1.7–7.7)
NEUTROPHILS RELATIVE PERCENT: 81.6 %
NITRITE, URINE: NEGATIVE
O2 SAT, VEN: 97 %
O2 THERAPY: NORMAL
OCCULT BLOOD SCREENING: NORMAL
PCO2, VEN: 43.7 MMHG (ref 40–50)
PDW BLD-RTO: 15.6 % (ref 12.4–15.4)
PERFORMED ON: ABNORMAL
PH UA: 5 (ref 5–8)
PH VENOUS: 7.36 (ref 7.35–7.45)
PLATELET # BLD: 256 K/UL (ref 135–450)
PMV BLD AUTO: 9.9 FL (ref 5–10.5)
PO2, VEN: 86 MMHG
POTASSIUM REFLEX MAGNESIUM: 4.4 MMOL/L (ref 3.5–5.1)
PRO-BNP: 1518 PG/ML (ref 0–124)
PROCALCITONIN: 0.1 NG/ML (ref 0–0.15)
PROTEIN UA: 30 MG/DL
RAPID INFLUENZA  B AGN: NEGATIVE
RAPID INFLUENZA A AGN: NEGATIVE
RBC # BLD: 3.03 M/UL (ref 4–5.2)
RBC UA: 0 /HPF (ref 0–4)
RETICULOCYTE ABSOLUTE COUNT: 0.05 M/UL (ref 0.02–0.1)
RETICULOCYTE COUNT PCT: 1.65 % (ref 0.5–2.18)
SARS-COV-2, NAAT: NOT DETECTED
SODIUM BLD-SCNC: 137 MMOL/L (ref 136–145)
SPECIFIC GRAVITY UA: 1.01 (ref 1–1.03)
TCO2 CALC VENOUS: 26 MMOL/L
TOTAL IRON BINDING CAPACITY: 287 UG/DL (ref 260–445)
TOTAL PROTEIN: 7.1 G/DL (ref 6.4–8.2)
TROPONIN: 0.01 NG/ML
URINE REFLEX TO CULTURE: ABNORMAL
URINE TYPE: ABNORMAL
UROBILINOGEN, URINE: 0.2 E.U./DL
WBC # BLD: 12.3 K/UL (ref 4–11)
WBC UA: 1 /HPF (ref 0–5)

## 2022-07-18 PROCEDURE — 85045 AUTOMATED RETICULOCYTE COUNT: CPT

## 2022-07-18 PROCEDURE — 84484 ASSAY OF TROPONIN QUANT: CPT

## 2022-07-18 PROCEDURE — 82270 OCCULT BLOOD FECES: CPT

## 2022-07-18 PROCEDURE — 83550 IRON BINDING TEST: CPT

## 2022-07-18 PROCEDURE — 83880 ASSAY OF NATRIURETIC PEPTIDE: CPT

## 2022-07-18 PROCEDURE — 6360000002 HC RX W HCPCS: Performed by: STUDENT IN AN ORGANIZED HEALTH CARE EDUCATION/TRAINING PROGRAM

## 2022-07-18 PROCEDURE — 6370000000 HC RX 637 (ALT 250 FOR IP): Performed by: STUDENT IN AN ORGANIZED HEALTH CARE EDUCATION/TRAINING PROGRAM

## 2022-07-18 PROCEDURE — 82803 BLOOD GASES ANY COMBINATION: CPT

## 2022-07-18 PROCEDURE — 71250 CT THORAX DX C-: CPT

## 2022-07-18 PROCEDURE — 2580000003 HC RX 258: Performed by: STUDENT IN AN ORGANIZED HEALTH CARE EDUCATION/TRAINING PROGRAM

## 2022-07-18 PROCEDURE — 99223 1ST HOSP IP/OBS HIGH 75: CPT | Performed by: INTERNAL MEDICINE

## 2022-07-18 PROCEDURE — 83540 ASSAY OF IRON: CPT

## 2022-07-18 PROCEDURE — 2700000000 HC OXYGEN THERAPY PER DAY

## 2022-07-18 PROCEDURE — 2580000003 HC RX 258: Performed by: EMERGENCY MEDICINE

## 2022-07-18 PROCEDURE — 36415 COLL VENOUS BLD VENIPUNCTURE: CPT

## 2022-07-18 PROCEDURE — 6360000002 HC RX W HCPCS: Performed by: EMERGENCY MEDICINE

## 2022-07-18 PROCEDURE — 6370000000 HC RX 637 (ALT 250 FOR IP): Performed by: NURSE PRACTITIONER

## 2022-07-18 PROCEDURE — 80053 COMPREHEN METABOLIC PANEL: CPT

## 2022-07-18 PROCEDURE — 99285 EMERGENCY DEPT VISIT HI MDM: CPT

## 2022-07-18 PROCEDURE — 96374 THER/PROPH/DIAG INJ IV PUSH: CPT

## 2022-07-18 PROCEDURE — 71045 X-RAY EXAM CHEST 1 VIEW: CPT

## 2022-07-18 PROCEDURE — 87635 SARS-COV-2 COVID-19 AMP PRB: CPT

## 2022-07-18 PROCEDURE — 82728 ASSAY OF FERRITIN: CPT

## 2022-07-18 PROCEDURE — 84145 PROCALCITONIN (PCT): CPT

## 2022-07-18 PROCEDURE — 93005 ELECTROCARDIOGRAM TRACING: CPT | Performed by: EMERGENCY MEDICINE

## 2022-07-18 PROCEDURE — 6360000002 HC RX W HCPCS: Performed by: HOSPITALIST

## 2022-07-18 PROCEDURE — 99223 1ST HOSP IP/OBS HIGH 75: CPT | Performed by: HOSPITALIST

## 2022-07-18 PROCEDURE — 87040 BLOOD CULTURE FOR BACTERIA: CPT

## 2022-07-18 PROCEDURE — 93970 EXTREMITY STUDY: CPT

## 2022-07-18 PROCEDURE — 1200000000 HC SEMI PRIVATE

## 2022-07-18 PROCEDURE — 94640 AIRWAY INHALATION TREATMENT: CPT

## 2022-07-18 PROCEDURE — 94761 N-INVAS EAR/PLS OXIMETRY MLT: CPT

## 2022-07-18 PROCEDURE — 85025 COMPLETE CBC W/AUTO DIFF WBC: CPT

## 2022-07-18 PROCEDURE — 87804 INFLUENZA ASSAY W/OPTIC: CPT

## 2022-07-18 PROCEDURE — 81001 URINALYSIS AUTO W/SCOPE: CPT

## 2022-07-18 RX ORDER — ONDANSETRON 2 MG/ML
4 INJECTION INTRAMUSCULAR; INTRAVENOUS EVERY 6 HOURS PRN
Status: DISCONTINUED | OUTPATIENT
Start: 2022-07-18 | End: 2022-07-24 | Stop reason: HOSPADM

## 2022-07-18 RX ORDER — BENZONATATE 100 MG/1
100 CAPSULE ORAL 3 TIMES DAILY PRN
Status: DISCONTINUED | OUTPATIENT
Start: 2022-07-18 | End: 2022-07-24 | Stop reason: HOSPADM

## 2022-07-18 RX ORDER — POLYETHYLENE GLYCOL 3350 17 G/17G
17 POWDER, FOR SOLUTION ORAL DAILY PRN
Status: DISCONTINUED | OUTPATIENT
Start: 2022-07-18 | End: 2022-07-24 | Stop reason: HOSPADM

## 2022-07-18 RX ORDER — AZITHROMYCIN 500 MG/1
500 TABLET, FILM COATED ORAL EVERY 24 HOURS
Status: COMPLETED | OUTPATIENT
Start: 2022-07-19 | End: 2022-07-21

## 2022-07-18 RX ORDER — GABAPENTIN 300 MG/1
600 CAPSULE ORAL 3 TIMES DAILY
Status: DISCONTINUED | OUTPATIENT
Start: 2022-07-18 | End: 2022-07-18

## 2022-07-18 RX ORDER — GABAPENTIN 100 MG/1
200 CAPSULE ORAL 3 TIMES DAILY
Status: DISCONTINUED | OUTPATIENT
Start: 2022-07-18 | End: 2022-07-24 | Stop reason: HOSPADM

## 2022-07-18 RX ORDER — GABAPENTIN 100 MG/1
100 CAPSULE ORAL 3 TIMES DAILY
Status: DISCONTINUED | OUTPATIENT
Start: 2022-07-18 | End: 2022-07-18

## 2022-07-18 RX ORDER — SODIUM CHLORIDE 0.9 % (FLUSH) 0.9 %
5-40 SYRINGE (ML) INJECTION EVERY 12 HOURS SCHEDULED
Status: DISCONTINUED | OUTPATIENT
Start: 2022-07-18 | End: 2022-07-24 | Stop reason: HOSPADM

## 2022-07-18 RX ORDER — CITALOPRAM 20 MG/1
40 TABLET ORAL EVERY EVENING
Status: DISCONTINUED | OUTPATIENT
Start: 2022-07-18 | End: 2022-07-24 | Stop reason: HOSPADM

## 2022-07-18 RX ORDER — AZITHROMYCIN 500 MG/1
500 TABLET, FILM COATED ORAL EVERY 24 HOURS
Status: CANCELLED | OUTPATIENT
Start: 2022-07-19 | End: 2022-07-22

## 2022-07-18 RX ORDER — HEPARIN SODIUM 5000 [USP'U]/ML
5000 INJECTION, SOLUTION INTRAVENOUS; SUBCUTANEOUS EVERY 8 HOURS SCHEDULED
Status: DISCONTINUED | OUTPATIENT
Start: 2022-07-18 | End: 2022-07-24 | Stop reason: HOSPADM

## 2022-07-18 RX ORDER — LANOLIN ALCOHOL/MO/W.PET/CERES
4.5 CREAM (GRAM) TOPICAL NIGHTLY
Status: DISCONTINUED | OUTPATIENT
Start: 2022-07-18 | End: 2022-07-24 | Stop reason: HOSPADM

## 2022-07-18 RX ORDER — CARVEDILOL 6.25 MG/1
6.25 TABLET ORAL 2 TIMES DAILY WITH MEALS
Status: DISCONTINUED | OUTPATIENT
Start: 2022-07-18 | End: 2022-07-24 | Stop reason: HOSPADM

## 2022-07-18 RX ORDER — SODIUM CHLORIDE 0.9 % (FLUSH) 0.9 %
5-40 SYRINGE (ML) INJECTION PRN
Status: DISCONTINUED | OUTPATIENT
Start: 2022-07-18 | End: 2022-07-24 | Stop reason: HOSPADM

## 2022-07-18 RX ORDER — SODIUM CHLORIDE 9 MG/ML
INJECTION, SOLUTION INTRAVENOUS PRN
Status: DISCONTINUED | OUTPATIENT
Start: 2022-07-18 | End: 2022-07-24 | Stop reason: HOSPADM

## 2022-07-18 RX ORDER — FUROSEMIDE 10 MG/ML
40 INJECTION INTRAMUSCULAR; INTRAVENOUS ONCE
Status: COMPLETED | OUTPATIENT
Start: 2022-07-18 | End: 2022-07-18

## 2022-07-18 RX ORDER — INSULIN LISPRO 100 [IU]/ML
0-6 INJECTION, SOLUTION INTRAVENOUS; SUBCUTANEOUS
Status: DISCONTINUED | OUTPATIENT
Start: 2022-07-18 | End: 2022-07-21

## 2022-07-18 RX ORDER — ASPIRIN 81 MG/1
81 TABLET, CHEWABLE ORAL DAILY
Status: DISCONTINUED | OUTPATIENT
Start: 2022-07-18 | End: 2022-07-24 | Stop reason: HOSPADM

## 2022-07-18 RX ORDER — INSULIN LISPRO 100 [IU]/ML
0-3 INJECTION, SOLUTION INTRAVENOUS; SUBCUTANEOUS NIGHTLY
Status: DISCONTINUED | OUTPATIENT
Start: 2022-07-18 | End: 2022-07-21

## 2022-07-18 RX ORDER — ATORVASTATIN CALCIUM 80 MG/1
80 TABLET, FILM COATED ORAL DAILY
Status: DISCONTINUED | OUTPATIENT
Start: 2022-07-18 | End: 2022-07-24 | Stop reason: HOSPADM

## 2022-07-18 RX ORDER — PANTOPRAZOLE SODIUM 40 MG/1
40 TABLET, DELAYED RELEASE ORAL EVERY EVENING
Status: DISCONTINUED | OUTPATIENT
Start: 2022-07-18 | End: 2022-07-24 | Stop reason: HOSPADM

## 2022-07-18 RX ORDER — ACETAMINOPHEN 325 MG/1
650 TABLET ORAL EVERY 6 HOURS PRN
Status: DISCONTINUED | OUTPATIENT
Start: 2022-07-18 | End: 2022-07-24 | Stop reason: HOSPADM

## 2022-07-18 RX ORDER — IPRATROPIUM BROMIDE AND ALBUTEROL SULFATE 2.5; .5 MG/3ML; MG/3ML
1 SOLUTION RESPIRATORY (INHALATION) EVERY 4 HOURS PRN
Status: DISCONTINUED | OUTPATIENT
Start: 2022-07-18 | End: 2022-07-24 | Stop reason: HOSPADM

## 2022-07-18 RX ORDER — FUROSEMIDE 10 MG/ML
20 INJECTION INTRAMUSCULAR; INTRAVENOUS 2 TIMES DAILY
Status: DISCONTINUED | OUTPATIENT
Start: 2022-07-18 | End: 2022-07-18

## 2022-07-18 RX ORDER — AZITHROMYCIN 500 MG/1
500 TABLET, FILM COATED ORAL EVERY 24 HOURS
Status: DISCONTINUED | OUTPATIENT
Start: 2022-07-18 | End: 2022-07-18

## 2022-07-18 RX ORDER — FUROSEMIDE 10 MG/ML
40 INJECTION INTRAMUSCULAR; INTRAVENOUS 2 TIMES DAILY
Status: DISCONTINUED | OUTPATIENT
Start: 2022-07-18 | End: 2022-07-19

## 2022-07-18 RX ORDER — DEXTROSE MONOHYDRATE 50 MG/ML
100 INJECTION, SOLUTION INTRAVENOUS PRN
Status: DISCONTINUED | OUTPATIENT
Start: 2022-07-18 | End: 2022-07-24 | Stop reason: HOSPADM

## 2022-07-18 RX ORDER — ENOXAPARIN SODIUM 100 MG/ML
40 INJECTION SUBCUTANEOUS DAILY
Status: CANCELLED | OUTPATIENT
Start: 2022-07-18

## 2022-07-18 RX ORDER — ACETAMINOPHEN 650 MG/1
650 SUPPOSITORY RECTAL EVERY 6 HOURS PRN
Status: DISCONTINUED | OUTPATIENT
Start: 2022-07-18 | End: 2022-07-24 | Stop reason: HOSPADM

## 2022-07-18 RX ADMIN — FUROSEMIDE 40 MG: 10 INJECTION, SOLUTION INTRAMUSCULAR; INTRAVENOUS at 04:46

## 2022-07-18 RX ADMIN — BENZONATATE 100 MG: 100 CAPSULE ORAL at 20:07

## 2022-07-18 RX ADMIN — INSULIN LISPRO 2 UNITS: 100 INJECTION, SOLUTION INTRAVENOUS; SUBCUTANEOUS at 12:50

## 2022-07-18 RX ADMIN — GABAPENTIN 200 MG: 100 CAPSULE ORAL at 10:11

## 2022-07-18 RX ADMIN — INSULIN LISPRO 2 UNITS: 100 INJECTION, SOLUTION INTRAVENOUS; SUBCUTANEOUS at 17:09

## 2022-07-18 RX ADMIN — ATORVASTATIN CALCIUM 80 MG: 80 TABLET, FILM COATED ORAL at 10:11

## 2022-07-18 RX ADMIN — HEPARIN SODIUM 5000 UNITS: 5000 INJECTION INTRAVENOUS; SUBCUTANEOUS at 21:11

## 2022-07-18 RX ADMIN — HEPARIN SODIUM 5000 UNITS: 5000 INJECTION INTRAVENOUS; SUBCUTANEOUS at 14:23

## 2022-07-18 RX ADMIN — GABAPENTIN 200 MG: 100 CAPSULE ORAL at 21:12

## 2022-07-18 RX ADMIN — Medication 4.5 MG: at 21:12

## 2022-07-18 RX ADMIN — ACETAMINOPHEN 650 MG: 325 TABLET ORAL at 20:07

## 2022-07-18 RX ADMIN — CEFTRIAXONE 1000 MG: 1 INJECTION, POWDER, FOR SOLUTION INTRAMUSCULAR; INTRAVENOUS at 05:48

## 2022-07-18 RX ADMIN — CARVEDILOL 6.25 MG: 6.25 TABLET, FILM COATED ORAL at 10:11

## 2022-07-18 RX ADMIN — SODIUM CHLORIDE, PRESERVATIVE FREE 10 ML: 5 INJECTION INTRAVENOUS at 21:14

## 2022-07-18 RX ADMIN — GABAPENTIN 200 MG: 100 CAPSULE ORAL at 14:23

## 2022-07-18 RX ADMIN — CITALOPRAM HYDROBROMIDE 40 MG: 20 TABLET ORAL at 17:05

## 2022-07-18 RX ADMIN — AZITHROMYCIN MONOHYDRATE 500 MG: 500 INJECTION, POWDER, LYOPHILIZED, FOR SOLUTION INTRAVENOUS at 06:58

## 2022-07-18 RX ADMIN — PANTOPRAZOLE SODIUM 40 MG: 40 TABLET, DELAYED RELEASE ORAL at 17:07

## 2022-07-18 RX ADMIN — FUROSEMIDE 40 MG: 10 INJECTION, SOLUTION INTRAMUSCULAR; INTRAVENOUS at 14:23

## 2022-07-18 RX ADMIN — SODIUM CHLORIDE, PRESERVATIVE FREE 10 ML: 5 INJECTION INTRAVENOUS at 10:12

## 2022-07-18 RX ADMIN — INSULIN LISPRO 1 UNITS: 100 INJECTION, SOLUTION INTRAVENOUS; SUBCUTANEOUS at 21:11

## 2022-07-18 RX ADMIN — IPRATROPIUM BROMIDE AND ALBUTEROL SULFATE 1 AMPULE: .5; 3 SOLUTION RESPIRATORY (INHALATION) at 20:11

## 2022-07-18 RX ADMIN — CARVEDILOL 6.25 MG: 6.25 TABLET, FILM COATED ORAL at 17:07

## 2022-07-18 ASSESSMENT — ENCOUNTER SYMPTOMS
VOMITING: 0
PHOTOPHOBIA: 0
TROUBLE SWALLOWING: 0
COUGH: 0
COLOR CHANGE: 0
ABDOMINAL PAIN: 0
WHEEZING: 1
CHEST TIGHTNESS: 1
NAUSEA: 0
SHORTNESS OF BREATH: 1

## 2022-07-18 ASSESSMENT — PAIN SCALES - GENERAL
PAINLEVEL_OUTOF10: 0

## 2022-07-18 ASSESSMENT — PAIN - FUNCTIONAL ASSESSMENT: PAIN_FUNCTIONAL_ASSESSMENT: NONE - DENIES PAIN

## 2022-07-18 ASSESSMENT — LIFESTYLE VARIABLES: HOW OFTEN DO YOU HAVE A DRINK CONTAINING ALCOHOL: MONTHLY OR LESS

## 2022-07-18 NOTE — PROGRESS NOTES
Pt resting in bed, CGA when ambulating. A&Ox4. Pt tolerating intake and PO medications whole with thin liquids. No c/o pain this AM. 5L humidified O2 NC, not home dependent. NSR on tele. Pt went for BLE venous scan this AM, results pending. Call light within reach. Fall precautions in place. All need addressed at this time.  Electronically signed by Nancy Lim RN on 7/18/2022 at 10:35 AM

## 2022-07-18 NOTE — PLAN OF CARE
Problem: Discharge Planning  Goal: Discharge to home or other facility with appropriate resources  Outcome: Progressing  Flowsheets (Taken 7/18/2022 0630)  Discharge to home or other facility with appropriate resources:   Identify barriers to discharge with patient and caregiver   Identify discharge learning needs (meds, wound care, etc)     Problem: Safety - Adult  Goal: Free from fall injury  Outcome: Progressing     Problem: ABCDS Injury Assessment  Goal: Absence of physical injury  Outcome: Progressing     Problem: Skin/Tissue Integrity  Goal: Absence of new skin breakdown  Description: 1. Monitor for areas of redness and/or skin breakdown  2. Assess vascular access sites hourly  3. Every 4-6 hours minimum:  Change oxygen saturation probe site  4. Every 4-6 hours:  If on nasal continuous positive airway pressure, respiratory therapy assess nares and determine need for appliance change or resting period. Outcome: Progressing     Continue with plan of care.

## 2022-07-18 NOTE — PROGRESS NOTES
Hospital Medicine Progress Note      Admit Date: 7/18/2022       CC: F/U for worsening dyspnea on exertion, fatigue, cough    HPI:   The patient is a 74 y.o. female with past medical history of type 2 diabetes, CKD, previous history of DVT not on anticoagulation, cardiomyopathy with diastolic heart failure, hyperlipidemia, hypertension who presents to [facility] coming from home that over the last 2 days since she returned from a festival on Saturday she was noticing that while at the festival she was initially somewhat increasingly fatigued and short of breath on exertion but was still able to get to her car and get home. She became even more progressively short of breath and fatigued and was in her home and sleeping in bed the whole day all day till she decided to call EMS tonight prior to coming to the ED because she became more more short of breath as the day went on till tonight she was getting increasingly more short of breath and fatigued on exertion. She did have a cough at home but denied any sputum production. She also denies any other recent symptoms of fever, chills, dizziness, syncope, chest pain, dysuria, blood in urine/stool/sputum recently or prior to today, nausea/vomiting/diarrhea/abdominal pain. She does not feel as though she has been having any difficulty urinating and feels as though her urination has been optimal while on her Lasix. Does not feel obstructed. He has been compliant with her medications. Interval History/Subjective: Nephrology, cardiology and CHF RN as well as dietitian consulted by admitting MD    Ordered melatonin for patient per request (home med)   cont IV lasix for now. Dopplers neg for DVT. C/o SOB over the weekend at the [employer].  endorses a little bit of a cough, denies fever   She is on 5L nc but does not normally wear O2. Covid neg.     Review of Systems:       The patient denied headaches, visual changes, LOC, SOB, CP, ABD pain, N/V/D, skin changes, new or worsening weakness or neuromuscular deficits. Comprehensive ROS negative except as mentioned above.     Past Medical History:        Diagnosis Date    Abnormal echocardiogram     25% on 3/11/14 and 50% on 3/19/14    Back pain     Cardiomyopathy ([address]     EF was 50% on 3/19/14    Chipped tooth     lower left    Clostridium difficile diarrhea 03/12/2021    Dental crown present     veneers    Depressive disorder 08/13/2014    Diabetic infection of right foot ([address] 04/15/2015    Diabetic ulcer of toe of left foot associated with diabetes mellitus due to underlying condition, with fat layer exposed ([address] 11/9/2018    Diabetic ulcer of toe of left foot associated with type 2 diabetes mellitus, with fat layer exposed ([address] 04/10/2018    Pt slipped in hot tub latter part of February and has not healed since despite topical treatment    DVT (deep venous thrombosis) ([facility])     HTN (hypertension)     Hx of blood clots 2016    left leg    Ischemic cardiomyopathy 04/15/2015    Kidney disease     CHRONIC STAGE 3    Meniere's disease     Mixed hyperlipidemia 03/07/2016    Nicotine abuse     NSTEMI (non-ST elevated myocardial infarction) ([address] 03/13/2014    ANGLE (obstructive sleep apnea)     Osteomyelitis of ankle or foot, acute, left ([address] 06/04/2018    Pneumonia     PONV (postoperative nausea and vomiting)     nausea    Spinal epidural abscess 03/13/2014    Type II diabetes mellitus, uncontrolled ([address] 08/13/2014       Past Surgical History:        Procedure Laterality Date    BACK SURGERY  3/2014    DEBRIDEMENT  3/12/14    extensive debridement of bone/muscle and paraspinal empyema    DILATION AND CURETTAGE OF UTERUS      ENDOSCOPY, COLON, DIAGNOSTIC      FOOT DEBRIDEMENT Left 9/8/2020    LEFT FOOT DEBRIDEMENT INCISION AND DRAINAGE performed by Millie Tee DPM at [address] Left 8/28/2020    ON THE LEFT: [address] Y, WOUND CLOSURE, FLEXOR TENOTOMY 4TH AND 5TH DIGITS, TENOTOMY AND CAPSULOTOMY 2ND DIGIT performed by Lucía Schmidt DPM at [address] ([address] [facility])  6/15/1999    complete-think right ovary in. NASAL SEPTUM SURGERY      NERVE SURGERY Left 9/10/2020    LEFT FOOT INCISION AND DRAINAGE, EXTENSOR HALLUCIS LONGUS REPAIR WITH DELAYED PRIMARY CLOSURE performed by Lucía Schmidt DPM at [address] shut behind right ear    PRESSURE ULCER DEBRIDEMENT Left 10/23/2020    ON THE LEFT: SURGICAL PREPARATION OF WOUND BED, APPLICATION OF DERMAL GRAFT SUBSTITUTE performed by Lucía Schmidt DPM at [address]  04/02/2019    ROBOTIC ASSISTED LAPAROSCOPIC SLEEVE GASTRECTOMY, LAPAROSCOPIC REDUCIBLE INCISIONAL HERNIA REPAIR     SLEEVE GASTRECTOMY N/A 4/2/2019    ROBOTIC ASSISTED LAPAROSCOPIC SLEEVE GASTRECTOMY, LAPAROSCOPIC REDUCIBLE INCISIONAL HERNIA REPAIR performed by Rah Mena DO at [address] N/A 2/8/2019    EGD BIOPSY performed by Rah Mena DO at [address] [facility] N/A 2/8/2019    EGD POLYP ABLATION/OTHER performed by Rah Mena DO at [facility] ENDOSCOPY       Allergies:  Doxycycline    Past medical and surgical history reviewed. Any changes have been noted. PHYSICAL EXAM:  BP (!) 166/70   Pulse 67   Temp 98.4 °F (36.9 °C) (Oral)   Resp 18   Ht 5' 3\" (1.6 m)   Wt 248 lb 0.3 oz (112.5 kg)   LMP 06/15/1999   SpO2 94%   BMI 43.93 kg/m²       Intake/Output Summary (Last 24 hours) at 7/18/2022 7554  Last data filed at 7/18/2022 4143  Gross per 24 hour   Intake --   Output 100 ml   Net -100 ml        General appearance:   No apparent distress, appears stated age. Cooperative. HEENT:  Normocephalic, atraumatic. PERRLA. EOMi. Conjunctivae/corneas clear, no icterus, non-injected. Neck: Supple, with full range of motion. No jugular venous distention. Trachea midline.   Respiratory:  Normal respiratory effort. Clear to auscultation, bilaterally without Rales/Wheezes/Rhonchi. Cardiovascular:  Regular rate and rhythm without murmurs, rubs or gallops. Abdomen: Soft, non-tender, non-distended, without rebound or guarding. Normal bowel sounds. Musculoskeletal:  No clubbing, cyanosis or edema bilaterally. Full range of motion without deformity. Skin: Skin color, texture, turgor normal.  No rashes or lesions. Neurologic:  Neurovascularly intact without any focal sensory/motor deficits. Cranial nerves: II-XII intact, grossly intact. No facial asymmetry, tongue midline. Psychiatric:  Alert and oriented, thought content appropriate  Capillary Refill: Brisk,< 3 seconds   Peripheral Pulses: +2 palpable, equal bilaterally       LABS:    Lab Results   Component Value Date    WBC 12.3 (H) 07/18/2022    HGB 8.7 (L) 07/18/2022    HCT 26.4 (L) 07/18/2022    HCT 27.0 (L) 07/18/2022    MCV 87.4 07/18/2022     07/18/2022    LYMPHOPCT 8.4 07/18/2022    RBC 3.03 (L) 07/18/2022    MCH 28.8 07/18/2022    MCHC 33.0 07/18/2022    RDW 15.6 (H) 07/18/2022       Lab Results   Component Value Date    CREATININE 3.5 (H) 07/18/2022    BUN 64 (H) 07/18/2022     07/18/2022    K 4.4 07/18/2022     07/18/2022    CO2 22 07/18/2022       Lab Results   Component Value Date/Time    MG 1.80 01/05/2022 05:25 AM       Lab Results   Component Value Date    ALT 10 07/18/2022    AST 9 (L) 07/18/2022    ALKPHOS 101 07/18/2022    BILITOT 0.4 07/18/2022        No flowsheet data found. Lab Results   Component Value Date    LABA1C 6.8 06/21/2022       Imaging:  CT CHEST WO CONTRAST   Final Result   1. Interstitial pulmonary edema with suspected peribronchovascular alveolar   edema, suggesting congestive heart failure given mild cardiomegaly and trace   bilateral pleural effusions. Interstitial pneumonia could appear similar.    2. Patchy peribronchovascular groundglass opacities most prominent near the   lung bases are most likely alveolar edema, although pneumonia could appear   similar. 3. Mild bronchial wall thickening potentially due to pulmonary vascular   congestion, reactive airways disease, or bronchitis. 4. At least minimal anasarca. XR CHEST PORTABLE   Final Result   1. Pulmonary vascular congestion with suggestion of perihilar edema,   potentially congestive heart failure given mild cardiomegaly and possible   trace bilateral pleural effusions. Pneumonia could appear similar. 2. Bibasilar airspace opacities most likely due atelectasis, pneumonia and   aspiration could appear similar.          VL Extremity Venous Bilateral    (Results Pending)       Scheduled and prn Medications:    Scheduled Meds:   [Held by provider] aspirin  81 mg Oral Daily    atorvastatin  80 mg Oral Daily    carvedilol  6.25 mg Oral BID WC    pantoprazole  40 mg Oral QPM    citalopram  40 mg Oral QPM    gabapentin  100 mg Oral TID    sodium chloride flush  5-40 mL IntraVENous 2 times per day    furosemide  20 mg IntraVENous BID    heparin (porcine)  5,000 Units SubCUTAneous 3 times per day    [START ON 7/19/2022] cefTRIAXone (ROCEPHIN) IV  2,000 mg IntraVENous Q24H    And    [START ON 7/19/2022] azithromycin  500 mg Oral Q24H     Continuous Infusions:   sodium chloride       PRN Meds:.sodium chloride flush, sodium chloride, polyethylene glycol, acetaminophen **OR** acetaminophen, ondansetron, ipratropium-albuterol    Assessment & Plan:      Acute resp failure  with hypoxia  - O2 5L - does not wear O2 at home   - CT : pulm edema, possible PNA, CHF  - monitor and wean O2 for > 94%  - IV antibx broad spectrum for poss gram neg pna  - procalc normal      Possibly pneumonia on CT  - IV rocephin and azithromycin for now  - procalcitonin normal  - afebrile   - reports some cough         NEHA on CKD, stage 3   - consult to nephrology  - BMP daily   - avoid nephrotoxic meds   - renal dosing gabapentin to 200mg tid rather than home dose 600mg tid  - monitor BP  - received lasix in ED, and cont bid IV lasix with consideration for NEHA    Anemia  - workup in place  - no active bleeding  - follow daily CBC  - hemoccult, FE/TIBC/ferritin/B12/retic ct    HTN  - cont home meds  - hold ACEi in setting of NEHA       DM, T2  - lantus home dose: 54 units daily/ 20 units nightly - holding for now until BS require it  - poct ac/hs  - A1c 6/21/22   6.8   - will do low dose SSI for now while glucose on the lower side   - adjust accordingly as needed    CHF   Last echo 1/4/22:   EF 60-65%. The right ventricle is normal in size and function. Aortic sclerosis    Trivial mitral and tricuspid regurgitation. Lasix 40mg daily - home dose  - IV lasix while admitted  - dopplers bilat   - bNP 1518  - trop neg  - strict I/O  - daily wts  - bilat venous dopplers to r/o DVT for edema  - CT : interstitial pulmonary edema with peribronchovascular alveolar edema, suggesting CHF given mild cardiomegaly and trace bilat pleural effusions. Insterstitial pneumonia could appear similar. 2. Patchy peribronchovascular groundglass opacities most prominent near the lung bases are most likely alveolar edema, although pneumonia could appear similar . 3. Mild bronchial wall thickening potentially due to pulmonary vascular congestion, reactive airway disease or bronchitis, at least minimal anasarca  - consult to cardiology for CHF        hx back pain   - prior laminectomy        Continue current regimen/therapies. Monitor. Adjust medical regimen as appropriate. Body mass index is 43.93 kg/m². The patient and / or the family were informed of the results of any tests, a time was given to answer questions, a plan was proposed and they agreed with plan. DVT ppx: hep   PPI: protonix    Diet: ADULT DIET; Regular; 4 carb choices (60 gm/meal);  Low Sodium (2 gm); 1500 ml    Consults:  IP CONSULT TO HEART FAILURE NURSE/COORDINATOR  IP CONSULT TO DIETITIAN  IP CONSULT TO NEPHROLOGY  IP CONSULT TO SPIRITUAL SERVICES  IP CONSULT TO SOCIAL WORK    DISPO/placement plan: pending    Code Status: Full Code      Johnnie Ochoa, [place] - N[place]INI  07/18/22

## 2022-07-18 NOTE — ED PROVIDER NOTES
with fat layer exposed ([facility] 11/9/2018    Diabetic ulcer of toe of left foot associated with type 2 diabetes mellitus, with fat layer exposed ([address] 04/10/2018    Pt slipped in hot tub latter part of February and has not healed since despite topical treatment    DVT (deep venous thrombosis) ([facility])     HTN (hypertension)     Hx of blood clots 2016    left leg    Ischemic cardiomyopathy 04/15/2015    Kidney disease     CHRONIC STAGE 3    Meniere's disease     Mixed hyperlipidemia 03/07/2016    Nicotine abuse     NSTEMI (non-ST elevated myocardial infarction) ([address] 03/13/2014    ANGLE (obstructive sleep apnea)     Osteomyelitis of ankle or foot, acute, left ([address] 06/04/2018    Pneumonia     PONV (postoperative nausea and vomiting)     nausea    Spinal epidural abscess 03/13/2014    Type II diabetes mellitus, uncontrolled ([address] 08/13/2014         SURGICALHISTORY       Past Surgical History:   Procedure Laterality Date    BACK SURGERY  3/2014    DEBRIDEMENT  3/12/14    extensive debridement of bone/muscle and paraspinal empyema    DILATION AND CURETTAGE OF UTERUS      ENDOSCOPY, COLON, DIAGNOSTIC      FOOT DEBRIDEMENT Left 9/8/2020    LEFT FOOT DEBRIDEMENT INCISION AND DRAINAGE performed by Kedar Mcgee DPM at [address] TOE SURGERY Left 8/28/2020    ON THE LEFT: [address], WOUND CLOSURE, FLEXOR TENOTOMY 4TH AND 5TH DIGITS, TENOTOMY AND CAPSULOTOMY 2ND DIGIT performed by Kedar Mcgee DPM at [address] ([address] [facility])  6/15/1999    complete-think right ovary in.     NASAL SEPTUM SURGERY      NERVE SURGERY Left 9/10/2020    LEFT FOOT INCISION AND DRAINAGE, EXTENSOR HALLUCIS LONGUS REPAIR WITH DELAYED PRIMARY CLOSURE performed by Kedar Mcgee DPM at [address] shut behind right ear    PRESSURE ULCER DEBRIDEMENT Left 10/23/2020    ON THE LEFT: SURGICAL PREPARATION OF WOUND BED, APPLICATION OF DERMAL GRAFT SUBSTITUTE performed by Adriana Posada DPM at [address]  04/02/2019    ROBOTIC ASSISTED LAPAROSCOPIC SLEEVE GASTRECTOMY, LAPAROSCOPIC REDUCIBLE INCISIONAL HERNIA REPAIR     SLEEVE GASTRECTOMY N/A 4/2/2019    ROBOTIC ASSISTED LAPAROSCOPIC SLEEVE GASTRECTOMY, LAPAROSCOPIC REDUCIBLE INCISIONAL HERNIA REPAIR performed by Danielle Lama DO at [address] [facility] N/A 2/8/2019    EGD BIOPSY performed by Danielle Lama DO at [address] 2/8/2019    EGD POLYP ABLATION/OTHER performed by Danielle Lama DO at [address] [facility]       Previous Medications    ACETAMINOPHEN (TYLENOL) 500 MG TABLET    Take 500 mg by mouth every 6 hours as needed for Pain    AMLODIPINE (NORVASC) 10 MG TABLET    Take 1 tablet by mouth daily    ASPIRIN 81 MG CHEWABLE TABLET    Take 1 tablet by mouth daily. ATORVASTATIN (LIPITOR) 80 MG TABLET    Take 1 tablet by mouth daily    B-D UF III MINI PEN NEEDLES 31G X 5 MM MISC    USE AS DIRECTED TWICE A DAY    BLOOD GLUCOSE TEST STRIPS (ONE TOUCH ULTRA TEST) STRIP    Check FSBS 2-3 times daily. CARVEDILOL (COREG) 25 MG TABLET    Take 1 tablet by mouth 2 times daily (with meals)    CHOLECALCIFEROL (VITAMIN D3) 2000 UNITS CAPS    Take 2,000 Units by mouth every evening     CITALOPRAM (CELEXA) 40 MG TABLET    Take 1 tablet by mouth every evening    FUROSEMIDE (LASIX) 40 MG TABLET    Take 1 tablet by mouth daily    GABAPENTIN (NEURONTIN) 100 MG CAPSULE    Take 2 capsules by mouth 3 times daily for 180 days. GABAPENTIN (NEURONTIN) 300 MG CAPSULE    Take 2 capsules by mouth 3 times daily for 180 days.     INSULIN GLARGINE (LANTUS SOLOSTAR) 100 UNIT/ML INJECTION PEN    Inject 54 units in AM and 20 units in PM    LIRAGLUTIDE (VICTOZA) 18 MG/3ML SOPN SC INJECTION    Inject 1.8 mg into the skin daily    LISINOPRIL (PRINIVIL;ZESTRIL) 40 MG TABLET    Take 1 tablet by mouth daily    MELATONIN 5 MG TABS TABLET    Take 1 tablet by mouth daily    ONE TOUCH LANCETS MISC    1 each by Does not apply route 3 times daily    PANTOPRAZOLE (PROTONIX) 40 MG TABLET    Take 1 tablet by mouth daily            Doxycycline    FAMILY HISTORY       Family History   Problem Relation Age of Onset    High Blood Pressure Mother     Cancer Mother     Rheum Arthritis Mother     COPD Father     Cancer Father     Osteoarthritis Neg Hx     Asthma Neg Hx     Breast Cancer Neg Hx     Diabetes Neg Hx     Heart Failure Neg Hx     High Cholesterol Neg Hx     Hypertension Neg Hx     Migraines Neg Hx     Ovarian Cancer Neg Hx     Rashes/Skin Problems Neg Hx     Seizures Neg Hx     Stroke Neg Hx     Thyroid Disease Neg Hx           SOCIAL HISTORY       Social History     Socioeconomic History    Marital status:      Spouse name: None    Number of children: None    Years of education: None    Highest education level: None   Tobacco Use    Smoking status: Former     Packs/day: 0.50     Years: 10.00     Pack years: 5.00     Types: Cigarettes     Quit date: 11/10/2013     Years since quittin.6    Smokeless tobacco: Never   Vaping Use    Vaping Use: Never used   Substance and Sexual Activity    Alcohol use: Yes     Alcohol/week: 0.0 standard drinks     Comment: occasionally    Drug use: No    Sexual activity: Not Currently     Social Determinants of Health     Physical Activity: Inactive    Days of Exercise per Week: 0 days    Minutes of Exercise per Session: 0 min       SCREENINGS             PHYSICAL EXAM    (up to 7 for level 4, 8 or more for level 5)     ED Triage Vitals [22 0231]   BP Temp Temp src Heart Rate Resp SpO2 Height Weight   (!) 138/117 99 °F (37.2 °C) -- 77 18 90 % 5' 3\" (1.6 m) --       Physical Exam  Vitals reviewed. Constitutional:       Appearance: She is well-developed. HENT:      Head: Normocephalic and atraumatic.       Mouth/Throat:      Mouth: Mucous membranes are moist.   Eyes:      Extraocular Movements: Extraocular movements intact. Conjunctiva/sclera: Conjunctivae normal.      Pupils: Pupils are equal, round, and reactive to light. Neck:      Trachea: No tracheal deviation. Cardiovascular:      Rate and Rhythm: Normal rate and regular rhythm. Heart sounds: Normal heart sounds. Pulmonary:      Effort: Pulmonary effort is normal.      Breath sounds: Rales present. Abdominal:      General: There is no distension. Palpations: Abdomen is soft. Tenderness: There is no abdominal tenderness. Musculoskeletal:         General: Normal range of motion. Cervical back: Normal range of motion. Right lower leg: Edema present. Left lower leg: Edema present. Skin:     General: Skin is warm and dry. Capillary Refill: Capillary refill takes less than 2 seconds. Neurological:      Mental Status: She is alert. RESULTS     EKG: All EKG's are interpreted by the Emergency Department Physician who either signs or Co-signsthis chart in the absence of a cardiologist.    ED shows a sinus rhythm ventricular to 77 bpm.  AR interval is prolonged QTc interval within normal limits. Patient has normal axis. There are no significant ST elevations or depressions EKG is nondiagnostic for ACS. Compared EKG from 1/3/2022 there are nonspecific ST changes to the lateral leads.      RADIOLOGY:   Non-plain filmimages such as CT, Ultrasound and MRI are read by the radiologist. Plain radiographic images are visualized and preliminarily interpreted by the emergency physician with the below findings:        Interpretation per the Radiologist below, if available at the time ofthis note:    XR CHEST PORTABLE    (Results Pending)         ED BEDSIDE ULTRASOUND:   Performed by ED Physician - none    LABS:  Labs Reviewed   COVID-19, RAPID   RAPID INFLUENZA A/B ANTIGENS   CBC WITH AUTO DIFFERENTIAL   COMPREHENSIVE METABOLIC PANEL W/ REFLEX TO MG FOR LOW K   TROPONIN   BRAIN [facility] WITH REFLEX TO CULTURE   BLOOD GAS, VENOUS       All other labs were within normal range or not returned as of this dictation. EMERGENCY DEPARTMENT COURSE and DIFFERENTIAL DIAGNOSIS/MDM:   Vitals:    Vitals:    07/18/22 0231   BP: (!) 138/117   Pulse: 77   Resp: 18   Temp: 99 °F (37.2 °C)   SpO2: 90%   Height: 5' 3\" (1.6 m)       Patient was given thefollowing medications:  Medications - No data to display    ED COURSE & MEDICAL DECISION MAKING    Pertinent Labs & Imaging studies reviewed. (See chart for details)   -  Patient seen and evaluated in the emergency department. -  Triage and nursing notes reviewed and incorporated. -  Old chart records reviewed and incorporated. -  Differential diagnosis includes: Differential Diagnosis: Acute Coronary Syndrome, Congestive Heart Failure, Myocardial Infarction, Pulmonary Embolus, Pneumonia, Pneumothorax, other    -  Work-up included:  See above  -  ED treatment included: See above  -  Results discussed with patient. Patient resents ED evaluation of shortness of breath. On arrival she is speaking full sentences. Patient satting well on nonrebreather. Patient was placed on a nasal cannula and did not desaturate into the 80s and was placed on high flow. labs show leukocytosisleukocytosis. VBG unremarkable. BNP elevated but has been more elevated in the past.  Renal function elevated but near baseline. imaging studies show pulmonary infiltrate and vascular edema consistent with either CHF or pneumonia. CT demonstrates patchy airspace disease as well as bilateral pleural effusions. Patient started on coverage for CAP and given Lasix. patient feels well on reevaluation. The patient is agreeable with plan of care and disposition. REASSESSMENT          CRITICAL CARE TIME   Total Critical Care time was 30 minutes, excluding separately reportable procedures.   There was a high probability of clinically significant/life threatening deterioration in the patient's condition which required my urgent intervention. CONSULTS:  None    PROCEDURES:  Unless otherwise noted below, none     Procedures    FINAL IMPRESSION      1. Acute respiratory failure with hypoxia ([address]    2. Pneumonia of both lungs due to infectious organism, unspecified part of lung          DISPOSITION/PLAN   DISPOSITION        PATIENT REFERREDTO:  No follow-up provider specified.     DISCHARGEMEDICATIONS:  New Prescriptions    No medications on file          (Please note that portions of this note were completed with a voice recognition program.  Efforts were made to edit the dictations but occasionally words are mis-transcribed.)    Dayan Frias MD (electronically signed)  Attending Emergency Physician          Dayan Frias MD  07/18/22 5999

## 2022-07-18 NOTE — H&P
Hospital Medicine History & Physical      PCP: Arun Webber MD    Date of Admission: 7/18/2022    Date of Service: Pt seen/examined on 7/18/2022 admitted to inpatient    Chief Complaint: Progressive worsening dyspnea on exertion, fatigue, cough      History Of Present Illness: The patient is a 74 y.o. female with past medical history of type 2 diabetes, CKD, previous history of DVT not on anticoagulation, cardiomyopathy with diastolic heart failure, hyperlipidemia, hypertension who presents to [facility] coming from home that over the last 2 days since she returned from a festival on Saturday she was noticing that while at the festival she was initially somewhat increasingly fatigued and short of breath on exertion but was still able to get to her car and get home. She became even more progressively short of breath and fatigued and was in her home and sleeping in bed the whole day all day till she decided to call EMS tonight prior to coming to the ED because she became more more short of breath as the day went on till tonight she was getting increasingly more short of breath and fatigued on exertion. She did have a cough at home but denied any sputum production. She also denies any other recent symptoms of fever, chills, dizziness, syncope, chest pain, dysuria, blood in urine/stool/sputum recently or prior to today, nausea/vomiting/diarrhea/abdominal pain. She does not feel as though she has been having any difficulty urinating and feels as though her urination has been optimal while on her Lasix. Does not feel obstructed. He has been compliant with her medications.     Past Medical History:        Diagnosis Date    Abnormal echocardiogram     25% on 3/11/14 and 50% on 3/19/14    Back pain     Cardiomyopathy (HCC)     EF was 50% on 3/19/14    Chipped tooth lower left    Clostridium difficile diarrhea 03/12/2021    Dental crown present     veneers    Depressive disorder 08/13/2014    Diabetic infection of right foot ([address] 04/15/2015    Diabetic ulcer of toe of left foot associated with diabetes mellitus due to underlying condition, with fat layer exposed ([address] 11/9/2018    Diabetic ulcer of toe of left foot associated with type 2 diabetes mellitus, with fat layer exposed ([address] 04/10/2018    Pt slipped in hot tub latter part of February and has not healed since despite topical treatment    DVT (deep venous thrombosis) ([address]     HTN (hypertension)     Hx of blood clots 2016    left leg    Ischemic cardiomyopathy 04/15/2015    Kidney disease     CHRONIC STAGE 3    Meniere's disease     Mixed hyperlipidemia 03/07/2016    Nicotine abuse     NSTEMI (non-ST elevated myocardial infarction) ([address] 03/13/2014    ANGLE (obstructive sleep apnea)     Osteomyelitis of ankle or foot, acute, left ([address] 06/04/2018    Pneumonia     PONV (postoperative nausea and vomiting)     nausea    Spinal epidural abscess 03/13/2014    Type II diabetes mellitus, uncontrolled ([address] 08/13/2014       Past Surgical History:        Procedure Laterality Date    BACK SURGERY  3/2014    DEBRIDEMENT  3/12/14    extensive debridement of bone/muscle and paraspinal empyema    DILATION AND CURETTAGE OF UTERUS      ENDOSCOPY, COLON, DIAGNOSTIC      FOOT DEBRIDEMENT Left 9/8/2020    LEFT FOOT DEBRIDEMENT INCISION AND DRAINAGE performed by Zulema Negrete DPM at [address] TOE SURGERY Left 8/28/2020    ON THE LEFT: [address], WOUND CLOSURE, FLEXOR TENOTOMY 4TH AND 5TH DIGITS, TENOTOMY AND CAPSULOTOMY 2ND DIGIT performed by Zulema Negrete DPM at [facility] ([facility])  6/15/1999    complete-think right ovary in.     NASAL SEPTUM SURGERY      NERVE SURGERY Left 9/10/2020    LEFT FOOT INCISION AND DRAINAGE, EXTENSOR HALLUCIS LONGUS REPAIR WITH glargine (LANTUS SOLOSTAR) 100 UNIT/ML injection pen Inject 54 units in AM and 20 units in PM 4/20/22   Rita Fernandez MD   citalopram (CELEXA) 40 MG tablet Take 1 tablet by mouth every evening 4/20/22   Rita Fernandez MD   carvedilol (COREG) 25 MG tablet Take 1 tablet by mouth 2 times daily (with meals) 9/30/21   Rita Fernandez MD   pantoprazole (PROTONIX) 40 MG tablet Take 1 tablet by mouth daily  Patient taking differently: Take 40 mg by mouth every evening  9/30/21   Rita Fernandez MD   acetaminophen (TYLENOL) 500 MG tablet Take 500 mg by mouth every 6 hours as needed for Pain    Historical Provider, MD   melatonin 5 MG TABS tablet Take 1 tablet by mouth daily 4/6/21   Rita Fernandez MD   blood glucose test strips (ONE TOUCH ULTRA TEST) strip Check FSBS 2-3 times daily. 3/5/20   Rita Fernandez MD   ONE TOUCH LANCETS MISC 1 each by Does not apply route 3 times daily 2/14/19   Rita Fernandez MD   Cholecalciferol (VITAMIN D3) 2000 units CAPS Take 2,000 Units by mouth every evening     Historical Provider, MD   aspirin 81 MG chewable tablet Take 1 tablet by mouth daily. Patient taking differently: Take 81 mg by mouth every evening  3/20/14   Paloma Diaz MD       Allergies:  Doxycycline    Social History:  The patient currently lives home    TOBACCO:   reports that she quit smoking about 8 years ago. She has a 5.00 pack-year smoking history. She has never used smokeless tobacco.  ETOH:   reports current alcohol use. Family History:  Reviewed in detail and negative for DM, Early CAD, Cancer, CVA.  Positive as follows:        Problem Relation Age of Onset    High Blood Pressure Mother     Cancer Mother     Rheum Arthritis Mother     COPD Father     Cancer Father     Osteoarthritis Neg Hx     Asthma Neg Hx     Breast Cancer Neg Hx     Diabetes Neg Hx     Heart Failure Neg Hx     High Cholesterol Neg Hx     Hypertension Neg Hx     Migraines Neg Hx     Ovarian Cancer Neg Hx     Rashes/Skin Problems Neg Hx     Seizures Neg Hx     Stroke Neg Hx     Thyroid Disease Neg Hx        REVIEW OF SYSTEMS:    and as noted in the HPI. All other systems reviewed and negative. PHYSICAL EXAM:    /66   Pulse 74   Temp 99 °F (37.2 °C)   Resp 19   Ht 5' 3\" (1.6 m)   Wt 249 lb 1.9 oz (113 kg)   LMP 06/15/1999   SpO2 92%   BMI 44.13 kg/m²     General appearance: On nasal cannula oxygen, somewhat fatigued appearing but overall alert and oriented, seems to be improving  HEENT Normal cephalic, atraumatic without obvious deformity. Pupils equal, round, and reactive to light. Extra ocular muscles intact. Mildly dry mucous membranes, anicteric sclera  Neck: Supple, no JVD  Lungs: Breath sounds bilateral scattered areas of crackles to the lower lung fields bilaterally, no wheezing  Heart: Regular rate and rhythm, no murmurs noted  Abdomen: Soft, nontender, nondistended, active bowel sounds  Extremities:  1+ bilateral lower extremity pitting edema, slightly asymmetric with left greater than right  Skin: No rashes  Neurologic: Grossly intact neurologically, strength is 5 out of 5 to all extremities  Mental status: Alert, oriented, thought content appropriate. Capillary Refill: Acceptable  < 3 seconds  Peripheral Pulses: +3 Easily felt, not easily obliterated with pressure    CT chest without contrast:  1. Interstitial pulmonary edema with suspected peribronchovascular alveolar   edema, suggesting congestive heart failure given mild cardiomegaly and trace   bilateral pleural effusions. Interstitial pneumonia could appear similar. 2. Patchy peribronchovascular groundglass opacities most prominent near the   lung bases are most likely alveolar edema, although pneumonia could appear   similar. 3. Mild bronchial wall thickening potentially due to pulmonary vascular   congestion, reactive airways disease, or bronchitis. 4. At least minimal anasarca.          CBC   Recent Labs     07/18/22  0301   WBC 12.3* diabetes  Hypertension    Plan:  Patient has worsening anemia, uncertain etiology of worsening anemia but patient notes compliance with her home hypertension and Lasix medications, there is a possible component of pneumonia but I feel patient is more likely suffering from acute onset heart failure with relation to possibly worsening anemia and some component of NEHA on CKD  Patient given Lasix in the ED as well as IV antibiotics  Obtaining bilateral venous duplex ultrasound in the morning for asymmetric leg edema  Continue IV Rocephin and Zithromax  Continue Lasix 20 twice daily IV  Restarting some of patient's home medications but holding the ACE inhibitor at this time due to the NEHA  Nephrology consulted  Pending anemia work-up with fecal occult stool, XUPT/DEVANG/[id number]/U57 and folic acid/reticulocyte count  Repeat labs daily        DVT Prophylaxis: heparin  Diet: No diet orders on file  Code Status: Prior  PT/OT Eval Status: ambulatory    Dispo - pending clinical course       Rohan Burks, DO    Thank you Arminda Gonsalez MD for the opportunity to be involved in this patient's care. If you have any questions or concerns please feel free to contact me at [phone].

## 2022-07-18 NOTE — ED NOTES
ED SBAR report provider to [facility]. Patient to be transported to Room 3115 via stretcher by ED tech. Patient transported with bedside cardiac monitor and with IV medications infusing. IV site clean, dry, and intact. MEWS score and pain assessed as 0/10 and documented. Patient's score on the MYERS Fall scale reviewed with receiving RN. Updated patient on plan of care.        Rebekah Romero RN  07/18/22 1926

## 2022-07-18 NOTE — CONSULTS
Heart Failure Diet Education:    Per hospital protocol or consult, patient being seen for Heart Failure diet guidelines. Learners: [x]Patient []Family []Caregiver [] Other: ______________  Methods: [x]Verbal Education  [x]Handouts  []Teachback     Patient's Lifestyle Questions:   Is HF a new Diagnosis? []Yes [x]No    Has patient had prior education? []Yes [x]No    Who does Grocery shopping and cooking? Pt stated she does all the shopping and cooking. Frequency of eating out? Pt eats out 2 - 3 times/weeks. Typical Eating Habits: Pt poor historian on diet. Breakfast is seldom eaten. Pt wakes up and eats peanut butter crackers. Lunch and dinner are a protein paired with a vegetable. Drinks 2-3 sodas per day and 2-3 oconnell per day. Pt stated added limited salt to meals. Pt previously had gastric sleeve and eats smaller meals. Instructed the Patient on:   [x] High/Low Sodium foods    [x] Moderation/portion control  [x] Eating out  [x] Fluid Restriction    [x] Label reading  [] Carb Counting   [] Other:      Educational Materials Provided:   [x] Nutrition Care Manual (NCM) Heart Failure Nutrition Therapy   [x] NCM Sodium (Salt) Content of Foods  [] NCM Sodium Free Flavoring Tips    [x] Heart Healthy Eating Label Reading Tips   [] Healthy Eating when Eating Out  [] Controlling Your Fluids   [] [facility] (from [employer])  [] NCM Carbohydrate Counting for People with Diabetes   [] Managing Your Diabetes Plate Method     -Diet Recommendations: 2000 mg Sodium/day, 64 oz Fluids/day         Monitoring/Evaluation:   -Barriers: None  -Evaluation of education: Indicates understanding.  -Expected compliance: fair. Additional Comments: Discussed reducing high sodium meats. Discussed salt-free seasoning alternatives. Discussed reducing the amount of pops per day. Discussed reducing frequency of eating out and ordering healthier options.     Total time involved in patient education: 15 minutes Electronically signed by Jm Soni on 7/18/2022 at 11:27 AM

## 2022-07-18 NOTE — CONSULTS
[address]  Cardiology Consult Note        CC:      CHF             HPI:   This is a 74 y.o. female history of cardiomyopathy diastolic heart failure, DM, advanced CKD, HTN, HLD, who presents the hospital for evaluation of fatigue, shortness of breath this has been progressive. I asked her many times about her main presenting complaint and she would always state that it was fatigue. According to her her primary care physician has dropped her dose of furosemide to 40 mg daily. Her primary nephrologist was giving her furosemide 40 twice daily.     Medications include gabapentin, amlodipine 10, atorvastatin 80, lisinopril 40, furosemide 40, carvedilol 25 mg and aspirin    Past Medical History:   Diagnosis Date    Abnormal echocardiogram     25% on 3/11/14 and 50% on 3/19/14    Back pain     Cardiomyopathy (HCC)     EF was 50% on 3/19/14    Chipped tooth     lower left    Clostridium difficile diarrhea 03/12/2021    Dental crown present     veneers    Depressive disorder 08/13/2014    Diabetic infection of right foot ([address] 04/15/2015    Diabetic ulcer of toe of left foot associated with diabetes mellitus due to underlying condition, with fat layer exposed ([address] 11/9/2018    Diabetic ulcer of toe of left foot associated with type 2 diabetes mellitus, with fat layer exposed ([address] 04/10/2018    Pt slipped in hot tub latter part of February and has not healed since despite topical treatment    DVT (deep venous thrombosis) ([address]     HTN (hypertension)     Hx of blood clots 2016    left leg    Ischemic cardiomyopathy 04/15/2015    Kidney disease     CHRONIC STAGE 3    Meniere's disease     Mixed hyperlipidemia 03/07/2016    Nicotine abuse     NSTEMI (non-ST elevated myocardial infarction) ([address] 03/13/2014    ANGLE (obstructive sleep apnea)     Osteomyelitis of ankle or foot, acute, left ([address] 06/04/2018    Pneumonia     PONV (postoperative nausea and vomiting)     nausea    Spinal epidural abscess 03/13/2014 Type II diabetes mellitus, uncontrolled [facility] 08/13/2014      Past Surgical History:   Procedure Laterality Date    BACK SURGERY  3/2014    DEBRIDEMENT  3/12/14    extensive debridement of bone/muscle and paraspinal empyema    DILATION AND CURETTAGE OF UTERUS      ENDOSCOPY, COLON, DIAGNOSTIC      FOOT DEBRIDEMENT Left 9/8/2020    LEFT FOOT DEBRIDEMENT INCISION AND DRAINAGE performed by Radha Fiore DPM at [address] TOE SURGERY Left 8/28/2020    ON THE LEFT: [address], WOUND CLOSURE, FLEXOR TENOTOMY 4TH AND 5TH DIGITS, TENOTOMY AND CAPSULOTOMY 2ND DIGIT performed by Radha Fiore DPM at [address] ([facility]  6/15/1999    complete-think right ovary in.     NASAL SEPTUM SURGERY      NERVE SURGERY Left 9/10/2020    LEFT FOOT INCISION AND DRAINAGE, EXTENSOR HALLUCIS LONGUS REPAIR WITH DELAYED PRIMARY CLOSURE performed by Radha Fiore DPM at [address] shut behind right ear    PRESSURE ULCER DEBRIDEMENT Left 10/23/2020    ON THE LEFT: SURGICAL PREPARATION OF WOUND BED, APPLICATION OF DERMAL GRAFT SUBSTITUTE performed by Radha Fiore DPM at [address] [facility] [address] [facility] [address]  04/02/2019    ROBOTIC ASSISTED LAPAROSCOPIC SLEEVE GASTRECTOMY, LAPAROSCOPIC REDUCIBLE INCISIONAL HERNIA REPAIR     SLEEVE GASTRECTOMY N/A 4/2/2019    ROBOTIC ASSISTED LAPAROSCOPIC SLEEVE GASTRECTOMY, LAPAROSCOPIC REDUCIBLE INCISIONAL HERNIA REPAIR performed by Joseph Tello DO at [address] N/A 2/8/2019    EGD BIOPSY performed by Joseph Tello DO at [address] N/A 2/8/2019    EGD POLYP ABLATION/OTHER performed by Joseph Tello DO at [facility]      Family History   Problem Relation Age of Onset    High Blood Pressure Mother     Cancer Mother     Rheum Arthritis Mother     COPD Father     Cancer Father     Osteoarthritis Neg Hx     Asthma Neg Hx     Breast Cancer Neg Hx     Diabetes Neg Hx     Heart Failure Neg Hx     High Cholesterol Neg Hx     Hypertension Neg Hx     Migraines Neg Hx     Ovarian Cancer Neg Hx     Rashes/Skin Problems Neg Hx     Seizures Neg Hx     Stroke Neg Hx     Thyroid Disease Neg Hx       Social History     Tobacco Use    Smoking status: Former     Packs/day: 0.50     Years: 10.00     Pack years: 5.00     Types: Cigarettes     Quit date: 11/10/2013     Years since quittin.6    Smokeless tobacco: Never   Vaping Use    Vaping Use: Never used   Substance Use Topics    Alcohol use:  Yes     Alcohol/week: 0.0 standard drinks     Comment: occasionally    Drug use: No      Allergies   Allergen Reactions    Doxycycline Other (See Comments)     Yeast infection      [Held by provider] aspirin  81 mg Oral Daily    atorvastatin  80 mg Oral Daily    carvedilol  6.25 mg Oral BID WC    pantoprazole  40 mg Oral QPM    citalopram  40 mg Oral QPM    sodium chloride flush  5-40 mL IntraVENous 2 times per day    furosemide  20 mg IntraVENous BID    heparin (porcine)  5,000 Units SubCUTAneous 3 times per day    [START ON 2022] cefTRIAXone (ROCEPHIN) IV  2,000 mg IntraVENous Q24H    And    [START ON 2022] azithromycin  500 mg Oral Q24H    melatonin  4.5 mg Oral Nightly    insulin lispro  0-6 Units SubCUTAneous TID WC    insulin lispro  0-3 Units SubCUTAneous Nightly    gabapentin  200 mg Oral TID       Review of Systems -   Constitutional: Negative for weight gain/loss; malaise, fever  Respiratory: Negative for Asthma;  cough and hemoptysis  Cardiovascular: Negative for palpitations,dizziness   Gastrointestinal: Negative for abd.pain; constipation/diarrhea;    Genitourinary: Negative for stones; hematuria; frequency hesitancy  Integumentt: Negative for rash or pruritis  Hematologic/lymphatic: Negative for blood dyscrasia; leukemia/lymphoma  Musculoskeletal: Negative for Connective tissue disease  Neurological:  Negative for Seizure Behavioral/Psych:Negative for Bipolar disorder, Schizophrenia; Dementia  Endocrine: negative for thyroid, parathyroid disease      Intake/Output Summary (Last 24 hours) at 7/18/2022 1044  Last data filed at 7/18/2022 0628  Gross per 24 hour   Intake --   Output 100 ml   Net -100 ml       Physical Examination:    BP (!) 166/70   Pulse 67   Temp 98.4 °F (36.9 °C) (Oral)   Resp 18   Ht 5' 3\" (1.6 m)   Wt 248 lb 0.3 oz (112.5 kg)   LMP 06/15/1999   SpO2 90%   BMI 43.93 kg/m²    HEENT:  Face: Atraumatic, Conjunctiva: Pink; non icteric,  Mucous Memb:  Moist, No thyromegaly or Lymphadenopathy  Respiratory:  Resp Assessment: normal, Resp Auscultation: clear   Cardiovascular: Auscultation: nl S1 & S2, Palpation:  Nl PMI;  No heaves or thrills, JVP:  normal  Abdomen: Soft, non-tender, Normal bowel sounds,  No organomegaly  Extremities: No Cyanosis or Clubbing; Edema none  Neurological: Oriented to time, place, and person, Non-anxious  Psychiatric: Normal mood and affect  Skin: Warm and dry,  No rash seen      Current Facility-Administered Medications: [Held by provider] aspirin chewable tablet 81 mg, 81 mg, Oral, Daily  atorvastatin (LIPITOR) tablet 80 mg, 80 mg, Oral, Daily  carvedilol (COREG) tablet 6.25 mg, 6.25 mg, Oral, BID WC  pantoprazole (PROTONIX) tablet 40 mg, 40 mg, Oral, QPM  citalopram (CELEXA) tablet 40 mg, 40 mg, Oral, QPM  sodium chloride flush 0.9 % injection 5-40 mL, 5-40 mL, IntraVENous, 2 times per day  sodium chloride flush 0.9 % injection 5-40 mL, 5-40 mL, IntraVENous, PRN  0.9 % sodium chloride infusion, , IntraVENous, PRN  polyethylene glycol (GLYCOLAX) packet 17 g, 17 g, Oral, Daily PRN  acetaminophen (TYLENOL) tablet 650 mg, 650 mg, Oral, Q6H PRN **OR** acetaminophen (TYLENOL) suppository 650 mg, 650 mg, Rectal, Q6H PRN  ondansetron (ZOFRAN) injection 4 mg, 4 mg, IntraVENous, Q6H PRN  ipratropium-albuterol (DUONEB) nebulizer solution 1 ampule, 1 ampule, Inhalation, Q4H PRN  furosemide (LASIX) injection 20 mg, 20 mg, IntraVENous, BID  heparin (porcine) injection 5,000 Units, 5,000 Units, SubCUTAneous, 3 times per day  [START ON 7/19/2022] cefTRIAXone (ROCEPHIN) 2000 mg IVPB in D5W 50ml minibag, 2,000 mg, IntraVENous, Q24H **AND** [START ON 7/19/2022] azithromycin (ZITHROMAX) tablet 500 mg, 500 mg, Oral, Q24H  melatonin tablet 4.5 mg, 4.5 mg, Oral, Nightly  insulin lispro (HUMALOG) injection vial 0-6 Units, 0-6 Units, SubCUTAneous, TID WC  insulin lispro (HUMALOG) injection vial 0-3 Units, 0-3 Units, SubCUTAneous, Nightly  glucose chewable tablet 16 g, 4 tablet, Oral, PRN  dextrose bolus 10% 125 mL, 125 mL, IntraVENous, PRN **OR** dextrose bolus 10% 250 mL, 250 mL, IntraVENous, PRN  glucagon (rDNA) injection 1 mg, 1 mg, IntraMUSCular, PRN  dextrose 5 % solution, 100 mL/hr, IntraVENous, PRN  gabapentin (NEURONTIN) capsule 200 mg, 200 mg, Oral, TID      Labs:   Recent Labs     07/18/22  0301   WBC 12.3*   HGB 8.7*   HCT 27.0*  26.4*        Recent Labs     07/18/22  0301      K 4.4   CO2 22   BUN 64*   CREATININE 3.5*   GLUCOSE 128*     No results for input(s): BNP in the last 72 hours. No results for input(s): PROTIME, INR in the last 72 hours. No results for input(s): APTT in the last 72 hours. Recent Labs     07/18/22  0301   TROPONINI 0.01     Lab Results   Component Value Date/Time    HDL 64 11/27/2018 10:15 AM    LDLCALC 139 11/27/2018 10:15 AM    TRIG 77 11/27/2018 10:15 AM     Recent Labs     07/18/22  0301   AST 9*   ALT 10   LABALBU 3.8         EKG:   Sinus rhythm with poor R wave progression possible septal infarct    Chest X-Ray:  1. Pulmonary vascular congestion with suggestion of perihilar edema,   potentially congestive heart failure given mild cardiomegaly and possible   trace bilateral pleural effusions. Pneumonia could appear similar.    2. Bibasilar airspace opacities most likely due atelectasis, pneumonia and   aspiration could appear similar       ECHO:  Normal left ventricle size, wall thickness, and systolic function with EF 60-65%. No RWMA  The right ventricle is normal in size and function. Aortic sclerosis with no significant stenosis (peak velocity 2.1m/s and mean gradient of 12mmHg). Trivial mitral and tricuspid regurgitation. Stress Nuclear: 2014   1. Technically a suboptimal study. 2. Non diagonostic treadmill exercise stress portion of the study because of    resting ST abnormalities    3. A very small area of reversibility seen at the apex. However given    significant movement and extracardiac artifact adjacent to the apex this    study remains equivocal.    4. Gated Study shows normal LV size and Systolic function; EF is > 70%. ASSESSMENT AND PLAN:      75-year-old patient presenting with fatigue shortness of breath. no fever chills  Has advanced kidney disease, (creatinine of 3.5)   Her primary care physician had dropped her dose of her furosemide from twice a day to once a day.   Anemia is also very likely to be contributing to her fatigue and shortness of breath      proBNP is not that elevated which is surprising  I think a trial of Lasix 40 twice daily would be helpful    She would need work-up for anemia          Corky Baumgarten M.D  7/18/2022

## 2022-07-18 NOTE — CONSULTS
Office: [phone]       Fax: [phone]      Nephrology Initial Consult Note        Patient's Name: Hina Kimbrough Date: 7/18/2022  Date of Visit: 7/18/2022    Reason for Consult: NEHA  Requesting Physician: Syed Sheffield MD  PCP: Millie Tee MD    Chief Complaint:  shortness of breath      History of Present Illness:       Flower Carrillo is a 74 y.o. female with PMHx of hypertension,diabetes mellitus,CKD, CHF who was hospitalized on 7/18/2022 with complaints of shortness of breath   Has about 30 lb wt gain since last year  Per pt, her lasix was decreased was her PCP  No chest pain   No dysuria, hematuria  NSAID use: Denies   IV contrast: None recent   Home meds reviewed    Past Medical History:   Diagnosis Date    Abnormal echocardiogram     25% on 3/11/14 and 50% on 3/19/14    Back pain     Cardiomyopathy ([address]     EF was 50% on 3/19/14    Chipped tooth     lower left    Clostridium difficile diarrhea 03/12/2021    Dental crown present     veneers    Depressive disorder 08/13/2014    Diabetic infection of right foot ([address] 04/15/2015    Diabetic ulcer of toe of left foot associated with diabetes mellitus due to underlying condition, with fat layer exposed ([address] 11/9/2018    Diabetic ulcer of toe of left foot associated with type 2 diabetes mellitus, with fat layer exposed ([address] 04/10/2018    Pt slipped in hot tub latter part of February and has not healed since despite topical treatment    DVT (deep venous thrombosis) ([facility])     HTN (hypertension)     Hx of blood clots 2016    left leg    Ischemic cardiomyopathy 04/15/2015    Kidney disease     CHRONIC STAGE 3    Meniere's disease     Mixed hyperlipidemia 03/07/2016    Nicotine abuse     NSTEMI (non-ST elevated myocardial infarction) ([address] 03/13/2014    ANGLE (obstructive sleep apnea) Osteomyelitis of ankle or foot, acute, left ([address] 06/04/2018    Pneumonia     PONV (postoperative nausea and vomiting)     nausea    Spinal epidural abscess 03/13/2014    Type II diabetes mellitus, uncontrolled ([address] 08/13/2014       Past Surgical History:   Procedure Laterality Date    BACK SURGERY  3/2014    DEBRIDEMENT  3/12/14    extensive debridement of bone/muscle and paraspinal empyema    DILATION AND CURETTAGE OF UTERUS      ENDOSCOPY, COLON, DIAGNOSTIC      FOOT DEBRIDEMENT Left 9/8/2020    LEFT FOOT DEBRIDEMENT INCISION AND DRAINAGE performed by Ofelia Hubbard DPM at [address] TOE SURGERY Left 8/28/2020    ON THE LEFT: [address] [facility] [address], WOUND CLOSURE, FLEXOR TENOTOMY 4TH AND 5TH DIGITS, TENOTOMY AND CAPSULOTOMY 2ND DIGIT performed by Ofelia Hubbard DPM at [address] ([facility])  6/15/1999    complete-think right ovary in.     NASAL SEPTUM SURGERY      NERVE SURGERY Left 9/10/2020    LEFT FOOT INCISION AND DRAINAGE, EXTENSOR HALLUCIS LONGUS REPAIR WITH DELAYED PRIMARY CLOSURE performed by Ofelia Hubbard DPM at [address] shut behind right ear    PRESSURE ULCER DEBRIDEMENT Left 10/23/2020    ON THE LEFT: SURGICAL PREPARATION OF WOUND BED, APPLICATION OF DERMAL GRAFT SUBSTITUTE performed by Ofelia Hubbard DPM at [address] [facility]  04/02/2019    ROBOTIC ASSISTED LAPAROSCOPIC SLEEVE GASTRECTOMY, LAPAROSCOPIC REDUCIBLE INCISIONAL HERNIA REPAIR     SLEEVE GASTRECTOMY N/A 4/2/2019    ROBOTIC ASSISTED LAPAROSCOPIC SLEEVE GASTRECTOMY, LAPAROSCOPIC REDUCIBLE INCISIONAL HERNIA REPAIR performed by Yesi Johnson DO at [facility] N/A 2/8/2019    EGD BIOPSY performed by Yesi Johnson DO at [address] [place] [address] N/A 2/8/2019    EGD POLYP ABLATION/OTHER performed by Yesi Johnson DO at [facility] ENDOSCOPY       Family History   Problem Relation Age 0   EPIU 3     Urine Chemistry: No results for input(s): CLUR, LABCREA, PROTEINUR, NAUR in the last 72 hours. ROS:     All systems reviewed and negative except as in HPI    Objective:     Vitals: BP (!) 166/70   Pulse 67   Temp 98.4 °F (36.9 °C) (Oral)   Resp 18   Ht 5' 3\" (1.6 m)   Wt 248 lb 0.3 oz (112.5 kg)   LMP 06/15/1999   SpO2 94%   BMI 43.93 kg/m²    Wt Readings from Last 3 Encounters:   07/18/22 248 lb 0.3 oz (112.5 kg)   06/21/22 235 lb (106.6 kg)   01/10/22 220 lb (99.8 kg)      24HR INTAKE/OUTPUT:    Intake/Output Summary (Last 24 hours) at 7/18/2022 9418  Last data filed at 7/18/2022 5377  Gross per 24 hour   Intake --   Output 100 ml   Net -100 ml     Constitutional:  awake, NAD  HEENT:  MMM, No icterus  Neck: no bruits, No JVD  Cardiovascular:  reg rhythm  Respiratory: Diminished at bases   Abdomen:  +BS, soft, NT, ND  Ext: + lower extremity edema  Psychiatric: mood and affect appropriate  Skin: dry/intact  CNS: alert, no agitation    IMAGING:  CT CHEST WO CONTRAST   Final Result   1. Interstitial pulmonary edema with suspected peribronchovascular alveolar   edema, suggesting congestive heart failure given mild cardiomegaly and trace   bilateral pleural effusions. Interstitial pneumonia could appear similar. 2. Patchy peribronchovascular groundglass opacities most prominent near the   lung bases are most likely alveolar edema, although pneumonia could appear   similar. 3. Mild bronchial wall thickening potentially due to pulmonary vascular   congestion, reactive airways disease, or bronchitis. 4. At least minimal anasarca. XR CHEST PORTABLE   Final Result   1. Pulmonary vascular congestion with suggestion of perihilar edema,   potentially congestive heart failure given mild cardiomegaly and possible   trace bilateral pleural effusions. Pneumonia could appear similar.    2. Bibasilar airspace opacities most likely due atelectasis, pneumonia and   aspiration could appear similar. VL Extremity Venous Bilateral    (Results Pending)       Assessment :     1. NEHA on CKD 3b/4  -Non-Oliguric  -Baseline creat: 1.8-1.9 Now 3.5  -UA: prot ++. UPC 2 in past  -Volume: Hypervolemic   -Electrolytes: No Dyskalemia  -Acid-Base: AGMA    Recent Labs     07/18/22  0301   BUN 64*   CREATININE 3.5*     Recent Labs     07/18/22  0301      K 4.4   CO2 22         2. HTN  -Blood pressure high    BP Readings from Last 1 Encounters:   07/18/22 (!) 166/70       3. DM    4. CHF  - TTE (2022): LVEF 60-65%, PASP 36    Plan:     - Diuresis based on vol status : iv lasix  - daily weight  - Antimicrobials per primary team   - Monitor BMP    -Monitor I/O, UOP  -Maintain MAP>65  -Avoid nephrotoxin, if able. -Dose meds to current eGFR    Spoke to cardiology    Thank you for allowing us to participate in care of [facility] . We will continue to follow. Feel free to contact me with any questions.       Patrick Lizarraga MD  7/18/2022    [facility] [address]  Office : [phone]  Fax :[phone]

## 2022-07-18 NOTE — PROGRESS NOTES
Pt tolerating ambulation to and from bathroom. Measuring urine output. Pt gets SOB upon exertion 5L humidified O2 NC still in place. Pts friend at bedside at this time. All needs addressed.  Electronically signed by Marjan Arroyo RN on 7/18/2022 at 3:51 PM

## 2022-07-19 LAB
ANION GAP SERPL CALCULATED.3IONS-SCNC: 16 MMOL/L (ref 3–16)
BASOPHILS ABSOLUTE: 0 K/UL (ref 0–0.2)
BASOPHILS RELATIVE PERCENT: 0.4 %
BUN BLDV-MCNC: 75 MG/DL (ref 7–20)
CALCIUM SERPL-MCNC: 8.7 MG/DL (ref 8.3–10.6)
CHLORIDE BLD-SCNC: 99 MMOL/L (ref 99–110)
CO2: 23 MMOL/L (ref 21–32)
CREAT SERPL-MCNC: 3.6 MG/DL (ref 0.6–1.2)
EKG ATRIAL RATE: 77 BPM
EKG DIAGNOSIS: NORMAL
EKG P AXIS: 71 DEGREES
EKG P-R INTERVAL: 194 MS
EKG Q-T INTERVAL: 396 MS
EKG QRS DURATION: 84 MS
EKG QTC CALCULATION (BAZETT): 448 MS
EKG R AXIS: -11 DEGREES
EKG T AXIS: 39 DEGREES
EKG VENTRICULAR RATE: 77 BPM
EOSINOPHILS ABSOLUTE: 0.1 K/UL (ref 0–0.6)
EOSINOPHILS RELATIVE PERCENT: 1.7 %
FOLATE: 6.02 NG/ML (ref 4.78–24.2)
GFR AFRICAN AMERICAN: 15
GFR NON-AFRICAN AMERICAN: 13
GLUCOSE BLD-MCNC: 118 MG/DL (ref 70–99)
GLUCOSE BLD-MCNC: 132 MG/DL (ref 70–99)
GLUCOSE BLD-MCNC: 164 MG/DL (ref 70–99)
GLUCOSE BLD-MCNC: 210 MG/DL (ref 70–99)
GLUCOSE BLD-MCNC: 267 MG/DL (ref 70–99)
HCT VFR BLD CALC: 23.5 % (ref 36–48)
HEMOGLOBIN: 8 G/DL (ref 12–16)
LYMPHOCYTES ABSOLUTE: 1 K/UL (ref 1–5.1)
LYMPHOCYTES RELATIVE PERCENT: 11.5 %
MAGNESIUM: 2.1 MG/DL (ref 1.8–2.4)
MCH RBC QN AUTO: 29.6 PG (ref 26–34)
MCHC RBC AUTO-ENTMCNC: 34.1 G/DL (ref 31–36)
MCV RBC AUTO: 86.8 FL (ref 80–100)
MONOCYTES ABSOLUTE: 0.8 K/UL (ref 0–1.3)
MONOCYTES RELATIVE PERCENT: 9.5 %
NEUTROPHILS ABSOLUTE: 6.7 K/UL (ref 1.7–7.7)
NEUTROPHILS RELATIVE PERCENT: 76.9 %
OCCULT BLOOD DIAGNOSTIC: NORMAL
PDW BLD-RTO: 15.5 % (ref 12.4–15.4)
PERFORMED ON: ABNORMAL
PLATELET # BLD: 216 K/UL (ref 135–450)
PMV BLD AUTO: 10 FL (ref 5–10.5)
POTASSIUM SERPL-SCNC: 4 MMOL/L (ref 3.5–5.1)
RBC # BLD: 2.71 M/UL (ref 4–5.2)
SODIUM BLD-SCNC: 138 MMOL/L (ref 136–145)
VITAMIN B-12: 341 PG/ML (ref 211–911)
WBC # BLD: 8.7 K/UL (ref 4–11)

## 2022-07-19 PROCEDURE — 97162 PT EVAL MOD COMPLEX 30 MIN: CPT

## 2022-07-19 PROCEDURE — 6360000002 HC RX W HCPCS: Performed by: HOSPITALIST

## 2022-07-19 PROCEDURE — 97116 GAIT TRAINING THERAPY: CPT

## 2022-07-19 PROCEDURE — 85025 COMPLETE CBC W/AUTO DIFF WBC: CPT

## 2022-07-19 PROCEDURE — 36415 COLL VENOUS BLD VENIPUNCTURE: CPT

## 2022-07-19 PROCEDURE — 94760 N-INVAS EAR/PLS OXIMETRY 1: CPT

## 2022-07-19 PROCEDURE — 97535 SELF CARE MNGMENT TRAINING: CPT

## 2022-07-19 PROCEDURE — 2580000003 HC RX 258: Performed by: STUDENT IN AN ORGANIZED HEALTH CARE EDUCATION/TRAINING PROGRAM

## 2022-07-19 PROCEDURE — 82270 OCCULT BLOOD FECES: CPT

## 2022-07-19 PROCEDURE — 6370000000 HC RX 637 (ALT 250 FOR IP): Performed by: NURSE PRACTITIONER

## 2022-07-19 PROCEDURE — 80048 BASIC METABOLIC PNL TOTAL CA: CPT

## 2022-07-19 PROCEDURE — 97530 THERAPEUTIC ACTIVITIES: CPT

## 2022-07-19 PROCEDURE — 1200000000 HC SEMI PRIVATE

## 2022-07-19 PROCEDURE — 99233 SBSQ HOSP IP/OBS HIGH 50: CPT | Performed by: INTERNAL MEDICINE

## 2022-07-19 PROCEDURE — 6360000002 HC RX W HCPCS: Performed by: STUDENT IN AN ORGANIZED HEALTH CARE EDUCATION/TRAINING PROGRAM

## 2022-07-19 PROCEDURE — 99233 SBSQ HOSP IP/OBS HIGH 50: CPT | Performed by: HOSPITALIST

## 2022-07-19 PROCEDURE — 83735 ASSAY OF MAGNESIUM: CPT

## 2022-07-19 PROCEDURE — 97166 OT EVAL MOD COMPLEX 45 MIN: CPT

## 2022-07-19 PROCEDURE — 82607 VITAMIN B-12: CPT

## 2022-07-19 PROCEDURE — 2700000000 HC OXYGEN THERAPY PER DAY

## 2022-07-19 PROCEDURE — 6370000000 HC RX 637 (ALT 250 FOR IP): Performed by: STUDENT IN AN ORGANIZED HEALTH CARE EDUCATION/TRAINING PROGRAM

## 2022-07-19 PROCEDURE — 93010 ELECTROCARDIOGRAM REPORT: CPT | Performed by: INTERNAL MEDICINE

## 2022-07-19 PROCEDURE — 82746 ASSAY OF FOLIC ACID SERUM: CPT

## 2022-07-19 RX ORDER — FERROUS SULFATE TAB EC 324 MG (65 MG FE EQUIVALENT) 324 (65 FE) MG
324 TABLET DELAYED RESPONSE ORAL
Status: DISCONTINUED | OUTPATIENT
Start: 2022-07-19 | End: 2022-07-24 | Stop reason: HOSPADM

## 2022-07-19 RX ORDER — FUROSEMIDE 10 MG/ML
40 INJECTION INTRAMUSCULAR; INTRAVENOUS 3 TIMES DAILY
Status: DISCONTINUED | OUTPATIENT
Start: 2022-07-19 | End: 2022-07-20

## 2022-07-19 RX ADMIN — Medication 4.5 MG: at 22:08

## 2022-07-19 RX ADMIN — ATORVASTATIN CALCIUM 80 MG: 80 TABLET, FILM COATED ORAL at 09:00

## 2022-07-19 RX ADMIN — FERROUS SULFATE TAB EC 324 MG (65 MG FE EQUIVALENT) 324 MG: 324 (65 FE) TABLET DELAYED RESPONSE at 17:00

## 2022-07-19 RX ADMIN — CEFTRIAXONE 2000 MG: 2 INJECTION, POWDER, FOR SOLUTION INTRAMUSCULAR; INTRAVENOUS at 09:08

## 2022-07-19 RX ADMIN — INSULIN LISPRO 2 UNITS: 100 INJECTION, SOLUTION INTRAVENOUS; SUBCUTANEOUS at 22:09

## 2022-07-19 RX ADMIN — FERROUS SULFATE TAB EC 324 MG (65 MG FE EQUIVALENT) 324 MG: 324 (65 FE) TABLET DELAYED RESPONSE at 12:31

## 2022-07-19 RX ADMIN — GABAPENTIN 200 MG: 100 CAPSULE ORAL at 14:51

## 2022-07-19 RX ADMIN — CITALOPRAM HYDROBROMIDE 40 MG: 20 TABLET ORAL at 17:00

## 2022-07-19 RX ADMIN — SODIUM CHLORIDE, PRESERVATIVE FREE 10 ML: 5 INJECTION INTRAVENOUS at 22:09

## 2022-07-19 RX ADMIN — HEPARIN SODIUM 5000 UNITS: 5000 INJECTION INTRAVENOUS; SUBCUTANEOUS at 05:24

## 2022-07-19 RX ADMIN — PANTOPRAZOLE SODIUM 40 MG: 40 TABLET, DELAYED RELEASE ORAL at 17:00

## 2022-07-19 RX ADMIN — HEPARIN SODIUM 5000 UNITS: 5000 INJECTION INTRAVENOUS; SUBCUTANEOUS at 22:09

## 2022-07-19 RX ADMIN — INSULIN LISPRO 2 UNITS: 100 INJECTION, SOLUTION INTRAVENOUS; SUBCUTANEOUS at 12:31

## 2022-07-19 RX ADMIN — CARVEDILOL 6.25 MG: 6.25 TABLET, FILM COATED ORAL at 09:00

## 2022-07-19 RX ADMIN — GABAPENTIN 200 MG: 100 CAPSULE ORAL at 22:08

## 2022-07-19 RX ADMIN — FUROSEMIDE 40 MG: 10 INJECTION, SOLUTION INTRAMUSCULAR; INTRAVENOUS at 09:00

## 2022-07-19 RX ADMIN — AZITHROMYCIN MONOHYDRATE 500 MG: 500 TABLET ORAL at 09:00

## 2022-07-19 RX ADMIN — SODIUM CHLORIDE, PRESERVATIVE FREE 10 ML: 5 INJECTION INTRAVENOUS at 09:03

## 2022-07-19 RX ADMIN — INSULIN LISPRO 1 UNITS: 100 INJECTION, SOLUTION INTRAVENOUS; SUBCUTANEOUS at 17:01

## 2022-07-19 RX ADMIN — FUROSEMIDE 40 MG: 10 INJECTION, SOLUTION INTRAMUSCULAR; INTRAVENOUS at 22:09

## 2022-07-19 RX ADMIN — CARVEDILOL 6.25 MG: 6.25 TABLET, FILM COATED ORAL at 17:00

## 2022-07-19 RX ADMIN — GABAPENTIN 200 MG: 100 CAPSULE ORAL at 09:00

## 2022-07-19 RX ADMIN — HEPARIN SODIUM 5000 UNITS: 5000 INJECTION INTRAVENOUS; SUBCUTANEOUS at 14:51

## 2022-07-19 RX ADMIN — FUROSEMIDE 40 MG: 10 INJECTION, SOLUTION INTRAMUSCULAR; INTRAVENOUS at 14:56

## 2022-07-19 ASSESSMENT — PAIN SCALES - GENERAL
PAINLEVEL_OUTOF10: 0

## 2022-07-19 NOTE — PROGRESS NOTES
Physical Therapy  Facility/Department: [address] ORTHOPEDICS  Physical Therapy Initial Assessment  Co Treat with OT    Name: Brian Ortiz  : 1955  MRN: [id number]  Date of Service: 2022    Discharge Recommendations:  Continue to assess pending progress, Patient would benefit from continued therapy after discharge, Therapy recommended at discharge    Brian Ortiz scored a 17/24 on the AM-PAC short mobility form. Current research shows that an AM-PAC score of 17 or less is typically not associated with a discharge to the patient's home setting. Based on the patient's AM-PAC score and their current functional mobility deficits, it is recommended that the patient have 3-5 sessions per week of Physical Therapy at d/c to increase the patient's independence. Please see assessment section for further patient specific details. Patient Diagnosis(es): The primary encounter diagnosis was Acute respiratory failure with hypoxia ([address] A diagnosis of Pneumonia of both lungs due to infectious organism, unspecified part of lung was also pertinent to this visit.   Past Medical History:  has a past medical history of Abnormal echocardiogram, Back pain, Cardiomyopathy ([address] Chipped tooth, Clostridium difficile diarrhea, Dental crown present, Depressive disorder, Diabetic infection of right foot ([address] Diabetic ulcer of toe of left foot associated with diabetes mellitus due to underlying condition, with fat layer exposed ([address] Diabetic ulcer of toe of left foot associated with type 2 diabetes mellitus, with fat layer exposed ([address] DVT (deep venous thrombosis) ([address] HTN (hypertension), Hx of blood clots, Ischemic cardiomyopathy, Kidney disease, Meniere's disease, Mixed hyperlipidemia, Nicotine abuse, NSTEMI (non-ST elevated myocardial infarction) ([address] ANGLE (obstructive sleep apnea), Osteomyelitis of ankle or foot, acute, left ([address] Pneumonia, PONV (postoperative nausea and vomiting), Spinal epidural abscess, and Type II diabetes mellitus, uncontrolled ([facility] Past Surgical History:  has a past surgical history that includes Wound debridement (3/12/14); back surgery (3/2014); Hysterectomy (6/15/1999); Nasal septum surgery; Upper gastrointestinal endoscopy (N/A, 2/8/2019); Upper gastrointestinal endoscopy (N/A, 2/8/2019); Sleeve Gastrectomy (04/02/2019); Sleeve Gastrectomy (N/A, 4/2/2019); Hammer toe surgery (Left, 8/28/2020); Foot Debridement (Left, 9/8/2020); Nerve Surgery (Left, 9/10/2020); Endoscopy, colon, diagnostic; Dilation and curettage of uterus; other surgical history; and Pressure ulcer debridement (Left, 10/23/2020). Assessment   Body Structures, Functions, Activity Limitations Requiring Skilled Therapeutic Intervention: Decreased safe awareness;Decreased endurance;Decreased balance  Assessment: Per H and P: \"The patient is a 74 y.o. female with past medical history of type 2 diabetes, CKD, previous history of DVT not on anticoagulation, cardiomyopathy with diastolic heart failure, hyperlipidemia, hypertension who presents to [facility] coming from home that over the last 2 days since she returned from a festival on Saturday she was noticing that while at the festival she was initially somewhat increasingly fatigued and short of breath. Active hospital issues: acute resp failure with hypoxia, Acute CHF, pneumonia, sepsis, anemia, NEHA on CKD. B LE dopplers 7-18-22 negative. Cardiology and Nephrology following. Status 7/19/22: Pt is very chatty but easily guided back to task. When talking, forgets to breath deeply through nose and needing mod cueing to increase O2. Pt on 6L of O2 throughout session ranges of 86-91%, 1-2 minutes of focused breathing needed to increase levels. Bed mobility: required minAx1 to EOB. O2 dropped to 86%, recovered to 90% after 1-2 minutes. Transfers: SBA with RW, cues for hand placement and RW management. Tends to push/leave RW to side when begining to sit.  Gait: SBA 10' to commode and 20' to [facility] chair. Cues to stay within RW base, slight unsteadiness when turning to change directions. PT/OT managment of lines. Pt is motivated to go home but is unaware of her safety needs. Recommend continued therapy of low/mod intesity to increase functional mobility, strength and safety awarness. Will continue to monitor and assess while here. Therapy Prognosis: Good  Decision Making: Medium Complexity  History: as noted  Clinical Presentation: evolving  Barriers to Learning: distractable  Requires PT Follow-Up: Yes  Activity Tolerance  Activity Tolerance: Patient tolerated evaluation without incident;Patient limited by fatigue;Patient limited by endurance     Plan   Plan  Plan: 3-5 times per week  Current Treatment Recommendations: Balance training, Functional mobility training, Transfer training, Endurance training, Gait training, Stair training, Safety education & training, Equipment evaluation, education, & procurement, Therapeutic activities  Safety Devices  Type of Devices: All fall risk precautions in place, Call light within reach, Chair alarm in place, Gait belt, Patient at risk for falls, Left in chair, Nurse notified     Restrictions  Restrictions/Precautions  Restrictions/Precautions: Fall Risk     Subjective   General  Chart Reviewed: Yes  Patient assessed for rehabilitation services?: Yes  Additional Pertinent Hx: Per H and P:  \"The patient is a 74 y.o. female with past medical history of type 2 diabetes, CKD, previous history of DVT not on anticoagulation, cardiomyopathy with diastolic heart failure, hyperlipidemia, hypertension who presents to [facility] coming from home that over the last 2 days since she returned from a festival on Saturday she was noticing that while at the festival she was initially somewhat increasingly fatigued and short of breath. Active hospital issues: acute resp failure with hypoxia, Acute CHF, pneumonia, sepsis, anemia, NEHA on CKD.  GEREMIAS Boss 7-18-22 negative. Cardiology and Nephrology following. Response To Previous Treatment: Not applicable  Family / Caregiver Present: No  Referring Practitioner: MADISON Hayes CNP  Referral Date : 07/18/22  Follows Commands: Within Functional Limits  Subjective  Subjective: Pt in bed needing to go to the bathroom. Agreeable to therapy. Social/Functional History  Social/Functional History  Lives With: Alone  Type of Home: Apartment (2nd floor)  Home Layout: One level  Home Access: Stairs to enter with rails, Stairs to enter without rails  Entrance Stairs - Number of Steps: from garage: 13 steps 1 rail, then 13 steps B rails up to apartment or from outside 5 steps to landing, 13 steps to 2nd floor apt.   Bathroom Shower/Tub: Tub/Shower unit, Shower chair with back  [employer]: Handicap height  Has the patient had two or more falls in the past year or any fall with injury in the past year?: No  Receives Help From: Family, Friend(s)  ADL Assistance: [address] [place] [address] Avenue: Independent  Homemaking Responsibilities: Yes  Ambulation Assistance: Independent  Transfer Assistance: Independent  Active : Yes    Vision/Hearing  Vision  Vision: Impaired  Vision Exceptions: Wears glasses at all times  Hearing  Hearing: Within functional limits      Cognition   Orientation  Overall Orientation Status: Within Functional Limits  Orientation Level: Oriented X4  Cognition  Overall Cognitive Status: WFL     Objective   AROM RLE (degrees)  RLE AROM: WFL  AROM LLE (degrees)  LLE AROM : WFL  Strength RLE  Strength RLE: WFL  Strength LLE  Strength LLE: WFL  Bed mobility  Supine to Sit: Minimal assistance  Sit to Supine: Unable to assess (in BS chair at end of session)  Scooting: Stand by assistance  Bed Mobility Comments: Increased time, used PT/OT hand to pull to EOB  Transfers  Sit to Stand: Stand by assistance;Contact guard assistance (to RW)  Stand to sit: Stand by assistance;Contact guard assistance (with RW)  Comment: Mod cueing for hand placement and RW management  Ambulation  Surface: level tile  Device: Rolling Walker  Assistance: Stand by assistance  Quality of Gait: increased time due to SOB, slight unsteadiness  Gait Deviations: Slow Kendra;Decreased step length;Decreased step height  Distance: 10', 20'  Comments: Pt amb to commode, urinated, performed pericare and then washed hands at sink. Amb to [facility] chair. On 6L of O2 throughout session ranging from 85-91% with mod cueing to breath through nose to increase O2 levels, took ~1-2 minutes to return to 90s. Stairs/Curb  Stairs?: No  Balance  Posture: Good  Sitting - Static: Good;-  Sitting - Dynamic: Good;-  Standing - Static: Fair;+  Standing - Dynamic: Fair;+  Comments: Some unsteadiness on feet. AM-PAC Score  AM-PAC Inpatient Mobility Raw Score : 17 (07/19/22 1150)  AM-PAC Inpatient T-Scale Score : 42.13 (07/19/22 1150)  Mobility Inpatient CMS 0-100% Score: 50.57 (07/19/22 1150)  Mobility Inpatient CMS G-Code Modifier : CK (07/19/22 1150)     Goals  Short Term Goals  Time Frame for Short term goals: By acute discharge  Short term goal 1: Bed mobility SBA to EOB  Short term goal 2: Transfers with RW, SBA, min cueing. Short term goal 3: Ambulation 48' with RW, min cueing for breathing technique. Patient Goals   Patient goals : None stated at thie time     Education  Patient Education  Education Given To: Patient  Education Provided: Role of Therapy;Plan of Care;Precautions; Equipment;Transfer Training  Education Method: Verbal  Education Outcome: Verbalized understanding    Therapy Time   Individual Concurrent Group Co-treatment   Time In 5748         Time Out 7952         Minutes 40         Timed Code Treatment Minutes: 30 Minutes   SICW 03, TA 15, [address]  Electronically signed by Miles Kiser on 7/19/2022 at 11:53 AM  I attest that I was present for and made a skilled & mindful clinical judgement during the evaluation and/or treatment of this patient on 7/19/2022  Electronically signed by Jennie Dick, [address] [place] [address] Drive [phone])  on 7/19/2022 at 1:26 PM

## 2022-07-19 NOTE — PROGRESS NOTES
Occupational Therapy  Facility/Department: [facility]  Occupational Therapy Initial Assessment    Name: Will Carlos  : 1955  MRN: [id number]  Date of Service: 2022    Discharge Recommendations:  2-3 sessions per week, Patient would benefit from continued therapy after discharge, Home with Home health OT, 24 hour supervision or assist        Will Carlos scored a 19/24 on the AM-PAC ADL Inpatient form. Current research shows that an AM-PAC score of 18 or greater is typically associated with a discharge to the patient's home setting. Based on the patient's AM-PAC score, and their current ADL deficits, it is recommended that the patient have 2-3 sessions per week of Occupational Therapy at d/c to increase the patient's independence. At this time, this patient demonstrates the endurance and safety to discharge home with [address] [place] [address] (home vs OP services) and a follow up treatment frequency of 2-3x/wk. Please see assessment section for further patient specific details. If patient discharges prior to next session this note will serve as a discharge summary. Please see below for the latest assessment towards goals. Patient Diagnosis(es): The primary encounter diagnosis was Acute respiratory failure with hypoxia ([address] A diagnosis of Pneumonia of both lungs due to infectious organism, unspecified part of lung was also pertinent to this visit.   Past Medical History:  has a past medical history of Abnormal echocardiogram, Back pain, Cardiomyopathy ([address] Chipped tooth, Clostridium difficile diarrhea, Dental crown present, Depressive disorder, Diabetic infection of right foot ([address] Diabetic ulcer of toe of left foot associated with diabetes mellitus due to underlying condition, with fat layer exposed ([address] Diabetic ulcer of toe of left foot associated with type 2 diabetes mellitus, with fat layer exposed ([address] DVT (deep venous thrombosis) ([address] HTN (hypertension), Hx of blood clots, Ischemic cardiomyopathy, Kidney disease, Meniere's disease, Mixed hyperlipidemia, Nicotine abuse, NSTEMI (non-ST elevated myocardial infarction) ([facility] ANGLE (obstructive sleep apnea), Osteomyelitis of ankle or foot, acute, left ([facility] Pneumonia, PONV (postoperative nausea and vomiting), Spinal epidural abscess, and Type II diabetes mellitus, uncontrolled ([facility] Past Surgical History:  has a past surgical history that includes Wound debridement (3/12/14); back surgery (3/2014); Hysterectomy (6/15/1999); Nasal septum surgery; Upper gastrointestinal endoscopy (N/A, 2/8/2019); Upper gastrointestinal endoscopy (N/A, 2/8/2019); Sleeve Gastrectomy (04/02/2019); Sleeve Gastrectomy (N/A, 4/2/2019); Hammer toe surgery (Left, 8/28/2020); Foot Debridement (Left, 9/8/2020); Nerve Surgery (Left, 9/10/2020); Endoscopy, colon, diagnostic; Dilation and curettage of uterus; other surgical history; and Pressure ulcer debridement (Left, 10/23/2020). Treatment Diagnosis: impaired ADL/fxl mobility    Assessment   Performance deficits / Impairments: Decreased functional mobility ; Decreased endurance;Decreased ADL status; Decreased high-level IADLs;Decreased balance  Assessment: 75 yo female admitted for SOB with hypoxia. PMH: DM, HTN, Neuropathy, CKD, ANGLE, obesity, Meniere's Disease. PTA, pt lives alone and fully IND + driving. Today, pt functioning below baseline most limited by decreased endurance. Pt with SOB throughout and frequently desatting to 85% with bed mobility and short fxl mobility- required cues for deep breathing and ~2 min to recover. Began energy conservation education (delegating IADLs, seated bathing, and seated rest breaks) -pt receptive. Pt completes bed mobility Min A, toileting CGA, and sink side grooming and fxl tx and mobility household distance with RW with CGA/SBA (therapist managing O2 tubing). Cont acute OT to address above deficits. Rec OT 2-3x/week to further address endurance with ADL/fxl mobility.  Pt to have grandchild stay for intialy 24 hr SUP and IADL assist  Treatment Diagnosis: impaired ADL/fxl mobility  Prognosis: Good  Decision Making: Medium Complexity  REQUIRES OT FOLLOW-UP: Yes  Activity Tolerance  Activity Tolerance: Patient limited by fatigue  Activity Tolerance Comments: Moderate to significant SOB with household fxl mobility and ADLs. SpO2 drops to 85% with simple bed mobility, mobility EOB>toilet and again toilet>recliner. Pt requires cues for deep breathing and 1.5 to 2 min to recover >90%        Plan   Plan  Times per Week: 3-5  Specific Instructions for Next Treatment: energy conservation. managing O2 tubing  Current Treatment Recommendations: Strengthening, ROM, Balance training, Functional mobility training, Endurance training, Pain management, Safety education & training, Patient/Caregiver education & training, Self-Care / ADL, Home management training, Modalities, Positioning     Restrictions  Restrictions/Precautions  Restrictions/Precautions: Fall Risk  Position Activity Restriction  Other position/activity restrictions: 6L NC    Subjective   General  Chart Reviewed: Yes  Patient assessed for rehabilitation services?: Yes  Additional Pertinent Hx: 75 yo female admitted for SOB with hypoxia. PMH: DM, HTN, Neuropathy, CKD, ANGLE, obesity, Meniere's Disease  Family / Caregiver Present: No  Referring Practitioner: MADISON Swift CNP  Diagnosis: Hypoxia  Subjective  Subjective: pt resting in bed upon arrival and agreeable to OT/PT eval. pt reporting no pain, SOB noted throughout.   General Comment  Comments: RN ok to see     Social/Functional History  Social/Functional History  Lives With: Alone  Type of Home: Apartment (2nd floor)  Home Layout: One level  Home Access: Stairs to enter with rails, Stairs to enter without rails  Entrance Stairs - Number of Steps: from garage: 13 steps 1 rail, then 13 steps B rails up to apartment or from outside 5 steps to landing, 13 steps to 2nd floor hand to pull to EOB  Transfers  Sit to stand: Contact guard assistance;Stand by assistance  Stand to sit: Contact guard assistance;Stand by assistance  Transfer Comments: RW. cues hand placement     Cognition  Overall Cognitive Status: WFL  Orientation  Overall Orientation Status: Within Functional Limits  Orientation Level: Oriented X4                  Education Given To: Patient  Education Provided: Role of Therapy;Plan of Care;Transfer Training;Energy Conservation  Education Provided Comments: began energy conservation education (delegating IADLs, seated bathing, and seated rest breaks). Education Method: Verbal  Education Outcome: Continued education needed;Verbalized understanding                        AM-PAC Score        AM-[facility] Inpatient Daily Activity Raw Score: 19 (07/19/22 1216)  AM-PAC Inpatient ADL T-Scale Score : 40.22 (07/19/22 1216)  ADL Inpatient CMS 0-100% Score: 42.8 (07/19/22 1216)  ADL Inpatient CMS G-Code Modifier : CK (07/19/22 1216)    Goals  Short Term Goals  Time Frame for Short term goals: prior to d/c  Short Term Goal 1: UB bathing/dressing seated set up  Short Term Goal 2: toileting SUP  Short Term Goal 3: LB dressing with AE as needed SUP  Short Term Goal 4: tolerate 5 min fxl standing task SUP  Short Term Goal 5: verbalize 4 energy conservation strategies in order to prevent further fatigue with ADL and fxl mobility  Patient Goals   Patient goals : improve breathing and be able to return home       Therapy Time   Individual Concurrent Group Co-treatment   Time In 1017         Time Out 1055         Minutes 38         Timed Code Treatment Minutes: 23 Minutes (15 eval. 15 ADL.  8 TA)       BILL Mooney, OTR/L

## 2022-07-19 NOTE — PROGRESS NOTES
Hospital Medicine Progress Note      Admit Date: 7/18/2022       CC: F/U for worsening dyspnea on exertion, fatigue, cough    HPI: patient is a 74 y.o. female with past medical history of type 2 diabetes, CKD, previous history of DVT not on anticoagulation, cardiomyopathy with diastolic heart failure, hyperlipidemia, hypertension who presents to [facility] coming from home that over the last 2 days since she returned from a festival on Saturday she was noticing that while at the festival she was initially somewhat increasingly fatigued and short of breath on exertion but was still able to get to her car and get home. She became even more progressively short of breath and fatigued and was in her home and sleeping in bed the whole day all day till she decided to call EMS tonight prior to coming to the ED because she became more more short of breath as the day went on till tonight she was getting increasingly more short of breath and fatigued on exertion. She did have a cough at home but denied any sputum production. She also denies any other recent symptoms of fever, chills, dizziness, syncope, chest pain, dysuria, blood in urine/stool/sputum recently or prior to today, nausea/vomiting/diarrhea/abdominal pain. She does not feel as though she has been having any difficulty urinating and feels as though her urination has been optimal while on her Lasix. Does not feel obstructed. He has been compliant with her medications. 7/18: Nephrology, cardiology and CHF RN as well as dietitian consulted by admitting MD     Ordered melatonin for patient per request (home med)  cont IV lasix for now. Dopplers neg for DVT. C/o SOB over the weekend at the [employer].  endorses a little bit of a cough, denies fever  She is on 5L nc but does not normally wear O2. Covid neg. Interval History/Subjective: BS improved with insulin. Still on 5-6L nc for pneumonia, CHF.  Diuresing with BID IV lasix per cardiology. Nephrology seeing for CKD    Review of Systems:       The patient denied headaches, visual changes, LOC, SOB, CP, ABD pain, N/V/D, skin changes, new or worsening weakness or neuromuscular deficits. Comprehensive ROS negative except as mentioned above.     Past Medical History:        Diagnosis Date    Abnormal echocardiogram     25% on 3/11/14 and 50% on 3/19/14    Back pain     Cardiomyopathy (HCC)     EF was 50% on 3/19/14    Chipped tooth     lower left    Clostridium difficile diarrhea 03/12/2021    Dental crown present     veneers    Depressive disorder 08/13/2014    Diabetic infection of right foot ([address] 04/15/2015    Diabetic ulcer of toe of left foot associated with diabetes mellitus due to underlying condition, with fat layer exposed ([address] 11/9/2018    Diabetic ulcer of toe of left foot associated with type 2 diabetes mellitus, with fat layer exposed ([address] 04/10/2018    Pt slipped in hot tub latter part of February and has not healed since despite topical treatment    DVT (deep venous thrombosis) ([facility])     HTN (hypertension)     Hx of blood clots 2016    left leg    Ischemic cardiomyopathy 04/15/2015    Kidney disease     CHRONIC STAGE 3    Meniere's disease     Mixed hyperlipidemia 03/07/2016    Nicotine abuse     NSTEMI (non-ST elevated myocardial infarction) ([address] 03/13/2014    ANGLE (obstructive sleep apnea)     Osteomyelitis of ankle or foot, acute, left ([address] 06/04/2018    Pneumonia     PONV (postoperative nausea and vomiting)     nausea    Spinal epidural abscess 03/13/2014    Type II diabetes mellitus, uncontrolled ([address] 08/13/2014       Past Surgical History:        Procedure Laterality Date    BACK SURGERY  3/2014    DEBRIDEMENT  3/12/14    extensive debridement of bone/muscle and paraspinal empyema    DILATION AND CURETTAGE OF UTERUS      ENDOSCOPY, COLON, DIAGNOSTIC      FOOT DEBRIDEMENT Left 9/8/2020    LEFT FOOT DEBRIDEMENT INCISION AND DRAINAGE performed by Arun Webber DPM at [address] TOE SURGERY Left 8/28/2020    ON THE LEFT: [address], WOUND CLOSURE, FLEXOR TENOTOMY 4TH AND 5TH DIGITS, TENOTOMY AND CAPSULOTOMY 2ND DIGIT performed by Radha Fiore DPM at [facility] ([facility])  6/15/1999    complete-think right ovary in. NASAL SEPTUM SURGERY      NERVE SURGERY Left 9/10/2020    LEFT FOOT INCISION AND DRAINAGE, EXTENSOR HALLUCIS LONGUS REPAIR WITH DELAYED PRIMARY CLOSURE performed by Radha Fiore DPM at [address] shut behind right ear    PRESSURE ULCER DEBRIDEMENT Left 10/23/2020    ON THE LEFT: SURGICAL PREPARATION OF WOUND BED, APPLICATION OF DERMAL GRAFT SUBSTITUTE performed by Radha Fiore DPM at [address]  04/02/2019    ROBOTIC ASSISTED LAPAROSCOPIC SLEEVE GASTRECTOMY, LAPAROSCOPIC REDUCIBLE INCISIONAL HERNIA REPAIR     SLEEVE GASTRECTOMY N/A 4/2/2019    ROBOTIC ASSISTED LAPAROSCOPIC SLEEVE GASTRECTOMY, LAPAROSCOPIC REDUCIBLE INCISIONAL HERNIA REPAIR performed by Joseph Tello DO at [facility] [address] N/A 2/8/2019    EGD BIOPSY performed by Joseph Tello DO at [facility] 13 N/A 2/8/2019    EGD POLYP ABLATION/OTHER performed by Joseph Tello DO at [facility] ENDOSCOPY       Allergies:  Doxycycline    Past medical and surgical history reviewed. Any changes have been noted. PHYSICAL EXAM:  /68   Pulse 64   Temp 97.7 °F (36.5 °C) (Oral)   Resp 16   Ht 5' 3\" (1.6 m)   Wt 249 lb 1.9 oz (113 kg)   LMP 06/15/1999   SpO2 92%   BMI 44.13 kg/m²       Intake/Output Summary (Last 24 hours) at 7/19/2022 0711  Last data filed at 7/18/2022 2114  Gross per 24 hour   Intake 340 ml   Output 800 ml   Net -460 ml        General appearance:   No apparent distress, appears stated age. Cooperative. HEENT:  Normocephalic, atraumatic. PERRLA. EOMi.   Conjunctivae/corneas clear, no icterus, non-injected. Neck: Supple, with full range of motion. No jugular venous distention. Trachea midline. Respiratory:  Normal respiratory effort. Clear to auscultation, bilaterally without Rales/Wheezes/Rhonchi. Cardiovascular:  Regular rate and rhythm without murmurs, rubs or gallops. Abdomen: Soft, non-tender, non-distended, without rebound or guarding. Normal bowel sounds. Musculoskeletal:  No clubbing, cyanosis or edema bilaterally. Full range of motion without deformity. Skin: Skin color, texture, turgor normal.  No rashes or lesions. Neurologic:  Neurovascularly intact without any focal sensory/motor deficits. Cranial nerves: II-XII intact, grossly intact. No facial asymmetry, tongue midline. Psychiatric:  Alert and oriented, thought content appropriate  Capillary Refill: Brisk,< 3 seconds   Peripheral Pulses: +2 palpable, equal bilaterally       LABS:    Lab Results   Component Value Date    WBC 8.7 07/19/2022    HGB 8.0 (L) 07/19/2022    HCT 23.5 (L) 07/19/2022    MCV 86.8 07/19/2022     07/19/2022    LYMPHOPCT 11.5 07/19/2022    RBC 2.71 (L) 07/19/2022    MCH 29.6 07/19/2022    MCHC 34.1 07/19/2022    RDW 15.5 (H) 07/19/2022       Lab Results   Component Value Date    CREATININE 3.6 (H) 07/19/2022    BUN 75 (H) 07/19/2022     07/19/2022    K 4.0 07/19/2022    CL 99 07/19/2022    CO2 23 07/19/2022       Lab Results   Component Value Date/Time    MG 2.10 07/19/2022 05:54 AM       Lab Results   Component Value Date    ALT 10 07/18/2022    AST 9 (L) 07/18/2022    ALKPHOS 101 07/18/2022    BILITOT 0.4 07/18/2022        No flowsheet data found. Lab Results   Component Value Date    LABA1C 6.8 06/21/2022       Imaging:  VL Extremity Venous Bilateral   Final Result      CT CHEST WO CONTRAST   Final Result   1. Interstitial pulmonary edema with suspected peribronchovascular alveolar   edema, suggesting congestive heart failure given mild cardiomegaly and trace   bilateral pleural effusions. Interstitial pneumonia could appear similar. 2. Patchy peribronchovascular groundglass opacities most prominent near the   lung bases are most likely alveolar edema, although pneumonia could appear   similar. 3. Mild bronchial wall thickening potentially due to pulmonary vascular   congestion, reactive airways disease, or bronchitis. 4. At least minimal anasarca. XR CHEST PORTABLE   Final Result   1. Pulmonary vascular congestion with suggestion of perihilar edema,   potentially congestive heart failure given mild cardiomegaly and possible   trace bilateral pleural effusions. Pneumonia could appear similar. 2. Bibasilar airspace opacities most likely due atelectasis, pneumonia and   aspiration could appear similar.              Scheduled and prn Medications:    Scheduled Meds:   [Held by provider] aspirin  81 mg Oral Daily    atorvastatin  80 mg Oral Daily    carvedilol  6.25 mg Oral BID WC    pantoprazole  40 mg Oral QPM    citalopram  40 mg Oral QPM    sodium chloride flush  5-40 mL IntraVENous 2 times per day    heparin (porcine)  5,000 Units SubCUTAneous 3 times per day    cefTRIAXone (ROCEPHIN) IV  2,000 mg IntraVENous Q24H    And    azithromycin  500 mg Oral Q24H    melatonin  4.5 mg Oral Nightly    insulin lispro  0-6 Units SubCUTAneous TID     insulin lispro  0-3 Units SubCUTAneous Nightly    gabapentin  200 mg Oral TID    furosemide  40 mg IntraVENous BID     Continuous Infusions:   sodium chloride      dextrose       PRN Meds:.sodium chloride flush, sodium chloride, polyethylene glycol, acetaminophen **OR** acetaminophen, ondansetron, ipratropium-albuterol, glucose, dextrose bolus **OR** dextrose bolus, glucagon (rDNA), dextrose, benzonatate    Assessment & Plan:        Acute resp failure  with hypoxia  - O2 5L - does not wear O2 at home   - CT : pulm edema, possible PNA, CHF  - monitor and wean O2 for > 94%  - IV antibx broad spectrum for poss gram neg pna  - procalc normal  - Possibly pneumonia on CT  - IV rocephin and azithromycin for now  - procalcitonin normal  - afebrile   - reports some cough            NEHA on CKD, stage 3  - consult to nephrology  - BMP daily  - avoid nephrotoxic meds   - renal dosing gabapentin to 200mg tid rather than home dose 600mg tid  - monitor BP  - received lasix in ED, and cont bid IV lasix with consideration for NEHA     Anemia  - workup in place  - no active bleeding  - follow daily CBC  - hemoccult, FE/TIBC/ferritin/B12/retic ct  - started ferrous sulfate tid for low iron and TIBC     HTN  - cont home meds  - hold ACEi in setting of NEHA        DM, T2  - lantus home dose: 54 units daily/ 20 units nightly - holding for now until BS require it  - poct ac/hs  - A1c 6/21/22   6.8  - will do low dose SSI for now while glucose on the lower side  - adjust accordingly as needed     CHF  Last echo 1/4/22:   EF 60-65%. The right ventricle is normal in size and function. Aortic sclerosis    Trivial mitral and tricuspid regurgitation. Lasix 40mg daily - home dose, recently reduced by PCP from bid to daily  - IV lasix while admitted  - dopplers bilat  - bNP 1518  - trop neg  - strict I/O  - daily wts  - bilat venous dopplers to r/o DVT for edema  - CT : interstitial pulmonary edema with peribronchovascular alveolar edema, suggesting CHF given mild cardiomegaly and trace bilat pleural effusions. Insterstitial pneumonia could appear similar. 2. Patchy peribronchovascular groundglass opacities most prominent near the lung bases are most likely alveolar edema, although pneumonia could appear similar . 3. Mild bronchial wall thickening potentially due to pulmonary vascular congestion, reactive airway disease or bronchitis, at least minimal anasarca  - consult to cardiology for CHF    considering right heart cath         hx back pain  - prior laminectomy    Continue current regimen/therapies. Monitor. Adjust medical regimen as appropriate.     Body mass index is 44.13 kg/m². The patient and / or the family were informed of the results of any tests, a time was given to answer questions, a plan was proposed and they agreed with plan. DVT ppx: hep   GI ppx: protonix    Diet: ADULT DIET; Regular; 4 carb choices (60 gm/meal);  Low Sodium (2 gm); 1500 ml    Consults:  IP CONSULT TO HEART FAILURE NURSE/COORDINATOR  IP CONSULT TO DIETITIAN  IP CONSULT TO NEPHROLOGY  IP CONSULT TO SPIRITUAL SERVICES  IP CONSULT TO SOCIAL WORK  IP CONSULT TO CARDIOLOGY    DISPO/placement plan: pending when off O2    Code Status: Full Code      MADISON Carson - NALINI  07/19/22

## 2022-07-19 NOTE — PROGRESS NOTES
[facility] [address]  Cardiology ProgressNote        CC:      CHF             HPI:   This is a 74 y.o. female history of cardiomyopathy diastolic heart failure, DM, advanced CKD, HTN, HLD, who presents the hospital for evaluation of fatigue, shortness of breath this has been progressive. I asked her many times about her main presenting complaint and she would always state that it was fatigue. According to her her primary care physician has dropped her dose of furosemide to 40 mg daily. Her primary nephrologist was giving her furosemide 40 twice daily.     Medications include gabapentin, amlodipine 10, atorvastatin 80, lisinopril 40, furosemide 40, carvedilol 25 mg and aspirin    Interval History  Is unable to answer if she is better or worse  Did not get a straight answer about her breathing alsom      Past Medical History:   Diagnosis Date    Abnormal echocardiogram     25% on 3/11/14 and 50% on 3/19/14    Back pain     Cardiomyopathy ([address]     EF was 50% on 3/19/14    Chipped tooth     lower left    Clostridium difficile diarrhea 03/12/2021    Dental crown present     veneers    Depressive disorder 08/13/2014    Diabetic infection of right foot ([address] 04/15/2015    Diabetic ulcer of toe of left foot associated with diabetes mellitus due to underlying condition, with fat layer exposed ([address] 11/9/2018    Diabetic ulcer of toe of left foot associated with type 2 diabetes mellitus, with fat layer exposed ([address] 04/10/2018    Pt slipped in hot tub latter part of February and has not healed since despite topical treatment    DVT (deep venous thrombosis) (HCC)     HTN (hypertension)     Hx of blood clots 2016    left leg    Ischemic cardiomyopathy 04/15/2015    Kidney disease     CHRONIC STAGE 3    Meniere's disease     Mixed hyperlipidemia 03/07/2016    Nicotine abuse     NSTEMI (non-ST elevated myocardial infarction) ([address] 03/13/2014    ANGLE (obstructive sleep apnea)     Osteomyelitis of ankle or foot, acute, left leukemia/lymphoma  Musculoskeletal: Negative for Connective tissue disease  Neurological:  Negative for Seizure   Behavioral/Psych:Negative for Bipolar disorder, Schizophrenia; Dementia  Endocrine: negative for thyroid, parathyroid disease      Intake/Output Summary (Last 24 hours) at 7/19/2022 0904  Last data filed at 7/18/2022 2114  Gross per 24 hour   Intake 340 ml   Output 800 ml   Net -460 ml       Physical Examination:    /65   Pulse 69   Temp 98.1 °F (36.7 °C) (Oral)   Resp 18   Ht 5' 3\" (1.6 m)   Wt 249 lb 1.9 oz (113 kg)   LMP 06/15/1999   SpO2 91%   BMI 44.13 kg/m²    HEENT:  Face: Atraumatic, Conjunctiva: Pink; non icteric,  Mucous Memb:  Moist, No thyromegaly or Lymphadenopathy  Respiratory:  Resp Assessment: normal, Resp Auscultation: clear   Cardiovascular: Auscultation: nl S1 & S2, Palpation:  Nl PMI;  No heaves or thrills, JVP:  normal  Abdomen: Soft, non-tender, Normal bowel sounds,  No organomegaly  Extremities: No Cyanosis or Clubbing; Edema none  Neurological: Oriented to time, place, and person, Non-anxious  Psychiatric: Normal mood and affect  Skin: Warm and dry,  No rash seen      Current Facility-Administered Medications: ferrous sulfate EC tablet 324 mg, 324 mg, Oral, TID WC  [Held by provider] aspirin chewable tablet 81 mg, 81 mg, Oral, Daily  atorvastatin (LIPITOR) tablet 80 mg, 80 mg, Oral, Daily  carvedilol (COREG) tablet 6.25 mg, 6.25 mg, Oral, BID WC  pantoprazole (PROTONIX) tablet 40 mg, 40 mg, Oral, QPM  citalopram (CELEXA) tablet 40 mg, 40 mg, Oral, QPM  sodium chloride flush 0.9 % injection 5-40 mL, 5-40 mL, IntraVENous, 2 times per day  sodium chloride flush 0.9 % injection 5-40 mL, 5-40 mL, IntraVENous, PRN  0.9 % sodium chloride infusion, , IntraVENous, PRN  polyethylene glycol (GLYCOLAX) packet 17 g, 17 g, Oral, Daily PRN  acetaminophen (TYLENOL) tablet 650 mg, 650 mg, Oral, Q6H PRN **OR** acetaminophen (TYLENOL) suppository 650 mg, 650 mg, Rectal, Q6H PRN  ondansetron (ZOFRAN) injection 4 mg, 4 mg, IntraVENous, Q6H PRN  ipratropium-albuterol (DUONEB) nebulizer solution 1 ampule, 1 ampule, Inhalation, Q4H PRN  heparin (porcine) injection 5,000 Units, 5,000 Units, SubCUTAneous, 3 times per day  cefTRIAXone (ROCEPHIN) 2000 mg IVPB in D5W 50ml minibag, 2,000 mg, IntraVENous, Q24H **AND** azithromycin (ZITHROMAX) tablet 500 mg, 500 mg, Oral, Q24H  melatonin tablet 4.5 mg, 4.5 mg, Oral, Nightly  insulin lispro (HUMALOG) injection vial 0-6 Units, 0-6 Units, SubCUTAneous, TID WC  insulin lispro (HUMALOG) injection vial 0-3 Units, 0-3 Units, SubCUTAneous, Nightly  glucose chewable tablet 16 g, 4 tablet, Oral, PRN  dextrose bolus 10% 125 mL, 125 mL, IntraVENous, PRN **OR** dextrose bolus 10% 250 mL, 250 mL, IntraVENous, PRN  glucagon (rDNA) injection 1 mg, 1 mg, IntraMUSCular, PRN  dextrose 5 % solution, 100 mL/hr, IntraVENous, PRN  gabapentin (NEURONTIN) capsule 200 mg, 200 mg, Oral, TID  furosemide (LASIX) injection 40 mg, 40 mg, IntraVENous, BID  benzonatate (TESSALON) capsule 100 mg, 100 mg, Oral, TID PRN      Labs:   Recent Labs     07/18/22  0301 07/19/22  0554   WBC 12.3* 8.7   HGB 8.7* 8.0*   HCT 27.0*  26.4* 23.5*    216     Recent Labs     07/18/22  0301 07/19/22  0554    138   K 4.4 4.0   CO2 22 23   BUN 64* 75*   CREATININE 3.5* 3.6*   GLUCOSE 128* 118*     No results for input(s): BNP in the last 72 hours. No results for input(s): PROTIME, INR in the last 72 hours. No results for input(s): APTT in the last 72 hours. Recent Labs     07/18/22  0301   TROPONINI 0.01     Lab Results   Component Value Date/Time    HDL 64 11/27/2018 10:15 AM    LDLCALC 139 11/27/2018 10:15 AM    TRIG 77 11/27/2018 10:15 AM     Recent Labs     07/18/22  0301   AST 9*   ALT 10   LABALBU 3.8         EKG:   Sinus rhythm with poor R wave progression possible septal infarct    Chest X-Ray:  1.  Pulmonary vascular congestion with suggestion of perihilar edema,   potentially congestive heart failure given mild cardiomegaly and possible   trace bilateral pleural effusions. Pneumonia could appear similar. 2. Bibasilar airspace opacities most likely due atelectasis, pneumonia and   aspiration could appear similar       ECHO: 1/2022  Normal left ventricle size, wall thickness, and systolic function with EF 60-65%. No RWMA  The right ventricle is normal in size and function. Aortic sclerosis with no significant stenosis (peak velocity 2.1m/s and mean gradient of 12mmHg). Trivial mitral and tricuspid regurgitation. Stress Nuclear: 2014   1. Technically a suboptimal study. 2. Non diagonostic treadmill exercise stress portion of the study because of    resting ST abnormalities    3. A very small area of reversibility seen at the apex. However given    significant movement and extracardiac artifact adjacent to the apex this    study remains equivocal.    4. Gated Study shows normal LV size and Systolic function; EF is > 70%. ASSESSMENT AND PLAN:      68-year-old patient presenting with fatigue shortness of breath. no fever chills  Has advanced kidney disease, (creatinine of 3.5)   Her primary care physician had dropped her dose of her furosemide from twice a day to once a day. Anemia is also very likely to be contributing to her fatigue and shortness of breath    I am at a loss to comment about her volume status  Has been given Lasix 40 bid since yesterday  But urine output modest.  Accurate???  Wt is the same  Does not feel thirsty    Dyspnea was never her main complaint  ProBNP was just very modestly elevated  She had vascular congestion seen on CXR    Can perform Right Heart cath if Dr. Macio Quiroz thinks so  We can continue with present Rx. Anemia  Needs iron and erythropoitin?         Nilsa Sharma M.D  7/19/2022

## 2022-07-19 NOTE — PROGRESS NOTES
Patient with large BM, streaked with dark maroon. NP notified and Guaiac ordered and sent. Pending results. No additional concerns at this time.

## 2022-07-19 NOTE — ACP (ADVANCE CARE PLANNING)
Advance Care Planning     Advance Care Planning Activator (Inpatient)  Conversation Note      Date of ACP Conversation: 7/19/2022     Conversation Conducted with: Patient with Decision Making Capacity    ACP Activator: Nessa Arroyo RN    Health Care Decision Maker:     Current Designated Health Care Decision Maker:     Primary Decision Maker: Grace Brooks Child - [phone]    Secondary Decision Maker: Rey Hidalgo - [phone]    Secondary Decision Maker: Niecy Defelipe Child - [phone]    Care Preferences    Ventilation: \"If you were in your present state of health and suddenly became very ill and were unable to breathe on your own, what would your preference be about the use of a ventilator (breathing machine) if it were available to you? \"      Would the patient desire the use of ventilator (breathing machine)?: yes    \"If your health worsens and it becomes clear that your chance of recovery is unlikely, what would your preference be about the use of a ventilator (breathing machine) if it were available to you? \"     Would the patient desire the use of ventilator (breathing machine)?: No      Resuscitation  \"CPR works best to restart the heart when there is a sudden event, like a heart attack, in someone who is otherwise healthy. Unfortunately, CPR does not typically restart the heart for people who have serious health conditions or who are very sick. \"    \"In the event your heart stopped as a result of an underlying serious health condition, would you want attempts to be made to restart your heart (answer \"yes\" for attempt to resuscitate) or would you prefer a natural death (answer \"no\" for do not attempt to resuscitate)? \" yes       [] Yes   [] No   Educated Patient / Tk Mac regarding differences between Advance Directives and portable DNR orders.     Length of ACP Conversation in minutes:      Conversation Outcomes:  [x] ACP discussion completed  [] Existing advance directive reviewed with patient; no changes to patient's previously recorded wishes  [] New Advance Directive completed  [] Portable Do Not Rescitate prepared for Provider review and signature  [] POLST/POST/MOLST/MOST prepared for Provider review and signature      Follow-up plan:    [] Schedule follow-up conversation to continue planning  [] Referred individual to Provider for additional questions/concerns   [] Advised patient/agent/surrogate to review completed ACP document and update if needed with changes in condition, patient preferences or care setting  [] This note routed to one or more involved healthcare providers

## 2022-07-19 NOTE — CONS[place]
[facility] [place]      NAME:  Shelva Spurling  MEDICAL RECORD NUMBER:  [id number]  AGE: 74 y.o. GENDER: female  : 1955  TODAY'S DATE:  2022    Subjective:     VISIT TYPE: evaluation     ADMITTING PHYSICIAN:  Freddie Hoyt DO    PAST MEDICAL HISTORY        Diagnosis Date    Abnormal echocardiogram     25% on 3/11/14 and 50% on 3/19/14    Back pain     Cardiomyopathy (HCC)     EF was 50% on 3/19/14    Chipped tooth     lower left    Clostridium difficile diarrhea 2021    Dental crown present     veneers    Depressive disorder 2014    Diabetic infection of right foot ([address] 04/15/2015    Diabetic ulcer of toe of left foot associated with diabetes mellitus due to underlying condition, with fat layer exposed ([address] 2018    Diabetic ulcer of toe of left foot associated with type 2 diabetes mellitus, with fat layer exposed ([address] 04/10/2018    Pt slipped in hot tub latter part of February and has not healed since despite topical treatment    DVT (deep venous thrombosis) (HCC)     HTN (hypertension)     Hx of blood clots     left leg    Ischemic cardiomyopathy 04/15/2015    Kidney disease     CHRONIC STAGE 3    Meniere's disease     Mixed hyperlipidemia 2016    Nicotine abuse     NSTEMI (non-ST elevated myocardial infarction) ([address] 2014    ANGLE (obstructive sleep apnea)     Osteomyelitis of ankle or foot, acute, left ([address] 2018    Pneumonia     PONV (postoperative nausea and vomiting)     nausea    Spinal epidural abscess 2014    Type II diabetes mellitus, uncontrolled ([address] 2014       SOCIAL HISTORY    Social History     Tobacco Use    Smoking status: Former     Packs/day: 0.50     Years: 10.00     Pack years: 5.00     Types: Cigarettes     Quit date: 11/10/2013     Years since quittin.6    Smokeless tobacco: Never   Vaping Use    Vaping Use: Never used   Substance Use Topics    Alcohol use:  Yes     Alcohol/week: 0.0 standard drinks     Comment: occasionally    Drug use: No       ALLERGIES    Allergies   Allergen Reactions    Doxycycline Other (See Comments)     Yeast infection       MEDICATIONS  Scheduled Meds:   ferrous sulfate  324 mg Oral TID WC    furosemide  40 mg IntraVENous TID    [Held by provider] aspirin  81 mg Oral Daily    atorvastatin  80 mg Oral Daily    carvedilol  6.25 mg Oral BID WC    pantoprazole  40 mg Oral QPM    citalopram  40 mg Oral QPM    sodium chloride flush  5-40 mL IntraVENous 2 times per day    heparin (porcine)  5,000 Units SubCUTAneous 3 times per day    cefTRIAXone (ROCEPHIN) IV  2,000 mg IntraVENous Q24H    And    azithromycin  500 mg Oral Q24H    melatonin  4.5 mg Oral Nightly    insulin lispro  0-6 Units SubCUTAneous TID WC    insulin lispro  0-3 Units SubCUTAneous Nightly    gabapentin  200 mg Oral TID       ADMIT DATE: 7/18/2022      Objective:     ADMISSION DIAGNOSIS:   Acute respiratory failure (HCC) [J96.00]  Acute respiratory failure with hypoxia (HCC) [J96.01]  Pneumonia of both lungs due to infectious organism, unspecified part of lung [J18.9]     /65   Pulse 64   Temp 97.3 °F (36.3 °C) (Oral)   Resp 16   Ht 5' 3\" (1.6 m)   Wt 249 lb 1.9 oz (113 kg)   LMP 06/15/1999   SpO2 92%   BMI 44.13 kg/m²     ADMIT:  Weight: 249 lb 1.9 oz (113 kg)    TODAY: Weight: 249 lb 1.9 oz (113 kg)    Wt Readings from Last 10 Encounters:   07/19/22 249 lb 1.9 oz (113 kg)   06/21/22 235 lb (106.6 kg)   01/10/22 220 lb (99.8 kg)   01/05/22 230 lb 6.1 oz (104.5 kg)   09/02/21 227 lb 6.4 oz (103.1 kg)   07/15/21 212 lb 9.6 oz (96.4 kg)   05/28/21 213 lb (96.6 kg)   04/22/21 211 lb 9.6 oz (96 kg)   04/06/21 214 lb 6.4 oz (97.3 kg)   03/15/21 218 lb 4.1 oz (99 kg)          Intake/Output Summary (Last 24 hours) at 7/19/2022 1809  Last data filed at 7/19/2022 1708  Gross per 24 hour   Intake 550 ml   Output 600 ml   Net -50 ml       LABS  BMP:   Lab Results   Component Value Date/Time     07/19/2022 05:54 AM    K 4.0 07/19/2022 05:54 AM    K 4.4 07/18/2022 03:01 AM    CL 99 07/19/2022 05:54 AM    CO2 23 07/19/2022 05:54 AM    BUN 75 07/19/2022 05:54 AM    LABALBU 3.8 07/18/2022 03:01 AM    CREATININE 3.6 07/19/2022 05:54 AM    CALCIUM 8.7 07/19/2022 05:54 AM    GFRAA 15 07/19/2022 05:54 AM    LABGLOM 13 07/19/2022 05:54 AM    GLUCOSE 118 07/19/2022 05:54 AM     CBC:   Recent Labs     07/18/22  0301 07/19/22  0554   WBC 12.3* 8.7   HGB 8.7* 8.0*   HCT 27.0*  26.4* 23.5*   MCV 87.4 86.8    216     BNP: No results found for: BNP    ECHOCARDIOGRAM:      Summary   Normal left ventricle size, wall thickness, and systolic function with an   estimated ejection fraction of 60-65%. No regional wall motion abnormalities   are seen. The right ventricle is normal in size and function. Aortic sclerosis with no significant stenosis (peak velocity 2.1m/s and mean   gradient of 12mmHg). Trivial mitral and tricuspid regurgitation. Signature      ------------------------------------------------------------------   Electronically signed by Radha Rizo MD   (Interpreting physician) on 01/04/2022 at 03:31 PM    Assessment:     CONSULTS:   [id number] NURSE/COORDINATOR  IP CONSULT TO DIETITIAN  IP CONSULT TO NEPHROLOGY  IP CONSULT TO SPIRITUAL SERVICES  IP CONSULT TO SOCIAL WORK  IP CONSULT TO CARDIOLOGY    Patient has a CARDIOLOGY CONSULT: Yes      Patient taking an ACEI/ARB:  no( corinne)     Patient taking a BETA BLOCKER:  Yes    SCALE AVAILABLE:  Yes     EDUCATION STATUS: Patient   [x]  Provided both written and verbal education on Heart Failure signs/symptoms. [x]  Provided instructions on daily medications. [x]  Provided instructions to monitor and record weight daily. [x]  Provided instructions to call if weight increases 3 lbs in one day or 5 lbs in one week. [x]  Received verbal acknowledgment/understanding of Heart Failure related causes.   [x]  Provided instructions on how to maintain a low sodium diet. [x]  Provided recommendations for smoking cessation programs  []  Provided recommendations on activity and exercise    [x]  Other:   I met with Kati in her room, she was pleasant and engaged, and somewhat overwhelmed with all of the information coming her way. I introduced myself and my role in heart failure education. She was agreeable to spend time with me. She had many questions regarding the [address] that Dr. Daniel Moran had mentioned he plans on doing later in the week. I was able to answer those questions for her, and she seemed more at ease. We covered all of the above topics. Kati had been getting along fairly well with all of her chronic conditions, however, her PCP recently changed her lasix from twice a day to once a day. That along with poor diet/fluid choices got her into some trouble, and she came to the ER. She does have a scale, but she had not been weighing herself. She had weight loss surgery in the past, and had put a lot of weight back on, so she is frustrated with herself over that. I urged her to forgive herself, and move forward--that a daily weight is as important as her medications. We talked about the zones of heart failure, and recognizing the early s/s, and whom to call. She understands that she she contact either her cardiologist, or her nephrologist--they are in communication with each other, and work as a team to preserve her kidney function. Kati lives alone, having been  in her 42's, but has good family support. Her sister is a retired cardiac RN from the [place], and her children are near by. She seems to understand that she needs to be more meticulous with her diet, and fluid restrictions. She admits to drinking several sodas a day-so she will try to cut back-I suggested using the small can so she can satisfy her cravings. She understands we will arrange a follow up appointment for her within 7 days of her discharge.  She may benefit from additional support of the outpatient wellness center, so I will refer her to them at discharge. CURRENT DIET: ADULT DIET; Regular; 4 carb choices (60 gm/meal); Low Sodium (2 gm); 1500 ml    EDUCATIONAL PACKETS PROVIDED- [facility] Heart Failure Booklet,     Titles and material given:  Yes   [x]  What is Heart Failure? [x]  Heart Failure: Warning Signs of a Flare-Up  [x]  Heart Failure: Making Changes to Your Diet  [x]  Heart Failure: Medications to Help Your Heart   [x]  Other: [employer] Nutrition Guide    PATIENT/CAREGIVER TEACHING:    Level of patient/caregiver understanding able to:   [x] Verbalize understanding   [x] Demonstrate understanding       [x] Teach back        [x] Needs reinforcement     []  Other:      TEACHING TIME:  40 minutes       Plan:       DISCHARGE PLAN:  Placement for patient upon discharge: home with support   Hospice Care:  no  Code Status: Full Code  Discharge appointment scheduled: Yes     RECOMMENDATIONS:   [x]  Encourage to call Heart Failure Resource Line with any questions or concerns. [x]  Educate further Ms. Jurado's on fluid restriction 48 oz- 64 oz during inpatient stay so she can understand how to measure intake at home. [x]  Continue to educate on S/S of Heart Failure.   [x]  Emphasize daily weights, diet, and if changes, to call Heart Failure Resource Line  [x]  Other:     [facility] referral  Cardiac Rehab declined       Electronically signed by Tejinder Sandoval, RN, BSN  on 7/19/2022 at 6:09 PM

## 2022-07-19 NOTE — CARE COORDINATION
INITIAL CASE MANAGEMENT ASSESSMENT    Met with patient to assess possible discharge needs. Explained Case Management role/services. Living Situation: Patient lives alone in an apartment on the second floor of a 4 family building. 5 steps to enter the building and 13 steps once in the building, Bilateral hand rails present. ADLs: Independent     DME: cane, shower chair, grab bars     PT/OT Recs: Continue to assess pending progress. Patient would benefit from continued therapy after discharge. Therapy recommended at discharge. Active Services: No active services in the home. Grand-daughter help when needed. Transportation: Drives self when able, family drives at other times. Medications: Uses [facility] on [facility]    PCP: Dr Darold Meigs      PLAN/COMMENTS: Patient plans to return home alone at this time. Therapy recommended at [place]. Home Health List given to patient. Provided contact information for patient or family to call with any questions. Will follow and assist as needed.

## 2022-07-19 NOTE — PROGRESS NOTES
Patient oriented to plan, assisted up to bathroom, with complaints of additional swelling to face. Safety measures taken as charted. Tolerating PO intake. Continuing to monitor.

## 2022-07-19 NOTE — PLAN OF CARE
Problem: Discharge Planning  Goal: Discharge to home or other facility with appropriate resources  7/19/2022 0033 by Sondra Ayala RN  Outcome: Progressing Towards Goal     Problem: Pain  Goal: Verbalizes/displays adequate comfort level or baseline comfort level  7/19/2022 0033 by Sondra Ayala RN  Outcome: Progressing Towards Goal     Problem: Safety - Adult  Goal: Free from fall injury  7/19/2022 0033 by Sondra Ayala RN  Outcome: Progressing Towards Goal  Flowsheets (Taken 7/18/2022 2000)  Free From Fall Injury: Instruct family/caregiver on patient safety     Problem: ABCDS Injury Assessment  Goal: Absence of physical injury  7/19/2022 0033 by Sondra Ayala RN  Outcome: Progressing Towards Goal  Flowsheets (Taken 7/18/2022 2000)  Absence of Physical Injury: Implement safety measures based on patient assessment     Problem: Chronic Conditions and Co-morbidities  Goal: Patient's chronic conditions and co-morbidity symptoms are monitored and maintained or improved  Outcome: Progressing Towards Goal     Problem: Respiratory - Adult  Goal: Achieves optimal ventilation and oxygenation  Outcome: Progressing Towards Goal     Problem: Cardiovascular - Adult  Goal: Maintains optimal cardiac output and hemodynamic stability  Outcome: Progressing Towards Goal     Problem: Infection - Adult  Goal: Absence of infection at discharge  Outcome: Progressing Towards Goal     Problem: Metabolic/Fluid and Electrolytes - Adult  Goal: Electrolytes maintained within normal limits  Outcome: Progressing Towards Goal     Problem: Hematologic - Adult  Goal: Maintains hematologic stability  Outcome: Progressing Towards Goal     Continue with plan of care.

## 2022-07-19 NOTE — ACP (ADVANCE CARE PLANNING)
reviewed with patient; no changes to patient's previously recorded wishes  [] New Advance Directive completed  [] Portable Do Not Rescitate prepared for Provider review and signature  [] POLST/POST/MOLST/MOST prepared for Provider review and signature      Follow-up plan:    [] Schedule follow-up conversation to continue planning  [] Referred individual to Provider for additional questions/concerns   [] Advised patient/agent/surrogate to review completed ACP document and update if needed with changes in condition, patient preferences or care setting  [] This note routed to one or more involved healthcare providers

## 2022-07-19 NOTE — PROGRESS NOTES
1.014   BLOODU Negative   PHUR 5.0   PROTEINU 30*   UROBILINOGEN 0.2   NITRU Negative   LEUKOCYTESUR Negative   LABMICR YES   URINETYPE NotGiven      Urine Microscopic:   Recent Labs     07/18/22  0645   BACTERIA None Seen   HYALCAST 6   WBCUA 1   RBCUA 0   EPIU 3     Urine Chemistry: No results for input(s): CLUR, LABCREA, PROTEINUR, NAUR in the last 72 hours. Objective:     Vitals: /65   Pulse 69   Temp 98.1 °F (36.7 °C) (Oral)   Resp 18   Ht 5' 3\" (1.6 m)   Wt 249 lb 1.9 oz (113 kg)   LMP 06/15/1999   SpO2 91%   BMI 44.13 kg/m²    Wt Readings from Last 3 Encounters:   07/19/22 249 lb 1.9 oz (113 kg)   06/21/22 235 lb (106.6 kg)   01/10/22 220 lb (99.8 kg)      24HR INTAKE/OUTPUT:    Intake/Output Summary (Last 24 hours) at 7/19/2022 2696  Last data filed at 7/18/2022 2114  Gross per 24 hour   Intake 340 ml   Output 800 ml   Net -460 ml   + UM    Constitutional:  awake, NAD  HEENT:  MMM, No icterus  Neck: no bruits, No JVD  Cardiovascular:  reg rhythm  Respiratory: Diminished at bases   Abdomen:  +BS, soft, NT, ND  Ext: + lower extremity edema    CNS: alert, no agitation    IMAGING:  VL Extremity Venous Bilateral   Final Result      CT CHEST WO CONTRAST   Final Result   1. Interstitial pulmonary edema with suspected peribronchovascular alveolar   edema, suggesting congestive heart failure given mild cardiomegaly and trace   bilateral pleural effusions. Interstitial pneumonia could appear similar. 2. Patchy peribronchovascular groundglass opacities most prominent near the   lung bases are most likely alveolar edema, although pneumonia could appear   similar. 3. Mild bronchial wall thickening potentially due to pulmonary vascular   congestion, reactive airways disease, or bronchitis. 4. At least minimal anasarca. XR CHEST PORTABLE   Final Result   1.  Pulmonary vascular congestion with suggestion of perihilar edema,   potentially congestive heart failure given mild cardiomegaly and possible   trace bilateral pleural effusions. Pneumonia could appear similar. 2. Bibasilar airspace opacities most likely due atelectasis, pneumonia and   aspiration could appear similar. Assessment :     1. NEHA on CKD 3b/4  -Non-Oliguric  -Baseline creat: 1.8-1.9 Now 3.5  -UA: prot ++. UPC 2 in past  -Volume: Hypervolemic   -Electrolytes: No Dyskalemia  -Acid-Base: AGMA    Recent Labs     07/18/22  0301 07/19/22  0554   BUN 64* 75*   CREATININE 3.5* 3.6*     Recent Labs     07/18/22  0301 07/19/22  0554    138   K 4.4 4.0   CO2 22 23   MG  --  2.10         2. HTN  -Blood pressure at goal     BP Readings from Last 1 Encounters:   07/19/22 131/65       3. DM    4. CHF  - TTE (2022): LVEF 60-65%, PASP 36    Plan:     - Diuresis based on vol status : continue iv lasix. Monitor BUN  - daily weight  - Antimicrobials per primary team   - Monitor BMP    -Monitor I/O, UOP  -Maintain MAP>65  -Avoid nephrotoxin, if able. -Dose meds to current eGFR      Thank you for allowing us to participate in care of [facility] . We will continue to follow. Feel free to contact me with any questions.       Mathias Brunner, MD  7/19/2022    [facility] [address]  Office : [phone]  Fax :[phone]

## 2022-07-19 NOTE — PLAN OF CARE
Problem: Discharge Planning  Goal: Discharge to home or other facility with appropriate resources  7/19/2022 1357 by Lilian Min RN  Outcome: Progressing Towards Goal  Flowsheets (Taken 7/18/2022 0630 by Amberly Marc RN)  Discharge to home or other facility with appropriate resources:   Identify barriers to discharge with patient and caregiver   Identify discharge learning needs (meds, wound care, etc)  7/19/2022 0033 by Amberly Marc RN  Outcome: Progressing Towards Goal     Problem: Pain  Goal: Verbalizes/displays adequate comfort level or baseline comfort level  7/19/2022 1357 by Lilian Min RN  Outcome: Progressing Towards Goal  Flowsheets (Taken 7/19/2022 1357)  Verbalizes/displays adequate comfort level or baseline comfort level: Encourage patient to monitor pain and request assistance  7/19/2022 0033 by Amberly Marc RN  Outcome: Progressing Towards Goal     Problem: Safety - Adult  Goal: Free from fall injury  7/19/2022 1357 by Lilian Min RN  Outcome: Progressing Towards Goal  Flowsheets (Taken 7/18/2022 2000 by Amberly Marc RN)  Free From Fall Injury: Instruct family/caregiver on patient safety  7/19/2022 0033 by Amberly Marc RN  Outcome: Progressing Towards Goal  Flowsheets (Taken 7/18/2022 2000)  Free From Fall Injury: Instruct family/caregiver on patient safety     Problem: ABCDS Injury Assessment  Goal: Absence of physical injury  7/19/2022 1357 by Lilian Min RN  Outcome: Progressing Towards Goal  Flowsheets (Taken 7/18/2022 2000 by Amberly Marc RN)  Absence of Physical Injury: Implement safety measures based on patient assessment  7/19/2022 0033 by Amberly Marc RN  Outcome: Progressing Towards Goal  Flowsheets (Taken 7/18/2022 2000)  Absence of Physical Injury: Implement safety measures based on patient assessment     Problem: Skin/Tissue Integrity  Goal: Absence of new skin breakdown  Description: 1. Monitor for areas of redness and/or skin breakdown  2.   Assess vascular access sites hourly  3. Every 4-6 hours minimum:  Change oxygen saturation probe site  4. Every 4-6 hours:  If on nasal continuous positive airway pressure, respiratory therapy assess nares and determine need for appliance change or resting period.   7/19/2022 1357 by Ortiz Bacon RN  Outcome: Progressing Towards Goal  7/19/2022 0033 by Joe Shah RN  Outcome: Progressing Towards Goal     Problem: Chronic Conditions and Co-morbidities  Goal: Patient's chronic conditions and co-morbidity symptoms are monitored and maintained or improved  7/19/2022 1357 by Ortiz Bacon RN  Outcome: Progressing Towards Goal  Flowsheets (Taken 7/19/2022 1357)  Care Plan - Patient's Chronic Conditions and Co-Morbidity Symptoms are Monitored and Maintained or Improved: Monitor and assess patient's chronic conditions and comorbid symptoms for stability, deterioration, or improvement  7/19/2022 0033 by Joe Shah RN  Outcome: Progressing Towards Goal     Problem: Cardiovascular - Adult  Goal: Maintains optimal cardiac output and hemodynamic stability  7/19/2022 1357 by Ortiz Bacon RN  Outcome: Progressing Towards Goal  7/19/2022 0033 by Joe Shah RN  Outcome: Progressing Towards Goal     Problem: Metabolic/Fluid and Electrolytes - Adult  Goal: Electrolytes maintained within normal limits  7/19/2022 1357 by Ortiz Bacon RN  Outcome: Progressing Towards Goal  7/19/2022 0033 by Joe Shah RN  Outcome: Progressing Towards Goal     Problem: Hematologic - Adult  Goal: Maintains hematologic stability  7/19/2022 1357 by Ortiz Bacon RN  Outcome: Progressing Towards Goal  7/19/2022 0033 by Joe Shah RN  Outcome: Progressing Towards Goal

## 2022-07-19 NOTE — ACP (ADVANCE CARE PLANNING)
Advance Care Planning     Advance Care Planning Inpatient Note  [facility] Department    Today's Date: 7/19/2022  Unit: [facility]TH[facility]    Received request from IDT Member. Upon review of chart and communication with care team, patient's decision making abilities are not in question. . Patient was/were present in the room during visit. Goals of ACP Conversation:  Discuss advance care planning documents    Health Care Decision Makers:       Primary Decision MakerFarrukh Dejesus Child - [phone]    Secondary Decision Maker: skinny moon - Child - [phone]  Summary:  Completed [facility]    Advance Care Planning Documents (Patient Wishes):  Healthcare Power of /Advance Directive Appointment of Health Care Agent  Living Will/Advance Directive     Assessment:  Patient tired but able to complete advance directives today. Patient identified her two children as her healthcare decision makers, daughter Adrianna Ellington as primary and son Larry Coats as secondary. Patient is [employer] but has not been practicing; she is considering asking  to do anointing of the sick. Interventions:  Provided education on documents for clarity and greater understanding  Discussed and provided education on state decision maker hierarchy  Assisted in the completion of documents according to patient's wishes at this time  Encouraged ongoing ACP conversation with future decision makers and loved ones    Care Preferences Communicated:   No    Outcomes/Plan:  ACP Discussion: Completed  New advance directive completed. Returned original document(s) to patient, as well as copies for distribution to appointed agents  Copy of advance directive given to staff to scan into medical record.     Electronically signed by Larry [facility] on 7/19/2022 at 11:39 AM

## 2022-07-20 ENCOUNTER — APPOINTMENT (OUTPATIENT)
Dept: GENERAL RADIOLOGY | Age: 67
DRG: 871 | End: 2022-07-20
Payer: MEDICARE

## 2022-07-20 LAB
ANION GAP SERPL CALCULATED.3IONS-SCNC: 13 MMOL/L (ref 3–16)
BASOPHILS ABSOLUTE: 0 K/UL (ref 0–0.2)
BASOPHILS RELATIVE PERCENT: 0.5 %
BUN BLDV-MCNC: 80 MG/DL (ref 7–20)
C-REACTIVE PROTEIN: 149 MG/L (ref 0–5.1)
CALCIUM SERPL-MCNC: 8.9 MG/DL (ref 8.3–10.6)
CHLORIDE BLD-SCNC: 99 MMOL/L (ref 99–110)
CO2: 22 MMOL/L (ref 21–32)
CREAT SERPL-MCNC: 4.1 MG/DL (ref 0.6–1.2)
CREATININE URINE: 101.2 MG/DL (ref 28–259)
EOSINOPHILS ABSOLUTE: 0.1 K/UL (ref 0–0.6)
EOSINOPHILS RELATIVE PERCENT: 2 %
GFR AFRICAN AMERICAN: 13
GFR NON-AFRICAN AMERICAN: 11
GLUCOSE BLD-MCNC: 144 MG/DL (ref 70–99)
GLUCOSE BLD-MCNC: 159 MG/DL (ref 70–99)
GLUCOSE BLD-MCNC: 195 MG/DL (ref 70–99)
GLUCOSE BLD-MCNC: 203 MG/DL (ref 70–99)
GLUCOSE BLD-MCNC: 292 MG/DL (ref 70–99)
HCT VFR BLD CALC: 22.4 % (ref 36–48)
HEMOGLOBIN: 7.5 G/DL (ref 12–16)
LYMPHOCYTES ABSOLUTE: 0.8 K/UL (ref 1–5.1)
LYMPHOCYTES RELATIVE PERCENT: 10.5 %
MAGNESIUM: 2.3 MG/DL (ref 1.8–2.4)
MCH RBC QN AUTO: 29.6 PG (ref 26–34)
MCHC RBC AUTO-ENTMCNC: 33.6 G/DL (ref 31–36)
MCV RBC AUTO: 88.2 FL (ref 80–100)
MONOCYTES ABSOLUTE: 0.7 K/UL (ref 0–1.3)
MONOCYTES RELATIVE PERCENT: 9.7 %
NEUTROPHILS ABSOLUTE: 5.9 K/UL (ref 1.7–7.7)
NEUTROPHILS RELATIVE PERCENT: 77.3 %
PDW BLD-RTO: 15.2 % (ref 12.4–15.4)
PERFORMED ON: ABNORMAL
PLATELET # BLD: 218 K/UL (ref 135–450)
PMV BLD AUTO: 10.2 FL (ref 5–10.5)
POTASSIUM SERPL-SCNC: 4.3 MMOL/L (ref 3.5–5.1)
PRO-BNP: 2170 PG/ML (ref 0–124)
PROCALCITONIN: 0.21 NG/ML (ref 0–0.15)
RBC # BLD: 2.54 M/UL (ref 4–5.2)
SODIUM BLD-SCNC: 134 MMOL/L (ref 136–145)
UREA NITROGEN, UR: 355 MG/DL (ref 800–1666)
WBC # BLD: 7.6 K/UL (ref 4–11)

## 2022-07-20 PROCEDURE — 94669 MECHANICAL CHEST WALL OSCILL: CPT

## 2022-07-20 PROCEDURE — 85025 COMPLETE CBC W/AUTO DIFF WBC: CPT

## 2022-07-20 PROCEDURE — 83735 ASSAY OF MAGNESIUM: CPT

## 2022-07-20 PROCEDURE — 1200000000 HC SEMI PRIVATE

## 2022-07-20 PROCEDURE — 99233 SBSQ HOSP IP/OBS HIGH 50: CPT | Performed by: HOSPITALIST

## 2022-07-20 PROCEDURE — 2700000000 HC OXYGEN THERAPY PER DAY

## 2022-07-20 PROCEDURE — 71045 X-RAY EXAM CHEST 1 VIEW: CPT

## 2022-07-20 PROCEDURE — 6360000002 HC RX W HCPCS: Performed by: HOSPITALIST

## 2022-07-20 PROCEDURE — 36415 COLL VENOUS BLD VENIPUNCTURE: CPT

## 2022-07-20 PROCEDURE — 94761 N-INVAS EAR/PLS OXIMETRY MLT: CPT

## 2022-07-20 PROCEDURE — 94640 AIRWAY INHALATION TREATMENT: CPT

## 2022-07-20 PROCEDURE — 6370000000 HC RX 637 (ALT 250 FOR IP): Performed by: NURSE PRACTITIONER

## 2022-07-20 PROCEDURE — 2580000003 HC RX 258: Performed by: STUDENT IN AN ORGANIZED HEALTH CARE EDUCATION/TRAINING PROGRAM

## 2022-07-20 PROCEDURE — 84540 ASSAY OF URINE/UREA-N: CPT

## 2022-07-20 PROCEDURE — 6360000002 HC RX W HCPCS: Performed by: INTERNAL MEDICINE

## 2022-07-20 PROCEDURE — 83880 ASSAY OF NATRIURETIC PEPTIDE: CPT

## 2022-07-20 PROCEDURE — 84145 PROCALCITONIN (PCT): CPT

## 2022-07-20 PROCEDURE — 97535 SELF CARE MNGMENT TRAINING: CPT

## 2022-07-20 PROCEDURE — 2580000003 HC RX 258: Performed by: HOSPITALIST

## 2022-07-20 PROCEDURE — 82570 ASSAY OF URINE CREATININE: CPT

## 2022-07-20 PROCEDURE — 80048 BASIC METABOLIC PNL TOTAL CA: CPT

## 2022-07-20 PROCEDURE — 6360000002 HC RX W HCPCS: Performed by: STUDENT IN AN ORGANIZED HEALTH CARE EDUCATION/TRAINING PROGRAM

## 2022-07-20 PROCEDURE — 97530 THERAPEUTIC ACTIVITIES: CPT

## 2022-07-20 PROCEDURE — 6370000000 HC RX 637 (ALT 250 FOR IP): Performed by: STUDENT IN AN ORGANIZED HEALTH CARE EDUCATION/TRAINING PROGRAM

## 2022-07-20 PROCEDURE — 86140 C-REACTIVE PROTEIN: CPT

## 2022-07-20 PROCEDURE — 99223 1ST HOSP IP/OBS HIGH 75: CPT | Performed by: INTERNAL MEDICINE

## 2022-07-20 RX ORDER — GUAIFENESIN 600 MG/1
600 TABLET, EXTENDED RELEASE ORAL 2 TIMES DAILY
Status: DISCONTINUED | OUTPATIENT
Start: 2022-07-20 | End: 2022-07-24 | Stop reason: HOSPADM

## 2022-07-20 RX ORDER — ALBUTEROL SULFATE 2.5 MG/3ML
2.5 SOLUTION RESPIRATORY (INHALATION) EVERY 4 HOURS PRN
Status: DISCONTINUED | OUTPATIENT
Start: 2022-07-20 | End: 2022-07-24 | Stop reason: HOSPADM

## 2022-07-20 RX ORDER — IPRATROPIUM BROMIDE AND ALBUTEROL SULFATE 2.5; .5 MG/3ML; MG/3ML
1 SOLUTION RESPIRATORY (INHALATION)
Status: DISCONTINUED | OUTPATIENT
Start: 2022-07-20 | End: 2022-07-23

## 2022-07-20 RX ORDER — METHYLPREDNISOLONE SODIUM SUCCINATE 40 MG/ML
40 INJECTION, POWDER, LYOPHILIZED, FOR SOLUTION INTRAMUSCULAR; INTRAVENOUS DAILY
Status: DISCONTINUED | OUTPATIENT
Start: 2022-07-20 | End: 2022-07-23

## 2022-07-20 RX ADMIN — HEPARIN SODIUM 5000 UNITS: 5000 INJECTION INTRAVENOUS; SUBCUTANEOUS at 06:14

## 2022-07-20 RX ADMIN — INSULIN LISPRO 2 UNITS: 100 INJECTION, SOLUTION INTRAVENOUS; SUBCUTANEOUS at 21:45

## 2022-07-20 RX ADMIN — ATORVASTATIN CALCIUM 80 MG: 80 TABLET, FILM COATED ORAL at 09:41

## 2022-07-20 RX ADMIN — CEFTRIAXONE 2000 MG: 2 INJECTION, POWDER, FOR SOLUTION INTRAMUSCULAR; INTRAVENOUS at 09:44

## 2022-07-20 RX ADMIN — IPRATROPIUM BROMIDE AND ALBUTEROL SULFATE 1 AMPULE: .5; 3 SOLUTION RESPIRATORY (INHALATION) at 16:18

## 2022-07-20 RX ADMIN — METHYLPREDNISOLONE SODIUM SUCCINATE 40 MG: 40 INJECTION, POWDER, FOR SOLUTION INTRAMUSCULAR; INTRAVENOUS at 11:52

## 2022-07-20 RX ADMIN — CARVEDILOL 6.25 MG: 6.25 TABLET, FILM COATED ORAL at 17:13

## 2022-07-20 RX ADMIN — INSULIN LISPRO 2 UNITS: 100 INJECTION, SOLUTION INTRAVENOUS; SUBCUTANEOUS at 11:51

## 2022-07-20 RX ADMIN — FUROSEMIDE 5 MG/HR: 10 INJECTION, SOLUTION INTRAMUSCULAR; INTRAVENOUS at 14:25

## 2022-07-20 RX ADMIN — PANTOPRAZOLE SODIUM 40 MG: 40 TABLET, DELAYED RELEASE ORAL at 17:12

## 2022-07-20 RX ADMIN — INSULIN LISPRO 1 UNITS: 100 INJECTION, SOLUTION INTRAVENOUS; SUBCUTANEOUS at 17:21

## 2022-07-20 RX ADMIN — IPRATROPIUM BROMIDE AND ALBUTEROL SULFATE 1 AMPULE: .5; 3 SOLUTION RESPIRATORY (INHALATION) at 12:02

## 2022-07-20 RX ADMIN — GUAIFENESIN 600 MG: 600 TABLET ORAL at 09:53

## 2022-07-20 RX ADMIN — CARVEDILOL 6.25 MG: 6.25 TABLET, FILM COATED ORAL at 09:41

## 2022-07-20 RX ADMIN — INSULIN LISPRO 1 UNITS: 100 INJECTION, SOLUTION INTRAVENOUS; SUBCUTANEOUS at 09:41

## 2022-07-20 RX ADMIN — SODIUM CHLORIDE, PRESERVATIVE FREE 10 ML: 5 INJECTION INTRAVENOUS at 09:43

## 2022-07-20 RX ADMIN — Medication 4.5 MG: at 21:46

## 2022-07-20 RX ADMIN — HEPARIN SODIUM 5000 UNITS: 5000 INJECTION INTRAVENOUS; SUBCUTANEOUS at 21:44

## 2022-07-20 RX ADMIN — FERROUS SULFATE TAB EC 324 MG (65 MG FE EQUIVALENT) 324 MG: 324 (65 FE) TABLET DELAYED RESPONSE at 17:13

## 2022-07-20 RX ADMIN — IPRATROPIUM BROMIDE AND ALBUTEROL SULFATE 1 AMPULE: .5; 3 SOLUTION RESPIRATORY (INHALATION) at 20:37

## 2022-07-20 RX ADMIN — GABAPENTIN 200 MG: 100 CAPSULE ORAL at 17:12

## 2022-07-20 RX ADMIN — CHLOROTHIAZIDE SODIUM 250 MG: 500 INJECTION, POWDER, LYOPHILIZED, FOR SOLUTION INTRAVENOUS at 13:51

## 2022-07-20 RX ADMIN — HEPARIN SODIUM 5000 UNITS: 5000 INJECTION INTRAVENOUS; SUBCUTANEOUS at 17:12

## 2022-07-20 RX ADMIN — FERROUS SULFATE TAB EC 324 MG (65 MG FE EQUIVALENT) 324 MG: 324 (65 FE) TABLET DELAYED RESPONSE at 11:52

## 2022-07-20 RX ADMIN — FERROUS SULFATE TAB EC 324 MG (65 MG FE EQUIVALENT) 324 MG: 324 (65 FE) TABLET DELAYED RESPONSE at 09:41

## 2022-07-20 RX ADMIN — SODIUM CHLORIDE, PRESERVATIVE FREE 10 ML: 5 INJECTION INTRAVENOUS at 21:54

## 2022-07-20 RX ADMIN — GUAIFENESIN 600 MG: 600 TABLET ORAL at 21:46

## 2022-07-20 RX ADMIN — GABAPENTIN 200 MG: 100 CAPSULE ORAL at 09:41

## 2022-07-20 RX ADMIN — AZITHROMYCIN MONOHYDRATE 500 MG: 500 TABLET ORAL at 09:40

## 2022-07-20 RX ADMIN — CITALOPRAM HYDROBROMIDE 40 MG: 20 TABLET ORAL at 17:12

## 2022-07-20 RX ADMIN — GABAPENTIN 200 MG: 100 CAPSULE ORAL at 21:46

## 2022-07-20 NOTE — PROGRESS NOTES
Physician Progress Note      Sangita Thao  CSN #:                  [id number]  :                       1955  ADMIT DATE:       2022 2:26 AM  DISCH DATE:  RESPONDING  PROVIDER #:        Kalani Velez CNP          QUERY TEXT:    Dear COLIN Escobedo,  Patient admitted with sepsis, possible pneumonia, acute resp failure with   hypoxia, NEHA on CKD and acute CHF (later noted as acute on chronic diastolic). Documentation reflects Sepsis in H and P note dated  and Leukocytosis,   Neutrophilia, NEHA, hypoxia and Temp up to 100.4. If possible, please document   in the progress notes and discharge summary if Sepsis was: The medical record reflects the following:  Risk Factors: Hx DM, ANGLE, Cardiomyopathy, CKD  Clinical Indicators:  ED for SOB, EMS report 68% sat on RA, progressively   worsening SOB over past few days, does not use home O2, Temp=.4,   WBC=12.3, Neutrophils=10, creat=3.5, CXR with possible pneumonia, H and P   notes Sepsis,  Procal=0.21,  Treatment: O2 NRBM and later 5L to maintain 90% sat,  Rocephin, Zithromax  Thank you,  Jef Faust RN, [email or web address]  Options provided:  -- Sepsis confirmed after study  -- Sepsis treated and resolved  -- Sepsis ruled out after study  -- Other - I will add my own diagnosis  -- Disagree - Not applicable / Not valid  -- Disagree - Clinically unable to determine / Unknown  -- Refer to Clinical Documentation Reviewer    PROVIDER RESPONSE TEXT:    Sepsis confirmed after study. Query created by:  [facility] on 2022 1:22 PM      Electronically signed by:  Kalani Velez CNP 2022 3:10 PM

## 2022-07-20 NOTE — PROGRESS NOTES
Pt sleeping but easily arousable. She is still on 12L HFNC. Throughout the night she removed the canula several times in her sleep and because of this there was no chance to wean her down. She has been compliant with waiting for help to walk to the  and is currently in her bed asleep, call light within reach, bed alarm and no further complaints.

## 2022-07-20 NOTE — PROGRESS NOTES
Patient currently on 12L HFNC, oxygen sitting at 98%. Oxygen turned down to 7L HFNC, after a few minutes patient maintained oxygen at 94-96%. IS remains at bedside, instructed patient to use 10x an hour while awake, patient verbalized understanding. Patient denies further needs at this time. Fall precautions in place, wheels on chair are locked and alarm is on, call light within reach.

## 2022-07-20 NOTE — CONSULTS
Pulmonary Consult Note     Patient's name: Gopi Willis  Medical Record Number: [id number]  Patient's account/billing number: [de-identified]  Patient's YOB: 1955  Age: 74 y.o. Date of Admission: 7/18/2022  2:26 AM  Date of Consult: 7/20/2022      Primary Care Physician: Pedro Zavala MD      Code Status: Full Code    Reason for consult: acute respiratory failure with hypoxia     Assessment and Plan     Acute respiratory failure with hypoxia   Acute on chronic diastolic CHF  CAP  NEHA on CKD  Anemia of chr disease. Plan:  CAP, ? Viral, covid and flu negative. Diuresis per nephrology. On CAP coverage continue x 7 days  Check PCT, CRP and BNP  Use bipap prn increase wob. Aggressive IS, scheduled bronchodilators   Titrate O2 to keep sat > 90%      HISTORY OF PRESENT ILLNESS:   /Ms. Gopi Willis is a 74 y.o.   lady with PMH stated below significant for former smoker, CKDIII, Chronic diastolic CHF, DMII, and h/o DVT, presented with increasing sob, and fatigue, no fever or chills, no cough or sputum, no chest pain,   LE edema, stated that her diuretics dose was lowered by pcp recently.     CT chest with bibasilar infiltrate patchy GGO,     Flu and covid negative       Past Medical History:        Diagnosis Date    Abnormal echocardiogram     25% on 3/11/14 and 50% on 3/19/14    Back pain     Cardiomyopathy (HCC)     EF was 50% on 3/19/14    Chipped tooth     lower left    Clostridium difficile diarrhea 03/12/2021    Dental crown present     veneers    Depressive disorder 08/13/2014    Diabetic infection of right foot ([address] 04/15/2015    Diabetic ulcer of toe of left foot associated with diabetes mellitus due to underlying condition, with fat layer exposed ([address] 11/9/2018    Diabetic ulcer of toe of left foot associated with type 2 diabetes mellitus, with fat layer exposed ([address] 04/10/2018    Pt slipped in hot tub latter part of February and has not healed since despite topical treatment    DVT (deep venous thrombosis) ([facility])     HTN (hypertension)     Hx of blood clots 2016    left leg    Ischemic cardiomyopathy 04/15/2015    Kidney disease     CHRONIC STAGE 3    Meniere's disease     Mixed hyperlipidemia 03/07/2016    Nicotine abuse     NSTEMI (non-ST elevated myocardial infarction) ([facility] 03/13/2014    ANGLE (obstructive sleep apnea)     Osteomyelitis of ankle or foot, acute, left ([facility] 06/04/2018    Pneumonia     PONV (postoperative nausea and vomiting)     nausea    Spinal epidural abscess 03/13/2014    Type II diabetes mellitus, uncontrolled ([facility] 08/13/2014       Past Surgical History:        Procedure Laterality Date    BACK SURGERY  3/2014    DEBRIDEMENT  3/12/14    extensive debridement of bone/muscle and paraspinal empyema    DILATION AND CURETTAGE OF UTERUS      ENDOSCOPY, COLON, DIAGNOSTIC      FOOT DEBRIDEMENT Left 9/8/2020    LEFT FOOT DEBRIDEMENT INCISION AND DRAINAGE performed by Jc Winn DPM at [address] TOE SURGERY Left 8/28/2020    ON THE LEFT: [address], WOUND CLOSURE, FLEXOR TENOTOMY 4TH AND 5TH DIGITS, TENOTOMY AND CAPSULOTOMY 2ND DIGIT performed by Jc Winn DPM at [address] [facility])  6/15/1999    complete-think right ovary in.     NASAL SEPTUM SURGERY      NERVE SURGERY Left 9/10/2020    LEFT FOOT INCISION AND DRAINAGE, EXTENSOR HALLUCIS LONGUS REPAIR WITH DELAYED PRIMARY CLOSURE performed by Jc Winn DPM at [address] shut behind right ear    PRESSURE ULCER DEBRIDEMENT Left 10/23/2020    ON THE LEFT: SURGICAL PREPARATION OF WOUND BED, APPLICATION OF DERMAL GRAFT SUBSTITUTE performed by Jc Winn DPM at [facility]  04/02/2019    ROBOTIC ASSISTED LAPAROSCOPIC SLEEVE GASTRECTOMY, LAPAROSCOPIC REDUCIBLE INCISIONAL HERNIA REPAIR     SLEEVE GASTRECTOMY N/A 4/2/2019    ROBOTIC ASSISTED LAPAROSCOPIC SLEEVE GASTRECTOMY, LAPAROSCOPIC REDUCIBLE INCISIONAL HERNIA REPAIR performed by Zach Dumont DO at [address] N/A 2/8/2019    EGD BIOPSY performed by Zach Dumont DO at [address] N/A 2/8/2019    EGD POLYP ABLATION/OTHER performed by Zach Dumont DO at [facility] ENDOSCOPY       Allergies: Allergies   Allergen Reactions    Doxycycline Other (See Comments)     Yeast infection         Home Meds:   Prior to Admission medications    Medication Sig Start Date End Date Taking? Authorizing Provider   gabapentin (NEURONTIN) 300 MG capsule Take 2 capsules by mouth 3 times daily for 180 days. 7/5/22 1/1/23  Layla Cloud MD   B-D UF III MINI PEN NEEDLES 31G X 5 MM MISC USE AS DIRECTED TWICE A DAY 6/28/22   Layla Cloud MD   amLODIPine (NORVASC) 10 MG tablet Take 1 tablet by mouth daily 6/27/22   Layla Cloud MD   atorvastatin (LIPITOR) 80 MG tablet Take 1 tablet by mouth daily 6/27/22   Layla Cloud MD   lisinopril (PRINIVIL;ZESTRIL) 40 MG tablet Take 1 tablet by mouth daily 6/27/22   Layla Cloud MD   furosemide (LASIX) 40 MG tablet Take 1 tablet by mouth daily 6/21/22   Layla Cloud MD   gabapentin (NEURONTIN) 100 MG capsule Take 2 capsules by mouth 3 times daily for 180 days. Patient taking differently: Take 100 mg by mouth 3 times daily.   5/11/22 11/7/22  Layla Cloud MD   Liraglutide (VICTOZA) 18 MG/3ML SOPN SC injection Inject 1.8 mg into the skin daily 4/20/22   Layla Cloud MD   insulin glargine (LANTUS SOLOSTAR) 100 UNIT/ML injection pen Inject 54 units in AM and 20 units in PM 4/20/22   Layla Cloud MD   citalopram (CELEXA) 40 MG tablet Take 1 tablet by mouth every evening 4/20/22   Layla Cloud MD   carvedilol (COREG) 25 MG tablet Take 1 tablet by mouth 2 times daily (with meals) 9/30/21   Layla Cloud MD   pantoprazole (PROTONIX) 40 MG tablet Take 1 tablet by mouth daily  Patient taking differently: Take 40 mg by mouth every evening  9/30/21   Nilsa Cárdenas MD   acetaminophen (TYLENOL) 500 MG tablet Take 500 mg by mouth every 6 hours as needed for Pain    Historical Provider, MD   melatonin 5 MG TABS tablet Take 1 tablet by mouth daily 4/6/21   Nilsa Cárdenas MD   blood glucose test strips (ONE TOUCH ULTRA TEST) strip Check FSBS 2-3 times daily. 3/5/20   Nilsa Cárdenas MD   ONE TOUCH LANCETS MISC 1 each by Does not apply route 3 times daily 2/14/19   Nilsa Cárdenas MD   Cholecalciferol (VITAMIN D3) 2000 units CAPS Take 2,000 Units by mouth every evening     Historical Provider, MD   aspirin 81 MG chewable tablet Take 1 tablet by mouth daily. Patient taking differently: Take 81 mg by mouth every evening  3/20/14   Abdullahi Lyons MD       Family History:       Problem Relation Age of Onset    High Blood Pressure Mother     Cancer Mother     Rheum Arthritis Mother     COPD Father     Cancer Father     Osteoarthritis Neg Hx     Asthma Neg Hx     Breast Cancer Neg Hx     Diabetes Neg Hx     Heart Failure Neg Hx     High Cholesterol Neg Hx     Hypertension Neg Hx     Migraines Neg Hx     Ovarian Cancer Neg Hx     Rashes/Skin Problems Neg Hx     Seizures Neg Hx     Stroke Neg Hx     Thyroid Disease Neg Hx          Social History:   TOBACCO:   reports that she quit smoking about 8 years ago. She has a 5.00 pack-year smoking history. She has never used smokeless tobacco.  ETOH:   reports current alcohol use. DRUGS:  reports no history of drug use.           REVIEW OF SYSTEMS:  Review of Systems -   General ROS: fatigue   Psychological ROS: negative  Ophthalmic ROS: negative  ENT ROS: negative  Allergy and Immunology ROS: negative  Hematological and Lymphatic ROS: negative  Endocrine ROS: negative  Breast ROS: negative  Respiratory ROS: sob, no cough, no sputum   Cardiovascular ROS: sob, no chest pain, has LE edema   Gastrointestinal ROS:negative  Genito-Urinary ROS: negative  Musculoskeletal ROS: negative  Neurological ROS: negative  Dermatological ROS: negative        Physical Exam:    Vitals: BP (!) 128/59   Pulse 58   Temp 98.2 °F (36.8 °C) (Oral)   Resp 17   Ht 5' 3\" (1.6 m)   Wt 250 lb 10.6 oz (113.7 kg)   LMP 06/15/1999   SpO2 98%   BMI 44.40 kg/m²     Last Body weight:   Wt Readings from Last 3 Encounters:   07/20/22 250 lb 10.6 oz (113.7 kg)   06/21/22 235 lb (106.6 kg)   01/10/22 220 lb (99.8 kg)       Body Mass Index : Body mass index is 44.4 kg/m². Intake and Output summary:   Intake/Output Summary (Last 24 hours) at 7/20/2022 1008  Last data filed at 7/20/2022 0954  Gross per 24 hour   Intake 618 ml   Output 500 ml   Net 118 ml       Physical Examination:     PHYSICAL EXAM:    Gen:  mild acute distress  Eyes: PERRL. Anicteric sclera. No conjunctival injection. ENT: No discharge. Posterior oropharynx clear. External appearance of ears and nose normal.  Neck: Trachea midline. No mass, no lymphadenopathy    Resp:  severely diminished with no active wheezing, dull percussion notes bibasilar   CV: Regular rate. Regular rhythm. No murmur or rub. No edema. GI: Soft, Non-tender. Non-distended. +BS  Skin: Warm, dry, w/o erythema. Lymph: No cervical or supraclavicular LAD. M/S: No cyanosis. No clubbing. Neuro:  CN 2-12 tested, no focal neurologic deficit. Moves all extremities  Psych: Awake and alert, Oriented x 3. Judgement and insight appropriate. Mood stable. Laboratory findings:-    CBC:   Recent Labs     07/20/22  0602   WBC 7.6   HGB 7.5*        BMP:    Recent Labs     07/18/22  0301 07/18/22  0301 07/19/22  0554 07/20/22  0602     --  138 134*   K 4.4   < > 4.0 4.3     --  99 99   CO2 22  --  23 22   BUN 64*  --  75* 80*   CREATININE 3.5*  --  3.6* 4.1*   GLUCOSE 128*  --  118* 144*    < > = values in this interval not displayed. S. Calcium:  Recent Labs     07/20/22  0602   CALCIUM 8.9     S.  Ionized Calcium:No results for input(s): IONCA in the last 72 hours. S. Magnesium:  Recent Labs     07/20/22  0602   MG 2.30       S. Glucose:  Recent Labs     07/19/22  1617 07/19/22  2052 07/20/22  0620   POCGLU 164* 267* 159*       Hepatic functions:   Recent Labs     07/18/22  0301   ALKPHOS 101   ALT 10   AST 9*   PROT 7.1   BILITOT 0.4   LABALBU 3.8       Cardiac enzymes:  Recent Labs     07/18/22  0301   TROPONINI 0.01       Thyroid functions:   Lab Results   Component Value Date/Time    TSH 2.24 10/04/2017 10:00 AM          Radiology Review:  Pertinent images / reports were reviewed as a part of this visit. CT Chest w/ contrast: No results found for this or any previous visit. CT Chest w/o contrast: Results for orders placed during the hospital encounter of 07/18/22    CT CHEST WO CONTRAST    Narrative  EXAMINATION:  CT OF THE CHEST WITHOUT CONTRAST    7/18/2022 3:30 am    TECHNIQUE:  CT of the chest was performed without the administration of intravenous  contrast. Multiplanar reformatted images are provided for review. Automated  exposure control, iterative reconstruction, and/or weight based adjustment of  the mA/kV was utilized to reduce the radiation dose to as low as reasonably  achievable. COMPARISON:  Chest radiograph 07/18/2022, chest CTA 10/05/2007    HISTORY:  ORDERING SYSTEM PROVIDED HISTORY: sob  TECHNOLOGIST PROVIDED HISTORY:  Reason for exam:->sob  Decision Support Exception - unselect if not a suspected or confirmed  emergency medical condition->Emergency Medical Condition (MA)  Reason for Exam: sob    FINDINGS:  Lack of intravenous contrast administration, partially limiting evaluation of  the soft tissues and vasculature. MEDIASTINUM:  Mild cardiomegaly. Relative hyperdensity of the left  ventricular myocardium with respect to blood, suggesting underlying anemia. Mitral and aortic valve annular calcifications. Trace pericardial effusion.   Mild to moderate coronary and systemic atherosclerotic calcifications. Normal variant direct origin of the left vertebral artery from the aortic  arch. Nonenlarged to mildly enlarged mediastinal lymph nodes, likely  reactive. No obvious hilar lymphadenopathy. LUNGS/PLEURA:  Patent central airways. Mild bronchial wall thickening most  prominent centrally. Smooth interlobular septal thickening most prominent  near the apices and bases. Patchy peribronchovascular groundglass opacities  with basilar predominance. Consolidative opacities in dependent portions of  the bilateral lower lobes near areas of passive atelectasis. Trace bilateral  pleural effusions. No pneumothoraces. UPPER ABDOMEN:  0.8 cm hypodense lesion in hepatic segment 2, too small to  characterize but likely a cyst or hemangioma. Changes of sleeve gastrectomy. SOFT TISSUES/BONES:   At least minimal subcutaneous edema. No  supraclavicular nor axillary lymphadenopathy. No acute fractures nor  suspicious bony lesions. Impression  1. Interstitial pulmonary edema with suspected peribronchovascular alveolar  edema, suggesting congestive heart failure given mild cardiomegaly and trace  bilateral pleural effusions. Interstitial pneumonia could appear similar. 2. Patchy peribronchovascular groundglass opacities most prominent near the  lung bases are most likely alveolar edema, although pneumonia could appear  similar. 3. Mild bronchial wall thickening potentially due to pulmonary vascular  congestion, reactive airways disease, or bronchitis. 4. At least minimal anasarca. CTPA: No results found for this or any previous visit.       CXR PA/LAT: Results for orders placed during the hospital encounter of 01/03/22    XR CHEST (2 VW)    Narrative  EXAMINATION:  TWO XRAY VIEWS OF THE CHEST    1/3/2022 4:50 pm    COMPARISON:  03/11/2021    HISTORY:  ORDERING SYSTEM PROVIDED HISTORY: sob, no cough  TECHNOLOGIST PROVIDED HISTORY:  Reason for exam:->sob, no cough  Reason for Exam: sob, no cough    FINDINGS:  Cardiomegaly. Pulmonary vascular congestion. Possible pulmonary edema. Possible small bilateral pleural effusions. No focal airspace disease. No  pneumothorax. Impression  Findings suggest congestive heart failure      CXR portable: Results for orders placed during the hospital encounter of 07/18/22    XR CHEST PORTABLE    Narrative  EXAMINATION:  ONE XRAY VIEW OF THE CHEST    7/20/2022 9:23 am    COMPARISON:  07/18/2022    HISTORY:  ORDERING SYSTEM PROVIDED HISTORY: SOB- worsening  TECHNOLOGIST PROVIDED HISTORY:  Reason for exam:->SOB- worsening  Reason for Exam: SOB- worsening    FINDINGS:  No change in the bilateral airspace disease either due to pulmonary edema or  pneumonia. No change in the mild pulmonary vascular congestion and small  bilateral pleural effusions. Cardiomegaly is stable. There is no  pneumothorax. Impression  1. No significant change.         Nithya Hollingsworth MD, M.D.            7/20/2022, 10:08 AM

## 2022-07-20 NOTE — PROGRESS NOTES
PCA called RN to pts room to report a low SP02 at 84% on 6L. On entry to the room pt noted to be increased SOB and using accessory muscles to breathe. Pt then placed on HFNC and encouraged to deep breathe through her nose slowly. Pt placed on 12L and O2 increased to 95% after several minutes. Pt currently in bed with call light within reach and no further complaints.

## 2022-07-20 NOTE — PROGRESS NOTES
Patient alert and oriented x4. Patient currently sitting up in chair with feet elevated, chair alarm on. Took all morning medications without difficulty, IV flushed. No complaints at this time. Call light within reach. Will continue to monitor.

## 2022-07-20 NOTE — PROGRESS NOTES
Ischemic cardiomyopathy, Kidney disease, Meniere's disease, Mixed hyperlipidemia, Nicotine abuse, NSTEMI (non-ST elevated myocardial infarction) ([facility] ANGLE (obstructive sleep apnea), Osteomyelitis of ankle or foot, acute, left ([facility] Pneumonia, PONV (postoperative nausea and vomiting), Spinal epidural abscess, and Type II diabetes mellitus, uncontrolled ([facility] Past Surgical History:  has a past surgical history that includes Wound debridement (3/12/14); back surgery (3/2014); Hysterectomy (6/15/1999); Nasal septum surgery; Upper gastrointestinal endoscopy (N/A, 2/8/2019); Upper gastrointestinal endoscopy (N/A, 2/8/2019); Sleeve Gastrectomy (04/02/2019); Sleeve Gastrectomy (N/A, 4/2/2019); Hammer toe surgery (Left, 8/28/2020); Foot Debridement (Left, 9/8/2020); Nerve Surgery (Left, 9/10/2020); Endoscopy, colon, diagnostic; Dilation and curettage of uterus; other surgical history; and Pressure ulcer debridement (Left, 10/23/2020). Treatment Diagnosis: impaired ADL/fxl mobility    Assessment   Performance deficits / Impairments: Decreased functional mobility ; Decreased endurance;Decreased ADL status; Decreased high-level IADLs;Decreased balance  Assessment: 73 yo female admitted for SOB with hypoxia. PMH: DM, HTN, Neuropathy, CKD, ANGLE, obesity, Meniere's Disease. PTA, pt lives alone and fully IND + driving. Today, pt functioning below baseline limited by decreased endurance. Pt progressing well with very minimal SOB and no desaturations this session on7L throughout. Pt completes fxl tx, fxl mobility household distance with RW (therapist managing O2 tubing), and sink side grooming SBA. Pt required Mod A LB dressing and Min A toileting. Cont acute OT to address above deficits. Rec OT 2-3x/week to further address endurance with ADL/fxl mobility.  Pt to have grandchild stay for intialy 24 hr SUP and IADL assist  Treatment Diagnosis: impaired ADL/fxl mobility  Prognosis: Good  REQUIRES OT FOLLOW-UP: Yes  Activity Tolerance  Activity Tolerance: Patient Tolerated treatment well;Patient limited by fatigue  Activity Tolerance Comments: 7L throughout. minimal SOB, SpO2 >92% throughout. seated rest break after mobility and 4 min standing task        Plan   Plan  Times per Week: 3-5  Specific Instructions for Next Treatment: energy conservation. managing O2 tubing  Current Treatment Recommendations: Strengthening, ROM, Balance training, Functional mobility training, Endurance training, Pain management, Safety education & training, Patient/Caregiver education & training, Self-Care / ADL, Home management training, Modalities, Positioning     Restrictions  Restrictions/Precautions  Restrictions/Precautions: Fall Risk  Position Activity Restriction  Other position/activity restrictions: 7L NC High Flow    Subjective   General  Chart Reviewed: Yes  Patient assessed for rehabilitation services?: Yes  Additional Pertinent Hx: 75 yo female admitted for SOB with hypoxia. PMH: DM, HTN, Neuropathy, CKD, ANGLE, obesity, Meniere's Disease  Family / Caregiver Present: No  Referring Practitioner: MADISON Carson CNP  Diagnosis: Hypoxia  Subjective  Subjective: pt recliner bed upon arrival and agreeable to OT tx. Pt reporting no pain, minimal SOB noted  General Comment  Comments: RN ok to see     Social/Functional History  Social/Functional History  Lives With: Alone  Type of Home: Apartment (2nd floor)  Home Layout: One level  Home Access: Stairs to enter with rails, Stairs to enter without rails  Entrance Stairs - Number of Steps: from garage: 13 steps 1 rail, then 13 steps B rails up to apartment or from outside 5 steps to landing, 13 steps to 2nd floor apt.   Bathroom Shower/Tub: Tub/Shower unit, Shower chair with back  [employer]: Handicap height  Bathroom Equipment: Shower chair  Has the patient had two or more falls in the past year or any fall with injury in the past year?: No  Receives Help From: Family, Friend(s)  ADL Assistance: Independent  Homemaking Assistance: Independent  Homemaking Responsibilities: Yes  Ambulation Assistance: Independent  Transfer Assistance: Independent  Active : Yes       Objective              Safety Devices  Type of Devices: All fall risk precautions in place;Call light within reach; Chair alarm in place;Gait belt;Patient at risk for falls; Left in chair;Nurse notified  Balance  Sitting:  (seated at [facility] SUP)  Standing:  (SBA pant management x2 and sink side grooming (4 min))  Gait  Overall Level of Assistance: Stand-by assistance (recliner><BSC no AD close distance. recliner><bathroom with RW, therapist manages O2 line. no unsteadiness)  Toilet Transfers  Toilet - Technique: Stand pivot  Equipment Used: Standard bedside commode  Toilet Transfer: Stand by assistance  Toilet Transfers Comments: stand pivot as high floor line wouldnt reach bathroom. (then adjusted line and able to reach)     ADL  Grooming: Stand by assistance  Grooming Skilled Clinical Factors: sink side oral care, putting on deodorant  UE Dressing Skilled Clinical Factors: assist doffing/donning gown d/t lines  LE Dressing: Moderate assistance  LE Dressing Skilled Clinical Factors: assist threading underpants d/t SOB, able to manage over hips. doffs IND  Toileting: Minimal assistance  Toileting Skilled Clinical Factors: manages pants and pericare front.  assist pericare back        Bed mobility  Bed Mobility Comments: not observed  Transfers  Sit to stand: Stand by assistance  Stand to sit: Stand by assistance  Transfer Comments: RW. cues hand placement     Cognition  Overall Cognitive Status: [facility]  Orientation  Overall Orientation Status: Within Functional Limits  Orientation Level: Oriented X4                  Education Given To: Patient  Education Provided: Role of Therapy;Plan of Care;Transfer Training;Energy Conservation  Education Method: Verbal  Education Outcome: Continued education needed;Verbalized understanding AM-PAC Score        AM-PAC Inpatient Daily Activity Raw Score: 18 (07/20/22 1224)  AM-PAC Inpatient ADL T-Scale Score : 38.66 (07/20/22 1224)  ADL Inpatient CMS 0-100% Score: 46.65 (07/20/22 1224)  ADL Inpatient CMS G-Code Modifier : CK (07/20/22 1224)    T[place]eti Score       Goals  Short Term Goals  Time Frame for Short term goals: prior to d/c: progressing  Short Term Goal 1: UB bathing/dressing seated set up  Short Term Goal 2: toileting SUP  Short Term Goal 3: LB dressing with AE as needed SUP  Short Term Goal 4: tolerate 5 min fxl standing task SUP  Short Term Goal 5: verbalize 4 energy conservation strategies in order to prevent further fatigue with ADL and fxl mobility  Patient Goals   Patient goals : improve breathing and be able to return home       Therapy Time   Individual Concurrent Group Co-treatment   Time In 1110         Time Out 1150         Minutes 40         Timed Code Treatment Minutes: 40 Minutes (25 ADL.  15 TA)       BILL Mooney, OTR/L

## 2022-07-20 NOTE — PROGRESS NOTES
Hospital Medicine Progress Note      Admit Date: 7/18/2022       CC: F/U for increased sob    HPI:  patient is a 74 y.o. female with past medical history of type 2 diabetes, CKD, previous history of DVT not on anticoagulation, cardiomyopathy with diastolic heart failure, hyperlipidemia, hypertension who presents to [facility] coming from home that over the last 2 days since she returned from a festival on Saturday she was noticing that while at the festival she was initially somewhat increasingly fatigued and short of breath on exertion but was still able to get to her car and get home. She became even more progressively short of breath and fatigued and was in her home and sleeping in bed the whole day all day till she decided to call EMS tonight prior to coming to the ED because she became more more short of breath as the day went on till tonight she was getting increasingly more short of breath and fatigued on exertion. She did have a cough at home but denied any sputum production. She also denies any other recent symptoms of fever, chills, dizziness, syncope, chest pain, dysuria, blood in urine/stool/sputum recently or prior to today, nausea/vomiting/diarrhea/abdominal pain. She does not feel as though she has been having any difficulty urinating and feels as though her urination has been optimal while on her Lasix. Does not feel obstructed. He has been compliant with her medications. 7/18: Nephrology, cardiology and CHF RN as well as dietitian consulted by admitting MD     Ordered melatonin for patient per request (home med)  cont IV lasix for now. Dopplers neg for DVT. C/o SOB over the weekend at the [employer].  endorses a little bit of a cough, denies fever  She is on 5L nc but does not normally wear O2. Covid neg. 7/19 BS improved with insulin. Still on 5-6L nc for pneumonia, CHF. Diuresing with BID IV lasix per cardiology.  Nephrology seeing for CKD       Interval History/Subjective: consult placed to pulmonology for increased need for O2. Now at 12L high flow. ABG done 7/18: pH 7.36 pCo2 43/ pO2 86/ HCO3 25 / BE -0.8 / O2 sat 97%   Maybe she would benefit from BAL/bronch mucus plug? On antibx for pneumonia. I will repeat CXR     Right heart cath consideration by cardiology as well. Down to 5L nc on my exam. Using her acapella and IS  Pt resting, sleepy today. Grand-daughter visiting. Review of Systems:       The patient denied headaches, visual changes, LOC, SOB, CP, ABD pain, N/V/D, skin changes, new or worsening weakness or neuromuscular deficits. Comprehensive ROS negative except as mentioned above.     Past Medical History:        Diagnosis Date    Abnormal echocardiogram     25% on 3/11/14 and 50% on 3/19/14    Back pain     Cardiomyopathy (HCC)     EF was 50% on 3/19/14    Chipped tooth     lower left    Clostridium difficile diarrhea 03/12/2021    Dental crown present     veneers    Depressive disorder 08/13/2014    Diabetic infection of right foot ([address] 04/15/2015    Diabetic ulcer of toe of left foot associated with diabetes mellitus due to underlying condition, with fat layer exposed ([address] 11/9/2018    Diabetic ulcer of toe of left foot associated with type 2 diabetes mellitus, with fat layer exposed ([address] 04/10/2018    Pt slipped in hot tub latter part of February and has not healed since despite topical treatment    DVT (deep venous thrombosis) ([facility])     HTN (hypertension)     Hx of blood clots 2016    left leg    Ischemic cardiomyopathy 04/15/2015    Kidney disease     CHRONIC STAGE 3    Meniere's disease     Mixed hyperlipidemia 03/07/2016    Nicotine abuse     NSTEMI (non-ST elevated myocardial infarction) ([address] 03/13/2014    ANGLE (obstructive sleep apnea)     Osteomyelitis of ankle or foot, acute, left ([address] 06/04/2018    Pneumonia     PONV (postoperative nausea and vomiting)     nausea    Spinal epidural abscess 03/13/2014    Type II diabetes mellitus, (113.7 kg)   [facility] 06/15/1999   SpO2 98%   BMI 44.40 kg/m²       Intake/Output Summary (Last 24 hours) at 7/20/2022 0831  Last data filed at 7/20/2022 4690  Gross per 24 hour   Intake 618 ml   Output 600 ml   Net 18 ml        General appearance:   No apparent distress, appears stated age. Cooperative. HEENT:  Normocephalic, atraumatic. PERRLA. EOMi. Conjunctivae/corneas clear, no icterus, non-injected. Neck: Supple, with full range of motion. No jugular venous distention. Trachea midline. Respiratory:  diminished and some rhonchi on auscultation;   Normal respiratory effort. Cardiovascular:  Regular rate and rhythm without murmurs, rubs or gallops. Abdomen: Soft, non-tender, non-distended, without rebound or guarding. Normal bowel sounds. Musculoskeletal:  No clubbing, cyanosis or edema bilaterally. Full range of motion without deformity. Skin: Skin color, texture, turgor normal.  No rashes or lesions. Neurologic:  Neurovascularly intact without any focal sensory/motor deficits. Cranial nerves: II-XII intact, grossly intact. No facial asymmetry, tongue midline.    Psychiatric:  Alert and oriented, thought content appropriate  Capillary Refill: Brisk,< 3 seconds   Peripheral Pulses: +2 palpable, equal bilaterally       LABS:    Lab Results   Component Value Date    WBC 8.7 07/19/2022    HGB 8.0 (L) 07/19/2022    HCT 23.5 (L) 07/19/2022    MCV 86.8 07/19/2022     07/19/2022    LYMPHOPCT 11.5 07/19/2022    RBC 2.71 (L) 07/19/2022    MCH 29.6 07/19/2022    MCHC 34.1 07/19/2022    RDW 15.5 (H) 07/19/2022       Lab Results   Component Value Date    CREATININE 4.1 (H) 07/20/2022    BUN 80 (H) 07/20/2022     (L) 07/20/2022    K 4.3 07/20/2022    CL 99 07/20/2022    CO2 22 07/20/2022       Lab Results   Component Value Date/Time    MG 2.30 07/20/2022 06:02 AM       Lab Results   Component Value Date    ALT 10 07/18/2022    AST 9 (L) 07/18/2022    ALKPHOS 101 07/18/2022    BILITOT 0.4 07/18/2022 No flowsheet data found. Lab Results   Component Value Date    LABA1C 6.8 06/21/2022       Imaging:  VL Extremity Venous Bilateral   Final Result      CT CHEST WO CONTRAST   Final Result   1. Interstitial pulmonary edema with suspected peribronchovascular alveolar   edema, suggesting congestive heart failure given mild cardiomegaly and trace   bilateral pleural effusions. Interstitial pneumonia could appear similar. 2. Patchy peribronchovascular groundglass opacities most prominent near the   lung bases are most likely alveolar edema, although pneumonia could appear   similar. 3. Mild bronchial wall thickening potentially due to pulmonary vascular   congestion, reactive airways disease, or bronchitis. 4. At least minimal anasarca. XR CHEST PORTABLE   Final Result   1. Pulmonary vascular congestion with suggestion of perihilar edema,   potentially congestive heart failure given mild cardiomegaly and possible   trace bilateral pleural effusions. Pneumonia could appear similar. 2. Bibasilar airspace opacities most likely due atelectasis, pneumonia and   aspiration could appear similar.              Scheduled and prn Medications:    Scheduled Meds:   ferrous sulfate  324 mg Oral TID WC    furosemide  40 mg IntraVENous TID    [Held by provider] aspirin  81 mg Oral Daily    atorvastatin  80 mg Oral Daily    carvedilol  6.25 mg Oral BID WC    pantoprazole  40 mg Oral QPM    citalopram  40 mg Oral QPM    sodium chloride flush  5-40 mL IntraVENous 2 times per day    heparin (porcine)  5,000 Units SubCUTAneous 3 times per day    cefTRIAXone (ROCEPHIN) IV  2,000 mg IntraVENous Q24H    And    azithromycin  500 mg Oral Q24H    melatonin  4.5 mg Oral Nightly    insulin lispro  0-6 Units SubCUTAneous TID WC    insulin lispro  0-3 Units SubCUTAneous Nightly    gabapentin  200 mg Oral TID     Continuous Infusions:   sodium chloride      dextrose       PRN Meds:.sodium chloride flush, sodium chloride, polyethylene glycol, acetaminophen **OR** acetaminophen, ondansetron, ipratropium-albuterol, glucose, dextrose bolus **OR** dextrose bolus, glucagon (rDNA), dextrose, benzonatate    Assessment & Plan:        Acute resp failure  with hypoxia  - O2 12L  - does not wear O2 at home   - CT : pulm edema, possible PNA, CHF  - monitor and wean O2 for > 94%  - IV antibx broad spectrum for poss gram neg pna  - procalc normal  - duoneb q4h w/a with PRN q4h albuterol nebs  - mucinex  - acapella + IS   - will repeat CXR to compare to prior.  - may need right heart cath with cardiology   -consult to pulmonology for poss BAL/Bronch? Mucous plug? Possibly pneumonia on CT  - IV rocephin and azithromycin for now  - procalcitonin normal  - afebrile   - reports some cough            NEHA on CKD, stage 3  - consult to nephrology  - BMP daily  - avoid nephrotoxic meds   - renal dosing gabapentin to 200mg tid rather than home dose 600mg tid  - monitor BP  - received lasix in ED, and cont bid IV lasix with consideration for NEHA     Anemia  - workup in place  - no active bleeding  - follow daily CBC  - hemoccult, FE/TIBC/ferritin/B12/retic ct  - started ferrous sulfate tid for low iron and TIBC     HTN  - cont home meds  - hold ACEi in setting of NEHA        DM, T2  - lantus home dose: 54 units daily/ 20 units nightly - holding for now until BS require it  - poct ac/hs  - A1c 6/21/22   6.8  - will do low dose SSI for now while glucose on the lower side  - adjust accordingly as needed     CHF  Last echo 1/4/22:   EF 60-65%. The right ventricle is normal in size and function. Aortic sclerosis    Trivial mitral and tricuspid regurgitation.    Lasix 40mg daily - home dose, recently reduced by PCP from bid to daily  - IV lasix while admitted  - dopplers bilat  - bNP 1518  - trop neg  - strict I/O  - daily wts  - bilat venous dopplers to r/o DVT for edema  - CT : interstitial pulmonary edema with peribronchovascular alveolar edema,

## 2022-07-20 NOTE — PROGRESS NOTES
Office: [phone]       Fax: [phone]      Nephrology Progress Note        Patient's Name: Shantal Vail Date: 7/18/2022  Date of Visit: 7/20/2022    Reason for Consult:  NEHA      History of Present Illness: Dianna Suero is a 74 y.o. female with PMHx of hypertension,diabetes mellitus,CKD, CHF who was hospitalized on 7/18/2022 with complaints of shortness of breath   Has about 30 lb wt gain since last year  Per pt, her lasix was decreased was her PCP  No chest pain   No dysuria, hematuria  NSAID use: Denies   IV contrast: None recent   Home meds reviewed    INTERVAL HISTORY    Feels worse  Shortness of breath: Yes  UOP: Fair  Creat: trending up  oxygen req up       Medications:    Allergies:  Doxycycline    Scheduled Meds:   ipratropium-albuterol  1 ampule Inhalation Q4H WA    guaiFENesin  600 mg Oral BID    ferrous sulfate  324 mg Oral TID WC    furosemide  40 mg IntraVENous TID    [Held by provider] aspirin  81 mg Oral Daily    atorvastatin  80 mg Oral Daily    carvedilol  6.25 mg Oral BID WC    pantoprazole  40 mg Oral QPM    citalopram  40 mg Oral QPM    sodium chloride flush  5-40 mL IntraVENous 2 times per day    heparin (porcine)  5,000 Units SubCUTAneous 3 times per day    cefTRIAXone (ROCEPHIN) IV  2,000 mg IntraVENous Q24H    And    azithromycin  500 mg Oral Q24H    melatonin  4.5 mg Oral Nightly    insulin lispro  0-6 Units SubCUTAneous TID WC    insulin lispro  0-3 Units SubCUTAneous Nightly    gabapentin  200 mg Oral TID     Continuous Infusions:   sodium chloride      dextrose         Labs:  CBC:   Recent Labs     07/18/22  0301 07/19/22  0554   WBC 12.3* 8.7   HGB 8.7* 8.0*    216       Ca/Mg/Phos:   Recent Labs     07/18/22  0301 07/19/22  0554 07/20/22  0602   CALCIUM 9.2 8.7 8.9   MG  --  2.10 2.30       UA:  Recent Labs     07/18/22  0645   COLORU Yellow   CLARITYU Clear   GLUCOSEU Negative   BILIRUBINUR Negative   KETUA Negative   SPECGRAV 1.014   BLOODU Negative   PHUR 5.0   PROTEINU 30*   UROBILINOGEN 0.2   NITRU Negative   LEUKOCYTESUR Negative   LABMICR YES   URINETYPE NotGiven        Urine Microscopic:   Recent Labs     07/18/22  0645   BACTERIA None Seen   HYALCAST 6   WBCUA 1   RBCUA 0   EPIU 3       Urine Chemistry: No results for input(s): CLUR, LABCREA, PROTEINUR, NAUR in the last 72 hours. Objective:     Vitals: BP (!) 128/59   Pulse 58   Temp 98.2 °F (36.8 °C) (Oral)   Resp 17   Ht 5' 3\" (1.6 m)   Wt 250 lb 10.6 oz (113.7 kg)   LMP 06/15/1999   SpO2 98%   BMI 44.40 kg/m²    Wt Readings from Last 3 Encounters:   07/20/22 250 lb 10.6 oz (113.7 kg)   06/21/22 235 lb (106.6 kg)   01/10/22 220 lb (99.8 kg)      24HR INTAKE/OUTPUT:    Intake/Output Summary (Last 24 hours) at 7/20/2022 0919  Last data filed at 7/20/2022 0247  Gross per 24 hour   Intake 618 ml   Output 600 ml   Net 18 ml     + UM    Constitutional:  awake, NAD  HEENT:  MMM, No icterus  Neck: no bruits, No JVD  Cardiovascular:  reg rhythm  Respiratory: Diminished at bases   Abdomen:  +BS, soft, NT, ND  Ext: + lower extremity edema    CNS: alert, no agitation    IMAGING:  VL Extremity Venous Bilateral   Final Result      CT CHEST WO CONTRAST   Final Result   1. Interstitial pulmonary edema with suspected peribronchovascular alveolar   edema, suggesting congestive heart failure given mild cardiomegaly and trace   bilateral pleural effusions. Interstitial pneumonia could appear similar. 2. Patchy peribronchovascular groundglass opacities most prominent near the   lung bases are most likely alveolar edema, although pneumonia could appear   similar. 3. Mild bronchial wall thickening potentially due to pulmonary vascular   congestion, reactive airways disease, or bronchitis. 4. At least minimal anasarca.          XR CHEST PORTABLE   Final Result   1. Pulmonary vascular congestion with suggestion of perihilar edema,   potentially congestive heart failure given mild cardiomegaly and possible   trace bilateral pleural effusions. Pneumonia could appear similar. 2. Bibasilar airspace opacities most likely due atelectasis, pneumonia and   aspiration could appear similar. XR CHEST PORTABLE    (Results Pending)       Assessment :     1. NEHA on CKD 3b/4  -Non-Oliguric  -Baseline creat: 1.8-1.9 Now 3.5  -UA: prot ++. UPC 2 in past  -Volume: Hypervolemic   -Electrolytes: No Dyskalemia  -Acid-Base: AGMA    Recent Labs     07/18/22  0301 07/19/22  0554 07/20/22  0602   BUN 64* 75* 80*   CREATININE 3.5* 3.6* 4.1*       Recent Labs     07/18/22  0301 07/19/22  0554 07/20/22  0602    138 134*   K 4.4 4.0 4.3   CO2 22 23 22   MG  --  2.10 2.30       2. HTN  -Blood pressure at goal     BP Readings from Last 1 Encounters:   07/20/22 (!) 128/59     3. DM    4. CHF  - TTE (2022): LVEF 60-65%, PASP 36    Plan:     - chest X-ray reviewed. Spoke to [email or web address]  - continue Diuresis based on vol status : continue iv lasix. Monitor BUN  - ur urea, creat  - daily weight  - Antimicrobials per primary team   - Monitor BMP    -Monitor I/O, UOP  -Maintain MAP>65  -Avoid nephrotoxin, if able. -Dose meds to current eGFR      Thank you for allowing us to participate in care of [facility] . We will continue to follow. Feel free to contact me with any questions.       Adam Mueller MD  7/20/2022    [facility] [address]  Office : [phone]  Fax :[phone]

## 2022-07-20 NOTE — PLAN OF CARE
Problem: Discharge Planning  Goal: Discharge to home or other facility with appropriate resources  7/20/2022 0210 by Abhi Lemus RN  Outcome: Progressing  Flowsheets (Taken 7/19/2022 2200)  Discharge to home or other facility with appropriate resources:   Identify barriers to discharge with patient and caregiver   Arrange for needed discharge resources and transportation as appropriate     Problem: Pain  Goal: Verbalizes/displays adequate comfort level or baseline comfort level  7/20/2022 0210 by Abhi Lemus RN  Outcome: Progressing  Flowsheets (Taken 7/19/2022 1357 by Michael Baron RN)  Verbalizes/displays adequate comfort level or baseline comfort level: Encourage patient to monitor pain and request assistance     Problem: Safety - Adult  Goal: Free from fall injury  7/20/2022 0210 by Abhi Lemus RN  Outcome: Progressing  Flowsheets (Taken 7/19/2022 2200)  Free From Fall Injury:   Instruct family/caregiver on patient safety   Based on caregiver fall risk screen, instruct family/caregiver to ask for assistance with transferring infant if caregiver noted to have fall risk factors     Problem: ABCDS Injury Assessment  Goal: Absence of physical injury  7/20/2022 0210 by Abhi Lemus RN  Outcome: Progressing  Flowsheets (Taken 7/19/2022 2200)  Absence of Physical Injury: Implement safety measures based on patient assessment     Problem: Skin/Tissue Integrity  Goal: Absence of new skin breakdown  Description: 1. Monitor for areas of redness and/or skin breakdown  2. Assess vascular access sites hourly  3. Every 4-6 hours minimum:  Change oxygen saturation probe site  4. Every 4-6 hours:  If on nasal continuous positive airway pressure, respiratory therapy assess nares and determine need for appliance change or resting period.   7/20/2022 0210 by Abhi Lemus RN  Outcome: Progressing     Problem: Chronic Conditions and Co-morbidities  Goal: Patient's chronic conditions and co-morbidity symptoms are monitored and maintained or improved  7/20/2022 0210 by Emeli Monaco RN  Outcome: Progressing  Flowsheets (Taken 7/19/2022 2200)  Care Plan - Patient's Chronic Conditions and Co-Morbidity Symptoms are Monitored and Maintained or Improved: Monitor and assess patient's chronic conditions and comorbid symptoms for stability, deterioration, or improvement     Problem: Respiratory - Adult  Goal: Achieves optimal ventilation and oxygenation  7/20/2022 0210 by Emeli Monaco RN  Outcome: Progressing  Flowsheets  Taken 7/20/2022 0210  Achieves optimal ventilation and oxygenation:   Assess for changes in respiratory status   Assess for changes in mentation and behavior   Position to facilitate oxygenation and minimize respiratory effort   Oxygen supplementation based on oxygen saturation or arterial blood gases   Assess and instruct to report shortness of breath or any respiratory difficulty   Respiratory therapy support as indicated  Taken 7/19/2022 2200  Achieves optimal ventilation and oxygenation:   Assess for changes in respiratory status   Assess for changes in mentation and behavior   Position to facilitate oxygenation and minimize respiratory effort   Oxygen supplementation based on oxygen saturation or arterial blood gases   Respiratory therapy support as indicated     Problem: Cardiovascular - Adult  Goal: Maintains optimal cardiac output and hemodynamic stability  7/20/2022 0210 by Emeli Monaco RN  Outcome: Progressing  Flowsheets (Taken 7/19/2022 2200)  Maintains optimal cardiac output and hemodynamic stability:   Monitor blood pressure and heart rate   Monitor urine output and notify Licensed Independent Practitioner for values outside of normal range   Assess for signs of decreased cardiac output     Problem: Infection - Adult  Goal: Absence of infection at discharge  7/20/2022 0210 by Emeli Moanco RN  Outcome: Progressing  Flowsheets (Taken 7/19/2022 2200)  Absence of infection at discharge:   Assess and monitor for signs and symptoms of infection   Monitor lab/diagnostic results   Monitor all insertion sites i.e., indwelling lines, tubes and drains     Problem: Metabolic/Fluid and Electrolytes - Adult  Goal: Electrolytes maintained within normal limits  7/20/2022 0210 by Bassam Freed RN  Outcome: Progressing  Flowsheets (Taken 7/19/2022 2200)  Electrolytes maintained within normal limits:   Monitor labs and assess patient for signs and symptoms of electrolyte imbalances   Administer electrolyte replacement as ordered   Monitor response to electrolyte replacements, including repeat lab results as appropriate     Problem: Hematologic - Adult  Goal: Maintains hematologic stability  7/20/2022 0210 by Bassam Freed RN  Outcome: Progressing  Flowsheets (Taken 7/19/2022 2200)  Maintains hematologic stability: Assess for signs and symptoms of bleeding or hemorrhage

## 2022-07-21 LAB
ANION GAP SERPL CALCULATED.3IONS-SCNC: 15 MMOL/L (ref 3–16)
BASOPHILS ABSOLUTE: 0 K/UL (ref 0–0.2)
BASOPHILS RELATIVE PERCENT: 0.1 %
BUN BLDV-MCNC: 91 MG/DL (ref 7–20)
CALCIUM SERPL-MCNC: 9 MG/DL (ref 8.3–10.6)
CHLORIDE BLD-SCNC: 95 MMOL/L (ref 99–110)
CO2: 21 MMOL/L (ref 21–32)
CREAT SERPL-MCNC: 3.6 MG/DL (ref 0.6–1.2)
EOSINOPHILS ABSOLUTE: 0 K/UL (ref 0–0.6)
EOSINOPHILS RELATIVE PERCENT: 0 %
ESTIMATED AVERAGE GLUCOSE: 157.1 MG/DL
GFR AFRICAN AMERICAN: 15
GFR NON-AFRICAN AMERICAN: 13
GLUCOSE BLD-MCNC: 301 MG/DL (ref 70–99)
GLUCOSE BLD-MCNC: 308 MG/DL (ref 70–99)
GLUCOSE BLD-MCNC: 319 MG/DL (ref 70–99)
GLUCOSE BLD-MCNC: 370 MG/DL (ref 70–99)
GLUCOSE BLD-MCNC: 396 MG/DL (ref 70–99)
HBA1C MFR BLD: 7.1 %
HCT VFR BLD CALC: 23.5 % (ref 36–48)
HEMOGLOBIN: 7.8 G/DL (ref 12–16)
LYMPHOCYTES ABSOLUTE: 0.4 K/UL (ref 1–5.1)
LYMPHOCYTES RELATIVE PERCENT: 4.5 %
MAGNESIUM: 2.4 MG/DL (ref 1.8–2.4)
MCH RBC QN AUTO: 28.8 PG (ref 26–34)
MCHC RBC AUTO-ENTMCNC: 33.1 G/DL (ref 31–36)
MCV RBC AUTO: 87 FL (ref 80–100)
MONOCYTES ABSOLUTE: 0.2 K/UL (ref 0–1.3)
MONOCYTES RELATIVE PERCENT: 2.6 %
NEUTROPHILS ABSOLUTE: 7.4 K/UL (ref 1.7–7.7)
NEUTROPHILS RELATIVE PERCENT: 92.8 %
PDW BLD-RTO: 15.1 % (ref 12.4–15.4)
PERFORMED ON: ABNORMAL
PLATELET # BLD: 243 K/UL (ref 135–450)
PMV BLD AUTO: 9.9 FL (ref 5–10.5)
POTASSIUM SERPL-SCNC: 4.6 MMOL/L (ref 3.5–5.1)
RBC # BLD: 2.71 M/UL (ref 4–5.2)
SODIUM BLD-SCNC: 131 MMOL/L (ref 136–145)
WBC # BLD: 8 K/UL (ref 4–11)

## 2022-07-21 PROCEDURE — 2580000003 HC RX 258: Performed by: HOSPITALIST

## 2022-07-21 PROCEDURE — 85025 COMPLETE CBC W/AUTO DIFF WBC: CPT

## 2022-07-21 PROCEDURE — 6370000000 HC RX 637 (ALT 250 FOR IP): Performed by: STUDENT IN AN ORGANIZED HEALTH CARE EDUCATION/TRAINING PROGRAM

## 2022-07-21 PROCEDURE — 94640 AIRWAY INHALATION TREATMENT: CPT

## 2022-07-21 PROCEDURE — 94669 MECHANICAL CHEST WALL OSCILL: CPT

## 2022-07-21 PROCEDURE — 2580000003 HC RX 258: Performed by: STUDENT IN AN ORGANIZED HEALTH CARE EDUCATION/TRAINING PROGRAM

## 2022-07-21 PROCEDURE — 80048 BASIC METABOLIC PNL TOTAL CA: CPT

## 2022-07-21 PROCEDURE — 6370000000 HC RX 637 (ALT 250 FOR IP): Performed by: NURSE PRACTITIONER

## 2022-07-21 PROCEDURE — 99233 SBSQ HOSP IP/OBS HIGH 50: CPT | Performed by: HOSPITALIST

## 2022-07-21 PROCEDURE — 6360000002 HC RX W HCPCS: Performed by: STUDENT IN AN ORGANIZED HEALTH CARE EDUCATION/TRAINING PROGRAM

## 2022-07-21 PROCEDURE — 36415 COLL VENOUS BLD VENIPUNCTURE: CPT

## 2022-07-21 PROCEDURE — 9990000010 HC NO CHARGE VISIT

## 2022-07-21 PROCEDURE — 6360000002 HC RX W HCPCS: Performed by: INTERNAL MEDICINE

## 2022-07-21 PROCEDURE — 1200000000 HC SEMI PRIVATE

## 2022-07-21 PROCEDURE — 2700000000 HC OXYGEN THERAPY PER DAY

## 2022-07-21 PROCEDURE — 6360000002 HC RX W HCPCS: Performed by: HOSPITALIST

## 2022-07-21 PROCEDURE — 83735 ASSAY OF MAGNESIUM: CPT

## 2022-07-21 PROCEDURE — 83036 HEMOGLOBIN GLYCOSYLATED A1C: CPT

## 2022-07-21 PROCEDURE — 94761 N-INVAS EAR/PLS OXIMETRY MLT: CPT

## 2022-07-21 PROCEDURE — 99232 SBSQ HOSP IP/OBS MODERATE 35: CPT | Performed by: INTERNAL MEDICINE

## 2022-07-21 RX ORDER — INSULIN LISPRO 100 [IU]/ML
0-4 INJECTION, SOLUTION INTRAVENOUS; SUBCUTANEOUS NIGHTLY
Status: DISCONTINUED | OUTPATIENT
Start: 2022-07-21 | End: 2022-07-22

## 2022-07-21 RX ORDER — DEXTROSE MONOHYDRATE 100 MG/ML
INJECTION, SOLUTION INTRAVENOUS CONTINUOUS PRN
Status: DISCONTINUED | OUTPATIENT
Start: 2022-07-21 | End: 2022-07-24 | Stop reason: HOSPADM

## 2022-07-21 RX ORDER — INSULIN LISPRO 100 [IU]/ML
0-8 INJECTION, SOLUTION INTRAVENOUS; SUBCUTANEOUS EVERY 4 HOURS
Status: DISCONTINUED | OUTPATIENT
Start: 2022-07-21 | End: 2022-07-21 | Stop reason: SDUPTHER

## 2022-07-21 RX ORDER — INSULIN GLARGINE 100 [IU]/ML
6 INJECTION, SOLUTION SUBCUTANEOUS NIGHTLY
Status: DISCONTINUED | OUTPATIENT
Start: 2022-07-21 | End: 2022-07-22

## 2022-07-21 RX ORDER — INSULIN LISPRO 100 [IU]/ML
0-8 INJECTION, SOLUTION INTRAVENOUS; SUBCUTANEOUS
Status: DISCONTINUED | OUTPATIENT
Start: 2022-07-21 | End: 2022-07-22

## 2022-07-21 RX ADMIN — HEPARIN SODIUM 5000 UNITS: 5000 INJECTION INTRAVENOUS; SUBCUTANEOUS at 15:43

## 2022-07-21 RX ADMIN — CEFTRIAXONE 2000 MG: 2 INJECTION, POWDER, FOR SOLUTION INTRAMUSCULAR; INTRAVENOUS at 09:02

## 2022-07-21 RX ADMIN — INSULIN LISPRO 6 UNITS: 100 INJECTION, SOLUTION INTRAVENOUS; SUBCUTANEOUS at 08:57

## 2022-07-21 RX ADMIN — FERROUS SULFATE TAB EC 324 MG (65 MG FE EQUIVALENT) 324 MG: 324 (65 FE) TABLET DELAYED RESPONSE at 08:54

## 2022-07-21 RX ADMIN — FUROSEMIDE 5 MG/HR: 10 INJECTION, SOLUTION INTRAMUSCULAR; INTRAVENOUS at 08:59

## 2022-07-21 RX ADMIN — INSULIN LISPRO 4 UNITS: 100 INJECTION, SOLUTION INTRAVENOUS; SUBCUTANEOUS at 20:52

## 2022-07-21 RX ADMIN — GABAPENTIN 200 MG: 100 CAPSULE ORAL at 15:42

## 2022-07-21 RX ADMIN — AZITHROMYCIN MONOHYDRATE 500 MG: 500 TABLET ORAL at 08:55

## 2022-07-21 RX ADMIN — IPRATROPIUM BROMIDE AND ALBUTEROL SULFATE 1 AMPULE: .5; 3 SOLUTION RESPIRATORY (INHALATION) at 11:41

## 2022-07-21 RX ADMIN — FERROUS SULFATE TAB EC 324 MG (65 MG FE EQUIVALENT) 324 MG: 324 (65 FE) TABLET DELAYED RESPONSE at 17:16

## 2022-07-21 RX ADMIN — Medication 4.5 MG: at 20:37

## 2022-07-21 RX ADMIN — GABAPENTIN 200 MG: 100 CAPSULE ORAL at 08:55

## 2022-07-21 RX ADMIN — HEPARIN SODIUM 5000 UNITS: 5000 INJECTION INTRAVENOUS; SUBCUTANEOUS at 20:38

## 2022-07-21 RX ADMIN — GABAPENTIN 200 MG: 100 CAPSULE ORAL at 20:38

## 2022-07-21 RX ADMIN — INSULIN LISPRO 6 UNITS: 100 INJECTION, SOLUTION INTRAVENOUS; SUBCUTANEOUS at 17:16

## 2022-07-21 RX ADMIN — SODIUM CHLORIDE, PRESERVATIVE FREE 10 ML: 5 INJECTION INTRAVENOUS at 08:55

## 2022-07-21 RX ADMIN — METHYLPREDNISOLONE SODIUM SUCCINATE 40 MG: 40 INJECTION, POWDER, FOR SOLUTION INTRAMUSCULAR; INTRAVENOUS at 08:55

## 2022-07-21 RX ADMIN — CARVEDILOL 6.25 MG: 6.25 TABLET, FILM COATED ORAL at 17:16

## 2022-07-21 RX ADMIN — INSULIN GLARGINE 6 UNITS: 100 INJECTION, SOLUTION SUBCUTANEOUS at 20:52

## 2022-07-21 RX ADMIN — HEPARIN SODIUM 5000 UNITS: 5000 INJECTION INTRAVENOUS; SUBCUTANEOUS at 05:42

## 2022-07-21 RX ADMIN — INSULIN LISPRO 8 UNITS: 100 INJECTION, SOLUTION INTRAVENOUS; SUBCUTANEOUS at 12:16

## 2022-07-21 RX ADMIN — PANTOPRAZOLE SODIUM 40 MG: 40 TABLET, DELAYED RELEASE ORAL at 17:16

## 2022-07-21 RX ADMIN — FERROUS SULFATE TAB EC 324 MG (65 MG FE EQUIVALENT) 324 MG: 324 (65 FE) TABLET DELAYED RESPONSE at 12:16

## 2022-07-21 RX ADMIN — SODIUM CHLORIDE: 9 INJECTION, SOLUTION INTRAVENOUS at 09:01

## 2022-07-21 RX ADMIN — GUAIFENESIN 600 MG: 600 TABLET ORAL at 20:38

## 2022-07-21 RX ADMIN — CITALOPRAM HYDROBROMIDE 40 MG: 20 TABLET ORAL at 17:16

## 2022-07-21 RX ADMIN — ASPIRIN 81 MG: 81 TABLET, CHEWABLE ORAL at 08:55

## 2022-07-21 RX ADMIN — FUROSEMIDE 5 MG/HR: 10 INJECTION, SOLUTION INTRAMUSCULAR; INTRAVENOUS at 15:44

## 2022-07-21 RX ADMIN — ATORVASTATIN CALCIUM 80 MG: 80 TABLET, FILM COATED ORAL at 08:55

## 2022-07-21 RX ADMIN — CARVEDILOL 6.25 MG: 6.25 TABLET, FILM COATED ORAL at 08:55

## 2022-07-21 RX ADMIN — GUAIFENESIN 600 MG: 600 TABLET ORAL at 08:55

## 2022-07-21 RX ADMIN — SODIUM CHLORIDE, PRESERVATIVE FREE 10 ML: 5 INJECTION INTRAVENOUS at 20:38

## 2022-07-21 NOTE — PROGRESS NOTES
Hospital Medicine Progress Note      Admit Date: 7/18/2022       CC: F/U for increased sob    HPI:   HPI:  patient is a 74 y.o. female with past medical history of type 2 diabetes, CKD, previous history of DVT not on anticoagulation, cardiomyopathy with diastolic heart failure, hyperlipidemia, hypertension who presents to [facility] coming from home that over the last 2 days since she returned from a festival on Saturday she was noticing that while at the festival she was initially somewhat increasingly fatigued and short of breath on exertion but was still able to get to her car and get home. She became even more progressively short of breath and fatigued and was in her home and sleeping in bed the whole day all day till she decided to call EMS tonight prior to coming to the ED because she became more more short of breath as the day went on till tonight she was getting increasingly more short of breath and fatigued on exertion. She did have a cough at home but denied any sputum production. She also denies any other recent symptoms of fever, chills, dizziness, syncope, chest pain, dysuria, blood in urine/stool/sputum recently or prior to today, nausea/vomiting/diarrhea/abdominal pain. She does not feel as though she has been having any difficulty urinating and feels as though her urination has been optimal while on her Lasix. Does not feel obstructed. He has been compliant with her medications. 7/18: Nephrology, cardiology and CHF RN as well as dietitian consulted by admitting MD     Ordered melatonin for patient per request (home med)  cont IV lasix for now. Dopplers neg for DVT. C/o SOB over the weekend at the [employer].  endorses a little bit of a cough, denies fever  She is on 5L nc but does not normally wear O2. Covid neg. 7/19 BS improved with insulin. Still on 5-6L nc for pneumonia, CHF. Diuresing with BID IV lasix per cardiology.  Nephrology seeing for CKD 7/20: consult placed to pulmonology for increased need for O2. Now at 12L high flow. ABG done 7/18: pH 7.36 pCo2 43/ pO2 86/ HCO3 25 / BE -0.8 / O2 sat 97%  Maybe she would benefit from BAL/bronch mucus plug? On antibx for pneumonia. I will repeat CXR      Right heart cath consideration by cardiology as well. Down to 5L nc on my exam. Using her acapella and IS  Pt resting, sleepy today. Grand-daughter visiting. Interval History/Subjective: increased ssi and added 6 units lantus   Was given diuril and started on lasix gtt yesterday by nephrology  On 6L nc. Cardiology is considering right heart cath    Review of Systems:       The patient denied headaches, visual changes, LOC, SOB, CP, ABD pain, N/V/D, skin changes, new or worsening weakness or neuromuscular deficits. Comprehensive ROS negative except as mentioned above.     Past Medical History:        Diagnosis Date    Abnormal echocardiogram     25% on 3/11/14 and 50% on 3/19/14    Back pain     Cardiomyopathy (HCC)     EF was 50% on 3/19/14    Chipped tooth     lower left    Clostridium difficile diarrhea 03/12/2021    Dental crown present     veneers    Depressive disorder 08/13/2014    Diabetic infection of right foot ([address] 04/15/2015    Diabetic ulcer of toe of left foot associated with diabetes mellitus due to underlying condition, with fat layer exposed ([address] 11/9/2018    Diabetic ulcer of toe of left foot associated with type 2 diabetes mellitus, with fat layer exposed ([address] 04/10/2018    Pt slipped in hot tub latter part of February and has not healed since despite topical treatment    DVT (deep venous thrombosis) ([facility])     HTN (hypertension)     Hx of blood clots 2016    left leg    Ischemic cardiomyopathy 04/15/2015    Kidney disease     CHRONIC STAGE 3    Meniere's disease     Mixed hyperlipidemia 03/07/2016    Nicotine abuse     NSTEMI (non-ST elevated myocardial infarction) ([address] 03/13/2014    ANGLE (obstructive sleep apnea)     Osteomyelitis of ankle or foot, acute, left ([address] 06/04/2018    Pneumonia     PONV (postoperative nausea and vomiting)     nausea    Spinal epidural abscess 03/13/2014    Type II diabetes mellitus, uncontrolled ([address] 08/13/2014       Past Surgical History:        Procedure Laterality Date    BACK SURGERY  3/2014    DEBRIDEMENT  3/12/14    extensive debridement of bone/muscle and paraspinal empyema    DILATION AND CURETTAGE OF UTERUS      ENDOSCOPY, COLON, DIAGNOSTIC      FOOT DEBRIDEMENT Left 9/8/2020    LEFT FOOT DEBRIDEMENT INCISION AND DRAINAGE performed by Janelle Kaufman DPM at [address] TOE SURGERY Left 8/28/2020    ON THE LEFT: [address], WOUND CLOSURE, FLEXOR TENOTOMY 4TH AND 5TH DIGITS, TENOTOMY AND CAPSULOTOMY 2ND DIGIT performed by Janelle Kaufman DPM at [address])  6/15/1999    complete-think right ovary in.     NASAL SEPTUM SURGERY      NERVE SURGERY Left 9/10/2020    LEFT FOOT INCISION AND DRAINAGE, EXTENSOR HALLUCIS LONGUS REPAIR WITH DELAYED PRIMARY CLOSURE performed by Janelle Kaufman DPM at [address] shut behind right ear    PRESSURE ULCER DEBRIDEMENT Left 10/23/2020    ON THE LEFT: SURGICAL PREPARATION OF WOUND BED, APPLICATION OF DERMAL GRAFT SUBSTITUTE performed by Janelle Kaufman DPM at [address]  04/02/2019    ROBOTIC ASSISTED LAPAROSCOPIC SLEEVE GASTRECTOMY, LAPAROSCOPIC REDUCIBLE INCISIONAL HERNIA REPAIR     SLEEVE GASTRECTOMY N/A 4/2/2019    ROBOTIC ASSISTED LAPAROSCOPIC SLEEVE GASTRECTOMY, LAPAROSCOPIC REDUCIBLE INCISIONAL HERNIA REPAIR performed by J Carlos Sinha DO at [address] [facility] N/A 2/8/2019    EGD BIOPSY performed by J Carlos Sinha DO at [address] N/A 2/8/2019    EGD POLYP ABLATION/OTHER performed by J Carlos Sinha DO at [facility] ENDOSCOPY       Allergies:  Doxycycline    Past medical and surgical history reviewed. Any changes have been noted. PHYSICAL EXAM:  /60   Pulse 66   Temp 97.6 °F (36.4 °C) (Oral)   Resp 18   Ht 5' 3\" (1.6 m)   Wt 248 lb 3.8 oz (112.6 kg)   LMP 06/15/1999   SpO2 95%   BMI 43.97 kg/m²       Intake/Output Summary (Last 24 hours) at 7/21/2022 6750  Last data filed at 7/21/2022 0417  Gross per 24 hour   Intake 720 ml   Output 1750 ml   Net -1030 ml        General appearance:   No apparent distress, appears stated age. Cooperative. HEENT:  Normocephalic, atraumatic. PERRLA. EOMi. Conjunctivae/corneas clear, no icterus, non-injected. Neck: Supple, with full range of motion. No jugular venous distention. Trachea midline. Respiratory:  Normal respiratory effort. Clear to auscultation, bilaterally without Rales/Wheezes/Rhonchi. Cardiovascular:  Regular rate and rhythm without murmurs, rubs or gallops. Abdomen: Soft, non-tender, non-distended, without rebound or guarding. Normal bowel sounds. Musculoskeletal:  No clubbing, cyanosis or edema bilaterally. Full range of motion without deformity. Skin: Skin color, texture, turgor normal.  No rashes or lesions. Neurologic:  Neurovascularly intact without any focal sensory/motor deficits. Cranial nerves: II-XII intact, grossly intact. No facial asymmetry, tongue midline.    Psychiatric:  Alert and oriented, thought content appropriate  Capillary Refill: Brisk,< 3 seconds   Peripheral Pulses: +2 palpable, equal bilaterally       LABS:    Lab Results   Component Value Date    WBC 8.0 07/21/2022    HGB 7.8 (L) 07/21/2022    HCT 23.5 (L) 07/21/2022    MCV 87.0 07/21/2022     07/21/2022    LYMPHOPCT 4.5 07/21/2022    RBC 2.71 (L) 07/21/2022    MCH 28.8 07/21/2022    MCHC 33.1 07/21/2022    RDW 15.1 07/21/2022       Lab Results   Component Value Date    CREATININE 3.6 (H) 07/21/2022    BUN 91 (HH) 07/21/2022     (L) 07/21/2022    K 4.6 07/21/2022    CL 95 (L) 07/21/2022    CO2 21 07/21/2022       Lab Results   Component Value Date/Time    MG 2.40 07/21/2022 04:50 AM       Lab Results   Component Value Date    ALT 10 07/18/2022    AST 9 (L) 07/18/2022    ALKPHOS 101 07/18/2022    BILITOT 0.4 07/18/2022        No flowsheet data found. Lab Results   Component Value Date    LABA1C 6.8 06/21/2022       Imaging:  XR CHEST PORTABLE   Final Result   1. No significant change. VL Extremity Venous Bilateral   Final Result      CT CHEST WO CONTRAST   Final Result   1. Interstitial pulmonary edema with suspected peribronchovascular alveolar   edema, suggesting congestive heart failure given mild cardiomegaly and trace   bilateral pleural effusions. Interstitial pneumonia could appear similar. 2. Patchy peribronchovascular groundglass opacities most prominent near the   lung bases are most likely alveolar edema, although pneumonia could appear   similar. 3. Mild bronchial wall thickening potentially due to pulmonary vascular   congestion, reactive airways disease, or bronchitis. 4. At least minimal anasarca. XR CHEST PORTABLE   Final Result   1. Pulmonary vascular congestion with suggestion of perihilar edema,   potentially congestive heart failure given mild cardiomegaly and possible   trace bilateral pleural effusions. Pneumonia could appear similar. 2. Bibasilar airspace opacities most likely due atelectasis, pneumonia and   aspiration could appear similar.              Scheduled and prn Medications:    Scheduled Meds:   insulin lispro  0-8 Units SubCUTAneous TID     insulin lispro  0-4 Units SubCUTAneous Nightly    insulin lispro  0-8 Units SubCUTAneous Q4H    ipratropium-albuterol  1 ampule Inhalation Q4H WA    guaiFENesin  600 mg Oral BID    methylPREDNISolone  40 mg IntraVENous Daily    ferrous sulfate  324 mg Oral TID     aspirin  81 mg Oral Daily    atorvastatin  80 mg Oral Daily    carvedilol  6.25 mg Oral BID     pantoprazole  40 mg Oral QPM    citalopram  40 mg Oral QPM    sodium chloride flush  5-40 mL IntraVENous 2 times per day    heparin (porcine)  5,000 Units SubCUTAneous 3 times per day    cefTRIAXone (ROCEPHIN) IV  2,000 mg IntraVENous Q24H    And    azithromycin  500 mg Oral Q24H    melatonin  4.5 mg Oral Nightly    gabapentin  200 mg Oral TID     Continuous Infusions:   dextrose      furosemide (LASIX) 1mg/ml infusion 5 mg/hr (07/20/22 1425)    sodium chloride      dextrose       PRN Meds:.glucose, dextrose bolus **OR** dextrose bolus, glucagon (rDNA), dextrose, albuterol, sodium chloride flush, sodium chloride, polyethylene glycol, acetaminophen **OR** acetaminophen, ondansetron, ipratropium-albuterol, dextrose bolus **OR** dextrose bolus, glucagon (rDNA), dextrose, benzonatate    Assessment & Plan:          Acute resp failure  with hypoxia  - O2 12L  - does not wear O2 at home   - CT : pulm edema, possible PNA, CHF  - monitor and wean O2 for > 94%  - IV antibx broad spectrum for poss gram neg pna  - procalc normal  - duoneb q4h w/a with PRN q4h albuterol nebs  - mucinex  - acapella + IS  - will repeat CXR to compare to prior.  - may need right heart cath with cardiology   -consult to pulmonology for poss BAL/Bronch? Mucous plug?          Possibly pneumonia on CT  - IV rocephin and azithromycin for now  - procalcitonin normal  - afebrile   - reports some cough            NEHA on CKD, stage 3  - consult to nephrology  - BMP daily  - avoid nephrotoxic meds   - renal dosing gabapentin to 200mg tid rather than home dose 600mg tid  - monitor BP  - received lasix in ED, and cont bid IV lasix with consideration for NEHA     Anemia  - workup in place  - no active bleeding  - follow daily CBC  - hemoccult, FE/TIBC/ferritin/B12/retic ct  - started ferrous sulfate tid for low iron and TIBC     HTN  - cont home meds  - hold ACEi in setting of NEHA        DM, T2  - lantus home dose: 54 units daily/ 20 units nightly - holding for now until BS require it  - poct ac/hs  - A1c 6/21/22   6.8  - will do low dose SSI for now while glucose on the lower side  - adjust accordingly as needed     CHF  Last echo 1/4/22:   EF 60-65%. The right ventricle is normal in size and function. Aortic sclerosis    Trivial mitral and tricuspid regurgitation. Lasix 40mg daily - home dose, recently reduced by PCP from bid to daily  - IV lasix while admitted  - dopplers bilat  - bNP 1518  - trop neg  - strict I/O  - daily wts  - bilat venous dopplers to r/o DVT for edema  - CT : interstitial pulmonary edema with peribronchovascular alveolar edema, suggesting CHF given mild cardiomegaly and trace bilat pleural effusions. Insterstitial pneumonia could appear similar. 2. Patchy peribronchovascular groundglass opacities most prominent near the lung bases are most likely alveolar edema, although pneumonia could appear similar . 3. Mild bronchial wall thickening potentially due to pulmonary vascular congestion, reactive airway disease or bronchitis, at least minimal anasarca  - consult to cardiology for CHF    considering right heart cath         hx back pain  - prior laminectomy    Continue current regimen/therapies. Monitor. Adjust medical regimen as appropriate. Body mass index is 43.97 kg/m². The patient and / or the family were informed of the results of any tests, a time was given to answer questions, a plan was proposed and they agreed with plan. DVT ppx: hep  GI ppx: protonix    Diet: ADULT DIET; Regular; 4 carb choices (60 gm/meal);  Low Sodium (2 gm); 1500 ml    Consults:  IP CONSULT TO HEART FAILURE NURSE/COORDINATOR  IP CONSULT TO DIETITIAN  IP CONSULT TO NEPHROLOGY  IP CONSULT TO SPIRITUAL SERVICES  IP CONSULT TO SOCIAL WORK  IP CONSULT TO CARDIOLOGY  IP CONSULT TO PULMONOLOGY    DISPO/placement plan: pending    Code Status: Full Code      MADISON Hayes - NALINI  07/21/22

## 2022-07-21 NOTE — PROGRESS NOTES
Physical Therapy  Pashazulema Bagley  [id number]  [phone]  7/21/2022  13:15 pm    Attempt Note    Therapist to room to see patient. Patient was in [facility] chair with lunch and declined therapy at this time. She reported being \"antsy\" and upset that her lunch had been incorrect. No reports of pain at this time. Will attempt to see later as time allows and patient able to participate.     Electronically signed by Godfrey Bañuelos on 7/21/2022 at 1:25 PM  I attest that I was present for and made a skilled & mindful clinical judgement during the evaluation and/or treatment of this patient on 7/21/2022  Electronically signed by Rupert Stauffer, [address] [place] [address] Drive [id number])  on 7/21/2022 at 1:51 PM

## 2022-07-21 NOTE — PROGRESS NOTES
Office: [phone]       Fax: [phone]      Nephrology Progress Note        Patient's Name: Ollie Fernandes Date: 7/18/2022  Date of Visit: 7/21/2022    Reason for Consult:  NEHA      History of Present Illness: Ava Thurston is a 74 y.o. female with PMHx of hypertension,diabetes mellitus,CKD, CHF who was hospitalized on 7/18/2022 with complaints of shortness of breath   Has about 30 lb wt gain since last year  Per pt, her lasix was decreased was her PCP  No chest pain   No dysuria, hematuria  NSAID use: Denies   IV contrast: None recent   Home meds reviewed    INTERVAL HISTORY    Feels better  Shortness of breath: Yes dyspnea on exertion   UOP: Good   Creat: trending down  oxygen req better       Medications:    Allergies:  Doxycycline    Scheduled Meds:   insulin lispro  0-8 Units SubCUTAneous TID WC    insulin lispro  0-4 Units SubCUTAneous Nightly    insulin glargine  6 Units SubCUTAneous Nightly    ipratropium-albuterol  1 ampule Inhalation Q4H WA    guaiFENesin  600 mg Oral BID    methylPREDNISolone  40 mg IntraVENous Daily    ferrous sulfate  324 mg Oral TID WC    aspirin  81 mg Oral Daily    atorvastatin  80 mg Oral Daily    carvedilol  6.25 mg Oral BID WC    pantoprazole  40 mg Oral QPM    citalopram  40 mg Oral QPM    sodium chloride flush  5-40 mL IntraVENous 2 times per day    heparin (porcine)  5,000 Units SubCUTAneous 3 times per day    cefTRIAXone (ROCEPHIN) IV  2,000 mg IntraVENous Q24H    melatonin  4.5 mg Oral Nightly    gabapentin  200 mg Oral TID     Continuous Infusions:   dextrose      furosemide (LASIX) 1mg/ml infusion 5 mg/hr (07/21/22 0859)    sodium chloride 20 mL/hr at 07/21/22 0901    dextrose         Labs:  CBC:   Recent Labs     07/19/22  0554 07/20/22  0602 07/21/22  0451   WBC 8.7 7.6 8.0   HGB 8.0* 7.5* 7.8*  218 243       Ca/Mg/Phos:   Recent Labs     07/19/22  0554 07/20/22  0602 07/21/22  0450   CALCIUM 8.7 8.9 9.0   MG 2.10 2.30 2.40       UA:  No results for input(s): Delene Belt, GLUCOSEU, BILIRUBINUR, KETUA, SPECGRAV, BLOODU, PHUR, PROTEINU, UROBILINOGEN, NITRU, LEUKOCYTESUR, Shanna Room in the last 72 hours. Urine Microscopic:   No results for input(s): LABCAST, BACTERIA, COMU, HYALCAST, WBCUA, RBCUA, EPIU in the last 72 hours. Urine Chemistry:   Recent Labs     07/20/22  1843   LABCREA 101.2         Objective:     Vitals: BP (!) 147/68   Pulse 67   Temp 97.4 °F (36.3 °C) (Oral)   Resp 16   Ht 5' 3\" (1.6 m)   Wt 248 lb 3.8 oz (112.6 kg)   LMP 06/15/1999   SpO2 97%   BMI 43.97 kg/m²    Wt Readings from Last 3 Encounters:   07/21/22 248 lb 3.8 oz (112.6 kg)   06/21/22 235 lb (106.6 kg)   01/10/22 220 lb (99.8 kg)      24HR INTAKE/OUTPUT:    Intake/Output Summary (Last 24 hours) at 7/21/2022 0915  Last data filed at 7/21/2022 0805  Gross per 24 hour   Intake 1120 ml   Output 2050 ml   Net -930 ml     + UM    Constitutional:  awake, NAD  HEENT:  MMM, No icterus  Neck: no bruits, No JVD  Cardiovascular:  reg rhythm  Respiratory: Diminished at bases   Abdomen:  +BS, soft, NT, ND  Ext: + lower extremity edema    CNS: alert, no agitation    IMAGING:  XR CHEST PORTABLE   Final Result   1. No significant change. VL Extremity Venous Bilateral   Final Result      CT CHEST WO CONTRAST   Final Result   1. Interstitial pulmonary edema with suspected peribronchovascular alveolar   edema, suggesting congestive heart failure given mild cardiomegaly and trace   bilateral pleural effusions. Interstitial pneumonia could appear similar. 2. Patchy peribronchovascular groundglass opacities most prominent near the   lung bases are most likely alveolar edema, although pneumonia could appear   similar.    3. Mild bronchial wall thickening potentially due to pulmonary vascular   congestion,

## 2022-07-21 NOTE — PROGRESS NOTES
Pulmonary Progress Note     Patient's name: Dianna Suero  Medical Record Number: [id number]  Patient's account/billing number: [de-identified]  Patient's YOB: 1955  Age: 74 y.o. Date of Admission: 7/18/2022  2:26 AM  Date of Consult: 7/21/2022      Primary Care Physician: Silver Bee MD      Code Status: Full Code    Reason for consult: acute respiratory failure with hypoxia     Assessment and Plan     Acute respiratory failure with hypoxia   Acute on chronic diastolic CHF  CAP suspect viral   NEHA on CKD  Anemia of chr disease. Plan:  Diuresis per nephrology. On CAP coverage continue x 7 days  Steroid x 5 days   Aggressive IS, scheduled bronchodilators   Titrate O2 to keep sat > 90%    Subjective:  O2 requirement improved  Cultures negative todate  CRP elevated and so is BNP  On lasix gtt    HISTORY OF PRESENT ILLNESS:   Mr./Ms. Dianna Suero is a 74 y.o.   lady with PMH stated below significant for former smoker, CKDIII, Chronic diastolic CHF, DMII, and h/o DVT, presented with increasing sob, and fatigue, no fever or chills, no cough or sputum, no chest pain,   LE edema, stated that her diuretics dose was lowered by pcp recently.     CT chest with bibasilar infiltrate patchy GGO,     Flu and covid negative           REVIEW OF SYSTEMS:  Review of Systems -   General ROS: fatigue   Psychological ROS: negative  Ophthalmic ROS: negative  ENT ROS: negative  Allergy and Immunology ROS: negative  Hematological and Lymphatic ROS: negative  Endocrine ROS: negative  Breast ROS: negative  Respiratory ROS: sob, no cough, no sputum   Cardiovascular ROS: sob, no chest pain, has LE edema   Gastrointestinal ROS:negative  Genito-Urinary ROS: negative  Musculoskeletal ROS: negative  Neurological ROS: negative  Dermatological ROS: negative        Physical Exam:    Vitals: BP (!) 147/68   Pulse 67   Temp 97.4 °F (36.3 °C) (Oral)   Resp 16 Ht 5' 3\" (1.6 m)   Wt 248 lb 3.8 oz (112.6 kg)   LMP 06/15/1999   SpO2 97%   BMI 43.97 kg/m²     Last Body weight:   Wt Readings from Last 3 Encounters:   07/21/22 248 lb 3.8 oz (112.6 kg)   06/21/22 235 lb (106.6 kg)   01/10/22 220 lb (99.8 kg)       Body Mass Index : Body mass index is 43.97 kg/m². Intake and Output summary:   Intake/Output Summary (Last 24 hours) at 7/21/2022 1130  Last data filed at 7/21/2022 1054  Gross per 24 hour   Intake 880 ml   Output 2300 ml   Net -1420 ml         Physical Examination:     PHYSICAL EXAM:    Gen:  mild acute distress  Eyes: PERRL. Anicteric sclera. No conjunctival injection. ENT: No discharge. Posterior oropharynx clear. External appearance of ears and nose normal.  Neck: Trachea midline. No mass, no lymphadenopathy    Resp:  severely diminished with no active wheezing, dull percussion notes bibasilar   CV: Regular rate. Regular rhythm. No murmur or rub. No edema. GI: Soft, Non-tender. Non-distended. +BS  Skin: Warm, dry, w/o erythema. Lymph: No cervical or supraclavicular LAD. M/S: No cyanosis. No clubbing. Neuro:  CN 2-12 tested, no focal neurologic deficit. Moves all extremities  Psych: Awake and alert, Oriented x 3. Judgement and insight appropriate. Mood stable. Laboratory findings:-    CBC:   Recent Labs     07/21/22  0451   WBC 8.0   HGB 7.8*          BMP:    Recent Labs     07/19/22  0554 07/20/22  0602 07/21/22  0450    134* 131*   K 4.0 4.3 4.6   CL 99 99 95*   CO2 23 22 21   BUN 75* 80* 91*   CREATININE 3.6* 4.1* 3.6*   GLUCOSE 118* 144* 301*       S. Calcium:  Recent Labs     07/21/22  0450   CALCIUM 9.0       S. Ionized Calcium:No results for input(s): IONCA in the last 72 hours. S. Magnesium:  Recent Labs     07/21/22  0450   MG 2.40         S.  Glucose:  Recent Labs     07/20/22  1627 07/20/22  2028 07/21/22  0700   POCGLU 195* 292* 308*         Hepatic functions:   No results for input(s): ALKPHOS, ALT, AST, PROT, BILITOT, BILIDIR, LABALBU in the last 72 hours. Cardiac enzymes:  No results for input(s): CKTOTAL, CKMB, CKMBINDEX, TROPONINI in the last 72 hours. Thyroid functions:   Lab Results   Component Value Date/Time    TSH 2.24 10/04/2017 10:00 AM            Radiology Review:  Pertinent images / reports were reviewed as a part of this visit. CT Chest w/ contrast: No results found for this or any previous visit. CT Chest w/o contrast: Results for orders placed during the hospital encounter of 07/18/22    CT CHEST WO CONTRAST    Narrative  EXAMINATION:  CT OF THE CHEST WITHOUT CONTRAST    7/18/2022 3:30 am    TECHNIQUE:  CT of the chest was performed without the administration of intravenous  contrast. Multiplanar reformatted images are provided for review. Automated  exposure control, iterative reconstruction, and/or weight based adjustment of  the mA/kV was utilized to reduce the radiation dose to as low as reasonably  achievable. COMPARISON:  Chest radiograph 07/18/2022, chest CTA 10/05/2007    HISTORY:  ORDERING SYSTEM PROVIDED HISTORY: sob  TECHNOLOGIST PROVIDED HISTORY:  Reason for exam:->sob  Decision Support Exception - unselect if not a suspected or confirmed  emergency medical condition->Emergency Medical Condition (MA)  Reason for Exam: sob    FINDINGS:  Lack of intravenous contrast administration, partially limiting evaluation of  the soft tissues and vasculature. MEDIASTINUM:  Mild cardiomegaly. Relative hyperdensity of the left  ventricular myocardium with respect to blood, suggesting underlying anemia. Mitral and aortic valve annular calcifications. Trace pericardial effusion. Mild to moderate coronary and systemic atherosclerotic calcifications. Normal variant direct origin of the left vertebral artery from the aortic  arch. Nonenlarged to mildly enlarged mediastinal lymph nodes, likely  reactive. No obvious hilar lymphadenopathy. LUNGS/PLEURA:  Patent central airways.   Mild Results for orders placed during the hospital encounter of 07/18/22    XR CHEST PORTABLE    Narrative  EXAMINATION:  ONE XRAY VIEW OF THE CHEST    7/20/2022 9:23 am    COMPARISON:  07/18/2022    HISTORY:  ORDERING SYSTEM PROVIDED HISTORY: SOB- worsening  TECHNOLOGIST PROVIDED HISTORY:  Reason for exam:->SOB- worsening  Reason for Exam: SOB- worsening    FINDINGS:  No change in the bilateral airspace disease either due to pulmonary edema or  pneumonia. No change in the mild pulmonary vascular congestion and small  bilateral pleural effusions. Cardiomegaly is stable. There is no  pneumothorax. Impression  1. No significant change.         Natalia Bonds MD, MMihaiD.            7/21/2022, 11:30 AM

## 2022-07-21 NOTE — PROGRESS NOTES
PM Assessment completed. /71   Pulse 68   Temp 97.8 °F (36.6 °C) (Oral)   Resp 16   Ht 5' 3\" (1.6 m)   Wt 250 lb 10.6 oz (113.7 kg)   LMP 06/15/1999   SpO2 93%   BMI 44.40 kg/m²     Alert and oriented x4. Denies pain. Up to the BR x 1. C/o Dyspnea with exertion. Respirations shallow. Lungs with crackles posterior BLL. Tolerates @ 250 using the I&S. Calls appropriately. Plan of care and safety measures reviewed with the patient. Needed items including call light in reach and exit alarm in place.        Electronically signed by Odalys Hollingsworth RN on 7/20/2022 at 8:26 PM

## 2022-07-21 NOTE — PROGRESS NOTES
Perfect serve message to Nephrologist about critical labs:    Critical lab: BUN 90.9 and Cr 3.2. Yesterday BUN 80 and Cr 4.1.

## 2022-07-22 LAB
ANION GAP SERPL CALCULATED.3IONS-SCNC: 18 MMOL/L (ref 3–16)
BASOPHILS ABSOLUTE: 0 K/UL (ref 0–0.2)
BASOPHILS RELATIVE PERCENT: 0.1 %
BLOOD CULTURE, ROUTINE: NORMAL
BUN BLDV-MCNC: 100 MG/DL (ref 7–20)
CALCIUM SERPL-MCNC: 9.3 MG/DL (ref 8.3–10.6)
CHLORIDE BLD-SCNC: 94 MMOL/L (ref 99–110)
CO2: 21 MMOL/L (ref 21–32)
CREAT SERPL-MCNC: 3.6 MG/DL (ref 0.6–1.2)
CULTURE, BLOOD 2: NORMAL
EOSINOPHILS ABSOLUTE: 0 K/UL (ref 0–0.6)
EOSINOPHILS RELATIVE PERCENT: 0 %
GFR AFRICAN AMERICAN: 15
GFR NON-AFRICAN AMERICAN: 13
GLUCOSE BLD-MCNC: 312 MG/DL (ref 70–99)
GLUCOSE BLD-MCNC: 358 MG/DL (ref 70–99)
GLUCOSE BLD-MCNC: 363 MG/DL (ref 70–99)
GLUCOSE BLD-MCNC: 386 MG/DL (ref 70–99)
GLUCOSE BLD-MCNC: 386 MG/DL (ref 70–99)
HCT VFR BLD CALC: 24.7 % (ref 36–48)
HEMOGLOBIN: 8 G/DL (ref 12–16)
LYMPHOCYTES ABSOLUTE: 0.5 K/UL (ref 1–5.1)
LYMPHOCYTES RELATIVE PERCENT: 4.2 %
MAGNESIUM: 2.6 MG/DL (ref 1.8–2.4)
MCH RBC QN AUTO: 28.6 PG (ref 26–34)
MCHC RBC AUTO-ENTMCNC: 32.4 G/DL (ref 31–36)
MCV RBC AUTO: 88.2 FL (ref 80–100)
MONOCYTES ABSOLUTE: 0.7 K/UL (ref 0–1.3)
MONOCYTES RELATIVE PERCENT: 5.1 %
NEUTROPHILS ABSOLUTE: 11.7 K/UL (ref 1.7–7.7)
NEUTROPHILS RELATIVE PERCENT: 90.6 %
PDW BLD-RTO: 15.1 % (ref 12.4–15.4)
PERFORMED ON: ABNORMAL
PLATELET # BLD: 286 K/UL (ref 135–450)
PMV BLD AUTO: 9.9 FL (ref 5–10.5)
POTASSIUM SERPL-SCNC: 4.7 MMOL/L (ref 3.5–5.1)
RBC # BLD: 2.8 M/UL (ref 4–5.2)
SODIUM BLD-SCNC: 133 MMOL/L (ref 136–145)
WBC # BLD: 12.9 K/UL (ref 4–11)

## 2022-07-22 PROCEDURE — 97110 THERAPEUTIC EXERCISES: CPT

## 2022-07-22 PROCEDURE — 1200000000 HC SEMI PRIVATE

## 2022-07-22 PROCEDURE — 6360000002 HC RX W HCPCS: Performed by: STUDENT IN AN ORGANIZED HEALTH CARE EDUCATION/TRAINING PROGRAM

## 2022-07-22 PROCEDURE — 6370000000 HC RX 637 (ALT 250 FOR IP): Performed by: STUDENT IN AN ORGANIZED HEALTH CARE EDUCATION/TRAINING PROGRAM

## 2022-07-22 PROCEDURE — 6370000000 HC RX 637 (ALT 250 FOR IP): Performed by: NURSE PRACTITIONER

## 2022-07-22 PROCEDURE — 2580000003 HC RX 258: Performed by: STUDENT IN AN ORGANIZED HEALTH CARE EDUCATION/TRAINING PROGRAM

## 2022-07-22 PROCEDURE — 97116 GAIT TRAINING THERAPY: CPT

## 2022-07-22 PROCEDURE — 6360000002 HC RX W HCPCS: Performed by: INTERNAL MEDICINE

## 2022-07-22 PROCEDURE — 99232 SBSQ HOSP IP/OBS MODERATE 35: CPT | Performed by: INTERNAL MEDICINE

## 2022-07-22 PROCEDURE — 97530 THERAPEUTIC ACTIVITIES: CPT

## 2022-07-22 PROCEDURE — 85025 COMPLETE CBC W/AUTO DIFF WBC: CPT

## 2022-07-22 PROCEDURE — 6370000000 HC RX 637 (ALT 250 FOR IP): Performed by: HOSPITALIST

## 2022-07-22 PROCEDURE — 80048 BASIC METABOLIC PNL TOTAL CA: CPT

## 2022-07-22 PROCEDURE — 2700000000 HC OXYGEN THERAPY PER DAY

## 2022-07-22 PROCEDURE — 83735 ASSAY OF MAGNESIUM: CPT

## 2022-07-22 PROCEDURE — 94761 N-INVAS EAR/PLS OXIMETRY MLT: CPT

## 2022-07-22 PROCEDURE — 94640 AIRWAY INHALATION TREATMENT: CPT

## 2022-07-22 PROCEDURE — 94669 MECHANICAL CHEST WALL OSCILL: CPT

## 2022-07-22 PROCEDURE — 94680 O2 UPTK RST&XERS DIR SIMPLE: CPT

## 2022-07-22 PROCEDURE — 99232 SBSQ HOSP IP/OBS MODERATE 35: CPT | Performed by: HOSPITALIST

## 2022-07-22 PROCEDURE — 36415 COLL VENOUS BLD VENIPUNCTURE: CPT

## 2022-07-22 RX ORDER — INSULIN LISPRO 100 [IU]/ML
5 INJECTION, SOLUTION INTRAVENOUS; SUBCUTANEOUS
Status: DISCONTINUED | OUTPATIENT
Start: 2022-07-22 | End: 2022-07-23

## 2022-07-22 RX ORDER — INSULIN LISPRO 100 [IU]/ML
0-4 INJECTION, SOLUTION INTRAVENOUS; SUBCUTANEOUS NIGHTLY
Status: DISCONTINUED | OUTPATIENT
Start: 2022-07-22 | End: 2022-07-24 | Stop reason: HOSPADM

## 2022-07-22 RX ORDER — MAGNESIUM SULFATE 1 G/100ML
1000 INJECTION INTRAVENOUS ONCE
Status: DISCONTINUED | OUTPATIENT
Start: 2022-07-22 | End: 2022-07-22

## 2022-07-22 RX ORDER — INSULIN LISPRO 100 [IU]/ML
0-16 INJECTION, SOLUTION INTRAVENOUS; SUBCUTANEOUS
Status: DISCONTINUED | OUTPATIENT
Start: 2022-07-22 | End: 2022-07-24 | Stop reason: HOSPADM

## 2022-07-22 RX ORDER — INSULIN GLARGINE 100 [IU]/ML
12 INJECTION, SOLUTION SUBCUTANEOUS NIGHTLY
Status: DISCONTINUED | OUTPATIENT
Start: 2022-07-22 | End: 2022-07-23

## 2022-07-22 RX ORDER — TORSEMIDE 20 MG/1
50 TABLET ORAL 2 TIMES DAILY
Status: DISCONTINUED | OUTPATIENT
Start: 2022-07-22 | End: 2022-07-23

## 2022-07-22 RX ADMIN — FERROUS SULFATE TAB EC 324 MG (65 MG FE EQUIVALENT) 324 MG: 324 (65 FE) TABLET DELAYED RESPONSE at 12:19

## 2022-07-22 RX ADMIN — GUAIFENESIN 600 MG: 600 TABLET ORAL at 20:57

## 2022-07-22 RX ADMIN — INSULIN LISPRO 5 UNITS: 100 INJECTION, SOLUTION INTRAVENOUS; SUBCUTANEOUS at 17:19

## 2022-07-22 RX ADMIN — HEPARIN SODIUM 5000 UNITS: 5000 INJECTION INTRAVENOUS; SUBCUTANEOUS at 06:09

## 2022-07-22 RX ADMIN — PANTOPRAZOLE SODIUM 40 MG: 40 TABLET, DELAYED RELEASE ORAL at 17:04

## 2022-07-22 RX ADMIN — HEPARIN SODIUM 5000 UNITS: 5000 INJECTION INTRAVENOUS; SUBCUTANEOUS at 20:58

## 2022-07-22 RX ADMIN — GUAIFENESIN 600 MG: 600 TABLET ORAL at 08:17

## 2022-07-22 RX ADMIN — Medication 4.5 MG: at 20:57

## 2022-07-22 RX ADMIN — INSULIN LISPRO 16 UNITS: 100 INJECTION, SOLUTION INTRAVENOUS; SUBCUTANEOUS at 12:20

## 2022-07-22 RX ADMIN — GABAPENTIN 200 MG: 100 CAPSULE ORAL at 20:57

## 2022-07-22 RX ADMIN — CARVEDILOL 6.25 MG: 6.25 TABLET, FILM COATED ORAL at 17:04

## 2022-07-22 RX ADMIN — INSULIN LISPRO 16 UNITS: 100 INJECTION, SOLUTION INTRAVENOUS; SUBCUTANEOUS at 08:26

## 2022-07-22 RX ADMIN — INSULIN LISPRO 4 UNITS: 100 INJECTION, SOLUTION INTRAVENOUS; SUBCUTANEOUS at 20:58

## 2022-07-22 RX ADMIN — FERROUS SULFATE TAB EC 324 MG (65 MG FE EQUIVALENT) 324 MG: 324 (65 FE) TABLET DELAYED RESPONSE at 17:04

## 2022-07-22 RX ADMIN — INSULIN GLARGINE 12 UNITS: 100 INJECTION, SOLUTION SUBCUTANEOUS at 20:59

## 2022-07-22 RX ADMIN — ASPIRIN 81 MG: 81 TABLET, CHEWABLE ORAL at 08:17

## 2022-07-22 RX ADMIN — TORSEMIDE 50 MG: 20 TABLET ORAL at 14:48

## 2022-07-22 RX ADMIN — FERROUS SULFATE TAB EC 324 MG (65 MG FE EQUIVALENT) 324 MG: 324 (65 FE) TABLET DELAYED RESPONSE at 08:17

## 2022-07-22 RX ADMIN — IPRATROPIUM BROMIDE AND ALBUTEROL SULFATE 1 AMPULE: .5; 3 SOLUTION RESPIRATORY (INHALATION) at 19:43

## 2022-07-22 RX ADMIN — INSULIN LISPRO 5 UNITS: 100 INJECTION, SOLUTION INTRAVENOUS; SUBCUTANEOUS at 12:20

## 2022-07-22 RX ADMIN — ATORVASTATIN CALCIUM 80 MG: 80 TABLET, FILM COATED ORAL at 08:17

## 2022-07-22 RX ADMIN — METHYLPREDNISOLONE SODIUM SUCCINATE 40 MG: 40 INJECTION, POWDER, FOR SOLUTION INTRAMUSCULAR; INTRAVENOUS at 08:17

## 2022-07-22 RX ADMIN — IPRATROPIUM BROMIDE AND ALBUTEROL SULFATE 1 AMPULE: .5; 3 SOLUTION RESPIRATORY (INHALATION) at 08:55

## 2022-07-22 RX ADMIN — HEPARIN SODIUM 5000 UNITS: 5000 INJECTION INTRAVENOUS; SUBCUTANEOUS at 14:48

## 2022-07-22 RX ADMIN — GABAPENTIN 200 MG: 100 CAPSULE ORAL at 14:48

## 2022-07-22 RX ADMIN — GABAPENTIN 200 MG: 100 CAPSULE ORAL at 08:17

## 2022-07-22 RX ADMIN — CEFTRIAXONE 2000 MG: 2 INJECTION, POWDER, FOR SOLUTION INTRAMUSCULAR; INTRAVENOUS at 08:21

## 2022-07-22 RX ADMIN — SODIUM CHLORIDE, PRESERVATIVE FREE 10 ML: 5 INJECTION INTRAVENOUS at 21:03

## 2022-07-22 RX ADMIN — INSULIN LISPRO 16 UNITS: 100 INJECTION, SOLUTION INTRAVENOUS; SUBCUTANEOUS at 17:19

## 2022-07-22 RX ADMIN — INSULIN LISPRO 5 UNITS: 100 INJECTION, SOLUTION INTRAVENOUS; SUBCUTANEOUS at 08:26

## 2022-07-22 RX ADMIN — CITALOPRAM HYDROBROMIDE 40 MG: 20 TABLET ORAL at 17:04

## 2022-07-22 RX ADMIN — CARVEDILOL 6.25 MG: 6.25 TABLET, FILM COATED ORAL at 08:17

## 2022-07-22 ASSESSMENT — PAIN SCALES - GENERAL: PAINLEVEL_OUTOF10: 0

## 2022-07-22 NOTE — PROGRESS NOTES
Physical Therapy  [facility]  Physical Therapy Treatment Session    Name: Ava Thurston  : 1955  MRN: [id number]  Date of Service: 2022    Discharge Recommendations:  Continue to assess pending progress, Patient would benefit from continued therapy after discharge, Home with Home health PT, 24 hour supervision or assist        Ava Thurston scored a 18/24 on the AM-PAC short mobility form. Current research shows that an AM-PAC score of 18 or greater is typically associated with a discharge to the patient's home setting. Based on the patient's AM-PAC score and their current functional mobility deficits, it is recommended that the patient have 2-3 sessions per week of Physical Therapy at d/c to increase the patient's independence. At this time, this patient demonstrates the endurance and safety to discharge home with HHPT and a follow up treatment frequency of 2-3x/wk. Please see assessment section for further patient specific details. If patient discharges prior to next session this note will serve as a discharge summary. Please see below for the latest assessment towards goals. Patient Diagnosis(es): The primary encounter diagnosis was Acute respiratory failure with hypoxia ([address] A diagnosis of Pneumonia of both lungs due to infectious organism, unspecified part of lung was also pertinent to this visit.   Past Medical History:  has a past medical history of Abnormal echocardiogram, Back pain, Cardiomyopathy ([address] Chipped tooth, Clostridium difficile diarrhea, Dental crown present, Depressive disorder, Diabetic infection of right foot ([address] Diabetic ulcer of toe of left foot associated with diabetes mellitus due to underlying condition, with fat layer exposed ([address] Diabetic ulcer of toe of left foot associated with type 2 diabetes mellitus, with fat layer exposed ([address] DVT (deep venous thrombosis) ([address] HTN (hypertension), Hx of blood clots, Ischemic cardiomyopathy, Kidney disease, Meniere's disease, Mixed hyperlipidemia, Nicotine abuse, NSTEMI (non-ST elevated myocardial infarction) ([facility] [address] ANGLE (obstructive sleep apnea), Osteomyelitis of ankle or foot, acute, left ([address] Pneumonia, PONV (postoperative nausea and vomiting), Spinal epidural abscess, and Type II diabetes mellitus, uncontrolled ([address] Past Surgical History:  has a past surgical history that includes Wound debridement (3/12/14); back surgery (3/2014); Hysterectomy (6/15/1999); Nasal septum surgery; Upper gastrointestinal endoscopy (N/A, 2/8/2019); Upper gastrointestinal endoscopy (N/A, 2/8/2019); Sleeve Gastrectomy (04/02/2019); Sleeve Gastrectomy (N/A, 4/2/2019); Hammer toe surgery (Left, 8/28/2020); Foot Debridement (Left, 9/8/2020); Nerve Surgery (Left, 9/10/2020); Endoscopy, colon, diagnostic; Dilation and curettage of uterus; other surgical history; and Pressure ulcer debridement (Left, 10/23/2020). Assessment   Body Structures, Functions, Activity Limitations Requiring Skilled Therapeutic Intervention: Decreased safe awareness;Decreased endurance;Decreased balance  Assessment: Per H and P: \"The patient is a 74 y.o. female with past medical history of type 2 diabetes, CKD, previous history of DVT not on anticoagulation, cardiomyopathy with diastolic heart failure, hyperlipidemia, hypertension who presents to [facility] coming from home that over the last 2 days since she returned from a festival on Saturday she was noticing that while at the festival she was initially somewhat increasingly fatigued and short of breath. Active hospital issues: acute resp failure with hypoxia, Acute CHF, pneumonia, sepsis, anemia, NEHA on CKD. B LE dopplers 7-18-22 negative. Cardiology and Nephrology following. Today pt SBA for supine->sit bed mobility. Pt transfer sit<->stand CGA/SBA. Pt amb up to 50' RW CGA/SBA, no complaints of SOB. Pt amb up ~65' no AD CGA/SBA; no LOB.  Pt would benefit from continued PT for endurance training and functional mobility. Anticipate d/c home with HHPT 2-3x per/week and inital 24 hour assist/supervision. Will continue to follow. Therapy Prognosis: Good  Decision Making: Medium Complexity  Requires PT Follow-Up: Yes  Activity Tolerance  Activity Tolerance: Patient tolerated treatment well;Patient limited by fatigue;Patient limited by endurance     Plan   Plan  Plan: 3-5 times per week  Current Treatment Recommendations: Balance training, Functional mobility training, Transfer training, Endurance training, Gait training, Stair training, Safety education & training, Equipment evaluation, education, & procurement, Therapeutic activities  Safety Devices  Type of Devices: All fall risk precautions in place, Call light within reach, Chair alarm in place, Left in chair, Gait belt, Nurse notified     Restrictions  Restrictions/Precautions  Restrictions/Precautions: Fall Risk  Position Activity Restriction  Other position/activity restrictions: 3L O2 via NC     Subjective   General  Chart Reviewed: Yes  Patient assessed for rehabilitation services?: Yes  Additional Pertinent Hx: Per H and P:  \"The patient is a 74 y.o. female with past medical history of type 2 diabetes, CKD, previous history of DVT not on anticoagulation, cardiomyopathy with diastolic heart failure, hyperlipidemia, hypertension who presents to [facility] coming from home that over the last 2 days since she returned from a festival on Saturday she was noticing that while at the festival she was initially somewhat increasingly fatigued and short of breath. Active hospital issues: acute resp failure with hypoxia, Acute CHF, pneumonia, sepsis, anemia, NEHA on CKD. B LE dopplers 7-18-22 negative. Cardiology and Nephrology following. Response To Previous Treatment: Patient with no complaints from previous session.   Family / Caregiver Present: No  Referring Practitioner: MADISON Mayo - NALINI  Referral Date : 07/18/22  Subjective  Subjective: Pt supine in bed upon arrival. She is agreeable to PT session. Social/Functional History  Social/Functional History  Lives With: Alone  Type of Home: Apartment (2nd floor)  Home Layout: One level  Home Access: Stairs to enter with rails, Stairs to enter without rails  Entrance Stairs - Number of Steps: from garage: 13 steps 1 rail, then 13 steps B rails up to apartment or from outside 5 steps to landing, 13 steps to 2nd floor apt.   Bathroom Shower/Tub: Tub/Shower unit, Shower chair with back  [employer]: Handicap height  Bathroom Equipment: Shower chair  Has the patient had two or more falls in the past year or any fall with injury in the past year?: No  Receives Help From: Family, Friend(s)  ADL Assistance: Independent  Homemaking Assistance: Independent  Homemaking Responsibilities: Yes  Ambulation Assistance: Independent  Transfer Assistance: Independent  Active : Yes    Objective   Bed mobility  Supine to Sit: Stand by assistance (HOB elevated)  Sit to Supine: Unable to assess (Pt in recliner chair at end of session.)  Bed Mobility Comments: When sitting EOB SpO2 96% on 3L    Transfers  Sit to Stand: Stand by assistance;Contact guard assistance  Stand to sit: Stand by assistance;Contact guard assistance    Ambulation  Surface: level tile  Device: Rolling Walker  Other Apparatus: O2 (3L O2)  Assistance: Stand by assistance;Contact guard assistance  Gait Deviations: Slow Kendra;Decreased step length;Decreased step height  Distance: ~10' ~50'  More Ambulation?: Yes  Ambulation 2  Surface - 2: level tile  Device 2: No device  Other Apparatus 2: O2 (3L O2)  Assistance 2: Contact guard assistance;Stand by assistance  Gait Deviations: Slow Kendra;Decreased step length;Decreased step height  Distance: ~65  Comments: No LOB; SpO2 95% after amb  Stairs/Curb  Stairs?: No       Exercise Treatment: Seated Exercises: x15 ankle pumps maggi, x5 sit to stands      Other Activities: Pt amb RW CGA to bathroom, pt able to urinate and perform own pericare. Pt stood at stink to wash hands and brush teeth. AM-PAC Score  AM-PAC Inpatient Mobility Raw Score : 18 (07/22/22 1328)  AM-PAC Inpatient T-Scale Score : 43.63 (07/22/22 1328)  Mobility Inpatient CMS 0-100% Score: 46.58 (07/22/22 1328)  Mobility Inpatient CMS G-Code Modifier : CK (07/22/22 1328)       Goals  Short Term Goals  Time Frame for Short term goals: By acute discharge  Short term goal 1: Bed mobility SBA to EOB - MET 7/22 new goal bed mobility Eleanor  Short term goal 2: Transfers with RW, SBA, min cueing. Short term goal 3: Ambulation 48' with RW, min cueing for breathing technique. Patient Goals   Patient goals : None stated at thie time       Education  Patient Education  Education Given To: Patient  Education Provided: Role of Therapy;Plan of Care;Precautions; Equipment;Transfer Training  Education Method: Verbal  Education Outcome: Verbalized understanding      Therapy Time   Individual Concurrent Group Co-treatment   Time In 6568         Time Out 6459         Minutes 55         Timed Code Treatment Minutes: 54 Minutes       Electronically signed by AMALIA Steward on 7/22/2022 at 11:23 AM  Therapist was present, directed the patient's care, made skilled judgement, and was responsible for assessment and treatment of the patient.       Electronically signed by Maribell Villela PT [id number] on 7/22/2022 at 1:30 PM

## 2022-07-22 NOTE — CARE COORDINATION
7/22 met with patient at bedside to discuss PT/OT recommendation for continued therapy in a skilled setting. Patient does not want a SNF and states she will be able to manage at home- states her son will assist her up her steps and family will assist at home as needed. We discussed home care and she is agreeable-- no preference for home care agency--agreeable to  referral to [facility] . Notified Ravindra carlson/ [facility]      The Plan for Transition of Care is related to the following treatment goals: home     The Patient and/or patient representative  was provided with a choice of provider and agrees   with the discharge plan. [x] Yes [] No    Freedom of choice list was provided with basic dialogue that supports the patient's individualized plan of care/goals, treatment preferences and shares the quality data associated with the providers.  [x] Yes [] No  Electronically signed by Fabiola Rogel on 7/22/2022 at 12:57 PM  #[phone]

## 2022-07-22 NOTE — PROGRESS NOTES
Anticipating weekend discharge, Nisreen Sprague has had 60+ minutes of heart failure education.   She has a follow up appointment with Dr. Keo Ruiz, [email or web address], this is noted on her AVS

## 2022-07-22 NOTE — PLAN OF CARE
Problem: Discharge Planning  Goal: Discharge to home or other facility with appropriate resources  Outcome: Progressing  Flowsheets (Taken 7/21/2022 2038)  Discharge to home or other facility with appropriate resources:   Identify barriers to discharge with patient and caregiver   Arrange for needed discharge resources and transportation as appropriate   Identify discharge learning needs (meds, wound care, etc)   Refer to discharge planning if patient needs post-hospital services based on physician order or complex needs related to functional status, cognitive ability or social support system     Problem: Pain  Goal: Verbalizes/displays adequate comfort level or baseline comfort level  Outcome: Progressing  Flowsheets (Taken 7/19/2022 1357 by Red Taylor RN)  Verbalizes/displays adequate comfort level or baseline comfort level: Encourage patient to monitor pain and request assistance     Problem: Safety - Adult  Goal: Free from fall injury  Outcome: Progressing  Flowsheets (Taken 7/21/2022 2038)  Free From Fall Injury:   Instruct family/caregiver on patient safety   Based on caregiver fall risk screen, instruct family/caregiver to ask for assistance with transferring infant if caregiver noted to have fall risk factors     Problem: ABCDS Injury Assessment  Goal: Absence of physical injury  Outcome: Progressing  Flowsheets (Taken 7/21/2022 2038)  Absence of Physical Injury: Implement safety measures based on patient assessment     Problem: Skin/Tissue Integrity  Goal: Absence of new skin breakdown  Description: 1. Monitor for areas of redness and/or skin breakdown  2. Assess vascular access sites hourly  3. Every 4-6 hours minimum:  Change oxygen saturation probe site  4. Every 4-6 hours:  If on nasal continuous positive airway pressure, respiratory therapy assess nares and determine need for appliance change or resting period.   Outcome: Progressing     Problem: Chronic Conditions and Co-morbidities  Goal: Patient's chronic conditions and co-morbidity symptoms are monitored and maintained or improved  Outcome: Progressing  Flowsheets (Taken 7/21/2022 2038)  Care Plan - Patient's Chronic Conditions and Co-Morbidity Symptoms are Monitored and Maintained or Improved:   Monitor and assess patient's chronic conditions and comorbid symptoms for stability, deterioration, or improvement   Collaborate with multidisciplinary team to address chronic and comorbid conditions and prevent exacerbation or deterioration   Update acute care plan with appropriate goals if chronic or comorbid symptoms are exacerbated and prevent overall improvement and discharge     Problem: Respiratory - Adult  Goal: Achieves optimal ventilation and oxygenation  Outcome: Progressing  Flowsheets (Taken 7/21/2022 2038)  Achieves optimal ventilation and oxygenation:   Assess for changes in respiratory status   Assess for changes in mentation and behavior   Initiate smoking cessation protocol as indicated     Problem: Cardiovascular - Adult  Goal: Maintains optimal cardiac output and hemodynamic stability  Outcome: Progressing  Flowsheets (Taken 7/21/2022 2038)  Maintains optimal cardiac output and hemodynamic stability:   Monitor blood pressure and heart rate   Monitor urine output and notify Licensed Independent Practitioner for values outside of normal range   Assess for signs of decreased cardiac output   Administer fluid and/or volume expanders as ordered     Problem: Infection - Adult  Goal: Absence of infection at discharge  Outcome: Progressing  Flowsheets (Taken 7/21/2022 2038)  Absence of infection at discharge: Assess and monitor for signs and symptoms of infection     Problem: Metabolic/Fluid and Electrolytes - Adult  Goal: Electrolytes maintained within normal limits  Outcome: Progressing  Flowsheets (Taken 7/21/2022 2038)  Electrolytes maintained within normal limits:   Monitor labs and assess patient for signs and symptoms of electrolyte imbalances   Administer electrolyte replacement as ordered   Monitor response to electrolyte replacements, including repeat lab results as appropriate   Fluid restriction as ordered     Problem: Hematologic - Adult  Goal: Maintains hematologic stability  Outcome: Progressing  Flowsheets (Taken 7/21/2022 2038)  Maintains hematologic stability: Assess for signs and symptoms of bleeding or hemorrhage

## 2022-07-22 NOTE — CARE COORDINATION
07/22/22 1248   IMM Letter   IMM Letter given to Patient/Family/Significant other/Guardian/POA/by: second IMM letter given to patient and copy provided per GEREMIAS Lomeli RN   IMM Letter date given: 07/20/22   IMM Letter time given: 1450

## 2022-07-22 NOTE — PROGRESS NOTES
Hospital Medicine Progress Note      Admit Date: 7/18/2022       CC: F/U for increased sob    HPI:   HPI:  patient is a 74 y.o. female with past medical history of type 2 diabetes, CKD, previous history of DVT not on anticoagulation, cardiomyopathy with diastolic heart failure, hyperlipidemia, hypertension who presents to [facility] coming from home that over the last 2 days since she returned from a festival on Saturday she was noticing that while at the festival she was initially somewhat increasingly fatigued and short of breath on exertion but was still able to get to her car and get home. She became even more progressively short of breath and fatigued and was in her home and sleeping in bed the whole day all day till she decided to call EMS tonight prior to coming to the ED because she became more more short of breath as the day went on till tonight she was getting increasingly more short of breath and fatigued on exertion. She did have a cough at home but denied any sputum production. She also denies any other recent symptoms of fever, chills, dizziness, syncope, chest pain, dysuria, blood in urine/stool/sputum recently or prior to today, nausea/vomiting/diarrhea/abdominal pain. She does not feel as though she has been having any difficulty urinating and feels as though her urination has been optimal while on her Lasix. Does not feel obstructed. He has been compliant with her medications. Diagnosed with pneumonia. Does not normally wear O2. Required O2 on admission. Received diuresis for CHF. Now on torsemide. Was up to 12L high flow. ABG done 7/18: pH 7.36 pCo2 43/ pO2 86/ HCO3 25 / BE -0.8 / O2 sat 97% and Pulmonology was consulted. Right heart cath consideration by cardiology as well.  however, she has improved, so likely will not need.  Using her acapella and IS      Interval History/Subjective: continue to adjust insulin now that sugars higher   Increased lantus and prandial  On demadex po now. Discussed need for right heart cath with Dr. Junie Castleman but she is improving, so likely will not need. Will do home O2 eval and possibly discharge tomorrow or next day (sat/sun)        Review of Systems:       The patient denied headaches, visual changes, LOC, SOB, CP, ABD pain, N/V/D, skin changes, new or worsening weakness or neuromuscular deficits. Comprehensive ROS negative except as mentioned above.     Past Medical History:        Diagnosis Date    Abnormal echocardiogram     25% on 3/11/14 and 50% on 3/19/14    Back pain     Cardiomyopathy (HCC)     EF was 50% on 3/19/14    Chipped tooth     lower left    Clostridium difficile diarrhea 03/12/2021    Dental crown present     veneers    Depressive disorder 08/13/2014    Diabetic infection of right foot ([address] 04/15/2015    Diabetic ulcer of toe of left foot associated with diabetes mellitus due to underlying condition, with fat layer exposed ([address] 11/9/2018    Diabetic ulcer of toe of left foot associated with type 2 diabetes mellitus, with fat layer exposed ([address] 04/10/2018    Pt slipped in hot tub latter part of February and has not healed since despite topical treatment    DVT (deep venous thrombosis) ([address]     HTN (hypertension)     Hx of blood clots 2016    left leg    Ischemic cardiomyopathy 04/15/2015    Kidney disease     CHRONIC STAGE 3    Meniere's disease     Mixed hyperlipidemia 03/07/2016    Nicotine abuse     NSTEMI (non-ST elevated myocardial infarction) ([address] 03/13/2014    ANGLE (obstructive sleep apnea)     Osteomyelitis of ankle or foot, acute, left ([address] 06/04/2018    Pneumonia     PONV (postoperative nausea and vomiting)     nausea    Spinal epidural abscess 03/13/2014    Type II diabetes mellitus, uncontrolled ([address] 08/13/2014       Past Surgical History:        Procedure Laterality Date    BACK SURGERY  3/2014    DEBRIDEMENT  3/12/14    extensive debridement of bone/muscle and paraspinal empyema    DILATION AND CURETTAGE OF UTERUS      ENDOSCOPY, COLON, DIAGNOSTIC      FOOT DEBRIDEMENT Left 9/8/2020    LEFT FOOT DEBRIDEMENT INCISION AND DRAINAGE performed by Eleni Sacks, DPM at [address] TOE SURGERY Left 8/28/2020    ON THE LEFT: [address], WOUND CLOSURE, FLEXOR TENOTOMY 4TH AND 5TH DIGITS, TENOTOMY AND CAPSULOTOMY 2ND DIGIT performed by Eleni Sacks, DPM at [address] ([facility])  6/15/1999    complete-think right ovary in. NASAL SEPTUM SURGERY      NERVE SURGERY Left 9/10/2020    LEFT FOOT INCISION AND DRAINAGE, EXTENSOR HALLUCIS LONGUS REPAIR WITH DELAYED PRIMARY CLOSURE performed by Eleni Sacks, DPM at [address] shut behind right ear    PRESSURE ULCER DEBRIDEMENT Left 10/23/2020    ON THE LEFT: SURGICAL PREPARATION OF WOUND BED, APPLICATION OF DERMAL GRAFT SUBSTITUTE performed by Eleni Sacks, DPM at [address]  04/02/2019    ROBOTIC ASSISTED LAPAROSCOPIC SLEEVE GASTRECTOMY, LAPAROSCOPIC REDUCIBLE INCISIONAL HERNIA REPAIR     SLEEVE GASTRECTOMY N/A 4/2/2019    ROBOTIC ASSISTED LAPAROSCOPIC SLEEVE GASTRECTOMY, LAPAROSCOPIC REDUCIBLE INCISIONAL HERNIA REPAIR performed by Junior Carr DO at [address] N/A 2/8/2019    EGD BIOPSY performed by Junior Carr DO at [address] N/A 2/8/2019    EGD POLYP ABLATION/OTHER performed by Junior Carr DO at [address] ENDOSCOPY       Allergies:  Doxycycline    Past medical and surgical history reviewed. Any changes have been noted.      PHYSICAL EXAM:  BP (!) 160/76   Pulse 58   Temp 98 °F (36.7 °C) (Oral)   Resp 18   Ht 5' 3\" (1.6 m)   Wt 248 lb 10.9 oz (112.8 kg)   LMP 06/15/1999   SpO2 95%   BMI 44.05 kg/m²       Intake/Output Summary (Last 24 hours) at 7/22/2022 0801  Last data filed at 7/22/2022 0612  Gross per 24 hour   Intake 1473.87 ml   Output 1400 ml   Net 73.87 ml General appearance:   No apparent distress, appears stated age. Cooperative. HEENT:  Normocephalic, atraumatic. PERRLA. EOMi. Conjunctivae/corneas clear, no icterus, non-injected. Neck: Supple, with full range of motion. No jugular venous distention. Trachea midline. Respiratory:  Normal respiratory effort. Clear to auscultation, bilaterally without Rales/Wheezes/Rhonchi. Cardiovascular:  Regular rate and rhythm without murmurs, rubs or gallops. Abdomen: Soft, non-tender, non-distended, without rebound or guarding. Normal bowel sounds. Musculoskeletal:  No clubbing, cyanosis or edema bilaterally. Full range of motion without deformity. Skin: Skin color, texture, turgor normal.  No rashes or lesions. Neurologic:  Neurovascularly intact without any focal sensory/motor deficits. Cranial nerves: II-XII intact, grossly intact. No facial asymmetry, tongue midline. Psychiatric:  Alert and oriented, thought content appropriate  Capillary Refill: Brisk,< 3 seconds   Peripheral Pulses: +2 palpable, equal bilaterally       LABS:    Lab Results   Component Value Date    WBC 8.0 07/21/2022    HGB 7.8 (L) 07/21/2022    HCT 23.5 (L) 07/21/2022    MCV 87.0 07/21/2022     07/21/2022    LYMPHOPCT 4.5 07/21/2022    RBC 2.71 (L) 07/21/2022    MCH 28.8 07/21/2022    MCHC 33.1 07/21/2022    RDW 15.1 07/21/2022       Lab Results   Component Value Date    CREATININE 3.6 (H) 07/21/2022    BUN 91 (HH) 07/21/2022     (L) 07/21/2022    K 4.6 07/21/2022    CL 95 (L) 07/21/2022    CO2 21 07/21/2022       Lab Results   Component Value Date/Time    MG 2.40 07/21/2022 04:50 AM       Lab Results   Component Value Date    ALT 10 07/18/2022    AST 9 (L) 07/18/2022    ALKPHOS 101 07/18/2022    BILITOT 0.4 07/18/2022        No flowsheet data found. Lab Results   Component Value Date    LABA1C 7.1 07/21/2022       Imaging:  XR CHEST PORTABLE   Final Result   1. No significant change.          VL Extremity Venous Bilateral Final Result      CT CHEST WO CONTRAST   Final Result   1. Interstitial pulmonary edema with suspected peribronchovascular alveolar   edema, suggesting congestive heart failure given mild cardiomegaly and trace   bilateral pleural effusions. Interstitial pneumonia could appear similar. 2. Patchy peribronchovascular groundglass opacities most prominent near the   lung bases are most likely alveolar edema, although pneumonia could appear   similar. 3. Mild bronchial wall thickening potentially due to pulmonary vascular   congestion, reactive airways disease, or bronchitis. 4. At least minimal anasarca. XR CHEST PORTABLE   Final Result   1. Pulmonary vascular congestion with suggestion of perihilar edema,   potentially congestive heart failure given mild cardiomegaly and possible   trace bilateral pleural effusions. Pneumonia could appear similar. 2. Bibasilar airspace opacities most likely due atelectasis, pneumonia and   aspiration could appear similar.              Scheduled and prn Medications:    Scheduled Meds:   insulin lispro  0-8 Units SubCUTAneous TID     insulin lispro  0-4 Units SubCUTAneous Nightly    insulin glargine  6 Units SubCUTAneous Nightly    ipratropium-albuterol  1 ampule Inhalation Q4H WA    guaiFENesin  600 mg Oral BID    methylPREDNISolone  40 mg IntraVENous Daily    ferrous sulfate  324 mg Oral TID     aspirin  81 mg Oral Daily    atorvastatin  80 mg Oral Daily    carvedilol  6.25 mg Oral BID     pantoprazole  40 mg Oral QPM    citalopram  40 mg Oral QPM    sodium chloride flush  5-40 mL IntraVENous 2 times per day    heparin (porcine)  5,000 Units SubCUTAneous 3 times per day    cefTRIAXone (ROCEPHIN) IV  2,000 mg IntraVENous Q24H    melatonin  4.5 mg Oral Nightly    gabapentin  200 mg Oral TID     Continuous Infusions:   dextrose      furosemide (LASIX) 1mg/ml infusion 5 mg/hr (07/21/22 1544)    sodium chloride 20 mL/hr at 07/21/22 0901    dextrose       PRN Meds:.glucose, dextrose bolus **OR** dextrose bolus, glucagon (rDNA), dextrose, albuterol, sodium chloride flush, sodium chloride, polyethylene glycol, acetaminophen **OR** acetaminophen, ondansetron, ipratropium-albuterol, glucagon (rDNA), dextrose, benzonatate    Assessment & Plan:      Acute resp failure  with hypoxia  - O2 12L  - does not wear O2 at home   - CT : pulm edema, possible PNA, CHF  - monitor and wean O2 for > 90%  - IV antibx broad spectrum for poss gram neg pna  - procalc normal  - duoneb q4h w/a with PRN q4h albuterol nebs  - mucinex  - acapella + IS  - steroids   - will repeat CXR to compare to prior.  - may need right heart cath with cardiology   -consult to pulmonology for poss BAL/Bronch? Mucous plug? - no plans for bronch at this time. Possible pneumonia on CT  - hypoxia with O2 need  - IV rocephin and azithromycin for now  - procalcitonin normal  - afebrile   - reports some cough   - using acapella and IS frequently. - on 2L nc  - home O2 eval.          NEHA on CKD, stage 3  - consult to nephrology  - BMP daily  - avoid nephrotoxic meds   - renal dosing gabapentin to 200mg tid rather than home dose 600mg tid  - monitor BP  - received lasix in ED, and cont bid IV lasix with consideration for NEHA       Anemia  - workup in place  - no active bleeding  - follow daily CBC  - hemoccult, FE/TIBC/ferritin/B12/retic ct  - started ferrous sulfate tid for low iron and TIBC       HTN  - cont home meds  - hold ACEi in setting of NEHA        DM, T2  - lantus home dose: 54 units daily/ 20 units nightly   - lantus here - increased to 12 units nightly and 5 units humalog prandial with high dose ssi  - patient's BS was lower on admission. Now that she is eating, is increasing and continue to adjust accordingly  - poct ac/hs  - A1c 6/21/22   6.8         CHF  Last echo 1/4/22:   EF 60-65%. The right ventricle is normal in size and function.     Aortic sclerosis    Trivial mitral and tricuspid regurgitation. Lasix 40mg daily - home dose, recently reduced by PCP from bid to daily  - IV lasix while admitted  - dopplers bilat  - bNP 1518  - trop neg  - strict I/O  - daily wts  - bilat venous dopplers to r/o DVT for edema  - CT : interstitial pulmonary edema with peribronchovascular alveolar edema, suggesting CHF given mild cardiomegaly and trace bilat pleural effusions. Insterstitial pneumonia could appear similar. 2. Patchy peribronchovascular groundglass opacities most prominent near the lung bases are most likely alveolar edema, although pneumonia could appear similar . 3. Mild bronchial wall thickening potentially due to pulmonary vascular congestion, reactive airway disease or bronchitis, at least minimal anasarca  - consult to cardiology for CHF    considering right heart cath -- discussed. Will likely not need since she is improving. hx back pain  - prior laminectomy       Continue current regimen/therapies. Monitor. Adjust medical regimen as appropriate. Body mass index is 44.05 kg/m². The patient and / or the family were informed of the results of any tests, a time was given to answer questions, a plan was proposed and they agreed with plan. DVT ppx: hep   GI ppx: prontonix    Diet: ADULT DIET; Regular; 4 carb choices (60 gm/meal); Low Sodium (2 gm); 1500 ml    Consults:  IP CONSULT TO HEART FAILURE NURSE/COORDINATOR  IP CONSULT TO DIETITIAN  IP CONSULT TO NEPHROLOGY  IP CONSULT TO SPIRITUAL SERVICES  IP CONSULT TO SOCIAL WORK  IP CONSULT TO CARDIOLOGY  IP CONSULT TO PULMONOLOGY    DISPO/placement plan: pending . Home this weekend likely.     Code Status: Full Code      MADISON Macdonald - NALINI  07/22/22

## 2022-07-22 NOTE — DISCHARGE INSTR - COC
All your labs are normal or stable from previous.   Very slightly elevated platelet count is not significant.     Please, continue your current medications and/or supplements.   Follow up as we discussed at your last office visit.     Thank you so much for choosing [facility].  Please contact us with any questions that you may have.   We appreciate the opportunity to serve you now and look forward to supporting your healthcare needs for a long time to come!    Most Sincerely,     Luz Elise MD Continuity of Care Form    Patient Name: Gardenia Gonzalez   :  1955  MRN:  [id number]    Admit date:  2022  Discharge date:  ***    Code Status Order: Full Code   Advance Directives:     Admitting Physician:  Torrey Alcala DO  PCP: Arun Webber MD    Discharging Nurse: [facility] Unit/Room#: S0H-9186/3115-01  Discharging Unit Phone Number: ***    Emergency Contact:   Extended Emergency Contact Information  Primary Emergency Contact: bri gonzalez  Home Phone: [phone]  Mobile Phone: [phone]  Relation: Other  Secondary Emergency Contact: skinny moon  Home Phone: [phone]  Relation: Child    Past Surgical History:  Past Surgical History:   Procedure Laterality Date    BACK SURGERY  3/2014    DEBRIDEMENT  3/12/14    extensive debridement of bone/muscle and paraspinal empyema    DILATION AND CURETTAGE OF UTERUS      ENDOSCOPY, COLON, DIAGNOSTIC      FOOT DEBRIDEMENT Left 2020    LEFT FOOT DEBRIDEMENT INCISION AND DRAINAGE performed by Arun Webber DPM at [address] TOE SURGERY Left 2020    ON THE LEFT: [address], WOUND CLOSURE, FLEXOR TENOTOMY 4TH AND 5TH DIGITS, TENOTOMY AND CAPSULOTOMY 2ND DIGIT performed by Arun Webber DPM at [facility] ([facility])  6/15/1999    complete-think right ovary in.     NASAL SEPTUM SURGERY      NERVE SURGERY Left 9/10/2020    LEFT FOOT INCISION AND DRAINAGE, EXTENSOR HALLUCIS LONGUS REPAIR WITH DELAYED PRIMARY CLOSURE performed by Arun Webber DPM at [address] shut behind right ear    PRESSURE ULCER DEBRIDEMENT Left 10/23/2020    ON THE LEFT: SURGICAL PREPARATION OF WOUND BED, APPLICATION OF DERMAL GRAFT SUBSTITUTE performed by Arun Webber DPM at [address] [facility] [address]  2019    ROBOTIC ASSISTED LAPAROSCOPIC SLEEVE GASTRECTOMY, LAPAROSCOPIC REDUCIBLE INCISIONAL HERNIA REPAIR     SLEEVE laparoscopic sleeve gastrectomy Z98.84    Anemia D64.9    Diabetic foot infection ([facility] E11.628, L08.9    Tenosynovitis of foot M65.9    Pneumonia J18.9    Acute kidney injury superimposed on CKD ([facility] N17.9, N18.9    Acute respiratory failure with hypoxia (HCC) J96.01    DM2 (diabetes mellitus, type 2) (HCC) E11.9    Sepsis ([facility] A41.9    Acute respiratory failure ([facility]) J96.00       Isolation/Infection:   Isolation            No Isolation          Patient Infection Status       Infection Onset Added Last Indicated Last Indicated By Review Planned Expiration Resolved Resolved By    None active    Resolved    COVID-19 (Rule Out) 07/18/22 07/18/22 07/18/22 COVID-19, Rapid (Ordered)   07/18/22 Rule-Out Test Resulted    COVID-19 (Rule Out) 01/03/22 01/03/22 01/03/22 COVID-19, Rapid (Ordered)   01/03/22 Rule-Out Test Resulted    C-diff Rule Out 03/12/21 03/12/21 03/12/21 C. difficile toxin Molecular (Ordered)   03/12/21 Rule-Out Test Resulted    C-diff Rule Out 03/11/21 03/11/21 03/12/21 GI Bacterial Pathogens By PCR (Ordered)   03/12/21 Rule-Out Test Resulted    COVID-19 (Rule Out) 09/08/20 09/08/20 09/08/20 COVID-19 (Ordered)   09/08/20 Rule-Out Test Resulted            Nurse Assessment:  Last Vital Signs: /78   Pulse 72   Temp 98 °F (36.7 °C) (Oral)   Resp 18   Ht 5' 3\" (1.6 m)   Wt 248 lb 10.9 oz (112.8 kg)   LMP 06/15/1999   SpO2 96%   BMI 44.05 kg/m²     Last documented pain score (0-10 scale): Pain Level: 0  Last Weight:   Wt Readings from Last 1 Encounters:   07/22/22 248 lb 10.9 oz (112.8 kg)     Mental Status:  {IP PT MENTAL STATUS:20030}    IV Access:  { RICHARD IV ACCESS:416937989}    Nursing Mobility/ADLs:  Walking   {[facility] DME LBQC:492243909}  Transfer  {[facility] DME WKIJ:811027820}  Bathing  {[facility] DME XKJZ:337883518}  Dressing  {[facility] DME SVGF:054490979}  Toileting  {[facility] DME JXNN:062211736}  Feeding  {[facility] DME JFNL:226172541}  Med Admin  {[facility] DME MZWM:872938330}  Med Delivery   { RICHARD MED

## 2022-07-22 NOTE — PLAN OF CARE
Problem: Discharge Planning  Goal: Discharge to home or other facility with appropriate resources  7/22/2022 1153 by Patti Hartmann RN  Outcome: Progressing  Flowsheets (Taken 7/22/2022 7631)  Discharge to home or other facility with appropriate resources: Identify barriers to discharge with patient and caregiver     Problem: Pain  Goal: Verbalizes/displays adequate comfort level or baseline comfort level  7/22/2022 1153 by Patti Hartmann RN  Outcome: Progressing   Pain /discomfort being managed with PRN analgesics per MD orders. Patient able to express presence and absence of pain and rate pain appropriately using numerical scale. Problem: Safety - Adult  Goal: Free from fall injury  7/22/2022 1153 by Patti Hartmann RN  Outcome: Progressing  Flowsheets (Taken 7/22/2022 1151)  Free From Fall Injury: Chanell Marino family/caregiver on patient safety     Problem: ABCDS Injury Assessment  Goal: Absence of physical injury  7/22/2022 1153 by Patti Hartmann RN  Outcome: Progressing  Flowsheets (Taken 7/22/2022 1151)  Absence of Physical Injury: Implement safety measures based on patient assessment     Problem: Skin/Tissue Integrity  Goal: Absence of new skin breakdown  Description: 1. Monitor for areas of redness and/or skin breakdown  2. Assess vascular access sites hourly  3. Every 4-6 hours minimum:  Change oxygen saturation probe site  4. Every 4-6 hours:  If on nasal continuous positive airway pressure, respiratory therapy assess nares and determine need for appliance change or resting period.   7/22/2022 1153 by Patti Hartmann RN  Outcome: Progressing     Problem: Chronic Conditions and Co-morbidities  Goal: Patient's chronic conditions and co-morbidity symptoms are monitored and maintained or improved  7/22/2022 1153 by Patti Hartmann RN  Outcome: Progressing  Flowsheets (Taken 7/22/2022 0847)  Care Plan - Patient's Chronic Conditions and Co-Morbidity Symptoms are Monitored and Maintained or Improved: Monitor and assess patient's chronic conditions and comorbid symptoms for stability, deterioration, or improvement     Problem: Respiratory - Adult  Goal: Achieves optimal ventilation and oxygenation  7/22/2022 1153 by Madelyn Anguiano RN  Outcome: Progressing     Problem: Cardiovascular - Adult  Goal: Maintains optimal cardiac output and hemodynamic stability  7/22/2022 1153 by Madelyn Anguiano RN  Outcome: Progressing  Flowsheets (Taken 7/22/2022 0817)  Maintains optimal cardiac output and hemodynamic stability: Monitor blood pressure and heart rate     Problem: Infection - Adult  Goal: Absence of infection at discharge  7/22/2022 1153 by Madelyn Anguiano RN  Outcome: Progressing  Flowsheets (Taken 7/22/2022 0817)  Absence of infection at discharge:   Assess and monitor for signs and symptoms of infection   Monitor lab/diagnostic results     Problem: Metabolic/Fluid and Electrolytes - Adult  Goal: Electrolytes maintained within normal limits  7/22/2022 1153 by Madelyn Anguiano RN  Outcome: Progressing  Flowsheets (Taken 7/22/2022 0817)  Electrolytes maintained within normal limits: Monitor labs and assess patient for signs and symptoms of electrolyte imbalances     Problem: Hematologic - Adult  Goal: Maintains hematologic stability  7/22/2022 1153 by Madelyn Anguiano RN  Outcome: Progressing  Flowsheets (Taken 7/22/2022 0817)  Maintains hematologic stability: Assess for signs and symptoms of bleeding or hemorrhage

## 2022-07-22 NOTE — PROGRESS NOTES
Pulmonary Progress Note     Patient's name: Shelva Spurling  Medical Record Number: [id number]  Patient's account/billing number: [de-identified]  Patient's YOB: 1955  Age: 74 y.o. Date of Admission: 7/18/2022  2:26 AM  Date of Consult: 7/22/2022      Primary Care Physician: Lev Das MD      Code Status: Full Code    Reason for consult: acute respiratory failure with hypoxia     Assessment and Plan     Acute respiratory failure with hypoxia   Acute on chronic diastolic CHF  CAP suspect viral   NEHA on CKD  Anemia of chr disease. Plan:  Diuresis per nephrology. On CAP coverage continue x 7 days  Steroid x 5 days   Aggressive IS, scheduled bronchodilators   Titrate O2 to keep sat > 90%    Subjective:  O2 requirement improved  Cultures negative todate  On lasix gtt        HISTORY OF PRESENT ILLNESS:   Mr./Ms. Shelva Spurling is a 74 y.o.   lady with PMH stated below significant for former smoker, CKDIII, Chronic diastolic CHF, DMII, and h/o DVT, presented with increasing sob, and fatigue, no fever or chills, no cough or sputum, no chest pain,   LE edema, stated that her diuretics dose was lowered by pcp recently.     CT chest with bibasilar infiltrate patchy GGO,     Flu and covid negative           REVIEW OF SYSTEMS:  Review of Systems -   General ROS: fatigue   Psychological ROS: negative  Ophthalmic ROS: negative  ENT ROS: negative  Allergy and Immunology ROS: negative  Hematological and Lymphatic ROS: negative  Endocrine ROS: negative  Breast ROS: negative  Respiratory ROS: sob, no cough, no sputum   Cardiovascular ROS: sob, no chest pain, has LE edema   Gastrointestinal ROS:negative  Genito-Urinary ROS: negative  Musculoskeletal ROS: negative  Neurological ROS: negative  Dermatological ROS: negative        Physical Exam:    Vitals: /78   Pulse 72   Temp 98 °F (36.7 °C) (Oral)   Resp 18   Ht 5' 3\" (1.6 m)   Wt 248 lb 10.9 oz (112.8 kg)   LMP 06/15/1999   SpO2 96%   BMI 44.05 kg/m²     Last Body weight:   Wt Readings from Last 3 Encounters:   07/22/22 248 lb 10.9 oz (112.8 kg)   06/21/22 235 lb (106.6 kg)   01/10/22 220 lb (99.8 kg)       Body Mass Index : Body mass index is 44.05 kg/m². Intake and Output summary:   Intake/Output Summary (Last 24 hours) at 7/22/2022 0941  Last data filed at 7/22/2022 6270  Gross per 24 hour   Intake 1073.87 ml   Output 1100 ml   Net -26.13 ml         Physical Examination:     PHYSICAL EXAM:    Gen:  mild acute distress  Eyes: PERRL. Anicteric sclera. No conjunctival injection. ENT: No discharge. Posterior oropharynx clear. External appearance of ears and nose normal.  Neck: Trachea midline. No mass, no lymphadenopathy    Resp:  severely diminished with no active wheezing, dull percussion notes bibasilar   CV: Regular rate. Regular rhythm. No murmur or rub. No edema. GI: Soft, Non-tender. Non-distended. +BS  Skin: Warm, dry, w/o erythema. Lymph: No cervical or supraclavicular LAD. M/S: No cyanosis. No clubbing. Neuro:  CN 2-12 tested, no focal neurologic deficit. Moves all extremities  Psych: Awake and alert, Oriented x 3. Judgement and insight appropriate. Mood stable. Laboratory findings:-    CBC:   Recent Labs     07/22/22  0648   WBC 12.9*   HGB 8.0*          BMP:    Recent Labs     07/20/22  0602 07/21/22  0450 07/22/22  0648   * 131* 133*   K 4.3 4.6 4.7   CL 99 95* 94*   CO2 22 21 21   BUN 80* 91* 100*   CREATININE 4.1* 3.6* 3.6*   GLUCOSE 144* 301* 363*       S. Calcium:  Recent Labs     07/22/22  0648   CALCIUM 9.3       S. Glucose:  Recent Labs     07/21/22  1617 07/21/22  2037 07/22/22  0610   POCGLU 319* 396* 386*             Thyroid functions:   Lab Results   Component Value Date/Time    TSH 2.24 10/04/2017 10:00 AM            Radiology Review:  Pertinent images / reports were reviewed as a part of this visit.     CT Chest w/ contrast: No results found for this or any previous visit. CT Chest w/o contrast: Results for orders placed during the hospital encounter of 07/18/22    CT CHEST WO CONTRAST    Narrative  EXAMINATION:  CT OF THE CHEST WITHOUT CONTRAST    7/18/2022 3:30 am    TECHNIQUE:  CT of the chest was performed without the administration of intravenous  contrast. Multiplanar reformatted images are provided for review. Automated  exposure control, iterative reconstruction, and/or weight based adjustment of  the mA/kV was utilized to reduce the radiation dose to as low as reasonably  achievable. COMPARISON:  Chest radiograph 07/18/2022, chest CTA 10/05/2007    HISTORY:  ORDERING SYSTEM PROVIDED HISTORY: sob  TECHNOLOGIST PROVIDED HISTORY:  Reason for exam:->sob  Decision Support Exception - unselect if not a suspected or confirmed  emergency medical condition->Emergency Medical Condition (MA)  Reason for Exam: sob    FINDINGS:  Lack of intravenous contrast administration, partially limiting evaluation of  the soft tissues and vasculature. MEDIASTINUM:  Mild cardiomegaly. Relative hyperdensity of the left  ventricular myocardium with respect to blood, suggesting underlying anemia. Mitral and aortic valve annular calcifications. Trace pericardial effusion. Mild to moderate coronary and systemic atherosclerotic calcifications. Normal variant direct origin of the left vertebral artery from the aortic  arch. Nonenlarged to mildly enlarged mediastinal lymph nodes, likely  reactive. No obvious hilar lymphadenopathy. LUNGS/PLEURA:  Patent central airways. Mild bronchial wall thickening most  prominent centrally. Smooth interlobular septal thickening most prominent  near the apices and bases. Patchy peribronchovascular groundglass opacities  with basilar predominance. Consolidative opacities in dependent portions of  the bilateral lower lobes near areas of passive atelectasis. Trace bilateral  pleural effusions.   No pneumothoraces. UPPER ABDOMEN:  0.8 cm hypodense lesion in hepatic segment 2, too small to  characterize but likely a cyst or hemangioma. Changes of sleeve gastrectomy. SOFT TISSUES/BONES:   At least minimal subcutaneous edema. No  supraclavicular nor axillary lymphadenopathy. No acute fractures nor  suspicious bony lesions. Impression  1. Interstitial pulmonary edema with suspected peribronchovascular alveolar  edema, suggesting congestive heart failure given mild cardiomegaly and trace  bilateral pleural effusions. Interstitial pneumonia could appear similar. 2. Patchy peribronchovascular groundglass opacities most prominent near the  lung bases are most likely alveolar edema, although pneumonia could appear  similar. 3. Mild bronchial wall thickening potentially due to pulmonary vascular  congestion, reactive airways disease, or bronchitis. 4. At least minimal anasarca. CTPA: No results found for this or any previous visit. CXR PA/LAT: Results for orders placed during the hospital encounter of 01/03/22    XR CHEST (2 VW)    Narrative  EXAMINATION:  TWO XRAY VIEWS OF THE CHEST    1/3/2022 4:50 pm    COMPARISON:  03/11/2021    HISTORY:  ORDERING SYSTEM PROVIDED HISTORY: sob, no cough  TECHNOLOGIST PROVIDED HISTORY:  Reason for exam:->sob, no cough  Reason for Exam: sob, no cough    FINDINGS:  Cardiomegaly. Pulmonary vascular congestion. Possible pulmonary edema. Possible small bilateral pleural effusions. No focal airspace disease. No  pneumothorax.     Impression  Findings suggest congestive heart failure      CXR portable: Results for orders placed during the hospital encounter of 07/18/22    XR CHEST PORTABLE    Narrative  EXAMINATION:  ONE XRAY VIEW OF THE CHEST    7/20/2022 9:23 am    COMPARISON:  07/18/2022    HISTORY:  ORDERING SYSTEM PROVIDED HISTORY: SOB- worsening  TECHNOLOGIST PROVIDED HISTORY:  Reason for exam:->SOB- worsening  Reason for Exam: SOB- worsening    FINDINGS:  No change in the bilateral airspace disease either due to pulmonary edema or  pneumonia. No change in the mild pulmonary vascular congestion and small  bilateral pleural effusions. Cardiomegaly is stable. There is no  pneumothorax. Impression  1. No significant change.         Sivan Rock MD, M.D.            7/22/2022, 9:41 AM

## 2022-07-22 NOTE — PROGRESS NOTES
MD made aware of patients blood sugar 386.  Electronically signed by Jean Arreguin RN on 7/22/2022 at 5:49 PM

## 2022-07-22 NOTE — PROGRESS NOTES
Patient is alert and oriented x4, up with stand by assist, call light within reach, bed/chair alarm on. AM meds complete, patient tolerated well. VSS and WDL. No s/s of distress, no further needs noted at this time.  Electronically signed by Peter Ferro RN on 7/22/2022 at 11:54 AM

## 2022-07-22 NOTE — PROGRESS NOTES
Home Oxygen Evaluation    SaO2 96% on 3 L/m at rest  SaO2 92% on RA at rest  SaO2 89% on RA w/ ambulation. Pt speaking in full sentences while ambulating, no dyspnea or inc wob noted. SaO2 91% on RA at rest (post ambulation)    Pt does NOT qualify for home O2.  Pt left on RA post O2 eval.

## 2022-07-22 NOTE — PROGRESS NOTES
218 243       Ca/Mg/Phos:   Recent Labs     07/20/22  0602 07/21/22  0450 07/22/22  0648   CALCIUM 8.9 9.0 9.3   MG 2.30 2.40 2.60*       UA:  No results for input(s): Ruby John, GLUCOSEU, BILIRUBINUR, KETUA, SPECGRAV, BLOODU, PHUR, PROTEINU, UROBILINOGEN, NITRU, LEUKOCYTESUR, LABMICR, URINETYPE in the last 72 hours. Urine Microscopic:   No results for input(s): LABCAST, BACTERIA, COMU, HYALCAST, WBCUA, RBCUA, EPIU in the last 72 hours. Urine Chemistry:   Recent Labs     07/20/22  1843   LABCREA 101.2           Objective:     Vitals: /78   Pulse 72   Temp 98 °F (36.7 °C) (Oral)   Resp 18   Ht 5' 3\" (1.6 m)   Wt 248 lb 10.9 oz (112.8 kg)   LMP 06/15/1999   SpO2 96%   BMI 44.05 kg/m²    Wt Readings from Last 3 Encounters:   07/22/22 248 lb 10.9 oz (112.8 kg)   06/21/22 235 lb (106.6 kg)   01/10/22 220 lb (99.8 kg)      24HR INTAKE/OUTPUT:    Intake/Output Summary (Last 24 hours) at 7/22/2022 4991  Last data filed at 7/22/2022 1044  Gross per 24 hour   Intake 1073.87 ml   Output 1100 ml   Net -26.13 ml     + UM    Constitutional:  awake, NAD  HEENT:  MMM, No icterus  Neck: no bruits, No JVD  Cardiovascular:  reg rhythm  Respiratory: Diminished at bases   Abdomen:  +BS, soft, NT, ND  Ext: + lower extremity edema    CNS: alert, no agitation    IMAGING:  XR CHEST PORTABLE   Final Result   1. No significant change. VL Extremity Venous Bilateral   Final Result      CT CHEST WO CONTRAST   Final Result   1. Interstitial pulmonary edema with suspected peribronchovascular alveolar   edema, suggesting congestive heart failure given mild cardiomegaly and trace   bilateral pleural effusions. Interstitial pneumonia could appear similar. 2. Patchy peribronchovascular groundglass opacities most prominent near the   lung bases are most likely alveolar edema, although pneumonia could appear   similar.    3. Mild bronchial wall thickening potentially due to pulmonary vascular   congestion, reactive airways disease, or bronchitis. 4. At least minimal anasarca. XR CHEST PORTABLE   Final Result   1. Pulmonary vascular congestion with suggestion of perihilar edema,   potentially congestive heart failure given mild cardiomegaly and possible   trace bilateral pleural effusions. Pneumonia could appear similar. 2. Bibasilar airspace opacities most likely due atelectasis, pneumonia and   aspiration could appear similar. Assessment :     1. NEHA on CKD 3b/4  -Non-Oliguric  -Baseline creat: 1.8-1.9 Now 3.5->4.1->3.6  -UA: prot ++. UPC 2 in past  - FeUrea 18%  -Volume: Hypervolemic   -Electrolytes: No Dyskalemia  -Acid-Base: AGMA    Recent Labs     07/20/22  0602 07/21/22  0450 07/22/22  0648   BUN 80* 91* 100*   CREATININE 4.1* 3.6* 3.6*       Recent Labs     07/20/22  0602 07/21/22  0450 07/22/22  0648   * 131* 133*   K 4.3 4.6 4.7   CO2 22 21 21   MG 2.30 2.40 2.60*       2. HTN  -Blood pressure at goal     BP Readings from Last 1 Encounters:   07/22/22 130/78     3. DM    4. CHF  - TTE (2022): LVEF 60-65%, PASP 36    Plan:     - Diuresis as tolerated  - daily weight  - Antimicrobials per primary team   - Monitor BMP    -Monitor I/O, UOP  -Maintain MAP>65  -Avoid nephrotoxin, if able. -Dose meds to current eGFR    Spoke to Hospitalist      Thank you for allowing us to participate in care of Shelva Spurling . We will continue to follow. Feel free to contact me with any questions.       Starr Caputo MD  7/22/2022    [facility] [address]  Office : [phone]  Fax :[phone]

## 2022-07-23 LAB
ANION GAP SERPL CALCULATED.3IONS-SCNC: 16 MMOL/L (ref 3–16)
BASOPHILS ABSOLUTE: 0 K/UL (ref 0–0.2)
BASOPHILS RELATIVE PERCENT: 0.1 %
BUN BLDV-MCNC: 113 MG/DL (ref 7–20)
CALCIUM SERPL-MCNC: 9.5 MG/DL (ref 8.3–10.6)
CHLORIDE BLD-SCNC: 93 MMOL/L (ref 99–110)
CO2: 24 MMOL/L (ref 21–32)
CREAT SERPL-MCNC: 3 MG/DL (ref 0.6–1.2)
EOSINOPHILS ABSOLUTE: 0 K/UL (ref 0–0.6)
EOSINOPHILS RELATIVE PERCENT: 0 %
GFR AFRICAN AMERICAN: 19
GFR NON-AFRICAN AMERICAN: 15
GLUCOSE BLD-MCNC: 260 MG/DL (ref 70–99)
GLUCOSE BLD-MCNC: 302 MG/DL (ref 70–99)
GLUCOSE BLD-MCNC: 386 MG/DL (ref 70–99)
GLUCOSE BLD-MCNC: 393 MG/DL (ref 70–99)
GLUCOSE BLD-MCNC: 395 MG/DL (ref 70–99)
HCT VFR BLD CALC: 25.2 % (ref 36–48)
HEMOGLOBIN: 8.3 G/DL (ref 12–16)
LYMPHOCYTES ABSOLUTE: 0.6 K/UL (ref 1–5.1)
LYMPHOCYTES RELATIVE PERCENT: 4.4 %
MAGNESIUM: 2.5 MG/DL (ref 1.8–2.4)
MCH RBC QN AUTO: 28.5 PG (ref 26–34)
MCHC RBC AUTO-ENTMCNC: 32.9 G/DL (ref 31–36)
MCV RBC AUTO: 86.7 FL (ref 80–100)
MONOCYTES ABSOLUTE: 0.6 K/UL (ref 0–1.3)
MONOCYTES RELATIVE PERCENT: 4.4 %
NEUTROPHILS ABSOLUTE: 12.4 K/UL (ref 1.7–7.7)
NEUTROPHILS RELATIVE PERCENT: 91.1 %
PDW BLD-RTO: 15.6 % (ref 12.4–15.4)
PERFORMED ON: ABNORMAL
PLATELET # BLD: 319 K/UL (ref 135–450)
PMV BLD AUTO: 9.6 FL (ref 5–10.5)
POTASSIUM SERPL-SCNC: 4.7 MMOL/L (ref 3.5–5.1)
RBC # BLD: 2.91 M/UL (ref 4–5.2)
SODIUM BLD-SCNC: 133 MMOL/L (ref 136–145)
WBC # BLD: 13.6 K/UL (ref 4–11)

## 2022-07-23 PROCEDURE — 6370000000 HC RX 637 (ALT 250 FOR IP): Performed by: NURSE PRACTITIONER

## 2022-07-23 PROCEDURE — 99231 SBSQ HOSP IP/OBS SF/LOW 25: CPT | Performed by: INTERNAL MEDICINE

## 2022-07-23 PROCEDURE — 2580000003 HC RX 258: Performed by: STUDENT IN AN ORGANIZED HEALTH CARE EDUCATION/TRAINING PROGRAM

## 2022-07-23 PROCEDURE — 83735 ASSAY OF MAGNESIUM: CPT

## 2022-07-23 PROCEDURE — 85025 COMPLETE CBC W/AUTO DIFF WBC: CPT

## 2022-07-23 PROCEDURE — 6370000000 HC RX 637 (ALT 250 FOR IP): Performed by: HOSPITALIST

## 2022-07-23 PROCEDURE — 1200000000 HC SEMI PRIVATE

## 2022-07-23 PROCEDURE — 36415 COLL VENOUS BLD VENIPUNCTURE: CPT

## 2022-07-23 PROCEDURE — 80048 BASIC METABOLIC PNL TOTAL CA: CPT

## 2022-07-23 PROCEDURE — 99233 SBSQ HOSP IP/OBS HIGH 50: CPT | Performed by: HOSPITALIST

## 2022-07-23 PROCEDURE — 6370000000 HC RX 637 (ALT 250 FOR IP): Performed by: STUDENT IN AN ORGANIZED HEALTH CARE EDUCATION/TRAINING PROGRAM

## 2022-07-23 PROCEDURE — 6360000002 HC RX W HCPCS: Performed by: INTERNAL MEDICINE

## 2022-07-23 PROCEDURE — 6360000002 HC RX W HCPCS: Performed by: STUDENT IN AN ORGANIZED HEALTH CARE EDUCATION/TRAINING PROGRAM

## 2022-07-23 PROCEDURE — 94669 MECHANICAL CHEST WALL OSCILL: CPT

## 2022-07-23 PROCEDURE — 94760 N-INVAS EAR/PLS OXIMETRY 1: CPT

## 2022-07-23 RX ORDER — INSULIN LISPRO 100 [IU]/ML
8 INJECTION, SOLUTION INTRAVENOUS; SUBCUTANEOUS
Status: DISCONTINUED | OUTPATIENT
Start: 2022-07-23 | End: 2022-07-24 | Stop reason: HOSPADM

## 2022-07-23 RX ORDER — ASCORBIC ACID 500 MG
250 TABLET ORAL 3 TIMES DAILY
Status: DISCONTINUED | OUTPATIENT
Start: 2022-07-23 | End: 2022-07-24 | Stop reason: HOSPADM

## 2022-07-23 RX ORDER — INSULIN GLARGINE 100 [IU]/ML
20 INJECTION, SOLUTION SUBCUTANEOUS 2 TIMES DAILY
Status: DISCONTINUED | OUTPATIENT
Start: 2022-07-23 | End: 2022-07-24

## 2022-07-23 RX ORDER — TORSEMIDE 20 MG/1
40 TABLET ORAL 2 TIMES DAILY
Status: DISCONTINUED | OUTPATIENT
Start: 2022-07-24 | End: 2022-07-24 | Stop reason: HOSPADM

## 2022-07-23 RX ADMIN — INSULIN LISPRO 5 UNITS: 100 INJECTION, SOLUTION INTRAVENOUS; SUBCUTANEOUS at 13:44

## 2022-07-23 RX ADMIN — GABAPENTIN 200 MG: 100 CAPSULE ORAL at 13:39

## 2022-07-23 RX ADMIN — TORSEMIDE 50 MG: 20 TABLET ORAL at 08:51

## 2022-07-23 RX ADMIN — GABAPENTIN 200 MG: 100 CAPSULE ORAL at 21:47

## 2022-07-23 RX ADMIN — INSULIN LISPRO 5 UNITS: 100 INJECTION, SOLUTION INTRAVENOUS; SUBCUTANEOUS at 08:46

## 2022-07-23 RX ADMIN — HEPARIN SODIUM 5000 UNITS: 5000 INJECTION INTRAVENOUS; SUBCUTANEOUS at 21:48

## 2022-07-23 RX ADMIN — CARVEDILOL 6.25 MG: 6.25 TABLET, FILM COATED ORAL at 08:51

## 2022-07-23 RX ADMIN — Medication 4.5 MG: at 21:47

## 2022-07-23 RX ADMIN — INSULIN LISPRO 4 UNITS: 100 INJECTION, SOLUTION INTRAVENOUS; SUBCUTANEOUS at 21:48

## 2022-07-23 RX ADMIN — SODIUM CHLORIDE, PRESERVATIVE FREE 10 ML: 5 INJECTION INTRAVENOUS at 08:51

## 2022-07-23 RX ADMIN — HEPARIN SODIUM 5000 UNITS: 5000 INJECTION INTRAVENOUS; SUBCUTANEOUS at 13:39

## 2022-07-23 RX ADMIN — GUAIFENESIN 600 MG: 600 TABLET ORAL at 21:47

## 2022-07-23 RX ADMIN — GABAPENTIN 200 MG: 100 CAPSULE ORAL at 08:51

## 2022-07-23 RX ADMIN — INSULIN LISPRO 8 UNITS: 100 INJECTION, SOLUTION INTRAVENOUS; SUBCUTANEOUS at 17:27

## 2022-07-23 RX ADMIN — INSULIN GLARGINE 20 UNITS: 100 INJECTION, SOLUTION SUBCUTANEOUS at 08:53

## 2022-07-23 RX ADMIN — FERROUS SULFATE TAB EC 324 MG (65 MG FE EQUIVALENT) 324 MG: 324 (65 FE) TABLET DELAYED RESPONSE at 08:51

## 2022-07-23 RX ADMIN — INSULIN GLARGINE 20 UNITS: 100 INJECTION, SOLUTION SUBCUTANEOUS at 21:48

## 2022-07-23 RX ADMIN — FERROUS SULFATE TAB EC 324 MG (65 MG FE EQUIVALENT) 324 MG: 324 (65 FE) TABLET DELAYED RESPONSE at 17:24

## 2022-07-23 RX ADMIN — ATORVASTATIN CALCIUM 80 MG: 80 TABLET, FILM COATED ORAL at 08:50

## 2022-07-23 RX ADMIN — OXYCODONE HYDROCHLORIDE AND ACETAMINOPHEN 250 MG: 500 TABLET ORAL at 21:47

## 2022-07-23 RX ADMIN — CITALOPRAM HYDROBROMIDE 40 MG: 20 TABLET ORAL at 17:24

## 2022-07-23 RX ADMIN — PANTOPRAZOLE SODIUM 40 MG: 40 TABLET, DELAYED RELEASE ORAL at 17:24

## 2022-07-23 RX ADMIN — METHYLPREDNISOLONE SODIUM SUCCINATE 40 MG: 40 INJECTION, POWDER, FOR SOLUTION INTRAMUSCULAR; INTRAVENOUS at 08:51

## 2022-07-23 RX ADMIN — CEFTRIAXONE 2000 MG: 2 INJECTION, POWDER, FOR SOLUTION INTRAMUSCULAR; INTRAVENOUS at 09:12

## 2022-07-23 RX ADMIN — SODIUM CHLORIDE: 9 INJECTION, SOLUTION INTRAVENOUS at 09:12

## 2022-07-23 RX ADMIN — ASPIRIN 81 MG: 81 TABLET, CHEWABLE ORAL at 08:51

## 2022-07-23 RX ADMIN — FERROUS SULFATE TAB EC 324 MG (65 MG FE EQUIVALENT) 324 MG: 324 (65 FE) TABLET DELAYED RESPONSE at 13:44

## 2022-07-23 RX ADMIN — INSULIN LISPRO 16 UNITS: 100 INJECTION, SOLUTION INTRAVENOUS; SUBCUTANEOUS at 13:44

## 2022-07-23 RX ADMIN — TORSEMIDE 50 MG: 20 TABLET ORAL at 13:39

## 2022-07-23 RX ADMIN — INSULIN LISPRO 16 UNITS: 100 INJECTION, SOLUTION INTRAVENOUS; SUBCUTANEOUS at 08:46

## 2022-07-23 RX ADMIN — GUAIFENESIN 600 MG: 600 TABLET ORAL at 08:51

## 2022-07-23 RX ADMIN — CARVEDILOL 6.25 MG: 6.25 TABLET, FILM COATED ORAL at 17:24

## 2022-07-23 ASSESSMENT — PAIN SCALES - GENERAL: PAINLEVEL_OUTOF10: 0

## 2022-07-23 NOTE — RT PROTOCOL NOTE
RT Inhaler-Nebulizer Bronchodilator Protocol Note    There is a bronchodilator order in the chart from a provider indicating to follow the RT Bronchodilator Protocol and there is an Initiate RT Inhaler-Nebulizer Bronchodilator Protocol order as well (see protocol at bottom of note). CXR Findings:  No results found. The findings from the last RT Protocol Assessment were as follows:   History Pulmonary Disease: Smoker 15 pack years or more  Respiratory Pattern: Regular pattern and RR 12-20 bpm  Breath Sounds: Clear breath sounds  Cough: Strong, spontaneous, non-productive  Indication for Bronchodilator Therapy:    Bronchodilator Assessment Score: 1    Aerosolized bronchodilator medication orders have been revised according to the RT Inhaler-Nebulizer Bronchodilator Protocol below. Respiratory Therapist to perform RT Therapy Protocol Assessment initially then follow the protocol. Repeat RT Therapy Protocol Assessment PRN for score 0-3 or on second treatment, BID, and PRN for scores above 3. No Indications - adjust the frequency to every 6 hours PRN wheezing or bronchospasm, if no treatments needed after 48 hours then discontinue using Per Protocol order mode. If indication present, adjust the RT bronchodilator orders based on the Bronchodilator Assessment Score as indicated below. Use Inhaler orders unless patient has one or more of the following: on home nebulizer, not able to hold breath for 10 seconds, is not alert and oriented, cannot activate and use MDI correctly, or respiratory rate 25 breaths per minute or more, then use the equivalent nebulizer order(s) with same Frequency and PRN reasons based on the score. If a patient is on this medication at home then do not decrease Frequency below that used at home.     0-3 - enter or revise RT bronchodilator order(s) to equivalent RT Bronchodilator order with Frequency of every 4 hours PRN for wheezing or increased work of breathing using Per Protocol order mode. 4-6 - enter or revise RT Bronchodilator order(s) to two equivalent RT bronchodilator orders with one order with BID Frequency and one order with Frequency of every 4 hours PRN wheezing or increased work of breathing using Per Protocol order mode. 7-10 - enter or revise RT Bronchodilator order(s) to two equivalent RT bronchodilator orders with one order with TID Frequency and one order with Frequency of every 4 hours PRN wheezing or increased work of breathing using Per Protocol order mode. 11-13 - enter or revise RT Bronchodilator order(s) to one equivalent RT bronchodilator order with QID Frequency and an Albuterol order with Frequency of every 4 hours PRN wheezing or increased work of breathing using Per Protocol order mode. Greater than 13 - enter or revise RT Bronchodilator order(s) to one equivalent RT bronchodilator order with every 4 hours Frequency and an Albuterol order with Frequency of every 2 hours PRN wheezing or increased work of breathing using Per Protocol order mode. RT to enter RT Home Evaluation for COPD & MDI Assessment order using Per Protocol order mode.     Electronically signed by Emilia Shaw RCP on 7/23/2022 at 9:37 AM

## 2022-07-23 NOTE — PROGRESS NOTES
Pulmonary Progress Note    Date of Admission: 7/18/2022   LOS: 5 days     Chief Complaint   Patient presents with    Shortness of Breath     Pt woke up having sob per ems she was 68% on room air        Assessment/Plan:     Acute hypoxemic respiratory failure  -Resolved    Acute on chronic diastolic congestive heart failure  -Continue diuresis, per nephrology  -Lasix drip    Community-acquired pneumonia  -Empiric coverage x7 days, cultures NGTD    NEHA on CKD  -Nephrology following    No further inpatient recommendations, we will sign off at this time. Please let us know if we can be of any further assistance. 24 Hour Events/Subjective  Patient tolerating room air during my visit. No acute events overnight    ROS:   No nausea  No Vomiting  No chest pain    No intake or output data in the 24 hours ending 07/23/22 1026      PHYSICAL EXAM:   Blood pressure (!) 145/60, pulse 64, temperature 97.9 °F (36.6 °C), temperature source Oral, resp. rate 16, height 5' 3\" (1.6 m), weight 246 lb 14.6 oz (112 kg), last menstrual period 06/15/1999, SpO2 95 %, not currently breastfeeding.'  Gen:  No acute distress. Eyes: PERRL. Anicteric sclera. No conjunctival injection. ENT: No discharge. Posterior oropharynx clear. External appearance of ears and nose normal.  Neck: Trachea midline. No mass   Resp:  No crackles. No wheezes. No rhonchi. No dullness on percussion. CV: Regular rate. Regular rhythm. No murmur or rub. No edema. GI: Soft, Non-tender. Non-distended. +BS  Skin: Warm, dry, w/o erythema. Lymph: No cervical or supraclavicular LAD. M/S: No cyanosis. No clubbing. Neuro:  no focal neurologic deficit. Moves all extremities  Psych: Awake and alert, Oriented x 3. Judgement and insight appropriate. Mood stable.       Medications:    Scheduled Meds:   insulin glargine  20 Units SubCUTAneous BID    insulin lispro  5 Units SubCUTAneous TID WC    insulin lispro  0-16 Units SubCUTAneous TID WC    insulin lispro  0-4 Units SubCUTAneous Nightly    torsemide  50 mg Oral BID    guaiFENesin  600 mg Oral BID    methylPREDNISolone  40 mg IntraVENous Daily    ferrous sulfate  324 mg Oral TID WC    aspirin  81 mg Oral Daily    atorvastatin  80 mg Oral Daily    carvedilol  6.25 mg Oral BID WC    pantoprazole  40 mg Oral QPM    citalopram  40 mg Oral QPM    sodium chloride flush  5-40 mL IntraVENous 2 times per day    heparin (porcine)  5,000 Units SubCUTAneous 3 times per day    cefTRIAXone (ROCEPHIN) IV  2,000 mg IntraVENous Q24H    melatonin  4.5 mg Oral Nightly    gabapentin  200 mg Oral TID       Continuous Infusions:   dextrose      sodium chloride 100 mL/hr at 07/23/22 0912    dextrose         PRN Meds:  glucose, dextrose bolus **OR** dextrose bolus, glucagon (rDNA), dextrose, albuterol, sodium chloride flush, sodium chloride, polyethylene glycol, acetaminophen **OR** acetaminophen, ondansetron, ipratropium-albuterol, glucagon (rDNA), dextrose, benzonatate    Labs reviewed:  CBC:   Recent Labs     07/21/22  0451 07/22/22  0648 07/23/22  0503   WBC 8.0 12.9* 13.6*   HGB 7.8* 8.0* 8.3*   HCT 23.5* 24.7* 25.2*   MCV 87.0 88.2 86.7    286 319     BMP:   Recent Labs     07/21/22  0450 07/22/22  0648 07/23/22  0503   * 133* 133*   K 4.6 4.7 4.7   CL 95* 94* 93*   CO2 21 21 24   BUN 91* 100* 113*   CREATININE 3.6* 3.6* 3.0*     LIVER PROFILE: No results for input(s): AST, ALT, LIPASE, BILIDIR, BILITOT, ALKPHOS in the last 72 hours. Invalid input(s): AMYLASE,  ALB  PT/INR: No results for input(s): PROTIME, INR in the last 72 hours. APTT: No results for input(s): APTT in the last 72 hours. UA:No results for input(s): NITRITE, COLORU, PHUR, LABCAST, WBCUA, RBCUA, MUCUS, TRICHOMONAS, YEAST, BACTERIA, CLARITYU, SPECGRAV, LEUKOCYTESUR, UROBILINOGEN, BILIRUBINUR, BLOODU, GLUCOSEU, AMORPHOUS in the last 72 hours. Invalid input(s): Yosef Gomez  No results for input(s): PH, PCO2, PO2 in the last 72 hours.       Films:  Radiology Review:  Pertinent images / reports were reviewed as a part of this visit. XR CHEST PORTABLE  Narrative: EXAMINATION:  ONE XRAY VIEW OF THE CHEST    7/20/2022 9:23 am    COMPARISON:  07/18/2022    HISTORY:  ORDERING SYSTEM PROVIDED HISTORY: SOB- worsening  TECHNOLOGIST PROVIDED HISTORY:  Reason for exam:->SOB- worsening  Reason for Exam: SOB- worsening    FINDINGS:  No change in the bilateral airspace disease either due to pulmonary edema or  pneumonia. No change in the mild pulmonary vascular congestion and small  bilateral pleural effusions. Cardiomegaly is stable. There is no  pneumothorax. Impression: 1. No significant change. This note was transcribed using [id number]. Please disregard any translational errors.     Thank you for this consult,    Seema Arrnigton MD  [facility] Pulmonary, Critical Care, and Sleep Medicine

## 2022-07-23 NOTE — PROGRESS NOTES
Office: [phone]       Fax: [phone]      Nephrology Progress Note        Patient's Name: Hina Kimbrough Date: 7/18/2022  Date of Visit: 7/23/2022    Reason for Consult:  NEHA      History of Present Illness: Flower Carrillo is a 74 y.o. female with PMHx of hypertension,diabetes mellitus,CKD, CHF who was hospitalized on 7/18/2022 with complaints of shortness of breath   Has about 30 lb wt gain since last year  Per pt, her lasix was decreased was her PCP  No chest pain   No dysuria, hematuria  NSAID use: Denies   IV contrast: None recent   Home meds reviewed    INTERVAL HISTORY    Feels better  Shortness of breath: No    UOP: Good   Creat: trending down  On RA       Medications:    Allergies:  Doxycycline    Scheduled Meds:   insulin glargine  20 Units SubCUTAneous BID    insulin lispro  5 Units SubCUTAneous TID WC    insulin lispro  0-16 Units SubCUTAneous TID WC    insulin lispro  0-4 Units SubCUTAneous Nightly    torsemide  50 mg Oral BID    ipratropium-albuterol  1 ampule Inhalation Q4H WA    guaiFENesin  600 mg Oral BID    methylPREDNISolone  40 mg IntraVENous Daily    ferrous sulfate  324 mg Oral TID WC    aspirin  81 mg Oral Daily    atorvastatin  80 mg Oral Daily    carvedilol  6.25 mg Oral BID WC    pantoprazole  40 mg Oral QPM    citalopram  40 mg Oral QPM    sodium chloride flush  5-40 mL IntraVENous 2 times per day    heparin (porcine)  5,000 Units SubCUTAneous 3 times per day    cefTRIAXone (ROCEPHIN) IV  2,000 mg IntraVENous Q24H    melatonin  4.5 mg Oral Nightly    gabapentin  200 mg Oral TID     Continuous Infusions:   dextrose      sodium chloride 20 mL/hr at 07/21/22 0901    dextrose         Labs:  CBC:   Recent Labs     07/21/22  0451 07/22/22  0648 07/23/22  0503   WBC 8.0 12.9* 13.6*   HGB 7.8* 8.0* 8.3*    574 418

## 2022-07-23 NOTE — FLOWSHEET NOTE
07/23/22 1221   Treatment Team Notification   Reason for Communication Review case  (bun 113, blood glucose 386)   Team Member Name [facility]   Treatment Team Role Attending Provider   Method of Communication Secure Message   Response No new orders   Notification Time [facility] CNP advised to let Nephro know about Bun. Dr Shirley Arrington advised. No further orders for blood glucose.    Anastacio Sabillon, RN

## 2022-07-23 NOTE — PLAN OF CARE
Problem: Discharge Planning  Goal: Discharge to home or other facility with appropriate resources  Outcome: Progressing  Flowsheets (Taken 7/22/2022 0946 by Noemi Nicolas, RN)  Discharge to home or other facility with appropriate resources: Identify barriers to discharge with patient and caregiver     Problem: Pain  Goal: Verbalizes/displays adequate comfort level or baseline comfort level  Outcome: Progressing  Flowsheets (Taken 7/19/2022 1357 by Sandi Anton RN)  Verbalizes/displays adequate comfort level or baseline comfort level: Encourage patient to monitor pain and request assistance     Problem: Safety - Adult  Goal: Free from fall injury  Outcome: Progressing  Flowsheets (Taken 7/22/2022 1151 by Javier Pompa RN)  Free From Fall Injury: Instruct family/caregiver on patient safety     Problem: ABCDS Injury Assessment  Goal: Absence of physical injury  Outcome: Progressing  Flowsheets (Taken 7/22/2022 1151 by Javier Pompa RN)  Absence of Physical Injury: Implement safety measures based on patient assessment     Problem: Skin/Tissue Integrity  Goal: Absence of new skin breakdown  Description: 1. Monitor for areas of redness and/or skin breakdown  2. Assess vascular access sites hourly  3. Every 4-6 hours minimum:  Change oxygen saturation probe site  4. Every 4-6 hours:  If on nasal continuous positive airway pressure, respiratory therapy assess nares and determine need for appliance change or resting period.   Outcome: Progressing     Problem: Chronic Conditions and Co-morbidities  Goal: Patient's chronic conditions and co-morbidity symptoms are monitored and maintained or improved  Outcome: Progressing  Flowsheets (Taken 7/22/2022 4936 by Noemi Nicolas RN)  Care Plan - Patient's Chronic Conditions and Co-Morbidity Symptoms are Monitored and Maintained or Improved: Monitor and assess patient's chronic conditions and comorbid symptoms for stability, deterioration, or improvement     Problem:

## 2022-07-23 NOTE — PROGRESS NOTES
Insulin orders were modified by provider. Patient indep with ambulation. No questions or concerns at this time.    Robert Floyd RN

## 2022-07-23 NOTE — FLOWSHEET NOTE
Treatment Team Notification   Reason for Communication Abnormal vitals  (bs 393)   Type of Critical Result POC test   Critical Lab Result Type Glucose   Critical POC Result Type Glucose   Team Member Name Michaelkimberly Chance   Treatment Team Role Attending Provider   Method of Communication Face to face   Notification Time 0900   Insulin adjusted see orders   Daniel Bruce, DARRELL

## 2022-07-23 NOTE — PLAN OF CARE
Problem: Metabolic/Fluid and Electrolytes - Adult  Goal: Glucose maintained within prescribed range  Outcome:blood sugar are out of normal range.   Will continue monitoring and insulin administraiton

## 2022-07-23 NOTE — PROGRESS NOTES
Hospitalist Progress Note      PCP: Marlo Mcintosh MD    Date of Admission: 7/18/2022    Chief Complaint: Worsening dyspnea on exertion, fatigue, cough    Hospital Course:  patient is a 74 y.o. female with past medical history of type 2 diabetes, CKD, previous history of DVT not on anticoagulation, cardiomyopathy with diastolic heart failure, hyperlipidemia, hypertension who presents to [facility] coming from home that over the last 2 days since she returned from a festival on Saturday she was noticing that while at the festival she was initially somewhat increasingly fatigued and short of breath on exertion but was still able to get to her car and get home. She became even more progressively short of breath and fatigued and was in her home and sleeping in bed the whole day all day till she decided to call EMS tonight prior to coming to the ED because she became more more short of breath as the day went on till tonight she was getting increasingly more short of breath and fatigued on exertion. She was diagnosed with pneumonia and required up to 12 L of high flow oxygen. Currently has been weaned to room air. Creatinine also elevated, which is slowly trending down. Cardiology, nephrology, and pulmonology have all been following her. Subjective: Patient sitting on side of bed, eating breakfast.  Reports she feels much better. Has been on room air all night and all morning, no shortness of breath. Home O2 eval completed 7/22/2022, did not qualify for home oxygen. Patient would like to know when she can go home. Discussed creatinine, she is agreeable to stay to ensure creatinine continues to trend down. Denies chest pain palpitation shortness of breath abdominal pain headache lightheadedness. Reviewed plan of care, no further needs or questions. Assessment/Plan:    Acute hypoxic respiratory failure  -Had been up to 12 L of high flow O2, does not wear home O2 at home.   Currently on room air.  Home O2 eval did not show need for oxygen at discharge. -CT showed pulmonary edema, possible pneumonia and heart failure. Most likely cause of the hypoxic respiratory. .  -Continue to monitor pulse oximeter.  -Continue DuoNebs, Mucinex, Acapella, incentive spirometry.  -Discontinue steroids    Possible pneumonia  -Procalcitonin has been normal, but patient responded to IV antibiotics.  -Continuing Rocephin and azithromycin  -Cough mostly resolved  -Continue Acapella and I-S  -Pulmonology consulted, signed off today 7/23/2022. NEHA on CKD stage III  -Nephrology consulted  -Gabapentin renally dosed, avoid nephrotoxic medications  -Creatinine trending down. Today 3.0. Baseline 1.8-1.9. Anemia  -Iron level low  -No active bleeding  -Likely related to iron deficiency and of chronic disease from CKD  -Continue ferrous sulfate, add vitamin C    Uncontrolled diabetes  -Glucose remains elevated in the 300s, likely secondary to steroid and under use of insulin  -At home takes Lantus 54 units in the morning and 20 units at night  -We will increase Lantus to 20 units twice daily,  Humalog prandial dosing increase to 8 units with meals and continue high-dose sliding scale  -Stop steroids today  -Continue ACH S fingersticks  -Hemoglobin A1c 6.8    Acute on chronic diastolic congestive heart failure  -Last echo performed 1/4/2022, EF 60 to 65%.   -Treated with IV Lasix  -Bilateral Dopplers negative for DVT  -BNP elevated, troponin within normal limits  -Cardiology consulted  -Daily weights, accurate I's and O's  -Cardiology was initially considering right heart cath, will likely not need since she is improving    History of chronic back pain  -Prior laminectomy      Active Hospital Problems    Diagnosis     Pneumonia [J18.9]      Priority: Medium    Acute kidney injury superimposed on CKD ([facility] [N17.9, N18.9]      Priority: Medium    Acute respiratory failure with hypoxia ([address] [J96.01]      Priority: Medium    DM2 (diabetes mellitus, type 2) ([facility] [E11.9]      Priority: Medium    Sepsis ([facility] [A41.9]      Priority: Medium    Acute respiratory failure (HCC) [J96.00]      Priority: Medium    Anemia [D64.9]     Essential hypertension [I10]        Medications:  Reviewed    Infusion Medications    dextrose      sodium chloride 100 mL/hr at 07/23/22 0912    dextrose       Scheduled Medications    insulin glargine  20 Units SubCUTAneous BID    insulin lispro  5 Units SubCUTAneous TID     insulin lispro  0-16 Units SubCUTAneous TID     insulin lispro  0-4 Units SubCUTAneous Nightly    torsemide  50 mg Oral BID    ipratropium-albuterol  1 ampule Inhalation Q4H WA    guaiFENesin  600 mg Oral BID    methylPREDNISolone  40 mg IntraVENous Daily    ferrous sulfate  324 mg Oral TID WC    aspirin  81 mg Oral Daily    atorvastatin  80 mg Oral Daily    carvedilol  6.25 mg Oral BID WC    pantoprazole  40 mg Oral QPM    citalopram  40 mg Oral QPM    sodium chloride flush  5-40 mL IntraVENous 2 times per day    heparin (porcine)  5,000 Units SubCUTAneous 3 times per day    cefTRIAXone (ROCEPHIN) IV  2,000 mg IntraVENous Q24H    melatonin  4.5 mg Oral Nightly    gabapentin  200 mg Oral TID     PRN Meds: glucose, dextrose bolus **OR** dextrose bolus, glucagon (rDNA), dextrose, albuterol, sodium chloride flush, sodium chloride, polyethylene glycol, acetaminophen **OR** acetaminophen, ondansetron, ipratropium-albuterol, glucagon (rDNA), dextrose, benzonatate      Intake/Output Summary (Last 24 hours) at 7/23/2022 0926  Last data filed at 7/22/2022 0941  Gross per 24 hour   Intake 200 ml   Output --   Net 200 ml       Physical Exam Performed:    BP (!) 145/60   Pulse 64   Temp 97.9 °F (36.6 °C) (Oral)   Resp 16   Ht 5' 3\" (1.6 m)   Wt 246 lb 14.6 oz (112 kg)   LMP 06/15/1999   SpO2 92%   BMI 43.74 kg/m²     General appearance: No apparent distress, appears stated age and cooperative. HEENT: Pupils equal, round, and reactive to light. Conjunctivae/corneas clear. Neck: Supple, with full range of motion. No jugular venous distention. Trachea midline. Respiratory:  Normal respiratory effort. Clear to auscultation, bilaterally without Rales/Wheezes/Rhonchi. Cardiovascular: Regular rate and rhythm with normal S1/S2 without murmurs, rubs or gallops. Abdomen: Soft, non-tender, non-distended with normal bowel sounds. Musculoskeletal: No clubbing, cyanosis or edema bilaterally. Full range of motion without deformity. Skin: Skin color, texture, turgor normal.  No rashes or lesions. Neurologic:  Neurovascularly intact without any focal sensory/motor deficits. Cranial nerves: II-XII intact, grossly non-focal.  Psychiatric: Alert and oriented, thought content appropriate, normal insight  Capillary Refill: Brisk,< 3 seconds   Peripheral Pulses: +2 palpable, equal bilaterally       Labs:   Recent Labs     07/21/22  0451 07/22/22  0648 07/23/22  0503   WBC 8.0 12.9* 13.6*   HGB 7.8* 8.0* 8.3*   HCT 23.5* 24.7* 25.2*    286 319     Recent Labs     07/21/22  0450 07/22/22  0648 07/23/22  0503   * 133* 133*   K 4.6 4.7 4.7   CL 95* 94* 93*   CO2 21 21 24   BUN 91* 100* 113*   CREATININE 3.6* 3.6* 3.0*   CALCIUM 9.0 9.3 9.5     No results for input(s): AST, ALT, BILIDIR, BILITOT, ALKPHOS in the last 72 hours. No results for input(s): INR in the last 72 hours. No results for input(s): Assunta Rogers in the last 72 hours. Urinalysis:      Lab Results   Component Value Date/Time    NITRU Negative 07/18/2022 06:45 AM    WBCUA 1 07/18/2022 06:45 AM    BACTERIA None Seen 07/18/2022 06:45 AM    RBCUA 0 07/18/2022 06:45 AM    BLOODU Negative 07/18/2022 06:45 AM    SPECGRAV 1.014 07/18/2022 06:45 AM    GLUCOSEU Negative 07/18/2022 06:45 AM       Radiology:  XR CHEST PORTABLE   Final Result   1. No significant change. VL Extremity Venous Bilateral   Final Result      CT CHEST WO CONTRAST   Final Result   1.  Interstitial pulmonary edema with suspected peribronchovascular alveolar   edema, suggesting congestive heart failure given mild cardiomegaly and trace   bilateral pleural effusions. Interstitial pneumonia could appear similar. 2. Patchy peribronchovascular groundglass opacities most prominent near the   lung bases are most likely alveolar edema, although pneumonia could appear   similar. 3. Mild bronchial wall thickening potentially due to pulmonary vascular   congestion, reactive airways disease, or bronchitis. 4. At least minimal anasarca. XR CHEST PORTABLE   Final Result   1. Pulmonary vascular congestion with suggestion of perihilar edema,   potentially congestive heart failure given mild cardiomegaly and possible   trace bilateral pleural effusions. Pneumonia could appear similar. 2. Bibasilar airspace opacities most likely due atelectasis, pneumonia and   aspiration could appear similar. DVT Prophylaxis: Heparin  Diet: ADULT DIET; Regular; 4 carb choices (60 gm/meal); Low Sodium (2 gm); 1500 ml  Code Status: Full Code    PT/OT Eval Status: No acute needs    Dispo -home once medically stable    Akshat Velez, APRN - CNP      NOTE:  This report was transcribed using voice recognition software. Every effort was made to ensure accuracy; however, inadvertent computerized transcription errors may be present.

## 2022-07-24 VITALS
DIASTOLIC BLOOD PRESSURE: 74 MMHG | SYSTOLIC BLOOD PRESSURE: 165 MMHG | BODY MASS INDEX: 42.7 KG/M2 | HEART RATE: 57 BPM | WEIGHT: 240.96 LBS | RESPIRATION RATE: 20 BRPM | HEIGHT: 63 IN | TEMPERATURE: 98 F | OXYGEN SATURATION: 94 %

## 2022-07-24 LAB
ANION GAP SERPL CALCULATED.3IONS-SCNC: 14 MMOL/L (ref 3–16)
BASOPHILS ABSOLUTE: 0 K/UL (ref 0–0.2)
BASOPHILS RELATIVE PERCENT: 0.2 %
BUN BLDV-MCNC: 108 MG/DL (ref 7–20)
CALCIUM SERPL-MCNC: 9.9 MG/DL (ref 8.3–10.6)
CHLORIDE BLD-SCNC: 95 MMOL/L (ref 99–110)
CO2: 26 MMOL/L (ref 21–32)
CREAT SERPL-MCNC: 2.8 MG/DL (ref 0.6–1.2)
EOSINOPHILS ABSOLUTE: 0 K/UL (ref 0–0.6)
EOSINOPHILS RELATIVE PERCENT: 0 %
GFR AFRICAN AMERICAN: 20
GFR NON-AFRICAN AMERICAN: 17
GLUCOSE BLD-MCNC: 323 MG/DL (ref 70–99)
GLUCOSE BLD-MCNC: 325 MG/DL (ref 70–99)
GLUCOSE BLD-MCNC: 349 MG/DL (ref 70–99)
HCT VFR BLD CALC: 26.3 % (ref 36–48)
HEMOGLOBIN: 8.8 G/DL (ref 12–16)
LYMPHOCYTES ABSOLUTE: 0.7 K/UL (ref 1–5.1)
LYMPHOCYTES RELATIVE PERCENT: 5.5 %
MAGNESIUM: 2.3 MG/DL (ref 1.8–2.4)
MCH RBC QN AUTO: 28.7 PG (ref 26–34)
MCHC RBC AUTO-ENTMCNC: 33.4 G/DL (ref 31–36)
MCV RBC AUTO: 86 FL (ref 80–100)
MONOCYTES ABSOLUTE: 0.7 K/UL (ref 0–1.3)
MONOCYTES RELATIVE PERCENT: 5.5 %
NEUTROPHILS ABSOLUTE: 11.9 K/UL (ref 1.7–7.7)
NEUTROPHILS RELATIVE PERCENT: 88.8 %
PDW BLD-RTO: 15.5 % (ref 12.4–15.4)
PERFORMED ON: ABNORMAL
PERFORMED ON: ABNORMAL
PLATELET # BLD: 331 K/UL (ref 135–450)
PMV BLD AUTO: 9.4 FL (ref 5–10.5)
POTASSIUM SERPL-SCNC: 4.5 MMOL/L (ref 3.5–5.1)
RBC # BLD: 3.06 M/UL (ref 4–5.2)
SODIUM BLD-SCNC: 135 MMOL/L (ref 136–145)
WBC # BLD: 13.4 K/UL (ref 4–11)

## 2022-07-24 PROCEDURE — 6370000000 HC RX 637 (ALT 250 FOR IP): Performed by: NURSE PRACTITIONER

## 2022-07-24 PROCEDURE — 99232 SBSQ HOSP IP/OBS MODERATE 35: CPT | Performed by: HOSPITALIST

## 2022-07-24 PROCEDURE — 6370000000 HC RX 637 (ALT 250 FOR IP): Performed by: HOSPITALIST

## 2022-07-24 PROCEDURE — 80048 BASIC METABOLIC PNL TOTAL CA: CPT

## 2022-07-24 PROCEDURE — 83735 ASSAY OF MAGNESIUM: CPT

## 2022-07-24 PROCEDURE — 85025 COMPLETE CBC W/AUTO DIFF WBC: CPT

## 2022-07-24 PROCEDURE — 6370000000 HC RX 637 (ALT 250 FOR IP): Performed by: STUDENT IN AN ORGANIZED HEALTH CARE EDUCATION/TRAINING PROGRAM

## 2022-07-24 PROCEDURE — 6360000002 HC RX W HCPCS: Performed by: STUDENT IN AN ORGANIZED HEALTH CARE EDUCATION/TRAINING PROGRAM

## 2022-07-24 PROCEDURE — 36415 COLL VENOUS BLD VENIPUNCTURE: CPT

## 2022-07-24 PROCEDURE — 94760 N-INVAS EAR/PLS OXIMETRY 1: CPT

## 2022-07-24 PROCEDURE — 2580000003 HC RX 258: Performed by: STUDENT IN AN ORGANIZED HEALTH CARE EDUCATION/TRAINING PROGRAM

## 2022-07-24 RX ORDER — GABAPENTIN 100 MG/1
200 CAPSULE ORAL 3 TIMES DAILY
Qty: 180 CAPSULE | Refills: 0 | Status: SHIPPED | OUTPATIENT
Start: 2022-07-24 | End: 2022-08-02 | Stop reason: SDUPTHER

## 2022-07-24 RX ORDER — ASCORBIC ACID 250 MG
250 TABLET ORAL 3 TIMES DAILY
Qty: 90 TABLET | Refills: 0 | Status: SHIPPED | OUTPATIENT
Start: 2022-07-24 | End: 2022-08-23

## 2022-07-24 RX ORDER — CEFDINIR 300 MG/1
300 CAPSULE ORAL 2 TIMES DAILY
Qty: 8 CAPSULE | Refills: 0 | Status: SHIPPED | OUTPATIENT
Start: 2022-07-24 | End: 2022-07-28

## 2022-07-24 RX ORDER — INSULIN GLARGINE 100 [IU]/ML
20 INJECTION, SOLUTION SUBCUTANEOUS NIGHTLY
Status: DISCONTINUED | OUTPATIENT
Start: 2022-07-24 | End: 2022-07-24 | Stop reason: HOSPADM

## 2022-07-24 RX ORDER — CARVEDILOL 6.25 MG/1
6.25 TABLET ORAL 2 TIMES DAILY WITH MEALS
Qty: 60 TABLET | Refills: 3 | Status: SHIPPED | OUTPATIENT
Start: 2022-07-24

## 2022-07-24 RX ORDER — FERROUS SULFATE TAB EC 324 MG (65 MG FE EQUIVALENT) 324 (65 FE) MG
324 TABLET DELAYED RESPONSE ORAL
Qty: 90 TABLET | Refills: 0 | Status: SHIPPED | OUTPATIENT
Start: 2022-07-24 | End: 2022-08-23

## 2022-07-24 RX ORDER — INSULIN GLARGINE 100 [IU]/ML
30 INJECTION, SOLUTION SUBCUTANEOUS
Status: DISCONTINUED | OUTPATIENT
Start: 2022-07-24 | End: 2022-07-24 | Stop reason: HOSPADM

## 2022-07-24 RX ADMIN — CEFTRIAXONE 2000 MG: 2 INJECTION, POWDER, FOR SOLUTION INTRAMUSCULAR; INTRAVENOUS at 08:46

## 2022-07-24 RX ADMIN — GABAPENTIN 200 MG: 100 CAPSULE ORAL at 08:39

## 2022-07-24 RX ADMIN — GUAIFENESIN 600 MG: 600 TABLET ORAL at 08:39

## 2022-07-24 RX ADMIN — ATORVASTATIN CALCIUM 80 MG: 80 TABLET, FILM COATED ORAL at 08:39

## 2022-07-24 RX ADMIN — TORSEMIDE 40 MG: 20 TABLET ORAL at 08:39

## 2022-07-24 RX ADMIN — HEPARIN SODIUM 5000 UNITS: 5000 INJECTION INTRAVENOUS; SUBCUTANEOUS at 13:56

## 2022-07-24 RX ADMIN — FERROUS SULFATE TAB EC 324 MG (65 MG FE EQUIVALENT) 324 MG: 324 (65 FE) TABLET DELAYED RESPONSE at 12:38

## 2022-07-24 RX ADMIN — OXYCODONE HYDROCHLORIDE AND ACETAMINOPHEN 250 MG: 500 TABLET ORAL at 08:39

## 2022-07-24 RX ADMIN — GABAPENTIN 200 MG: 100 CAPSULE ORAL at 13:56

## 2022-07-24 RX ADMIN — INSULIN GLARGINE 30 UNITS: 100 INJECTION, SOLUTION SUBCUTANEOUS at 10:32

## 2022-07-24 RX ADMIN — TORSEMIDE 40 MG: 20 TABLET ORAL at 13:56

## 2022-07-24 RX ADMIN — OXYCODONE HYDROCHLORIDE AND ACETAMINOPHEN 250 MG: 500 TABLET ORAL at 13:56

## 2022-07-24 RX ADMIN — SODIUM CHLORIDE, PRESERVATIVE FREE 10 ML: 5 INJECTION INTRAVENOUS at 08:40

## 2022-07-24 RX ADMIN — FERROUS SULFATE TAB EC 324 MG (65 MG FE EQUIVALENT) 324 MG: 324 (65 FE) TABLET DELAYED RESPONSE at 08:39

## 2022-07-24 RX ADMIN — INSULIN LISPRO 12 UNITS: 100 INJECTION, SOLUTION INTRAVENOUS; SUBCUTANEOUS at 12:39

## 2022-07-24 RX ADMIN — INSULIN LISPRO 12 UNITS: 100 INJECTION, SOLUTION INTRAVENOUS; SUBCUTANEOUS at 08:41

## 2022-07-24 RX ADMIN — CARVEDILOL 6.25 MG: 6.25 TABLET, FILM COATED ORAL at 08:39

## 2022-07-24 RX ADMIN — INSULIN LISPRO 8 UNITS: 100 INJECTION, SOLUTION INTRAVENOUS; SUBCUTANEOUS at 12:39

## 2022-07-24 RX ADMIN — ASPIRIN 81 MG: 81 TABLET, CHEWABLE ORAL at 08:39

## 2022-07-24 RX ADMIN — INSULIN LISPRO 8 UNITS: 100 INJECTION, SOLUTION INTRAVENOUS; SUBCUTANEOUS at 08:40

## 2022-07-24 NOTE — PLAN OF CARE
Problem: Discharge Planning  Goal: Discharge to home or other facility with appropriate resources  Outcome: Progressing     Problem: Pain  Goal: Verbalizes/displays adequate comfort level or baseline comfort level  Outcome: Progressing   Patient educated on acute pain. Taught patient to use call light to ask for pain medication. PRN pain medication given for acute pain. Will continue to monitor pain per unit protocol. Problem: Safety - Adult  Goal: Free from fall injury  Outcome: Progressing   Will remain free from falls. Fall precautions are in place. Call light, telephone and bedside table are within reach. Problem: ABCDS Injury Assessment  Goal: Absence of physical injury  Outcome: Progressing     Problem: Skin/Tissue Integrity  Goal: Absence of new skin breakdown  Description: 1. Monitor for areas of redness and/or skin breakdown  2. Assess vascular access sites hourly  3. Every 4-6 hours minimum:  Change oxygen saturation probe site  4. Every 4-6 hours:  If on nasal continuous positive airway pressure, respiratory therapy assess nares and determine need for appliance change or resting period. Outcome: Progressing   Will monitor skin and mucous members. Will turn patient every 2 hours, monitor for friction and sheering, and change dressings as needed. Will preform skin assessment every shift.      Problem: Chronic Conditions and Co-morbidities  Goal: Patient's chronic conditions and co-morbidity symptoms are monitored and maintained or improved  Outcome: Progressing     Problem: Respiratory - Adult  Goal: Achieves optimal ventilation and oxygenation  Outcome: Progressing

## 2022-07-24 NOTE — PROGRESS NOTES
Ca/Mg/Phos:   Recent Labs     07/22/22  0648 07/23/22  0503 07/24/22  0552   CALCIUM 9.3 9.5 9.9   MG 2.60* 2.50* 2.30       UA:  No results for input(s): Janace Taveras, GLUCOSEU, BILIRUBINUR, KETUA, SPECGRAV, BLOODU, PHUR, PROTEINU, UROBILINOGEN, NITRU, LEUKOCYTESUR, [place] Comment in the last 72 hours. Urine Microscopic:   No results for input(s): LABCAST, BACTERIA, COMU, HYALCAST, WBCUA, RBCUA, EPIU in the last 72 hours. Urine Chemistry: No results for input(s): Luis E Clos, PROTEINUR, NAUR in the last 72 hours. Objective:     Vitals: BP (!) 148/70   Pulse 63   Temp 98.3 °F (36.8 °C) (Oral)   Resp 17   Ht 5' 3\" (1.6 m)   Wt 240 lb 15.4 oz (109.3 kg)   LMP 06/15/1999   SpO2 93%   BMI 42.68 kg/m²    Wt Readings from Last 3 Encounters:   07/24/22 240 lb 15.4 oz (109.3 kg)   06/21/22 235 lb (106.6 kg)   01/10/22 220 lb (99.8 kg)      24HR INTAKE/OUTPUT:    Intake/Output Summary (Last 24 hours) at 7/24/2022 0857  Last data filed at 7/23/2022 1927  Gross per 24 hour   Intake 1026.92 ml   Output 2400 ml   Net -1373.08 ml     + UM    Constitutional:  awake, NAD  HEENT:  MMM, No icterus  Neck: no bruits, No JVD  Cardiovascular:  reg rhythm  Respiratory: Diminished at bases   Abdomen:  +BS, soft, NT, ND  Ext: + lower extremity edema    CNS: alert, no agitation    IMAGING:  XR CHEST PORTABLE   Final Result   1. No significant change. VL Extremity Venous Bilateral   Final Result      CT CHEST WO CONTRAST   Final Result   1. Interstitial pulmonary edema with suspected peribronchovascular alveolar   edema, suggesting congestive heart failure given mild cardiomegaly and trace   bilateral pleural effusions. Interstitial pneumonia could appear similar. 2. Patchy peribronchovascular groundglass opacities most prominent near the   lung bases are most likely alveolar edema, although pneumonia could appear   similar.    3. Mild bronchial wall thickening potentially due to pulmonary vascular   congestion, reactive airways disease, or bronchitis. 4. At least minimal anasarca. XR CHEST PORTABLE   Final Result   1. Pulmonary vascular congestion with suggestion of perihilar edema,   potentially congestive heart failure given mild cardiomegaly and possible   trace bilateral pleural effusions. Pneumonia could appear similar. 2. Bibasilar airspace opacities most likely due atelectasis, pneumonia and   aspiration could appear similar. Assessment :     1. NEHA on CKD 3b/4  -Non-Oliguric  -Baseline creat: 1.8-1.9 Now 3.5->4.1->3.6->3  -UA: prot ++. UPC 2 in past  - FeUrea 18%  -Volume: Hypervolemic   -Electrolytes: No Dyskalemia  -Acid-Base: AGMA    Recent Labs     07/22/22  0648 07/23/22  0503 07/24/22  0552   * 113* 108*   CREATININE 3.6* 3.0* 2.8*       Recent Labs     07/22/22  0648 07/23/22  0503 07/24/22  0552   * 133* 135*   K 4.7 4.7 4.5   CO2 21 24 26   MG 2.60* 2.50* 2.30       2. HTN  -Blood pressure at goal     BP Readings from Last 1 Encounters:   07/24/22 (!) 148/70     3. DM    4. CHF  - TTE (2022): LVEF 60-65%, PASP 36    Plan:     - Diuresis as tolerated  - daily weight  - Antimicrobials per primary team   - Monitor BMP    -Monitor I/O, UOP  -Maintain MAP>65  -Avoid nephrotoxin, if able. -Dose meds to current eGFR      Thank you for allowing us to participate in care of [facility] . We will continue to follow. Feel free to contact me with any questions.       Raji Law MD  7/24/2022    [facility] [address]  Office : [phone]  Fax :[phone]

## 2022-07-24 NOTE — PROGRESS NOTES
Patient is A&Ox4. Patient is resting in bed, awake and quiet. Room air. Side rails are up x3. Fall precautions are in place. Bed is in lowest position. Call light, telephone and bedside table are within reach. VSS taken. AM meds given. Shift assessment completed. Will continue to monitor patient per unit protocols.  Electronically signed by Judge Sumeet RN on 7/24/2022 at 12:13 PM

## 2022-07-24 NOTE — DISCHARGE INSTRUCTIONS
Follow up with pcp in 1 week  Follow up with nephrologist as directed  Nonfasting lab work, bmp, in one week, review results with nephrologist

## 2022-07-24 NOTE — DISCHARGE SUMMARY
Hospital Medicine Discharge Summary    Patient ID: Elyse Nesbitt      Patient's PCP: Louis Silva MD    Admit Date: 7/18/2022     Discharge Date:   7/24/2022    Admitting Physician: Christian Singh DO     Discharge Physician: MADISON Wise - CNP     Discharge Diagnoses  Acute hypoxic respiratory failure  Possible pneumonia  NEHA on CKD stage III  Anemia  Uncontrolled diabetes  Acute on chronic diastolic congestive heart failure  History of chronic back pain      Hospital Course: patient is a 74 y.o. female with past medical history of type 2 diabetes, CKD, previous history of DVT not on anticoagulation, cardiomyopathy with diastolic heart failure, hyperlipidemia, hypertension who presents to [facility] coming from home that over the last 2 days since she returned from a festival on Saturday she was noticing that while at the festival she was initially somewhat increasingly fatigued and short of breath on exertion but was still able to get to her car and get home. She became even more progressively short of breath and fatigued and was in her home and sleeping in bed the whole day all day [facility] because she became more more short of breath as the day went on till [facility] she was getting increasingly more short of breath and fatigued on exertion. She was diagnosed with pneumonia and required up to 12 L of high flow oxygen. Currently has been weaned to room air. Creatinine also elevated, which is slowly trending down. Cardiology, nephrology, and pulmonology have all been following her. Acute hypoxic respiratory failure  -Had been up to 12 L of high flow O2, does not wear home O2 at home. Currently on room air. Home O2 eval did not show need for oxygen at discharge. -CT showed pulmonary edema, possible pneumonia and heart failure. Most likely cause of the hypoxic respiratory. .  -Home health care has been ordered to monitor CT CHEST WO CONTRAST   Final Result   1. Interstitial pulmonary edema with suspected peribronchovascular alveolar   edema, suggesting congestive heart failure given mild cardiomegaly and trace   bilateral pleural effusions. Interstitial pneumonia could appear similar. 2. Patchy peribronchovascular groundglass opacities most prominent near the   lung bases are most likely alveolar edema, although pneumonia could appear   similar. 3. Mild bronchial wall thickening potentially due to pulmonary vascular   congestion, reactive airways disease, or bronchitis. 4. At least minimal anasarca. XR CHEST PORTABLE   Final Result   1. Pulmonary vascular congestion with suggestion of perihilar edema,   potentially congestive heart failure given mild cardiomegaly and possible   trace bilateral pleural effusions. Pneumonia could appear similar. 2. Bibasilar airspace opacities most likely due atelectasis, pneumonia and   aspiration could appear similar. Consults:     IP CONSULT TO HEART FAILURE NURSE/COORDINATOR  IP CONSULT TO DIETITIAN  IP CONSULT TO NEPHROLOGY  IP CONSULT TO SPIRITUAL SERVICES  IP CONSULT TO SOCIAL WORK  IP CONSULT TO CARDIOLOGY  IP CONSULT TO PULMONOLOGY  IP CONSULT TO HOME CARE NEEDS    Disposition: Home    Condition at Discharge: Stable    Discharge Instructions/Follow-up:    Follow up with pcp in 1 week  Follow up with nephrologist as directed  Nonfasting lab work, bmp, in one week, review results with nephrologist     Code Status:  Full Code     Activity: activity as tolerated    Diet: cardiac diet      Discharge Medications:     Current Discharge Medication List             Details   cefdinir (OMNICEF) 300 MG capsule Take 1 capsule by mouth in the morning and 1 capsule before bedtime. Do all this for 4 days.   Qty: 8 capsule, Refills: 0      torsemide 40 MG TABS Take 40 mg by mouth 2 times daily at 0800 and 1400  Qty: 30 tablet, Refills: 3      ferrous sulfate 324 (65 Fe) DM type 2 with diabetic peripheral neuropathy (HCC)      Cholecalciferol (VITAMIN D3) 2000 units CAPS Take 2,000 Units by mouth every evening       aspirin 81 MG chewable tablet Take 1 tablet by mouth daily. Qty: 30 tablet             Time Spent on discharge is more than 30 minutes in the examination, evaluation, counseling and review of medications and discharge plan. Signed: MADISON Fair CNP   7/24/2022    The patient was seen and examined on day of discharge and this discharge summary is in conjunction with any daily progress note from day of discharge. Thank you Lev Das MD for the opportunity to be involved in this patient's care. If you have any questions or concerns please feel free to contact me at [phone]. NOTE:  This report was transcribed using voice recognition software. Every effort was made to ensure accuracy; however, inadvertent computerized transcription errors may be present.

## 2022-07-24 NOTE — PROGRESS NOTES
Data- discharge order received, pt verbalized agreement to discharge, disposition to previous residence, no needs for HHC/DME. Action- discharge instructions prepared and given to pt, pt verbalized understanding. Medication information packet given r/t NEW and/or CHANGED prescriptions emphasizing name/purpose/side effects, pt verbalized understanding. Discharge instruction summary: Diet- low sodium, carb control, Activity- UAL, Primary Care Physician as followsBhanu Giordano -[phone] f/u appointment, immunizations reviewed and prescription medications filled with patient's preferred pharmacy. 60 minutes of CHF Education reviewed. Pt/ Family has had a total of 60 minutes CHF education this admission encounter. Response- Pt belongings gathered, IV removed without complications. Dry dressing applied. Disposition is home (no HHC/DME needs), transported with family, taken to lobby via w/c w/ belongings and discharge instructions, no complications. Reviewed discharge instructions with the patient. No further questions at this time.  Electronically signed by Bart Hernandez RN on 7/24/2022 at 4:26 PM

## 2022-07-24 NOTE — FLOWSHEET NOTE
07/23/22 2030   Assessment   Charting Type Shift assessment   Psychosocial   Psychosocial (WDL) WDL   Patient Behaviors Anxious   Neurological   Neuro (WDL) WDL   Level of Consciousness Alert (0)   Orientation Level Oriented X4   Cognition Appropriate judgement; Appropriate safety awareness; Appropriate attention/concentration; Appropriate for developmental age   Speech Clear   Bison Coma Scale   Eye Opening 4   Best Verbal Response 5   Best Motor Response 6   Bison Coma Scale Score 15   HEENT (Head, Ears, Eyes, Nose, & Throat)   HEENT (WDL) X   Right Eye Glasses; Impaired vision   Left Eye Glasses; Impaired vision   Right Ear Impaired hearing   Respiratory   Respiratory (WDL) X   Respiratory Pattern Regular   Respiratory Depth Normal   Respiratory Quality/Effort Dyspnea with exertion   Chest Assessment Chest expansion symmetrical   L Breath Sounds Diminished   R Breath Sounds Diminished   Subcutaneous Air/Crepitus None   Breath Sounds   Right Upper Lobe Diminished   Right Middle Lobe Diminished   Right Lower Lobe Diminished   Left Upper Lobe Diminished   Left Lower Lobe Diminished   Cough/Sputum   Sputum How Obtained Spontaneous cough   Cough Congested   Cardiac   Cardiac (WDL) WDL   Cardiac Rhythm Sinus rhythm   Cardiac Regularity Regular   Heart Sounds S1, S2   Cardiac Monitor   Alarm Audible Yes   Gastrointestinal   Abdominal (WDL) WDL   Abdomen Inspection Soft;Rounded   RUQ Bowel Sounds Active   LUQ Bowel Sounds Active   RLQ Bowel Sounds Active   LLQ Bowel Sounds Active   Tenderness Soft;Nontender   Genitourinary   Genitourinary (WDL) WDL   Suprapubic Tenderness No   Dysuria (Pain/Burning w/Urination) No   Peripheral Vascular   Peripheral Vascular (WDL) WDL   Edema Generalized   Edema Generalized +1   RLE Neurovascular Assessment   R Pedal Pulse +2   LLE Neurovascular Assessment   L Pedal Pulse +2   Skin Integumentary    Skin Integumentary (WDL) X   Skin Condition/Temp Dry; Warm   Care Plan - Skin/Tissue Integrity Goals   Skin Integrity Remains Intact Monitor for areas of redness and/or skin breakdown   Musculoskeletal   Musculoskeletal (WDL) WDL

## 2022-07-24 NOTE — DISCHARGE INSTR - DIET
Good nutrition is important when healing from an illness, injury, or surgery. Follow any nutrition recommendations given to you during your hospital stay. If you were given an oral nutrition supplement while in the hospital, continue to take this supplement at home. You can take it with meals, in-between meals, and/or before bedtime. These supplements can be purchased at most local grocery stores, pharmacies, and chain [employer] retargeting-stores. If you have any questions about your diet or nutrition, call the hospital and ask for the dietitian.       Resume diet as tolerated

## 2022-07-25 ENCOUNTER — TELEPHONE (OUTPATIENT)
Dept: PRIMARY CARE CLINIC | Age: 67
End: 2022-07-25

## 2022-07-25 NOTE — TELEPHONE ENCOUNTER
Thomasina Apgar from [employer] in requesting verbal orders for home care (skilled nursing, PT and OT)

## 2022-07-26 ENCOUNTER — TELEPHONE (OUTPATIENT)
Dept: PRIMARY CARE CLINIC | Age: 67
End: 2022-07-26

## 2022-07-26 NOTE — TELEPHONE ENCOUNTER
Spoke with Kati she declined f/u with Dr Bekah Bynum at this time. Has appointments with cardiology and kidney specialists in the next week. Will call when she feels stronger to come in to see Dr Maria G Otero. [facility] Transitions Initial Follow Up Call    Outreach made within 2 business days of discharge: Yes    Patient: Gopi Willis Patient : 1955   MRN: [id number]  Reason for Admission: There are no discharge diagnoses documented for the most recent discharge. Discharge Date: 22       Spoke with: Marjorie Zamora    Discharge department/facility: [facility]    TCM Interactive Patient Contact:  Was patient able to fill all prescriptions: Yes  Was patient instructed to bring all medications to the follow-up visit: Yes  Is patient taking all medications as directed in the discharge summary?  Yes  Does patient understand their discharge instructions: Yes  Does patient have questions or concerns that need addressed prior to 7-14 day follow up office visit: no    Scheduled appointment with PCP within 7-14 days    Follow Up  Future Appointments   Date Time Provider Gael Turcios   2022  3:15 PM Neyda Bishop MD [place], [place]

## 2022-07-26 NOTE — TELEPHONE ENCOUNTER
Left detailed VM for [address] for orders.   Also asked for current med list and note if she is on oxygen, etc.

## 2022-07-27 ENCOUNTER — FOLLOWUP TELEPHONE ENCOUNTER (OUTPATIENT)
Dept: INPATIENT UNIT | Age: 67
End: 2022-07-27

## 2022-07-29 ENCOUNTER — OFFICE VISIT (OUTPATIENT)
Dept: CARDIOLOGY CLINIC | Age: 67
End: 2022-07-29
Payer: MEDICARE

## 2022-07-29 VITALS
DIASTOLIC BLOOD PRESSURE: 70 MMHG | WEIGHT: 221 LBS | BODY MASS INDEX: 39.16 KG/M2 | HEART RATE: 68 BPM | HEIGHT: 63 IN | OXYGEN SATURATION: 98 % | SYSTOLIC BLOOD PRESSURE: 134 MMHG

## 2022-07-29 DIAGNOSIS — N17.9 ACUTE KIDNEY INJURY SUPERIMPOSED ON CKD (HCC): ICD-10-CM

## 2022-07-29 DIAGNOSIS — D64.9 ANEMIA, UNSPECIFIED TYPE: ICD-10-CM

## 2022-07-29 DIAGNOSIS — N18.30 STAGE 3 CHRONIC KIDNEY DISEASE, UNSPECIFIED WHETHER STAGE 3A OR 3B CKD (HCC): ICD-10-CM

## 2022-07-29 DIAGNOSIS — I50.23 ACUTE ON CHRONIC SYSTOLIC CONGESTIVE HEART FAILURE (HCC): Primary | ICD-10-CM

## 2022-07-29 DIAGNOSIS — N18.9 ACUTE KIDNEY INJURY SUPERIMPOSED ON CKD (HCC): ICD-10-CM

## 2022-07-29 LAB
ANION GAP SERPL CALCULATED.3IONS-SCNC: 17 MMOL/L (ref 3–16)
BUN BLDV-MCNC: 62 MG/DL (ref 7–20)
CALCIUM SERPL-MCNC: 9.6 MG/DL (ref 8.3–10.6)
CHLORIDE BLD-SCNC: 93 MMOL/L (ref 99–110)
CO2: 30 MMOL/L (ref 21–32)
CREAT SERPL-MCNC: 2.4 MG/DL (ref 0.6–1.2)
GFR AFRICAN AMERICAN: 24
GFR NON-AFRICAN AMERICAN: 20
GLUCOSE BLD-MCNC: 124 MG/DL (ref 70–99)
POTASSIUM SERPL-SCNC: 3.9 MMOL/L (ref 3.5–5.1)
SODIUM BLD-SCNC: 140 MMOL/L (ref 136–145)

## 2022-07-29 PROCEDURE — 99214 OFFICE O/P EST MOD 30 MIN: CPT | Performed by: INTERNAL MEDICINE

## 2022-07-29 PROCEDURE — 1123F ACP DISCUSS/DSCN MKR DOCD: CPT | Performed by: INTERNAL MEDICINE

## 2022-07-29 RX ORDER — METOLAZONE 5 MG/1
5 TABLET ORAL DAILY
Qty: 30 TABLET | Refills: 5 | Status: SHIPPED | OUTPATIENT
Start: 2022-07-29 | End: 2022-08-02 | Stop reason: ALTCHOICE

## 2022-07-29 NOTE — PROGRESS NOTES
[facility]  Cardiology ProgressNote        CC:    Diastolic heart failure               HPI:       This is a 74 y.o. female history of diastolic heart dysfunction DM, advanced CKD (creatinine 3.5), HTN, HLD, who was admitted to [facility] on 7/18/22  for evaluation of f aggressive fatigue and shortness of breath. She was thought of having fluid overload and treated with IV Lasix with over 19 pound weight loss. She is feeling much better now. She was discharged on torsemide 40 mg twice daily. Previously her primary care doctor had decreased her torsemide to a lower dose. I think she did not tolerated and got fluid overloaded    She denies any exertional chest pain, pressure, tightness, nausea, vomiting, diaphoresis, SOB/AREVALO, palpitations, heart racing, dizziness/lightheadedness, orthopnea, PND, LE edema or syncope.                 Past Medical History:   Diagnosis Date    Abnormal echocardiogram     25% on 3/11/14 and 50% on 3/19/14    Back pain     Cardiomyopathy (HCC)     EF was 50% on 3/19/14    Chipped tooth     lower left    Clostridium difficile diarrhea 03/12/2021    Dental crown present     veneers    Depressive disorder 08/13/2014    Diabetic infection of right foot ([address] 04/15/2015    Diabetic ulcer of toe of left foot associated with diabetes mellitus due to underlying condition, with fat layer exposed ([address] 11/9/2018    Diabetic ulcer of toe of left foot associated with type 2 diabetes mellitus, with fat layer exposed ([address] 04/10/2018    Pt slipped in hot tub latter part of February and has not healed since despite topical treatment    DVT (deep venous thrombosis) ([facility])     HTN (hypertension)     Hx of blood clots 2016    left leg    Ischemic cardiomyopathy 04/15/2015    Kidney disease     CHRONIC STAGE 3    Meniere's disease     Mixed hyperlipidemia 03/07/2016    Nicotine abuse     NSTEMI (non-ST elevated myocardial infarction) ([address] 03/13/2014    ANGLE (obstructive sleep apnea) Osteomyelitis of ankle or foot, acute, left ([address] 06/04/2018    Pneumonia     PONV (postoperative nausea and vomiting)     nausea    Spinal epidural abscess 03/13/2014    Type II diabetes mellitus, uncontrolled ([address] 08/13/2014      Past Surgical History:   Procedure Laterality Date    BACK SURGERY  3/2014    DEBRIDEMENT  3/12/14    extensive debridement of bone/muscle and paraspinal empyema    DILATION AND CURETTAGE OF UTERUS      ENDOSCOPY, COLON, DIAGNOSTIC      FOOT DEBRIDEMENT Left 9/8/2020    LEFT FOOT DEBRIDEMENT INCISION AND DRAINAGE performed by Roberta Garcia DPM at [address] TOE SURGERY Left 8/28/2020    ON THE LEFT: [address], WOUND CLOSURE, FLEXOR TENOTOMY 4TH AND 5TH DIGITS, TENOTOMY AND CAPSULOTOMY 2ND DIGIT performed by Roberta Garcia DPM at [address] [facility] ([facility])  6/15/1999    complete-think right ovary in.     NASAL SEPTUM SURGERY      NERVE SURGERY Left 9/10/2020    LEFT FOOT INCISION AND DRAINAGE, EXTENSOR HALLUCIS LONGUS REPAIR WITH DELAYED PRIMARY CLOSURE performed by Roberta Garcia DPM at [address] shut behind right ear    PRESSURE ULCER DEBRIDEMENT Left 10/23/2020    ON THE LEFT: SURGICAL PREPARATION OF WOUND BED, APPLICATION OF DERMAL GRAFT SUBSTITUTE performed by Roberta Garcia DPM at [address]  04/02/2019    ROBOTIC ASSISTED LAPAROSCOPIC SLEEVE GASTRECTOMY, LAPAROSCOPIC REDUCIBLE INCISIONAL HERNIA REPAIR     SLEEVE GASTRECTOMY N/A 4/2/2019    ROBOTIC ASSISTED LAPAROSCOPIC SLEEVE GASTRECTOMY, LAPAROSCOPIC REDUCIBLE INCISIONAL HERNIA REPAIR performed by James Cruz DO at [address] N/A 2/8/2019    EGD BIOPSY performed by James Cruz DO at [address] N/A 2/8/2019    EGD POLYP ABLATION/OTHER performed by James Cruz DO at [facility] ENDOSCOPY      Family History   Problem Relation Age of Onset    High Blood Pressure Mother     Cancer Mother     Rheum Arthritis Mother     COPD Father     Cancer Father     Osteoarthritis Neg Hx     Asthma Neg Hx     Breast Cancer Neg Hx     Diabetes Neg Hx     Heart Failure Neg Hx     High Cholesterol Neg Hx     Hypertension Neg Hx     Migraines Neg Hx     Ovarian Cancer Neg Hx     Rashes/Skin Problems Neg Hx     Seizures Neg Hx     Stroke Neg Hx     Thyroid Disease Neg Hx       Social History     Tobacco Use    Smoking status: Former     Packs/day: 0.50     Years: 10.00     Pack years: 5.00     Types: Cigarettes     Quit date: 11/10/2013     Years since quittin.7    Smokeless tobacco: Never   Vaping Use    Vaping Use: Never used   Substance Use Topics    Alcohol use:  Yes     Alcohol/week: 0.0 standard drinks     Comment: occasionally    Drug use: No      Allergies   Allergen Reactions    Doxycycline Other (See Comments)     Yeast infection     REM      Review of Systems -   Constitutional: Negative for weight gain/loss; malaise, fever  Respiratory: Negative for Asthma;  cough and hemoptysis  Cardiovascular: Negative for palpitations,dizziness   Gastrointestinal: Negative for abd.pain; constipation/diarrhea;    Genitourinary: Negative for stones; hematuria; frequency hesitancy  Integumentt: Negative for rash or pruritis  Hematologic/lymphatic: Negative for blood dyscrasia; leukemia/lymphoma  Musculoskeletal: Negative for Connective tissue disease  Neurological:  Negative for Seizure   Behavioral/Psych:Negative for Bipolar disorder, Schizophrenia; Dementia  Endocrine: negative for thyroid, parathyroid disease    No intake or output data in the 24 hours ending 22 1552      Physical Examination:    /70   Pulse 68   Ht 5' 3\" (1.6 m)   Wt 221 lb (100.2 kg)   LMP 06/15/1999   SpO2 98%   BMI 39.15 kg/m²    HEENT:  Face: Atraumatic, Conjunctiva: Pink; non icteric,  Mucous Memb:  Moist, No thyromegaly or Lymphadenopathy  Respiratory:  Resp Assessment: normal, Resp Auscultation: clear   Cardiovascular: Auscultation: nl S1 & S2, Palpation:  Nl PMI; No heaves or thrills, JVP:  normal  Abdomen: Soft, non-tender, Normal bowel sounds,  No organomegaly  Extremities: No Cyanosis or Clubbing; Edema none  Neurological: Oriented to time, place, and person, Non-anxious  Psychiatric: Normal mood and affect  Skin: Warm and dry,  No rash seen      No current facility-administered medications for this visit. Labs:   No results for input(s): WBC, HGB, HCT, PLT in the last 72 hours. No results for input(s): NA, K, CO2, BUN, CREATININE, GLUCOSE in the last 72 hours. No results for input(s): BNP in the last 72 hours. No results for input(s): PROTIME, INR in the last 72 hours. No results for input(s): APTT in the last 72 hours. No results for input(s): CKTOTAL, CKMB, CKMBINDEX, TROPONINI in the last 72 hours. Lab Results   Component Value Date/Time    HDL 64 2018 10:15 AM    LDLCALC 139 2018 10:15 AM    TRIG 77 2018 10:15 AM     No results for input(s): AST, ALT, LABALBU in the last 72 hours. EK22  Sinus rhythm       Chest X-Ray:  1. Pulmonary vascular congestion with suggestion of perihilar edema,   potentially congestive heart failure given mild cardiomegaly and possible   trace bilateral pleural effusions. Pneumonia could appear similar. 2. Bibasilar airspace opacities most likely due atelectasis, pneumonia and   aspiration could appear similar       ECHO: 2022  Normal left ventricle size, wall thickness, and systolic function with EF 60-65%. No RWMA  The right ventricle is normal in size and function. Aortic sclerosis with no significant stenosis (peak velocity 2.1m/s and mean gradient of 12mmHg). Trivial mitral and tricuspid regurgitation. Stress Nuclear:    1. Technically a suboptimal study. 2. Non diagonostic treadmill exercise stress portion of the study because of    resting ST abnormalities    3.  A very small area of reversibility seen at the apex. However given    significant movement and extracardiac artifact adjacent to the apex this    study remains equivocal.    4. Gated Study shows normal LV size and Systolic function; EF is > 70%. ASSESSMENT AND PLAN:      I think Mrs. Jurado's main problem is fluid overload from advanced kidney disease. Her creatinine is 3.5. She needs a higher dose of torsemide to keep her from getting fluid overloaded. As such she is on 40 twice daily. Diastolic heart function. Systolic function is normal  Addition she has severe anemia which is contributing to her fatigue.   Again this is most likely to be tackled by nephrology    CKD Stage III   Cr 2.8 (7/24/22)    Anemia   7/24/22 Hgb 8.8 Hct 26.3    Diabetes   The care of PCP    Johnathan Black M.D  7/29/2022

## 2022-08-15 DIAGNOSIS — N18.32 STAGE 3B CHRONIC KIDNEY DISEASE (HCC): ICD-10-CM

## 2022-08-15 DIAGNOSIS — I50.30 DIASTOLIC CONGESTIVE HEART FAILURE, UNSPECIFIED HF CHRONICITY (HCC): ICD-10-CM

## 2022-08-15 LAB
ALBUMIN SERPL-MCNC: 4.5 G/DL (ref 3.4–5)
ANION GAP SERPL CALCULATED.3IONS-SCNC: 18 MMOL/L (ref 3–16)
BUN BLDV-MCNC: 28 MG/DL (ref 7–20)
CALCIUM SERPL-MCNC: 9.4 MG/DL (ref 8.3–10.6)
CHLORIDE BLD-SCNC: 94 MMOL/L (ref 99–110)
CO2: 29 MMOL/L (ref 21–32)
CREAT SERPL-MCNC: 2.1 MG/DL (ref 0.6–1.2)
GFR AFRICAN AMERICAN: 28
GFR NON-AFRICAN AMERICAN: 23
GLUCOSE BLD-MCNC: 177 MG/DL (ref 70–99)
IRON SATURATION: 39 % (ref 15–50)
IRON: 118 UG/DL (ref 37–145)
PHOSPHORUS: 3.4 MG/DL (ref 2.5–4.9)
POTASSIUM SERPL-SCNC: 3.6 MMOL/L (ref 3.5–5.1)
PRO-BNP: 438 PG/ML (ref 0–124)
SODIUM BLD-SCNC: 141 MMOL/L (ref 136–145)
TOTAL IRON BINDING CAPACITY: 299 UG/DL (ref 260–445)

## 2022-08-17 RX ORDER — ONDANSETRON 4 MG/1
TABLET, ORALLY DISINTEGRATING ORAL
Qty: 20 TABLET | Refills: 0 | Status: SHIPPED | OUTPATIENT
Start: 2022-08-17

## 2022-08-22 DIAGNOSIS — E11.42 DM TYPE 2 WITH DIABETIC PERIPHERAL NEUROPATHY (HCC): ICD-10-CM

## 2022-08-22 RX ORDER — PEN NEEDLE, DIABETIC 31 GX5/16"
NEEDLE, DISPOSABLE MISCELLANEOUS
Qty: 180 EACH | Refills: 5 | Status: SHIPPED | OUTPATIENT
Start: 2022-08-22

## 2022-08-22 NOTE — TELEPHONE ENCOUNTER
Medication:   Requested Prescriptions     Pending Prescriptions Disp Refills    B-D UF III MINI PEN NEEDLES 31G X 5 MM MISC [Pharmacy Med Name: BD UF MINI PEN NEEDLE [id number]]  1     Sig: USE AS DIRECTED TWICE A DAY       Last Filled:      Patient Phone Number: [phone] (home)     Last appt: 6/21/2022   Next appt: Visit date not found    Last Labs DM:   Lab Results   Component Value Date/Time    LABA1C 7.1 07/21/2022 04:50 AM

## 2022-08-23 RX ORDER — CITALOPRAM 40 MG/1
TABLET ORAL
Qty: 90 TABLET | Refills: 1 | Status: SHIPPED | OUTPATIENT
Start: 2022-08-23

## 2022-08-31 RX ORDER — PANTOPRAZOLE SODIUM 40 MG/1
40 TABLET, DELAYED RELEASE ORAL DAILY
Qty: 90 TABLET | Refills: 1 | Status: SHIPPED | OUTPATIENT
Start: 2022-08-31

## 2022-09-19 RX ORDER — LIRAGLUTIDE 6 MG/ML
1.8 INJECTION SUBCUTANEOUS DAILY
Qty: 27 ML | Refills: 1 | Status: SHIPPED | OUTPATIENT
Start: 2022-09-19

## 2022-09-28 ENCOUNTER — PATIENT MESSAGE (OUTPATIENT)
Dept: BARIATRICS/WEIGHT MGMT | Age: 67
End: 2022-09-28

## 2022-10-10 DIAGNOSIS — N18.4 CKD (CHRONIC KIDNEY DISEASE) STAGE 4, GFR 15-29 ML/MIN (HCC): ICD-10-CM

## 2022-10-10 DIAGNOSIS — I50.30 DIASTOLIC CONGESTIVE HEART FAILURE, UNSPECIFIED HF CHRONICITY (HCC): ICD-10-CM

## 2022-10-10 DIAGNOSIS — N18.32 STAGE 3B CHRONIC KIDNEY DISEASE (HCC): ICD-10-CM

## 2022-10-10 LAB
ALBUMIN SERPL-MCNC: 4.3 G/DL (ref 3.4–5)
ANION GAP SERPL CALCULATED.3IONS-SCNC: 20 MMOL/L (ref 3–16)
BASOPHILS ABSOLUTE: 0.1 K/UL (ref 0–0.2)
BASOPHILS RELATIVE PERCENT: 0.7 %
BUN BLDV-MCNC: 29 MG/DL (ref 7–20)
CALCIUM SERPL-MCNC: 9 MG/DL (ref 8.3–10.6)
CHLORIDE BLD-SCNC: 88 MMOL/L (ref 99–110)
CO2: 30 MMOL/L (ref 21–32)
CREAT SERPL-MCNC: 2.3 MG/DL (ref 0.6–1.2)
CREATININE URINE: 94.6 MG/DL (ref 28–259)
EOSINOPHILS ABSOLUTE: 0.2 K/UL (ref 0–0.6)
EOSINOPHILS RELATIVE PERCENT: 2.3 %
GFR AFRICAN AMERICAN: 25
GFR NON-AFRICAN AMERICAN: 21
GLUCOSE BLD-MCNC: 263 MG/DL (ref 70–99)
HCT VFR BLD CALC: 37 % (ref 36–48)
HEMOGLOBIN: 12.3 G/DL (ref 12–16)
LYMPHOCYTES ABSOLUTE: 1.5 K/UL (ref 1–5.1)
LYMPHOCYTES RELATIVE PERCENT: 18.3 %
MCH RBC QN AUTO: 28.7 PG (ref 26–34)
MCHC RBC AUTO-ENTMCNC: 33.2 G/DL (ref 31–36)
MCV RBC AUTO: 86.3 FL (ref 80–100)
MICROALBUMIN UR-MCNC: 178 MG/DL
MICROALBUMIN/CREAT UR-RTO: 1881.6 MG/G (ref 0–30)
MONOCYTES ABSOLUTE: 0.5 K/UL (ref 0–1.3)
MONOCYTES RELATIVE PERCENT: 6.4 %
NEUTROPHILS ABSOLUTE: 6 K/UL (ref 1.7–7.7)
NEUTROPHILS RELATIVE PERCENT: 72.3 %
PDW BLD-RTO: 16.1 % (ref 12.4–15.4)
PHOSPHORUS: 3.7 MG/DL (ref 2.5–4.9)
PLATELET # BLD: 294 K/UL (ref 135–450)
PMV BLD AUTO: 9.6 FL (ref 5–10.5)
POTASSIUM SERPL-SCNC: 3.4 MMOL/L (ref 3.5–5.1)
PRO-BNP: 638 PG/ML (ref 0–124)
RBC # BLD: 4.29 M/UL (ref 4–5.2)
SODIUM BLD-SCNC: 138 MMOL/L (ref 136–145)
WBC # BLD: 8.3 K/UL (ref 4–11)

## 2022-10-24 RX ORDER — INSULIN GLARGINE 100 [IU]/ML
INJECTION, SOLUTION SUBCUTANEOUS
Qty: 66 ML | Refills: 0 | Status: SHIPPED | OUTPATIENT
Start: 2022-10-24 | End: 2023-01-21

## 2022-11-22 RX ORDER — ATORVASTATIN CALCIUM 80 MG/1
TABLET, FILM COATED ORAL
Qty: 90 TABLET | Refills: 1 | OUTPATIENT
Start: 2022-11-22

## 2022-11-22 RX ORDER — INSULIN GLARGINE 100 [IU]/ML
INJECTION, SOLUTION SUBCUTANEOUS
OUTPATIENT
Start: 2022-11-22 | End: 2023-02-19

## 2022-11-22 NOTE — TELEPHONE ENCOUNTER
Pt needs fasting labs Office Visit. Last OV: 6/21/22. If patient calls back, schedule appt for these, then let me know and we'll send in Rx's.

## 2022-12-07 ENCOUNTER — PATIENT MESSAGE (OUTPATIENT)
Dept: PRIMARY CARE CLINIC | Age: 67
End: 2022-12-07

## 2022-12-08 RX ORDER — INSULIN GLARGINE 100 [IU]/ML
INJECTION, SOLUTION SUBCUTANEOUS
OUTPATIENT
Start: 2022-12-08 | End: 2023-03-07

## 2022-12-08 RX ORDER — ATORVASTATIN CALCIUM 80 MG/1
TABLET, FILM COATED ORAL
Qty: 90 TABLET | Refills: 1 | OUTPATIENT
Start: 2022-12-08

## 2022-12-08 NOTE — TELEPHONE ENCOUNTER
From: Joon Gupta  To: Dr. Tree Painter: 12/7/2022 8:49 PM EST  Subject: Carvedil    I need a refill for this as well as Vitamin C and D.     Thank you,    Ernesto Michel

## 2022-12-16 RX ORDER — CARVEDILOL 6.25 MG/1
6.25 TABLET ORAL 2 TIMES DAILY WITH MEALS
Qty: 60 TABLET | Refills: 1 | Status: SHIPPED | OUTPATIENT
Start: 2022-12-16

## 2022-12-21 ENCOUNTER — OFFICE VISIT (OUTPATIENT)
Dept: PRIMARY CARE CLINIC | Age: 67
End: 2022-12-21
Payer: MEDICARE

## 2022-12-21 VITALS
WEIGHT: 221 LBS | TEMPERATURE: 97.3 F | SYSTOLIC BLOOD PRESSURE: 118 MMHG | BODY MASS INDEX: 39.15 KG/M2 | RESPIRATION RATE: 18 BRPM | HEART RATE: 66 BPM | DIASTOLIC BLOOD PRESSURE: 68 MMHG

## 2022-12-21 DIAGNOSIS — E11.40 TYPE 2 DIABETES MELLITUS WITH DIABETIC NEUROPATHY, WITH LONG-TERM CURRENT USE OF INSULIN (HCC): ICD-10-CM

## 2022-12-21 DIAGNOSIS — Z12.31 ENCOUNTER FOR SCREENING MAMMOGRAM FOR MALIGNANT NEOPLASM OF BREAST: ICD-10-CM

## 2022-12-21 DIAGNOSIS — I10 ESSENTIAL HYPERTENSION: ICD-10-CM

## 2022-12-21 DIAGNOSIS — N18.4 CKD (CHRONIC KIDNEY DISEASE) STAGE 4, GFR 15-29 ML/MIN (HCC): ICD-10-CM

## 2022-12-21 DIAGNOSIS — E78.2 MIXED HYPERLIPIDEMIA: ICD-10-CM

## 2022-12-21 DIAGNOSIS — D63.1 ANEMIA OF CHRONIC RENAL FAILURE, STAGE 4 (SEVERE) (HCC): ICD-10-CM

## 2022-12-21 DIAGNOSIS — K21.9 CHRONIC GERD: ICD-10-CM

## 2022-12-21 DIAGNOSIS — Z79.4 TYPE 2 DIABETES MELLITUS WITH DIABETIC NEUROPATHY, WITH LONG-TERM CURRENT USE OF INSULIN (HCC): ICD-10-CM

## 2022-12-21 DIAGNOSIS — N18.4 ANEMIA OF CHRONIC RENAL FAILURE, STAGE 4 (SEVERE) (HCC): ICD-10-CM

## 2022-12-21 PROBLEM — J96.00 ACUTE RESPIRATORY FAILURE (HCC): Status: RESOLVED | Noted: 2022-07-18 | Resolved: 2022-12-21

## 2022-12-21 PROBLEM — N18.9 ACUTE KIDNEY INJURY SUPERIMPOSED ON CKD (HCC): Status: RESOLVED | Noted: 2022-07-18 | Resolved: 2022-12-21

## 2022-12-21 PROBLEM — N17.9 ACUTE KIDNEY INJURY SUPERIMPOSED ON CKD (HCC): Status: RESOLVED | Noted: 2022-07-18 | Resolved: 2022-12-21

## 2022-12-21 PROBLEM — N18.30 CKD (CHRONIC KIDNEY DISEASE) STAGE 3, GFR 30-59 ML/MIN (HCC): Chronic | Status: RESOLVED | Noted: 2018-12-11 | Resolved: 2022-12-21

## 2022-12-21 PROBLEM — A41.9 SEPSIS (HCC): Status: RESOLVED | Noted: 2022-07-18 | Resolved: 2022-12-21

## 2022-12-21 PROBLEM — J96.01 ACUTE RESPIRATORY FAILURE WITH HYPOXIA (HCC): Status: RESOLVED | Noted: 2022-07-18 | Resolved: 2022-12-21

## 2022-12-21 LAB
BASOPHILS ABSOLUTE: 0.1 K/UL (ref 0–0.2)
BASOPHILS RELATIVE PERCENT: 0.8 %
EOSINOPHILS ABSOLUTE: 0.2 K/UL (ref 0–0.6)
EOSINOPHILS RELATIVE PERCENT: 1.7 %
HCT VFR BLD CALC: 35.6 % (ref 36–48)
HEMOGLOBIN: 11.8 G/DL (ref 12–16)
LYMPHOCYTES ABSOLUTE: 1.9 K/UL (ref 1–5.1)
LYMPHOCYTES RELATIVE PERCENT: 18.5 %
MCH RBC QN AUTO: 30.1 PG (ref 26–34)
MCHC RBC AUTO-ENTMCNC: 33.2 G/DL (ref 31–36)
MCV RBC AUTO: 90.8 FL (ref 80–100)
MONOCYTES ABSOLUTE: 0.8 K/UL (ref 0–1.3)
MONOCYTES RELATIVE PERCENT: 7.9 %
NEUTROPHILS ABSOLUTE: 7.2 K/UL (ref 1.7–7.7)
NEUTROPHILS RELATIVE PERCENT: 71.1 %
PDW BLD-RTO: 13.8 % (ref 12.4–15.4)
PLATELET # BLD: 268 K/UL (ref 135–450)
PMV BLD AUTO: 10.6 FL (ref 5–10.5)
RBC # BLD: 3.93 M/UL (ref 4–5.2)
WBC # BLD: 10.2 K/UL (ref 4–11)

## 2022-12-21 PROCEDURE — 36415 COLL VENOUS BLD VENIPUNCTURE: CPT | Performed by: FAMILY MEDICINE

## 2022-12-21 PROCEDURE — 3078F DIAST BP <80 MM HG: CPT | Performed by: FAMILY MEDICINE

## 2022-12-21 PROCEDURE — 99214 OFFICE O/P EST MOD 30 MIN: CPT | Performed by: FAMILY MEDICINE

## 2022-12-21 PROCEDURE — 3051F HG A1C>EQUAL 7.0%<8.0%: CPT | Performed by: FAMILY MEDICINE

## 2022-12-21 PROCEDURE — 3074F SYST BP LT 130 MM HG: CPT | Performed by: FAMILY MEDICINE

## 2022-12-21 PROCEDURE — 1123F ACP DISCUSS/DSCN MKR DOCD: CPT | Performed by: FAMILY MEDICINE

## 2022-12-21 RX ORDER — PANTOPRAZOLE SODIUM 20 MG/1
20 TABLET, DELAYED RELEASE ORAL DAILY
Qty: 90 TABLET | Refills: 1 | Status: SHIPPED | OUTPATIENT
Start: 2022-12-21

## 2022-12-21 ASSESSMENT — ENCOUNTER SYMPTOMS
ABDOMINAL PAIN: 0
BLOOD IN STOOL: 0
DIARRHEA: 0
SHORTNESS OF BREATH: 0
CONSTIPATION: 0

## 2022-12-21 NOTE — PROGRESS NOTES
2022     Aj White (:  1955) is a 69 y.o. female, here for evaluation of the following medical concerns:      Patient presented to the office for regular follow-up. She has diabetes fairly controlled takes Lantus 54 units in the morning and 22 units in the evening plus Victoza 1.8 mg daily. Denies hypoglycemic episode. She has hypertension controlled with Coreg 6.25 and losartan 50 mg daily. She has history of volume overload and leg edema now and Demadex 40 mg daily, no longer on metolazone. She has a chronic kidney disease stage IV and goes to nephrologist.  She is told to avoid NSAIDs and other nephrotoxic drugs. She has history of depression stable on Celexa 40 mg daily. She has history of chronic GERD and takes pantoprazole as needed. She denies headache nausea vomiting denies chest pain or shortness of breath denies bowel or urinary disturbance. Review of Systems   Constitutional:  Negative for activity change and appetite change. Eyes:  Negative for visual disturbance. Respiratory:  Negative for shortness of breath. Cardiovascular:  Negative for chest pain and leg swelling. Gastrointestinal:  Negative for abdominal pain, blood in stool, constipation and diarrhea. Genitourinary:  Negative for difficulty urinating, frequency, hematuria, menstrual problem and urgency. Neurological:  Negative for dizziness and syncope. Psychiatric/Behavioral:  Negative for behavioral problems. Prior to Visit Medications    Medication Sig Taking?  Authorizing Provider   pantoprazole (PROTONIX) 20 MG tablet Take 1 tablet by mouth daily Yes Devyn Troncoso MD   carvedilol (COREG) 6.25 MG tablet Take 1 tablet by mouth 2 times daily (with meals) Yes Devyn Troncoso MD   torsemide (DEMADEX) 20 MG tablet Take 2 tablets by mouth daily Yes Kurt Stapleton MD   insulin glargine (LANTUS SOLOSTAR) 100 UNIT/ML injection pen Inject 54 Units into the skin every morning AND 20 Units every evening. To the patient: Please call to make an appointment for fasting labs. Phone: [phone]. Tita Gavin Patient taking differently: Inject 56 Units into the skin every morning AND 22 Units every evening. Yes Madeline Starks MD   losartan (COZAAR) 50 MG tablet TAKE 1 TABLET BY MOUTH EVERY DAY Yes Natalia Lerma MD   Liraglutide (VICTOZA) 18 MG/3ML SOPN SC injection Inject 1.8 mg into the skin daily Yes Madeline Starks MD   citalopram (CELEXA) 40 MG tablet TAKE 1 TABLET BY MOUTH EVERY DAY IN THE EVENING Yes Madeline Starks MD   atorvastatin (LIPITOR) 80 MG tablet Take 1 tablet by mouth daily Yes Madeline Starks MD   aspirin 81 MG chewable tablet Take 1 tablet by mouth daily. Yes Mercedes Cormier MD   gabapentin (NEURONTIN) 100 MG capsule Take 2 capsules by mouth in the morning and at bedtime for 30 days. Natalia Lerma MD   ferrous sulfate 324 (65 Fe) MG EC tablet Take 1 tablet by mouth in the morning and 1 tablet at noon and 1 tablet in the evening. Take with meals. MADISON Johnson - CNP   amLODIPine (NORVASC) 10 MG tablet Take 1 tablet by mouth daily  Madeline Starks MD   lisinopril (PRINIVIL;ZESTRIL) 40 MG tablet Take 1 tablet by mouth daily  Madeline Starks MD   furosemide (LASIX) 40 MG tablet Take 1 tablet by mouth daily  Madeline Starks MD   acetaminophen (TYLENOL) 500 MG tablet Take 500 mg by mouth every 6 hours as needed for Pain  Historical Provider, MD   blood glucose test strips (ONE TOUCH ULTRA TEST) strip Check FSBS 2-3 times daily. Madeline Starks MD   Cholecalciferol (VITAMIN D3) 2000 units CAPS Take 2,000 Units by mouth every evening   Patient not taking: Reported on 2022  Historical Provider, MD        Social History     Tobacco Use    Smoking status: Former     Packs/day: 0.50     Years: 10.00     Pack years: 5.00     Types: Cigarettes     Quit date: 11/10/2013     Years since quittin.1    Smokeless tobacco: Never   Substance Use Topics    Alcohol use:  Yes Alcohol/week: 0.0 standard drinks     Comment: occasionally        Vitals:    12/21/22 1027   BP: 118/68   Pulse: 66   Resp: 18   Temp: 97.3 °F (36.3 °C)   TempSrc: Temporal   Weight: 221 lb (100.2 kg)     Estimated body mass index is 39.15 kg/m² as calculated from the following:    Height as of 7/29/22: 5' 3\" (1.6 m). Weight as of this encounter: 221 lb (100.2 kg). Physical Exam  Constitutional:       Appearance: She is well-developed. HENT:      Head: Normocephalic. Right Ear: External ear normal.      Nose: Nose normal.   Eyes:      Conjunctiva/sclera: Conjunctivae normal.      Pupils: Pupils are equal, round, and reactive to light. Neck:      Thyroid: No thyromegaly. Cardiovascular:      Rate and Rhythm: Normal rate and regular rhythm. Heart sounds: Normal heart sounds. No murmur heard. No friction rub. Pulmonary:      Effort: Pulmonary effort is normal.      Breath sounds: Normal breath sounds. Abdominal:      General: Bowel sounds are normal.      Palpations: Abdomen is soft. There is no mass. Musculoskeletal:         General: Normal range of motion. Cervical back: Normal range of motion and neck supple. Lymphadenopathy:      Cervical: No cervical adenopathy. Skin:     General: Skin is warm. Findings: No rash. Neurological:      Mental Status: She is alert and oriented to person, place, and time. ASSESSMENT/PLAN:  1. Type 2 diabetes mellitus with diabetic neuropathy, with long-term current use of insulin ([address]  Fairly controlled. Continue Lantus 54 units in the morning and 22 units in the evening plus Victoza 1.8 mg daily. Will check HbA1c today. - Comprehensive Metabolic Panel, Fasting  - Hemoglobin A1C    2. Essential hypertension  Controlled. Continue carvedilol 6.25 twice daily and losartan 50 mg daily  - CBC with Auto Differential    3. Mixed hyperlipidemia  Controlled. Continue Lipitor 80 mg daily. Will check lipid panel today.   - T4, Free  - TSH  - Lipid, Fasting    4. CKD (chronic kidney disease) stage 4, GFR 15-29 ml/min (HCC)  She goes to nephrologist Dr Ian Valiente    5. Anemia of chronic renal failure, stage 4 (severe) (HCC)  She takes iron supplement 3 times a day    6. Chronic GERD  Underlying chronic kidney disease will reduce Protonix from 40 mg to 20 mg once a day as needed  - pantoprazole (PROTONIX) 20 MG tablet; Take 1 tablet by mouth daily  Dispense: 90 tablet; Refill: 1    7. Encounter for screening mammogram for malignant neoplasm of breast  - [facility]INA DIGITAL SCREEN W OR WO CAD BILATERAL;  Future      RTC in 3 mos    An electronic signature was used to authenticate this note.    --Batsheva Vega MD on 12/21/2022 at 12:37 PM

## 2022-12-22 LAB
A/G RATIO: 1.5 (ref 1.1–2.2)
ALBUMIN SERPL-MCNC: 4 G/DL (ref 3.4–5)
ALP BLD-CCNC: 129 U/L (ref 40–129)
ALT SERPL-CCNC: 9 U/L (ref 10–40)
ANION GAP SERPL CALCULATED.3IONS-SCNC: 28 MMOL/L (ref 3–16)
AST SERPL-CCNC: 11 U/L (ref 15–37)
BILIRUB SERPL-MCNC: <0.2 MG/DL (ref 0–1)
BUN BLDV-MCNC: 67 MG/DL (ref 7–20)
CALCIUM SERPL-MCNC: 9 MG/DL (ref 8.3–10.6)
CHLORIDE BLD-SCNC: 87 MMOL/L (ref 99–110)
CHOLESTEROL, FASTING: 253 MG/DL (ref 0–199)
CO2: 23 MMOL/L (ref 21–32)
CREAT SERPL-MCNC: 3.1 MG/DL (ref 0.6–1.2)
ESTIMATED AVERAGE GLUCOSE: 194.4 MG/DL
GFR SERPL CREATININE-BSD FRML MDRD: 16 ML/MIN/{1.73_M2}
GLUCOSE FASTING: 165 MG/DL (ref 70–99)
HBA1C MFR BLD: 8.4 %
HDLC SERPL-MCNC: 47 MG/DL (ref 40–60)
LDL CHOLESTEROL CALCULATED: 165 MG/DL
POTASSIUM SERPL-SCNC: 3.3 MMOL/L (ref 3.5–5.1)
SODIUM BLD-SCNC: 138 MMOL/L (ref 136–145)
T4 FREE: 1 NG/DL (ref 0.9–1.8)
TOTAL PROTEIN: 6.7 G/DL (ref 6.4–8.2)
TRIGLYCERIDE, FASTING: 204 MG/DL (ref 0–150)
TSH SERPL DL<=0.05 MIU/L-ACNC: 2.02 UIU/ML (ref 0.27–4.2)
VLDLC SERPL CALC-MCNC: 41 MG/DL

## 2023-01-09 RX ORDER — CARVEDILOL 6.25 MG/1
TABLET ORAL
Qty: 60 TABLET | Refills: 3 | Status: SHIPPED | OUTPATIENT
Start: 2023-01-09

## 2023-01-13 RX ORDER — INSULIN GLARGINE 100 [IU]/ML
INJECTION, SOLUTION SUBCUTANEOUS
Qty: 70 ML | Refills: 1 | Status: SHIPPED | OUTPATIENT
Start: 2023-01-13

## 2023-01-16 DIAGNOSIS — I10 HYPERTENSION, UNSPECIFIED TYPE: ICD-10-CM

## 2023-01-16 DIAGNOSIS — E11.69 TYPE 2 DIABETES MELLITUS WITH OTHER SPECIFIED COMPLICATION, UNSPECIFIED WHETHER LONG TERM INSULIN USE (HCC): ICD-10-CM

## 2023-01-16 DIAGNOSIS — N18.4 CKD (CHRONIC KIDNEY DISEASE) STAGE 4, GFR 15-29 ML/MIN (HCC): ICD-10-CM

## 2023-01-16 DIAGNOSIS — E66.9 OBESITY, UNSPECIFIED CLASSIFICATION, UNSPECIFIED OBESITY TYPE, UNSPECIFIED WHETHER SERIOUS COMORBIDITY PRESENT: ICD-10-CM

## 2023-01-16 DIAGNOSIS — R60.9 EDEMA, UNSPECIFIED TYPE: ICD-10-CM

## 2023-01-16 DIAGNOSIS — A04.72 C. DIFFICILE COLITIS: ICD-10-CM

## 2023-01-16 LAB
ALBUMIN SERPL-MCNC: 4 G/DL (ref 3.4–5)
ANION GAP SERPL CALCULATED.3IONS-SCNC: 14 MMOL/L (ref 3–16)
BUN BLDV-MCNC: 33 MG/DL (ref 7–20)
CALCIUM SERPL-MCNC: 9.2 MG/DL (ref 8.3–10.6)
CHLORIDE BLD-SCNC: 89 MMOL/L (ref 99–110)
CO2: 33 MMOL/L (ref 21–32)
CREAT SERPL-MCNC: 2.4 MG/DL (ref 0.6–1.2)
CREATININE URINE: 38.7 MG/DL (ref 28–259)
GFR SERPL CREATININE-BSD FRML MDRD: 21 ML/MIN/{1.73_M2}
GLUCOSE BLD-MCNC: 337 MG/DL (ref 70–99)
MICROALBUMIN UR-MCNC: 41 MG/DL
MICROALBUMIN/CREAT UR-RTO: 1059.4 MG/G (ref 0–30)
PHOSPHORUS: 3.8 MG/DL (ref 2.5–4.9)
POTASSIUM SERPL-SCNC: 3.2 MMOL/L (ref 3.5–5.1)
SODIUM BLD-SCNC: 136 MMOL/L (ref 136–145)

## 2023-02-22 RX ORDER — ATORVASTATIN CALCIUM 80 MG/1
80 TABLET, FILM COATED ORAL DAILY
Qty: 90 TABLET | Refills: 1 | Status: SHIPPED | OUTPATIENT
Start: 2023-02-22

## 2023-02-27 ENCOUNTER — TELEPHONE (OUTPATIENT)
Dept: PRIMARY CARE CLINIC | Age: 68
End: 2023-02-27

## 2023-02-28 ENCOUNTER — PATIENT MESSAGE (OUTPATIENT)
Dept: PRIMARY CARE CLINIC | Age: 68
End: 2023-02-28

## 2023-03-01 DIAGNOSIS — E11.42 DM TYPE 2 WITH DIABETIC PERIPHERAL NEUROPATHY (HCC): Chronic | ICD-10-CM

## 2023-03-01 RX ORDER — LANCETS 30 GAUGE
EACH MISCELLANEOUS
Qty: 100 EACH | Refills: 3 | Status: SHIPPED | OUTPATIENT
Start: 2023-03-01

## 2023-03-01 RX ORDER — PEN NEEDLE, DIABETIC 30 GX3/16"
NEEDLE, DISPOSABLE MISCELLANEOUS
Qty: 200 EACH | Refills: 1 | Status: SHIPPED | OUTPATIENT
Start: 2023-03-01

## 2023-03-01 NOTE — TELEPHONE ENCOUNTER
From: Nate Lyles  To: Dr. Bevelyn Castleman: 2/28/2023 8:59 PM EST  Subject: Refills for One Touch    I had called on Monday and spoke with I thought a lady from your office. I have the One Tiuch to check my sugars but I am out of the lancets and could use more test strips if you could get this to my [facility]AP. Thank you!     Kati

## 2023-03-20 RX ORDER — LIRAGLUTIDE 6 MG/ML
1.8 INJECTION SUBCUTANEOUS DAILY
Qty: 27 ML | Refills: 1 | Status: SHIPPED | OUTPATIENT
Start: 2023-03-20

## 2023-04-20 RX ORDER — CITALOPRAM 40 MG/1
40 TABLET ORAL DAILY
Qty: 90 TABLET | Refills: 0 | Status: SHIPPED | OUTPATIENT
Start: 2023-04-20

## 2023-04-26 DIAGNOSIS — K21.9 CHRONIC GERD: ICD-10-CM

## 2023-04-26 RX ORDER — PANTOPRAZOLE SODIUM 40 MG/1
TABLET, DELAYED RELEASE ORAL
Qty: 90 TABLET | Refills: 1 | OUTPATIENT
Start: 2023-04-26

## 2023-04-26 RX ORDER — PANTOPRAZOLE SODIUM 20 MG/1
20 TABLET, DELAYED RELEASE ORAL DAILY
Qty: 90 TABLET | Refills: 0 | Status: SHIPPED | OUTPATIENT
Start: 2023-04-26

## 2023-05-01 RX ORDER — CARVEDILOL 6.25 MG/1
6.25 TABLET ORAL 2 TIMES DAILY WITH MEALS
Qty: 180 TABLET | Refills: 0 | Status: SHIPPED | OUTPATIENT
Start: 2023-05-01

## 2023-05-19 ENCOUNTER — OFFICE VISIT (OUTPATIENT)
Dept: PRIMARY CARE CLINIC | Age: 68
End: 2023-05-19

## 2023-05-19 VITALS
DIASTOLIC BLOOD PRESSURE: 67 MMHG | BODY MASS INDEX: 39.15 KG/M2 | WEIGHT: 221 LBS | OXYGEN SATURATION: 98 % | RESPIRATION RATE: 18 BRPM | HEART RATE: 65 BPM | SYSTOLIC BLOOD PRESSURE: 128 MMHG

## 2023-05-19 DIAGNOSIS — N18.32 TYPE 2 DIABETES MELLITUS WITH STAGE 3B CHRONIC KIDNEY DISEASE, WITH LONG-TERM CURRENT USE OF INSULIN (HCC): ICD-10-CM

## 2023-05-19 DIAGNOSIS — Z01.818 PREOP EXAMINATION: ICD-10-CM

## 2023-05-19 DIAGNOSIS — E13.319 RETINOPATHY DUE TO SECONDARY DM (HCC): ICD-10-CM

## 2023-05-19 DIAGNOSIS — Z79.4 TYPE 2 DIABETES MELLITUS WITH STAGE 3B CHRONIC KIDNEY DISEASE, WITH LONG-TERM CURRENT USE OF INSULIN (HCC): ICD-10-CM

## 2023-05-19 DIAGNOSIS — E11.22 TYPE 2 DIABETES MELLITUS WITH STAGE 3B CHRONIC KIDNEY DISEASE, WITH LONG-TERM CURRENT USE OF INSULIN (HCC): ICD-10-CM

## 2023-05-19 DIAGNOSIS — H25.013 CORTICAL AGE-RELATED CATARACT OF BOTH EYES: ICD-10-CM

## 2023-05-19 LAB — HBA1C MFR BLD: 10.8 %

## 2023-05-19 RX ORDER — ONDANSETRON 4 MG/1
4 TABLET, ORALLY DISINTEGRATING ORAL EVERY 8 HOURS PRN
Qty: 30 TABLET | Refills: 3 | Status: SHIPPED | OUTPATIENT
Start: 2023-05-19

## 2023-05-19 RX ORDER — INSULIN GLARGINE 100 [IU]/ML
INJECTION, SOLUTION SUBCUTANEOUS
Qty: 70 ML | Refills: 1 | Status: SHIPPED | OUTPATIENT
Start: 2023-05-19

## 2023-05-19 NOTE — PROGRESS NOTES
REPAIR     SLEEVE GASTRECTOMY N/A 2019    ROBOTIC ASSISTED LAPAROSCOPIC SLEEVE GASTRECTOMY, LAPAROSCOPIC REDUCIBLE INCISIONAL HERNIA REPAIR performed by Cinda Roberts DO at [address] N/A 2019    EGD BIOPSY performed by Cinda Roberts DO at [address] [place] N/A 2019    EGD POLYP ABLATION/OTHER performed by Cinda Roberts DO at [facility] ENDOSCOPY     Family History   Problem Relation Age of Onset    High Blood Pressure Mother     Cancer Mother     Rheum Arthritis Mother     COPD Father     Cancer Father     Osteoarthritis Neg Hx     Asthma Neg Hx     Breast Cancer Neg Hx     Diabetes Neg Hx     Heart Failure Neg Hx     High Cholesterol Neg Hx     Hypertension Neg Hx     Migraines Neg Hx     Ovarian Cancer Neg Hx     Rashes/Skin Problems Neg Hx     Seizures Neg Hx     Stroke Neg Hx     Thyroid Disease Neg Hx      Social History     Socioeconomic History    Marital status:      Spouse name: Not on file    Number of children: Not on file    Years of education: Not on file    Highest education level: Not on file   Occupational History    Not on file   Tobacco Use    Smoking status: Former     Packs/day: 0.50     Years: 10.00     Pack years: 5.00     Types: Cigarettes     Quit date: 11/10/2013     Years since quittin.5    Smokeless tobacco: Never   Vaping Use    Vaping Use: Never used   Substance and Sexual Activity    Alcohol use:  Yes     Alcohol/week: 0.0 standard drinks     Comment: occasionally    Drug use: No    Sexual activity: Not Currently   Other Topics Concern    Not on file   Social History Narrative    Not on file     Social Determinants of Health     Financial Resource Strain: Not on file   Food Insecurity: Not on file   Transportation Needs: Not on file   Physical Activity: Inactive    Days of Exercise per Week: 0 days    Minutes of Exercise per Session: 0 min   Stress: Not on file   Social Connections: Not on file   Intimate

## 2023-06-20 ENCOUNTER — TELEPHONE (OUTPATIENT)
Dept: PRIMARY CARE CLINIC | Age: 68
End: 2023-06-20

## 2023-06-21 RX ORDER — GABAPENTIN 300 MG/1
300 CAPSULE ORAL 3 TIMES DAILY
Qty: 90 CAPSULE | Refills: 1 | Status: SHIPPED | OUTPATIENT
Start: 2023-06-21 | End: 2023-08-20

## 2023-07-10 DIAGNOSIS — E11.42 DM TYPE 2 WITH DIABETIC PERIPHERAL NEUROPATHY (HCC): Chronic | ICD-10-CM

## 2023-07-10 RX ORDER — LANCETS 33 GAUGE
EACH MISCELLANEOUS
Qty: 300 EACH | Refills: 1 | Status: SHIPPED | OUTPATIENT
Start: 2023-07-10

## 2023-07-20 RX ORDER — CITALOPRAM 40 MG/1
40 TABLET ORAL DAILY
Qty: 90 TABLET | Refills: 1 | Status: SHIPPED | OUTPATIENT
Start: 2023-07-20

## 2023-08-01 RX ORDER — CARVEDILOL 6.25 MG/1
TABLET ORAL
Qty: 180 TABLET | Refills: 1 | Status: SHIPPED | OUTPATIENT
Start: 2023-08-01

## 2023-08-13 DIAGNOSIS — K21.9 CHRONIC GERD: ICD-10-CM

## 2023-08-14 RX ORDER — PANTOPRAZOLE SODIUM 40 MG/1
TABLET, DELAYED RELEASE ORAL
Qty: 90 TABLET | Refills: 1 | OUTPATIENT
Start: 2023-08-14

## 2023-08-14 RX ORDER — PANTOPRAZOLE SODIUM 20 MG/1
20 TABLET, DELAYED RELEASE ORAL DAILY
Qty: 90 TABLET | Refills: 1 | Status: ON HOLD | OUTPATIENT
Start: 2023-08-14

## 2023-08-14 RX ORDER — LIRAGLUTIDE 6 MG/ML
1.8 INJECTION SUBCUTANEOUS DAILY
Qty: 27 ML | Refills: 1 | Status: ON HOLD | OUTPATIENT
Start: 2023-08-14

## 2023-08-14 RX ORDER — ATORVASTATIN CALCIUM 80 MG/1
80 TABLET, FILM COATED ORAL DAILY
Qty: 90 TABLET | Refills: 1 | Status: ON HOLD | OUTPATIENT
Start: 2023-08-14

## 2023-08-17 DIAGNOSIS — N18.4 CKD (CHRONIC KIDNEY DISEASE) STAGE 4, GFR 15-29 ML/MIN (HCC): ICD-10-CM

## 2023-08-18 LAB
ALBUMIN SERPL-MCNC: 4 G/DL (ref 3.4–5)
ANION GAP SERPL CALCULATED.3IONS-SCNC: 17 MMOL/L (ref 3–16)
BUN SERPL-MCNC: 36 MG/DL (ref 7–20)
CALCIUM SERPL-MCNC: 8.9 MG/DL (ref 8.3–10.6)
CHLORIDE SERPL-SCNC: 96 MMOL/L (ref 99–110)
CO2 SERPL-SCNC: 29 MMOL/L (ref 21–32)
CREAT SERPL-MCNC: 2.2 MG/DL (ref 0.6–1.2)
GFR SERPLBLD CREATININE-BSD FMLA CKD-EPI: 24 ML/MIN/{1.73_M2}
GLUCOSE SERPL-MCNC: 118 MG/DL (ref 70–99)
PHOSPHATE SERPL-MCNC: 4.7 MG/DL (ref 2.5–4.9)
POTASSIUM SERPL-SCNC: 3.5 MMOL/L (ref 3.5–5.1)
SODIUM SERPL-SCNC: 142 MMOL/L (ref 136–145)

## 2023-08-19 ENCOUNTER — HOSPITAL ENCOUNTER (INPATIENT)
Age: 68
LOS: 4 days | Discharge: HOME | DRG: 418 | End: 2023-08-23
Attending: EMERGENCY MEDICINE | Admitting: INTERNAL MEDICINE
Payer: MEDICARE

## 2023-08-19 ENCOUNTER — APPOINTMENT (OUTPATIENT)
Dept: CT IMAGING | Age: 68
DRG: 418 | End: 2023-08-19
Payer: MEDICARE

## 2023-08-19 ENCOUNTER — APPOINTMENT (OUTPATIENT)
Dept: GENERAL RADIOLOGY | Age: 68
DRG: 418 | End: 2023-08-19
Payer: MEDICARE

## 2023-08-19 DIAGNOSIS — E83.42 HYPOMAGNESEMIA: ICD-10-CM

## 2023-08-19 DIAGNOSIS — K80.01 CALCULUS OF GALLBLADDER WITH ACUTE CHOLECYSTITIS AND OBSTRUCTION: Primary | ICD-10-CM

## 2023-08-19 DIAGNOSIS — K81.9 CHOLECYSTITIS: ICD-10-CM

## 2023-08-19 DIAGNOSIS — R74.01 TRANSAMINITIS: ICD-10-CM

## 2023-08-19 DIAGNOSIS — R07.9 CHEST PAIN, UNSPECIFIED TYPE: ICD-10-CM

## 2023-08-19 LAB
ALBUMIN SERPL-MCNC: 3.6 G/DL (ref 3.4–5)
ALBUMIN/GLOB SERPL: 1.1 {RATIO} (ref 1.1–2.2)
ALP SERPL-CCNC: 207 U/L (ref 40–129)
ALT SERPL-CCNC: 152 U/L (ref 10–40)
ANION GAP SERPL CALCULATED.3IONS-SCNC: 13 MMOL/L (ref 3–16)
AST SERPL-CCNC: 255 U/L (ref 15–37)
BASOPHILS # BLD: 0 K/UL (ref 0–0.2)
BASOPHILS NFR BLD: 0.3 %
BILIRUB SERPL-MCNC: 1.1 MG/DL (ref 0–1)
BUN SERPL-MCNC: 31 MG/DL (ref 7–20)
CALCIUM SERPL-MCNC: 8.5 MG/DL (ref 8.3–10.6)
CHLORIDE SERPL-SCNC: 96 MMOL/L (ref 99–110)
CO2 SERPL-SCNC: 30 MMOL/L (ref 21–32)
CREAT SERPL-MCNC: 2.2 MG/DL (ref 0.6–1.2)
DEPRECATED RDW RBC AUTO: 14.4 % (ref 12.4–15.4)
EOSINOPHIL # BLD: 0.1 K/UL (ref 0–0.6)
EOSINOPHIL NFR BLD: 0.7 %
GFR SERPLBLD CREATININE-BSD FMLA CKD-EPI: 24 ML/MIN/{1.73_M2}
GLUCOSE BLD-MCNC: 101 MG/DL (ref 70–99)
GLUCOSE SERPL-MCNC: 133 MG/DL (ref 70–99)
HAV IGM SERPL QL IA: NORMAL
HBV CORE IGM SERPL QL IA: NORMAL
HBV SURFACE AG SERPL QL IA: NORMAL
HCT VFR BLD AUTO: 31.2 % (ref 36–48)
HCV AB SERPL QL IA: NORMAL
HGB BLD-MCNC: 10.5 G/DL (ref 12–16)
LIPASE SERPL-CCNC: 24 U/L (ref 13–60)
LYMPHOCYTES # BLD: 1.2 K/UL (ref 1–5.1)
LYMPHOCYTES NFR BLD: 9.3 %
MAGNESIUM SERPL-MCNC: 1.1 MG/DL (ref 1.8–2.4)
MCH RBC QN AUTO: 30 PG (ref 26–34)
MCHC RBC AUTO-ENTMCNC: 33.7 G/DL (ref 31–36)
MCV RBC AUTO: 89 FL (ref 80–100)
MONOCYTES # BLD: 0.7 K/UL (ref 0–1.3)
MONOCYTES NFR BLD: 6 %
NEUTROPHILS # BLD: 10.3 K/UL (ref 1.7–7.7)
NEUTROPHILS NFR BLD: 83.7 %
PERFORMED ON: ABNORMAL
PLATELET # BLD AUTO: 324 K/UL (ref 135–450)
PMV BLD AUTO: 10.1 FL (ref 5–10.5)
POTASSIUM SERPL-SCNC: 3.5 MMOL/L (ref 3.5–5.1)
PROT SERPL-MCNC: 7 G/DL (ref 6.4–8.2)
RBC # BLD AUTO: 3.5 M/UL (ref 4–5.2)
SODIUM SERPL-SCNC: 139 MMOL/L (ref 136–145)
TROPONIN, HIGH SENSITIVITY: 35 NG/L (ref 0–14)
TROPONIN, HIGH SENSITIVITY: 36 NG/L (ref 0–14)
TROPONIN, HIGH SENSITIVITY: 36 NG/L (ref 0–14)
WBC # BLD AUTO: 12.4 K/UL (ref 4–11)

## 2023-08-19 PROCEDURE — 71046 X-RAY EXAM CHEST 2 VIEWS: CPT

## 2023-08-19 PROCEDURE — 2580000003 HC RX 258: Performed by: NURSE PRACTITIONER

## 2023-08-19 PROCEDURE — 36415 COLL VENOUS BLD VENIPUNCTURE: CPT

## 2023-08-19 PROCEDURE — 93005 ELECTROCARDIOGRAM TRACING: CPT | Performed by: EMERGENCY MEDICINE

## 2023-08-19 PROCEDURE — 1200000000 HC SEMI PRIVATE

## 2023-08-19 PROCEDURE — 6360000002 HC RX W HCPCS: Performed by: NURSE PRACTITIONER

## 2023-08-19 PROCEDURE — 84484 ASSAY OF TROPONIN QUANT: CPT

## 2023-08-19 PROCEDURE — 6370000000 HC RX 637 (ALT 250 FOR IP): Performed by: NURSE PRACTITIONER

## 2023-08-19 PROCEDURE — 83690 ASSAY OF LIPASE: CPT

## 2023-08-19 PROCEDURE — 96366 THER/PROPH/DIAG IV INF ADDON: CPT

## 2023-08-19 PROCEDURE — 74176 CT ABD & PELVIS W/O CONTRAST: CPT

## 2023-08-19 PROCEDURE — 96365 THER/PROPH/DIAG IV INF INIT: CPT

## 2023-08-19 PROCEDURE — 85025 COMPLETE CBC W/AUTO DIFF WBC: CPT

## 2023-08-19 PROCEDURE — 2580000003 HC RX 258: Performed by: INTERNAL MEDICINE

## 2023-08-19 PROCEDURE — 80053 COMPREHEN METABOLIC PANEL: CPT

## 2023-08-19 PROCEDURE — 6360000002 HC RX W HCPCS: Performed by: EMERGENCY MEDICINE

## 2023-08-19 PROCEDURE — 6370000000 HC RX 637 (ALT 250 FOR IP): Performed by: INTERNAL MEDICINE

## 2023-08-19 PROCEDURE — 83735 ASSAY OF MAGNESIUM: CPT

## 2023-08-19 PROCEDURE — 80074 ACUTE HEPATITIS PANEL: CPT

## 2023-08-19 PROCEDURE — 99285 EMERGENCY DEPT VISIT HI MDM: CPT

## 2023-08-19 RX ORDER — ATORVASTATIN CALCIUM 80 MG/1
80 TABLET, FILM COATED ORAL DAILY
Status: DISCONTINUED | OUTPATIENT
Start: 2023-08-20 | End: 2023-08-23 | Stop reason: HOSPADM

## 2023-08-19 RX ORDER — LANOLIN ALCOHOL/MO/W.PET/CERES
9 CREAM (GRAM) TOPICAL NIGHTLY PRN
Status: DISCONTINUED | OUTPATIENT
Start: 2023-08-19 | End: 2023-08-23 | Stop reason: HOSPADM

## 2023-08-19 RX ORDER — ASPIRIN 81 MG/1
81 TABLET, CHEWABLE ORAL DAILY
Status: DISCONTINUED | OUTPATIENT
Start: 2023-08-20 | End: 2023-08-23 | Stop reason: HOSPADM

## 2023-08-19 RX ORDER — DEXTROSE MONOHYDRATE 100 MG/ML
INJECTION, SOLUTION INTRAVENOUS CONTINUOUS PRN
Status: DISCONTINUED | OUTPATIENT
Start: 2023-08-19 | End: 2023-08-23 | Stop reason: HOSPADM

## 2023-08-19 RX ORDER — GABAPENTIN 300 MG/1
300 CAPSULE ORAL 3 TIMES DAILY
Status: DISCONTINUED | OUTPATIENT
Start: 2023-08-19 | End: 2023-08-23 | Stop reason: HOSPADM

## 2023-08-19 RX ORDER — INSULIN GLARGINE 100 [IU]/ML
30 INJECTION, SOLUTION SUBCUTANEOUS NIGHTLY
Status: DISCONTINUED | OUTPATIENT
Start: 2023-08-19 | End: 2023-08-23 | Stop reason: HOSPADM

## 2023-08-19 RX ORDER — CITALOPRAM 20 MG/1
40 TABLET ORAL DAILY
Status: DISCONTINUED | OUTPATIENT
Start: 2023-08-20 | End: 2023-08-23 | Stop reason: HOSPADM

## 2023-08-19 RX ORDER — ACETAMINOPHEN 650 MG/1
650 SUPPOSITORY RECTAL EVERY 6 HOURS PRN
Status: DISCONTINUED | OUTPATIENT
Start: 2023-08-19 | End: 2023-08-23 | Stop reason: HOSPADM

## 2023-08-19 RX ORDER — MAGNESIUM SULFATE IN WATER 40 MG/ML
2000 INJECTION, SOLUTION INTRAVENOUS ONCE
Status: COMPLETED | OUTPATIENT
Start: 2023-08-19 | End: 2023-08-19

## 2023-08-19 RX ORDER — SODIUM CHLORIDE, SODIUM LACTATE, POTASSIUM CHLORIDE, CALCIUM CHLORIDE 600; 310; 30; 20 MG/100ML; MG/100ML; MG/100ML; MG/100ML
INJECTION, SOLUTION INTRAVENOUS CONTINUOUS
Status: ACTIVE | OUTPATIENT
Start: 2023-08-19 | End: 2023-08-20

## 2023-08-19 RX ORDER — SODIUM CHLORIDE 0.9 % (FLUSH) 0.9 %
5-40 SYRINGE (ML) INJECTION EVERY 12 HOURS SCHEDULED
Status: DISCONTINUED | OUTPATIENT
Start: 2023-08-19 | End: 2023-08-23 | Stop reason: HOSPADM

## 2023-08-19 RX ORDER — ACETAMINOPHEN 325 MG/1
650 TABLET ORAL EVERY 6 HOURS PRN
Status: DISCONTINUED | OUTPATIENT
Start: 2023-08-19 | End: 2023-08-23 | Stop reason: HOSPADM

## 2023-08-19 RX ORDER — CARVEDILOL 6.25 MG/1
6.25 TABLET ORAL 2 TIMES DAILY WITH MEALS
Status: DISCONTINUED | OUTPATIENT
Start: 2023-08-19 | End: 2023-08-23 | Stop reason: HOSPADM

## 2023-08-19 RX ORDER — ACETAMINOPHEN 500 MG
1000 TABLET ORAL ONCE
Status: COMPLETED | OUTPATIENT
Start: 2023-08-19 | End: 2023-08-19

## 2023-08-19 RX ORDER — SODIUM CHLORIDE 9 MG/ML
INJECTION, SOLUTION INTRAVENOUS CONTINUOUS
Status: DISCONTINUED | OUTPATIENT
Start: 2023-08-19 | End: 2023-08-20

## 2023-08-19 RX ORDER — INSULIN GLARGINE 100 [IU]/ML
24 INJECTION, SOLUTION SUBCUTANEOUS NIGHTLY
Status: DISCONTINUED | OUTPATIENT
Start: 2023-08-19 | End: 2023-08-19 | Stop reason: DRUGHIGH

## 2023-08-19 RX ORDER — POLYETHYLENE GLYCOL 3350 17 G/17G
17 POWDER, FOR SOLUTION ORAL DAILY PRN
Status: DISCONTINUED | OUTPATIENT
Start: 2023-08-19 | End: 2023-08-23 | Stop reason: HOSPADM

## 2023-08-19 RX ORDER — ONDANSETRON 4 MG/1
4 TABLET, ORALLY DISINTEGRATING ORAL EVERY 8 HOURS PRN
Status: DISCONTINUED | OUTPATIENT
Start: 2023-08-19 | End: 2023-08-23 | Stop reason: HOSPADM

## 2023-08-19 RX ORDER — SODIUM CHLORIDE 0.9 % (FLUSH) 0.9 %
5-40 SYRINGE (ML) INJECTION PRN
Status: DISCONTINUED | OUTPATIENT
Start: 2023-08-19 | End: 2023-08-23 | Stop reason: HOSPADM

## 2023-08-19 RX ORDER — ENOXAPARIN SODIUM 100 MG/ML
30 INJECTION SUBCUTANEOUS NIGHTLY
Status: DISCONTINUED | OUTPATIENT
Start: 2023-08-19 | End: 2023-08-20

## 2023-08-19 RX ORDER — SODIUM CHLORIDE 9 MG/ML
INJECTION, SOLUTION INTRAVENOUS PRN
Status: DISCONTINUED | OUTPATIENT
Start: 2023-08-19 | End: 2023-08-23 | Stop reason: HOSPADM

## 2023-08-19 RX ORDER — PANTOPRAZOLE SODIUM 20 MG/1
20 TABLET, DELAYED RELEASE ORAL DAILY
Status: DISCONTINUED | OUTPATIENT
Start: 2023-08-20 | End: 2023-08-23 | Stop reason: HOSPADM

## 2023-08-19 RX ORDER — ONDANSETRON 2 MG/ML
4 INJECTION INTRAMUSCULAR; INTRAVENOUS EVERY 6 HOURS PRN
Status: DISCONTINUED | OUTPATIENT
Start: 2023-08-19 | End: 2023-08-23 | Stop reason: HOSPADM

## 2023-08-19 RX ADMIN — SODIUM CHLORIDE, PRESERVATIVE FREE 10 ML: 5 INJECTION INTRAVENOUS at 22:00

## 2023-08-19 RX ADMIN — INSULIN GLARGINE 30 UNITS: 100 INJECTION, SOLUTION SUBCUTANEOUS at 21:54

## 2023-08-19 RX ADMIN — PIPERACILLIN AND TAZOBACTAM 3375 MG: 3; .375 INJECTION, POWDER, LYOPHILIZED, FOR SOLUTION INTRAVENOUS at 18:18

## 2023-08-19 RX ADMIN — GABAPENTIN 300 MG: 300 CAPSULE ORAL at 21:54

## 2023-08-19 RX ADMIN — MAGNESIUM SULFATE HEPTAHYDRATE 2000 MG: 40 INJECTION, SOLUTION INTRAVENOUS at 14:52

## 2023-08-19 RX ADMIN — ACETAMINOPHEN 1000 MG: 500 TABLET ORAL at 17:38

## 2023-08-19 RX ADMIN — Medication 9 MG: at 21:54

## 2023-08-19 ASSESSMENT — PAIN DESCRIPTION - ORIENTATION
ORIENTATION: LEFT
ORIENTATION: UPPER

## 2023-08-19 ASSESSMENT — PAIN - FUNCTIONAL ASSESSMENT: PAIN_FUNCTIONAL_ASSESSMENT: 0-10

## 2023-08-19 ASSESSMENT — PAIN DESCRIPTION - DESCRIPTORS
DESCRIPTORS: DULL
DESCRIPTORS: ACHING

## 2023-08-19 ASSESSMENT — PAIN DESCRIPTION - LOCATION
LOCATION: HEAD
LOCATION: CHEST

## 2023-08-19 ASSESSMENT — PAIN SCALES - GENERAL
PAINLEVEL_OUTOF10: 4
PAINLEVEL_OUTOF10: 3

## 2023-08-19 ASSESSMENT — PAIN DESCRIPTION - FREQUENCY: FREQUENCY: CONTINUOUS

## 2023-08-19 ASSESSMENT — HEART SCORE
ECG: 1
ECG: 1

## 2023-08-19 NOTE — ED NOTES
Pt arrived to dept via ems. Pt c/o Chest pain onset this morning. Subsided when ems transported patient. [address] given en route. Cardiac hx . Pt awake, alert and oriented x 3. Skin warm and dry/normal color for ethnicity. Resp easy and unlabored. Pt placed in gown and on cardiac monitor. Call light in reach. EKG complete.       Moshe Astudillo RN  08/19/23 6103

## 2023-08-19 NOTE — ED NOTES
Report given to [facility] RN all questions answered.       Olivier Oliver RN  08/19/23 1437

## 2023-08-19 NOTE — ED PROVIDER NOTES
Violence: Not on file   Housing Stability: Not on file     Current Facility-Administered Medications   Medication Dose Route Frequency Provider Last Rate Last Admin    magnesium sulfate 2000 mg in 50 mL IVPB premix  2,000 mg IntraVENous Once Mona Fierro MD 25 mL/hr at 08/19/23 1452 2,000 mg at 08/19/23 1452     Current Outpatient Medications   Medication Sig Dispense Refill    Liraglutide (VICTOZA) 18 MG/3ML SOPN SC injection Inject 1.8 mg into the skin daily 27 mL 1    atorvastatin (LIPITOR) 80 MG tablet Take 1 tablet by mouth daily 90 tablet 1    pantoprazole (PROTONIX) 20 MG tablet Take 1 tablet by mouth daily 90 tablet 1    carvedilol (COREG) 6.25 MG tablet TAKE 1 TABLET BY MOUTH TWICE A DAY WITH MEALS 180 tablet 1    citalopram (CELEXA) 40 MG tablet Take 1 tablet by mouth daily 90 tablet 1    Lancets (ONETOUCH DELICA PLUS [id number]) MISC USE TO CHECK BLOOD SUGAR 2-3 TIMES A  each 1    gabapentin (NEURONTIN) 300 MG capsule Take 1 capsule by mouth 3 times daily for 60 days. 90 capsule 1    ondansetron (ZOFRAN-ODT) 4 MG disintegrating tablet Take 1 tablet by mouth every 8 hours as needed for Nausea or Vomiting 30 tablet 3    insulin glargine (LANTUS SOLOSTAR) 100 UNIT/ML injection pen Inject 60 Units into the skin every morning AND 24 Units every evening. 70 mL 1    vitamin D (ERGOCALCIFEROL) 1.25 MG (37006 UT) CAPS capsule Take 1 capsule by mouth every 14 days 12 capsule 1    torsemide (DEMADEX) 20 MG tablet TAKE 2 TABLETS BY MOUTH DAILY 180 tablet 2    blood glucose test strips (ONE TOUCH ULTRA TEST) strip Check FSBS 2-3 times daily. 300 strip 1    Insulin Pen Needle (PEN NEEDLES) 31G X 5 MM MISC Use twice daily with Lantus injections. 200 each 1    losartan (COZAAR) 100 MG tablet Take 1 tablet by mouth daily 90 tablet 5    ferrous sulfate 324 (65 Fe) MG EC tablet Take 1 tablet by mouth in the morning and 1 tablet at noon and 1 tablet in the evening. Take with meals.  90 tablet 0    acetaminophen
care:  has a past medical history of Abnormal echocardiogram, Acute kidney injury superimposed on CKD ([address]), Acute respiratory failure ([address]), Acute respiratory failure with hypoxia ([address]), Back pain, Cardiomyopathy ([address]), Cataract, Chipped tooth, CKD (chronic kidney disease) stage 3, GFR 30-59 ml/min ([facility]), Clostridium difficile diarrhea, Dental crown present, Depressive disorder, Diabetic infection of right foot ([address]), Diabetic retinopathy ([address]), Diabetic ulcer of toe of left foot associated with diabetes mellitus due to underlying condition, with fat layer exposed ([address]), Diabetic ulcer of toe of left foot associated with type 2 diabetes mellitus, with fat layer exposed ([address]), DVT (deep venous thrombosis) ([address]), Glaucoma, HTN (hypertension), Hx of blood clots, Ischemic cardiomyopathy, Kidney disease, Meniere's disease, Mixed hyperlipidemia, Nicotine abuse, NSTEMI (non-ST elevated myocardial infarction) ([address]), ANGLE (obstructive sleep apnea), Osteomyelitis of ankle or foot, acute, left ([address]), Pneumonia, PONV (postoperative nausea and vomiting), Spinal epidural abscess, and Type II diabetes mellitus, uncontrolled. Social Determinants of Health: none    Goals of Care Discussed: I discussed case with the patient she will be a full code    Consults placed: General surgery consulted. I spoke with Dr. Radha Palacios. The plan will to continue n.p.o. status. I will place the patient on a rate of normal saline down in the department. She was started on Zosyn for acute cholecystitis. Perfect serve placed to the hospitalist service for admission. I updated the patient on the plan of care and she agrees with the plan. She agrees to remain NPO. CRITICAL CARE NOTE:  There was a high probability of clinically significant life-threatening deterioration of the patient's condition requiring my urgent intervention.     I personally saw the patient and independently provided 10 minutes of non-concurrent critical care out of the

## 2023-08-20 ENCOUNTER — ANESTHESIA EVENT (OUTPATIENT)
Dept: OPERATING ROOM | Age: 68
DRG: 418 | End: 2023-08-20
Payer: MEDICARE

## 2023-08-20 LAB
ALBUMIN SERPL-MCNC: 3.3 G/DL (ref 3.4–5)
ALBUMIN/GLOB SERPL: 0.9 {RATIO} (ref 1.1–2.2)
ALP SERPL-CCNC: 207 U/L (ref 40–129)
ALT SERPL-CCNC: 138 U/L (ref 10–40)
ANION GAP SERPL CALCULATED.3IONS-SCNC: 15 MMOL/L (ref 3–16)
AST SERPL-CCNC: 139 U/L (ref 15–37)
BASOPHILS # BLD: 0 K/UL (ref 0–0.2)
BASOPHILS NFR BLD: 0.3 %
BILIRUB SERPL-MCNC: 0.9 MG/DL (ref 0–1)
BUN SERPL-MCNC: 37 MG/DL (ref 7–20)
CALCIUM SERPL-MCNC: 8.4 MG/DL (ref 8.3–10.6)
CHLORIDE SERPL-SCNC: 93 MMOL/L (ref 99–110)
CO2 SERPL-SCNC: 29 MMOL/L (ref 21–32)
CREAT SERPL-MCNC: 2.6 MG/DL (ref 0.6–1.2)
DEPRECATED RDW RBC AUTO: 14.8 % (ref 12.4–15.4)
EOSINOPHIL # BLD: 0 K/UL (ref 0–0.6)
EOSINOPHIL NFR BLD: 0.3 %
GFR SERPLBLD CREATININE-BSD FMLA CKD-EPI: 19 ML/MIN/{1.73_M2}
GLUCOSE BLD-MCNC: 79 MG/DL (ref 70–99)
GLUCOSE BLD-MCNC: 86 MG/DL (ref 70–99)
GLUCOSE BLD-MCNC: 91 MG/DL (ref 70–99)
GLUCOSE BLD-MCNC: 93 MG/DL (ref 70–99)
GLUCOSE SERPL-MCNC: 126 MG/DL (ref 70–99)
HCT VFR BLD AUTO: 30 % (ref 36–48)
HGB BLD-MCNC: 10 G/DL (ref 12–16)
LYMPHOCYTES # BLD: 0.8 K/UL (ref 1–5.1)
LYMPHOCYTES NFR BLD: 5.3 %
MAGNESIUM SERPL-MCNC: 1.5 MG/DL (ref 1.8–2.4)
MCH RBC QN AUTO: 29.6 PG (ref 26–34)
MCHC RBC AUTO-ENTMCNC: 33.3 G/DL (ref 31–36)
MCV RBC AUTO: 89 FL (ref 80–100)
MONOCYTES # BLD: 1.1 K/UL (ref 0–1.3)
MONOCYTES NFR BLD: 7.5 %
NEUTROPHILS # BLD: 12.7 K/UL (ref 1.7–7.7)
NEUTROPHILS NFR BLD: 86.6 %
PERFORMED ON: NORMAL
PLATELET # BLD AUTO: 313 K/UL (ref 135–450)
PMV BLD AUTO: 10.4 FL (ref 5–10.5)
POTASSIUM SERPL-SCNC: 3.4 MMOL/L (ref 3.5–5.1)
PROT SERPL-MCNC: 6.8 G/DL (ref 6.4–8.2)
RBC # BLD AUTO: 3.37 M/UL (ref 4–5.2)
SODIUM SERPL-SCNC: 137 MMOL/L (ref 136–145)
WBC # BLD AUTO: 14.7 K/UL (ref 4–11)

## 2023-08-20 PROCEDURE — 99222 1ST HOSP IP/OBS MODERATE 55: CPT | Performed by: SURGERY

## 2023-08-20 PROCEDURE — 2580000003 HC RX 258: Performed by: INTERNAL MEDICINE

## 2023-08-20 PROCEDURE — 94760 N-INVAS EAR/PLS OXIMETRY 1: CPT

## 2023-08-20 PROCEDURE — 85025 COMPLETE CBC W/AUTO DIFF WBC: CPT

## 2023-08-20 PROCEDURE — 36415 COLL VENOUS BLD VENIPUNCTURE: CPT

## 2023-08-20 PROCEDURE — 6360000002 HC RX W HCPCS: Performed by: SURGERY

## 2023-08-20 PROCEDURE — 6370000000 HC RX 637 (ALT 250 FOR IP): Performed by: INTERNAL MEDICINE

## 2023-08-20 PROCEDURE — 2580000003 HC RX 258: Performed by: NURSE PRACTITIONER

## 2023-08-20 PROCEDURE — 80053 COMPREHEN METABOLIC PANEL: CPT

## 2023-08-20 PROCEDURE — 6360000002 HC RX W HCPCS: Performed by: INTERNAL MEDICINE

## 2023-08-20 PROCEDURE — 83735 ASSAY OF MAGNESIUM: CPT

## 2023-08-20 PROCEDURE — 1200000000 HC SEMI PRIVATE

## 2023-08-20 RX ORDER — MAGNESIUM SULFATE IN WATER 40 MG/ML
4000 INJECTION, SOLUTION INTRAVENOUS ONCE
Status: COMPLETED | OUTPATIENT
Start: 2023-08-20 | End: 2023-08-20

## 2023-08-20 RX ORDER — POTASSIUM CHLORIDE 20 MEQ/1
40 TABLET, EXTENDED RELEASE ORAL ONCE
Status: DISCONTINUED | OUTPATIENT
Start: 2023-08-20 | End: 2023-08-20 | Stop reason: DRUGHIGH

## 2023-08-20 RX ORDER — POTASSIUM CHLORIDE 7.45 MG/ML
10 INJECTION INTRAVENOUS PRN
Status: DISCONTINUED | OUTPATIENT
Start: 2023-08-20 | End: 2023-08-20

## 2023-08-20 RX ORDER — MAGNESIUM SULFATE IN WATER 40 MG/ML
2000 INJECTION, SOLUTION INTRAVENOUS ONCE
Status: DISCONTINUED | OUTPATIENT
Start: 2023-08-20 | End: 2023-08-20 | Stop reason: DRUGHIGH

## 2023-08-20 RX ORDER — POTASSIUM CHLORIDE 20 MEQ/1
40 TABLET, EXTENDED RELEASE ORAL PRN
Status: DISCONTINUED | OUTPATIENT
Start: 2023-08-20 | End: 2023-08-20

## 2023-08-20 RX ORDER — POTASSIUM CHLORIDE 20 MEQ/1
20 TABLET, EXTENDED RELEASE ORAL ONCE
Status: COMPLETED | OUTPATIENT
Start: 2023-08-20 | End: 2023-08-20

## 2023-08-20 RX ORDER — HEPARIN SODIUM 5000 [USP'U]/ML
5000 INJECTION, SOLUTION INTRAVENOUS; SUBCUTANEOUS EVERY 8 HOURS SCHEDULED
Status: DISCONTINUED | OUTPATIENT
Start: 2023-08-20 | End: 2023-08-23 | Stop reason: HOSPADM

## 2023-08-20 RX ORDER — MAGNESIUM SULFATE IN WATER 40 MG/ML
2000 INJECTION, SOLUTION INTRAVENOUS PRN
Status: DISCONTINUED | OUTPATIENT
Start: 2023-08-20 | End: 2023-08-20

## 2023-08-20 RX ORDER — SODIUM CHLORIDE, SODIUM LACTATE, POTASSIUM CHLORIDE, CALCIUM CHLORIDE 600; 310; 30; 20 MG/100ML; MG/100ML; MG/100ML; MG/100ML
INJECTION, SOLUTION INTRAVENOUS CONTINUOUS
Status: ACTIVE | OUTPATIENT
Start: 2023-08-20 | End: 2023-08-20

## 2023-08-20 RX ADMIN — SODIUM CHLORIDE, POTASSIUM CHLORIDE, SODIUM LACTATE AND CALCIUM CHLORIDE: 600; 310; 30; 20 INJECTION, SOLUTION INTRAVENOUS at 08:26

## 2023-08-20 RX ADMIN — MAGNESIUM SULFATE HEPTAHYDRATE 4000 MG: 40 INJECTION, SOLUTION INTRAVENOUS at 10:42

## 2023-08-20 RX ADMIN — ACETAMINOPHEN 650 MG: 325 TABLET ORAL at 01:17

## 2023-08-20 RX ADMIN — ASPIRIN 81 MG: 81 TABLET, CHEWABLE ORAL at 08:18

## 2023-08-20 RX ADMIN — GABAPENTIN 300 MG: 300 CAPSULE ORAL at 13:37

## 2023-08-20 RX ADMIN — PIPERACILLIN AND TAZOBACTAM 3375 MG: 3; .375 INJECTION, POWDER, LYOPHILIZED, FOR SOLUTION INTRAVENOUS at 01:30

## 2023-08-20 RX ADMIN — CITALOPRAM 40 MG: 20 TABLET, FILM COATED ORAL at 08:19

## 2023-08-20 RX ADMIN — POTASSIUM CHLORIDE 20 MEQ: 1500 TABLET, EXTENDED RELEASE ORAL at 10:42

## 2023-08-20 RX ADMIN — GABAPENTIN 300 MG: 300 CAPSULE ORAL at 21:19

## 2023-08-20 RX ADMIN — SODIUM CHLORIDE, POTASSIUM CHLORIDE, SODIUM LACTATE AND CALCIUM CHLORIDE: 600; 310; 30; 20 INJECTION, SOLUTION INTRAVENOUS at 20:11

## 2023-08-20 RX ADMIN — CARVEDILOL 6.25 MG: 6.25 TABLET, FILM COATED ORAL at 18:19

## 2023-08-20 RX ADMIN — CARVEDILOL 6.25 MG: 6.25 TABLET, FILM COATED ORAL at 08:19

## 2023-08-20 RX ADMIN — HEPARIN SODIUM 5000 UNITS: 5000 INJECTION INTRAVENOUS; SUBCUTANEOUS at 21:19

## 2023-08-20 RX ADMIN — PANTOPRAZOLE SODIUM 20 MG: 20 TABLET, DELAYED RELEASE ORAL at 08:19

## 2023-08-20 RX ADMIN — SODIUM CHLORIDE: 9 INJECTION, SOLUTION INTRAVENOUS at 10:54

## 2023-08-20 RX ADMIN — GABAPENTIN 300 MG: 300 CAPSULE ORAL at 08:19

## 2023-08-20 RX ADMIN — PIPERACILLIN AND TAZOBACTAM 3375 MG: 3; .375 INJECTION, POWDER, LYOPHILIZED, FOR SOLUTION INTRAVENOUS at 18:20

## 2023-08-20 RX ADMIN — HEPARIN SODIUM 5000 UNITS: 5000 INJECTION INTRAVENOUS; SUBCUTANEOUS at 13:37

## 2023-08-20 RX ADMIN — SODIUM CHLORIDE: 9 INJECTION, SOLUTION INTRAVENOUS at 10:40

## 2023-08-20 RX ADMIN — PIPERACILLIN AND TAZOBACTAM 3375 MG: 3; .375 INJECTION, POWDER, LYOPHILIZED, FOR SOLUTION INTRAVENOUS at 10:55

## 2023-08-20 RX ADMIN — SODIUM CHLORIDE, PRESERVATIVE FREE 10 ML: 5 INJECTION INTRAVENOUS at 21:20

## 2023-08-20 RX ADMIN — SODIUM CHLORIDE: 9 INJECTION, SOLUTION INTRAVENOUS at 01:32

## 2023-08-20 RX ADMIN — ATORVASTATIN CALCIUM 80 MG: 80 TABLET, FILM COATED ORAL at 09:55

## 2023-08-20 ASSESSMENT — PAIN SCALES - GENERAL: PAINLEVEL_OUTOF10: 0

## 2023-08-20 ASSESSMENT — PAIN DESCRIPTION - ORIENTATION: ORIENTATION: MID

## 2023-08-20 ASSESSMENT — PAIN DESCRIPTION - LOCATION: LOCATION: HEAD

## 2023-08-20 ASSESSMENT — LIFESTYLE VARIABLES: SMOKING_STATUS: 0

## 2023-08-20 NOTE — CONSULTS
152* 138*     No results for input(s): UOSM in the last 72 hours. Invalid input(s): UAPR, WILDPFAD, [place], [place], [place], [place], [place], [place], [place]    Imaging  CT ABDOMEN PELVIS WO CONTRAST Additional Contrast? None   Final Result   1. Cholelithiasis with distention of the gallbladder and wall thickening. Acute cholecystitis is suspected. 2. Bilateral punctate nephrolithiasis but no obstructive uropathy. 3. Diverticulosis worse in the sigmoid but no acute diverticulitis. Normal   appendix. 4. Ventral pelvic hernia containing mesenteric fat but no obstruction or   strangulation. RECOMMENDATIONS:   Right upper quadrant ultrasound to further evaluate the gallbladder for   possible acute cholecystitis. XR CHEST (2 VW)   Final Result   No acute cardiopulmonary process. US GALLBLADDER RUQ    (Results Pending)          Assessment  Vishnu Price is a 69 y.o. female admitted for cholecystitis, elevated LFTs    Plan    1. Cholecystitis  Continue IV abx  OK for clears today. NPO after MN  OR tomorrow for Laparoscopic cholecystectomy with cholangiogram, possible open  The risks, benefits and alternatives to the planned procedure were discussed. Patient expressed an understanding and is willing to proceed. Pending IOC results may need ERCP  2. Acute on CKD  Continue IV hydration. Change lovenox to SQ heparin  3.  HFpEF  Per primary team        Electronically signed by Dee Teresa MD on 8/20/2023 at 10:07 AM

## 2023-08-21 ENCOUNTER — APPOINTMENT (OUTPATIENT)
Dept: ULTRASOUND IMAGING | Age: 68
DRG: 418 | End: 2023-08-21
Payer: MEDICARE

## 2023-08-21 ENCOUNTER — ANESTHESIA (OUTPATIENT)
Dept: OPERATING ROOM | Age: 68
DRG: 418 | End: 2023-08-21
Payer: MEDICARE

## 2023-08-21 ENCOUNTER — APPOINTMENT (OUTPATIENT)
Dept: GENERAL RADIOLOGY | Age: 68
DRG: 418 | End: 2023-08-21
Payer: MEDICARE

## 2023-08-21 LAB
ALBUMIN SERPL-MCNC: 3.3 G/DL (ref 3.4–5)
ALBUMIN/GLOB SERPL: 1 {RATIO} (ref 1.1–2.2)
ALP SERPL-CCNC: 191 U/L (ref 40–129)
ALT SERPL-CCNC: 90 U/L (ref 10–40)
ANION GAP SERPL CALCULATED.3IONS-SCNC: 11 MMOL/L (ref 3–16)
AST SERPL-CCNC: 75 U/L (ref 15–37)
BASOPHILS # BLD: 0 K/UL (ref 0–0.2)
BASOPHILS NFR BLD: 0.5 %
BILIRUB SERPL-MCNC: 0.6 MG/DL (ref 0–1)
BUN SERPL-MCNC: 31 MG/DL (ref 7–20)
CALCIUM SERPL-MCNC: 9.3 MG/DL (ref 8.3–10.6)
CHLORIDE SERPL-SCNC: 96 MMOL/L (ref 99–110)
CO2 SERPL-SCNC: 31 MMOL/L (ref 21–32)
CREAT SERPL-MCNC: 2.5 MG/DL (ref 0.6–1.2)
DEPRECATED RDW RBC AUTO: 14.3 % (ref 12.4–15.4)
EKG ATRIAL RATE: 78 BPM
EKG DIAGNOSIS: NORMAL
EKG P AXIS: 69 DEGREES
EKG P-R INTERVAL: 190 MS
EKG Q-T INTERVAL: 420 MS
EKG QRS DURATION: 80 MS
EKG QTC CALCULATION (BAZETT): 478 MS
EKG R AXIS: -23 DEGREES
EKG T AXIS: 123 DEGREES
EKG VENTRICULAR RATE: 78 BPM
EOSINOPHIL # BLD: 0.3 K/UL (ref 0–0.6)
EOSINOPHIL NFR BLD: 3.5 %
GFR SERPLBLD CREATININE-BSD FMLA CKD-EPI: 20 ML/MIN/{1.73_M2}
GLUCOSE BLD-MCNC: 127 MG/DL (ref 70–99)
GLUCOSE BLD-MCNC: 280 MG/DL (ref 70–99)
GLUCOSE BLD-MCNC: 76 MG/DL (ref 70–99)
GLUCOSE BLD-MCNC: 81 MG/DL (ref 70–99)
GLUCOSE BLD-MCNC: 82 MG/DL (ref 70–99)
GLUCOSE BLD-MCNC: 85 MG/DL (ref 70–99)
GLUCOSE BLD-MCNC: 93 MG/DL (ref 70–99)
GLUCOSE SERPL-MCNC: 73 MG/DL (ref 70–99)
HCT VFR BLD AUTO: 29.3 % (ref 36–48)
HGB BLD-MCNC: 9.9 G/DL (ref 12–16)
LYMPHOCYTES # BLD: 1.4 K/UL (ref 1–5.1)
LYMPHOCYTES NFR BLD: 13.9 %
MAGNESIUM SERPL-MCNC: 2.6 MG/DL (ref 1.8–2.4)
MCH RBC QN AUTO: 30.2 PG (ref 26–34)
MCHC RBC AUTO-ENTMCNC: 33.8 G/DL (ref 31–36)
MCV RBC AUTO: 89.3 FL (ref 80–100)
MONOCYTES # BLD: 1 K/UL (ref 0–1.3)
MONOCYTES NFR BLD: 10.2 %
NEUTROPHILS # BLD: 7 K/UL (ref 1.7–7.7)
NEUTROPHILS NFR BLD: 71.9 %
PERFORMED ON: ABNORMAL
PERFORMED ON: ABNORMAL
PERFORMED ON: NORMAL
PLATELET # BLD AUTO: 266 K/UL (ref 135–450)
PMV BLD AUTO: 10.5 FL (ref 5–10.5)
POTASSIUM SERPL-SCNC: 3.5 MMOL/L (ref 3.5–5.1)
PROT SERPL-MCNC: 6.7 G/DL (ref 6.4–8.2)
RBC # BLD AUTO: 3.28 M/UL (ref 4–5.2)
SODIUM SERPL-SCNC: 138 MMOL/L (ref 136–145)
WBC # BLD AUTO: 9.8 K/UL (ref 4–11)

## 2023-08-21 PROCEDURE — 93010 ELECTROCARDIOGRAM REPORT: CPT | Performed by: INTERNAL MEDICINE

## 2023-08-21 PROCEDURE — 2500000003 HC RX 250 WO HCPCS: Performed by: NURSE ANESTHETIST, CERTIFIED REGISTERED

## 2023-08-21 PROCEDURE — 6370000000 HC RX 637 (ALT 250 FOR IP): Performed by: SURGERY

## 2023-08-21 PROCEDURE — 2580000003 HC RX 258: Performed by: SURGERY

## 2023-08-21 PROCEDURE — 2580000003 HC RX 258: Performed by: STUDENT IN AN ORGANIZED HEALTH CARE EDUCATION/TRAINING PROGRAM

## 2023-08-21 PROCEDURE — 6360000002 HC RX W HCPCS: Performed by: INTERNAL MEDICINE

## 2023-08-21 PROCEDURE — 6360000002 HC RX W HCPCS: Performed by: STUDENT IN AN ORGANIZED HEALTH CARE EDUCATION/TRAINING PROGRAM

## 2023-08-21 PROCEDURE — A4217 STERILE WATER/SALINE, 500 ML: HCPCS | Performed by: SURGERY

## 2023-08-21 PROCEDURE — 94761 N-INVAS EAR/PLS OXIMETRY MLT: CPT

## 2023-08-21 PROCEDURE — 6360000002 HC RX W HCPCS: Performed by: SURGERY

## 2023-08-21 PROCEDURE — 76705 ECHO EXAM OF ABDOMEN: CPT

## 2023-08-21 PROCEDURE — 6360000002 HC RX W HCPCS

## 2023-08-21 PROCEDURE — BF532Z0 OTHER IMAGING OF GALLBLADDER AND BILE DUCTS USING FLUORESCING AGENT, INTRAOPERATIVE: ICD-10-PCS | Performed by: SURGERY

## 2023-08-21 PROCEDURE — C9399 UNCLASSIFIED DRUGS OR BIOLOG: HCPCS | Performed by: NURSE ANESTHETIST, CERTIFIED REGISTERED

## 2023-08-21 PROCEDURE — 83735 ASSAY OF MAGNESIUM: CPT

## 2023-08-21 PROCEDURE — 6370000000 HC RX 637 (ALT 250 FOR IP): Performed by: INTERNAL MEDICINE

## 2023-08-21 PROCEDURE — 80053 COMPREHEN METABOLIC PANEL: CPT

## 2023-08-21 PROCEDURE — 2580000003 HC RX 258: Performed by: INTERNAL MEDICINE

## 2023-08-21 PROCEDURE — 88304 TISSUE EXAM BY PATHOLOGIST: CPT

## 2023-08-21 PROCEDURE — 85025 COMPLETE CBC W/AUTO DIFF WBC: CPT

## 2023-08-21 PROCEDURE — 6360000002 HC RX W HCPCS: Performed by: HOSPITALIST

## 2023-08-21 PROCEDURE — 2709999900 HC NON-CHARGEABLE SUPPLY: Performed by: SURGERY

## 2023-08-21 PROCEDURE — 47563 LAPARO CHOLECYSTECTOMY/GRAPH: CPT | Performed by: SURGERY

## 2023-08-21 PROCEDURE — 3600000014 HC SURGERY LEVEL 4 ADDTL 15MIN: Performed by: SURGERY

## 2023-08-21 PROCEDURE — 36415 COLL VENOUS BLD VENIPUNCTURE: CPT

## 2023-08-21 PROCEDURE — 6360000004 HC RX CONTRAST MEDICATION: Performed by: SURGERY

## 2023-08-21 PROCEDURE — 1200000000 HC SEMI PRIVATE

## 2023-08-21 PROCEDURE — 6370000000 HC RX 637 (ALT 250 FOR IP): Performed by: STUDENT IN AN ORGANIZED HEALTH CARE EDUCATION/TRAINING PROGRAM

## 2023-08-21 PROCEDURE — C9399 UNCLASSIFIED DRUGS OR BIOLOG: HCPCS

## 2023-08-21 PROCEDURE — 74300 X-RAY BILE DUCTS/PANCREAS: CPT

## 2023-08-21 PROCEDURE — 3700000000 HC ANESTHESIA ATTENDED CARE: Performed by: SURGERY

## 2023-08-21 PROCEDURE — 7100000001 HC PACU RECOVERY - ADDTL 15 MIN: Performed by: SURGERY

## 2023-08-21 PROCEDURE — 7100000000 HC PACU RECOVERY - FIRST 15 MIN: Performed by: SURGERY

## 2023-08-21 PROCEDURE — 3600000004 HC SURGERY LEVEL 4 BASE: Performed by: SURGERY

## 2023-08-21 PROCEDURE — 0FT44ZZ RESECTION OF GALLBLADDER, PERCUTANEOUS ENDOSCOPIC APPROACH: ICD-10-PCS | Performed by: SURGERY

## 2023-08-21 PROCEDURE — 6360000002 HC RX W HCPCS: Performed by: NURSE ANESTHETIST, CERTIFIED REGISTERED

## 2023-08-21 PROCEDURE — 3700000001 HC ADD 15 MINUTES (ANESTHESIA): Performed by: SURGERY

## 2023-08-21 PROCEDURE — C1757 CATH, THROMBECTOMY/EMBOLECT: HCPCS | Performed by: SURGERY

## 2023-08-21 RX ORDER — SODIUM CHLORIDE 0.9 % (FLUSH) 0.9 %
5-40 SYRINGE (ML) INJECTION PRN
Status: DISCONTINUED | OUTPATIENT
Start: 2023-08-21 | End: 2023-08-21 | Stop reason: HOSPADM

## 2023-08-21 RX ORDER — LORAZEPAM 2 MG/ML
1 INJECTION INTRAMUSCULAR
Status: DISCONTINUED | OUTPATIENT
Start: 2023-08-21 | End: 2023-08-21 | Stop reason: HOSPADM

## 2023-08-21 RX ORDER — IPRATROPIUM BROMIDE AND ALBUTEROL SULFATE 2.5; .5 MG/3ML; MG/3ML
1 SOLUTION RESPIRATORY (INHALATION)
Status: DISCONTINUED | OUTPATIENT
Start: 2023-08-21 | End: 2023-08-21 | Stop reason: HOSPADM

## 2023-08-21 RX ORDER — ROCURONIUM BROMIDE 10 MG/ML
INJECTION, SOLUTION INTRAVENOUS PRN
Status: DISCONTINUED | OUTPATIENT
Start: 2023-08-21 | End: 2023-08-21 | Stop reason: SDUPTHER

## 2023-08-21 RX ORDER — SUCCINYLCHOLINE/SOD CL,ISO/PF 200MG/10ML
SYRINGE (ML) INTRAVENOUS PRN
Status: DISCONTINUED | OUTPATIENT
Start: 2023-08-21 | End: 2023-08-21 | Stop reason: SDUPTHER

## 2023-08-21 RX ORDER — MORPHINE SULFATE 2 MG/ML
2 INJECTION, SOLUTION INTRAMUSCULAR; INTRAVENOUS
Status: DISCONTINUED | OUTPATIENT
Start: 2023-08-21 | End: 2023-08-23 | Stop reason: HOSPADM

## 2023-08-21 RX ORDER — ONDANSETRON 2 MG/ML
INJECTION INTRAMUSCULAR; INTRAVENOUS PRN
Status: DISCONTINUED | OUTPATIENT
Start: 2023-08-21 | End: 2023-08-21 | Stop reason: SDUPTHER

## 2023-08-21 RX ORDER — HYDRALAZINE HYDROCHLORIDE 20 MG/ML
10 INJECTION INTRAMUSCULAR; INTRAVENOUS
Status: DISCONTINUED | OUTPATIENT
Start: 2023-08-21 | End: 2023-08-21 | Stop reason: HOSPADM

## 2023-08-21 RX ORDER — FENTANYL CITRATE 0.05 MG/ML
25 INJECTION, SOLUTION INTRAMUSCULAR; INTRAVENOUS EVERY 5 MIN PRN
Status: DISCONTINUED | OUTPATIENT
Start: 2023-08-21 | End: 2023-08-21 | Stop reason: HOSPADM

## 2023-08-21 RX ORDER — MAGNESIUM HYDROXIDE 1200 MG/15ML
LIQUID ORAL CONTINUOUS PRN
Status: DISCONTINUED | OUTPATIENT
Start: 2023-08-21 | End: 2023-08-21 | Stop reason: HOSPADM

## 2023-08-21 RX ORDER — FENTANYL CITRATE 0.05 MG/ML
50 INJECTION, SOLUTION INTRAMUSCULAR; INTRAVENOUS EVERY 5 MIN PRN
Status: DISCONTINUED | OUTPATIENT
Start: 2023-08-21 | End: 2023-08-21 | Stop reason: HOSPADM

## 2023-08-21 RX ORDER — LABETALOL HYDROCHLORIDE 5 MG/ML
10 INJECTION, SOLUTION INTRAVENOUS
Status: DISCONTINUED | OUTPATIENT
Start: 2023-08-21 | End: 2023-08-21 | Stop reason: HOSPADM

## 2023-08-21 RX ORDER — SCOLOPAMINE TRANSDERMAL SYSTEM 1 MG/1
1 PATCH, EXTENDED RELEASE TRANSDERMAL ONCE
Status: DISCONTINUED | OUTPATIENT
Start: 2023-08-21 | End: 2023-08-21

## 2023-08-21 RX ORDER — PROCHLORPERAZINE EDISYLATE 5 MG/ML
5 INJECTION INTRAMUSCULAR; INTRAVENOUS
Status: DISCONTINUED | OUTPATIENT
Start: 2023-08-21 | End: 2023-08-21 | Stop reason: HOSPADM

## 2023-08-21 RX ORDER — ONDANSETRON 2 MG/ML
4 INJECTION INTRAMUSCULAR; INTRAVENOUS
Status: DISCONTINUED | OUTPATIENT
Start: 2023-08-21 | End: 2023-08-21 | Stop reason: HOSPADM

## 2023-08-21 RX ORDER — OXYCODONE HYDROCHLORIDE 10 MG/1
10 TABLET ORAL EVERY 4 HOURS PRN
Status: DISCONTINUED | OUTPATIENT
Start: 2023-08-21 | End: 2023-08-23 | Stop reason: HOSPADM

## 2023-08-21 RX ORDER — MORPHINE SULFATE 4 MG/ML
4 INJECTION, SOLUTION INTRAMUSCULAR; INTRAVENOUS
Status: DISCONTINUED | OUTPATIENT
Start: 2023-08-21 | End: 2023-08-23 | Stop reason: HOSPADM

## 2023-08-21 RX ORDER — DEXAMETHASONE SODIUM PHOSPHATE 4 MG/ML
INJECTION, SOLUTION INTRA-ARTICULAR; INTRALESIONAL; INTRAMUSCULAR; INTRAVENOUS; SOFT TISSUE PRN
Status: DISCONTINUED | OUTPATIENT
Start: 2023-08-21 | End: 2023-08-21 | Stop reason: SDUPTHER

## 2023-08-21 RX ORDER — OXYCODONE HYDROCHLORIDE 5 MG/1
5 TABLET ORAL EVERY 4 HOURS PRN
Status: DISCONTINUED | OUTPATIENT
Start: 2023-08-21 | End: 2023-08-23 | Stop reason: HOSPADM

## 2023-08-21 RX ORDER — SODIUM CHLORIDE 9 MG/ML
INJECTION, SOLUTION INTRAVENOUS PRN
Status: DISCONTINUED | OUTPATIENT
Start: 2023-08-21 | End: 2023-08-21 | Stop reason: HOSPADM

## 2023-08-21 RX ORDER — FENTANYL CITRATE 50 UG/ML
INJECTION, SOLUTION INTRAMUSCULAR; INTRAVENOUS PRN
Status: DISCONTINUED | OUTPATIENT
Start: 2023-08-21 | End: 2023-08-21 | Stop reason: SDUPTHER

## 2023-08-21 RX ORDER — SODIUM CHLORIDE 0.9 % (FLUSH) 0.9 %
5-40 SYRINGE (ML) INJECTION EVERY 12 HOURS SCHEDULED
Status: DISCONTINUED | OUTPATIENT
Start: 2023-08-21 | End: 2023-08-21 | Stop reason: HOSPADM

## 2023-08-21 RX ORDER — BUPIVACAINE HYDROCHLORIDE 5 MG/ML
INJECTION, SOLUTION EPIDURAL; INTRACAUDAL
Status: COMPLETED | OUTPATIENT
Start: 2023-08-21 | End: 2023-08-21

## 2023-08-21 RX ORDER — LIDOCAINE HYDROCHLORIDE 20 MG/ML
INJECTION, SOLUTION EPIDURAL; INFILTRATION; INTRACAUDAL; PERINEURAL PRN
Status: DISCONTINUED | OUTPATIENT
Start: 2023-08-21 | End: 2023-08-21 | Stop reason: SDUPTHER

## 2023-08-21 RX ORDER — HYDRALAZINE HYDROCHLORIDE 20 MG/ML
10 INJECTION INTRAMUSCULAR; INTRAVENOUS EVERY 6 HOURS PRN
Status: DISCONTINUED | OUTPATIENT
Start: 2023-08-21 | End: 2023-08-23 | Stop reason: HOSPADM

## 2023-08-21 RX ORDER — PROPOFOL 10 MG/ML
INJECTION, EMULSION INTRAVENOUS PRN
Status: DISCONTINUED | OUTPATIENT
Start: 2023-08-21 | End: 2023-08-21 | Stop reason: SDUPTHER

## 2023-08-21 RX ADMIN — PROPOFOL 150 MG: 10 INJECTION, EMULSION INTRAVENOUS at 13:35

## 2023-08-21 RX ADMIN — HEPARIN SODIUM 5000 UNITS: 5000 INJECTION INTRAVENOUS; SUBCUTANEOUS at 16:17

## 2023-08-21 RX ADMIN — FENTANYL CITRATE 50 MCG: 50 INJECTION INTRAMUSCULAR; INTRAVENOUS at 13:35

## 2023-08-21 RX ADMIN — SODIUM CHLORIDE, PRESERVATIVE FREE 10 ML: 5 INJECTION INTRAVENOUS at 22:04

## 2023-08-21 RX ADMIN — GABAPENTIN 300 MG: 300 CAPSULE ORAL at 16:17

## 2023-08-21 RX ADMIN — PANTOPRAZOLE SODIUM 20 MG: 20 TABLET, DELAYED RELEASE ORAL at 08:39

## 2023-08-21 RX ADMIN — HEPARIN SODIUM 5000 UNITS: 5000 INJECTION INTRAVENOUS; SUBCUTANEOUS at 22:03

## 2023-08-21 RX ADMIN — DEXAMETHASONE SODIUM PHOSPHATE 8 MG: 4 INJECTION, SOLUTION INTRAMUSCULAR; INTRAVENOUS at 13:43

## 2023-08-21 RX ADMIN — PIPERACILLIN AND TAZOBACTAM 3375 MG: 3; .375 INJECTION, POWDER, LYOPHILIZED, FOR SOLUTION INTRAVENOUS at 10:16

## 2023-08-21 RX ADMIN — CARVEDILOL 6.25 MG: 6.25 TABLET, FILM COATED ORAL at 18:24

## 2023-08-21 RX ADMIN — ROCURONIUM BROMIDE 5 MG: 10 INJECTION INTRAVENOUS at 13:35

## 2023-08-21 RX ADMIN — PIPERACILLIN AND TAZOBACTAM 3375 MG: 3; .375 INJECTION, POWDER, LYOPHILIZED, FOR SOLUTION INTRAVENOUS at 18:37

## 2023-08-21 RX ADMIN — HYDRALAZINE HYDROCHLORIDE 10 MG: 20 INJECTION, SOLUTION INTRAMUSCULAR; INTRAVENOUS at 16:16

## 2023-08-21 RX ADMIN — ATORVASTATIN CALCIUM 80 MG: 80 TABLET, FILM COATED ORAL at 16:17

## 2023-08-21 RX ADMIN — OXYCODONE HYDROCHLORIDE 10 MG: 10 TABLET ORAL at 16:27

## 2023-08-21 RX ADMIN — PIPERACILLIN AND TAZOBACTAM 3375 MG: 3; .375 INJECTION, POWDER, LYOPHILIZED, FOR SOLUTION INTRAVENOUS at 02:22

## 2023-08-21 RX ADMIN — SUGAMMADEX 300 MG: 100 INJECTION, SOLUTION INTRAVENOUS at 14:24

## 2023-08-21 RX ADMIN — CARVEDILOL 6.25 MG: 6.25 TABLET, FILM COATED ORAL at 08:39

## 2023-08-21 RX ADMIN — GABAPENTIN 300 MG: 300 CAPSULE ORAL at 22:03

## 2023-08-21 RX ADMIN — ONDANSETRON 4 MG: 2 INJECTION INTRAMUSCULAR; INTRAVENOUS at 13:43

## 2023-08-21 RX ADMIN — SODIUM CHLORIDE, PRESERVATIVE FREE 10 ML: 5 INJECTION INTRAVENOUS at 08:40

## 2023-08-21 RX ADMIN — HEPARIN SODIUM 5000 UNITS: 5000 INJECTION INTRAVENOUS; SUBCUTANEOUS at 07:05

## 2023-08-21 RX ADMIN — FENTANYL CITRATE 50 MCG: 50 INJECTION INTRAMUSCULAR; INTRAVENOUS at 13:44

## 2023-08-21 RX ADMIN — INSULIN GLARGINE 30 UNITS: 100 INJECTION, SOLUTION SUBCUTANEOUS at 22:02

## 2023-08-21 RX ADMIN — FOSAPREPITANT 150 MG: 150 INJECTION, POWDER, LYOPHILIZED, FOR SOLUTION INTRAVENOUS at 13:28

## 2023-08-21 RX ADMIN — GABAPENTIN 300 MG: 300 CAPSULE ORAL at 08:39

## 2023-08-21 RX ADMIN — ROCURONIUM BROMIDE 35 MG: 10 INJECTION INTRAVENOUS at 13:43

## 2023-08-21 RX ADMIN — OXYCODONE HYDROCHLORIDE 10 MG: 10 TABLET ORAL at 22:03

## 2023-08-21 RX ADMIN — CITALOPRAM 40 MG: 20 TABLET, FILM COATED ORAL at 08:40

## 2023-08-21 RX ADMIN — LIDOCAINE HYDROCHLORIDE 80 MG: 20 INJECTION, SOLUTION EPIDURAL; INFILTRATION; INTRACAUDAL; PERINEURAL at 13:35

## 2023-08-21 RX ADMIN — Medication 140 MG: at 13:35

## 2023-08-21 ASSESSMENT — PAIN DESCRIPTION - PAIN TYPE
TYPE: SURGICAL PAIN

## 2023-08-21 ASSESSMENT — PAIN SCALES - GENERAL
PAINLEVEL_OUTOF10: 1
PAINLEVEL_OUTOF10: 7
PAINLEVEL_OUTOF10: 3
PAINLEVEL_OUTOF10: 5
PAINLEVEL_OUTOF10: 7

## 2023-08-21 ASSESSMENT — PAIN - FUNCTIONAL ASSESSMENT
PAIN_FUNCTIONAL_ASSESSMENT: ACTIVITIES ARE NOT PREVENTED
PAIN_FUNCTIONAL_ASSESSMENT: 0-10
PAIN_FUNCTIONAL_ASSESSMENT: ACTIVITIES ARE NOT PREVENTED
PAIN_FUNCTIONAL_ASSESSMENT: ACTIVITIES ARE NOT PREVENTED
PAIN_FUNCTIONAL_ASSESSMENT: PREVENTS OR INTERFERES SOME ACTIVE ACTIVITIES AND ADLS

## 2023-08-21 ASSESSMENT — PAIN DESCRIPTION - DESCRIPTORS
DESCRIPTORS: CRAMPING
DESCRIPTORS: DISCOMFORT
DESCRIPTORS: CRAMPING

## 2023-08-21 ASSESSMENT — PAIN DESCRIPTION - LOCATION
LOCATION: ABDOMEN

## 2023-08-21 ASSESSMENT — PAIN DESCRIPTION - ORIENTATION
ORIENTATION: MID
ORIENTATION: MID
ORIENTATION: RIGHT;LEFT
ORIENTATION: MID

## 2023-08-21 ASSESSMENT — PAIN DESCRIPTION - FREQUENCY
FREQUENCY: INTERMITTENT

## 2023-08-21 ASSESSMENT — PAIN DESCRIPTION - ONSET
ONSET: ON-GOING

## 2023-08-21 NOTE — H&P
Update History & Physical    The patient's History and Physical of August 20, 2023 was reviewed with the patient and I examined the patient. There was no change. Plan Laparoscopic cholecystectomy with cholangiogram, possible open. The surgical site was confirmed by the patient and me. Plan: The risks, benefits, expected outcome, and alternative to the recommended procedure have been discussed with the patient. Patient understands and wants to proceed with the procedure.      Electronically signed by Shea Velasco MD on 8/21/2023 at 12:07 PM
Jenna Sims MD

## 2023-08-21 NOTE — CONSULTS
or   strangulation. RECOMMENDATIONS:   Right upper quadrant ultrasound to further evaluate the gallbladder for   possible acute cholecystitis. XR CHEST (2 VW)   Final Result   No acute cardiopulmonary process. ASSESSMENT AND RECOMMENDATIONS   69 y.o. female with a  PMH of DM, CKD, Cardiomyopathy, HFpEF, DVT, HTN who presented on 8/19/2023 with epigastric pain, nausea, vomiting     IMPRESSION:    Choledocholithiasis  Cholecystitis s/p laparoscopic cholecystectomy    RECOMMENDATIONS:    Will schedule pt for ERCP tomorrow  NPO after MN    If you have any questions or need any further information, please feel free to contact our consult team.  Thank you for allowing us to participate in the care of [facility]. The note was completed using Dragon voice recognition transcription. Every effort was made to ensure accuracy; however, inadvertent transcription errors may be present despite my best efforts to edit errors.       Carmine Montes PA-C

## 2023-08-21 NOTE — ANESTHESIA PRE PROCEDURE
2014)    Echocardiogram (2022):  Summary:  Normal left ventricle size, wall thickness, and systolic function with an estimated ejection fraction of 60-65%. No regional wall motion abnormalities are seen. The right ventricle is normal in size and function. Aortic sclerosis with no significant stenosis (peak velocity 2.1m/s and mean gradient of 12mmHg). Trivial mitral and tricuspid regurgitation. Neuro/Psych:   (+) depression/anxiety              ROS comment: Peripheral neuropathy, history of diabetic / pressure ulcers GI/Hepatic/Renal:   (+) GERD:, renal disease (CKD-4, SCr: 2.6):,      Liver disease: acute LFT elevation. Morbid obesity: BMI 39 s/p gastric sleeve. ROS comment: CT Abdomen / Pelvis:  IMPRESSION:  1. Cholelithiasis with distention of the gallbladder and wall thickening. Acute cholecystitis is suspected. 2. Bilateral punctate nephrolithiasis but no obstructive uropathy. 3. Diverticulosis worse in the sigmoid but no acute diverticulitis. Normal appendix. 4. Ventral pelvic hernia containing mesenteric fat but no obstruction or strangulation. .   Endo/Other:    (+) Diabetes (last available HgbA1c: 10.8%)Type II DM, poorly controlled, using insulin, electrolyte abnormalities, . Abdominal:   (+) obese,           Vascular:   + DVT (not on anticoagulation, ASA 324mg prior to admission), . Other Findings: Vitals signs stable, hypertensive in PCU. Anesthesia Plan      general     ASA 3     (NPO appropriate. Ms. Estephania Calderon denies active nausea / reflux. Glidescope in room. Aggressive PONV prophylaxis: scopolamine patch and IV emend ordered in preop.)  Induction: intravenous. MIPS: Postoperative opioids intended and Prophylactic antiemetics administered. Anesthetic plan and risks discussed with patient (sister at bedside). Use of blood products discussed with patient whom consented to blood products. Plan discussed with CRNA.             This pre-anesthesia

## 2023-08-21 NOTE — CARE COORDINATION
Case Management Assessment  Initial Evaluation    Date/Time of Evaluation: 8/21/2023 2:03 PM  Assessment Completed by: YESSI Riddle    If patient is discharged prior to next notation, then this note serves as note for discharge by case management. Patient Name: Obdulio Marcus                   YOB: 1955  Diagnosis: Calculus of gallbladder with acute cholecystitis and obstruction [K80.01]  Hypomagnesemia [E83.42]  Cholecystitis [K81.9]  Transaminitis [R74.01]  Chest pain, unspecified type [R07.9]                   Date / Time: 8/19/2023 12:44 PM    Patient Admission Status: Inpatient   Readmission Risk (Low < 19, Mod (19-27), High > 27): Readmission Risk Score: 15.9    Current PCP: Antonia Ruth MD  PCP verified by CM? (P) Yes    Chart Reviewed: Yes      History Provided by: (P) Patient  Patient Orientation: (P) Alert and Oriented, Person, Place, Situation    Patient Cognition: (P) Alert    Hospitalization in the last 30 days (Readmission):  No    If yes, Readmission Assessment in CM Navigator will be completed.     Advance Directives:      Code Status: Full Code   Patient's Primary Decision Maker is: (P) Named in Scanned ACP Document    Primary Decision MakerHdaryl Rocael Child - [phone]    Secondary Decision Maker: skinny moon - Child - [phone]    Discharge Planning:    Patient lives with: (P) Alone Type of Home: (P) Apartment  Primary Care Giver: (P) Self  Patient Support Systems include: (P) Children, Family Members, Friends/Neighbors   Current Financial resources: (P) None  Current community resources: (P) None  Current services prior to admission: (P) Durable Medical Equipment            Current DME: (P) Walker            Type of Home Care services:  (P) None    ADLS  Prior functional level: (P) Independent in ADLs/IADLs  Current functional level: (P) Independent in ADLs/IADLs    PT AM-PAC:   /24  OT AM-PAC:   /24    Family can provide assistance at DC: (P)

## 2023-08-21 NOTE — OP NOTE
infundibulum of the gallbladder. The infundibulum was then grasped and retracted laterally exposing the triangle of Calot. We performed our dissection until the anatomy was very clear as we could see two and only two structures entering the gallbladder and completing our critical view of safety. The cystic duct was dissected toward the rosa exposing the common bile duct and common hepatic duct. A clip was placed on the gallbladder side of the cystic duct. A small stab incision was made just below the costal margin in the RUQ and the cholangiogram catheter was introduced. A ductotomy was made in the cystic duct and large amount of sludge immediately back flowed and then the catheter inserted. Common bile duct, common hepatic duct, cystic duct and junction of the left and right hepatic ducts were well visualized. The ducts were significantly distended and no flow was noted into the duodenum. I attempted to run a romeo catheter without success. Repeat IOC showed obstructing filling defect. The catheter was removed. The cystic duct was further cleared circumferentially was clipped proximally with 3 clips and divided. The artery was likewise identified, clipped and divided. The gallbladder was removed from the liver bed with cautery and removed through the epigastric port site. The abdomen was reinspected and bleeding was controlled at the hepatic fossa with cautery for good hemostasis. The epigastric trocar site fascia was closed with a single suture of 0-Vicryl using a laparoscopic suture passer. The trocars were withdrawn and the skin closed with 4-0 Vicryl. Skin affix dermal adhesive was applied after injecting local anesthetic Patient was awoken and extubated without complication. All instrument counts were said to be correct.         Findings: obstructing distal CBD stone, acute on chronic cholecystitis    Estimated Blood Loss: less than 50 ml    Complications: none           Specimen:

## 2023-08-21 NOTE — ACP (ADVANCE CARE PLANNING)
Advance Care Planning     Advance Care Planning Activator (Inpatient)  Conversation Note      Date of ACP Conversation: 8/21/2023     Conversation Conducted with: Patient with Decision Making Capacity    ACP Activator: YESSI Metcalf        Health Care Decision Maker:     Current Designated Health Care Decision Maker:     Primary Decision Maker: Mounika Mathew Child - [phone]    Secondary Decision Maker: skinny moon - Child - [phone]    Today we documented Decision Maker(s) consistent with Legal Next of Kin hierarchy. Care Preferences    Ventilation: \"If you were in your present state of health and suddenly became very ill and were unable to breathe on your own, what would your preference be about the use of a ventilator (breathing machine) if it were available to you? \"      Would the patient desire the use of ventilator (breathing machine)?: yes    \"If your health worsens and it becomes clear that your chance of recovery is unlikely, what would your preference be about the use of a ventilator (breathing machine) if it were available to you? \"     Would the patient desire the use of ventilator (breathing machine)?: No      Resuscitation  \"CPR works best to restart the heart when there is a sudden event, like a heart attack, in someone who is otherwise healthy. Unfortunately, CPR does not typically restart the heart for people who have serious health conditions or who are very sick. \"    \"In the event your heart stopped as a result of an underlying serious health condition, would you want attempts to be made to restart your heart (answer \"yes\" for attempt to resuscitate) or would you prefer a natural death (answer \"no\" for do not attempt to resuscitate)? \" yes       [x] Yes   [] No   Educated Patient / Soledad Smartr regarding differences between Advance Directives and portable DNR orders.     Length of ACP Conversation in minutes:  5 min    Conversation Outcomes:  ACP discussion

## 2023-08-21 NOTE — ANESTHESIA POSTPROCEDURE EVALUATION
Department of Anesthesiology  Postprocedure Note    Patient: Wu Gonzalez  MRN: [id number]  YOB: 1955  Date of evaluation: 8/21/2023      Procedure Summary     Date: 08/21/23 Room / Location: [facility]    Anesthesia Start: 9160 Anesthesia Stop: 8310    Procedure: LAPAROSCOPIC CHOLECYSTECTOMY WITH CHOLANGIOGRAM (Abdomen) Diagnosis:       Cholecystitis      (Cholecystitis [K81.9])    Surgeons: Patric Cherry MD Responsible Provider: Ramila Narayanan MD    Anesthesia Type: general ASA Status: 3          Anesthesia Type: General    Steven Phase I: Steven Score: 8    Steven Phase II:        Anesthesia Post Evaluation    Patient location during evaluation: PACU  Patient participation: complete - patient participated  Level of consciousness: awake  Airway patency: patent  Nausea & Vomiting: no nausea and no vomiting  Complications: no  Cardiovascular status: hemodynamically stable and blood pressure returned to baseline  Respiratory status: spontaneous ventilation, nonlabored ventilation and room air  Hydration status: stable  Comments: Ms. Melina Shah was seen resting comfortably following her procedure. Discussed need for video laryngoscopy again with intubation. Encouraged patient to discuss with all future anesthesia providers. Potential need for ERCP tomorrow per Dr. Skip Ashley. Appropriate for return to floor at this time.   Pain management: adequate

## 2023-08-22 ENCOUNTER — ANESTHESIA EVENT (OUTPATIENT)
Dept: ENDOSCOPY | Age: 68
DRG: 418 | End: 2023-08-22
Payer: MEDICARE

## 2023-08-22 ENCOUNTER — ANESTHESIA (OUTPATIENT)
Dept: ENDOSCOPY | Age: 68
DRG: 418 | End: 2023-08-22
Payer: MEDICARE

## 2023-08-22 ENCOUNTER — APPOINTMENT (OUTPATIENT)
Dept: GENERAL RADIOLOGY | Age: 68
DRG: 418 | End: 2023-08-22
Payer: MEDICARE

## 2023-08-22 LAB
ALBUMIN SERPL-MCNC: 3.1 G/DL (ref 3.4–5)
ALBUMIN/GLOB SERPL: 0.9 {RATIO} (ref 1.1–2.2)
ALP SERPL-CCNC: 184 U/L (ref 40–129)
ALT SERPL-CCNC: 92 U/L (ref 10–40)
ANION GAP SERPL CALCULATED.3IONS-SCNC: 13 MMOL/L (ref 3–16)
AST SERPL-CCNC: 94 U/L (ref 15–37)
BASOPHILS # BLD: 0 K/UL (ref 0–0.2)
BASOPHILS NFR BLD: 0.2 %
BILIRUB SERPL-MCNC: 0.5 MG/DL (ref 0–1)
BUN SERPL-MCNC: 38 MG/DL (ref 7–20)
CALCIUM SERPL-MCNC: 9.1 MG/DL (ref 8.3–10.6)
CHLORIDE SERPL-SCNC: 93 MMOL/L (ref 99–110)
CO2 SERPL-SCNC: 26 MMOL/L (ref 21–32)
CREAT SERPL-MCNC: 2.8 MG/DL (ref 0.6–1.2)
DEPRECATED RDW RBC AUTO: 14.6 % (ref 12.4–15.4)
EOSINOPHIL # BLD: 0 K/UL (ref 0–0.6)
EOSINOPHIL NFR BLD: 0 %
GFR SERPLBLD CREATININE-BSD FMLA CKD-EPI: 18 ML/MIN/{1.73_M2}
GLUCOSE BLD-MCNC: 220 MG/DL (ref 70–99)
GLUCOSE BLD-MCNC: 233 MG/DL (ref 70–99)
GLUCOSE BLD-MCNC: 239 MG/DL (ref 70–99)
GLUCOSE SERPL-MCNC: 228 MG/DL (ref 70–99)
HCT VFR BLD AUTO: 27.8 % (ref 36–48)
HGB BLD-MCNC: 9.5 G/DL (ref 12–16)
LYMPHOCYTES # BLD: 0.7 K/UL (ref 1–5.1)
LYMPHOCYTES NFR BLD: 4.7 %
MCH RBC QN AUTO: 30.5 PG (ref 26–34)
MCHC RBC AUTO-ENTMCNC: 34 G/DL (ref 31–36)
MCV RBC AUTO: 89.7 FL (ref 80–100)
MONOCYTES # BLD: 0.4 K/UL (ref 0–1.3)
MONOCYTES NFR BLD: 2.9 %
NEUTROPHILS # BLD: 13.3 K/UL (ref 1.7–7.7)
NEUTROPHILS NFR BLD: 92.2 %
PERFORMED ON: ABNORMAL
PLATELET # BLD AUTO: 249 K/UL (ref 135–450)
PMV BLD AUTO: 10.7 FL (ref 5–10.5)
POTASSIUM SERPL-SCNC: 4.2 MMOL/L (ref 3.5–5.1)
PROT SERPL-MCNC: 6.7 G/DL (ref 6.4–8.2)
RBC # BLD AUTO: 3.1 M/UL (ref 4–5.2)
SODIUM SERPL-SCNC: 132 MMOL/L (ref 136–145)
WBC # BLD AUTO: 14.4 K/UL (ref 4–11)

## 2023-08-22 PROCEDURE — 7100000000 HC PACU RECOVERY - FIRST 15 MIN: Performed by: INTERNAL MEDICINE

## 2023-08-22 PROCEDURE — 6360000002 HC RX W HCPCS: Performed by: SURGERY

## 2023-08-22 PROCEDURE — 94760 N-INVAS EAR/PLS OXIMETRY 1: CPT

## 2023-08-22 PROCEDURE — 99024 POSTOP FOLLOW-UP VISIT: CPT | Performed by: SURGERY

## 2023-08-22 PROCEDURE — 6370000000 HC RX 637 (ALT 250 FOR IP): Performed by: INTERNAL MEDICINE

## 2023-08-22 PROCEDURE — 99024 POSTOP FOLLOW-UP VISIT: CPT | Performed by: PHYSICIAN ASSISTANT

## 2023-08-22 PROCEDURE — 6360000002 HC RX W HCPCS: Performed by: INTERNAL MEDICINE

## 2023-08-22 PROCEDURE — 2709999900 HC NON-CHARGEABLE SUPPLY: Performed by: INTERNAL MEDICINE

## 2023-08-22 PROCEDURE — 2500000003 HC RX 250 WO HCPCS: Performed by: NURSE ANESTHETIST, CERTIFIED REGISTERED

## 2023-08-22 PROCEDURE — 6360000002 HC RX W HCPCS: Performed by: NURSE ANESTHETIST, CERTIFIED REGISTERED

## 2023-08-22 PROCEDURE — APPSS15 APP SPLIT SHARED TIME 0-15 MINUTES: Performed by: PHYSICIAN ASSISTANT

## 2023-08-22 PROCEDURE — 3700000000 HC ANESTHESIA ATTENDED CARE: Performed by: INTERNAL MEDICINE

## 2023-08-22 PROCEDURE — 6360000004 HC RX CONTRAST MEDICATION: Performed by: INTERNAL MEDICINE

## 2023-08-22 PROCEDURE — 2720000010 HC SURG SUPPLY STERILE: Performed by: INTERNAL MEDICINE

## 2023-08-22 PROCEDURE — 36415 COLL VENOUS BLD VENIPUNCTURE: CPT

## 2023-08-22 PROCEDURE — 2580000003 HC RX 258: Performed by: SURGERY

## 2023-08-22 PROCEDURE — 6370000000 HC RX 637 (ALT 250 FOR IP): Performed by: SURGERY

## 2023-08-22 PROCEDURE — 1200000000 HC SEMI PRIVATE

## 2023-08-22 PROCEDURE — 74330 X-RAY BILE/PANC ENDOSCOPY: CPT

## 2023-08-22 PROCEDURE — 7100000001 HC PACU RECOVERY - ADDTL 15 MIN: Performed by: INTERNAL MEDICINE

## 2023-08-22 PROCEDURE — 0FC88ZZ EXTIRPATION OF MATTER FROM CYSTIC DUCT, VIA NATURAL OR ARTIFICIAL OPENING ENDOSCOPIC: ICD-10-PCS | Performed by: INTERNAL MEDICINE

## 2023-08-22 PROCEDURE — 3609014900 HC ERCP W/SPHINCTEROTOMY &/OR PAPILLOTOMY: Performed by: INTERNAL MEDICINE

## 2023-08-22 PROCEDURE — 85025 COMPLETE CBC W/AUTO DIFF WBC: CPT

## 2023-08-22 PROCEDURE — 80053 COMPREHEN METABOLIC PANEL: CPT

## 2023-08-22 PROCEDURE — APPNB15 APP NON BILLABLE TIME 0-15 MINS: Performed by: PHYSICIAN ASSISTANT

## 2023-08-22 PROCEDURE — 2580000003 HC RX 258: Performed by: NURSE ANESTHETIST, CERTIFIED REGISTERED

## 2023-08-22 PROCEDURE — 3700000001 HC ADD 15 MINUTES (ANESTHESIA): Performed by: INTERNAL MEDICINE

## 2023-08-22 PROCEDURE — 3609015200 HC ERCP REMOVE CALCULI/DEBRIS BILIARY/PANCREAS DUCT: Performed by: INTERNAL MEDICINE

## 2023-08-22 PROCEDURE — 2580000003 HC RX 258: Performed by: INTERNAL MEDICINE

## 2023-08-22 PROCEDURE — C1769 GUIDE WIRE: HCPCS | Performed by: INTERNAL MEDICINE

## 2023-08-22 RX ORDER — LIDOCAINE HYDROCHLORIDE 20 MG/ML
INJECTION, SOLUTION EPIDURAL; INFILTRATION; INTRACAUDAL; PERINEURAL PRN
Status: DISCONTINUED | OUTPATIENT
Start: 2023-08-22 | End: 2023-08-22 | Stop reason: SDUPTHER

## 2023-08-22 RX ORDER — METOCLOPRAMIDE HYDROCHLORIDE 5 MG/ML
10 INJECTION INTRAMUSCULAR; INTRAVENOUS ONCE
Status: DISCONTINUED | OUTPATIENT
Start: 2023-08-22 | End: 2023-08-22 | Stop reason: HOSPADM

## 2023-08-22 RX ORDER — SODIUM CHLORIDE 9 MG/ML
INJECTION, SOLUTION INTRAVENOUS PRN
Status: DISCONTINUED | OUTPATIENT
Start: 2023-08-22 | End: 2023-08-22 | Stop reason: HOSPADM

## 2023-08-22 RX ORDER — FENTANYL CITRATE 50 UG/ML
INJECTION, SOLUTION INTRAMUSCULAR; INTRAVENOUS PRN
Status: DISCONTINUED | OUTPATIENT
Start: 2023-08-22 | End: 2023-08-22 | Stop reason: SDUPTHER

## 2023-08-22 RX ORDER — SODIUM CHLORIDE 0.9 % (FLUSH) 0.9 %
5-40 SYRINGE (ML) INJECTION EVERY 12 HOURS SCHEDULED
Status: DISCONTINUED | OUTPATIENT
Start: 2023-08-22 | End: 2023-08-22 | Stop reason: HOSPADM

## 2023-08-22 RX ORDER — ONDANSETRON 2 MG/ML
INJECTION INTRAMUSCULAR; INTRAVENOUS PRN
Status: DISCONTINUED | OUTPATIENT
Start: 2023-08-22 | End: 2023-08-22 | Stop reason: SDUPTHER

## 2023-08-22 RX ORDER — PROPOFOL 10 MG/ML
INJECTION, EMULSION INTRAVENOUS PRN
Status: DISCONTINUED | OUTPATIENT
Start: 2023-08-22 | End: 2023-08-22 | Stop reason: SDUPTHER

## 2023-08-22 RX ORDER — ONDANSETRON 2 MG/ML
4 INJECTION INTRAMUSCULAR; INTRAVENOUS
Status: DISCONTINUED | OUTPATIENT
Start: 2023-08-22 | End: 2023-08-22

## 2023-08-22 RX ORDER — SODIUM CHLORIDE 9 MG/ML
INJECTION, SOLUTION INTRAVENOUS CONTINUOUS PRN
Status: DISCONTINUED | OUTPATIENT
Start: 2023-08-22 | End: 2023-08-22 | Stop reason: SDUPTHER

## 2023-08-22 RX ORDER — SUCCINYLCHOLINE/SOD CL,ISO/PF 200MG/10ML
SYRINGE (ML) INTRAVENOUS PRN
Status: DISCONTINUED | OUTPATIENT
Start: 2023-08-22 | End: 2023-08-22 | Stop reason: SDUPTHER

## 2023-08-22 RX ORDER — GLYCOPYRROLATE 0.2 MG/ML
INJECTION INTRAMUSCULAR; INTRAVENOUS PRN
Status: DISCONTINUED | OUTPATIENT
Start: 2023-08-22 | End: 2023-08-22 | Stop reason: SDUPTHER

## 2023-08-22 RX ORDER — SODIUM CHLORIDE 0.9 % (FLUSH) 0.9 %
5-40 SYRINGE (ML) INJECTION PRN
Status: DISCONTINUED | OUTPATIENT
Start: 2023-08-22 | End: 2023-08-22 | Stop reason: HOSPADM

## 2023-08-22 RX ADMIN — HEPARIN SODIUM 5000 UNITS: 5000 INJECTION INTRAVENOUS; SUBCUTANEOUS at 07:02

## 2023-08-22 RX ADMIN — Medication 160 MG: at 14:37

## 2023-08-22 RX ADMIN — PIPERACILLIN AND TAZOBACTAM 3375 MG: 3; .375 INJECTION, POWDER, LYOPHILIZED, FOR SOLUTION INTRAVENOUS at 17:23

## 2023-08-22 RX ADMIN — PANTOPRAZOLE SODIUM 20 MG: 20 TABLET, DELAYED RELEASE ORAL at 08:28

## 2023-08-22 RX ADMIN — CARVEDILOL 6.25 MG: 6.25 TABLET, FILM COATED ORAL at 08:28

## 2023-08-22 RX ADMIN — PIPERACILLIN AND TAZOBACTAM 3375 MG: 3; .375 INJECTION, POWDER, LYOPHILIZED, FOR SOLUTION INTRAVENOUS at 10:54

## 2023-08-22 RX ADMIN — PIPERACILLIN AND TAZOBACTAM 3375 MG: 3; .375 INJECTION, POWDER, LYOPHILIZED, FOR SOLUTION INTRAVENOUS at 02:37

## 2023-08-22 RX ADMIN — GABAPENTIN 300 MG: 300 CAPSULE ORAL at 21:04

## 2023-08-22 RX ADMIN — HEPARIN SODIUM 5000 UNITS: 5000 INJECTION INTRAVENOUS; SUBCUTANEOUS at 21:04

## 2023-08-22 RX ADMIN — FENTANYL CITRATE 50 MCG: 50 INJECTION INTRAMUSCULAR; INTRAVENOUS at 14:37

## 2023-08-22 RX ADMIN — SODIUM CHLORIDE, PRESERVATIVE FREE 10 ML: 5 INJECTION INTRAVENOUS at 08:29

## 2023-08-22 RX ADMIN — OXYCODONE HYDROCHLORIDE 10 MG: 10 TABLET ORAL at 02:32

## 2023-08-22 RX ADMIN — CITALOPRAM 40 MG: 20 TABLET, FILM COATED ORAL at 08:28

## 2023-08-22 RX ADMIN — GLYCOPYRROLATE 0.2 MG: 0.2 INJECTION INTRAMUSCULAR; INTRAVENOUS at 14:37

## 2023-08-22 RX ADMIN — CARVEDILOL 6.25 MG: 6.25 TABLET, FILM COATED ORAL at 17:19

## 2023-08-22 RX ADMIN — SODIUM CHLORIDE, PRESERVATIVE FREE 10 ML: 5 INJECTION INTRAVENOUS at 21:05

## 2023-08-22 RX ADMIN — OXYCODONE HYDROCHLORIDE 10 MG: 10 TABLET ORAL at 23:15

## 2023-08-22 RX ADMIN — SODIUM CHLORIDE: 9 INJECTION, SOLUTION INTRAVENOUS at 14:33

## 2023-08-22 RX ADMIN — ATORVASTATIN CALCIUM 80 MG: 80 TABLET, FILM COATED ORAL at 08:28

## 2023-08-22 RX ADMIN — GABAPENTIN 300 MG: 300 CAPSULE ORAL at 17:18

## 2023-08-22 RX ADMIN — PROPOFOL 50 MG: 10 INJECTION, EMULSION INTRAVENOUS at 14:42

## 2023-08-22 RX ADMIN — INSULIN GLARGINE 30 UNITS: 100 INJECTION, SOLUTION SUBCUTANEOUS at 21:05

## 2023-08-22 RX ADMIN — ONDANSETRON 4 MG: 2 INJECTION INTRAMUSCULAR; INTRAVENOUS at 15:03

## 2023-08-22 RX ADMIN — GABAPENTIN 300 MG: 300 CAPSULE ORAL at 08:28

## 2023-08-22 RX ADMIN — OXYCODONE HYDROCHLORIDE 10 MG: 10 TABLET ORAL at 07:02

## 2023-08-22 RX ADMIN — FENTANYL CITRATE 50 MCG: 50 INJECTION INTRAMUSCULAR; INTRAVENOUS at 14:34

## 2023-08-22 RX ADMIN — PROPOFOL 200 MG: 10 INJECTION, EMULSION INTRAVENOUS at 14:37

## 2023-08-22 RX ADMIN — LIDOCAINE HYDROCHLORIDE 60 MG: 20 INJECTION, SOLUTION EPIDURAL; INFILTRATION; INTRACAUDAL; PERINEURAL at 14:37

## 2023-08-22 ASSESSMENT — PAIN SCALES - GENERAL
PAINLEVEL_OUTOF10: 0
PAINLEVEL_OUTOF10: 8
PAINLEVEL_OUTOF10: 0
PAINLEVEL_OUTOF10: 7
PAINLEVEL_OUTOF10: 8
PAINLEVEL_OUTOF10: 0
PAINLEVEL_OUTOF10: 0

## 2023-08-22 ASSESSMENT — PAIN - FUNCTIONAL ASSESSMENT
PAIN_FUNCTIONAL_ASSESSMENT: PREVENTS OR INTERFERES SOME ACTIVE ACTIVITIES AND ADLS
PAIN_FUNCTIONAL_ASSESSMENT: ACTIVITIES ARE NOT PREVENTED
PAIN_FUNCTIONAL_ASSESSMENT: 0-10
PAIN_FUNCTIONAL_ASSESSMENT: ACTIVITIES ARE NOT PREVENTED

## 2023-08-22 ASSESSMENT — PAIN DESCRIPTION - ORIENTATION
ORIENTATION: MID

## 2023-08-22 ASSESSMENT — PAIN DESCRIPTION - LOCATION
LOCATION: ABDOMEN

## 2023-08-22 ASSESSMENT — PAIN DESCRIPTION - FREQUENCY
FREQUENCY: INTERMITTENT
FREQUENCY: INTERMITTENT

## 2023-08-22 ASSESSMENT — PAIN DESCRIPTION - DESCRIPTORS
DESCRIPTORS: CRAMPING
DESCRIPTORS: ACHING;CRAMPING
DESCRIPTORS: CRAMPING

## 2023-08-22 ASSESSMENT — PAIN SCALES - WONG BAKER
WONGBAKER_NUMERICALRESPONSE: 0
WONGBAKER_NUMERICALRESPONSE: 0

## 2023-08-22 ASSESSMENT — PAIN DESCRIPTION - ONSET
ONSET: ON-GOING
ONSET: AWAKENED FROM SLEEP

## 2023-08-22 ASSESSMENT — LIFESTYLE VARIABLES: SMOKING_STATUS: 0

## 2023-08-22 ASSESSMENT — PAIN DESCRIPTION - PAIN TYPE
TYPE: SURGICAL PAIN
TYPE: SURGICAL PAIN

## 2023-08-22 NOTE — ANESTHESIA PRE PROCEDURE
Department of Anesthesiology  Preprocedure Note       Name:  Arsalan Spann   Age:  69 y.o.  :  1955                                          MRN:  [id number]         Date:  2023      Surgeon: Maren Abbasi):  Shira Mauricio MD    Procedure: Procedure(s):  ENDOSCOPIC RETROGRADE CHOLANGIOPANCREATOGRAPHY    Medications prior to admission:   Prior to Admission medications    Medication Sig Start Date End Date Taking? Authorizing Provider   Liraglutide (VICTOZA) 18 MG/3ML SOPN SC injection Inject 1.8 mg into the skin daily 23   Sonia Obrien MD   atorvastatin (LIPITOR) 80 MG tablet Take 1 tablet by mouth daily 23   Sonia Obrien MD   pantoprazole (PROTONIX) 20 MG tablet Take 1 tablet by mouth daily 23   Sonia Obrien MD   carvedilol (COREG) 6.25 MG tablet TAKE 1 TABLET BY MOUTH TWICE A DAY WITH MEALS 23   Zaheer Brownlee MD   citalopram (CELEXA) 40 MG tablet Take 1 tablet by mouth daily 23   Sonia Obrien MD   Lancets (ONETOUCH DELICA PLUS [id number]) MISC USE TO CHECK BLOOD SUGAR 2-3 TIMES A DAY 7/10/23   Sonia Obrien MD   gabapentin (NEURONTIN) 300 MG capsule Take 1 capsule by mouth 3 times daily for 60 days. 23  Sonia Obrien MD   ondansetron (ZOFRAN-ODT) 4 MG disintegrating tablet Take 1 tablet by mouth every 8 hours as needed for Nausea or Vomiting 23   Sonia Obrien MD   insulin glargine (LANTUS SOLOSTAR) 100 UNIT/ML injection pen Inject 60 Units into the skin every morning AND 24 Units every evening. Patient taking differently: Inject 60 Units into the skin every morning AND 30 Units every evening.  23   Sonia Obrien MD   vitamin D (ERGOCALCIFEROL) 1.25 MG (88495 UT) CAPS capsule Take 1 capsule by mouth every 14 days 23   Carin Steele MD   torsemide (DEMADEX) 20 MG tablet TAKE 2 TABLETS BY MOUTH DAILY 3/21/23   Carin Steele MD   blood glucose test strips (ONE TOUCH ULTRA TEST) strip Check FSBS 2-3

## 2023-08-22 NOTE — OP NOTE
was noted. In addition, 2 small stones measuring 3 to 4 mm in size drained through the biliary orifice. The Jagtome was removed and a 9-12 mm stone extraction balloon with a 0.025 wire were passed through the channel. The tip of the balloon was advanced to the distal bile duct, and after a couple of attempts, we were successful at advancing the wire into the common bile duct, common hepatic duct, and intrahepatic biliary tree. A full cholangiogram was obtained. There was no clear evidence of a filling defect. However, a sweep of the common hepatic and common bile duct were performed with the balloon inflated to 9 and then 12 mm. 2 small, 4 to 5 mm stones were removed. Adequate drainage of bile and contrast was noted. The wire was removed and the procedure was terminated. Assessment:    1. Normal appearance of the ampulla of Vater, moderate-sized periampullary diverticulum. 2.  Low takeoff of the cystic duct    3. Biliary sphincterotomy completed. 4.  4 small stones, measuring 3 to 5 mm in size were removed. Plan: Monitor for post ERCP complications. Trend LFTs.

## 2023-08-23 VITALS
WEIGHT: 223.11 LBS | HEIGHT: 63 IN | BODY MASS INDEX: 39.53 KG/M2 | HEART RATE: 51 BPM | RESPIRATION RATE: 18 BRPM | TEMPERATURE: 98.5 F | SYSTOLIC BLOOD PRESSURE: 107 MMHG | DIASTOLIC BLOOD PRESSURE: 53 MMHG | OXYGEN SATURATION: 92 %

## 2023-08-23 LAB
ALBUMIN SERPL-MCNC: 3.4 G/DL (ref 3.4–5)
ALP SERPL-CCNC: 180 U/L (ref 40–129)
ALT SERPL-CCNC: 82 U/L (ref 10–40)
ANION GAP SERPL CALCULATED.3IONS-SCNC: 18 MMOL/L (ref 3–16)
AST SERPL-CCNC: 80 U/L (ref 15–37)
BASOPHILS # BLD: 0 K/UL (ref 0–0.2)
BASOPHILS NFR BLD: 0.2 %
BILIRUB DIRECT SERPL-MCNC: <0.2 MG/DL (ref 0–0.3)
BILIRUB INDIRECT SERPL-MCNC: ABNORMAL MG/DL (ref 0–1)
BILIRUB SERPL-MCNC: 0.4 MG/DL (ref 0–1)
BUN SERPL-MCNC: 49 MG/DL (ref 7–20)
CALCIUM SERPL-MCNC: 9.1 MG/DL (ref 8.3–10.6)
CHLORIDE SERPL-SCNC: 91 MMOL/L (ref 99–110)
CO2 SERPL-SCNC: 24 MMOL/L (ref 21–32)
CREAT SERPL-MCNC: 3.8 MG/DL (ref 0.6–1.2)
DEPRECATED RDW RBC AUTO: 14.8 % (ref 12.4–15.4)
EOSINOPHIL # BLD: 0 K/UL (ref 0–0.6)
EOSINOPHIL NFR BLD: 0 %
GFR SERPLBLD CREATININE-BSD FMLA CKD-EPI: 12 ML/MIN/{1.73_M2}
GLUCOSE BLD-MCNC: 263 MG/DL (ref 70–99)
GLUCOSE SERPL-MCNC: 246 MG/DL (ref 70–99)
HCT VFR BLD AUTO: 26.2 % (ref 36–48)
HGB BLD-MCNC: 8.6 G/DL (ref 12–16)
LYMPHOCYTES # BLD: 1 K/UL (ref 1–5.1)
LYMPHOCYTES NFR BLD: 5.7 %
MCH RBC QN AUTO: 29.5 PG (ref 26–34)
MCHC RBC AUTO-ENTMCNC: 32.9 G/DL (ref 31–36)
MCV RBC AUTO: 89.8 FL (ref 80–100)
MONOCYTES # BLD: 0.9 K/UL (ref 0–1.3)
MONOCYTES NFR BLD: 5.1 %
NEUTROPHILS # BLD: 15.3 K/UL (ref 1.7–7.7)
NEUTROPHILS NFR BLD: 89 %
PERFORMED ON: ABNORMAL
PLATELET # BLD AUTO: 260 K/UL (ref 135–450)
PMV BLD AUTO: 10.7 FL (ref 5–10.5)
POTASSIUM SERPL-SCNC: 4.3 MMOL/L (ref 3.5–5.1)
PROT SERPL-MCNC: 7 G/DL (ref 6.4–8.2)
RBC # BLD AUTO: 2.92 M/UL (ref 4–5.2)
SODIUM SERPL-SCNC: 133 MMOL/L (ref 136–145)
WBC # BLD AUTO: 17.1 K/UL (ref 4–11)

## 2023-08-23 PROCEDURE — 36415 COLL VENOUS BLD VENIPUNCTURE: CPT

## 2023-08-23 PROCEDURE — 99024 POSTOP FOLLOW-UP VISIT: CPT | Performed by: PHYSICIAN ASSISTANT

## 2023-08-23 PROCEDURE — 80048 BASIC METABOLIC PNL TOTAL CA: CPT

## 2023-08-23 PROCEDURE — 85025 COMPLETE CBC W/AUTO DIFF WBC: CPT

## 2023-08-23 PROCEDURE — APPSS15 APP SPLIT SHARED TIME 0-15 MINUTES: Performed by: PHYSICIAN ASSISTANT

## 2023-08-23 PROCEDURE — 6370000000 HC RX 637 (ALT 250 FOR IP): Performed by: INTERNAL MEDICINE

## 2023-08-23 PROCEDURE — 6360000002 HC RX W HCPCS: Performed by: INTERNAL MEDICINE

## 2023-08-23 PROCEDURE — 94760 N-INVAS EAR/PLS OXIMETRY 1: CPT

## 2023-08-23 PROCEDURE — 80076 HEPATIC FUNCTION PANEL: CPT

## 2023-08-23 PROCEDURE — APPNB15 APP NON BILLABLE TIME 0-15 MINS: Performed by: PHYSICIAN ASSISTANT

## 2023-08-23 PROCEDURE — 2580000003 HC RX 258: Performed by: INTERNAL MEDICINE

## 2023-08-23 RX ORDER — OXYCODONE HYDROCHLORIDE 5 MG/1
5 TABLET ORAL EVERY 6 HOURS PRN
Qty: 12 TABLET | Refills: 0 | Status: SHIPPED | OUTPATIENT
Start: 2023-08-23 | End: 2023-08-26

## 2023-08-23 RX ADMIN — PIPERACILLIN AND TAZOBACTAM 3375 MG: 3; .375 INJECTION, POWDER, LYOPHILIZED, FOR SOLUTION INTRAVENOUS at 02:12

## 2023-08-23 RX ADMIN — PIPERACILLIN AND TAZOBACTAM 3375 MG: 3; .375 INJECTION, POWDER, LYOPHILIZED, FOR SOLUTION INTRAVENOUS at 11:24

## 2023-08-23 RX ADMIN — CARVEDILOL 6.25 MG: 6.25 TABLET, FILM COATED ORAL at 08:24

## 2023-08-23 RX ADMIN — ASPIRIN 81 MG: 81 TABLET, CHEWABLE ORAL at 08:24

## 2023-08-23 RX ADMIN — GABAPENTIN 300 MG: 300 CAPSULE ORAL at 08:24

## 2023-08-23 RX ADMIN — HEPARIN SODIUM 5000 UNITS: 5000 INJECTION INTRAVENOUS; SUBCUTANEOUS at 06:22

## 2023-08-23 RX ADMIN — PANTOPRAZOLE SODIUM 20 MG: 20 TABLET, DELAYED RELEASE ORAL at 08:24

## 2023-08-23 RX ADMIN — OXYCODONE HYDROCHLORIDE 10 MG: 10 TABLET ORAL at 03:14

## 2023-08-23 RX ADMIN — CITALOPRAM 40 MG: 20 TABLET, FILM COATED ORAL at 08:24

## 2023-08-23 RX ADMIN — SODIUM CHLORIDE, PRESERVATIVE FREE 10 ML: 5 INJECTION INTRAVENOUS at 08:26

## 2023-08-23 RX ADMIN — ATORVASTATIN CALCIUM 80 MG: 80 TABLET, FILM COATED ORAL at 08:24

## 2023-08-23 ASSESSMENT — PAIN - FUNCTIONAL ASSESSMENT: PAIN_FUNCTIONAL_ASSESSMENT: PREVENTS OR INTERFERES SOME ACTIVE ACTIVITIES AND ADLS

## 2023-08-23 ASSESSMENT — PAIN SCALES - GENERAL
PAINLEVEL_OUTOF10: 0
PAINLEVEL_OUTOF10: 3
PAINLEVEL_OUTOF10: 8

## 2023-08-23 ASSESSMENT — PAIN SCALES - WONG BAKER: WONGBAKER_NUMERICALRESPONSE: 0

## 2023-08-23 ASSESSMENT — PAIN DESCRIPTION - LOCATION: LOCATION: ABDOMEN

## 2023-08-23 ASSESSMENT — PAIN DESCRIPTION - DESCRIPTORS: DESCRIPTORS: ACHING;CRAMPING

## 2023-08-23 ASSESSMENT — PAIN DESCRIPTION - ONSET: ONSET: AWAKENED FROM SLEEP

## 2023-08-23 ASSESSMENT — PAIN DESCRIPTION - FREQUENCY: FREQUENCY: INTERMITTENT

## 2023-08-23 ASSESSMENT — PAIN DESCRIPTION - PAIN TYPE: TYPE: SURGICAL PAIN

## 2023-08-23 ASSESSMENT — PAIN DESCRIPTION - ORIENTATION: ORIENTATION: RIGHT

## 2023-08-23 NOTE — PLAN OF CARE
Problem: Discharge Planning  Goal: Discharge to home or other facility with appropriate resources  8/20/2023 0954 by Adrian Minor RN  Outcome: Progressing  8/20/2023 0621 by Hal Cavazos RN  Outcome: Progressing     Problem: ABCDS Injury Assessment  Goal: Absence of physical injury  8/20/2023 0954 by Adrian Minor RN  Outcome: Progressing  8/20/2023 0621 by Hal Cavazos RN  Outcome: Progressing
Problem: Discharge Planning  Goal: Discharge to home or other facility with appropriate resources  8/21/2023 1019 by Conchis Todd RN  Outcome: Progressing       Problem: ABCDS Injury Assessment  Goal: Absence of physical injury  8/21/2023 1019 by Conchis Todd RN  Outcome: Progressing       Problem: Gastrointestinal - Adult  Goal: Minimal or absence of nausea and vomiting  8/21/2023 1019 by Conchis Todd RN  Outcome: Progressing       Problem: Safety - Adult  Goal: Free from fall injury  8/21/2023 1019 by Conchis Todd RN  Outcome: Progressing
Problem: Discharge Planning  Goal: Discharge to home or other facility with appropriate resources  8/21/2023 2355 by Alvarez Benz RN  Outcome: Progressing     Problem: ABCDS Injury Assessment  Goal: Absence of physical injury  8/21/2023 2355 by Alvarez Benz RN  Outcome: Progressing     Problem: Gastrointestinal - Adult  Goal: Minimal or absence of nausea and vomiting  8/21/2023 2355 by Alvarez Benz RN  Outcome: Progressing     Problem: Safety - Adult  Goal: Free from fall injury  8/21/2023 2355 by Alvarez Benz RN  Outcome: Progressing     Problem: Pain  Goal: Verbalizes/displays adequate comfort level or baseline comfort level  Outcome: Progressing     Problem: Chronic Conditions and Co-morbidities  Goal: Patient's chronic conditions and co-morbidity symptoms are monitored and maintained or improved  Outcome: Progressing     Problem: Skin/Tissue Integrity - Adult  Goal: Incisions, wounds, or drain sites healing without S/S of infection  Outcome: Progressing
Problem: Discharge Planning  Goal: Discharge to home or other facility with appropriate resources  8/22/2023 1048 by Marleni Krishna RN  Outcome: Progressing     Problem: ABCDS Injury Assessment  Goal: Absence of physical injury  8/22/2023 1048 by Marleni Krishna RN  Outcome: Progressing     Problem: Gastrointestinal - Adult  Goal: Minimal or absence of nausea and vomiting  8/22/2023 1048 by Marleni Krishna RN  Outcome: Progressing     Problem: Skin/Tissue Integrity - Adult  Goal: Incisions, wounds, or drain sites healing without S/S of infection  8/22/2023 1048 by Marleni Krishna RN  Outcome: Progressing     Problem: Safety - Adult  Goal: Free from fall injury  8/22/2023 1048 by Marleni Krishna RN  Outcome: Progressing     Problem: Pain  Goal: Verbalizes/displays adequate comfort level or baseline comfort level  8/22/2023 1048 by Marleni Krishna RN  Outcome: Progressing     Problem: Chronic Conditions and Co-morbidities  Goal: Patient's chronic conditions and co-morbidity symptoms are monitored and maintained or improved  8/22/2023 1048 by Marleni Krishna RN  Outcome: Progressing
Problem: Discharge Planning  Goal: Discharge to home or other facility with appropriate resources  8/23/2023 0432 by Candy Clark RN  Outcome: Progressing     Problem: ABCDS Injury Assessment  Goal: Absence of physical injury  8/23/2023 0432 by Candy Clark RN  Outcome: Progressing  Flowsheets (Taken 8/23/2023 1693)  Absence of Physical Injury: Implement safety measures based on patient assessment     Problem: Gastrointestinal - Adult  Goal: Minimal or absence of nausea and vomiting  8/23/2023 0432 by Candy Clark RN  Flowsheets (Taken 8/23/2023 0280)  Minimal or absence of nausea and vomiting:   Administer IV fluids as ordered to ensure adequate hydration   Maintain NPO status until nausea and vomiting are resolved     Problem: Safety - Adult  Goal: Free from fall injury  Outcome: Progressing  Flowsheets (Taken 8/23/2023 0432)  Free From Fall Injury: Instruct family/caregiver on patient safety     Problem: Pain  Goal: Verbalizes/displays adequate comfort level or baseline comfort level  Outcome: Progressing  Flowsheets (Taken 8/23/2023 0432)  Verbalizes/displays adequate comfort level or baseline comfort level:   Encourage patient to monitor pain and request assistance   Assess pain using appropriate pain scale   Administer analgesics based on type and severity of pain and evaluate response   Implement non-pharmacological measures as appropriate and evaluate response     Problem: Chronic Conditions and Co-morbidities  Goal: Patient's chronic conditions and co-morbidity symptoms are monitored and maintained or improved  Outcome: Progressing  Flowsheets (Taken 8/23/2023 0432)  Care Plan - Patient's Chronic Conditions and Co-Morbidity Symptoms are Monitored and Maintained or Improved:   Monitor and assess patient's chronic conditions and comorbid symptoms for stability, deterioration, or improvement   Collaborate with multidisciplinary team to address chronic and comorbid conditions and prevent
Problem: Discharge Planning  Goal: Discharge to home or other facility with appropriate resources  8/23/2023 1140 by Aide Mckeon RN  Outcome: Progressing     Problem: ABCDS Injury Assessment  Goal: Absence of physical injury  8/23/2023 1140 by Aide Mckeon RN  Outcome: Progressing     Problem: Gastrointestinal - Adult  Goal: Minimal or absence of nausea and vomiting  8/23/2023 1140 by Aide Mckeon RN  Outcome: Progressing     Problem: Skin/Tissue Integrity - Adult  Goal: Incisions, wounds, or drain sites healing without S/S of infection  8/23/2023 1140 by Aide Mckeon RN  Outcome: Progressing     Problem: Safety - Adult  Goal: Free from fall injury  8/23/2023 1140 by Aide Mckeon RN  Outcome: Progressing     Problem: Pain  Goal: Verbalizes/displays adequate comfort level or baseline comfort level  8/23/2023 1140 by Aide Mckeon RN  Outcome: Progressing     Problem: Chronic Conditions and Co-morbidities  Goal: Patient's chronic conditions and co-morbidity symptoms are monitored and maintained or improved  8/23/2023 1140 by Aide Mckeon RN  Outcome: Progressing
Problem: Discharge Planning  Goal: Discharge to home or other facility with appropriate resources  8/23/2023 1518 by Itzel Aldridge RN  Outcome: Completed     Problem: ABCDS Injury Assessment  Goal: Absence of physical injury  8/23/2023 1518 by Itzel Aldridge RN  Outcome: Completed     Problem: Gastrointestinal - Adult  Goal: Minimal or absence of nausea and vomiting  8/23/2023 1518 by Itzel Aldridge RN  Outcome: Completed     Problem: Skin/Tissue Integrity - Adult  Goal: Incisions, wounds, or drain sites healing without S/S of infection  8/23/2023 1518 by Itzel Aldridge RN  Outcome: Completed     Problem: Safety - Adult  Goal: Free from fall injury  8/23/2023 1518 by Itzel Aldridge RN  Outcome: Completed     Problem: Pain  Goal: Verbalizes/displays adequate comfort level or baseline comfort level  8/23/2023 1518 by Itzel Aldridge RN  Outcome: Completed     Problem: Chronic Conditions and Co-morbidities  Goal: Patient's chronic conditions and co-morbidity symptoms are monitored and maintained or improved  8/23/2023 1518 by Itzel Aldridge RN  Outcome: Completed
Problem: Discharge Planning  Goal: Discharge to home or other facility with appropriate resources  Outcome: Progressing     Problem: ABCDS Injury Assessment  Goal: Absence of physical injury  Outcome: Progressing
Problem: Discharge Planning  Goal: Discharge to home or other facility with appropriate resources  Outcome: Progressing     Problem: ABCDS Injury Assessment  Goal: Absence of physical injury  Outcome: Progressing     Problem: Gastrointestinal - Adult  Goal: Minimal or absence of nausea and vomiting  Outcome: Progressing     Problem: Safety - Adult  Goal: Free from fall injury  Outcome: Progressing
no

## 2023-08-23 NOTE — DISCHARGE SUMMARY
Hospital Medicine Discharge Summary    Patient ID: Dalton Wilhelm      Patient's PCP: Irene Luis MD    Admit Date: 8/19/2023     Discharge Date:   8/23/23    Admitting Physician: Johnna Mcgee MD     Discharge Physician: Jessi Segovia MD     Discharge Diagnoses: Active Hospital Problems    Diagnosis     Cholecystitis [K81.9]        The patient was seen and examined on day of discharge and this discharge summary is in conjunction with any daily progress note from day of discharge. Hospital Course:   69 y.o. female w PMH HTN, DLD, CKD, DVT, HFpEF who presents with chief complaint of abodminal and chest pain. States she was in her usual state of health, but all last week she had on and off nausea/vomiting every 2-3days. Reports she was feeling better, but then had lunch yesterday with her sister, and since then she had been feeling nauseous, and this AM, awoke with right epigastric pain and vomiting so she decided to come to the ED. Denies alcohol, tobacco or illicit drug use. In the ED /73 P 79 R 14 97% SPO2 on room air  /3.5/96/30/31/2.2 Alk phos 20   Tbili 1.1        CT Abd/pelvis  1. Cholelithiasis with distention of the gallbladder and wall thickening. Acute cholecystitis is suspected. 2. Bilateral punctate nephrolithiasis but no obstructive uropathy. 3. Diverticulosis worse in the sigmoid but no acute diverticulitis. Normal appendix. 4. Ventral pelvic hernia containing mesenteric fat but no obstruction or strangulation. General surgery recommendation :      DATE OF OPERATION: 8/21/2023      PREOPERATIVE DIAGNOSIS: cholecystitis     POSTOPERATIVE DIAGNOSIS: cholecystitis, choledocholithiasis     PROCEDURE PERFORMED: Laparoscopic cholecystectomy with cholangiogram     SURGEON: Jerson Dudley MD, MD     ANESTHESIA: GETA with local anesthetic      GI evaluation :      S/p ERCP on 8/22/23  Assessment:     1.   Normal appearance of the

## 2023-08-23 NOTE — CARE COORDINATION
Provided to patient . .. by Electronically signed by YESSI Jc on 8/23/2023 at 12:01 PM. Education provided to patient, patient reported no questions and verbalized understanding. Patient aware of 4 hours allotted time to determine if they choose to pursue Medicare appeal process. 08/23/23 1201   IMM Letter   IMM Letter given to Patient/Family/Significant other/Guardian/POA/by: TO patient by Etta Seip. LMSW   IMM Letter date given: 08/23/23   IMM Letter time given: [facility], [facility], [facility] Social Work Case Management   Phone: [phone]  Fax: [phone]

## 2023-08-24 ENCOUNTER — CARE COORDINATION (OUTPATIENT)
Dept: CASE MANAGEMENT | Age: 68
End: 2023-08-24

## 2023-08-24 NOTE — CARE COORDINATION
[facility] Care Transitions Initial Follow Up Call    Call within 2 business days of discharge: Yes      Patient: Bienvenido Tavera Patient : 1955   MRN: [id number]  Reason for Admission: Abdominal & Chest Pain  Discharge Date: 23 RARS: Readmission Risk Score: 19.4      Last Discharge [address] [facility]       Date Complaint Diagnosis Description Type Department Provider    23 Chest Pain Calculus of gallbladder with acute cholecystitis and obstruction . .. ED to Hosp-Admission (Discharged) (ADMITTED) JINNY 5N Monse Cabrera MD; Marium Martinez. .. Was this an external facility discharge? No Discharge Facility: [facility] to be reviewed by the provider   Additional needs identified to be addressed with provider: No                 Method of communication with provider: none. Initial attempt at CT discharge phone call. Unable to reach patient. Unable to leave message. Number listed as home & mobile that match - no answer - no voicemail set up. 2nd number listed as home is a wrong number - per the individual who answered. Follow Up  No future appointments.      Ewa Sahu RN BSN  Care Transition Nurse  [phone]

## 2023-08-25 ENCOUNTER — CARE COORDINATION (OUTPATIENT)
Dept: CASE MANAGEMENT | Age: 68
End: 2023-08-25

## 2023-08-25 ENCOUNTER — TELEPHONE (OUTPATIENT)
Dept: PRIMARY CARE CLINIC | Age: 68
End: 2023-08-25

## 2023-08-25 NOTE — CARE COORDINATION
[facility] Care Transitions Initial Follow Up Call    Call within 2 business days of discharge: Yes      Patient: Moiz Dorsey Patient : 1955   MRN: [id number]  Reason for Admission: Abdominal & Chest Pain  Discharge Date: 23 RARS: Readmission Risk Score: 19.4      Last Discharge [address] [facility]       Date Complaint Diagnosis Description Type Department Provider    23 Chest Pain Calculus of gallbladder with acute cholecystitis and obstruction . .. ED to Hosp-Admission (Discharged) (ADMITTED) JINNY CardozaN Nikhil Germain MD; Irma Navarro. .. Was this an external facility discharge? No Discharge Facility: [facility] to be reviewed by the provider   Additional needs identified to be addressed with provider: No                 Method of communication with provider: none. 2nd and final attempt at CT discharge phone call. Unable to reach patient. Unable to leave message. No answer - no voicemail has been set up. Follow Up  No future appointments.      Ej Green RN BSN  Care Transition Nurse  [phone]

## 2023-08-29 ENCOUNTER — CARE COORDINATION (OUTPATIENT)
Dept: CASE MANAGEMENT | Age: 68
End: 2023-08-29

## 2023-08-29 NOTE — CARE COORDINATION
contact information for future needs. Plan for follow-up call in 7-10 days based on severity of symptoms and risk factors.   Plan for next call: symptom management-,  self management-,  follow-up appointment-.    Wes Tavera LPN

## 2023-08-31 ENCOUNTER — CARE COORDINATION (OUTPATIENT)
Dept: CASE MANAGEMENT | Age: 68
End: 2023-08-31

## 2023-09-05 RX ORDER — INSULIN GLARGINE 100 [IU]/ML
INJECTION, SOLUTION SUBCUTANEOUS
Qty: 81 ML | Refills: 1 | Status: SHIPPED | OUTPATIENT
Start: 2023-09-05

## 2023-09-06 ENCOUNTER — CARE COORDINATION (OUTPATIENT)
Dept: CASE MANAGEMENT | Age: 68
End: 2023-09-06

## 2023-09-06 NOTE — CARE COORDINATION
[facility] Care Transitions Follow Up Call    Patient Current Location: [address] [place] [address] Transition Nurse contacted the patient by telephone to follow up after admission on 2023. Verified name and  with patient as identifiers. Patient: Chelsy Harp  Patient : 1955   MRN: [id number]  Reason for Admission: Abdominal & chest pain  Discharge Date: 23 RARS: Readmission Risk Score: 19.4      Needs to be reviewed by the provider   Additional needs identified to be addressed with provider: No               Method of communication with provider: none. Tammy Aleman states she is \"tired. \" She states she can only do things if she is sitting down. Tammy Aleman states her strength is not improving. She states her appetite is poor and she is even having trouble staying hydrated. Tammy Aleman states her bowels are loose. She states she is urinating without difficulty. Her DBP has been on the low side today - 44 & 58. She states she will be seeing her PCP tomorrow. Writer sees an appt with Viktoriya Deutsch tomorrow and the PCP on 2023. Tammy Jennns states writer is not right. Writer placed call to the office of . Staff state andrea has an appt on 2023. They will reach out to confirm that with her. CT team will follow. Follow Up  Future Appointments   Date Time Provider [address] [place] [address] Ct   2023  1:00 PM Catrina Maguire MD [id number]   2023  1:30 PM MD Pricila Bess RD [facility] [place] - D[facility]D   2023  2:45 PM MD BA Neves NEPH RAUL [facility] Nephrolo          Care Transitions Subsequent and Final Call    Subsequent and Final Calls  Care Transitions Interventions  Other Interventions:             Care Transition Nurse provided contact information for future needs.    Plan for next call:  follow up on surgeon's appt - B/P - micaela Gray RN BSN  Care Transition Nurse  [phone]

## 2023-09-07 ENCOUNTER — OFFICE VISIT (OUTPATIENT)
Dept: SURGERY | Age: 68
End: 2023-09-07

## 2023-09-07 VITALS — BODY MASS INDEX: 39.5 KG/M2 | WEIGHT: 223 LBS

## 2023-09-07 DIAGNOSIS — K81.9 CHOLECYSTITIS: Primary | ICD-10-CM

## 2023-09-07 PROCEDURE — 99024 POSTOP FOLLOW-UP VISIT: CPT | Performed by: SURGERY

## 2023-09-07 RX ORDER — ONDANSETRON 4 MG/1
4 TABLET, ORALLY DISINTEGRATING ORAL 3 TIMES DAILY PRN
Qty: 21 TABLET | Refills: 0 | Status: SHIPPED | OUTPATIENT
Start: 2023-09-07

## 2023-09-07 NOTE — PROGRESS NOTES
Subjective:      Patient ID: Addison Moon is a 69 y.o. female. HPI  S/p lap jone with gram, ERCP. Reports doing OK. Denies pain. C/o nausea, diarrhea. Diarrhea worse after meals. Ate [employer] last night. Eating because she knows she needs too but c/o nausea. Afebrile. Path  Gallbladder, cholecystectomy      - Acute cholecystitis   Review of Systems    Objective:   Physical Exam  Wounds healed  Abdomen nontender  Assessment:       Diagnosis Orders   1. Cholecystitis                Plan:      Continue zofran PRN  Low fat diet for now.     Continue to increase activity  Path discussed  F/U 2 weeks for re-eval        Stacy Kemp MD

## 2023-09-08 ENCOUNTER — CARE COORDINATION (OUTPATIENT)
Dept: CASE MANAGEMENT | Age: 68
End: 2023-09-08

## 2023-09-08 NOTE — CARE COORDINATION
[facility] Care Transitions Follow Up Call      Patient: Bienvenido Tavera  Patient : 1955   MRN: [id number]  Reason for Admission: Abdominal & chest pain  Discharge Date: 23 RARS: Readmission Risk Score: 19.4      Attempted to contact patient for follow up transition call. No voicemail setup. Unable to leave a voicemail message to return call. Will continue to follow.         Follow Up  Future Appointments   Date Time Provider [address] [place] [address]   2023  1:30 PM MD Jeanie Ratliff RD [facility] [place] - [facility]   2023  2:45 PM Tri Lopez MD AFL NE[facility] Nephrolo            Care Transitions Subsequent and Final Call    Subsequent and Final Calls  Care Transitions Interventions  Other Interventions:             Cristian Padron LPN  Care Coordinator  [phone]

## 2023-09-10 DIAGNOSIS — E11.42 DM TYPE 2 WITH DIABETIC PERIPHERAL NEUROPATHY (HCC): Chronic | ICD-10-CM

## 2023-09-11 ENCOUNTER — OFFICE VISIT (OUTPATIENT)
Dept: PRIMARY CARE CLINIC | Age: 68
End: 2023-09-11
Payer: MEDICARE

## 2023-09-11 ENCOUNTER — CARE COORDINATION (OUTPATIENT)
Dept: CASE MANAGEMENT | Age: 68
End: 2023-09-11

## 2023-09-11 VITALS
SYSTOLIC BLOOD PRESSURE: 136 MMHG | WEIGHT: 208 LBS | HEART RATE: 76 BPM | HEIGHT: 63 IN | RESPIRATION RATE: 18 BRPM | BODY MASS INDEX: 36.86 KG/M2 | DIASTOLIC BLOOD PRESSURE: 72 MMHG

## 2023-09-11 DIAGNOSIS — Z23 NEED FOR SHINGLES VACCINE: ICD-10-CM

## 2023-09-11 DIAGNOSIS — N18.4 CKD (CHRONIC KIDNEY DISEASE) STAGE 4, GFR 15-29 ML/MIN (HCC): ICD-10-CM

## 2023-09-11 DIAGNOSIS — Z00.00 MEDICARE ANNUAL WELLNESS VISIT, SUBSEQUENT: ICD-10-CM

## 2023-09-11 DIAGNOSIS — E66.01 SEVERE OBESITY (BMI 35.0-39.9) WITH COMORBIDITY (HCC): ICD-10-CM

## 2023-09-11 DIAGNOSIS — Z78.0 POST-MENOPAUSAL: ICD-10-CM

## 2023-09-11 DIAGNOSIS — G47.33 OSA (OBSTRUCTIVE SLEEP APNEA): ICD-10-CM

## 2023-09-11 DIAGNOSIS — E11.22 TYPE 2 DIABETES MELLITUS WITH STAGE 4 CHRONIC KIDNEY DISEASE, WITH LONG-TERM CURRENT USE OF INSULIN (HCC): ICD-10-CM

## 2023-09-11 DIAGNOSIS — E78.2 MIXED HYPERLIPIDEMIA: ICD-10-CM

## 2023-09-11 DIAGNOSIS — Z23 NEED FOR TDAP VACCINATION: ICD-10-CM

## 2023-09-11 DIAGNOSIS — N18.4 TYPE 2 DIABETES MELLITUS WITH STAGE 4 CHRONIC KIDNEY DISEASE, WITH LONG-TERM CURRENT USE OF INSULIN (HCC): ICD-10-CM

## 2023-09-11 DIAGNOSIS — Z79.4 TYPE 2 DIABETES MELLITUS WITH STAGE 4 CHRONIC KIDNEY DISEASE, WITH LONG-TERM CURRENT USE OF INSULIN (HCC): ICD-10-CM

## 2023-09-11 DIAGNOSIS — Z12.31 ENCOUNTER FOR SCREENING MAMMOGRAM FOR MALIGNANT NEOPLASM OF BREAST: ICD-10-CM

## 2023-09-11 DIAGNOSIS — I50.32 CHRONIC DIASTOLIC CHF (CONGESTIVE HEART FAILURE) (HCC): ICD-10-CM

## 2023-09-11 DIAGNOSIS — I10 ESSENTIAL HYPERTENSION: Primary | Chronic | ICD-10-CM

## 2023-09-11 PROBLEM — K81.9 CHOLECYSTITIS: Status: RESOLVED | Noted: 2023-08-19 | Resolved: 2023-09-11

## 2023-09-11 PROBLEM — I50.30 DIASTOLIC CONGESTIVE HEART FAILURE, UNSPECIFIED HF CHRONICITY (HCC): Status: ACTIVE | Noted: 2023-09-11

## 2023-09-11 PROBLEM — J18.9 PNEUMONIA: Status: RESOLVED | Noted: 2022-07-18 | Resolved: 2023-09-11

## 2023-09-11 LAB — HBA1C MFR BLD: 7 %

## 2023-09-11 PROCEDURE — 3075F SYST BP GE 130 - 139MM HG: CPT | Performed by: FAMILY MEDICINE

## 2023-09-11 PROCEDURE — 1123F ACP DISCUSS/DSCN MKR DOCD: CPT | Performed by: FAMILY MEDICINE

## 2023-09-11 PROCEDURE — 83036 HEMOGLOBIN GLYCOSYLATED A1C: CPT | Performed by: FAMILY MEDICINE

## 2023-09-11 PROCEDURE — 3046F HEMOGLOBIN A1C LEVEL >9.0%: CPT | Performed by: FAMILY MEDICINE

## 2023-09-11 PROCEDURE — 3078F DIAST BP <80 MM HG: CPT | Performed by: FAMILY MEDICINE

## 2023-09-11 PROCEDURE — G0439 PPPS, SUBSEQ VISIT: HCPCS | Performed by: FAMILY MEDICINE

## 2023-09-11 RX ORDER — LOSARTAN POTASSIUM 100 MG/1
100 TABLET ORAL DAILY
Qty: 90 TABLET | Refills: 1 | Status: SHIPPED | OUTPATIENT
Start: 2023-09-11

## 2023-09-11 RX ORDER — BLOOD SUGAR DIAGNOSTIC
STRIP MISCELLANEOUS
Qty: 300 STRIP | Refills: 1 | Status: SHIPPED | OUTPATIENT
Start: 2023-09-11

## 2023-09-11 ASSESSMENT — PATIENT HEALTH QUESTIONNAIRE - PHQ9
8. MOVING OR SPEAKING SO SLOWLY THAT OTHER PEOPLE COULD HAVE NOTICED. OR THE OPPOSITE, BEING SO FIGETY OR RESTLESS THAT YOU HAVE BEEN MOVING AROUND A LOT MORE THAN USUAL: 0
1. LITTLE INTEREST OR PLEASURE IN DOING THINGS: 0
SUM OF ALL RESPONSES TO PHQ QUESTIONS 1-9: 4
10. IF YOU CHECKED OFF ANY PROBLEMS, HOW DIFFICULT HAVE THESE PROBLEMS MADE IT FOR YOU TO DO YOUR WORK, TAKE CARE OF THINGS AT HOME, OR GET ALONG WITH OTHER PEOPLE: 0
7. TROUBLE CONCENTRATING ON THINGS, SUCH AS READING THE NEWSPAPER OR WATCHING TELEVISION: 0
3. TROUBLE FALLING OR STAYING ASLEEP: 0
SUM OF ALL RESPONSES TO PHQ QUESTIONS 1-9: 4
SUM OF ALL RESPONSES TO PHQ QUESTIONS 1-9: 4
5. POOR APPETITE OR OVEREATING: 3
SUM OF ALL RESPONSES TO PHQ9 QUESTIONS 1 & 2: 0
6. FEELING BAD ABOUT YOURSELF - OR THAT YOU ARE A FAILURE OR HAVE LET YOURSELF OR YOUR FAMILY DOWN: 0
2. FEELING DOWN, DEPRESSED OR HOPELESS: 0
4. FEELING TIRED OR HAVING LITTLE ENERGY: 1
9. THOUGHTS THAT YOU WOULD BE BETTER OFF DEAD, OR OF HURTING YOURSELF: 0
SUM OF ALL RESPONSES TO PHQ QUESTIONS 1-9: 4

## 2023-09-11 ASSESSMENT — ENCOUNTER SYMPTOMS
BLOOD IN STOOL: 0
ABDOMINAL PAIN: 0
SHORTNESS OF BREATH: 0
DIARRHEA: 1
CONSTIPATION: 0

## 2023-09-11 ASSESSMENT — LIFESTYLE VARIABLES
HOW MANY STANDARD DRINKS CONTAINING ALCOHOL DO YOU HAVE ON A TYPICAL DAY: PATIENT DOES NOT DRINK
HOW OFTEN DO YOU HAVE A DRINK CONTAINING ALCOHOL: NEVER

## 2023-09-12 NOTE — PATIENT INSTRUCTIONS
life.  Balancing. This helps you stay coordinated and have good posture. Includes heel-to-toe walking, mila chi, and certain types of yoga. Aim for at least 3 days a week. It can reduce your risk of falling. Even if you have a hard time meeting the recommendations, it's better to be more active than less active. All activity done in each category counts toward your weekly total. You'd be surprised how daily things like carrying groceries, keeping up with grandchildren, and taking the stairs can add up. What keeps you from being active? If you've had a hard time being more active, you're not alone. Maybe you remember being able to do more. Or maybe you've never thought of yourself as being active. It's frustrating when you can't do the things you want. Being more active can help. What's holding you back? Getting started. Have a goal, but break it into easy tasks. Small steps build into big accomplishments. Staying motivated. If you feel like skipping your activity, remember your goal. Maybe you want to move better and stay independent. Every activity gets you one step closer. Not feeling your best.  Start with 5 minutes of an activity you enjoy. Prove to yourself you can do it. As you get comfortable, increase your time. You may not be where you want to be. But you're in the process of getting there. Everyone starts somewhere. How can you find safe ways to stay active? Talk with your doctor about any physical challenges you're facing. Make a plan with your doctor if you have a health problem or aren't sure how to get started with activity. If you're already active, ask your doctor if there is anything you should change to stay safe as your body and health change. If you tend to feel dizzy after you take medicine, avoid activity at that time. Try being active before you take your medicine. This will reduce your risk of falls.   If you plan to be active at home, make sure to clear your space before you

## 2023-09-14 ENCOUNTER — HOSPITAL ENCOUNTER (EMERGENCY)
Age: 68
Discharge: HOME | End: 2023-09-14
Attending: STUDENT IN AN ORGANIZED HEALTH CARE EDUCATION/TRAINING PROGRAM
Payer: MEDICARE

## 2023-09-14 ENCOUNTER — TELEPHONE (OUTPATIENT)
Dept: OTHER | Facility: CLINIC | Age: 68
End: 2023-09-14

## 2023-09-14 VITALS
WEIGHT: 209 LBS | HEART RATE: 65 BPM | OXYGEN SATURATION: 98 % | RESPIRATION RATE: 18 BRPM | HEIGHT: 61 IN | BODY MASS INDEX: 39.46 KG/M2 | TEMPERATURE: 99.2 F | DIASTOLIC BLOOD PRESSURE: 65 MMHG | SYSTOLIC BLOOD PRESSURE: 181 MMHG

## 2023-09-14 DIAGNOSIS — E83.42 HYPOMAGNESEMIA: ICD-10-CM

## 2023-09-14 DIAGNOSIS — R19.7 DIARRHEA, UNSPECIFIED TYPE: Primary | ICD-10-CM

## 2023-09-14 LAB
ALBUMIN SERPL-MCNC: 3.6 G/DL (ref 3.4–5)
ALBUMIN/GLOB SERPL: 0.8 {RATIO} (ref 1.1–2.2)
ALP SERPL-CCNC: 132 U/L (ref 40–129)
ALT SERPL-CCNC: 14 U/L (ref 10–40)
ANION GAP SERPL CALCULATED.3IONS-SCNC: 17 MMOL/L (ref 3–16)
AST SERPL-CCNC: 22 U/L (ref 15–37)
BASOPHILS # BLD: 0.1 K/UL (ref 0–0.2)
BASOPHILS NFR BLD: 0.6 %
BILIRUB SERPL-MCNC: 0.4 MG/DL (ref 0–1)
BUN SERPL-MCNC: 35 MG/DL (ref 7–20)
C DIFF TOX A+B STL QL IA: NORMAL
CALCIUM SERPL-MCNC: 8.5 MG/DL (ref 8.3–10.6)
CHLORIDE SERPL-SCNC: 94 MMOL/L (ref 99–110)
CO2 SERPL-SCNC: 26 MMOL/L (ref 21–32)
CREAT SERPL-MCNC: 2.5 MG/DL (ref 0.6–1.2)
DEPRECATED RDW RBC AUTO: 14.6 % (ref 12.4–15.4)
EOSINOPHIL # BLD: 0.1 K/UL (ref 0–0.6)
EOSINOPHIL NFR BLD: 1 %
GFR SERPLBLD CREATININE-BSD FMLA CKD-EPI: 20 ML/MIN/{1.73_M2}
GLUCOSE SERPL-MCNC: 133 MG/DL (ref 70–99)
HCT VFR BLD AUTO: 29.3 % (ref 36–48)
HGB BLD-MCNC: 9.8 G/DL (ref 12–16)
LYMPHOCYTES # BLD: 2 K/UL (ref 1–5.1)
LYMPHOCYTES NFR BLD: 16.3 %
MAGNESIUM SERPL-MCNC: 1.1 MG/DL (ref 1.8–2.4)
MCH RBC QN AUTO: 29.3 PG (ref 26–34)
MCHC RBC AUTO-ENTMCNC: 33.4 G/DL (ref 31–36)
MCV RBC AUTO: 87.8 FL (ref 80–100)
MONOCYTES # BLD: 1.2 K/UL (ref 0–1.3)
MONOCYTES NFR BLD: 10.1 %
NEUTROPHILS # BLD: 8.8 K/UL (ref 1.7–7.7)
NEUTROPHILS NFR BLD: 72 %
PLATELET # BLD AUTO: 389 K/UL (ref 135–450)
PMV BLD AUTO: 10.3 FL (ref 5–10.5)
POTASSIUM SERPL-SCNC: 3.5 MMOL/L (ref 3.5–5.1)
PROT SERPL-MCNC: 8 G/DL (ref 6.4–8.2)
RBC # BLD AUTO: 3.34 M/UL (ref 4–5.2)
SODIUM SERPL-SCNC: 137 MMOL/L (ref 136–145)
WBC # BLD AUTO: 12.2 K/UL (ref 4–11)

## 2023-09-14 PROCEDURE — 2580000003 HC RX 258: Performed by: STUDENT IN AN ORGANIZED HEALTH CARE EDUCATION/TRAINING PROGRAM

## 2023-09-14 PROCEDURE — 87324 CLOSTRIDIUM AG IA: CPT

## 2023-09-14 PROCEDURE — 87506 IADNA-DNA/RNA PROBE TQ 6-11: CPT

## 2023-09-14 PROCEDURE — 6360000002 HC RX W HCPCS: Performed by: STUDENT IN AN ORGANIZED HEALTH CARE EDUCATION/TRAINING PROGRAM

## 2023-09-14 PROCEDURE — 99284 EMERGENCY DEPT VISIT MOD MDM: CPT

## 2023-09-14 PROCEDURE — 96366 THER/PROPH/DIAG IV INF ADDON: CPT

## 2023-09-14 PROCEDURE — 87449 NOS EACH ORGANISM AG IA: CPT

## 2023-09-14 PROCEDURE — 96365 THER/PROPH/DIAG IV INF INIT: CPT

## 2023-09-14 PROCEDURE — 36415 COLL VENOUS BLD VENIPUNCTURE: CPT

## 2023-09-14 PROCEDURE — 87493 C DIFF AMPLIFIED PROBE: CPT

## 2023-09-14 PROCEDURE — 85025 COMPLETE CBC W/AUTO DIFF WBC: CPT

## 2023-09-14 PROCEDURE — 80053 COMPREHEN METABOLIC PANEL: CPT

## 2023-09-14 PROCEDURE — 83735 ASSAY OF MAGNESIUM: CPT

## 2023-09-14 RX ORDER — SODIUM CHLORIDE, SODIUM LACTATE, POTASSIUM CHLORIDE, AND CALCIUM CHLORIDE .6; .31; .03; .02 G/100ML; G/100ML; G/100ML; G/100ML
1000 INJECTION, SOLUTION INTRAVENOUS ONCE
Status: COMPLETED | OUTPATIENT
Start: 2023-09-14 | End: 2023-09-14

## 2023-09-14 RX ORDER — LANOLIN ALCOHOL/MO/W.PET/CERES
400 CREAM (GRAM) TOPICAL ONCE
Status: DISCONTINUED | OUTPATIENT
Start: 2023-09-14 | End: 2023-09-14

## 2023-09-14 RX ORDER — MAGNESIUM SULFATE IN WATER 40 MG/ML
4000 INJECTION, SOLUTION INTRAVENOUS ONCE
Status: COMPLETED | OUTPATIENT
Start: 2023-09-14 | End: 2023-09-14

## 2023-09-14 RX ADMIN — MAGNESIUM SULFATE HEPTAHYDRATE 4000 MG: 4 INJECTION, SOLUTION INTRAVENOUS at 18:24

## 2023-09-14 RX ADMIN — SODIUM CHLORIDE, POTASSIUM CHLORIDE, SODIUM LACTATE AND CALCIUM CHLORIDE 1000 ML: 600; 310; 30; 20 INJECTION, SOLUTION INTRAVENOUS at 16:28

## 2023-09-14 ASSESSMENT — PAIN SCALES - GENERAL: PAINLEVEL_OUTOF10: 5

## 2023-09-14 ASSESSMENT — PAIN - FUNCTIONAL ASSESSMENT: PAIN_FUNCTIONAL_ASSESSMENT: 0-10

## 2023-09-14 ASSESSMENT — ENCOUNTER SYMPTOMS
CHEST TIGHTNESS: 0
VOMITING: 0
ABDOMINAL PAIN: 1
DIARRHEA: 1
SHORTNESS OF BREATH: 0
SORE THROAT: 0
NAUSEA: 1

## 2023-09-14 ASSESSMENT — PAIN DESCRIPTION - ORIENTATION: ORIENTATION: MID

## 2023-09-14 ASSESSMENT — PAIN DESCRIPTION - LOCATION: LOCATION: ABDOMEN

## 2023-09-14 NOTE — DISCHARGE INSTRUCTIONS
Please follow-up with your primary care doctor next week. You may continue taking Imodium as needed for your loose stools. Return to the hospital if you develop fever, vomiting, worsening abdominal pain if you have any additional concerns.

## 2023-09-14 NOTE — TELEPHONE ENCOUNTER
Writer contacted Dr Leiva Postal to inform of 30 day readmission risk. 's attempt to contact Dr Leiva Postal was unsuccessful.      Call Back: If you need to call back to inform of disposition you can contact me at [phone]

## 2023-09-15 ENCOUNTER — TELEPHONE (OUTPATIENT)
Dept: OTHER | Facility: CLINIC | Age: 68
End: 2023-09-15

## 2023-09-15 ENCOUNTER — PATIENT MESSAGE (OUTPATIENT)
Dept: PRIMARY CARE CLINIC | Age: 68
End: 2023-09-15

## 2023-09-15 ENCOUNTER — CARE COORDINATION (OUTPATIENT)
Dept: CASE MANAGEMENT | Age: 68
End: 2023-09-15

## 2023-09-15 LAB
C DIFF TOX GENS STL QL NAA+PROBE: ABNORMAL
GI PATHOGENS PNL STL NAA+PROBE: NORMAL
ORGANISM: ABNORMAL

## 2023-09-15 NOTE — CARE COORDINATION
medications?: No  Do you currently have any active services?: No  Do you have any needs or concerns that I can assist you with?: No  Identified Barriers: None  Care Transitions Interventions                          Other Interventions:             Care Transition Nurse provided contact information for future needs. Plan for next call:  diarrhea - final outreach if andrea is stable and has final results.     Destinee Navas RN BSN  Care Transition Nurse  [phone]

## 2023-09-18 RX ORDER — METRONIDAZOLE 500 MG/1
500 TABLET ORAL 2 TIMES DAILY
Qty: 14 TABLET | Refills: 0 | Status: SHIPPED | OUTPATIENT
Start: 2023-09-18 | End: 2023-09-25

## 2023-09-18 NOTE — TELEPHONE ENCOUNTER
From: Octavia Reyes  To: Dr. Elbert Kathleen: 9/15/2023 9:57 PM EDT  Subject: Brynn Rivas went to [facility]. on Thursday, the ER, per Dr. Kristi Etienne request. I was there for nine hours while they did tests. The furs t lab report came back negative for Cdiff. They called the next day to let me know it came back positive. I tried calling your office and it said it was closed. I then agreed to have another physician call me back, which never happened. My brother in law, a pharmacist suggested a diet for me to follow. Do I need to be on an antibiotic?     Radha Conte

## 2023-09-19 ENCOUNTER — CARE COORDINATION (OUTPATIENT)
Dept: CASE MANAGEMENT | Age: 68
End: 2023-09-19

## 2023-09-19 RX ORDER — GABAPENTIN 300 MG/1
300 CAPSULE ORAL 3 TIMES DAILY
Qty: 270 CAPSULE | Refills: 1 | Status: SHIPPED | OUTPATIENT
Start: 2023-09-19 | End: 2024-03-17

## 2023-09-19 NOTE — CARE COORDINATION
[address] [facility] [address] Care Transitions Follow Up Call      Patient: Rajani Antonio  Patient : 1955   MRN: [id number]  Reason for Admission: Abdominal pain & chest pain - recent ED visit  Discharge Date: 23 RARS: Readmission Risk Score: 19.4      Needs to be reviewed by the provider   Additional needs identified to be addressed with provider: Yes  labs-c diff result             Method of communication with provider: phone. Unable to reach patient for CT follow up phone call. Unable to leave message. No answer - no voicemail set up. Traity message sent to patient. Informed Andrew Knight there is no voicemail available. Requested she contact the PCP office to schedule an ED visit follow up and to discuss her new diagnosis. Call placed to the office of . Spoke with reception. They states  has ordered antibiotics for the patient and the patient has been informed. Is follow up appointment scheduled within 7 days of discharge? No. Patient declined scheduling when writer first made an outreach. The PCP office tried to reach the patient and was unsuccessful Writer was unsuccessful in reaching patient today. No answer - no voicemail set up. CT team will follow. Follow Up  Future Appointments   Date Time Provider [address]   10/12/2023  3:30 PM Michael Aly MD [facility]   2023  1:00 PM MD Dalila Powers RD [facility] [place] - [facility]       Care Transitions Subsequent and Final Call    Subsequent and Final Calls  Care Transitions Interventions                          Other Interventions:              Plan for next call:  schedule ED visit follow up with PCP & final outreach if Andrew Knight is stable.     Deric Sepulveda RN BSN  Care Transition Nurse  [phone]     Alyssa Caballero RN

## 2023-09-20 NOTE — ED NOTES
[facility] [address] [place] [address]   Emergency Department Culture Follow-Up       Geeta Donaldson (CSN: [id number]) was seen and evaluated at [facility] [address] [place] [address] Emergency Department on 9/14/23 by Dr. Rhianna Hensley. Patient was tested for C. diff toxin/antigen at time of ED encounter. C. 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 Treatment Course    The patient was NOT treated in the emergency department with antimicrobial therapy. However, patient's PCP (Dr. Glenn Ho) prescribed antimicrobial therapy for positive C. diff toxin on 9/18/23 per Care Coordination notes in chart on 9/19/23. Recommendation    It is recommended that the patient continue empiric antibiotic prescribed by PCP until therapy completed. Antibiotic should be taken with food. Alcohol consumption should be avoided while patient is on metronidazole. Antibiotic therapy prescribed by PCP not considered first line therapy for recurrent C.diff per IDSA/SHEA 2021 C.diff guidelines. Strict return precautions will be provided to patient in case symptoms persist or worsen. Patient will be contacted and counseled to continue antibiotic at this time as well as instructed when to seek additional medical attention if symptoms persist or worsen. Follow-Up    [facility] ED PharmD attempted to contact patient with updated lab/culture results as well as to provide follow-up counseling but patient could not be reached at listed number as of 9/20/2023 4:53 PM.     Patient remains unable to be reached after phone call attempt. Certified letter to be sent to patient at address listed in chart.     Thank you,    Bon Kapoor, PharmD, [facility] Specialist - Emergency Dept  Wireless: [phone]  9/20/2023 12:53 PM

## 2023-09-21 ENCOUNTER — CARE COORDINATION (OUTPATIENT)
Dept: CASE MANAGEMENT | Age: 68
End: 2023-09-21

## 2023-09-21 NOTE — CARE COORDINATION
[facility] Care Transitions Follow Up Call      Patient: Chelsy Harp  Patient : 1955   MRN: [id number]  Reason for Admission: Chest pain & ED visit for diarrhea  Discharge Date: 23 RARS: Readmission Risk Score: 19.4      Needs to be reviewed by the provider   Additional needs identified to be addressed with provider: No               Method of communication with provider: none. Final attempt at CT follow up. Unable to reach patient. Unable to leave message. No answer - no voicemail set up.       Follow Up  Future Appointments   Date Time Provider [address]   10/12/2023  3:30 PM Chidi Mijares MD [facility] Nephrolo   2023  1:00 PM MD Pricila Bess RD PC [place] - DYD        Care Transitions Subsequent and Final Call    Subsequent and Final Calls  Care Transitions Interventions                          Other Interventions:           Dayna Gray RN BSN  Care Transition Nurse  [phone]

## 2023-10-06 ENCOUNTER — HOSPITAL ENCOUNTER (INPATIENT)
Age: 68
LOS: 3 days | Discharge: HOME | DRG: 291 | End: 2023-10-09
Attending: EMERGENCY MEDICINE | Admitting: INTERNAL MEDICINE
Payer: MEDICARE

## 2023-10-06 ENCOUNTER — APPOINTMENT (OUTPATIENT)
Dept: GENERAL RADIOLOGY | Age: 68
DRG: 291 | End: 2023-10-06
Payer: MEDICARE

## 2023-10-06 DIAGNOSIS — R09.02 HYPOXIA: ICD-10-CM

## 2023-10-06 DIAGNOSIS — I50.9 CONGESTIVE HEART FAILURE, UNSPECIFIED HF CHRONICITY, UNSPECIFIED HEART FAILURE TYPE (HCC): Primary | ICD-10-CM

## 2023-10-06 PROBLEM — I50.33 ACUTE ON CHRONIC CONGESTIVE HEART FAILURE WITH LEFT VENTRICULAR DIASTOLIC DYSFUNCTION (HCC): Status: ACTIVE | Noted: 2023-10-06

## 2023-10-06 LAB
ALBUMIN SERPL-MCNC: 3.2 G/DL (ref 3.4–5)
ALBUMIN SERPL-MCNC: 3.4 G/DL (ref 3.4–5)
ALBUMIN/GLOB SERPL: 0.9 {RATIO} (ref 1.1–2.2)
ALP SERPL-CCNC: 143 U/L (ref 40–129)
ALT SERPL-CCNC: 8 U/L (ref 10–40)
ANION GAP SERPL CALCULATED.3IONS-SCNC: 11 MMOL/L (ref 3–16)
ANION GAP SERPL CALCULATED.3IONS-SCNC: 15 MMOL/L (ref 3–16)
AST SERPL-CCNC: 11 U/L (ref 15–37)
BACTERIA URNS QL MICRO: NORMAL /HPF
BASE EXCESS BLDV CALC-SCNC: 3 MMOL/L
BASOPHILS # BLD: 0.1 K/UL (ref 0–0.2)
BASOPHILS NFR BLD: 0.9 %
BILIRUB SERPL-MCNC: 0.4 MG/DL (ref 0–1)
BILIRUB UR QL STRIP.AUTO: NEGATIVE
BUN SERPL-MCNC: 13 MG/DL (ref 7–20)
BUN SERPL-MCNC: 19 MG/DL (ref 7–20)
CALCIUM SERPL-MCNC: 7.9 MG/DL (ref 8.3–10.6)
CALCIUM SERPL-MCNC: 8 MG/DL (ref 8.3–10.6)
CHLORIDE SERPL-SCNC: 102 MMOL/L (ref 99–110)
CHLORIDE SERPL-SCNC: 103 MMOL/L (ref 99–110)
CLARITY UR: CLEAR
CO2 BLDV-SCNC: 29 MMOL/L
CO2 SERPL-SCNC: 23 MMOL/L (ref 21–32)
CO2 SERPL-SCNC: 26 MMOL/L (ref 21–32)
COHGB MFR BLDV: 1.4 %
COLOR UR: YELLOW
CREAT SERPL-MCNC: 1.5 MG/DL (ref 0.6–1.2)
CREAT SERPL-MCNC: 1.7 MG/DL (ref 0.6–1.2)
DEPRECATED RDW RBC AUTO: 15.7 % (ref 12.4–15.4)
EKG ATRIAL RATE: 74 BPM
EKG DIAGNOSIS: NORMAL
EKG P AXIS: 74 DEGREES
EKG P-R INTERVAL: 168 MS
EKG Q-T INTERVAL: 444 MS
EKG QRS DURATION: 78 MS
EKG QTC CALCULATION (BAZETT): 492 MS
EKG R AXIS: -25 DEGREES
EKG T AXIS: 85 DEGREES
EKG VENTRICULAR RATE: 74 BPM
EOSINOPHIL # BLD: 0.5 K/UL (ref 0–0.6)
EOSINOPHIL NFR BLD: 3.7 %
EPI CELLS #/AREA URNS AUTO: 2 /HPF (ref 0–5)
FLUAV RNA UPPER RESP QL NAA+PROBE: NEGATIVE
FLUBV AG NPH QL: NEGATIVE
GFR SERPLBLD CREATININE-BSD FMLA CKD-EPI: 32 ML/MIN/{1.73_M2}
GFR SERPLBLD CREATININE-BSD FMLA CKD-EPI: 37 ML/MIN/{1.73_M2}
GLUCOSE BLD-MCNC: 215 MG/DL (ref 70–99)
GLUCOSE BLD-MCNC: 224 MG/DL (ref 70–99)
GLUCOSE SERPL-MCNC: 183 MG/DL (ref 70–99)
GLUCOSE SERPL-MCNC: 95 MG/DL (ref 70–99)
GLUCOSE UR STRIP.AUTO-MCNC: NEGATIVE MG/DL
HCO3 BLDV-SCNC: 28 MMOL/L (ref 23–29)
HCT VFR BLD AUTO: 29.8 % (ref 36–48)
HGB BLD-MCNC: 9.8 G/DL (ref 12–16)
HGB UR QL STRIP.AUTO: NEGATIVE
HYALINE CASTS #/AREA URNS AUTO: 1 /LPF (ref 0–8)
IRON SATN MFR SERPL: 13 % (ref 15–50)
IRON SERPL-MCNC: 29 UG/DL (ref 37–145)
KETONES UR STRIP.AUTO-MCNC: NEGATIVE MG/DL
LACTATE BLDV-SCNC: 1.4 MMOL/L (ref 0.4–2)
LEUKOCYTE ESTERASE UR QL STRIP.AUTO: NEGATIVE
LYMPHOCYTES # BLD: 1.3 K/UL (ref 1–5.1)
LYMPHOCYTES NFR BLD: 10 %
MCH RBC QN AUTO: 29.3 PG (ref 26–34)
MCHC RBC AUTO-ENTMCNC: 32.7 G/DL (ref 31–36)
MCV RBC AUTO: 89.5 FL (ref 80–100)
METHGB MFR BLDV: 0 %
MONOCYTES # BLD: 1.1 K/UL (ref 0–1.3)
MONOCYTES NFR BLD: 9 %
NEUTROPHILS # BLD: 9.7 K/UL (ref 1.7–7.7)
NEUTROPHILS NFR BLD: 76.4 %
NITRITE UR QL STRIP.AUTO: NEGATIVE
NT-PROBNP SERPL-MCNC: 3068 PG/ML (ref 0–124)
O2 THERAPY: NORMAL
PCO2 BLDV: 41.7 MMHG (ref 40–50)
PERFORMED ON: ABNORMAL
PERFORMED ON: ABNORMAL
PH BLDV: 7.43 [PH] (ref 7.35–7.45)
PH UR STRIP.AUTO: 5 [PH] (ref 5–8)
PHOSPHATE SERPL-MCNC: 2.3 MG/DL (ref 2.5–4.9)
PLATELET # BLD AUTO: 264 K/UL (ref 135–450)
PMV BLD AUTO: 10.5 FL (ref 5–10.5)
PO2 BLDV: 86 MMHG
POTASSIUM SERPL-SCNC: 3.1 MMOL/L (ref 3.5–5.1)
POTASSIUM SERPL-SCNC: 3.5 MMOL/L (ref 3.5–5.1)
PROT SERPL-MCNC: 6.7 G/DL (ref 6.4–8.2)
PROT UR STRIP.AUTO-MCNC: 300 MG/DL
RBC # BLD AUTO: 3.33 M/UL (ref 4–5.2)
RBC CLUMPS #/AREA URNS AUTO: 1 /HPF (ref 0–4)
SAO2 % BLDV: 97 %
SARS-COV-2 RDRP RESP QL NAA+PROBE: NOT DETECTED
SODIUM SERPL-SCNC: 139 MMOL/L (ref 136–145)
SODIUM SERPL-SCNC: 141 MMOL/L (ref 136–145)
SP GR UR STRIP.AUTO: 1.01 (ref 1–1.03)
TIBC SERPL-MCNC: 217 UG/DL (ref 260–445)
TROPONIN, HIGH SENSITIVITY: 31 NG/L (ref 0–14)
TROPONIN, HIGH SENSITIVITY: 31 NG/L (ref 0–14)
UA DIPSTICK W REFLEX MICRO PNL UR: YES
URN SPEC COLLECT METH UR: NORMAL
UROBILINOGEN UR STRIP-ACNC: 0.2 E.U./DL
WBC # BLD AUTO: 12.7 K/UL (ref 4–11)
WBC #/AREA URNS AUTO: 1 /HPF (ref 0–5)

## 2023-10-06 PROCEDURE — 82803 BLOOD GASES ANY COMBINATION: CPT

## 2023-10-06 PROCEDURE — 6370000000 HC RX 637 (ALT 250 FOR IP): Performed by: INTERNAL MEDICINE

## 2023-10-06 PROCEDURE — 83540 ASSAY OF IRON: CPT

## 2023-10-06 PROCEDURE — 36415 COLL VENOUS BLD VENIPUNCTURE: CPT

## 2023-10-06 PROCEDURE — 2060000000 HC ICU INTERMEDIATE R&B

## 2023-10-06 PROCEDURE — 93005 ELECTROCARDIOGRAM TRACING: CPT | Performed by: EMERGENCY MEDICINE

## 2023-10-06 PROCEDURE — 6360000002 HC RX W HCPCS: Performed by: INTERNAL MEDICINE

## 2023-10-06 PROCEDURE — 2580000003 HC RX 258: Performed by: INTERNAL MEDICINE

## 2023-10-06 PROCEDURE — 83605 ASSAY OF LACTIC ACID: CPT

## 2023-10-06 PROCEDURE — 83550 IRON BINDING TEST: CPT

## 2023-10-06 PROCEDURE — 6370000000 HC RX 637 (ALT 250 FOR IP): Performed by: EMERGENCY MEDICINE

## 2023-10-06 PROCEDURE — 93010 ELECTROCARDIOGRAM REPORT: CPT | Performed by: INTERNAL MEDICINE

## 2023-10-06 PROCEDURE — 87804 INFLUENZA ASSAY W/OPTIC: CPT

## 2023-10-06 PROCEDURE — 71045 X-RAY EXAM CHEST 1 VIEW: CPT

## 2023-10-06 PROCEDURE — 84484 ASSAY OF TROPONIN QUANT: CPT

## 2023-10-06 PROCEDURE — 93306 TTE W/DOPPLER COMPLETE: CPT

## 2023-10-06 PROCEDURE — 83036 HEMOGLOBIN GLYCOSYLATED A1C: CPT

## 2023-10-06 PROCEDURE — 99285 EMERGENCY DEPT VISIT HI MDM: CPT

## 2023-10-06 PROCEDURE — 96374 THER/PROPH/DIAG INJ IV PUSH: CPT

## 2023-10-06 PROCEDURE — 80053 COMPREHEN METABOLIC PANEL: CPT

## 2023-10-06 PROCEDURE — 6370000000 HC RX 637 (ALT 250 FOR IP): Performed by: REGISTERED NURSE

## 2023-10-06 PROCEDURE — 83880 ASSAY OF NATRIURETIC PEPTIDE: CPT

## 2023-10-06 PROCEDURE — 87635 SARS-COV-2 COVID-19 AMP PRB: CPT

## 2023-10-06 PROCEDURE — 81001 URINALYSIS AUTO W/SCOPE: CPT

## 2023-10-06 PROCEDURE — 99223 1ST HOSP IP/OBS HIGH 75: CPT | Performed by: INTERNAL MEDICINE

## 2023-10-06 PROCEDURE — 85025 COMPLETE CBC W/AUTO DIFF WBC: CPT

## 2023-10-06 PROCEDURE — 6360000002 HC RX W HCPCS: Performed by: EMERGENCY MEDICINE

## 2023-10-06 RX ORDER — SODIUM CHLORIDE 0.9 % (FLUSH) 0.9 %
5-40 SYRINGE (ML) INJECTION PRN
Status: DISCONTINUED | OUTPATIENT
Start: 2023-10-06 | End: 2023-10-09 | Stop reason: HOSPADM

## 2023-10-06 RX ORDER — POLYETHYLENE GLYCOL 3350 17 G/17G
17 POWDER, FOR SOLUTION ORAL DAILY PRN
Status: DISCONTINUED | OUTPATIENT
Start: 2023-10-06 | End: 2023-10-09 | Stop reason: HOSPADM

## 2023-10-06 RX ORDER — MAGNESIUM SULFATE IN WATER 40 MG/ML
2000 INJECTION, SOLUTION INTRAVENOUS PRN
Status: DISCONTINUED | OUTPATIENT
Start: 2023-10-06 | End: 2023-10-09 | Stop reason: HOSPADM

## 2023-10-06 RX ORDER — DEXTROSE MONOHYDRATE 100 MG/ML
INJECTION, SOLUTION INTRAVENOUS CONTINUOUS PRN
Status: DISCONTINUED | OUTPATIENT
Start: 2023-10-06 | End: 2023-10-09 | Stop reason: HOSPADM

## 2023-10-06 RX ORDER — POTASSIUM CHLORIDE 20 MEQ/1
40 TABLET, EXTENDED RELEASE ORAL ONCE
Status: COMPLETED | OUTPATIENT
Start: 2023-10-06 | End: 2023-10-06

## 2023-10-06 RX ORDER — SODIUM CHLORIDE 0.9 % (FLUSH) 0.9 %
5-40 SYRINGE (ML) INJECTION EVERY 12 HOURS SCHEDULED
Status: DISCONTINUED | OUTPATIENT
Start: 2023-10-06 | End: 2023-10-09 | Stop reason: HOSPADM

## 2023-10-06 RX ORDER — HYDROXYZINE PAMOATE 25 MG/1
25 CAPSULE ORAL 4 TIMES DAILY PRN
Status: DISCONTINUED | OUTPATIENT
Start: 2023-10-06 | End: 2023-10-09 | Stop reason: HOSPADM

## 2023-10-06 RX ORDER — GABAPENTIN 300 MG/1
300 CAPSULE ORAL 3 TIMES DAILY
Status: DISCONTINUED | OUTPATIENT
Start: 2023-10-06 | End: 2023-10-07

## 2023-10-06 RX ORDER — HYDRALAZINE HYDROCHLORIDE 25 MG/1
25 TABLET, FILM COATED ORAL EVERY 8 HOURS SCHEDULED
Status: DISCONTINUED | OUTPATIENT
Start: 2023-10-06 | End: 2023-10-09 | Stop reason: HOSPADM

## 2023-10-06 RX ORDER — PANTOPRAZOLE SODIUM 20 MG/1
20 TABLET, DELAYED RELEASE ORAL DAILY
Status: DISCONTINUED | OUTPATIENT
Start: 2023-10-06 | End: 2023-10-09 | Stop reason: HOSPADM

## 2023-10-06 RX ORDER — SODIUM CHLORIDE 9 MG/ML
INJECTION, SOLUTION INTRAVENOUS PRN
Status: DISCONTINUED | OUTPATIENT
Start: 2023-10-06 | End: 2023-10-09 | Stop reason: HOSPADM

## 2023-10-06 RX ORDER — ATORVASTATIN CALCIUM 80 MG/1
80 TABLET, FILM COATED ORAL NIGHTLY
Status: DISCONTINUED | OUTPATIENT
Start: 2023-10-06 | End: 2023-10-09 | Stop reason: HOSPADM

## 2023-10-06 RX ORDER — INSULIN GLARGINE 100 [IU]/ML
60 INJECTION, SOLUTION SUBCUTANEOUS NIGHTLY
Status: DISCONTINUED | OUTPATIENT
Start: 2023-10-06 | End: 2023-10-09 | Stop reason: HOSPADM

## 2023-10-06 RX ORDER — LANOLIN ALCOHOL/MO/W.PET/CERES
6 CREAM (GRAM) TOPICAL NIGHTLY
Status: DISCONTINUED | OUTPATIENT
Start: 2023-10-06 | End: 2023-10-09 | Stop reason: HOSPADM

## 2023-10-06 RX ORDER — ACETAMINOPHEN 650 MG/1
650 SUPPOSITORY RECTAL EVERY 6 HOURS PRN
Status: DISCONTINUED | OUTPATIENT
Start: 2023-10-06 | End: 2023-10-09 | Stop reason: HOSPADM

## 2023-10-06 RX ORDER — CARVEDILOL 12.5 MG/1
12.5 TABLET ORAL 2 TIMES DAILY WITH MEALS
Status: DISCONTINUED | OUTPATIENT
Start: 2023-10-06 | End: 2023-10-09 | Stop reason: HOSPADM

## 2023-10-06 RX ORDER — HYDRALAZINE HYDROCHLORIDE 20 MG/ML
5 INJECTION INTRAMUSCULAR; INTRAVENOUS EVERY 4 HOURS PRN
Status: DISCONTINUED | OUTPATIENT
Start: 2023-10-06 | End: 2023-10-09 | Stop reason: HOSPADM

## 2023-10-06 RX ORDER — POTASSIUM CHLORIDE 7.45 MG/ML
10 INJECTION INTRAVENOUS PRN
Status: DISCONTINUED | OUTPATIENT
Start: 2023-10-06 | End: 2023-10-09 | Stop reason: HOSPADM

## 2023-10-06 RX ORDER — ONDANSETRON 4 MG/1
4 TABLET, ORALLY DISINTEGRATING ORAL EVERY 8 HOURS PRN
Status: DISCONTINUED | OUTPATIENT
Start: 2023-10-06 | End: 2023-10-09 | Stop reason: HOSPADM

## 2023-10-06 RX ORDER — FUROSEMIDE 10 MG/ML
40 INJECTION INTRAMUSCULAR; INTRAVENOUS 2 TIMES DAILY
Status: DISCONTINUED | OUTPATIENT
Start: 2023-10-06 | End: 2023-10-08

## 2023-10-06 RX ORDER — ONDANSETRON 2 MG/ML
4 INJECTION INTRAMUSCULAR; INTRAVENOUS EVERY 6 HOURS PRN
Status: DISCONTINUED | OUTPATIENT
Start: 2023-10-06 | End: 2023-10-09 | Stop reason: HOSPADM

## 2023-10-06 RX ORDER — ASPIRIN 81 MG/1
81 TABLET, CHEWABLE ORAL DAILY
Status: DISCONTINUED | OUTPATIENT
Start: 2023-10-06 | End: 2023-10-09 | Stop reason: HOSPADM

## 2023-10-06 RX ORDER — CARVEDILOL 6.25 MG/1
6.25 TABLET ORAL 2 TIMES DAILY WITH MEALS
Status: DISCONTINUED | OUTPATIENT
Start: 2023-10-06 | End: 2023-10-06

## 2023-10-06 RX ORDER — CHOLESTYRAMINE LIGHT 4 G/5.7G
4 POWDER, FOR SUSPENSION ORAL
Status: DISCONTINUED | OUTPATIENT
Start: 2023-10-07 | End: 2023-10-09 | Stop reason: HOSPADM

## 2023-10-06 RX ORDER — ACETAMINOPHEN 325 MG/1
650 TABLET ORAL EVERY 6 HOURS PRN
Status: DISCONTINUED | OUTPATIENT
Start: 2023-10-06 | End: 2023-10-09 | Stop reason: HOSPADM

## 2023-10-06 RX ORDER — CITALOPRAM 20 MG/1
40 TABLET ORAL DAILY
Status: DISCONTINUED | OUTPATIENT
Start: 2023-10-06 | End: 2023-10-09 | Stop reason: HOSPADM

## 2023-10-06 RX ORDER — ENOXAPARIN SODIUM 100 MG/ML
30 INJECTION SUBCUTANEOUS 2 TIMES DAILY
Status: DISCONTINUED | OUTPATIENT
Start: 2023-10-06 | End: 2023-10-09 | Stop reason: HOSPADM

## 2023-10-06 RX ORDER — INSULIN LISPRO 100 [IU]/ML
0-4 INJECTION, SOLUTION INTRAVENOUS; SUBCUTANEOUS
Status: DISCONTINUED | OUTPATIENT
Start: 2023-10-06 | End: 2023-10-09 | Stop reason: HOSPADM

## 2023-10-06 RX ORDER — FUROSEMIDE 10 MG/ML
20 INJECTION INTRAMUSCULAR; INTRAVENOUS ONCE
Status: COMPLETED | OUTPATIENT
Start: 2023-10-06 | End: 2023-10-06

## 2023-10-06 RX ORDER — LOSARTAN POTASSIUM 100 MG/1
100 TABLET ORAL DAILY
Status: DISCONTINUED | OUTPATIENT
Start: 2023-10-06 | End: 2023-10-07

## 2023-10-06 RX ORDER — POTASSIUM CHLORIDE 20 MEQ/1
40 TABLET, EXTENDED RELEASE ORAL PRN
Status: DISCONTINUED | OUTPATIENT
Start: 2023-10-06 | End: 2023-10-09 | Stop reason: HOSPADM

## 2023-10-06 RX ORDER — INSULIN LISPRO 100 [IU]/ML
0-4 INJECTION, SOLUTION INTRAVENOUS; SUBCUTANEOUS NIGHTLY
Status: DISCONTINUED | OUTPATIENT
Start: 2023-10-06 | End: 2023-10-09 | Stop reason: HOSPADM

## 2023-10-06 RX ADMIN — ATORVASTATIN CALCIUM 80 MG: 80 TABLET, FILM COATED ORAL at 21:06

## 2023-10-06 RX ADMIN — GABAPENTIN 300 MG: 300 CAPSULE ORAL at 16:45

## 2023-10-06 RX ADMIN — GABAPENTIN 300 MG: 300 CAPSULE ORAL at 21:07

## 2023-10-06 RX ADMIN — ASPIRIN 81 MG CHEWABLE TABLET 81 MG: 81 TABLET CHEWABLE at 16:46

## 2023-10-06 RX ADMIN — HYDRALAZINE HYDROCHLORIDE 25 MG: 25 TABLET, FILM COATED ORAL at 21:07

## 2023-10-06 RX ADMIN — POTASSIUM CHLORIDE 40 MEQ: 1500 TABLET, EXTENDED RELEASE ORAL at 13:04

## 2023-10-06 RX ADMIN — CITALOPRAM HYDROBROMIDE 40 MG: 20 TABLET ORAL at 16:45

## 2023-10-06 RX ADMIN — FUROSEMIDE 40 MG: 10 INJECTION, SOLUTION INTRAMUSCULAR; INTRAVENOUS at 16:46

## 2023-10-06 RX ADMIN — INSULIN GLARGINE 60 UNITS: 100 INJECTION, SOLUTION SUBCUTANEOUS at 21:07

## 2023-10-06 RX ADMIN — Medication 6 MG: at 22:39

## 2023-10-06 RX ADMIN — Medication 10 ML: at 21:07

## 2023-10-06 RX ADMIN — ENOXAPARIN SODIUM 30 MG: 100 INJECTION SUBCUTANEOUS at 21:07

## 2023-10-06 RX ADMIN — PANTOPRAZOLE SODIUM 20 MG: 20 TABLET, DELAYED RELEASE ORAL at 16:45

## 2023-10-06 RX ADMIN — ACETAMINOPHEN 650 MG: 325 TABLET ORAL at 16:46

## 2023-10-06 RX ADMIN — FUROSEMIDE 20 MG: 10 INJECTION, SOLUTION INTRAMUSCULAR; INTRAVENOUS at 11:15

## 2023-10-06 RX ADMIN — CARVEDILOL 12.5 MG: 12.5 TABLET, FILM COATED ORAL at 16:46

## 2023-10-06 RX ADMIN — LOSARTAN POTASSIUM 100 MG: 100 TABLET, FILM COATED ORAL at 17:06

## 2023-10-06 RX ADMIN — INSULIN LISPRO 1 UNITS: 100 INJECTION, SOLUTION INTRAVENOUS; SUBCUTANEOUS at 17:07

## 2023-10-06 RX ADMIN — HYDRALAZINE HYDROCHLORIDE 25 MG: 25 TABLET, FILM COATED ORAL at 16:46

## 2023-10-06 ASSESSMENT — ENCOUNTER SYMPTOMS
SHORTNESS OF BREATH: 1
COUGH: 1

## 2023-10-06 ASSESSMENT — PAIN SCALES - GENERAL
PAINLEVEL_OUTOF10: 0
PAINLEVEL_OUTOF10: 3

## 2023-10-06 ASSESSMENT — PAIN DESCRIPTION - LOCATION: LOCATION: HEAD

## 2023-10-06 ASSESSMENT — PAIN DESCRIPTION - DESCRIPTORS: DESCRIPTORS: ACHING

## 2023-10-06 ASSESSMENT — PAIN DESCRIPTION - ORIENTATION: ORIENTATION: MID

## 2023-10-06 NOTE — PLAN OF CARE
Problem: Respiratory - Adult  Goal: Achieves optimal ventilation and oxygenation  Outcome: Progressing     Problem: Cardiovascular - Adult  Goal: Maintains optimal cardiac output and hemodynamic stability  Outcome: Progressing  Goal: Absence of cardiac dysrhythmias or at baseline  Outcome: Progressing     Problem: Metabolic/Fluid and Electrolytes - Adult  Goal: Electrolytes maintained within normal limits  Outcome: Progressing  Goal: Hemodynamic stability and optimal renal function maintained  Outcome: Progressing     Problem: Discharge Planning  Goal: Discharge to home or other facility with appropriate resources  Outcome: Progressing  Flowsheets (Taken 10/6/2023 1903)  Discharge to home or other facility with appropriate resources:   Identify barriers to discharge with patient and caregiver   Arrange for needed discharge resources and transportation as appropriate     Problem: Pain  Goal: Verbalizes/displays adequate comfort level or baseline comfort level  Outcome: Progressing     Problem: Safety - Adult  Goal: Free from fall injury  Outcome: Progressing     Problem: ABCDS Injury Assessment  Goal: Absence of physical injury  Outcome: Progressing

## 2023-10-06 NOTE — ED NOTES
Pt resting in bed at this time, laying in a supine position with head of bed elevated . Call light remains in reach instructed pt how to use, and encouraged pt to call if needed assistance, no distress noted. RR even and unlabored, skin warm and dry. No needs at this time. Will continue to monitor closely.        Mark Woodson RN  10/06/23 5766

## 2023-10-06 NOTE — ED NOTES
Report received from  Odalys Das RN at this time. Introduced self to pt, all needs met, call light within reach.        Juli Cornell RN  10/06/23 3128

## 2023-10-06 NOTE — ED NOTES
ED SBAR report provider to ANN, RN. Patient to be transported to Room 5123 via stretcher by transport tech. Patient transported with bedside cardiac monitor. IV site clean, dry, and intact. Updated patient on plan of care.        Tremaine Mcadams RN  10/06/23 5446

## 2023-10-06 NOTE — ED NOTES
Admitting hospitalist stated we can go ahead and give Coreg now. Waiting for pharmacy to verify. Will pass along in report.      Dearl DARRELL Uriostegui  10/06/23 9151

## 2023-10-06 NOTE — ED NOTES
Pt's BP was reading very high. On repeat, pt's BP was 208/67. Perfect Served admitting attending. No new orders at this time.      Juan Chaudhari, RN  10/06/23 [address], DARRELL  10/06/23 6014

## 2023-10-06 NOTE — ED PROVIDER NOTES
[address] [facility] [address] [address]      Pt Name: Phoebe Sesay  MRN: [id number]  [address] 1955  Date of evaluation: 10/6/2023  Provider: Danni Monique MD    CHIEF COMPLAINT       Chief Complaint   Patient presents with    Shortness of Breath     Pt states that she woke up this morning SOB and her sister told her \" I might have fluid on my lungs\" pt does not have history of CHF          HISTORY OF PRESENT ILLNESS   (Location/Symptom, Timing/Onset, Context/Setting, Quality, Duration, Modifying Factors, Severity)  Note limiting factors. Phoebe Sesay is a 69 y.o. female who presents to the emergency department with cough and shortness of breath for a day. HPI    This is a 67-year-old  female with migratory past medical history who developed a intermittently productive cough over the past couple of days and woke more short of breath this morning. She talked to her sister who said that she might have fluid on her lungs and she contacted EMS. She arrives via EMS on oxygen although it is unclear to me that she has an oxygen requirement. Denies any fevers chills just has been coughing with shortness of breath has been vaccinated for COVID not flow. She is speaking me in complete sentences without any difficulty or evidence of dyspnea. Nursing Notes were reviewed. REVIEW OF SYSTEMS    (2-9 systems for level 4, 10 or more for level 5)     Review of Systems   Constitutional:  Negative for chills and fever. Respiratory:  Positive for cough and shortness of breath. Cardiovascular:  Negative for chest pain. All other systems reviewed and are negative. Except as noted above the remainder of the review of systems was reviewed and negative.        PAST MEDICAL HISTORY     Past Medical History:   Diagnosis Date    Abnormal echocardiogram     25% on 3/11/14 and 50% on 3/19/14    Acute kidney injury superimposed on CKD ([address])

## 2023-10-06 NOTE — ED NOTES
Cardiology asked if pt could go for an echo before being taken upstairs. Miguel Villanueva RN to update her on current plan.      Jo Snider, RN  10/06/23 4550

## 2023-10-06 NOTE — DISCHARGE INSTRUCTIONS
Extra Heart Failure sites:     https://[employer]. [employer]/publication/?n=071848  --- this is American Heart Association Interactive Healthier Living with Heart Failure guidebook. Please click hyperlink or copy / paste link into search bar. Use your mouse to scroll through the pages. Lots of information about weight monitoring, diet tips, activity, meds, etc    HF Brooks meggan -- this is a free smart phone meggan available for iPhone and Android download. Use your phone to track sodium / fluid intake, zone tool symptom tracking, weights, medications, etc. Click on this hyperlink  HF Brooks Meggan   for QR code for easy download--.look for this in your meggan store                                          DASH (Dietary Approach to Stop Hypertension) diet --  [employer] -- this diet is a flexible eating plan that promotes heart healthy eating style. Click on hyperlink or copy / paste link into search bar. Lots of low sodium recipes and tips.     [employer].ca  -- more free recipes

## 2023-10-06 NOTE — ED NOTES
Pt arrived to the ED via EMS, pt states that she woke up this am SOBm pt denies chest pain, pt is 97% on room air, alert and orient, vss afebrile      Abimael Ovalles RN  10/06/23 8877

## 2023-10-06 NOTE — ED NOTES
Pt resting in bed at this time, laying in a supine position with head of bed elevated . Call light remains in reach instructed pt how to use, and encouraged pt to call if needed assistance, no distress noted. RR even and unlabored, skin warm and dry. No needs at this time. Will continue to monitor closely.        Cindy Arreguin RN  10/06/23 2433

## 2023-10-06 NOTE — H&P
07/18/2022 06:45 AM    BLOODU Negative 07/18/2022 06:45 AM    SPECGRAV 1.014 07/18/2022 06:45 AM    GLUCOSEU Negative 07/18/2022 06:45 AM       Radiology:     CXR: I have reviewed the CXR with the following interpretation: Pulmonary congestion  EKG:  I have reviewed the EKG with the following interpretation: No ST elevation    XR CHEST PORTABLE   Final Result   Cardiomegaly with mild vascular congestion. Consults:    IP CONSULT TO HOSPITALIST  IP CONSULT TO HEART FAILURE NURSE/COORDINATOR  IP CONSULT TO DIETITIAN  IP CONSULT TO CARDIOLOGY    ASSESSMENT:    Active Hospital Problems    Diagnosis Date Noted    Acute on chronic congestive heart failure with left ventricular diastolic dysfunction ([address]) [I50.33] 10/06/2023         PLAN:    Acute on chronic likely combined systolic and diastolic heart failure elevated BNP, minimally elevated troponin, borderline oxygen saturation admit to the hospital, IV Lasix started, continue beta-blockers, continue losartan, monitor creatinine closely, IV furosemide, repeat CBC CMP in a.m., echo ordered cardiology consulted, discussed with EDMD agree with plan to admit. Discussed plan with patient all questions answered  Minimally elevated troponin due to above flat no chest pain  CKD, monitor closely creatinine better than baseline, repeat CMP especially patient is on Lasix  Diabetes mellitus Long and short acting insulin diabetic diet  Essential hypertension, p.o. medications  Hypokalemia replace with p.o. supplement  DVT Prophylaxis: Lovenox  Diet: No diet orders on file  Code Status: Prior      Dispo -inpatient 2 to 3 days       Ericka Olguin MD    Thank you Shayna Zuluaga MD for the opportunity to be involved in this patient's care. If you have any questions or concerns please feel free to contact me at [phone].     Comment: Please note this report has been produced using speech recognition software and may contain errors related to that system including

## 2023-10-07 LAB
ANION GAP SERPL CALCULATED.3IONS-SCNC: 12 MMOL/L (ref 3–16)
BUN SERPL-MCNC: 22 MG/DL (ref 7–20)
CALCIUM SERPL-MCNC: 8 MG/DL (ref 8.3–10.6)
CHLORIDE SERPL-SCNC: 102 MMOL/L (ref 99–110)
CHOLEST SERPL-MCNC: 159 MG/DL (ref 0–199)
CO2 SERPL-SCNC: 25 MMOL/L (ref 21–32)
CREAT SERPL-MCNC: 1.7 MG/DL (ref 0.6–1.2)
DEPRECATED RDW RBC AUTO: 16.1 % (ref 12.4–15.4)
EST. AVERAGE GLUCOSE BLD GHB EST-MCNC: 125.5 MG/DL
FERRITIN SERPL IA-MCNC: 183.8 NG/ML (ref 15–150)
GFR SERPLBLD CREATININE-BSD FMLA CKD-EPI: 32 ML/MIN/{1.73_M2}
GLUCOSE BLD-MCNC: 144 MG/DL (ref 70–99)
GLUCOSE BLD-MCNC: 148 MG/DL (ref 70–99)
GLUCOSE BLD-MCNC: 192 MG/DL (ref 70–99)
GLUCOSE BLD-MCNC: 88 MG/DL (ref 70–99)
GLUCOSE SERPL-MCNC: 83 MG/DL (ref 70–99)
HBA1C MFR BLD: 6 %
HCT VFR BLD AUTO: 26.8 % (ref 36–48)
HDLC SERPL-MCNC: 44 MG/DL (ref 40–60)
HGB BLD-MCNC: 9 G/DL (ref 12–16)
IRON SATN MFR SERPL: 17 % (ref 15–50)
IRON SERPL-MCNC: 35 UG/DL (ref 37–145)
LDLC SERPL CALC-MCNC: 93 MG/DL
MAGNESIUM SERPL-MCNC: 1.2 MG/DL (ref 1.8–2.4)
MCH RBC QN AUTO: 29.8 PG (ref 26–34)
MCHC RBC AUTO-ENTMCNC: 33.5 G/DL (ref 31–36)
MCV RBC AUTO: 89 FL (ref 80–100)
NT-PROBNP SERPL-MCNC: 3922 PG/ML (ref 0–124)
PERFORMED ON: ABNORMAL
PERFORMED ON: NORMAL
PLATELET # BLD AUTO: 233 K/UL (ref 135–450)
PMV BLD AUTO: 10.8 FL (ref 5–10.5)
POTASSIUM SERPL-SCNC: 3.1 MMOL/L (ref 3.5–5.1)
RBC # BLD AUTO: 3.01 M/UL (ref 4–5.2)
SODIUM SERPL-SCNC: 139 MMOL/L (ref 136–145)
TIBC SERPL-MCNC: 210 UG/DL (ref 260–445)
TRIGL SERPL-MCNC: 109 MG/DL (ref 0–150)
VLDLC SERPL CALC-MCNC: 22 MG/DL
WBC # BLD AUTO: 8.9 K/UL (ref 4–11)

## 2023-10-07 PROCEDURE — 82728 ASSAY OF FERRITIN: CPT

## 2023-10-07 PROCEDURE — 2580000003 HC RX 258: Performed by: INTERNAL MEDICINE

## 2023-10-07 PROCEDURE — 6370000000 HC RX 637 (ALT 250 FOR IP): Performed by: INTERNAL MEDICINE

## 2023-10-07 PROCEDURE — 80061 LIPID PANEL: CPT

## 2023-10-07 PROCEDURE — 80048 BASIC METABOLIC PNL TOTAL CA: CPT

## 2023-10-07 PROCEDURE — 85027 COMPLETE CBC AUTOMATED: CPT

## 2023-10-07 PROCEDURE — 83550 IRON BINDING TEST: CPT

## 2023-10-07 PROCEDURE — 83540 ASSAY OF IRON: CPT

## 2023-10-07 PROCEDURE — 83880 ASSAY OF NATRIURETIC PEPTIDE: CPT

## 2023-10-07 PROCEDURE — 83735 ASSAY OF MAGNESIUM: CPT

## 2023-10-07 PROCEDURE — 6360000002 HC RX W HCPCS: Performed by: INTERNAL MEDICINE

## 2023-10-07 PROCEDURE — 94760 N-INVAS EAR/PLS OXIMETRY 1: CPT

## 2023-10-07 PROCEDURE — 36415 COLL VENOUS BLD VENIPUNCTURE: CPT

## 2023-10-07 PROCEDURE — 2060000000 HC ICU INTERMEDIATE R&B

## 2023-10-07 PROCEDURE — 84155 ASSAY OF PROTEIN SERUM: CPT

## 2023-10-07 PROCEDURE — 6370000000 HC RX 637 (ALT 250 FOR IP): Performed by: REGISTERED NURSE

## 2023-10-07 PROCEDURE — 84165 PROTEIN E-PHORESIS SERUM: CPT

## 2023-10-07 RX ORDER — LANOLIN ALCOHOL/MO/W.PET/CERES
400 CREAM (GRAM) TOPICAL 3 TIMES DAILY
Status: DISCONTINUED | OUTPATIENT
Start: 2023-10-07 | End: 2023-10-09 | Stop reason: HOSPADM

## 2023-10-07 RX ORDER — GABAPENTIN 100 MG/1
100 CAPSULE ORAL 3 TIMES DAILY
Status: DISCONTINUED | OUTPATIENT
Start: 2023-10-07 | End: 2023-10-09 | Stop reason: HOSPADM

## 2023-10-07 RX ADMIN — DIBASIC SODIUM PHOSPHATE, MONOBASIC POTASSIUM PHOSPHATE AND MONOBASIC SODIUM PHOSPHATE 1 TABLET: 852; 155; 130 TABLET ORAL at 06:02

## 2023-10-07 RX ADMIN — POTASSIUM CHLORIDE 40 MEQ: 1500 TABLET, EXTENDED RELEASE ORAL at 08:29

## 2023-10-07 RX ADMIN — CHOLESTYRAMINE 4 G: 4 POWDER, FOR SUSPENSION ORAL at 11:53

## 2023-10-07 RX ADMIN — HYDRALAZINE HYDROCHLORIDE 25 MG: 25 TABLET, FILM COATED ORAL at 13:36

## 2023-10-07 RX ADMIN — PANTOPRAZOLE SODIUM 20 MG: 20 TABLET, DELAYED RELEASE ORAL at 06:02

## 2023-10-07 RX ADMIN — GABAPENTIN 100 MG: 100 CAPSULE ORAL at 20:49

## 2023-10-07 RX ADMIN — HYDRALAZINE HYDROCHLORIDE 25 MG: 25 TABLET, FILM COATED ORAL at 04:32

## 2023-10-07 RX ADMIN — HYDRALAZINE HYDROCHLORIDE 25 MG: 25 TABLET, FILM COATED ORAL at 20:49

## 2023-10-07 RX ADMIN — FUROSEMIDE 40 MG: 10 INJECTION, SOLUTION INTRAMUSCULAR; INTRAVENOUS at 17:38

## 2023-10-07 RX ADMIN — Medication 10 ML: at 17:37

## 2023-10-07 RX ADMIN — ENOXAPARIN SODIUM 30 MG: 100 INJECTION SUBCUTANEOUS at 08:27

## 2023-10-07 RX ADMIN — MAGNESIUM SULFATE HEPTAHYDRATE 2000 MG: 40 INJECTION, SOLUTION INTRAVENOUS at 10:51

## 2023-10-07 RX ADMIN — ENOXAPARIN SODIUM 30 MG: 100 INJECTION SUBCUTANEOUS at 20:51

## 2023-10-07 RX ADMIN — LOSARTAN POTASSIUM 100 MG: 100 TABLET, FILM COATED ORAL at 08:29

## 2023-10-07 RX ADMIN — SODIUM CHLORIDE: 9 INJECTION, SOLUTION INTRAVENOUS at 08:35

## 2023-10-07 RX ADMIN — CARVEDILOL 12.5 MG: 12.5 TABLET, FILM COATED ORAL at 17:38

## 2023-10-07 RX ADMIN — CARVEDILOL 12.5 MG: 12.5 TABLET, FILM COATED ORAL at 08:29

## 2023-10-07 RX ADMIN — ATORVASTATIN CALCIUM 80 MG: 80 TABLET, FILM COATED ORAL at 20:49

## 2023-10-07 RX ADMIN — Medication 400 MG: at 15:36

## 2023-10-07 RX ADMIN — Medication 6 MG: at 20:52

## 2023-10-07 RX ADMIN — CITALOPRAM HYDROBROMIDE 40 MG: 20 TABLET ORAL at 08:28

## 2023-10-07 RX ADMIN — GABAPENTIN 300 MG: 300 CAPSULE ORAL at 13:36

## 2023-10-07 RX ADMIN — Medication 10 ML: at 20:51

## 2023-10-07 RX ADMIN — INSULIN GLARGINE 60 UNITS: 100 INJECTION, SOLUTION SUBCUTANEOUS at 20:51

## 2023-10-07 RX ADMIN — Medication 400 MG: at 20:49

## 2023-10-07 RX ADMIN — FUROSEMIDE 40 MG: 10 INJECTION, SOLUTION INTRAMUSCULAR; INTRAVENOUS at 08:27

## 2023-10-07 RX ADMIN — MAGNESIUM SULFATE HEPTAHYDRATE 2000 MG: 40 INJECTION, SOLUTION INTRAVENOUS at 08:37

## 2023-10-07 RX ADMIN — GABAPENTIN 300 MG: 300 CAPSULE ORAL at 08:28

## 2023-10-07 RX ADMIN — Medication 10 ML: at 08:26

## 2023-10-07 RX ADMIN — ASPIRIN 81 MG CHEWABLE TABLET 81 MG: 81 TABLET CHEWABLE at 08:29

## 2023-10-07 ASSESSMENT — PAIN SCALES - GENERAL
PAINLEVEL_OUTOF10: 0

## 2023-10-07 NOTE — PLAN OF CARE
Problem: Respiratory - Adult  Goal: Achieves optimal ventilation and oxygenation  Outcome: Progressing     Problem: Cardiovascular - Adult  Goal: Maintains optimal cardiac output and hemodynamic stability  Outcome: Progressing     Problem: Cardiovascular - Adult  Goal: Absence of cardiac dysrhythmias or at baseline  Outcome: Progressing     Problem: Metabolic/Fluid and Electrolytes - Adult  Goal: Electrolytes maintained within normal limits  Outcome: Progressing     Problem: Metabolic/Fluid and Electrolytes - Adult  Goal: Hemodynamic stability and optimal renal function maintained  Outcome: Progressing     Problem: Discharge Planning  Goal: Discharge to home or other facility with appropriate resources  Outcome: Progressing     Problem: Pain  Goal: Verbalizes/displays adequate comfort level or baseline comfort level  Outcome: Progressing     Problem: Safety - Adult  Goal: Free from fall injury  Outcome: Progressing     Problem: ABCDS Injury Assessment  Goal: Absence of physical injury  Outcome: Progressing

## 2023-10-08 ENCOUNTER — APPOINTMENT (OUTPATIENT)
Dept: GENERAL RADIOLOGY | Age: 68
DRG: 291 | End: 2023-10-08
Payer: MEDICARE

## 2023-10-08 LAB
ALBUMIN SERPL-MCNC: 3.3 G/DL (ref 3.4–5)
ANION GAP SERPL CALCULATED.3IONS-SCNC: 11 MMOL/L (ref 3–16)
BUN SERPL-MCNC: 25 MG/DL (ref 7–20)
CALCIUM SERPL-MCNC: 8.1 MG/DL (ref 8.3–10.6)
CHLORIDE SERPL-SCNC: 102 MMOL/L (ref 99–110)
CO2 SERPL-SCNC: 28 MMOL/L (ref 21–32)
CREAT SERPL-MCNC: 1.9 MG/DL (ref 0.6–1.2)
GFR SERPLBLD CREATININE-BSD FMLA CKD-EPI: 28 ML/MIN/{1.73_M2}
GLUCOSE BLD-MCNC: 137 MG/DL (ref 70–99)
GLUCOSE BLD-MCNC: 180 MG/DL (ref 70–99)
GLUCOSE BLD-MCNC: 242 MG/DL (ref 70–99)
GLUCOSE BLD-MCNC: 96 MG/DL (ref 70–99)
GLUCOSE SERPL-MCNC: 93 MG/DL (ref 70–99)
MAGNESIUM SERPL-MCNC: 2.4 MG/DL (ref 1.8–2.4)
NT-PROBNP SERPL-MCNC: 1791 PG/ML (ref 0–124)
PERFORMED ON: ABNORMAL
PERFORMED ON: NORMAL
PHOSPHATE SERPL-MCNC: 3.6 MG/DL (ref 2.5–4.9)
POTASSIUM SERPL-SCNC: 3.4 MMOL/L (ref 3.5–5.1)
SODIUM SERPL-SCNC: 141 MMOL/L (ref 136–145)

## 2023-10-08 PROCEDURE — 80069 RENAL FUNCTION PANEL: CPT

## 2023-10-08 PROCEDURE — 6370000000 HC RX 637 (ALT 250 FOR IP): Performed by: INTERNAL MEDICINE

## 2023-10-08 PROCEDURE — 83735 ASSAY OF MAGNESIUM: CPT

## 2023-10-08 PROCEDURE — 71045 X-RAY EXAM CHEST 1 VIEW: CPT

## 2023-10-08 PROCEDURE — 83880 ASSAY OF NATRIURETIC PEPTIDE: CPT

## 2023-10-08 PROCEDURE — 2580000003 HC RX 258: Performed by: INTERNAL MEDICINE

## 2023-10-08 PROCEDURE — 36415 COLL VENOUS BLD VENIPUNCTURE: CPT

## 2023-10-08 PROCEDURE — 94760 N-INVAS EAR/PLS OXIMETRY 1: CPT

## 2023-10-08 PROCEDURE — 6370000000 HC RX 637 (ALT 250 FOR IP): Performed by: NURSE PRACTITIONER

## 2023-10-08 PROCEDURE — 2060000000 HC ICU INTERMEDIATE R&B

## 2023-10-08 PROCEDURE — 6360000002 HC RX W HCPCS: Performed by: INTERNAL MEDICINE

## 2023-10-08 PROCEDURE — 6370000000 HC RX 637 (ALT 250 FOR IP): Performed by: REGISTERED NURSE

## 2023-10-08 RX ORDER — POTASSIUM CHLORIDE 20 MEQ/1
20 TABLET, EXTENDED RELEASE ORAL ONCE
Status: COMPLETED | OUTPATIENT
Start: 2023-10-08 | End: 2023-10-08

## 2023-10-08 RX ORDER — TORSEMIDE 20 MG/1
20 TABLET ORAL DAILY
Status: DISCONTINUED | OUTPATIENT
Start: 2023-10-08 | End: 2023-10-09 | Stop reason: HOSPADM

## 2023-10-08 RX ADMIN — ATORVASTATIN CALCIUM 80 MG: 80 TABLET, FILM COATED ORAL at 21:03

## 2023-10-08 RX ADMIN — PANTOPRAZOLE SODIUM 20 MG: 20 TABLET, DELAYED RELEASE ORAL at 05:42

## 2023-10-08 RX ADMIN — HYDRALAZINE HYDROCHLORIDE 5 MG: 20 INJECTION INTRAMUSCULAR; INTRAVENOUS at 17:15

## 2023-10-08 RX ADMIN — CARVEDILOL 12.5 MG: 12.5 TABLET, FILM COATED ORAL at 17:54

## 2023-10-08 RX ADMIN — GABAPENTIN 100 MG: 100 CAPSULE ORAL at 21:03

## 2023-10-08 RX ADMIN — Medication 6 MG: at 21:03

## 2023-10-08 RX ADMIN — Medication 10 ML: at 17:15

## 2023-10-08 RX ADMIN — POTASSIUM CHLORIDE 20 MEQ: 1500 TABLET, EXTENDED RELEASE ORAL at 13:51

## 2023-10-08 RX ADMIN — INSULIN GLARGINE 60 UNITS: 100 INJECTION, SOLUTION SUBCUTANEOUS at 21:14

## 2023-10-08 RX ADMIN — Medication 400 MG: at 21:03

## 2023-10-08 RX ADMIN — Medication 400 MG: at 13:50

## 2023-10-08 RX ADMIN — HYDRALAZINE HYDROCHLORIDE 25 MG: 25 TABLET, FILM COATED ORAL at 21:03

## 2023-10-08 RX ADMIN — ENOXAPARIN SODIUM 30 MG: 100 INJECTION SUBCUTANEOUS at 21:03

## 2023-10-08 RX ADMIN — GABAPENTIN 100 MG: 100 CAPSULE ORAL at 13:51

## 2023-10-08 RX ADMIN — CHOLESTYRAMINE 4 G: 4 POWDER, FOR SUSPENSION ORAL at 11:58

## 2023-10-08 RX ADMIN — CARVEDILOL 12.5 MG: 12.5 TABLET, FILM COATED ORAL at 08:52

## 2023-10-08 RX ADMIN — HYDRALAZINE HYDROCHLORIDE 25 MG: 25 TABLET, FILM COATED ORAL at 05:42

## 2023-10-08 RX ADMIN — GABAPENTIN 100 MG: 100 CAPSULE ORAL at 08:52

## 2023-10-08 RX ADMIN — HYDRALAZINE HYDROCHLORIDE 25 MG: 25 TABLET, FILM COATED ORAL at 13:51

## 2023-10-08 RX ADMIN — POTASSIUM CHLORIDE 40 MEQ: 1500 TABLET, EXTENDED RELEASE ORAL at 08:52

## 2023-10-08 RX ADMIN — Medication 10 ML: at 08:51

## 2023-10-08 RX ADMIN — TORSEMIDE 20 MG: 20 TABLET ORAL at 08:55

## 2023-10-08 RX ADMIN — ENOXAPARIN SODIUM 30 MG: 100 INJECTION SUBCUTANEOUS at 08:51

## 2023-10-08 RX ADMIN — Medication 10 ML: at 21:05

## 2023-10-08 RX ADMIN — CITALOPRAM HYDROBROMIDE 40 MG: 20 TABLET ORAL at 08:52

## 2023-10-08 RX ADMIN — Medication 400 MG: at 08:52

## 2023-10-08 RX ADMIN — ASPIRIN 81 MG CHEWABLE TABLET 81 MG: 81 TABLET CHEWABLE at 08:52

## 2023-10-08 ASSESSMENT — PAIN SCALES - GENERAL
PAINLEVEL_OUTOF10: 0

## 2023-10-08 NOTE — PLAN OF CARE
Problem: Respiratory - Adult  Goal: Achieves optimal ventilation and oxygenation  Outcome: Progressing     Problem: Cardiovascular - Adult  Goal: Maintains optimal cardiac output and hemodynamic stability  Outcome: Progressing  Flowsheets (Taken 10/8/2023 0237 by Silvia Cervantes RN)  Maintains optimal cardiac output and hemodynamic stability: Monitor blood pressure and heart rate     Problem: Cardiovascular - Adult  Goal: Absence of cardiac dysrhythmias or at baseline  Outcome: Progressing  Flowsheets (Taken 10/8/2023 0237 by Silvia Cervantes, RN)  Absence of cardiac dysrhythmias or at baseline: Monitor cardiac rate and rhythm     Problem: Metabolic/Fluid and Electrolytes - Adult  Goal: Electrolytes maintained within normal limits  Outcome: Progressing  Flowsheets (Taken 10/8/2023 0237 by Silvia Cervantes, RN)  Electrolytes maintained within normal limits: Monitor labs and assess patient for signs and symptoms of electrolyte imbalances     Problem: Metabolic/Fluid and Electrolytes - Adult  Goal: Hemodynamic stability and optimal renal function maintained  Outcome: Progressing  Flowsheets (Taken 10/8/2023 0237 by Silvia Cervantes, RN)  Hemodynamic stability and optimal renal function maintained: Monitor labs and assess for signs and symptoms of volume excess or deficit     Problem: Discharge Planning  Goal: Discharge to home or other facility with appropriate resources  Outcome: Progressing  Flowsheets (Taken 10/8/2023 0237 by Silvia Cervantes, RN)  Discharge to home or other facility with appropriate resources: Identify barriers to discharge with patient and caregiver     Problem: Pain  Goal: Verbalizes/displays adequate comfort level or baseline comfort level  Outcome: Progressing     Problem: Safety - Adult  Goal: Free from fall injury  Outcome: Progressing     Problem: ABCDS Injury Assessment  Goal: Absence of physical injury  Outcome: Progressing

## 2023-10-09 ENCOUNTER — APPOINTMENT (OUTPATIENT)
Dept: CT IMAGING | Age: 68
DRG: 291 | End: 2023-10-09
Payer: MEDICARE

## 2023-10-09 VITALS
BODY MASS INDEX: 40.57 KG/M2 | HEART RATE: 62 BPM | RESPIRATION RATE: 16 BRPM | OXYGEN SATURATION: 96 % | SYSTOLIC BLOOD PRESSURE: 180 MMHG | TEMPERATURE: 98.2 F | WEIGHT: 211.2 LBS | DIASTOLIC BLOOD PRESSURE: 56 MMHG

## 2023-10-09 LAB
ALBUMIN SERPL ELPH-MCNC: 2.5 G/DL (ref 3.1–4.9)
ALBUMIN SERPL-MCNC: 2.9 G/DL (ref 3.4–5)
ALPHA1 GLOB SERPL ELPH-MCNC: 0.3 G/DL (ref 0.2–0.4)
ALPHA2 GLOB SERPL ELPH-MCNC: 1 G/DL (ref 0.4–1.1)
ANION GAP SERPL CALCULATED.3IONS-SCNC: 12 MMOL/L (ref 3–16)
B-GLOBULIN SERPL ELPH-MCNC: 1.1 G/DL (ref 0.9–1.6)
BASOPHILS # BLD: 0 K/UL (ref 0–0.2)
BASOPHILS NFR BLD: 0.6 %
BUN SERPL-MCNC: 25 MG/DL (ref 7–20)
CALCIUM SERPL-MCNC: 8.4 MG/DL (ref 8.3–10.6)
CHLORIDE SERPL-SCNC: 103 MMOL/L (ref 99–110)
CO2 SERPL-SCNC: 25 MMOL/L (ref 21–32)
CREAT SERPL-MCNC: 1.7 MG/DL (ref 0.6–1.2)
DEPRECATED RDW RBC AUTO: 16.3 % (ref 12.4–15.4)
EOSINOPHIL # BLD: 0.3 K/UL (ref 0–0.6)
EOSINOPHIL NFR BLD: 4.2 %
GAMMA GLOB SERPL ELPH-MCNC: 0.9 G/DL (ref 0.6–1.8)
GFR SERPLBLD CREATININE-BSD FMLA CKD-EPI: 32 ML/MIN/{1.73_M2}
GLUCOSE BLD-MCNC: 131 MG/DL (ref 70–99)
GLUCOSE BLD-MCNC: 150 MG/DL (ref 70–99)
GLUCOSE BLD-MCNC: 81 MG/DL (ref 70–99)
GLUCOSE SERPL-MCNC: 75 MG/DL (ref 70–99)
HCT VFR BLD AUTO: 26.2 % (ref 36–48)
HGB BLD-MCNC: 8.7 G/DL (ref 12–16)
LYMPHOCYTES # BLD: 2.4 K/UL (ref 1–5.1)
LYMPHOCYTES NFR BLD: 31.1 %
MAGNESIUM SERPL-MCNC: 2.1 MG/DL (ref 1.8–2.4)
MCH RBC QN AUTO: 29.7 PG (ref 26–34)
MCHC RBC AUTO-ENTMCNC: 33.2 G/DL (ref 31–36)
MCV RBC AUTO: 89.5 FL (ref 80–100)
MONOCYTES # BLD: 0.6 K/UL (ref 0–1.3)
MONOCYTES NFR BLD: 7.9 %
NEUTROPHILS # BLD: 4.3 K/UL (ref 1.7–7.7)
NEUTROPHILS NFR BLD: 56.2 %
NT-PROBNP SERPL-MCNC: 1022 PG/ML (ref 0–124)
PERFORMED ON: ABNORMAL
PERFORMED ON: ABNORMAL
PERFORMED ON: NORMAL
PHOSPHATE SERPL-MCNC: 3.4 MG/DL (ref 2.5–4.9)
PLATELET # BLD AUTO: 248 K/UL (ref 135–450)
PMV BLD AUTO: 10.4 FL (ref 5–10.5)
POTASSIUM SERPL-SCNC: 3.6 MMOL/L (ref 3.5–5.1)
PROCALCITONIN SERPL IA-MCNC: 0.1 NG/ML (ref 0–0.15)
PROT SERPL-MCNC: 5.8 G/DL (ref 6.4–8.2)
RBC # BLD AUTO: 2.93 M/UL (ref 4–5.2)
SODIUM SERPL-SCNC: 140 MMOL/L (ref 136–145)
SPE/IFE INTERPRETATION: NORMAL
WBC # BLD AUTO: 7.7 K/UL (ref 4–11)

## 2023-10-09 PROCEDURE — 80069 RENAL FUNCTION PANEL: CPT

## 2023-10-09 PROCEDURE — 99222 1ST HOSP IP/OBS MODERATE 55: CPT | Performed by: INTERNAL MEDICINE

## 2023-10-09 PROCEDURE — 94760 N-INVAS EAR/PLS OXIMETRY 1: CPT

## 2023-10-09 PROCEDURE — 85025 COMPLETE CBC W/AUTO DIFF WBC: CPT

## 2023-10-09 PROCEDURE — 84145 PROCALCITONIN (PCT): CPT

## 2023-10-09 PROCEDURE — 6370000000 HC RX 637 (ALT 250 FOR IP): Performed by: INTERNAL MEDICINE

## 2023-10-09 PROCEDURE — 83735 ASSAY OF MAGNESIUM: CPT

## 2023-10-09 PROCEDURE — 2580000003 HC RX 258: Performed by: INTERNAL MEDICINE

## 2023-10-09 PROCEDURE — 36415 COLL VENOUS BLD VENIPUNCTURE: CPT

## 2023-10-09 PROCEDURE — 83880 ASSAY OF NATRIURETIC PEPTIDE: CPT

## 2023-10-09 PROCEDURE — 6370000000 HC RX 637 (ALT 250 FOR IP): Performed by: NURSE PRACTITIONER

## 2023-10-09 PROCEDURE — 6360000002 HC RX W HCPCS: Performed by: INTERNAL MEDICINE

## 2023-10-09 PROCEDURE — 71250 CT THORAX DX C-: CPT

## 2023-10-09 RX ORDER — TORSEMIDE 20 MG/1
20 TABLET ORAL DAILY
Qty: 30 TABLET | Refills: 0 | Status: SHIPPED | OUTPATIENT
Start: 2023-10-10

## 2023-10-09 RX ORDER — LABETALOL HYDROCHLORIDE 5 MG/ML
5 INJECTION, SOLUTION INTRAVENOUS ONCE
Status: COMPLETED | OUTPATIENT
Start: 2023-10-09 | End: 2023-10-09

## 2023-10-09 RX ORDER — HYDRALAZINE HYDROCHLORIDE 25 MG/1
25 TABLET, FILM COATED ORAL EVERY 8 HOURS SCHEDULED
Qty: 90 TABLET | Refills: 0 | Status: SHIPPED | OUTPATIENT
Start: 2023-10-09

## 2023-10-09 RX ORDER — CARVEDILOL 12.5 MG/1
12.5 TABLET ORAL 2 TIMES DAILY WITH MEALS
Qty: 60 TABLET | Refills: 0 | Status: SHIPPED | OUTPATIENT
Start: 2023-10-10

## 2023-10-09 RX ORDER — AZITHROMYCIN 500 MG/1
500 TABLET, FILM COATED ORAL DAILY
Status: DISCONTINUED | OUTPATIENT
Start: 2023-10-09 | End: 2023-10-09

## 2023-10-09 RX ADMIN — Medication 10 ML: at 09:53

## 2023-10-09 RX ADMIN — CARVEDILOL 12.5 MG: 12.5 TABLET, FILM COATED ORAL at 09:42

## 2023-10-09 RX ADMIN — GABAPENTIN 100 MG: 100 CAPSULE ORAL at 13:33

## 2023-10-09 RX ADMIN — CARVEDILOL 12.5 MG: 12.5 TABLET, FILM COATED ORAL at 17:49

## 2023-10-09 RX ADMIN — LABETALOL HYDROCHLORIDE 5 MG: 5 INJECTION, SOLUTION INTRAVENOUS at 17:10

## 2023-10-09 RX ADMIN — Medication 400 MG: at 09:42

## 2023-10-09 RX ADMIN — HYDRALAZINE HYDROCHLORIDE 25 MG: 25 TABLET, FILM COATED ORAL at 13:33

## 2023-10-09 RX ADMIN — PANTOPRAZOLE SODIUM 20 MG: 20 TABLET, DELAYED RELEASE ORAL at 06:08

## 2023-10-09 RX ADMIN — HYDRALAZINE HYDROCHLORIDE 25 MG: 25 TABLET, FILM COATED ORAL at 06:08

## 2023-10-09 RX ADMIN — ENOXAPARIN SODIUM 30 MG: 100 INJECTION SUBCUTANEOUS at 09:43

## 2023-10-09 RX ADMIN — Medication 400 MG: at 13:33

## 2023-10-09 RX ADMIN — GABAPENTIN 100 MG: 100 CAPSULE ORAL at 09:42

## 2023-10-09 RX ADMIN — TORSEMIDE 20 MG: 20 TABLET ORAL at 09:42

## 2023-10-09 RX ADMIN — CITALOPRAM HYDROBROMIDE 40 MG: 20 TABLET ORAL at 09:42

## 2023-10-09 RX ADMIN — ASPIRIN 81 MG CHEWABLE TABLET 81 MG: 81 TABLET CHEWABLE at 09:42

## 2023-10-09 ASSESSMENT — PAIN SCALES - WONG BAKER
WONGBAKER_NUMERICALRESPONSE: 0
WONGBAKER_NUMERICALRESPONSE: 0

## 2023-10-09 NOTE — PLAN OF CARE
Problem: Respiratory - Adult  Goal: Achieves optimal ventilation and oxygenation  Outcome: Progressing     Problem: Cardiovascular - Adult  Goal: Maintains optimal cardiac output and hemodynamic stability  Outcome: Progressing     Problem: Cardiovascular - Adult  Goal: Absence of cardiac dysrhythmias or at baseline  Outcome: Progressing     Problem: Metabolic/Fluid and Electrolytes - Adult  Goal: Electrolytes maintained within normal limits  Outcome: Progressing     Problem: Metabolic/Fluid and Electrolytes - Adult  Goal: Hemodynamic stability and optimal renal function maintained  Outcome: Progressing     Problem: Discharge Planning  Goal: Discharge to home or other facility with appropriate resources  Outcome: Progressing     Problem: ABCDS Injury Assessment  Goal: Absence of physical injury  Outcome: Progressing

## 2023-10-09 NOTE — CARE COORDINATION
Case Management Assessment  Initial Evaluation    Date/Time of Evaluation: 10/9/2023 11:58 AM  Assessment Completed by: Carlos Liao    If patient is discharged prior to next notation, then this note serves as note for discharge by case management. Patient Name: Tyrone Magdaleno                   YOB: 1955  Diagnosis: Hypoxia [R09.02]  Acute on chronic congestive heart failure with left ventricular diastolic dysfunction (HCC) [I50.33]  Congestive heart failure, unspecified HF chronicity, unspecified heart failure type ([address]) [I50.9]                   Date / Time: 10/6/2023 10:25 AM    Patient Admission Status: Inpatient   Readmission Risk (Low < 19, Mod (19-27), High > 27): Readmission Risk Score: 21.5    Current PCP: Theopolis Phoenix, MD  PCP verified by CM? Yes    Chart Reviewed: Yes      History Provided by: Patient  Patient Orientation: Alert and Oriented    Patient Cognition: Alert    Hospitalization in the last 30 days (Readmission):  No    If yes, Readmission Assessment in CM Navigator will be completed. Advance Directives:      Code Status: Full Code   Patient's Primary Decision Maker is: Named in [facility] E [facility]     Primary Decision MakerEgt Gupta Child - [phone]    Secondary Decision Maker: skinny moon  Child - [phone]    Discharge Planning:    Patient lives with: Alone Type of Home: Apartment  Primary Care Giver: Self  Patient Support Systems include: Family Members   Current Financial resources: Medicare  Current community resources: None  Current services prior to admission: Durable Medical Equipment            Current DME: Cane, Cpap            Type of Home Care services:  None    ADLS  Prior functional level: Independent in ADLs/IADLs  Current functional level: Independent in ADLs/IADLs    PT AM-PAC:   /24  OT AM-PAC:   /24    Family can provide assistance at DC:  Yes  Would you like Case Management to discuss the discharge plan with any other

## 2023-10-09 NOTE — ACP (ADVANCE CARE PLANNING)
Advance Care Planning     Advance Care Planning Activator (Inpatient)  Conversation Note      Date of ACP Conversation: 10/9/2023     Conversation Conducted with: Patient with Decision Making Capacity    ACP Activator: Tej Roldan Decision Maker:     Current Designated Health Care Decision Maker:     Primary Decision Maker: Rigoberto Schumacher Child - [phone]    Secondary Decision Maker: Sriram Samantha Child - [phone]    Care Preferences    Ventilation: \"If you were in your present state of health and suddenly became very ill and were unable to breathe on your own, what would your preference be about the use of a ventilator (breathing machine) if it were available to you? \"      Would the patient desire the use of ventilator (breathing machine)?: yes    \"If your health worsens and it becomes clear that your chance of recovery is unlikely, what would your preference be about the use of a ventilator (breathing machine) if it were available to you? \"     Would the patient desire the use of ventilator (breathing machine)?: No      Resuscitation  \"CPR works best to restart the heart when there is a sudden event, like a heart attack, in someone who is otherwise healthy. Unfortunately, CPR does not typically restart the heart for people who have serious health conditions or who are very sick. \"    \"In the event your heart stopped as a result of an underlying serious health condition, would you want attempts to be made to restart your heart (answer \"yes\" for attempt to resuscitate) or would you prefer a natural death (answer \"no\" for do not attempt to resuscitate)? \" yes       [] Yes   [x] No   Educated Patient / Angelic Deems regarding differences between Advance Directives and portable DNR orders.     Length of ACP Conversation in minutes:      Conversation Outcomes:  ACP discussion completed    Follow-up plan:    [] Schedule follow-up conversation to continue planning  [] Referred individual to

## 2023-10-10 ENCOUNTER — CARE COORDINATION (OUTPATIENT)
Dept: CASE MANAGEMENT | Age: 68
End: 2023-10-10

## 2023-10-10 NOTE — CARE COORDINATION
Care Transitions Initial Follow Up Call    Call within 2 business days of discharge: Yes    Patient Current Location:  Home: [address] [place] [address] [address] contacted the patient by telephone to perform post hospital discharge assessment. Verified name and  with patient as identifiers. Provided introduction to self, and explanation of the LPN Care Coordinator role. Patient: Tyrone Magdaleno Patient : 1955   MRN: [id number]  Reason for Admission: CHF  Discharge Date: 10/9/23 RARS: Readmission Risk Score: 21.5      Last Discharge Facility       Date Complaint Diagnosis Description Type Department Provider    10/6/23 Shortness of Breath Congestive heart failure, unspecified HF chronicity, unspecified heart failure type ([address] . .. ED to Hosp-Admission (Discharged) (ADMITTED) WSWILLIAM 5W Gallo Moreno MD; Eric Pac. .. Was this an external facility discharge? No Discharge Facility: [facility]    Patient answered call. She stated she was getting ready to shower. Can she call back and hung up the phone. Will try again if patient doesn't return call.             Care Transitions 24 Hour Call    Do you have all of your prescriptions and are they filled?: Yes  Care Transitions Interventions                                   Follow Up  Future Appointments   Date Time Provider [address]   10/11/2023  3:00 PM DO YUKO [facility]   10/12/2023  3:30 PM MD BA Henderson NEPH [facility] Nephrolo   2023  1:00 PM Lavinia Culver  [facility]  Care Coordinator  [phone]

## 2023-10-11 ENCOUNTER — TELEPHONE (OUTPATIENT)
Dept: PRIMARY CARE CLINIC | Age: 68
End: 2023-10-11

## 2023-10-11 ENCOUNTER — TELEPHONE (OUTPATIENT)
Dept: CARDIOLOGY CLINIC | Age: 68
End: 2023-10-11

## 2023-10-11 ENCOUNTER — CARE COORDINATION (OUTPATIENT)
Dept: CASE MANAGEMENT | Age: 68
End: 2023-10-11

## 2023-10-11 NOTE — TELEPHONE ENCOUNTER
Que Maldonado called in to cancel her appt. Today because she is not feeling well. I tried to reschedule her but it was in Jan. Can you find her a sooner appt.       Que Maldonado can be reached at [phone]

## 2023-10-11 NOTE — CARE COORDINATION
Care Transitions Outreach Attempt    Call within 2 business days of discharge: Yes     Second and final outreach call attempt, no answer. CTN left VM with contact information and request for return call. CTN will resolve episode and remain available. Patient: Merced Márquez Patient : 1955 MRN: [id number]    Last Discharge Facility       Date Complaint Diagnosis Description Type Department Provider    10/6/23 Shortness of Breath Congestive heart failure, unspecified HF chronicity, unspecified heart failure type ([address]) . .. ED to Hosp-Admission (Discharged) (ADMITTED) JINNY 5W Terry Mejias MD; Ginny Gudino. .. Was this an external facility discharge? No Discharge Facility Name: [facility]    Noted following upcoming appointments from discharge chart review:   [facility] follow up appointment(s):   Future Appointments   Date Time Provider [address]   10/11/2023  3:00 PM Stew Molina DO [facility]   10/12/2023  3:30 PM Darrell Cuba MD [facility] NEPH [facility] Nephrolo   2023  1:00 PM Mariel Culver MD Connie Neas RD [facility] [place] - DYD     Pt has a HFU with [facility] Single 10/11. Per Chart PCP office has reached out to pt to get a HFU scheduled.    Non-[facility]  follow up appointment(s): n/a    CONRAD Limon, RN   Care Transition Nurse  Mobile: [phone]

## 2023-10-12 ENCOUNTER — FOLLOWUP TELEPHONE ENCOUNTER (OUTPATIENT)
Dept: ADMINISTRATIVE | Age: 68
End: 2023-10-12

## 2023-10-12 NOTE — PROGRESS NOTES
Attempted to reach patient for 72 hour follow up call without success. Voicemail full. Care transition nurse has attempted to reach patient x2 for a total of three attempts.

## 2023-10-18 ENCOUNTER — OFFICE VISIT (OUTPATIENT)
Dept: CARDIOLOGY CLINIC | Age: 68
End: 2023-10-18

## 2023-10-18 VITALS
OXYGEN SATURATION: 95 % | HEART RATE: 72 BPM | SYSTOLIC BLOOD PRESSURE: 144 MMHG | BODY MASS INDEX: 38.99 KG/M2 | DIASTOLIC BLOOD PRESSURE: 70 MMHG | WEIGHT: 203 LBS

## 2023-10-18 DIAGNOSIS — I50.30 DIASTOLIC CONGESTIVE HEART FAILURE, UNSPECIFIED HF CHRONICITY (HCC): Primary | ICD-10-CM

## 2023-10-18 PROCEDURE — 1123F ACP DISCUSS/DSCN MKR DOCD: CPT | Performed by: INTERNAL MEDICINE

## 2023-10-18 PROCEDURE — 3074F SYST BP LT 130 MM HG: CPT | Performed by: INTERNAL MEDICINE

## 2023-10-18 PROCEDURE — 99214 OFFICE O/P EST MOD 30 MIN: CPT | Performed by: INTERNAL MEDICINE

## 2023-10-18 PROCEDURE — 3078F DIAST BP <80 MM HG: CPT | Performed by: INTERNAL MEDICINE

## 2023-10-18 NOTE — PATIENT INSTRUCTIONS
We will defer to your Nephrologist (Kidney Doctor) Conrad Mason MD for management of your blood pressure medications and diuretics (torsemide). Weight your self daily at same time of day upon rising after urinating and keep log of your weights. Call our office at [phone] if you have a 3 lbs weight gain in 24 hours or 5 lbs in one week. 203 lbs is your baseline dry weight. It is recommended that you follow a low sodium diet <2,000 mg daily. I have provided additional education material on low sodium diet and how to read food labels to help you identify the sodium content in the foods you eat. I have given you additional educational material on home monitoring of blood pressure. Keep a log of your blood pressure and call your nephrologist or our office with elevated readings if consistently running  over 150/90.

## 2023-10-18 NOTE — PROGRESS NOTES
10/18/2024    PATIENT: Cassi Olivier  : 1955    Primary Care Provider:   Oliver Lopez MD  J:[phone]  Y:[phone]    Reason for evaluation:   Hospital follow up     History of present illness:  Ms. Cassi Olivier is a 69 y.o. female patient here in close hospital follow up for diastolic CHF. Kati has tolerated medications at home without side effects. Reports home weight of 203 lbs to be without shortness of breath or edema. \"Bowels have regulated\" since recurrent C. Diff. She is looking forward to spending her birthday with family in [place], [place] for 2 weeks. One of her sisters is a nurse. She plans to monitor weight and blood pressure.      Medical History:      Diagnosis Date    Abnormal echocardiogram     25% on 3/11/14 and 50% on 3/19/14    Acute kidney injury superimposed on CKD ([address]) 2022    Acute respiratory failure ([address]) 2022    Acute respiratory failure with hypoxia ([facility]) 2022    Back pain     Cardiomyopathy ([address])     EF was 50% on 3/19/14    Cataract     per Dr. Dionne Patricio- DM eye exam report- 22  [facility]    Chipped tooth     lower left    CKD (chronic kidney disease) stage 3, GFR 30-59 ml/min ([facility]) 2018    Clostridium difficile diarrhea 2021    Dental crown present     veneers    Depressive disorder 2014    Diabetic infection of right foot ([address]) 04/15/2015    Diabetic retinopathy ([address])     per Dr Dionne Patricio- [facility] note 22    Diabetic ulcer of toe of left foot associated with diabetes mellitus due to underlying condition, with fat layer exposed ([address]) 2018    Diabetic ulcer of toe of left foot associated with type 2 diabetes mellitus, with fat layer exposed ([address]) 04/10/2018    Pt slipped in hot tub latter part of February and has not healed since despite topical treatment    DVT (deep venous thrombosis) ([facility])     Glaucoma     per Dr Dionne Patricio- DM eye exam report-  22- [facility]    HTN (hypertension)     Hx of

## 2023-10-26 ENCOUNTER — PATIENT MESSAGE (OUTPATIENT)
Dept: PRIMARY CARE CLINIC | Age: 68
End: 2023-10-26

## 2023-11-01 PROBLEM — M51.36 DEGENERATION OF INTERVERTEBRAL DISC OF LUMBAR REGION: Status: ACTIVE | Noted: 2022-03-28

## 2023-11-01 PROBLEM — M51.369 DEGENERATION OF INTERVERTEBRAL DISC OF LUMBAR REGION: Status: ACTIVE | Noted: 2022-03-28

## 2023-11-09 ENCOUNTER — OFFICE VISIT (OUTPATIENT)
Dept: PRIMARY CARE CLINIC | Age: 68
End: 2023-11-09
Payer: MEDICARE

## 2023-11-09 DIAGNOSIS — I10 ESSENTIAL HYPERTENSION: Chronic | ICD-10-CM

## 2023-11-09 DIAGNOSIS — I50.32 CHRONIC DIASTOLIC CHF (CONGESTIVE HEART FAILURE) (HCC): Primary | ICD-10-CM

## 2023-11-09 DIAGNOSIS — N18.32 STAGE 3B CHRONIC KIDNEY DISEASE (HCC): ICD-10-CM

## 2023-11-09 DIAGNOSIS — E11.21 TYPE 2 DIABETES MELLITUS WITH NEPHROPATHY (HCC): ICD-10-CM

## 2023-11-09 DIAGNOSIS — G47.33 OSA (OBSTRUCTIVE SLEEP APNEA): ICD-10-CM

## 2023-11-09 PROCEDURE — 99214 OFFICE O/P EST MOD 30 MIN: CPT | Performed by: FAMILY MEDICINE

## 2023-11-09 PROCEDURE — 3044F HG A1C LEVEL LT 7.0%: CPT | Performed by: FAMILY MEDICINE

## 2023-11-09 PROCEDURE — 1123F ACP DISCUSS/DSCN MKR DOCD: CPT | Performed by: FAMILY MEDICINE

## 2023-11-09 RX ORDER — CARVEDILOL 25 MG/1
25 TABLET ORAL 2 TIMES DAILY
Qty: 180 TABLET | Refills: 1 | Status: SHIPPED | OUTPATIENT
Start: 2023-11-09

## 2023-11-09 RX ORDER — ACYCLOVIR 400 MG/1
TABLET ORAL
Qty: 6 EACH | Refills: 1 | Status: SHIPPED | OUTPATIENT
Start: 2023-11-09

## 2023-11-09 RX ORDER — ACYCLOVIR 400 MG/1
TABLET ORAL
Qty: 1 EACH | Refills: 0 | Status: SHIPPED | OUTPATIENT
Start: 2023-11-09

## 2023-11-09 SDOH — ECONOMIC STABILITY: INCOME INSECURITY: HOW HARD IS IT FOR YOU TO PAY FOR THE VERY BASICS LIKE FOOD, HOUSING, MEDICAL CARE, AND HEATING?: NOT HARD AT ALL

## 2023-11-09 SDOH — ECONOMIC STABILITY: TRANSPORTATION INSECURITY
IN THE PAST 12 MONTHS, HAS LACK OF TRANSPORTATION KEPT YOU FROM MEETINGS, WORK, OR FROM GETTING THINGS NEEDED FOR DAILY LIVING?: NO

## 2023-11-09 SDOH — ECONOMIC STABILITY: FOOD INSECURITY: WITHIN THE PAST 12 MONTHS, YOU WORRIED THAT YOUR FOOD WOULD RUN OUT BEFORE YOU GOT MONEY TO BUY MORE.: NEVER TRUE

## 2023-11-09 SDOH — ECONOMIC STABILITY: HOUSING INSECURITY
IN THE LAST 12 MONTHS, WAS THERE A TIME WHEN YOU DID NOT HAVE A STEADY PLACE TO SLEEP OR SLEPT IN A SHELTER (INCLUDING NOW)?: NO

## 2023-11-09 SDOH — ECONOMIC STABILITY: FOOD INSECURITY: WITHIN THE PAST 12 MONTHS, THE FOOD YOU BOUGHT JUST DIDN'T LAST AND YOU DIDN'T HAVE MONEY TO GET MORE.: NEVER TRUE

## 2023-11-09 ASSESSMENT — ENCOUNTER SYMPTOMS
ABDOMINAL PAIN: 0
CONSTIPATION: 0
DIARRHEA: 0
SHORTNESS OF BREATH: 0
BLOOD IN STOOL: 0

## 2023-11-10 ENCOUNTER — TELEPHONE (OUTPATIENT)
Dept: ADMINISTRATIVE | Age: 68
End: 2023-11-10

## 2023-11-10 NOTE — TELEPHONE ENCOUNTER
Submitted PA for DEXCOM G7 SENSOR  Via CM (Key: [id number]) STATUS: PENDING. Cmm did not ask any add'l questions.       Follow up done daily; if no response in three days we will refax for status check.  If another three days goes by with no response we will call the insurance for status.

## 2023-11-13 ENCOUNTER — TELEPHONE (OUTPATIENT)
Dept: ADMINISTRATIVE | Age: 68
End: 2023-11-13

## 2023-11-13 NOTE — TELEPHONE ENCOUNTER
Submitted PA for DEXCOM G7  DEVICE  [employer]ia [employer] Key: ([id number]) STATUS: PENDING.    Follow up done daily; if no response in three days we will refax for status check.  If another three days goes by with no response we will call the insurance for status.

## 2023-11-13 NOTE — TELEPHONE ENCOUNTER
The medication was DENIED; DENIAL letter uploaded to MEDIA.    Patient would need to have an unsuccessful trial of freestyle sam line.    If you want an APPEAL; please note in this encounter what new information you would like to APPEAL with.  Once complete route back to PA POOL.    If this requires a response please respond to the pool ( P MHCX PSC MEDICATION PRE-AUTH).      Thank you please advise patient.

## 2023-11-14 ENCOUNTER — TELEPHONE (OUTPATIENT)
Dept: PRIMARY CARE CLINIC | Age: 68
End: 2023-11-14

## 2023-11-14 NOTE — TELEPHONE ENCOUNTER
Patient would like refill on medication torsemide (DEMADEX) 20 MG tablet it was reviewed on visit 11/9/23; also patient stated that she sent a message to pcp on mychart; please respond with a call back [phone]

## 2023-11-15 DIAGNOSIS — I50.23 ACUTE ON CHRONIC SYSTOLIC CONGESTIVE HEART FAILURE (HCC): ICD-10-CM

## 2023-11-15 DIAGNOSIS — K21.9 CHRONIC GERD: ICD-10-CM

## 2023-11-15 RX ORDER — METOLAZONE 5 MG/1
5 TABLET ORAL EVERY MORNING
Qty: 30 TABLET | Refills: 5 | OUTPATIENT
Start: 2023-11-15

## 2023-11-15 RX ORDER — PANTOPRAZOLE SODIUM 40 MG/1
40 TABLET, DELAYED RELEASE ORAL DAILY
Qty: 90 TABLET | Refills: 1 | OUTPATIENT
Start: 2023-11-15

## 2023-11-15 RX ORDER — TIRZEPATIDE 5 MG/.5ML
5 INJECTION, SOLUTION SUBCUTANEOUS WEEKLY
Qty: 2 ML | Refills: 3 | Status: SHIPPED | OUTPATIENT
Start: 2023-11-15

## 2023-11-15 RX ORDER — PANTOPRAZOLE SODIUM 20 MG/1
20 TABLET, DELAYED RELEASE ORAL DAILY
Qty: 90 TABLET | Refills: 1 | Status: SHIPPED | OUTPATIENT
Start: 2023-11-15

## 2023-11-15 RX ORDER — INSULIN GLARGINE 100 [IU]/ML
INJECTION, SOLUTION SUBCUTANEOUS
Qty: 81 ML | Refills: 1 | Status: SHIPPED | OUTPATIENT
Start: 2023-11-15

## 2023-11-15 NOTE — TELEPHONE ENCOUNTER
Per OV note 12/21/22:    6. Chronic GERD  Underlying chronic kidney disease will reduce Protonix from 40 mg to 20 mg once a day as needed      Has been ordered as 20 mg since then. Will refuse request for 40 mg and order 20 instead.

## 2023-11-17 NOTE — TELEPHONE ENCOUNTER
The medication was DENIED; DENIAL letter uploaded to MEDIA.    Must have had an unsuccesful trial with Freestyle sam line.     If you want an APPEAL; please note in this encounter what new information you would like to APPEAL with.  Once complete route back to PA POOL.    If this requires a response please respond to the pool ( P MHCX PSC MEDICATION PRE-AUTH).      Thank you please advise patient.

## 2023-11-17 NOTE — TELEPHONE ENCOUNTER
Did followup. This is a secondary plan for patient. Should be resubmitted thru their primary.     Submitted PA for DEXCOM G7 SENSOR  Via CM  (Key: [id number]) STATUS: PENDING.    Follow up done daily; if no response in three days we will refax for status check.  If another three days goes by with no response we will call the insurance for status.

## 2023-11-27 DIAGNOSIS — E11.22 TYPE 2 DIABETES MELLITUS WITH STAGE 4 CHRONIC KIDNEY DISEASE, WITH LONG-TERM CURRENT USE OF INSULIN (HCC): Primary | ICD-10-CM

## 2023-11-27 DIAGNOSIS — Z79.4 TYPE 2 DIABETES MELLITUS WITH STAGE 4 CHRONIC KIDNEY DISEASE, WITH LONG-TERM CURRENT USE OF INSULIN (HCC): Primary | ICD-10-CM

## 2023-11-27 DIAGNOSIS — N18.4 TYPE 2 DIABETES MELLITUS WITH STAGE 4 CHRONIC KIDNEY DISEASE, WITH LONG-TERM CURRENT USE OF INSULIN (HCC): Primary | ICD-10-CM

## 2023-11-28 ENCOUNTER — HOSPITAL ENCOUNTER (EMERGENCY)
Age: 68
Discharge: HOME | End: 2023-11-28
Attending: EMERGENCY MEDICINE
Payer: MEDICARE

## 2023-11-28 ENCOUNTER — APPOINTMENT (OUTPATIENT)
Dept: GENERAL RADIOLOGY | Age: 68
End: 2023-11-28
Payer: MEDICARE

## 2023-11-28 VITALS
RESPIRATION RATE: 18 BRPM | SYSTOLIC BLOOD PRESSURE: 151 MMHG | HEIGHT: 63 IN | DIASTOLIC BLOOD PRESSURE: 63 MMHG | BODY MASS INDEX: 36.05 KG/M2 | HEART RATE: 65 BPM | TEMPERATURE: 98.1 F | OXYGEN SATURATION: 97 % | WEIGHT: 203.48 LBS

## 2023-11-28 DIAGNOSIS — M54.50 CHRONIC RIGHT-SIDED LOW BACK PAIN, UNSPECIFIED WHETHER SCIATICA PRESENT: Primary | ICD-10-CM

## 2023-11-28 DIAGNOSIS — G89.29 CHRONIC RIGHT-SIDED LOW BACK PAIN, UNSPECIFIED WHETHER SCIATICA PRESENT: Primary | ICD-10-CM

## 2023-11-28 PROCEDURE — 99283 EMERGENCY DEPT VISIT LOW MDM: CPT

## 2023-11-28 PROCEDURE — 6370000000 HC RX 637 (ALT 250 FOR IP)

## 2023-11-28 PROCEDURE — 71046 X-RAY EXAM CHEST 2 VIEWS: CPT

## 2023-11-28 RX ORDER — ACETAMINOPHEN 325 MG/1
650 TABLET ORAL ONCE
Status: COMPLETED | OUTPATIENT
Start: 2023-11-28 | End: 2023-11-28

## 2023-11-28 RX ORDER — LIDOCAINE 4 G/G
1 PATCH TOPICAL DAILY
Status: DISCONTINUED | OUTPATIENT
Start: 2023-11-28 | End: 2023-11-28 | Stop reason: HOSPADM

## 2023-11-28 RX ORDER — LIDOCAINE 50 MG/G
1 PATCH TOPICAL DAILY
Qty: 10 PATCH | Refills: 0 | Status: SHIPPED | OUTPATIENT
Start: 2023-11-28 | End: 2023-12-08

## 2023-11-28 RX ADMIN — ACETAMINOPHEN 650 MG: 325 TABLET ORAL at 14:14

## 2023-11-28 ASSESSMENT — ENCOUNTER SYMPTOMS
COUGH: 0
CHEST TIGHTNESS: 0
ABDOMINAL PAIN: 0
VOMITING: 0
BACK PAIN: 1
SHORTNESS OF BREATH: 0
NAUSEA: 0

## 2023-11-28 ASSESSMENT — PAIN SCALES - GENERAL
PAINLEVEL_OUTOF10: 5
PAINLEVEL_OUTOF10: 5

## 2023-11-28 ASSESSMENT — LIFESTYLE VARIABLES
HOW OFTEN DO YOU HAVE A DRINK CONTAINING ALCOHOL: MONTHLY OR LESS
HOW MANY STANDARD DRINKS CONTAINING ALCOHOL DO YOU HAVE ON A TYPICAL DAY: 1 OR 2

## 2023-11-28 ASSESSMENT — PAIN DESCRIPTION - LOCATION: LOCATION: BACK

## 2023-11-28 ASSESSMENT — PAIN - FUNCTIONAL ASSESSMENT: PAIN_FUNCTIONAL_ASSESSMENT: 0-10

## 2023-11-28 ASSESSMENT — PAIN DESCRIPTION - PAIN TYPE: TYPE: ACUTE PAIN

## 2023-11-28 ASSESSMENT — PAIN DESCRIPTION - ORIENTATION: ORIENTATION: RIGHT

## 2023-11-28 NOTE — ED TRIAGE NOTES
Pt into ER from home with c/c Right side back pain for weeks, started with a cough. Pain is positional.  Pt took ibuprofen last night with some relief.

## 2023-11-28 NOTE — DISCHARGE INSTRUCTIONS
Your Chest x-ray showed no abnormalities. It looks like you have a back strain. We are sending you lidocaine patches please do not use heat while using the patch since this can cause burning to the skin. Take Tylenol as needed for the pain, we talked about ibuprofen very sporadically as it has risks but can be very helpful for pain. A referral for physical therapy was placed, please call the number and establish care. Return if any new or worsening of symptoms.

## 2023-11-28 NOTE — ED PROVIDER NOTES
[address] [facility] [address] [address]      Pt Name: Merced Márquez  MRN: [id number]  [address] [facility] 1955  Date of evaluation: 11/28/2023  Provider: Mary Ann Garcia MD  PCP: Wanda Rodgers MD  Note Started: 3:00 PM EST 11/28/23    ED Attending Attestation Note     [address]       Chief Complaint   Patient presents with    Back Pain     Right side back pain for weeks, started with a cough. Pain is positional.  Pt took ibuprofen last night with some relief. EMERGENCY DEPARTMENT COURSE and DIFFERENTIAL DIAGNOSIS/MDM:      This patient was seen by the advance practice provider. In addition to the VITA I personally saw Merced Márquez and performed a substantive portion of the visit including all aspects of the medical decision making. Briefly, this is a 77 y.o. female who presents to the ED secondary to concern for right sided back pain that started 5 weeks ago. Worse with different positions (like twisting  and bending). Also sometimes hurts when it is pushed. No radiation. No overlying skin changes. No falls or trauma. Took ibuprofen last night with improvement. On exam she is awake, alert, oriented. Her vitals are hemodynamically stable. She has point tenderness on the right thoracic side of her back. She states that the lidocaine patch they have placed on it has helped significantly. She is open to thinking about going to physical therapy. We discussed anti-inflammatories which she states she has been told not to take due to her history of gastric sleeve. We discussed using Tylenol as well as return precautions. She had wanted a chest x-ray here which on my review is clear. Critical Care:  I personally saw the patient and independently provided 0 minutes of nonconcurrent critical care out of the total shared critical care time provided due to the immediate potential for life-threatening deterioration due to N/A.   This time
superimposed on CKD ([address]) (07/18/2022), Acute respiratory failure ([address]) (07/18/2022), Acute respiratory failure with hypoxia ([address]) (07/18/2022), Back pain, Cardiomyopathy ([address]), Cataract, Chipped tooth, CKD (chronic kidney disease) stage 3, GFR 30-59 ml/min ([facility]) (12/11/2018), Clostridium difficile diarrhea (03/12/2021), Dental crown present, Depressive disorder (08/13/2014), Diabetic infection of right foot ([address]) (04/15/2015), Diabetic retinopathy ([address]), Diabetic ulcer of toe of left foot associated with diabetes mellitus due to underlying condition, with fat layer exposed ([address]) (11/09/2018), Diabetic ulcer of toe of left foot associated with type 2 diabetes mellitus, with fat layer exposed ([address]) (04/10/2018), DVT (deep venous thrombosis) ([address]), Glaucoma, HTN (hypertension), blood clots (2016), Ischemic cardiomyopathy (04/15/2015), Kidney disease, Meniere's disease, Mixed hyperlipidemia (03/07/2016), Nicotine abuse, NSTEMI (non-ST elevated myocardial infarction) ([address]) (03/13/2014), ANGLE (obstructive sleep apnea), Osteomyelitis of ankle or foot, acute, left ([address]) (06/04/2018), Pneumonia, PONV (postoperative nausea and vomiting), Spinal epidural abscess (03/13/2014), and Type II diabetes mellitus, uncontrolled (08/13/2014). CONSULTS: (Who and What was discussed)  None      Records Reviewed (External and Source)     CC/HPI Summary, DDx, ED Course, and Reassessment:   Patient is a 68-year-old female presenting to the ED with a complaint of chronic back strain that started over 4 weeks ago. Patient denies radiation to the abdominal lower extremity, no shortness of breath, no chest pain, nausea, no vomiting, no fever. Patient presented stable to the ED, nontachycardic, afebrile, SpO2 96%. Physical as above    Ddx:  Include but are not limited to back strain, rib fracture, cauda equina, UTI, nephrolithiasis, others  ED course: Lidocaine patches, Tylenol    Patient physical examination showed focal right back pain

## 2023-11-28 NOTE — ED NOTES
AVS reviewed, no needs or questions at this time. Pt discharged, ambulated to [facility].      Blayne [place], [place]  41/[id number]/33 [id number]

## 2023-11-29 ENCOUNTER — CARE COORDINATION (OUTPATIENT)
Dept: PRIMARY CARE CLINIC | Age: 68
End: 2023-11-29

## 2023-11-30 ENCOUNTER — CARE COORDINATION (OUTPATIENT)
Dept: PRIMARY CARE CLINIC | Age: 68
End: 2023-11-30

## 2023-12-01 NOTE — CARE COORDINATION
Ambulatory Care Coordination Note  2023    Patient Current Location:  Home: [address] [address]    ACM contacted the patient by telephone. Verified name and  with patient as identifiers. Provided introduction to self, and explanation of the ACM role. ACM: Anahi Lechuga RN    Challenges to be reviewed by the provider   Additional needs identified to be addressed with provider: No  none               Method of communication with provider: none. Received a return call from pt. Introduced self and reason for call agreeable to future calls  Pt reports she is doing better today, using lidocaine patches which is offering relief, denies any other issues or concerns, no swelling, increased sob, fatigue. Reports adequate oral intake. Reviewed ed discharge instructions with vu  Reviewed upcoming PCP appt with   Plan>  Continue  short term acm support for health coaching, disease specific education and resource assistance  Follow up next week to complete poc    Offered patient enrollment in the Remote Patient Monitoring (RPM) program for in-home monitoring:    will discuss at future out reach    Ambulatory Care Coordination Assessment    Care Coordination Protocol  Referral from Primary Care Provider: No  Week 1 - Initial Assessment     Do you have all of your prescriptions and are they filled?: Yes  Barriers to medication adherence: None  Are you able to afford your medications?: Yes  How often do you have trouble taking your medications the way you have been told to take them?: I always take them as prescribed.      Do you have Home O2 Therapy?: No      Is patient able to live independently?: Yes                    Suggested Interventions and [employer]                  Future Appointments   Date Time Provider [address]   2023  1:00 PM Cindy Culver MD Cecillia Mccallum RD [facility] [place] [facility]   2024  3:15 PM Jaya Jacobs MD [facility]

## 2023-12-07 ENCOUNTER — TELEPHONE (OUTPATIENT)
Dept: PRIMARY CARE CLINIC | Age: 68
End: 2023-12-07

## 2023-12-07 NOTE — TELEPHONE ENCOUNTER
----- Message from Bola Guicho sent at 12/7/2023 12:29 PM EST -----  Subject: Message to Provider    QUESTIONS  Information for Provider? pt was seen at [address] [facility] on 11/28 - called to   cancel her appt on 12/7/23 said she didnt need to reschedule it at this   time   ---------------------------------------------------------------------------  --------------  [address]  [phone]; OK to leave message on voicemail  ---------------------------------------------------------------------------  --------------  SCRIPT ANSWERS  Relationship to Patient?  Self

## 2023-12-11 ENCOUNTER — CARE COORDINATION (OUTPATIENT)
Dept: PRIMARY CARE CLINIC | Age: 68
End: 2023-12-11

## 2023-12-13 RX ORDER — LOSARTAN POTASSIUM 50 MG/1
50 TABLET ORAL DAILY
Qty: 30 TABLET | Refills: 0 | Status: SHIPPED | OUTPATIENT
Start: 2023-12-13

## 2023-12-13 RX ORDER — TORSEMIDE 10 MG/1
10 TABLET ORAL DAILY
Qty: 30 TABLET | Refills: 3 | Status: SHIPPED | OUTPATIENT
Start: 2023-12-13

## 2023-12-15 NOTE — PROGRESS NOTES
handoff report given to night RN at bedside no distress noted effect during my hospitalization, the order may or may not be in effect during this procedure. I give my doctor permission to give me blood or blood products. I understand that there are risks with receiving blood such as hepatitis, AIDS, fever, or allergic reaction. I acknowledge that the risks, benefits, and alternatives of this treatment have been explained to me and that no express or implied warranty has been given by the hospital, any blood bank, or any person or entity as to the blood or blood components transfused. At the discretion of my doctor, I agree to allow observers, equipment/product representatives and allow photographing, and/or televising of the procedure, provided my name or identity is maintained confidentially. I agree the hospital may dispose of or use for scientific or educational purposes any tissue, fluid, or body parts which may be removed.     ________________________________Date________Time______ am/pm  ([place])  Patient or Signature of Closest Relative or Legal Guardian    ________________________________Date________Time______am/pm      Page 1 of  1  Witness

## 2023-12-29 ENCOUNTER — CARE COORDINATION (OUTPATIENT)
Dept: PRIMARY CARE CLINIC | Age: 68
End: 2023-12-29

## 2023-12-29 NOTE — CARE COORDINATION
No answer to out reach attempt.  Message left on vm will make 1 more attempt before ending episode coffee

## 2024-01-11 RX ORDER — CITALOPRAM 40 MG/1
40 TABLET ORAL DAILY
Qty: 90 TABLET | Refills: 1 | Status: SHIPPED | OUTPATIENT
Start: 2024-01-11

## 2024-01-11 RX ORDER — ATORVASTATIN CALCIUM 80 MG/1
80 TABLET, FILM COATED ORAL DAILY
Qty: 90 TABLET | Refills: 1 | Status: SHIPPED | OUTPATIENT
Start: 2024-01-11

## 2024-01-11 RX ORDER — GABAPENTIN 300 MG/1
300 CAPSULE ORAL 3 TIMES DAILY
Qty: 270 CAPSULE | Refills: 0 | Status: SHIPPED | OUTPATIENT
Start: 2024-01-11 | End: 2024-04-10

## 2024-01-12 RX ORDER — PANTOPRAZOLE SODIUM 40 MG/1
40 TABLET, DELAYED RELEASE ORAL DAILY
Qty: 90 TABLET | Refills: 1 | Status: SHIPPED | OUTPATIENT
Start: 2024-01-12

## 2024-01-19 ENCOUNTER — CARE COORDINATION (OUTPATIENT)
Dept: PRIMARY CARE CLINIC | Age: 69
End: 2024-01-19

## 2024-01-29 DIAGNOSIS — N18.4 TYPE 2 DIABETES MELLITUS WITH STAGE 4 CHRONIC KIDNEY DISEASE, WITH LONG-TERM CURRENT USE OF INSULIN (HCC): ICD-10-CM

## 2024-01-29 DIAGNOSIS — E11.22 TYPE 2 DIABETES MELLITUS WITH STAGE 4 CHRONIC KIDNEY DISEASE, WITH LONG-TERM CURRENT USE OF INSULIN (HCC): ICD-10-CM

## 2024-01-29 DIAGNOSIS — Z79.4 TYPE 2 DIABETES MELLITUS WITH STAGE 4 CHRONIC KIDNEY DISEASE, WITH LONG-TERM CURRENT USE OF INSULIN (HCC): ICD-10-CM

## 2024-01-29 NOTE — TELEPHONE ENCOUNTER
Reviewed with pt's pharmacy.  A reader was sent in November.  Pharmacy states that she isn't even due for the sensors at this time.

## 2024-02-14 RX ORDER — TIRZEPATIDE 5 MG/.5ML
5 INJECTION, SOLUTION SUBCUTANEOUS WEEKLY
Qty: 6 ML | Refills: 1 | Status: SHIPPED | OUTPATIENT
Start: 2024-02-14

## 2024-02-16 DIAGNOSIS — N18.4 CKD (CHRONIC KIDNEY DISEASE) STAGE 4, GFR 15-29 ML/MIN (HCC): ICD-10-CM

## 2024-02-16 DIAGNOSIS — I50.9 CONGESTIVE HEART FAILURE, UNSPECIFIED HF CHRONICITY, UNSPECIFIED HEART FAILURE TYPE (HCC): ICD-10-CM

## 2024-02-16 LAB
ALBUMIN SERPL-MCNC: 3.7 G/DL (ref 3.4–5)
ANION GAP SERPL CALCULATED.3IONS-SCNC: 13 MMOL/L (ref 3–16)
BUN SERPL-MCNC: 22 MG/DL (ref 7–20)
CALCIUM SERPL-MCNC: 8.9 MG/DL (ref 8.3–10.6)
CHLORIDE SERPL-SCNC: 101 MMOL/L (ref 99–110)
CO2 SERPL-SCNC: 26 MMOL/L (ref 21–32)
CREAT SERPL-MCNC: 2.1 MG/DL (ref 0.6–1.2)
GFR SERPLBLD CREATININE-BSD FMLA CKD-EPI: 25 ML/MIN/{1.73_M2}
GLUCOSE SERPL-MCNC: 102 MG/DL (ref 70–99)
NT-PROBNP SERPL-MCNC: 704 PG/ML (ref 0–124)
PHOSPHATE SERPL-MCNC: 4.5 MG/DL (ref 2.5–4.9)
POTASSIUM SERPL-SCNC: 3.8 MMOL/L (ref 3.5–5.1)
PTH-INTACT SERPL-MCNC: 204 PG/ML (ref 14–72)
SODIUM SERPL-SCNC: 140 MMOL/L (ref 136–145)

## 2024-02-27 PROBLEM — I25.10 CORONARY ARTERY DISEASE INVOLVING NATIVE CORONARY ARTERY OF NATIVE HEART WITHOUT ANGINA PECTORIS: Status: ACTIVE | Noted: 2024-02-27

## 2024-02-27 NOTE — PROGRESS NOTES
transcription errors may be present.    Scribe's Attestation: This note was scribed in the presence of Dr. Mitesh DO by Oscar Sabillon RN.    I, Jaimie Sagastume, have personally performed the services described in this documentation as scribed by Oscar Sabillon RN in my presence, and it is both accurate and complete. An electronic signature was used to authenticate this note.

## 2024-02-28 ENCOUNTER — OFFICE VISIT (OUTPATIENT)
Dept: CARDIOLOGY CLINIC | Age: 69
End: 2024-02-28
Payer: COMMERCIAL

## 2024-02-28 VITALS
BODY MASS INDEX: 35.18 KG/M2 | OXYGEN SATURATION: 97 % | DIASTOLIC BLOOD PRESSURE: 68 MMHG | SYSTOLIC BLOOD PRESSURE: 122 MMHG | WEIGHT: 198.6 LBS | HEART RATE: 66 BPM

## 2024-02-28 DIAGNOSIS — I25.10 CORONARY ARTERY DISEASE INVOLVING NATIVE CORONARY ARTERY OF NATIVE HEART WITHOUT ANGINA PECTORIS: ICD-10-CM

## 2024-02-28 DIAGNOSIS — I50.33 ACUTE ON CHRONIC CONGESTIVE HEART FAILURE WITH LEFT VENTRICULAR DIASTOLIC DYSFUNCTION (HCC): Primary | ICD-10-CM

## 2024-02-28 DIAGNOSIS — I10 ESSENTIAL HYPERTENSION: Chronic | ICD-10-CM

## 2024-02-28 DIAGNOSIS — E78.2 MIXED HYPERLIPIDEMIA: ICD-10-CM

## 2024-02-28 PROCEDURE — 3078F DIAST BP <80 MM HG: CPT | Performed by: INTERNAL MEDICINE

## 2024-02-28 PROCEDURE — 1123F ACP DISCUSS/DSCN MKR DOCD: CPT | Performed by: INTERNAL MEDICINE

## 2024-02-28 PROCEDURE — 3074F SYST BP LT 130 MM HG: CPT | Performed by: INTERNAL MEDICINE

## 2024-02-28 PROCEDURE — 99214 OFFICE O/P EST MOD 30 MIN: CPT | Performed by: INTERNAL MEDICINE

## 2024-03-18 RX ORDER — DAPAGLIFLOZIN 10 MG/1
10 TABLET, FILM COATED ORAL EVERY MORNING
Qty: 90 TABLET | Refills: 1 | Status: SHIPPED | OUTPATIENT
Start: 2024-03-18

## 2024-03-21 DIAGNOSIS — E11.42 DM TYPE 2 WITH DIABETIC PERIPHERAL NEUROPATHY (HCC): Chronic | ICD-10-CM

## 2024-03-22 RX ORDER — FLURBIPROFEN SODIUM 0.3 MG/ML
SOLUTION/ DROPS OPHTHALMIC
Qty: 200 EACH | Refills: 1 | Status: SHIPPED | OUTPATIENT
Start: 2024-03-22

## 2024-04-09 ENCOUNTER — PATIENT MESSAGE (OUTPATIENT)
Dept: PRIMARY CARE CLINIC | Age: 69
End: 2024-04-09

## 2024-04-10 NOTE — TELEPHONE ENCOUNTER
From: Swetha Jurado  To: Dr. Jerry Culver  Sent: 4/9/2024 8:55 PM EDT  Subject: Korina Page. I know I need to schedule an appointment but in the meantime, [facility] tends to have trouble getting this medication, or at least on time for me. When I spoke to the lady today at the pharmacy, I asked her if it was is as difficult to obtain Ozempic in the store, she said no it’s a lot better. I was wondering if we could try to change this because I am out and would like to continue with a similar medication.    I will be calling to make an appointment,    Thank you,    Kati

## 2024-04-11 RX ORDER — SEMAGLUTIDE 1.34 MG/ML
1 INJECTION, SOLUTION SUBCUTANEOUS WEEKLY
Qty: 9 ML | Refills: 1 | Status: SHIPPED | OUTPATIENT
Start: 2024-04-11

## 2024-04-25 DIAGNOSIS — E11.42 DM TYPE 2 WITH DIABETIC PERIPHERAL NEUROPATHY (HCC): Chronic | ICD-10-CM

## 2024-04-25 RX ORDER — FLURBIPROFEN SODIUM 0.3 MG/ML
SOLUTION/ DROPS OPHTHALMIC
Qty: 200 EACH | Refills: 1 | Status: CANCELLED | OUTPATIENT
Start: 2024-04-25

## 2024-04-25 NOTE — TELEPHONE ENCOUNTER
Per pharmacy:  they filled #100 needs on 3/24/24        Pt states that she didn't get them.         She says she uses needles with Lantus (BID) and Victoza.  That her Ozempic comes with needles.     Reviewed with Dr Culver:  should pt be using both Victoza AND Ozempic? :  per Dr Culver: stop Victoza.       Left VM @ pharmacy for another Rx.     Called and D/W pt.

## 2024-05-04 RX ORDER — CARVEDILOL 25 MG/1
25 TABLET ORAL 2 TIMES DAILY
Qty: 180 TABLET | Refills: 0 | Status: SHIPPED | OUTPATIENT
Start: 2024-05-04

## 2024-05-28 RX ORDER — GABAPENTIN 300 MG/1
300 CAPSULE ORAL 3 TIMES DAILY
Qty: 270 CAPSULE | Refills: 0 | Status: SHIPPED | OUTPATIENT
Start: 2024-05-28 | End: 2024-08-26

## 2024-05-28 NOTE — TELEPHONE ENCOUNTER
Medication:   Requested Prescriptions     Pending Prescriptions Disp Refills    gabapentin (NEURONTIN) 300 MG capsule [Pharmacy Med Name: GABAPENTIN 300 MG CAPSULE] 270 capsule 0     Sig: Take 1 capsule by mouth 3 times daily for 90 days.        Last Filled:      Patient Phone Number: [phone] (home)     Last appt: 11/9/2023   Next appt: Visit date not found    Last OARRS:        No data to display

## 2024-05-28 NOTE — PROGRESS NOTES
respiratory distress  HEENT: PERRLA/EOMI; hearing appears within normal limits  NECK: Supple with trachea in midline, no masses  ABD: soft/NT/protuberant  Grade 2 pannus  EXT: No lymphedema noted  NEURO: No focal deficits, no obvious CN deficits    IMP: 68 y.o. female with panniculitis  PLAN: Would benefit from panniculectomy for functional improvement. We discussed the differences between an abdominoplasty and panniculectomy. She is going to work to decrease her weight and then follow-up in 2 months.     The complexity of body contouring procedures and wound healing physiology were discussed with the patient.  She was counseled on ideal medical optimization (nutrition, weight stability, etc.).  We also discussed the pros & cons of staging for multiple procedures including controlling tension from various sites and postoperative care managment. Clinical photos were obtained. The risks, benefits, alternatives, outcomes, personnel involved were discussed and all questions were answered in a satisfactory manner according to the patient. Specifically,the risks including, but not limited to: bleeding possibly requiring transfusion orreoperation, infection, seroma, nonhealing of wounds, poor cosmetic outcome, scarring, VTE (DVT/PE), and death were discussed.      Golden Hilton MD  [facility]  [phone]  05/29/24

## 2024-05-29 ENCOUNTER — OFFICE VISIT (OUTPATIENT)
Dept: SURGERY | Age: 69
End: 2024-05-29
Payer: MEDICARE

## 2024-05-29 VITALS
WEIGHT: 204 LBS | BODY MASS INDEX: 36.14 KG/M2 | DIASTOLIC BLOOD PRESSURE: 64 MMHG | HEIGHT: 63 IN | HEART RATE: 70 BPM | TEMPERATURE: 97.7 F | OXYGEN SATURATION: 98 % | SYSTOLIC BLOOD PRESSURE: 114 MMHG

## 2024-05-29 DIAGNOSIS — M79.3 PANNICULITIS: Primary | ICD-10-CM

## 2024-05-29 DIAGNOSIS — E66.01 SEVERE OBESITY (BMI 35.0-39.9) WITH COMORBIDITY (HCC): ICD-10-CM

## 2024-05-29 PROCEDURE — 99204 OFFICE O/P NEW MOD 45 MIN: CPT | Performed by: SURGERY

## 2024-05-29 PROCEDURE — 1123F ACP DISCUSS/DSCN MKR DOCD: CPT | Performed by: SURGERY

## 2024-05-29 PROCEDURE — 3078F DIAST BP <80 MM HG: CPT | Performed by: SURGERY

## 2024-05-29 PROCEDURE — 3074F SYST BP LT 130 MM HG: CPT | Performed by: SURGERY

## 2024-08-01 RX ORDER — CARVEDILOL 25 MG/1
TABLET ORAL
Qty: 180 TABLET | Refills: 0 | OUTPATIENT
Start: 2024-08-01

## 2024-08-01 NOTE — TELEPHONE ENCOUNTER
Last A1C   10/06/23    Last OV:  11/09/23    Has had 2 NS/Cancelled appts since.     Last Rx:  note attached asking to make appt.       When pt makes an appt, please let me know.  Will send Rx then.

## 2024-08-06 RX ORDER — CARVEDILOL 25 MG/1
TABLET ORAL
Qty: 180 TABLET | Refills: 0 | OUTPATIENT
Start: 2024-08-06

## 2024-08-08 RX ORDER — CARVEDILOL 25 MG/1
25 TABLET ORAL 2 TIMES DAILY
Qty: 180 TABLET | Refills: 0 | Status: SHIPPED | OUTPATIENT
Start: 2024-08-08

## 2024-08-11 DIAGNOSIS — K21.9 CHRONIC GERD: ICD-10-CM

## 2024-08-12 RX ORDER — CITALOPRAM 40 MG/1
40 TABLET ORAL DAILY
Qty: 90 TABLET | Refills: 0 | Status: SHIPPED | OUTPATIENT
Start: 2024-08-12

## 2024-08-12 RX ORDER — ATORVASTATIN CALCIUM 80 MG/1
80 TABLET, FILM COATED ORAL DAILY
Qty: 90 TABLET | Refills: 0 | Status: SHIPPED | OUTPATIENT
Start: 2024-08-12

## 2024-08-12 RX ORDER — PANTOPRAZOLE SODIUM 20 MG/1
20 TABLET, DELAYED RELEASE ORAL DAILY
Qty: 90 TABLET | Refills: 1 | Status: SHIPPED | OUTPATIENT
Start: 2024-08-12

## 2024-08-19 LAB — DIABETIC RETINOPATHY: POSITIVE

## 2024-08-22 ASSESSMENT — PATIENT HEALTH QUESTIONNAIRE - PHQ9
1. LITTLE INTEREST OR PLEASURE IN DOING THINGS: NOT AT ALL
SUM OF ALL RESPONSES TO PHQ QUESTIONS 1-9: 0
SUM OF ALL RESPONSES TO PHQ QUESTIONS 1-9: 0
SUM OF ALL RESPONSES TO PHQ9 QUESTIONS 1 & 2: 0
SUM OF ALL RESPONSES TO PHQ QUESTIONS 1-9: 0
2. FEELING DOWN, DEPRESSED OR HOPELESS: NOT AT ALL
2. FEELING DOWN, DEPRESSED OR HOPELESS: NOT AT ALL
1. LITTLE INTEREST OR PLEASURE IN DOING THINGS: NOT AT ALL
SUM OF ALL RESPONSES TO PHQ QUESTIONS 1-9: 0
SUM OF ALL RESPONSES TO PHQ9 QUESTIONS 1 & 2: 0

## 2024-08-23 ENCOUNTER — OFFICE VISIT (OUTPATIENT)
Dept: PRIMARY CARE CLINIC | Age: 69
End: 2024-08-23
Payer: MEDICARE

## 2024-08-23 VITALS
DIASTOLIC BLOOD PRESSURE: 56 MMHG | HEART RATE: 67 BPM | BODY MASS INDEX: 36.85 KG/M2 | RESPIRATION RATE: 18 BRPM | SYSTOLIC BLOOD PRESSURE: 127 MMHG | WEIGHT: 208 LBS

## 2024-08-23 DIAGNOSIS — I10 ESSENTIAL HYPERTENSION: Chronic | ICD-10-CM

## 2024-08-23 DIAGNOSIS — N18.4 CKD (CHRONIC KIDNEY DISEASE) STAGE 4, GFR 15-29 ML/MIN (HCC): ICD-10-CM

## 2024-08-23 DIAGNOSIS — Z12.31 ENCOUNTER FOR SCREENING MAMMOGRAM FOR MALIGNANT NEOPLASM OF BREAST: ICD-10-CM

## 2024-08-23 DIAGNOSIS — E11.22 TYPE 2 DIABETES MELLITUS WITH STAGE 4 CHRONIC KIDNEY DISEASE, WITH LONG-TERM CURRENT USE OF INSULIN (HCC): Primary | ICD-10-CM

## 2024-08-23 DIAGNOSIS — D63.1 ANEMIA OF CHRONIC RENAL FAILURE, STAGE 4 (SEVERE) (HCC): ICD-10-CM

## 2024-08-23 DIAGNOSIS — I50.32 CHRONIC DIASTOLIC CHF (CONGESTIVE HEART FAILURE) (HCC): ICD-10-CM

## 2024-08-23 DIAGNOSIS — Z78.0 POST-MENOPAUSAL: ICD-10-CM

## 2024-08-23 DIAGNOSIS — E78.2 MIXED HYPERLIPIDEMIA: ICD-10-CM

## 2024-08-23 DIAGNOSIS — E66.9 OBESITY (BMI 30-39.9): ICD-10-CM

## 2024-08-23 DIAGNOSIS — N18.4 ANEMIA OF CHRONIC RENAL FAILURE, STAGE 4 (SEVERE) (HCC): ICD-10-CM

## 2024-08-23 DIAGNOSIS — Z79.4 TYPE 2 DIABETES MELLITUS WITH STAGE 4 CHRONIC KIDNEY DISEASE, WITH LONG-TERM CURRENT USE OF INSULIN (HCC): Primary | ICD-10-CM

## 2024-08-23 DIAGNOSIS — G47.33 OSA (OBSTRUCTIVE SLEEP APNEA): ICD-10-CM

## 2024-08-23 DIAGNOSIS — N18.4 TYPE 2 DIABETES MELLITUS WITH STAGE 4 CHRONIC KIDNEY DISEASE, WITH LONG-TERM CURRENT USE OF INSULIN (HCC): Primary | ICD-10-CM

## 2024-08-23 PROCEDURE — 3074F SYST BP LT 130 MM HG: CPT | Performed by: FAMILY MEDICINE

## 2024-08-23 PROCEDURE — 1123F ACP DISCUSS/DSCN MKR DOCD: CPT | Performed by: FAMILY MEDICINE

## 2024-08-23 PROCEDURE — 83036 HEMOGLOBIN GLYCOSYLATED A1C: CPT | Performed by: FAMILY MEDICINE

## 2024-08-23 PROCEDURE — 99214 OFFICE O/P EST MOD 30 MIN: CPT | Performed by: FAMILY MEDICINE

## 2024-08-23 PROCEDURE — 3078F DIAST BP <80 MM HG: CPT | Performed by: FAMILY MEDICINE

## 2024-08-23 RX ORDER — SEMAGLUTIDE 2.68 MG/ML
2 INJECTION, SOLUTION SUBCUTANEOUS
Qty: 4 ML | Refills: 3 | Status: SHIPPED | OUTPATIENT
Start: 2024-08-23

## 2024-08-23 ASSESSMENT — ENCOUNTER SYMPTOMS
CONSTIPATION: 0
BLOOD IN STOOL: 0
DIARRHEA: 0
ABDOMINAL PAIN: 0
SHORTNESS OF BREATH: 0

## 2024-08-23 NOTE — PROGRESS NOTES
ADRIAN Use to continuously monitor glucose  Jerry Culver MD   amLODIPine (NORVASC) 10 MG tablet Take 1 tablet by mouth daily  Jerry Culver MD   lisinopril (PRINIVIL;ZESTRIL) 40 MG tablet Take 1 tablet by mouth daily  Jerry Culver MD   furosemide (LASIX) 40 MG tablet Take 1 tablet by mouth daily  Jerry Culver MD   acetaminophen (TYLENOL) 500 MG tablet Take 1 tablet by mouth every 6 hours as needed for Pain  ProviderEthan MD   aspirin 81 MG chewable tablet Take 1 tablet by mouth daily.  Kathy Umana MD        Social History     Tobacco Use    Smoking status: Former     Current packs/day: 0.00     Average packs/day: 0.5 packs/day for 10.0 years (5.0 ttl pk-yrs)     Types: Cigarettes     Start date: 11/10/2003     Quit date: 11/10/2013     Years since quitting: 10.7    Smokeless tobacco: Never   Substance Use Topics    Alcohol use: Yes     Alcohol/week: 0.0 standard drinks of alcohol     Comment: occasionally        Vitals:    08/23/24 1605   BP: (!) 127/56   Pulse: 67   Resp: 18   Weight: 94.3 kg (208 lb)     Estimated body mass index is 36.85 kg/m² as calculated from the following:    Height as of 5/29/24: 1.6 m (5' 3\").    Weight as of this encounter: 94.3 kg (208 lb).    Physical Exam  Constitutional:       General: She is not in acute distress.     Appearance: She is well-developed.   HENT:      Head: Normocephalic.   Eyes:      Conjunctiva/sclera: Conjunctivae normal.   Neck:      Thyroid: No thyromegaly.   Cardiovascular:      Rate and Rhythm: Normal rate and regular rhythm.      Heart sounds: Normal heart sounds. No murmur heard.  Pulmonary:      Effort: No respiratory distress.      Breath sounds: Normal breath sounds. No wheezing or rales.   Abdominal:      General: There is no distension.      Palpations: Abdomen is soft. There is no mass.      Tenderness: There is no guarding or rebound.   Musculoskeletal:         General: Normal range of motion.      Cervical back: Neck

## 2024-08-28 RX ORDER — INSULIN GLARGINE 100 [IU]/ML
INJECTION, SOLUTION SUBCUTANEOUS
Qty: 81 ML | Refills: 1 | Status: SHIPPED | OUTPATIENT
Start: 2024-08-28

## 2024-09-04 RX ORDER — GABAPENTIN 300 MG/1
300 CAPSULE ORAL 3 TIMES DAILY
Qty: 270 CAPSULE | Refills: 0 | Status: CANCELLED | OUTPATIENT
Start: 2024-09-04 | End: 2024-12-03

## 2024-09-05 RX ORDER — GABAPENTIN 300 MG/1
300 CAPSULE ORAL 3 TIMES DAILY
Qty: 270 CAPSULE | Refills: 1 | Status: SHIPPED | OUTPATIENT
Start: 2024-09-05 | End: 2025-03-04

## 2024-09-13 RX ORDER — DAPAGLIFLOZIN 10 MG/1
10 TABLET, FILM COATED ORAL EVERY MORNING
Qty: 90 TABLET | Refills: 1 | Status: SHIPPED | OUTPATIENT
Start: 2024-09-13

## 2024-10-21 NOTE — PROGRESS NOTES
10/23/2024    PATIENT: Swetha Jurado  : 1955    Primary Care Provider:   Jerry Culver MD  o:[phone]  f:[phone]    Reason for evaluation:   Chief Complaint   Patient presents with    Follow-up     Chronic congestive heart failure with left ventricular diastolic dysfunction (HCC)  No complaints     History of present illness:  Ms. Swetha Jurado is a 68 y.o. female patient here in routine CV follow up. Kati became known to me during hospitalization 10/6-10/9/2023 for CHF exacerbation. She has a history of recovered stress induced cardiomyopathy in ; echoes with preserved LV function since. Initial stress testing with apical defect felt to be 2/2 Taktosubo's component.   Kati states that Mounjaro was switched to Ozempic earlier this year.  She has had no side effect concerns with this.  Her weight has been stable, without significant loss.  She did join the [employer] and states that she needs to start swimming indoors as well.  She enjoys swimming regularly in her sister's pool in [place] when she visits.   No chest pain, palpitations, lightheadedness, shortness of breath, PND, orthopnea, or LE edema.     Medical History:      Diagnosis Date    Abnormal echocardiogram     25% on 3/11/14 and 50% on 3/19/14    Acute kidney injury superimposed on CKD (HCC) 2022    Acute respiratory failure 2022    Acute respiratory failure with hypoxia 2022    Back pain     Cardiomyopathy (HCC)     EF was 50% on 3/19/14    Cataract     per Dr. Bonner- DM eye exam report- 22  [facility]    Chipped tooth     lower left    CKD (chronic kidney disease) stage 3, GFR 30-59 ml/min (HCC) 2018    Clostridium difficile diarrhea 2021    Dental crown present     veneers    Depressive disorder 2014    Diabetic infection of right foot ([facility]) 04/15/2015    Diabetic retinopathy ([facility])     per Dr Bonner- [facility] note 22    Diabetic ulcer of toe of left foot

## 2024-10-23 ENCOUNTER — OFFICE VISIT (OUTPATIENT)
Dept: CARDIOLOGY CLINIC | Age: 69
End: 2024-10-23

## 2024-10-23 VITALS
BODY MASS INDEX: 36.68 KG/M2 | WEIGHT: 207 LBS | SYSTOLIC BLOOD PRESSURE: 108 MMHG | OXYGEN SATURATION: 97 % | HEIGHT: 63 IN | HEART RATE: 63 BPM | DIASTOLIC BLOOD PRESSURE: 70 MMHG

## 2024-10-23 DIAGNOSIS — I25.10 CORONARY ARTERY DISEASE INVOLVING NATIVE CORONARY ARTERY OF NATIVE HEART WITHOUT ANGINA PECTORIS: ICD-10-CM

## 2024-10-23 DIAGNOSIS — I50.32 CHRONIC DIASTOLIC CHF (CONGESTIVE HEART FAILURE) (HCC): Primary | ICD-10-CM

## 2024-10-23 DIAGNOSIS — E78.2 MIXED HYPERLIPIDEMIA: ICD-10-CM

## 2024-11-05 RX ORDER — CARVEDILOL 25 MG/1
25 TABLET ORAL 2 TIMES DAILY
Qty: 180 TABLET | Refills: 1 | Status: SHIPPED | OUTPATIENT
Start: 2024-11-05

## 2024-11-06 ENCOUNTER — HOSPITAL ENCOUNTER (OUTPATIENT)
Dept: WOMENS IMAGING | Age: 69
Discharge: HOME | End: 2024-11-06
Attending: FAMILY MEDICINE
Payer: MEDICARE

## 2024-11-06 VITALS — HEIGHT: 63 IN | WEIGHT: 198 LBS | BODY MASS INDEX: 35.08 KG/M2

## 2024-11-06 DIAGNOSIS — Z78.0 POST-MENOPAUSAL: ICD-10-CM

## 2024-11-06 DIAGNOSIS — Z12.31 ENCOUNTER FOR SCREENING MAMMOGRAM FOR MALIGNANT NEOPLASM OF BREAST: ICD-10-CM

## 2024-11-06 PROCEDURE — 77080 DXA BONE DENSITY AXIAL: CPT

## 2024-11-06 PROCEDURE — 77063 BREAST TOMOSYNTHESIS BI: CPT

## 2024-11-09 RX ORDER — ATORVASTATIN CALCIUM 80 MG/1
80 TABLET, FILM COATED ORAL DAILY
Qty: 90 TABLET | Refills: 1 | Status: SHIPPED | OUTPATIENT
Start: 2024-11-09

## 2024-11-09 RX ORDER — CITALOPRAM HYDROBROMIDE 40 MG/1
40 TABLET ORAL DAILY
Qty: 90 TABLET | Refills: 1 | Status: SHIPPED | OUTPATIENT
Start: 2024-11-09

## 2024-11-11 RX ORDER — SEMAGLUTIDE 1.34 MG/ML
1 INJECTION, SOLUTION SUBCUTANEOUS
Qty: 9 ML | Refills: 1 | Status: SHIPPED | OUTPATIENT
Start: 2024-11-11

## 2024-11-27 RX ORDER — SEMAGLUTIDE 2.68 MG/ML
2 INJECTION, SOLUTION SUBCUTANEOUS
Qty: 9 ML | Refills: 1 | Status: SHIPPED | OUTPATIENT
Start: 2024-11-27

## 2024-11-30 SDOH — ECONOMIC STABILITY: INCOME INSECURITY: HOW HARD IS IT FOR YOU TO PAY FOR THE VERY BASICS LIKE FOOD, HOUSING, MEDICAL CARE, AND HEATING?: NOT HARD AT ALL

## 2024-11-30 SDOH — ECONOMIC STABILITY: FOOD INSECURITY: WITHIN THE PAST 12 MONTHS, YOU WORRIED THAT YOUR FOOD WOULD RUN OUT BEFORE YOU GOT MONEY TO BUY MORE.: NEVER TRUE

## 2024-11-30 SDOH — ECONOMIC STABILITY: FOOD INSECURITY: WITHIN THE PAST 12 MONTHS, THE FOOD YOU BOUGHT JUST DIDN'T LAST AND YOU DIDN'T HAVE MONEY TO GET MORE.: NEVER TRUE

## 2024-12-02 ENCOUNTER — OFFICE VISIT (OUTPATIENT)
Dept: PRIMARY CARE CLINIC | Age: 69
End: 2024-12-02
Payer: MEDICARE

## 2024-12-02 VITALS
OXYGEN SATURATION: 98 % | HEART RATE: 65 BPM | RESPIRATION RATE: 18 BRPM | SYSTOLIC BLOOD PRESSURE: 166 MMHG | WEIGHT: 208 LBS | DIASTOLIC BLOOD PRESSURE: 60 MMHG | BODY MASS INDEX: 36.85 KG/M2

## 2024-12-02 DIAGNOSIS — N18.4 CKD (CHRONIC KIDNEY DISEASE) STAGE 4, GFR 15-29 ML/MIN (HCC): ICD-10-CM

## 2024-12-02 DIAGNOSIS — N18.4 ANEMIA OF CHRONIC RENAL FAILURE, STAGE 4 (SEVERE) (HCC): ICD-10-CM

## 2024-12-02 DIAGNOSIS — N18.4 TYPE 2 DIABETES MELLITUS WITH STAGE 4 CHRONIC KIDNEY DISEASE, WITH LONG-TERM CURRENT USE OF INSULIN (HCC): ICD-10-CM

## 2024-12-02 DIAGNOSIS — D63.1 ANEMIA OF CHRONIC RENAL FAILURE, STAGE 4 (SEVERE) (HCC): ICD-10-CM

## 2024-12-02 DIAGNOSIS — E55.9 VITAMIN D DEFICIENCY: ICD-10-CM

## 2024-12-02 DIAGNOSIS — I10 ESSENTIAL HYPERTENSION: Primary | Chronic | ICD-10-CM

## 2024-12-02 DIAGNOSIS — Z79.4 TYPE 2 DIABETES MELLITUS WITH STAGE 4 CHRONIC KIDNEY DISEASE, WITH LONG-TERM CURRENT USE OF INSULIN (HCC): ICD-10-CM

## 2024-12-02 DIAGNOSIS — I50.32 CHRONIC DIASTOLIC CHF (CONGESTIVE HEART FAILURE) (HCC): ICD-10-CM

## 2024-12-02 DIAGNOSIS — E11.22 TYPE 2 DIABETES MELLITUS WITH STAGE 4 CHRONIC KIDNEY DISEASE, WITH LONG-TERM CURRENT USE OF INSULIN (HCC): ICD-10-CM

## 2024-12-02 DIAGNOSIS — E11.42 DIABETIC PERIPHERAL NEUROPATHY (HCC): ICD-10-CM

## 2024-12-02 DIAGNOSIS — G62.9 PERIPHERAL POLYNEUROPATHY: ICD-10-CM

## 2024-12-02 DIAGNOSIS — E78.2 MIXED HYPERLIPIDEMIA: ICD-10-CM

## 2024-12-02 PROCEDURE — 1159F MED LIST DOCD IN RCRD: CPT | Performed by: FAMILY MEDICINE

## 2024-12-02 PROCEDURE — 3078F DIAST BP <80 MM HG: CPT | Performed by: FAMILY MEDICINE

## 2024-12-02 PROCEDURE — 1123F ACP DISCUSS/DSCN MKR DOCD: CPT | Performed by: FAMILY MEDICINE

## 2024-12-02 PROCEDURE — 99214 OFFICE O/P EST MOD 30 MIN: CPT | Performed by: FAMILY MEDICINE

## 2024-12-02 PROCEDURE — 3044F HG A1C LEVEL LT 7.0%: CPT | Performed by: FAMILY MEDICINE

## 2024-12-02 PROCEDURE — 3077F SYST BP >= 140 MM HG: CPT | Performed by: FAMILY MEDICINE

## 2024-12-02 RX ORDER — HYDRALAZINE HYDROCHLORIDE 50 MG/1
50 TABLET, FILM COATED ORAL 2 TIMES DAILY
Qty: 180 TABLET | Refills: 1 | Status: SHIPPED | OUTPATIENT
Start: 2024-12-02

## 2024-12-02 ASSESSMENT — ENCOUNTER SYMPTOMS
BLOOD IN STOOL: 0
DIARRHEA: 0
CONSTIPATION: 0
SHORTNESS OF BREATH: 0
ABDOMINAL PAIN: 0

## 2024-12-02 NOTE — PROGRESS NOTES
2024     Swetha Jurado (:  1955) is a 69 y.o. female, here for evaluation of the following medical concerns:      Patient is 69 years old white female medical history significant for diabetes, obesity, hypertension, chronic kidney disease, anemia of chronic renal failure, chronic diastolic heart failure, hyperlipidemia, sleep apnea menopausal syndrome and diabetic peripheral neuropathy.  Patient presented to the office for regular follow-up.  Her blood pressure is somewhat elevated today but patient reported that it was normal at home and she thinks she might have a whitecoat syndrome.  She takes Coreg, losartan and hydralazine.  She has congestive heart failure currently compensated.  Diabetes controlled with Lantus Farxiga and Ozempic.  She did not close significant weight with Ozempic.    Review of Systems   Constitutional:  Negative for activity change and appetite change.   Eyes:  Negative for visual disturbance.   Respiratory:  Negative for shortness of breath.    Cardiovascular:  Negative for chest pain and leg swelling.   Gastrointestinal:  Negative for abdominal pain, blood in stool, constipation and diarrhea.   Genitourinary:  Negative for difficulty urinating, frequency, hematuria, menstrual problem and urgency.   Neurological:  Negative for dizziness and syncope.   Psychiatric/Behavioral:  Negative for behavioral problems.        Prior to Visit Medications    Medication Sig Taking? Authorizing Provider   hydrALAZINE (APRESOLINE) 50 MG tablet Take 1 tablet by mouth 2 times daily Yes Jerry Culver MD   semaglutide, 2 MG/DOSE, (OZEMPIC, 2 MG/DOSE,) 8 MG/3ML SOPN sc injection Inject 2 mg into the skin every 7 days  Jerry Culver MD   citalopram (CELEXA) 40 MG tablet Take 1 tablet by mouth daily  Jerry Culver MD   atorvastatin (LIPITOR) 80 MG tablet Take 1 tablet by mouth daily  Jerry Culver MD   carvedilol (COREG) 25 MG tablet Take 1 tablet by mouth 2 times

## 2025-01-02 ENCOUNTER — PATIENT MESSAGE (OUTPATIENT)
Dept: PRIMARY CARE CLINIC | Age: 70
End: 2025-01-02

## 2025-01-15 NOTE — PROGRESS NOTES
[facility] PRE-OPERATIVE INSTRUCTIONS    Day of Procedure:    1/31            Arrival time:        9        Surgery time:1030    Take the following medications with a sip of water:  Follow your MD/Surgeons pre-procedure instructions regarding your medications     Do not eat or drink anything after 12:00 midnight prior to your surgery.  This includes water, chewing gum, mints and ice chips.   You may brush your teeth and gargle the morning of your surgery, but do not swallow the water.     Please see your family doctor/pediatrician for a history and physical and/or concerning medications.   Bring any test results/reports from your physicians office.   If you are under the care of a heart doctor or specialist doctor, please be aware that you may be asked to see them for clearance.    You may be asked to stop blood thinners such as Coumadin, Plavix, Fragmin, Lovenox, etc., or any anti-inflammatories such as:  Aspirin, Ibuprofen, Advil, Naproxen prior to your surgery.    We also ask that you stop any over the counter medications such as fish oil, vitamin E, glucosamine, garlic, Multivitamins, COQ 10, etc.    We ask that you do not smoke 24 hours prior to surgery.  We ask that you do not  drink any alcoholic beverages 24 hours prior to surgery     You must make arrangements for a responsible adult to take you home after your surgery.    For your safety, you will not be allowed to leave alone or drive yourself home.  Your surgery will be cancelled if you do not have a ride home.     Also for your safety, you must have someone stay with you the first 24 hours after your surgery.     A parent or legal guardian must accompany a child scheduled for surgery and plan to stay at the hospital until the child is discharged.    Please do not bring other children with you.    For your comfort, please wear simple loose fitting clothing to the hospital.  Please do not bring valuables.    Do not wear any make-up on face or  nail polish on your fingers or toes.      For your safety, please do not wear any jewelry or body piercing's on the day of surgery.   All jewelry must be removed.      If you have dentures, they will be removed before going to operating room.    For your convenience, we will provide you with a container.    If you wear contact lenses or glasses, they will be removed, please bring a case for them.     If you have a living will and a durable power of  for healthcare, please bring in a copy.     As part of our patient safety program to minimize surgical site infections, we ask you to do the following:    Please notify your surgeon if you develop any illness between         now and the  day of your surgery.    This includes a cough, cold, fever, sore throat, nausea,         or vomiting, and diarrhea, etc.   Please notify your surgeon if you experience dizziness, shortness         of breath or blurred vision between now and the time of your surgery.      Do not shave your operative site 96 hours prior to surgery.   For face and neck surgery, men may use an electric razor 48 hours   prior to surgery.    You must shower the night before surgery and the morning of   your surgery with an antibacterial soap.    You will need to bring a photo ID and insurance card.    [facility] has an onsite pharmacy, would you like to utilize our pharmacy.     If you will be staying overnight and use a C-pap machine, please bring   your C-pap to hospital.     Our goal is to provide you with excellent care, therefore, visitors will be limited to two in the room at a time so that we may focus on providing this care for you.          Please contact pre-admission testing if you have any further questions.                 Sara Burnham phone number:  [phone]     [facility] fax number:  [phone]  Please note these are generalized instructions for all surgical cases, you may be provided with more specific instructions according to your

## 2025-01-30 ENCOUNTER — ANESTHESIA EVENT (OUTPATIENT)
Dept: ENDOSCOPY | Age: 70
End: 2025-01-30
Payer: MEDICARE

## 2025-01-31 ENCOUNTER — ANESTHESIA (OUTPATIENT)
Dept: ENDOSCOPY | Age: 70
End: 2025-01-31
Payer: MEDICARE

## 2025-01-31 ENCOUNTER — HOSPITAL ENCOUNTER (OUTPATIENT)
Age: 70
Setting detail: OUTPATIENT SURGERY
Discharge: HOME | End: 2025-01-31
Attending: INTERNAL MEDICINE | Admitting: INTERNAL MEDICINE
Payer: MEDICARE

## 2025-01-31 ENCOUNTER — APPOINTMENT (OUTPATIENT)
Dept: ENDOSCOPY | Age: 70
End: 2025-01-31
Attending: INTERNAL MEDICINE
Payer: MEDICARE

## 2025-01-31 VITALS
BODY MASS INDEX: 36.86 KG/M2 | RESPIRATION RATE: 16 BRPM | HEIGHT: 63 IN | WEIGHT: 208 LBS | TEMPERATURE: 97 F | DIASTOLIC BLOOD PRESSURE: 64 MMHG | HEART RATE: 59 BPM | OXYGEN SATURATION: 98 % | SYSTOLIC BLOOD PRESSURE: 180 MMHG

## 2025-01-31 DIAGNOSIS — Z86.0100 HX OF COLONIC POLYPS: ICD-10-CM

## 2025-01-31 LAB
GLUCOSE BLD-MCNC: 54 MG/DL (ref 70–99)
GLUCOSE BLD-MCNC: 82 MG/DL (ref 70–99)
GLUCOSE BLD-MCNC: 99 MG/DL (ref 70–99)
PERFORMED ON: ABNORMAL
PERFORMED ON: NORMAL
PERFORMED ON: NORMAL

## 2025-01-31 PROCEDURE — 7100000001 HC PACU RECOVERY - ADDTL 15 MIN: Performed by: INTERNAL MEDICINE

## 2025-01-31 PROCEDURE — 2580000003 HC RX 258: Performed by: STUDENT IN AN ORGANIZED HEALTH CARE EDUCATION/TRAINING PROGRAM

## 2025-01-31 PROCEDURE — 2580000003 HC RX 258: Performed by: NURSE ANESTHETIST, CERTIFIED REGISTERED

## 2025-01-31 PROCEDURE — 6360000002 HC RX W HCPCS: Performed by: STUDENT IN AN ORGANIZED HEALTH CARE EDUCATION/TRAINING PROGRAM

## 2025-01-31 PROCEDURE — 3700000001 HC ADD 15 MINUTES (ANESTHESIA): Performed by: INTERNAL MEDICINE

## 2025-01-31 PROCEDURE — 7100000010 HC PHASE II RECOVERY - FIRST 15 MIN: Performed by: INTERNAL MEDICINE

## 2025-01-31 PROCEDURE — 3609010300 HC COLONOSCOPY W/BIOPSY SINGLE/MULTIPLE: Performed by: INTERNAL MEDICINE

## 2025-01-31 PROCEDURE — 2580000003 HC RX 258: Performed by: ANESTHESIOLOGY

## 2025-01-31 PROCEDURE — 2500000003 HC RX 250 WO HCPCS: Performed by: STUDENT IN AN ORGANIZED HEALTH CARE EDUCATION/TRAINING PROGRAM

## 2025-01-31 PROCEDURE — 88305 TISSUE EXAM BY PATHOLOGIST: CPT

## 2025-01-31 PROCEDURE — 2709999900 HC NON-CHARGEABLE SUPPLY: Performed by: INTERNAL MEDICINE

## 2025-01-31 PROCEDURE — 7100000011 HC PHASE II RECOVERY - ADDTL 15 MIN: Performed by: INTERNAL MEDICINE

## 2025-01-31 PROCEDURE — 3700000000 HC ANESTHESIA ATTENDED CARE: Performed by: INTERNAL MEDICINE

## 2025-01-31 PROCEDURE — 6360000002 HC RX W HCPCS: Performed by: NURSE ANESTHETIST, CERTIFIED REGISTERED

## 2025-01-31 PROCEDURE — 7100000000 HC PACU RECOVERY - FIRST 15 MIN: Performed by: INTERNAL MEDICINE

## 2025-01-31 RX ORDER — SODIUM CHLORIDE 0.9 % (FLUSH) 0.9 %
5-40 SYRINGE (ML) INJECTION EVERY 12 HOURS SCHEDULED
Status: DISCONTINUED | OUTPATIENT
Start: 2025-01-31 | End: 2025-01-31 | Stop reason: HOSPADM

## 2025-01-31 RX ORDER — SODIUM CHLORIDE 9 MG/ML
INJECTION, SOLUTION INTRAVENOUS
Status: DISCONTINUED | OUTPATIENT
Start: 2025-01-31 | End: 2025-01-31 | Stop reason: SDUPTHER

## 2025-01-31 RX ORDER — PROPOFOL 10 MG/ML
INJECTION, EMULSION INTRAVENOUS
Status: DISCONTINUED | OUTPATIENT
Start: 2025-01-31 | End: 2025-01-31 | Stop reason: SDUPTHER

## 2025-01-31 RX ORDER — LIDOCAINE HYDROCHLORIDE 20 MG/ML
INJECTION, SOLUTION EPIDURAL; INFILTRATION; INTRACAUDAL; PERINEURAL
Status: DISCONTINUED | OUTPATIENT
Start: 2025-01-31 | End: 2025-01-31 | Stop reason: SDUPTHER

## 2025-01-31 RX ORDER — ONDANSETRON 2 MG/ML
4 INJECTION INTRAMUSCULAR; INTRAVENOUS ONCE
Status: COMPLETED | OUTPATIENT
Start: 2025-01-31 | End: 2025-01-31

## 2025-01-31 RX ORDER — SODIUM CHLORIDE 0.9 % (FLUSH) 0.9 %
5-40 SYRINGE (ML) INJECTION PRN
Status: DISCONTINUED | OUTPATIENT
Start: 2025-01-31 | End: 2025-01-31 | Stop reason: HOSPADM

## 2025-01-31 RX ORDER — DEXTROSE MONOHYDRATE 25 G/50ML
12.5 INJECTION, SOLUTION INTRAVENOUS ONCE
Status: COMPLETED | OUTPATIENT
Start: 2025-01-31 | End: 2025-01-31

## 2025-01-31 RX ORDER — SODIUM CHLORIDE 9 MG/ML
INJECTION, SOLUTION INTRAVENOUS PRN
Status: DISCONTINUED | OUTPATIENT
Start: 2025-01-31 | End: 2025-01-31 | Stop reason: HOSPADM

## 2025-01-31 RX ADMIN — PROPOFOL 80 MG: 10 INJECTION, EMULSION INTRAVENOUS at 11:04

## 2025-01-31 RX ADMIN — DEXTROSE MONOHYDRATE 12.5 G: 25 INJECTION, SOLUTION INTRAVENOUS at 10:03

## 2025-01-31 RX ADMIN — SODIUM CHLORIDE: 9 INJECTION, SOLUTION INTRAVENOUS at 09:46

## 2025-01-31 RX ADMIN — ONDANSETRON 4 MG: 2 INJECTION, SOLUTION INTRAMUSCULAR; INTRAVENOUS at 09:45

## 2025-01-31 RX ADMIN — PROPOFOL 180 MCG/KG/MIN: 10 INJECTION, EMULSION INTRAVENOUS at 11:05

## 2025-01-31 RX ADMIN — FAMOTIDINE 20 MG: 10 INJECTION, SOLUTION INTRAVENOUS at 09:45

## 2025-01-31 RX ADMIN — LIDOCAINE HYDROCHLORIDE 80 MG: 20 INJECTION, SOLUTION EPIDURAL; INFILTRATION; INTRACAUDAL; PERINEURAL at 11:04

## 2025-01-31 RX ADMIN — SODIUM CHLORIDE: 9 INJECTION, SOLUTION INTRAVENOUS at 10:55

## 2025-01-31 ASSESSMENT — PAIN SCALES - GENERAL
PAINLEVEL_OUTOF10: 0
PAINLEVEL_OUTOF10: 0

## 2025-01-31 ASSESSMENT — PAIN - FUNCTIONAL ASSESSMENT: PAIN_FUNCTIONAL_ASSESSMENT: 0-10

## 2025-01-31 ASSESSMENT — ENCOUNTER SYMPTOMS: SHORTNESS OF BREATH: 0

## 2025-01-31 ASSESSMENT — LIFESTYLE VARIABLES: SMOKING_STATUS: 0

## 2025-01-31 NOTE — OP NOTE
Colonoscopy Note    Patient:   Swetha Jurado    :    1955    Facility:   [facility] [Outpatient]  Referring/PCP: Jerry Culver MD  Procedure:   Colonoscopy   Date:     2025  Endoscopist:  Anurag Jacobo MD, MD    Preoperative Diagnosis:  family history of colon cancer. Change in bowel habits.    Postoperative Diagnosis:  1.  nonspecific mucosal discoloration in the cecum and ascending colon  2.  Diverticular disease scattered throughout the colon  3.  Random biopsies obtained and sent for pathology  4.  There were no polyps  5.  Small internal hemorrhoids    Anesthesia: MAC    Estimated blood loss: Minimal    Complications:  None    Specimen: Random biopsies of the colonic mucosa    Instrument:     Description of Procedure:  Informed consent was obtained from the patient after explanation of the procedure including indications, description of the procedure,  benefits and possible risks and complications of the procedure, and alternatives. Questions were answered.  The patient's history was reviewed and a directed physical examination was performed prior to the procedure.    Patient was monitored throughout the procedure with pulse oximetry and periodic assessment of vital signs. Patient was sedated as noted above. With the patient initially in the left lateral decubitus position, a digital rectal examination was performed and revealed negative without mass, lesions or tenderness.  The Olympus video colonoscope was placed in the patient's rectum and advanced without difficulty  to the cecum, which was identified by the ileocecal valve and appendiceal orifice.   The prep was fair.  Examination of the mucosa was performed during both introduction and withdrawal of the colonoscope. Retroflexed view of the rectum was performed.        Findings:     The mucosa throughout the colon was closely examined.  Nonspecific discoloration of the mucosa was noted in the cecum

## 2025-01-31 NOTE — ANESTHESIA PRE PROCEDURE
8 MG/3ML SOPN sc injection Inject 2 mg into the skin every 7 days  Patient taking differently: Inject 2 mg into the skin every 7 days Wenesday   Pt last injections will be 1/22 11/27/24   Jerry Culver MD   amLODIPine (NORVASC) 10 MG tablet Take 1 tablet by mouth daily 6/27/22 7/24/22  Jerry Culver MD   lisinopril (PRINIVIL;ZESTRIL) 40 MG tablet Take 1 tablet by mouth daily 6/27/22 7/24/22  Jerry Culver MD   furosemide (LASIX) 40 MG tablet Take 1 tablet by mouth daily 6/21/22 7/24/22  Jerry Culver MD       Current medications:    Current Facility-Administered Medications   Medication Dose Route Frequency Provider Last Rate Last Admin    sodium chloride flush 0.9 % injection 5-40 mL  5-40 mL IntraVENous 2 times per day Sumeet Hernandez MD        sodium chloride flush 0.9 % injection 5-40 mL  5-40 mL IntraVENous PRN Sumeet Hernandez MD        0.9 % sodium chloride infusion   IntraVENous PRN Sumeet Hernandez  mL/hr at 01/31/25 0946 New Bag at 01/31/25 0946       Allergies:    Allergies   Allergen Reactions    Doxycycline Other (See Comments)     Yeast infection       Problem List:    Patient Active Problem List   Diagnosis Code    Meniere's disease H81.09    Peripheral neuropathy G62.9    Mixed hyperlipidemia E78.2    Essential hypertension I10    ASNHL (asymmetrical sensorineural hearing loss) H90.3    ANGLE (obstructive sleep apnea) G47.33    S/P laparoscopic sleeve gastrectomy Z98.84    Anemia D64.9    Diabetic foot infection ([facility]) E11.628, L08.9    Tenosynovitis of foot M65.979    DM2 (diabetes mellitus, type 2) ([facility]) E11.9    CKD (chronic kidney disease) stage 4, GFR 15-29 ml/min ([facility]) N18.4    Anemia of chronic renal failure, stage 4 (severe) ([facility]) N18.4, D63.1    Chronic diastolic CHF (congestive heart failure) ([facility]) I50.32    Acute on chronic congestive heart failure with left ventricular diastolic dysfunction ([facility]) I50.33    Degeneration of intervertebral disc of lumbar region M51.369    Coronary artery

## 2025-01-31 NOTE — ANESTHESIA POSTPROCEDURE EVALUATION
Department of Anesthesiology  Postprocedure Note    Patient: Swetha Jurado  MRN: [id number]  YOB: 1955  Date of evaluation: 1/31/2025    Procedure Summary       Date: 01/31/25 Room / Location: [address]    Anesthesia Start: 1056 Anesthesia Stop: 1133    Procedure: COLONOSCOPY BIOPSY Diagnosis:       Hx of colonic polyps      (Hx of colonic polyps [Z86.0100])    Surgeons: Anurag Jacobo MD Responsible Provider: Rupert Landa MD    Anesthesia Type: MAC ASA Status: 3            Anesthesia Type: MAC    Steven Phase I: Steven Score: 10    Steven Phase II: Steven Score: 10    Anesthesia Post Evaluation    Patient location during evaluation: PACU  Patient participation: complete - patient participated  Level of consciousness: awake  Airway patency: patent  Nausea & Vomiting: no nausea and no vomiting  Cardiovascular status: hemodynamically stable and blood pressure returned to baseline  Respiratory status: spontaneous ventilation, nonlabored ventilation and room air  Hydration status: stable  Comments: Ms. Jurado was seen comfortably conversing with staff following her procedure. Appropriate for discharge home with .   Pain management: adequate    No notable events documented.

## 2025-01-31 NOTE — H&P
[facility]   Attending Pre-operative History and Physical      PROCEDURE:  colonoscopy    Indication:The patient is a 69 y.o. female presents for a [id number].    Past Medical History:    Past Medical History:   Diagnosis Date    Abnormal echocardiogram     25% on 3/11/14 and 50% on 3/19/14    Acute kidney injury superimposed on CKD (HCC) 07/18/2022    Acute respiratory failure 07/18/2022    Acute respiratory failure with hypoxia 07/18/2022    Back pain     Cardiomyopathy (formerly [facility])     EF was 50% on 3/19/14    Cataract     per Dr. Bonner- [facility] eye exam report- 12/05/22  [facility]    Chipped tooth     lower left    CKD (chronic kidney disease) stage 3, GFR 30-59 ml/min (formerly [facility]) 12/11/2018    Clostridium difficile diarrhea 03/12/2021    Dental crown present     veneers    Depressive disorder 08/13/2014    Diabetic infection of right foot (formerly [facility]) 04/15/2015    Diabetic retinopathy (formerly [facility])     per Dr Bonner- [facility] note 12/05/22    Diabetic ulcer of toe of left foot associated with diabetes mellitus due to underlying condition, with fat layer exposed (formerly [facility]) 11/09/2018    Diabetic ulcer of toe of left foot associated with type 2 diabetes mellitus, with fat layer exposed (formerly [facility]) 04/10/2018    Pt slipped in hot tub latter part of February and has not healed since despite topical treatment    DVT (deep venous thrombosis) (formerly [facility])     Glaucoma     per Dr Bonner- DM eye exam report-  12/05/22- [facility]    HTN (hypertension)     Hx of blood clots 2016    left leg    Ischemic cardiomyopathy 04/15/2015    Kidney disease     CHRONIC STAGE 3    Meniere's disease     Mixed hyperlipidemia 03/07/2016    Nicotine abuse     NSTEMI (non-ST elevated myocardial infarction) (formerly [facility]) 03/13/2014    ANGLE (obstructive sleep apnea)     Osteomyelitis of ankle or foot, acute, left 06/04/2018    Pneumonia     PONV (postoperative nausea and vomiting)     nausea    Spinal epidural abscess 03/13/2014    Type II diabetes mellitus, uncontrolled 08/13/2014      Past

## 2025-02-05 RX ORDER — CARVEDILOL 25 MG/1
25 TABLET ORAL 2 TIMES DAILY
Qty: 180 TABLET | Refills: 1 | Status: SHIPPED | OUTPATIENT
Start: 2025-02-05

## 2025-02-10 RX ORDER — ONDANSETRON 4 MG/1
4 TABLET, ORALLY DISINTEGRATING ORAL EVERY 8 HOURS PRN
Qty: 30 TABLET | Refills: 1 | Status: SHIPPED | OUTPATIENT
Start: 2025-02-10

## 2025-02-21 RX ORDER — GABAPENTIN 300 MG/1
300 CAPSULE ORAL 3 TIMES DAILY
Qty: 270 CAPSULE | Refills: 1 | Status: SHIPPED | OUTPATIENT
Start: 2025-02-21 | End: 2025-08-20

## 2025-03-08 ENCOUNTER — TELEPHONE (OUTPATIENT)
Dept: FAMILY MEDICINE CLINIC | Age: 70
End: 2025-03-08

## 2025-03-08 NOTE — PROGRESS NOTES
After-hours patient call. Patient states she has been having blood sugars in the 40s for the past few mornings. She has been experiencing blood sugar drops throughout the day. Per patient's sister patient's diet is poor and she is simply not eating as much. Patient has been taking Ozempic and finding that it is significantly decreasing her appetite to that she just is not eating. Patient reports she is taking 90 units of Lantus daily 30 in the morning and 60 at bedtime. Patient states she does not typically eat after dinner anyways. Patient instructed to decrease Lantus to 30 units at bedtime, check blood sugars 3 times daily and at bedtime. And follow-up with on-call providers or with PCP with results. Patient to hold Ozempic injection until she follows up with her PCP to help properly manage her diabetes. Family had to call EMS this morning to give patient dextrose through the IV. She was not taken to the hospital. She otherwise feels well.

## 2025-03-24 DIAGNOSIS — E11.22 TYPE 2 DIABETES MELLITUS WITH STAGE 4 CHRONIC KIDNEY DISEASE, WITH LONG-TERM CURRENT USE OF INSULIN (HCC): Primary | ICD-10-CM

## 2025-03-24 DIAGNOSIS — I10 ESSENTIAL HYPERTENSION: Chronic | ICD-10-CM

## 2025-03-24 DIAGNOSIS — Z79.4 TYPE 2 DIABETES MELLITUS WITH STAGE 4 CHRONIC KIDNEY DISEASE, WITH LONG-TERM CURRENT USE OF INSULIN (HCC): Primary | ICD-10-CM

## 2025-03-24 DIAGNOSIS — N18.4 TYPE 2 DIABETES MELLITUS WITH STAGE 4 CHRONIC KIDNEY DISEASE, WITH LONG-TERM CURRENT USE OF INSULIN (HCC): Primary | ICD-10-CM

## 2025-03-25 DIAGNOSIS — I10 ESSENTIAL HYPERTENSION: Chronic | ICD-10-CM

## 2025-03-25 RX ORDER — HYDRALAZINE HYDROCHLORIDE 50 MG/1
50 TABLET, FILM COATED ORAL 2 TIMES DAILY
Qty: 180 TABLET | Refills: 1 | OUTPATIENT
Start: 2025-03-25

## 2025-03-26 ENCOUNTER — TELEPHONE (OUTPATIENT)
Dept: PRIMARY CARE CLINIC | Age: 70
End: 2025-03-26

## 2025-03-26 NOTE — TELEPHONE ENCOUNTER
Patient requested refill, but has not been seen since 12/2/24; advised patient to schedule an appointment; patient refused & hung up said she will order from [facility]

## 2025-03-27 RX ORDER — DAPAGLIFLOZIN 10 MG/1
10 TABLET, FILM COATED ORAL EVERY MORNING
Qty: 90 TABLET | Refills: 1 | Status: SHIPPED | OUTPATIENT
Start: 2025-03-27

## 2025-03-27 RX ORDER — HYDRALAZINE HYDROCHLORIDE 50 MG/1
50 TABLET, FILM COATED ORAL 2 TIMES DAILY
Qty: 180 TABLET | Refills: 1 | Status: SHIPPED | OUTPATIENT
Start: 2025-03-27

## 2025-03-27 RX ORDER — FLURBIPROFEN SODIUM 0.3 MG/ML
SOLUTION/ DROPS OPHTHALMIC
Qty: 200 EACH | Refills: 1 | Status: SHIPPED | OUTPATIENT
Start: 2025-03-27

## 2025-04-04 RX ORDER — PANTOPRAZOLE SODIUM 40 MG/1
40 TABLET, DELAYED RELEASE ORAL DAILY
Qty: 90 TABLET | Refills: 1 | Status: SHIPPED | OUTPATIENT
Start: 2025-04-04

## 2025-04-07 RX ORDER — INSULIN GLARGINE 100 [IU]/ML
INJECTION, SOLUTION SUBCUTANEOUS
Qty: 81 ML | Refills: 1 | Status: SHIPPED | OUTPATIENT
Start: 2025-04-07 | End: 2025-10-04

## 2025-05-01 RX ORDER — CITALOPRAM HYDROBROMIDE 40 MG/1
40 TABLET ORAL DAILY
Qty: 90 TABLET | Refills: 0 | Status: SHIPPED | OUTPATIENT
Start: 2025-05-01

## 2025-05-01 RX ORDER — ATORVASTATIN CALCIUM 80 MG/1
80 TABLET, FILM COATED ORAL DAILY
Qty: 90 TABLET | Refills: 0 | Status: SHIPPED | OUTPATIENT
Start: 2025-05-01

## 2025-05-01 NOTE — TELEPHONE ENCOUNTER
Needs A1C    Needs AWV    Needs fasting labs.     All BP's > 140/90    No future appts listed.       Note attached to Rx.

## 2025-05-30 RX ORDER — SEMAGLUTIDE 2.68 MG/ML
2 INJECTION, SOLUTION SUBCUTANEOUS
Qty: 4 ML | Refills: 3 | Status: SHIPPED | OUTPATIENT
Start: 2025-05-30

## 2025-05-30 RX ORDER — CITALOPRAM HYDROBROMIDE 40 MG/1
40 TABLET ORAL DAILY
Qty: 90 TABLET | Refills: 0 | Status: SHIPPED | OUTPATIENT
Start: 2025-05-30

## 2025-05-30 NOTE — TELEPHONE ENCOUNTER
Medication:   Requested Prescriptions     Pending Prescriptions Disp Refills    OZEMPIC, 2 MG/DOSE, 8 MG/3ML SOPN sc injection [Pharmacy Med Name: OZEMPIC 8 MG/3 ML (2 MG/DOSE)]  1     Sig: INJECT 2 MG INTO THE SKIN EVERY 7 DAYS    citalopram (CELEXA) 40 MG tablet [Pharmacy Med Name: CITALOPRAM HBR 40 MG TABLET] 90 tablet 0     Sig: TAKE 1 TABLET BY MOUTH DAILY PT NEEDS TO COME IN AND BE SEEN BEFORE MORE RX'S ARE GIVEN.        Last Filled:  [unfilled]    Patient Phone Number: [phone] (home)     Last appt: 12/2/2024   Next appt: 6/12/2025    Last OARRS:        No data to display               Name band;

## 2025-06-05 RX ORDER — ATORVASTATIN CALCIUM 80 MG/1
80 TABLET, FILM COATED ORAL DAILY
Qty: 90 TABLET | Refills: 0 | OUTPATIENT
Start: 2025-06-05

## 2025-06-09 SDOH — ECONOMIC STABILITY: INCOME INSECURITY: IN THE LAST 12 MONTHS, WAS THERE A TIME WHEN YOU WERE NOT ABLE TO PAY THE MORTGAGE OR RENT ON TIME?: NO

## 2025-06-09 SDOH — HEALTH STABILITY: PHYSICAL HEALTH: ON AVERAGE, HOW MANY MINUTES DO YOU ENGAGE IN EXERCISE AT THIS LEVEL?: 0 MIN

## 2025-06-09 SDOH — ECONOMIC STABILITY: FOOD INSECURITY: WITHIN THE PAST 12 MONTHS, THE FOOD YOU BOUGHT JUST DIDN'T LAST AND YOU DIDN'T HAVE MONEY TO GET MORE.: NEVER TRUE

## 2025-06-09 SDOH — ECONOMIC STABILITY: FOOD INSECURITY: WITHIN THE PAST 12 MONTHS, YOU WORRIED THAT YOUR FOOD WOULD RUN OUT BEFORE YOU GOT MONEY TO BUY MORE.: NEVER TRUE

## 2025-06-09 SDOH — HEALTH STABILITY: PHYSICAL HEALTH: ON AVERAGE, HOW MANY DAYS PER WEEK DO YOU ENGAGE IN MODERATE TO STRENUOUS EXERCISE (LIKE A BRISK WALK)?: 0 DAYS

## 2025-06-09 SDOH — ECONOMIC STABILITY: TRANSPORTATION INSECURITY
IN THE PAST 12 MONTHS, HAS THE LACK OF TRANSPORTATION KEPT YOU FROM MEDICAL APPOINTMENTS OR FROM GETTING MEDICATIONS?: NO

## 2025-06-09 ASSESSMENT — PATIENT HEALTH QUESTIONNAIRE - PHQ9
2. FEELING DOWN, DEPRESSED OR HOPELESS: NOT AT ALL
SUM OF ALL RESPONSES TO PHQ QUESTIONS 1-9: 0
1. LITTLE INTEREST OR PLEASURE IN DOING THINGS: NOT AT ALL

## 2025-06-09 ASSESSMENT — LIFESTYLE VARIABLES
HOW MANY STANDARD DRINKS CONTAINING ALCOHOL DO YOU HAVE ON A TYPICAL DAY: 1 OR 2
HOW OFTEN DO YOU HAVE A DRINK CONTAINING ALCOHOL: 2
HOW OFTEN DO YOU HAVE A DRINK CONTAINING ALCOHOL: MONTHLY OR LESS
HOW OFTEN DO YOU HAVE SIX OR MORE DRINKS ON ONE OCCASION: 1
HOW MANY STANDARD DRINKS CONTAINING ALCOHOL DO YOU HAVE ON A TYPICAL DAY: 1

## 2025-06-12 ENCOUNTER — OFFICE VISIT (OUTPATIENT)
Dept: PRIMARY CARE CLINIC | Age: 70
End: 2025-06-12
Payer: MEDICARE

## 2025-06-12 VITALS
OXYGEN SATURATION: 96 % | HEIGHT: 63 IN | WEIGHT: 203 LBS | TEMPERATURE: 97.2 F | DIASTOLIC BLOOD PRESSURE: 64 MMHG | HEART RATE: 60 BPM | SYSTOLIC BLOOD PRESSURE: 110 MMHG | BODY MASS INDEX: 35.97 KG/M2

## 2025-06-12 DIAGNOSIS — Z79.4 TYPE 2 DIABETES MELLITUS WITH STAGE 4 CHRONIC KIDNEY DISEASE, WITH LONG-TERM CURRENT USE OF INSULIN (HCC): ICD-10-CM

## 2025-06-12 DIAGNOSIS — G47.33 OSA (OBSTRUCTIVE SLEEP APNEA): ICD-10-CM

## 2025-06-12 DIAGNOSIS — E78.2 MIXED HYPERLIPIDEMIA: ICD-10-CM

## 2025-06-12 DIAGNOSIS — N18.4 CKD (CHRONIC KIDNEY DISEASE) STAGE 4, GFR 15-29 ML/MIN (HCC): ICD-10-CM

## 2025-06-12 DIAGNOSIS — N18.4 TYPE 2 DIABETES MELLITUS WITH STAGE 4 CHRONIC KIDNEY DISEASE, WITH LONG-TERM CURRENT USE OF INSULIN (HCC): ICD-10-CM

## 2025-06-12 DIAGNOSIS — D63.1 ANEMIA OF CHRONIC RENAL FAILURE, STAGE 4 (SEVERE) (HCC): ICD-10-CM

## 2025-06-12 DIAGNOSIS — I10 ESSENTIAL HYPERTENSION: Chronic | ICD-10-CM

## 2025-06-12 DIAGNOSIS — N18.4 ANEMIA OF CHRONIC RENAL FAILURE, STAGE 4 (SEVERE) (HCC): ICD-10-CM

## 2025-06-12 DIAGNOSIS — E11.42 DIABETIC PERIPHERAL NEUROPATHY (HCC): ICD-10-CM

## 2025-06-12 DIAGNOSIS — E11.22 TYPE 2 DIABETES MELLITUS WITH STAGE 4 CHRONIC KIDNEY DISEASE, WITH LONG-TERM CURRENT USE OF INSULIN (HCC): ICD-10-CM

## 2025-06-12 DIAGNOSIS — Z00.00 MEDICARE ANNUAL WELLNESS VISIT, SUBSEQUENT: Primary | ICD-10-CM

## 2025-06-12 DIAGNOSIS — I50.32 CHRONIC DIASTOLIC CHF (CONGESTIVE HEART FAILURE) (HCC): ICD-10-CM

## 2025-06-12 LAB — HBA1C MFR BLD: 6.1 %

## 2025-06-12 PROCEDURE — 3044F HG A1C LEVEL LT 7.0%: CPT | Performed by: FAMILY MEDICINE

## 2025-06-12 PROCEDURE — 1159F MED LIST DOCD IN RCRD: CPT | Performed by: FAMILY MEDICINE

## 2025-06-12 PROCEDURE — 3074F SYST BP LT 130 MM HG: CPT | Performed by: FAMILY MEDICINE

## 2025-06-12 PROCEDURE — 99213 OFFICE O/P EST LOW 20 MIN: CPT | Performed by: FAMILY MEDICINE

## 2025-06-12 PROCEDURE — G0439 PPPS, SUBSEQ VISIT: HCPCS | Performed by: FAMILY MEDICINE

## 2025-06-12 PROCEDURE — 1123F ACP DISCUSS/DSCN MKR DOCD: CPT | Performed by: FAMILY MEDICINE

## 2025-06-12 PROCEDURE — 3078F DIAST BP <80 MM HG: CPT | Performed by: FAMILY MEDICINE

## 2025-06-12 PROCEDURE — 83036 HEMOGLOBIN GLYCOSYLATED A1C: CPT | Performed by: FAMILY MEDICINE

## 2025-06-12 RX ORDER — INSULIN GLARGINE 100 [IU]/ML
30 INJECTION, SOLUTION SUBCUTANEOUS NIGHTLY
Qty: 15 ADJUSTABLE DOSE PRE-FILLED PEN SYRINGE | Refills: 1 | Status: SHIPPED | OUTPATIENT
Start: 2025-06-12

## 2025-06-12 RX ORDER — PANTOPRAZOLE SODIUM 20 MG/1
40 TABLET, DELAYED RELEASE ORAL DAILY PRN
Qty: 90 TABLET | Refills: 1
Start: 2025-06-12

## 2025-06-12 NOTE — PATIENT INSTRUCTIONS
Learning About Being Active as an Older Adult  Why is being active important as you get older?     Being active is one of the best things you can do for your health. And it's never too late to start. Being active--or getting active, if you aren't already--has definite benefits. It can:  Give you more energy,  Keep your mind sharp.  Improve balance to reduce your risk of falls.  Help you manage chronic illness with fewer medicines.  No matter how old you are, how fit you are, or what health problems you have, there is a form of activity that will work for you. And the more physical activity you can do, the better your overall health will be.  What kinds of activity can help you stay healthy?  Being more active will make your daily activities easier. Physical activity includes planned exercise and things you do in daily life. There are four types of activity:  Aerobic.  Doing aerobic activity makes your heart and lungs strong.  Includes walking, dancing, and gardening.  Aim for at least 2½ hours spread throughout the week.  It improves your energy and can help you sleep better.  Muscle-strengthening.  This type of activity can help maintain muscle and strengthen bones.  Includes climbing stairs, using resistance bands, and lifting or carrying heavy loads.  Aim for at least twice a week.  It can help protect the knees and other joints.  Stretching.  Stretching gives you better range of motion in joints and muscles.  Includes upper arm stretches, calf stretches, and gentle yoga.  Aim for at least twice a week, preferably after your muscles are warmed up from other activities.  It can help you function better in daily life.  Balancing.  This helps you stay coordinated and have good posture.  Includes heel-to-toe walking, mila chi, and certain types of yoga.  Aim for at least 3 days a week.  It can reduce your risk of falling.  Even if you have a hard time meeting the recommendations, it's better to be more active

## 2025-06-12 NOTE — PROGRESS NOTES
Jerry Culver MD   ondansetron (ZOFRAN-ODT) 4 MG disintegrating tablet Take 1 tablet by mouth every 8 hours as needed for Nausea or Vomiting Yes Jerry Culver MD   amLODIPine (NORVASC) 10 MG tablet Take 1 tablet by mouth daily  Jerry Culver MD   lisinopril (PRINIVIL;ZESTRIL) 40 MG tablet Take 1 tablet by mouth daily  Jerry Culver MD   furosemide (LASIX) 40 MG tablet Take 1 tablet by mouth daily  Jerry Culver MD   acetaminophen (TYLENOL) 500 MG tablet Take 1 tablet by mouth every 6 hours as needed for Pain  Provider, MD Ke Long (Including outside providers/suppliers regularly involved in providing care):   Patient Care Team:  Jerry Culver MD as PCP - General (Internal Medicine)  Jerry Culver MD as PCP - Empaneled Provider  Simone Spring DPM as Consulting Physician (Podiatry)     Recommendations for Preventive Services Due: see orders and patient instructions/AVS.  Recommended screening schedule for the next 5-10 years is provided to the patient in written form: see Patient Instructions/AVS.     Reviewed and updated this visit:  Allergies  Meds

## 2025-07-31 ENCOUNTER — CARE COORDINATION (OUTPATIENT)
Dept: CARE COORDINATION | Age: 70
End: 2025-07-31

## 2025-08-01 ENCOUNTER — CARE COORDINATION (OUTPATIENT)
Dept: CARE COORDINATION | Age: 70
End: 2025-08-01

## 2025-08-01 NOTE — CARE COORDINATION
Ambulatory Care Coordination Note     8/1/2025 10:37 AM     Patient outreach attempt by this ACM today to offer care management services. ACM was unable to reach the patient by telephone today;   voicemail full and unable to leave a message.      ACM: Ratna Gilmore RN     Care Summary Note: UTRx2, Mychart message sent.     PCP/Specialist follow up:   Future Appointments         Provider Specialty Dept Phone    8/7/2025 3:30 PM Bryson Nunes MD Nephrology [phone]    9/12/2025 1:45 PM Jerry Culver MD Primary Care [phone]            Follow Up:   Plan for next AC outreach in approximately 1-2 days  to complete:  - outreach attempt to offer care management services.

## 2025-08-04 ENCOUNTER — CARE COORDINATION (OUTPATIENT)
Dept: CARE COORDINATION | Age: 70
End: 2025-08-04

## 2025-08-25 RX ORDER — GABAPENTIN 300 MG/1
300 CAPSULE ORAL 3 TIMES DAILY
Qty: 270 CAPSULE | Refills: 1 | Status: SHIPPED | OUTPATIENT
Start: 2025-08-25 | End: 2026-02-21

## (undated) DEVICE — BANDAGE COBAN 4 IN COMPR W4INXL5YD FOAM COHESIVE QUIK STK SELF ADH SFT

## (undated) DEVICE — 3M™ WARMING BLANKET, UPPER BODY, 10 PER CASE, 42268: Brand: BAIR HUGGER™

## (undated) DEVICE — PLATE ES AD W 9FT CRD 2

## (undated) DEVICE — TROCAR: Brand: KII OPTICAL ACCESS SYSTEM

## (undated) DEVICE — TURNOVER KIT RM INF CTRL TECH

## (undated) DEVICE — GAUZE,SPONGE,4"X4",16PLY,XRAY,STRL,LF: Brand: MEDLINE

## (undated) DEVICE — SURGICAL SET UP - SURE SET: Brand: MEDLINE INDUSTRIES, INC.

## (undated) DEVICE — INTENDED FOR TISSUE SEPARATION, AND OTHER PROCEDURES THAT REQUIRE A SHARP SURGICAL BLADE TO PUNCTURE OR CUT.: Brand: BARD-PARKER ® CARBON RIB-BACK BLADES

## (undated) DEVICE — Device

## (undated) DEVICE — E-Z CLEAN, NON-STICK, PTFE COATED, ELECTROSURGICAL BLADE ELECTRODE, 2.5 INCH (6.35 CM): Brand: EZ CLEAN

## (undated) DEVICE — SYRINGE MED 10ML TRNSLUC BRL PLUNG BLK MRK POLYPR CTRL

## (undated) DEVICE — GLOVE ORANGE PI 8   MSG9080

## (undated) DEVICE — SUTURE VCRL SZ 4-0 L27IN ABSRB UD L26MM SH 1/2 CIR J415H

## (undated) DEVICE — ELECTROSURGICAL PENCIL ROCKER SWITCH NON COATED BLADE ELECTRODE 10 FT (3 M) CORD HOLSTER: Brand: MEGADYNE

## (undated) DEVICE — TROCAR: Brand: KII SHIELDED BLADED ACCESS SYSTEM

## (undated) DEVICE — SPHINCTEROTOME: Brand: JAGTOME RX 39

## (undated) DEVICE — SOLUTION ANTIFOG VIS SYS CLEARIFY LAPSCP

## (undated) DEVICE — FORCEPS BX L240CM DIA2.4MM L NDL RAD JAW 4 133340

## (undated) DEVICE — SYRINGE MED 10ML LUERLOCK TIP W/O SFTY DISP

## (undated) DEVICE — NEEDLE INSUF L150MM DIA2MM DISP FOR PNEUMOPERI ENDOPATH

## (undated) DEVICE — PODIATRY PK

## (undated) DEVICE — OBTURATOR ROBOTIC DIA8MM LNG BLDELSS ENDOSCP DISP DA VINCI

## (undated) DEVICE — SUTURE VCRL SZ 4-0 L18IN ABSRB UD L19MM PS-2 3/8 CIR PRIM J496H

## (undated) DEVICE — GLOVE ORANGE PI 7 1/2   MSG9075

## (undated) DEVICE — PUMP SUC IRR TBNG L10FT W/ HNDPC ASSEMB STRYKEFLOW 2

## (undated) DEVICE — BANDAGE COMPR M W4INXL10YD WHT BGE VELC E MTRX HK AND LOOP

## (undated) DEVICE — COVER,MAYO STAND,XL,STERILE: Brand: MEDLINE

## (undated) DEVICE — GARMENT,MEDLINE,DVT,INT,CALF,MED, GEN2: Brand: MEDLINE

## (undated) DEVICE — DEVICE LOK BILI BX CAP RAP EXCHG DISPOSABLE

## (undated) DEVICE — CADIERE FORCEPS: Brand: ENDOWRIST;DAVINCI SI

## (undated) DEVICE — PROTECTOR ULN NRV PUR FOAM HK LOOP STRP ANATOMICALLY

## (undated) DEVICE — STAPLER SKIN L440MM 32MM LNG 12 FIRING B FRM PWR + GRIPPING

## (undated) DEVICE — RAN-1115 BLUE F/G, STERILE, K2M P/N 74193-01M: Brand: RAN-1115

## (undated) DEVICE — 40583 XL ADVANCED TRENDELENBURG POSITIONING KIT: Brand: 40583 XL ADVANCED TRENDELENBURG POSITIONING KIT

## (undated) DEVICE — ORGANIZER MED DEV W76XL86CM PCH W25XL28CM FOR ENDOSCP TBL

## (undated) DEVICE — PADDING CAST W4INXL4YD HIGHLY ABSRB THAN COT EZ APPL

## (undated) DEVICE — SUTURE VCRL 5-0 L18IN ABSRB UD PS-2 L19MM 1/2 CIR J495H

## (undated) DEVICE — TRAY PREP DRY W/ PREM GLV 2 APPL 6 SPNG 2 UNDPD 1 OVERWRAP

## (undated) DEVICE — BOWL MED L 32OZ PLAS W/ MOLD GRAD EZ OPN PEEL PCH

## (undated) DEVICE — SUTURE VCRL SZ 4-0 L18IN ABSRB UD L13MM PC-1 3/8 CIR PRIM J835G

## (undated) DEVICE — SKIN AFFIX SURG ADHESIVE 72/CS 0.55ML: Brand: MEDLINE

## (undated) DEVICE — INSUFFLATION NEEDLE TO ESTABLISH PNEUMOPERITONEUM.: Brand: INSUFFLATION NEEDLE

## (undated) DEVICE — LAPAROSCOPIC SCISSORS: Brand: EPIX LAPAROSCOPIC SCISSORS

## (undated) DEVICE — COVER LT HNDL BLU PLAS

## (undated) DEVICE — PADDING CAST W4INXL4YD ST COT RAYON MICROPLEATED HIGHLY

## (undated) DEVICE — COAXIAL HIGH FLOW TIP WITH SOFT SHIELD

## (undated) DEVICE — SYRINGE MED 30ML STD CLR PLAS LUERLOCK TIP N CTRL DISP

## (undated) DEVICE — GOWN,SIRUS,POLYRNF,BRTHSLV,XL,30/CS: Brand: MEDLINE

## (undated) DEVICE — STERILE PVP: Brand: MEDLINE INDUSTRIES, INC.

## (undated) DEVICE — GLOVE ORANGE PI 7   MSG9070

## (undated) DEVICE — SUTURE VCRL SZ 3-0 L27IN ABSRB UD L26MM CT-2 1/2 CIR J232H

## (undated) DEVICE — SOLUTION INJ LR VISIV 1000ML BG

## (undated) DEVICE — STANDARD HYPODERMIC NEEDLE,POLYPROPYLENE HUB: Brand: MONOJECT

## (undated) DEVICE — SET INSUF TUBE HEAT ISO CONN DISP

## (undated) DEVICE — HANDPIECE SUCTION TUBING INTERPULSE 10FT

## (undated) DEVICE — 4F 40 CM NOVASIL SILICONE SINGLE LUMEN EMBOLECTOMY CATHETER, EIFU: Brand: LEMAITRE EMBOLECTOMY CATHETER

## (undated) DEVICE — SUTURE VCRL + SZ 4-0 L18IN ABSRB UD L19MM PS-2 3/8 CIR PRIM VCP496H

## (undated) DEVICE — ADTEC SINGLE USE HOOK SCISSORS, SHAFT ONLY, MONOPOLAR, STRAIGHT, WORKING LENGTH: 12 1/4", (310 MM), DIAM. 5 MM, BLUNT/BLUNT, INSULATED, SINGLE ACTION, STERILE, DISPOSABLE, PACKAGE OF 10 PIECES: Brand: AESCULAP

## (undated) DEVICE — BINDER ABD H12IN FOR 62-74IN WAIST UNIV 4 PNL PREM DSGN E

## (undated) DEVICE — KIT DRP 3 ARM ACC DISP ENDOWRIST DA VINCI SI

## (undated) DEVICE — TRAY CATHETER 16FR F INCLUDE BARDX IC COMPLT CARE DRNGE BG

## (undated) DEVICE — ENDOSCOPY KIT: Brand: MEDLINE INDUSTRIES, INC.

## (undated) DEVICE — GOWN SIRUS NONREIN XL W/TWL: Brand: MEDLINE INDUSTRIES, INC.

## (undated) DEVICE — SUTURE NONABSORBABLE MONOFILAMENT 4-0 PS-2 18 IN BLU PROLENE 8682H

## (undated) DEVICE — VESSEL SEALER: Brand: ENDOWRIST;DAVINCI SI

## (undated) DEVICE — SPLINT CAST 4INX15FT ORTHO GLS

## (undated) DEVICE — SINGLE USE DISTAL COVER MAJ-2315: Brand: SINGLE USE DISTAL COVER

## (undated) DEVICE — VISIGI 3D®  CALIBRATION SYSTEM  SIZE 36FR STD W/ BULB: Brand: BOEHRINGER® VISIGI 3D™ SLEEVE GASTRECTOMY CALIBRATION SYSTEM, SIZE 36FR W/BULB

## (undated) DEVICE — JEWISH HOSPITAL TURNOVER KIT: Brand: MEDLINE INDUSTRIES, INC.

## (undated) DEVICE — SUTURE ETHLN SZ 3-0 L18IN NONABSORBABLE BLK PS-2 L19MM 3/8 1669H

## (undated) DEVICE — SURE SET-DOUBLE BASIN-LF: Brand: MEDLINE INDUSTRIES, INC.

## (undated) DEVICE — INDICATED FOR USE DURING OPEN AND LAPAROSCOPIC CHOLECYSTECTOMY PROCEDURES TO INJECT RADIOPAQUE MEDIA THROUGH THE CYSTIC DUCT INTO THE BILIARY TREE.: Brand: AEROSTAT®

## (undated) DEVICE — PMI DISPOSABLE PUNCTURE CLOSURE DEVICE / SUTURE GRASPER: Brand: PMI

## (undated) DEVICE — C-ARM: Brand: UNBRANDED

## (undated) DEVICE — SYRINGE CATH TIP 50ML

## (undated) DEVICE — AGENT HEMSTAT W2XL4IN OXIDIZED REGENERATED CELOS ABSRB

## (undated) DEVICE — STRIP,CLOSURE,WOUND,MEDI-STRIP,1/2X4: Brand: MEDLINE

## (undated) DEVICE — Z INACTIVE USE 2660664 SOLUTION IRRIG 3000ML 0.9% SOD CHL USP UROMATIC PLAS CONT

## (undated) DEVICE — TISSUE RETRIEVAL SYSTEM: Brand: INZII RETRIEVAL SYSTEM

## (undated) DEVICE — GARMENT COMPR STD FOR 17IN CALF UNIF THER FLOTRN

## (undated) DEVICE — SUTURE VCRL SZ 2-0 L27IN ABSRB UD L26MM SH 1/2 CIR J417H

## (undated) DEVICE — LARGE NEEDLE DRIVER: Brand: ENDOWRIST;DAVINCI SI

## (undated) DEVICE — SOLUTION IV 1000ML 0.9% SOD CHL

## (undated) DEVICE — HIGH FLOW TIP

## (undated) DEVICE — SYRINGE MED 10ML POLYPR LUERSLIP TIP FLAT TOP W/O SFTY DISP

## (undated) DEVICE — GLOVE SURG SZ 85 L12IN FNGR ORTHO 126MIL CRM LTX FREE

## (undated) DEVICE — TROCAR: Brand: KII SLEEVE

## (undated) DEVICE — SYRINGE 20ML LL S/C 50

## (undated) DEVICE — TROCAR: Brand: KII FIOS FIRST ENTRY

## (undated) DEVICE — LIQUIBAND RAPID ADHESIVE 36/CS 0.8ML: Brand: MEDLINE

## (undated) DEVICE — MERCY HEALTH WEST TURNOVER: Brand: MEDLINE INDUSTRIES, INC.

## (undated) DEVICE — GOWN,SIRUS,POLYRNF,BRTHSLV,LG,30/CS: Brand: MEDLINE

## (undated) DEVICE — GENERAL LAPAROSCOPY: Brand: MEDLINE INDUSTRIES, INC.

## (undated) DEVICE — LOOP LIG SUT SZ 0 L18IN ABSRB POLYDIOXANONE MFIL PDS II

## (undated) DEVICE — SUTURE VCRL + SZ 0 L27IN ABSRB VLT L26MM UR-6 5/8 CIR VCP603H

## (undated) DEVICE — TRAY SKIN SCRB 2 OZ PVP- 1 BTL

## (undated) DEVICE — [HIGH FLOW INSUFFLATOR,  DO NOT USE IF PACKAGE IS DAMAGED,  KEEP DRY,  KEEP AWAY FROM SUNLIGHT,  PROTECT FROM HEAT AND RADIOACTIVE SOURCES.]: Brand: PNEUMOSURE

## (undated) DEVICE — PRE OP PACK: Brand: MEDLINE INDUSTRIES, INC.

## (undated) DEVICE — CORD ES L15FT PT RET REUSE VALLEYLAB REM

## (undated) DEVICE — TRAP SPEC RETRV CLR PLAS POLYP IN LN SUCT QUIK CTCH

## (undated) DEVICE — PAD,ABDOMINAL,5"X9",ST,LF,25/BX: Brand: MEDLINE INDUSTRIES, INC.

## (undated) DEVICE — SUTURE VCRL SZ 0 L54IN ABSRB VLT W/O NDL POLYGLACTIN 910 J616H

## (undated) DEVICE — TOWEL,OR,DSP,ST,BLUE,DLX,8/PK,10PK/CS: Brand: MEDLINE

## (undated) DEVICE — CHLORAPREP 26ML ORANGE

## (undated) DEVICE — SPONGE,LAP,18"X18",DLX,XR,ST,5/PK,40/PK: Brand: MEDLINE

## (undated) DEVICE — ANTI-FOG SOLUTION WITH FOAM PAD: Brand: DEVON

## (undated) DEVICE — THIN OFFSET (9.0 X 0.38 X 25.0MM)

## (undated) DEVICE — SNARE ENDOSCP L240CM OD24MM LOOP W10MM RND INSUL STIFF BRAID

## (undated) DEVICE — DRAPE,LAP,CHOLE,W/TROUGHS,STERILE: Brand: MEDLINE

## (undated) DEVICE — APPLIER CLP M L L11.4IN DIA10MM ENDOSCP ROT MULT FOR LIG

## (undated) DEVICE — CURITY STRETCH BANDAGE: Brand: CURITY

## (undated) DEVICE — GLOVE SURG SZ 75 L12IN FNGR THK79MIL GRN LTX FREE

## (undated) DEVICE — SYSTEM SMK EVAC LAP TBNG FILTER HSNG BENT STYL PNK SEE CLR

## (undated) DEVICE — RETRIEVAL BALLOON CATHETER: Brand: EXTRACTOR™ PRO RX

## (undated) DEVICE — BITE BLOCK ENDOSCP AD 60 FR W/ ADJ STRP PLAS GRN BLOX

## (undated) DEVICE — GOWN SIRUS NONREIN LG W/TWL: Brand: MEDLINE INDUSTRIES, INC.

## (undated) DEVICE — ROYAL SILK SURGICAL GOWN, XXXL, LONG: Brand: CONVERTORS

## (undated) DEVICE — 3M™ STERI-STRIP™ REINFORCED ADHESIVE SKIN CLOSURES, R1541, 1/4 IN X 3 IN (6 MM X 75 MM), 3 STRIPS/ENVELOPE: Brand: 3M™ STERI-STRIP™

## (undated) DEVICE — BNDG,ELSTC,MATRIX,STRL,4"X5YD,LF,HOOK&LP: Brand: MEDLINE

## (undated) DEVICE — MASTISOL ADHESIVE LIQ 2/3ML

## (undated) DEVICE — K WIRE FIX L6IN DIA0.045IN 1600645] MICROAIRE SURGICAL INSTRUMENTS INC]

## (undated) DEVICE — GARMENT,MEDLINE,DVT,INT,CALF,LG, GEN2: Brand: MEDLINE